# Patient Record
Sex: FEMALE | Race: WHITE | NOT HISPANIC OR LATINO | Employment: OTHER | URBAN - METROPOLITAN AREA
[De-identification: names, ages, dates, MRNs, and addresses within clinical notes are randomized per-mention and may not be internally consistent; named-entity substitution may affect disease eponyms.]

---

## 2017-04-17 ENCOUNTER — ALLSCRIPTS OFFICE VISIT (OUTPATIENT)
Dept: OTHER | Facility: OTHER | Age: 67
End: 2017-04-17

## 2017-04-17 DIAGNOSIS — M25.561 PAIN IN RIGHT KNEE: ICD-10-CM

## 2017-04-27 ENCOUNTER — APPOINTMENT (OUTPATIENT)
Dept: PHYSICAL THERAPY | Facility: CLINIC | Age: 67
End: 2017-04-27
Payer: COMMERCIAL

## 2017-04-27 PROCEDURE — 97161 PT EVAL LOW COMPLEX 20 MIN: CPT

## 2017-04-27 PROCEDURE — 97535 SELF CARE MNGMENT TRAINING: CPT

## 2017-05-15 ENCOUNTER — APPOINTMENT (OUTPATIENT)
Dept: PHYSICAL THERAPY | Facility: CLINIC | Age: 67
End: 2017-05-15
Payer: COMMERCIAL

## 2017-05-15 PROCEDURE — 97110 THERAPEUTIC EXERCISES: CPT

## 2017-05-18 ENCOUNTER — APPOINTMENT (OUTPATIENT)
Dept: PHYSICAL THERAPY | Facility: CLINIC | Age: 67
End: 2017-05-18
Payer: COMMERCIAL

## 2017-05-23 ENCOUNTER — APPOINTMENT (OUTPATIENT)
Dept: PHYSICAL THERAPY | Facility: CLINIC | Age: 67
End: 2017-05-23
Payer: COMMERCIAL

## 2017-05-23 PROCEDURE — 97110 THERAPEUTIC EXERCISES: CPT

## 2017-05-25 ENCOUNTER — APPOINTMENT (OUTPATIENT)
Dept: PHYSICAL THERAPY | Facility: CLINIC | Age: 67
End: 2017-05-25
Payer: COMMERCIAL

## 2017-05-26 ENCOUNTER — APPOINTMENT (OUTPATIENT)
Dept: PHYSICAL THERAPY | Facility: CLINIC | Age: 67
End: 2017-05-26
Payer: COMMERCIAL

## 2017-05-26 PROCEDURE — 97164 PT RE-EVAL EST PLAN CARE: CPT

## 2017-05-26 PROCEDURE — 97140 MANUAL THERAPY 1/> REGIONS: CPT

## 2017-05-26 PROCEDURE — 97110 THERAPEUTIC EXERCISES: CPT

## 2017-05-30 ENCOUNTER — APPOINTMENT (OUTPATIENT)
Dept: PHYSICAL THERAPY | Facility: CLINIC | Age: 67
End: 2017-05-30
Payer: COMMERCIAL

## 2017-05-30 PROCEDURE — 97110 THERAPEUTIC EXERCISES: CPT

## 2017-05-30 PROCEDURE — 97140 MANUAL THERAPY 1/> REGIONS: CPT

## 2017-05-31 ENCOUNTER — ALLSCRIPTS OFFICE VISIT (OUTPATIENT)
Dept: OTHER | Facility: OTHER | Age: 67
End: 2017-05-31

## 2017-05-31 DIAGNOSIS — R92.8 OTHER ABNORMAL AND INCONCLUSIVE FINDINGS ON DIAGNOSTIC IMAGING OF BREAST: ICD-10-CM

## 2017-06-01 ENCOUNTER — APPOINTMENT (OUTPATIENT)
Dept: PHYSICAL THERAPY | Facility: CLINIC | Age: 67
End: 2017-06-01
Payer: COMMERCIAL

## 2017-06-07 ENCOUNTER — APPOINTMENT (OUTPATIENT)
Dept: PHYSICAL THERAPY | Facility: CLINIC | Age: 67
End: 2017-06-07
Payer: COMMERCIAL

## 2017-06-07 PROCEDURE — 97110 THERAPEUTIC EXERCISES: CPT

## 2017-06-08 ENCOUNTER — APPOINTMENT (OUTPATIENT)
Dept: PHYSICAL THERAPY | Facility: CLINIC | Age: 67
End: 2017-06-08
Payer: COMMERCIAL

## 2017-06-08 PROCEDURE — 97110 THERAPEUTIC EXERCISES: CPT

## 2017-06-08 PROCEDURE — 97140 MANUAL THERAPY 1/> REGIONS: CPT

## 2017-06-13 ENCOUNTER — APPOINTMENT (OUTPATIENT)
Dept: PHYSICAL THERAPY | Facility: CLINIC | Age: 67
End: 2017-06-13
Payer: COMMERCIAL

## 2017-06-13 PROCEDURE — 97110 THERAPEUTIC EXERCISES: CPT

## 2017-06-15 ENCOUNTER — ALLSCRIPTS OFFICE VISIT (OUTPATIENT)
Dept: OTHER | Facility: OTHER | Age: 67
End: 2017-06-15

## 2017-06-15 ENCOUNTER — APPOINTMENT (OUTPATIENT)
Dept: PHYSICAL THERAPY | Facility: CLINIC | Age: 67
End: 2017-06-15
Payer: COMMERCIAL

## 2017-06-19 ENCOUNTER — APPOINTMENT (OUTPATIENT)
Dept: PHYSICAL THERAPY | Facility: CLINIC | Age: 67
End: 2017-06-19
Payer: COMMERCIAL

## 2017-06-19 PROCEDURE — 97164 PT RE-EVAL EST PLAN CARE: CPT

## 2017-06-20 ENCOUNTER — GENERIC CONVERSION - ENCOUNTER (OUTPATIENT)
Dept: OTHER | Facility: OTHER | Age: 67
End: 2017-06-20

## 2017-06-23 ENCOUNTER — HOSPITAL ENCOUNTER (OUTPATIENT)
Dept: MAMMOGRAPHY | Facility: CLINIC | Age: 67
Discharge: HOME | End: 2017-06-23
Payer: COMMERCIAL

## 2017-06-23 ENCOUNTER — HOSPITAL ENCOUNTER (OUTPATIENT)
Dept: ULTRASOUND IMAGING | Facility: CLINIC | Age: 67
Discharge: HOME | End: 2017-06-23
Payer: COMMERCIAL

## 2017-06-23 DIAGNOSIS — R92.8 OTHER ABNORMAL AND INCONCLUSIVE FINDINGS ON DIAGNOSTIC IMAGING OF BREAST: ICD-10-CM

## 2017-06-23 PROCEDURE — G0279 TOMOSYNTHESIS, MAMMO: HCPCS

## 2017-06-23 PROCEDURE — 76642 ULTRASOUND BREAST LIMITED: CPT

## 2017-06-23 PROCEDURE — G0204 DX MAMMO INCL CAD BI: HCPCS

## 2017-06-26 ENCOUNTER — GENERIC CONVERSION - ENCOUNTER (OUTPATIENT)
Dept: OTHER | Facility: OTHER | Age: 67
End: 2017-06-26

## 2017-09-26 ENCOUNTER — APPOINTMENT (OUTPATIENT)
Dept: LAB | Facility: CLINIC | Age: 67
End: 2017-09-26
Payer: COMMERCIAL

## 2017-09-26 ENCOUNTER — TRANSCRIBE ORDERS (OUTPATIENT)
Dept: LAB | Facility: CLINIC | Age: 67
End: 2017-09-26

## 2017-09-26 DIAGNOSIS — E78.5 OTHER AND UNSPECIFIED HYPERLIPIDEMIA: ICD-10-CM

## 2017-09-26 DIAGNOSIS — I10 ESSENTIAL HYPERTENSION, MALIGNANT: ICD-10-CM

## 2017-09-26 DIAGNOSIS — R94.30: Primary | ICD-10-CM

## 2017-09-26 LAB
25(OH)D3 SERPL-MCNC: 32.8 NG/ML (ref 30–100)
ALBUMIN SERPL BCP-MCNC: 3.7 G/DL (ref 3.5–5)
ALP SERPL-CCNC: 72 U/L (ref 46–116)
ALT SERPL W P-5'-P-CCNC: 17 U/L (ref 12–78)
ANION GAP SERPL CALCULATED.3IONS-SCNC: 10 MMOL/L (ref 4–13)
AST SERPL W P-5'-P-CCNC: 16 U/L (ref 5–45)
BASOPHILS # BLD AUTO: 0.02 THOUSANDS/ΜL (ref 0–0.1)
BASOPHILS NFR BLD AUTO: 0 % (ref 0–1)
BILIRUB SERPL-MCNC: 0.62 MG/DL (ref 0.2–1)
BUN SERPL-MCNC: 12 MG/DL (ref 5–25)
CALCIUM SERPL-MCNC: 8.7 MG/DL (ref 8.3–10.1)
CHLORIDE SERPL-SCNC: 107 MMOL/L (ref 100–108)
CHOLEST SERPL-MCNC: 269 MG/DL (ref 50–200)
CO2 SERPL-SCNC: 22 MMOL/L (ref 21–32)
CREAT SERPL-MCNC: 0.92 MG/DL (ref 0.6–1.3)
EOSINOPHIL # BLD AUTO: 0.15 THOUSAND/ΜL (ref 0–0.61)
EOSINOPHIL NFR BLD AUTO: 2 % (ref 0–6)
ERYTHROCYTE [DISTWIDTH] IN BLOOD BY AUTOMATED COUNT: 12.7 % (ref 11.6–15.1)
GFR SERPL CREATININE-BSD FRML MDRD: 66 ML/MIN/1.73SQ M
GLUCOSE P FAST SERPL-MCNC: 90 MG/DL (ref 65–99)
HCT VFR BLD AUTO: 43 % (ref 34.8–46.1)
HDLC SERPL-MCNC: 41 MG/DL (ref 40–60)
HGB BLD-MCNC: 14.7 G/DL (ref 11.5–15.4)
LDLC SERPL CALC-MCNC: 185 MG/DL (ref 0–100)
LYMPHOCYTES # BLD AUTO: 1.6 THOUSANDS/ΜL (ref 0.6–4.47)
LYMPHOCYTES NFR BLD AUTO: 24 % (ref 14–44)
MCH RBC QN AUTO: 30.8 PG (ref 26.8–34.3)
MCHC RBC AUTO-ENTMCNC: 34.2 G/DL (ref 31.4–37.4)
MCV RBC AUTO: 90 FL (ref 82–98)
MONOCYTES # BLD AUTO: 0.56 THOUSAND/ΜL (ref 0.17–1.22)
MONOCYTES NFR BLD AUTO: 9 % (ref 4–12)
NEUTROPHILS # BLD AUTO: 4.22 THOUSANDS/ΜL (ref 1.85–7.62)
NEUTS SEG NFR BLD AUTO: 65 % (ref 43–75)
NRBC BLD AUTO-RTO: 0 /100 WBCS
PLATELET # BLD AUTO: 237 THOUSANDS/UL (ref 149–390)
PMV BLD AUTO: 10.9 FL (ref 8.9–12.7)
POTASSIUM SERPL-SCNC: 3.5 MMOL/L (ref 3.5–5.3)
PROT SERPL-MCNC: 7.3 G/DL (ref 6.4–8.2)
RBC # BLD AUTO: 4.77 MILLION/UL (ref 3.81–5.12)
SODIUM SERPL-SCNC: 139 MMOL/L (ref 136–145)
TRIGL SERPL-MCNC: 216 MG/DL
TSH SERPL DL<=0.05 MIU/L-ACNC: 0.95 UIU/ML (ref 0.36–3.74)
WBC # BLD AUTO: 6.57 THOUSAND/UL (ref 4.31–10.16)

## 2017-09-26 PROCEDURE — 82306 VITAMIN D 25 HYDROXY: CPT

## 2017-09-26 PROCEDURE — 84443 ASSAY THYROID STIM HORMONE: CPT

## 2017-09-26 PROCEDURE — 80053 COMPREHEN METABOLIC PANEL: CPT

## 2017-09-26 PROCEDURE — 80061 LIPID PANEL: CPT

## 2017-09-26 PROCEDURE — 36415 COLL VENOUS BLD VENIPUNCTURE: CPT

## 2017-09-26 PROCEDURE — 85025 COMPLETE CBC W/AUTO DIFF WBC: CPT

## 2017-09-27 ENCOUNTER — GENERIC CONVERSION - ENCOUNTER (OUTPATIENT)
Dept: OTHER | Facility: OTHER | Age: 67
End: 2017-09-27

## 2017-10-04 ENCOUNTER — ALLSCRIPTS OFFICE VISIT (OUTPATIENT)
Dept: OTHER | Facility: OTHER | Age: 67
End: 2017-10-04

## 2018-01-11 NOTE — RESULT NOTES
Verified Results  MAMMO DIAGNOSTIC RIGHT W 3D & CAD [id number] 08:48AM Mary, Rayna   AREA ONLY SEEN 3D MAMMO     Test Name Result Flag Reference   [facility] DIAGNOSTIC RIGHT W 3D & CAD (Report)     Patient History:   Patient is postmenopausal    Family history of prostate cancer in father at age 48 or over  Excisional biopsy of both breasts, 2002  Benign core biopsy of    both breasts  Patient is an every day smoker, and has smoked for 17 years  Patient's BMI is 29 6  Reason for exam: follow-up at short interval from prior study  Mammo Diagnostic Right W DBT and CAD: September 16, 2016 - Check    In #: [de-identified]   2D/3D Procedure   3D views: MLO view(s) were taken of the right breast    2D views: CC and XCCL view(s) were taken of the right breast        Technologist: Be Babin, RT(R)(M)   Prior study comparison: March 15, 2016, mammo diagnostic right W    CAD, performed at [facility]  March 9, 2016, mammo screening bilateral W DBT and CAD performed at [place] [address]  April 25, 2008,    bilateral SLNBIC DIGTL BERT DX MAMMO w/CAD performed at [place] [address]  October 18, 2007, bilateral    digital bilat mammo cad performed at [place] [address]  The breast tissue is heterogeneously dense, potentially limiting    the sensitivity of mammography  Patient risk, included in this    report, assists in determining the appropriate screening regimen    (such as 3-D mammography or the inclusion of automated breast    ultrasound or MRI)  3-D mammography may also remain indicated as    screening  A combination of mediolateral oblique 3-D tomographic slices as    well as standard two-dimensional orthogonal images were obtained     No dominant soft tissue mass, architectural distortion or    suspicious calcifications are noted in the right breast   The    skin and nipple contours are within normal limits  No evidence of malignancy  No significant changes when compared with prior studies  ASSESSMENT: BiRad:1 - Negative     Recommendation:   Routine screening mammogram of the right breast in 1 year  A    reminder letter will be scheduled  8-10% of cancers will be missed on mammography  Management of a    palpable abnormality must be based on clinical grounds  Patients   will be notified of their results via letter from our facility  Accredited by [employer] and FDA       Transcription Location: [facility] 98: [id number]     Risk Value(s):   Tyrer-Cuzick 10 Year: 4 625%, Tyrer-Cuzick Lifetime: 10 285%,    Myriad Table: 1 5%, GILDA 5 Year: 2 8%, NCI Lifetime: 11 7%   Signed by:   Zenaida Yi MD   9/19/16

## 2018-01-12 VITALS
HEART RATE: 138 BPM | SYSTOLIC BLOOD PRESSURE: 138 MMHG | RESPIRATION RATE: 20 BRPM | OXYGEN SATURATION: 95 % | TEMPERATURE: 98.7 F | DIASTOLIC BLOOD PRESSURE: 82 MMHG | WEIGHT: 195 LBS | BODY MASS INDEX: 29.55 KG/M2 | HEIGHT: 68 IN

## 2018-01-12 NOTE — RESULT NOTES
Verified Results  MAMMO SCREENING BILATERAL W 3D & CAD [id number] 12:17PM Rayna Hernandez     Test Name Result Flag Reference   MAMMO SCREENING BILATERAL W 3D & CAD (Report)     Patient History:   Patient is postmenopausal    Family history of prostate cancer in father at age 48 or over  Excisional biopsy of both breasts, 2002  Benign core biopsy of    both breasts  Patient is an every day smoker, and has smoked for 17 years  Patient's BMI is 29 6  Reason for exam: screening (asymptomatic)  Screening     Mammo Screening Bilateral W DBT and CAD: March 9, 2016 - Check In   #: [de-identified]   2D/3D Procedure   3D views: Bilateral MLO view(s) were taken  2D views: Bilateral CC and XCCL view(s) were taken  Technologist: MARGARITA Munoz (MARGARITA)(M)   Prior study comparison: April 25, 2008, bilateral SLNBIC DIGTL    BERT DX MAMMO w/CAD performed at [place] [address]  April 25, 2008, bilateral ultrasound performed    at [place] [address]  October 18, 2007,   bilateral digital bilat mammo cad performed at [place] [address]  October 18, 2007, bilateral    ultrasound performed at [place] [address]  Focal spiculation in the upper posterior right breast in the    approximate midline appears to be new when compared to prior    examinations estimated at 9 mm in greatest dimension  Further    evaluation with diagnostic mammography and, if necessary,    targeted right breast ultrasound is recommended  Stable sharply circumscribed nodular densities in the lower inner   right breast and slightly lower and slightly inner left breast    are stable from previous examinations and can be considered    benign  Postsurgical tissue distortion is noted in both breasts,   stable   There are no other dominant masses, foci of    architectural distortion or suspicious clusters of calcification    in either breast to suggest malignancy  ASSESSMENT: BiRad:0 - Incomplete: needs additional imaging    evaluation     Recommendation:   Further imaging of the right breast    A breast health care nurse from our facility will be contacting    the patient regarding the need for additional imaging  8-10% of cancers will be missed on mammography  Management of a    palpable abnormality must be based on clinical grounds  Patients    will be notified of their results via letter from our facility  Accredited by [employer] and FDA       Transcription Location: [facility] 98: [id number]     Risk Value(s):   Tyrer-Cuzick 10 Year: 4 625%, Tyrer-Cuzick Lifetime: 10 285%,    Myriad Table: 1 5%, GILDA 5 Year: 2 8%, NCI Lifetime: 11 7%

## 2018-01-12 NOTE — RESULT NOTES
Verified Results  (1) CBC/PLT/DIFF 26Sep2017 08:36AM Rayna Hernandez     Test Name Result Flag Reference   WBC COUNT 6 57 Thousand/uL  4 31-10 16   RBC COUNT 4 77 Million/uL  3 81-5 12   HEMOGLOBIN 14 7 g/dL  11 5-15 4   HEMATOCRIT 43 0 %  34 8-46  1   MCV 90 fL  82-98   MCH 30 8 pg  26 8-34 3   MCHC 34 2 g/dL  31 4-37 4   RDW 12 7 %  11 6-15 1   MPV 10 9 fL  8 9-12 7   PLATELET COUNT 438 Thousands/uL  149-390   nRBC AUTOMATED 0 /100 WBCs     NEUTROPHILS RELATIVE PERCENT 65 %  43-75   LYMPHOCYTES RELATIVE PERCENT 24 %  14-44   MONOCYTES RELATIVE PERCENT 9 %  4-12   EOSINOPHILS RELATIVE PERCENT 2 %  0-6   BASOPHILS RELATIVE PERCENT 0 %  0-1   NEUTROPHILS ABSOLUTE COUNT 4 22 Thousands/? ??L  1 85-7 62   LYMPHOCYTES ABSOLUTE COUNT 1 60 Thousands/? ??L  0 60-4 47   MONOCYTES ABSOLUTE COUNT 0 56 Thousand/? ??L  0 17-1 22   EOSINOPHILS ABSOLUTE COUNT 0 15 Thousand/? ??L  0 00-0 61   BASOPHILS ABSOLUTE COUNT 0 02 Thousands/? ??L  0 00-0 10   This is a patient instruction: This test is non-fasting  Please drink two glasses of water morning of bloodwork  (1) VITAMIN D 25-HYDROXY 26Sep2017 08:36AM Rayna Hernandez     Test Name Result Flag Reference   VIT D 25-HYDROX 32 8 ng/mL  30 0-100 0   This assay is a certified procedure of the CDC Vitamin D Standardization Certification Program (VDSCP)     Deficiency <20ng/ml   Insufficiency 20-30ng/ml   Sufficient  ng/ml     *Patients undergoing fluorescein dye angiography may retain small amounts of fluorescein in the body for 48-72 hours post procedure  Samples containing fluorescein can produce falsely elevated Vitamin D values  If the patient had this procedure, a specimen should be resubmitted post fluorescein clearance       (1) COMPREHENSIVE METABOLIC PANEL [id number] 73:52GG Rayna Hernandez     Test Name Result Flag Reference   SODIUM 139 mmol/L  136-145   POTASSIUM 3 5 mmol/L  3 5-5 3   CHLORIDE 107 mmol/L  100-108   CARBON DIOXIDE 22 mmol/L  21-32   ANION GAP (CALC) 10 mmol/L  4-13   BLOOD UREA NITROGEN 12 mg/dL  5-25   CREATININE 0 92 mg/dL  0 60-1 30   Standardized to IDMS reference method   CALCIUM 8 7 mg/dL  8 3-10 1   BILI, TOTAL 0 62 mg/dL  0 20-1 00   ALK PHOSPHATAS 72 U/L     ALT (SGPT) 17 U/L  12-78   Specimen collection should occur prior to Sulfasalazine and/or Sulfapyridine administration due to the potential for falsely depressed results  AST(SGOT) 16 U/L  5-45   Specimen collection should occur prior to Sulfasalazine administration due to the potential for falsely depressed results  ALBUMIN 3 7 g/dL  3 5-5 0   TOTAL PROTEIN 7 3 g/dL  6 4-8 2   eGFR 66 ml/min/1 73sq m     National Kidney Disease Education Program recommendations are as follows:  GFR calculation is accurate only with a steady state creatinine  Chronic Kidney disease less than 60 ml/min/1 73 sq  meters  Kidney failure less than 15 ml/min/1 73 sq  meters  GLUCOSE FASTING 90 mg/dL  65-99   Specimen collection should occur prior to Sulfasalazine administration due to the potential for falsely depressed results  Specimen collection should occur prior to Sulfapyridine administration due to the potential for falsely elevated results  (1) TSH [id number] 08:36AM Rayna Hernandez     Test Name Result Flag Reference   TSH 0 953 uIU/mL  0 358-3 740   This is a patient instruction: This test is non-fasting  Please drink two glasses of water morning of bloodwork  Patients undergoing fluorescein dye angiography may retain small amounts of fluorescein in the body for 48-72 hours post procedure  Samples containing fluorescein can produce falsely depressed TSH values  If the patient had this procedure,a specimen should be resubmitted post fluorescein clearance            The recommended reference ranges for TSH during pregnancy are as follows:  First trimester 0 1 to 2 5 uIU/mL  Second trimester  0 2 to 3 0 uIU/mL  Third trimester 0 3 to 3 0 uIU/m     (1) LIPID PANEL, FASTING [id number] 08:36AM Romel Arms     Test Name Result Flag Reference   CHOLESTEROL 269 mg/dL H    HDL,DIRECT 41 mg/dL  40-60   Specimen collection should occur prior to Metamizole administration due to the potential for falsley depressed results  LDL CHOLESTEROL CALCULATED 185 mg/dL H 0-100   This is a patient instruction: This is a fasting test  Water, black tea or black coffee only after 9:00pm the night before the test  Drink 2 glasses of water the morning of the test         Triglyceride:        Normal <150 mg/dl   Borderline High 150-199 mg/dl   High 200-499 mg/dl   Very High >499 mg/dl      Cholesterol:       Desirable <200 mg/dl    Borderline High 200-239 mg/dl    High >239 mg/dl      HDL Cholesterol:       High>59 mg/dL    Low <41 mg/dL      This screening LDL is a calculated result  It does not have the accuracy of the Direct Measured LDL in the monitoring of patients with hyperlipidemia and/or statin therapy  Direct Measure LDL (EDZ924) must be ordered separately in these patients  TRIGLYCERIDES 216 mg/dL H <=150   Specimen collection should occur prior to N-Acetylcysteine or Metamizole administration due to the potential for falsely depressed results

## 2018-01-12 NOTE — RESULT NOTES
Verified Results  * DXA BONE DENSITY SPINE HIP AND PELVIS [id number] 12:17PM Rayna Hernandez     Test Name Result Flag Reference   DXA BONE DENSITY SPINE HIP AND PELVIS (Report)     CENTRAL DXA SCAN     CLINICAL HISTORY:  63year old post-menopausal  female risk factors include postmenopausal, estrogen deficiency  TECHNIQUE: Bone densitometry was performed using a [employer] [place] C bone densitometer  Regions of interest appear properly placed  There are no obvious fractures or other confounding variables which could limit the study  None     COMPARISON: Baseline     RESULTS:    LUMBAR SPINE: L1-L4:   BMD 1 091 gm/cm2   T-score normal, 0 4   Z-score +2 0     LEFT TOTAL HIP:   BMD 0 914 gm/cm2   T-score normal, -0 2   Z-score 0 9     LEFT FEMORAL NECK:   BMD 0 857 gm/cm2   T-score normal, 0 1   Z-score +1 5           ASSESSMENT:   1  Based on the WHO classification, this study is normal and the patient is considered at low risk for fracture  2  A daily intake of calcium of at least 1200 mg and vitamin D, 800-1000 IU, as well as weight bearing and muscle strengthening exercise, fall prevention and avoidance of tobacco and excessive alcohol intake as basic preventive measures are recommended  3  Repeat DXA scan in 18-24 months as clinically indicated  WHO CLASSIFICATION:   Normal (a T-score of -1 0 or higher)   Low bone mineral density (a T-score of less than -1 0 but higher than -2 5)   Osteoporosis (a T-score of -2 5 or less)   Severe osteoporosis (a T-score of -2 5 or less with a fragility fracture)      Thank you for allowing us the opportunity to participate in your patient care  The expanded DEXA report will no longer be arriving in your mail   If you desire to view the full report please contact [address] [place] [address] or access the PACS system       Workstation performed: [id number]

## 2018-01-12 NOTE — RESULT NOTES
Verified Results  MAMMO DIAGNOSTIC RIGHT W CAD [id number] 08:34AM Tamiko Oden Order Number: [id number]     Test Name Result Flag Reference   MAMMO DIAGNOSTIC RIGHT W CAD (Report)     Patient History:   Patient is postmenopausal    Family history of prostate cancer in father at age 48 or over  Excisional biopsy of both breasts, 2002  Benign core biopsy of    both breasts  Patient is an every day smoker, and has smoked for 17 years  Patient's BMI is 29 6  Reason for exam: additional evaluation requested from abnormal    screening  Indeterminate nodular density seen in the right breast on 3-D    tomographic screening  Mammo Diagnostic Right W CAD: March 15, 2016 - Check In #:    [de-identified]   Spot compression CC, spot compression MLO, and ML view(s) were    taken of the right breast      Technologist: Lilia Acevedo RT(R)(M)   Prior study comparison: March 9, 2016, mammo screening bilateral    W DBT and CAD, performed at [place] [address]  April 25, 2008, bilateral SLNBIC DIGTL BERT DX MAMMO    w/CAD, performed at [place] [address]  October 18, 2007, bilateral digital bilat mammo cad, performed at   [place] [address]  There are scattered fibroglandular densities  A convincing focal   high density nodule at the 12 o'clock position in the posterior    upper right breast is not visible on diagnostic mammographic    views  Targeted right breast ultrasound reveals fibrocystic type   tissue at the 12 o'clock position 7 cm from the nipple possibly    corresponding to the mammographic abnormality  Because the 3-D tomographic screening mammographic finding was so   convincing, 6 month 3-D tomographic mammographic follow-up is    recommended on the right for this probably benign finding       US Breast Right Limited: March 15, 2016 - Check In #: [de-identified]   Technologist: Cristy Chance RDMS     ASSESSMENT: BiRad:3 - Probably Benign (Overall)     Recommendation:   Follow-up diagnostic mammogram of the right breast in 6 months  Because the screening 3-D tomographic screening mammographic    finding was so convincing, 6 month 3-D tomographic mammographic    follow-up is recommended on the right for this probably benign    finding     Analyzed by CAD     Transcription Location: [address]   Signing Station: [id number]     Risk Value(s):   Tyrer-Cuzick 10 Year: 4 625%, Tyrer-Cuzick Lifetime: 10 285%,    Myriad Table: 1 5%, GILDA 5 Year: 2 8%, NCI Lifetime: 11 7%

## 2018-01-13 VITALS
SYSTOLIC BLOOD PRESSURE: 136 MMHG | WEIGHT: 199 LBS | DIASTOLIC BLOOD PRESSURE: 72 MMHG | HEART RATE: 86 BPM | TEMPERATURE: 98.6 F | RESPIRATION RATE: 18 BRPM | BODY MASS INDEX: 30.16 KG/M2 | HEIGHT: 68 IN

## 2018-01-13 NOTE — RESULT NOTES
Verified Results  (1) LIPID PANEL FASTING W DIRECT LDL REFLEX [id number] 08:26AM Herb June Order Number: [id number]  TW Order Number: [id number]     Test Name Result Flag Reference   CHOLESTEROL 270 mg/dL H    LDL CHOLESTEROL CALCULATED 179 mg/dL H 0-100   - Patient Instructions: This is a fasting blood test  Water, black tea or black coffee only after 9:00pm the night before test   Drink 2 glasses of water the morning of test    - Patient Instructions: This is a fasting blood test  Water, black tea or black coffee only after 9:00pm the night before test   Drink 2 glasses of water the morning of test     - Patient Instructions: This is a fasting blood test  Water, black tea or black coffee only after 9:00pm the night before test Drink 2 glasses of water the morning of test - Patient Instructions: This is a fasting blood test  Water, black tea or black   coffee only after 9:00pm the night before test Drink 2 glasses of water the morning of test   Triglyceride:         Normal              <150 mg/dl       Borderline High    150-199 mg/dl       High               200-499 mg/dl       Very High          >499 mg/dl  Cholesterol:         Desirable        <200 mg/dl      Borderline High  200-239 mg/dl      High             >239 mg/dl  HDL Cholesterol:        High    >59 mg/dL      Low     <41 mg/dL  LDL Cholesterol:        Optimal          <100 mg/dl        Near Optimal     100-129 mg/dl        Above Optimal          Borderline High   130-159 mg/dl          High              160-189 mg/dl          Very High        >189 mg/dl  LDL CALCULATED:    This screening LDL is a calculated result  It does not have the accuracy of the Direct Measured LDL in the monitoring of patients with hyperlipidemia and/or statin therapy  Direct Measure LDL (JSJ043) must be ordered separately in these patients     TRIGLYCERIDES 233 mg/dL H <=150   Specimen collection should occur prior to N-Acetylcysteine or Metamizole administration due to the potential for falsely depressed results  HDL,DIRECT 44 mg/dL  40-60   Specimen collection should occur prior to Metamizole administration due to the potential for falsely depressed results  (1) CBC/ PLT (NO DIFF) 21Oct2016 08:26AM Rayna Hernandez     Test Name Result Flag Reference   HEMATOCRIT 41 7 %  34 8-46 1   HEMOGLOBIN 14 6 g/dL  11 5-15 4   MCHC 35 0 g/dL  31 4-37 4   MCH 31 3 pg  26 8-34 3   MCV 89 fL  82-98   PLATELET COUNT 971 Thousands/uL  149-390   RBC COUNT 4 67 Million/uL  3 81-5 12   RDW 12 8 %  11 6-15 1   WBC COUNT 7 25 Thousand/uL  4 31-10 16   MPV 10 6 fL  8 9-12 7     (1) VITAMIN D 25-HYDROXY 21Oct2016 08:26AM Rayna Hernandez     Test Name Result Flag Reference   VIT D 25-HYDROX 30 3 ng/mL  30 0-100 0   This assay is a certified procedure of the CDC Vitamin D Standardization Certification Program (VDSCP)     Deficiency <20ng/ml   Insufficiency 20-30ng/ml   Sufficient  ng/ml     *Patients undergoing fluorescein dye angiography may retain small amounts of fluorescein in the body for 48-72 hours post procedure  Samples containing fluorescein can produce falsely elevated Vitamin D values  If the patient had this procedure, a specimen should be resubmitted post fluorescein clearance  (1) COMPREHENSIVE METABOLIC PANEL [id number] 39:65EP Rayna Hernandez     Test Name Result Flag Reference   GLUCOSE,RANDM 106 mg/dL     If the patient is fasting, the ADA then defines impaired fasting glucose as > 100 mg/dL and diabetes as > or equal to 123 mg/dL     SODIUM 139 mmol/L  136-145   POTASSIUM 3 8 mmol/L  3 5-5 3   CHLORIDE 108 mmol/L  100-108   CARBON DIOXIDE 27 mmol/L  21-32   ANION GAP (CALC) 4 mmol/L  4-13   BLOOD UREA NITROGEN 13 mg/dL  5-25   CREATININE 1 17 mg/dL  0 60-1 30   Standardized to IDMS reference method   CALCIUM 8 8 mg/dL  8 3-10 1   BILI, TOTAL 0 59 mg/dL  0 20-1 00   ALK PHOSPHATAS 77 U/L     ALT (SGPT) 32 U/L  12-78 AST(SGOT) 23 U/L  5-45   ALBUMIN 3 9 g/dL  3 5-5 0   TOTAL PROTEIN 7 3 g/dL  6 4-8 2   eGFR Non-African American 46 6 ml/min/1 73sq m     [facility] Energy Disease Education Program recommendations are as follows:  GFR calculation is accurate only with a steady state creatinine  Chronic Kidney disease less than 60 ml/min/1 73 sq  meters  Kidney failure less than 15 ml/min/1 73 sq  meters  (1) TSH [id number] 08:26AM Rayna Hernandez     Test Name Result Flag Reference   TSH 0 901 uIU/mL  0 358-3 740   Patients undergoing fluorescein dye angiography may retain small amounts of fluorescein in the body for 48-72 hours post procedure  Samples containing fluorescein can produce falsely depressed TSH values  If the patient had this procedure,a specimen should be resubmitted post fluorescein clearance            The recommended reference ranges for TSH during pregnancy are as follows:  First trimester 0 1 to 2 5 uIU/mL  Second trimester  0 2 to 3 0 uIU/mL  Third trimester 0 3 to 3 0 uIU/m

## 2018-01-14 VITALS
BODY MASS INDEX: 28.34 KG/M2 | TEMPERATURE: 98.2 F | DIASTOLIC BLOOD PRESSURE: 66 MMHG | RESPIRATION RATE: 20 BRPM | HEART RATE: 84 BPM | SYSTOLIC BLOOD PRESSURE: 112 MMHG | WEIGHT: 187 LBS | HEIGHT: 68 IN

## 2018-01-14 VITALS
DIASTOLIC BLOOD PRESSURE: 70 MMHG | OXYGEN SATURATION: 96 % | HEIGHT: 68 IN | TEMPERATURE: 98.4 F | SYSTOLIC BLOOD PRESSURE: 140 MMHG | RESPIRATION RATE: 18 BRPM | BODY MASS INDEX: 29.7 KG/M2 | WEIGHT: 196 LBS | HEART RATE: 112 BPM

## 2018-01-16 NOTE — RESULT NOTES
Verified Results  *US BREAST RIGHT LIMITED (DIAGNOSTIC) [id number] 03:47PM Maicol Lindo Order Number: [id number]    - Patient Instructions: To schedule this appointment, please contact Central Scheduling at [phone]  Do not wear any perfume, powder, lotion or deodorant on breast or underarm area  Please bring your doctors order, referral (if needed) and insurance information with you on the day of the test  Failure to bring this information may result in this test being rescheduled  Arrive 15 minutes prior to your appointment time to register  On the day of your test, please bring any prior mammogram or breast studies with you that were not performed at a [facility]  Failure to bring prior exams may result in your test needing to be rescheduled  Test Name Result Flag Reference   US BREAST RIGHT LIMITED (Report)     Patient History:   Patient is postmenopausal    Family history of prostate cancer at age 48 or over in father  Excisional biopsy of both breasts, 2002  Benign core biopsy of    both breasts  Patient is an every day smoker, and has smoked for 17 years  Patient's BMI is 29 6  Reason for exam: high-risk patient  Mammo Diagnostic Bilateral W DBT and CAD: June 23, 2017 - Check    In #: [de-identified]   2D/3D Procedure   3D views: Bilateral MLO and CC view(s) were taken  2D views: Bilateral CC view(s) were taken  Technologist: RT Carey(R)(M)   Prior study comparison: September 16, 2016, mammo diagnostic    right W DBT and CAD, performed at [place] [address]  March 15, 2016, mammo diagnostic right W CAD    performed at [address] [place] [address]  March 15, 2016,    US breast right limited performed at [address] [place] [address]  March 9, 2016, mammo screening bilateral W DBT and CAD,    performed at [place] [address]   April 25, 2008, bilateral SLNBIC DIGTL BERT DX MAMMO w/CAD, performed at   [facility]  There are scattered fibroglandular densities  US Breast Right Limited: June 23, 2017 - Check In #: [de-identified]   Standard views  Technologist: Charity Canseco   A uniformly echogenic layer of fibroglandular tissue  The    parenchymal pattern is stable  Again identified are the stable    nodules in the medial breast bilaterally  No new densities are    seen  The upper right breast vague nodular asymmetry, described    on the study of March 9, 2016, is not identified  No other    densities are seen  There are no suspicious microcalcifications  There is no evidence of axillary adenopathy or skin thickening  Following diagnostic mammography, the patient was taken to the    ultrasound suite     RIGHT BREAST ULTRASOUND:     Grayscale sonography of the upper right breast was performed in    multiple projections using real time imaging  At the 12 o'clock position, there is a vague hypoechoic area    measuring 0 6 cm, not significantly changed from a prior    ultrasound and having an appearance that is suggestive of    fibrocystic change  No new densities are seen  There is no    evidence of discrete cystic or solid mass or suspicious area of    shadowing  1  Stable bilateral mammogram    2  Stable upper right breast ultrasound with no suspicious    finding seen  3  The patient can return to routine screening protocol with    bilateral mammography in one year  ACR BI-RADSï¾® Assessments: BiRad:2 - Benign (Overall)   Diag Mammo: BiRad:2 - benign finding in both breasts  Right breast Right Brst US: BiRad:2 - benign finding in the right   breast      Recommendation:   Routine screening mammogram in 1 year  A reminder letter will be   scheduled       Transcription Location: [facility] 98: [id number]     Risk Value(s):   Tyrer-Cuzick 10 Year: 4 600%, Tyrer-Cuzick Lifetime: 9 900%,    Myriad Table: 1 5%, GILDA 5 Year: 2 9%, NCI Lifetime: 11 3%   Signed by:   Stephan Marion MD   6/23/17     MAMMO DIAGNOSTIC BILATERAL W 3D & CAD [id number] 02:33PM Kaylee Ma Order Number: [id number]    - Patient Instructions: To schedule this appointment, please contact Central Scheduling at [phone]  Do not wear any perfume, powder, lotion or deodorant on breast or underarm area  Please bring your doctors order, referral (if needed) and insurance information with you on the day of the test  Failure to bring this information may result in this test being rescheduled  Arrive 15 minutes prior to your appointment time to register  On the day of your test, please bring any prior mammogram or breast studies with you that were not performed at a [facility]  Failure to bring prior exams may result in your test needing to be rescheduled  Test Name Result Flag Reference   MAMMO DIAGNOSTIC BILATERAL W 3D & CAD (Report)     Patient History:   Patient is postmenopausal    Family history of prostate cancer at age 48 or over in father  Excisional biopsy of both breasts, 2002  Benign core biopsy of    both breasts  Patient is an every day smoker, and has smoked for 17 years  Patient's BMI is 29 6  Reason for exam: high-risk patient  Mammo Diagnostic Bilateral W DBT and CAD: June 23, 2017 - Check    In #: [de-identified]   2D/3D Procedure   3D views: Bilateral MLO and CC view(s) were taken  2D views: Bilateral CC view(s) were taken  Technologist: RT Garrison(R)(M)   Prior study comparison: September 16, 2016, mammo diagnostic    right W DBT and CAD, performed at [address] [place] [address]  March 15, 2016, mammo diagnostic right W CAD    performed at [address] [place] [address]  March 15, 2016,    US breast right limited performed at [address] [place] [address]  March 9, 2016, mammo screening bilateral W DBT and CAD,    performed at [address] [place] [address]   April 25, 2008, bilateral SLNBIC DIGTL BERT DX MAMMO w/CAD, performed at   [facility]  There are scattered fibroglandular densities  US Breast Right Limited: June 23, 2017 - Check In #: [de-identified]   Standard views  Technologist: Micha Arthur   A uniformly echogenic layer of fibroglandular tissue  The    parenchymal pattern is stable  Again identified are the stable    nodules in the medial breast bilaterally  No new densities are    seen  The upper right breast vague nodular asymmetry, described    on the study of March 9, 2016, is not identified  No other    densities are seen  There are no suspicious microcalcifications  There is no evidence of axillary adenopathy or skin thickening  Following diagnostic mammography, the patient was taken to the    ultrasound suite     RIGHT BREAST ULTRASOUND:     Grayscale sonography of the upper right breast was performed in    multiple projections using real time imaging  At the 12 o'clock position, there is a vague hypoechoic area    measuring 0 6 cm, not significantly changed from a prior    ultrasound and having an appearance that is suggestive of    fibrocystic change  No new densities are seen  There is no    evidence of discrete cystic or solid mass or suspicious area of    shadowing  1  Stable bilateral mammogram    2  Stable upper right breast ultrasound with no suspicious    finding seen  3  The patient can return to routine screening protocol with    bilateral mammography in one year  ACR BI-RADSï¾® Assessments: BiRad:2 - Benign (Overall)   Diag Mammo: BiRad:2 - benign finding in both breasts  Right breast Right Brst US: BiRad:2 - benign finding in the right   breast      Recommendation:   Routine screening mammogram in 1 year  A reminder letter will be   scheduled       Transcription Location: [facility] 98: [id number]     Risk Value(s):   Ariel 10 Year: 4 600%, Ariel Lifetime: 9 900%,    Myriad Table: 1 5%, GILDA 5 Year: 2 9%, NCI Lifetime: 11 3%   Signed by:   Kulwinder Carvalho MD   6/23/17

## 2018-02-12 ENCOUNTER — TELEPHONE (OUTPATIENT)
Dept: INTERNAL MEDICINE CLINIC | Facility: CLINIC | Age: 68
End: 2018-02-12

## 2018-02-26 DIAGNOSIS — I10 HYPERTENSION, UNSPECIFIED TYPE: Primary | ICD-10-CM

## 2018-02-26 RX ORDER — BIOTIN 5 MG
TABLET ORAL DAILY
COMMUNITY
Start: 2017-10-04 | End: 2018-03-19 | Stop reason: SINTOL

## 2018-02-26 RX ORDER — ALBUTEROL SULFATE 90 UG/1
2 AEROSOL, METERED RESPIRATORY (INHALATION) EVERY 4 HOURS PRN
COMMUNITY
Start: 2017-04-17 | End: 2018-09-06 | Stop reason: SDUPTHER

## 2018-02-26 RX ORDER — ATORVASTATIN CALCIUM 40 MG/1
1 TABLET, FILM COATED ORAL DAILY
COMMUNITY
Start: 2017-09-27 | End: 2018-09-06 | Stop reason: SDUPTHER

## 2018-02-26 RX ORDER — AMLODIPINE BESYLATE 10 MG/1
1 TABLET ORAL DAILY
COMMUNITY
Start: 2014-01-03 | End: 2018-02-26 | Stop reason: SDUPTHER

## 2018-02-26 RX ORDER — AMLODIPINE BESYLATE 10 MG/1
10 TABLET ORAL DAILY
Qty: 30 TABLET | Refills: 5 | Status: SHIPPED | OUTPATIENT
Start: 2018-02-26 | End: 2018-07-20 | Stop reason: SDUPTHER

## 2018-02-26 RX ORDER — MOMETASONE FUROATE 50 UG/1
2 SPRAY, METERED NASAL DAILY
COMMUNITY
Start: 2018-01-12 | End: 2018-08-06 | Stop reason: ALTCHOICE

## 2018-02-26 RX ORDER — ERGOCALCIFEROL 1.25 MG/1
1 CAPSULE ORAL WEEKLY
COMMUNITY
Start: 2017-10-04 | End: 2018-03-19 | Stop reason: SINTOL

## 2018-02-26 RX ORDER — DIAZEPAM 5 MG/1
TABLET ORAL
COMMUNITY
Start: 2017-11-03 | End: 2018-03-19 | Stop reason: ALTCHOICE

## 2018-03-19 ENCOUNTER — OFFICE VISIT (OUTPATIENT)
Dept: FAMILY MEDICINE CLINIC | Facility: CLINIC | Age: 68
End: 2018-03-19
Payer: COMMERCIAL

## 2018-03-19 VITALS
RESPIRATION RATE: 16 BRPM | DIASTOLIC BLOOD PRESSURE: 68 MMHG | SYSTOLIC BLOOD PRESSURE: 128 MMHG | BODY MASS INDEX: 29.41 KG/M2 | HEIGHT: 67 IN | TEMPERATURE: 98.5 F | HEART RATE: 103 BPM | WEIGHT: 187.4 LBS

## 2018-03-19 DIAGNOSIS — Z13.820 SCREENING FOR OSTEOPOROSIS: ICD-10-CM

## 2018-03-19 DIAGNOSIS — E78.5 HYPERLIPIDEMIA, UNSPECIFIED HYPERLIPIDEMIA TYPE: ICD-10-CM

## 2018-03-19 DIAGNOSIS — Z78.0 POST-MENOPAUSAL: ICD-10-CM

## 2018-03-19 DIAGNOSIS — J44.9 CHRONIC OBSTRUCTIVE PULMONARY DISEASE, UNSPECIFIED COPD TYPE (HCC): Primary | ICD-10-CM

## 2018-03-19 DIAGNOSIS — R53.83 FATIGUE, UNSPECIFIED TYPE: ICD-10-CM

## 2018-03-19 DIAGNOSIS — Z12.11 SCREENING FOR COLON CANCER: ICD-10-CM

## 2018-03-19 DIAGNOSIS — Z12.39 SCREENING FOR BREAST CANCER: ICD-10-CM

## 2018-03-19 PROBLEM — J30.9 ALLERGIC RHINITIS: Status: ACTIVE | Noted: 2017-06-01

## 2018-03-19 PROBLEM — E55.9 VITAMIN D DEFICIENCY: Status: ACTIVE | Noted: 2017-10-04

## 2018-03-19 PROCEDURE — 3725F SCREEN DEPRESSION PERFORMED: CPT | Performed by: PHYSICIAN ASSISTANT

## 2018-03-19 PROCEDURE — 99214 OFFICE O/P EST MOD 30 MIN: CPT | Performed by: PHYSICIAN ASSISTANT

## 2018-03-19 RX ORDER — ASCORBIC ACID 500 MG
500 TABLET ORAL DAILY
COMMUNITY

## 2018-03-19 RX ORDER — FLUTICASONE FUROATE AND VILANTEROL 100; 25 UG/1; UG/1
1 POWDER RESPIRATORY (INHALATION) DAILY
Qty: 30 EACH | Refills: 5 | Status: SHIPPED | OUTPATIENT
Start: 2018-03-19 | End: 2018-07-20 | Stop reason: CLARIF

## 2018-03-19 RX ORDER — CETIRIZINE HYDROCHLORIDE 10 MG/1
10 TABLET ORAL DAILY
COMMUNITY

## 2018-03-19 RX ORDER — MAG HYDROX/ALUMINUM HYD/SIMETH 400-400-40
1 SUSPENSION, ORAL (FINAL DOSE FORM) ORAL DAILY
COMMUNITY

## 2018-03-19 RX ORDER — CHROMIUM 200 MCG
1 TABLET ORAL DAILY
COMMUNITY

## 2018-03-19 NOTE — ASSESSMENT & PLAN NOTE
Start breo to treat COPD symptoms  Continue albuterol prn   Labs, mammogram, dexa scan and FIT test supplies given

## 2018-03-19 NOTE — PROGRESS NOTES
Assessment/Plan:    Chronic obstructive pulmonary disease ([facility] )  Start breo to treat COPD symptoms  Continue albuterol prn  Labs, mammogram, dexa scan and FIT test supplies given       Diagnoses and all orders for this visit:    Chronic obstructive pulmonary disease, unspecified COPD type ([facility] )  -     fluticasone furoate-vilanterol (BREO ELLIPTA); Inhale 1 puff daily    Screening for osteoporosis  -     DXA bone density spine hip and pelvis; Future    Post-menopausal  -     DXA bone density spine hip and pelvis; Future    Screening for breast cancer  -     Mammo screening bilateral w cad; Future    Screening for colon cancer  -     Ambulatory referral to Gastroenterology; Future    Hyperlipidemia, unspecified hyperlipidemia type  -     Lipid panel; Future    Fatigue, unspecified type  -     CBC and differential; Future  -     Comprehensive metabolic panel; Future  -     TSH, 3rd generation; Future    Other orders  -     Cholecalciferol (VITAMIN D3) 5000 units CAPS; Take 1 capsule by mouth daily  -     Probiotic Product (PROBIOTIC-10 PO); Take 1 capsule by mouth daily  -     ascorbic acid (VITAMIN C) 500 mg tablet; Take 500 mg by mouth daily  -     L-Lysine 500 MG CAPS; Take 1 capsule by mouth daily  -     cetirizine (ZyrTEC) 10 mg tablet; Take 10 mg by mouth daily          Subjective:      Patient ID: Evy Robison is a 74 y o  female  Pt presents for routine visit  She was recently in [facility] visiting family for the last few months  While there she had a COPD exacerbation due to the change in weather  Since being home it has improved but she feels her breathing is more labored  She is due for dexa, mammogram and colonoscopy  The following portions of the patient's history were reviewed and updated as appropriate:   She  has a past medical history of Allergic rhinitis; Fibroadenoma of breast; Hyperlipidemia; and Hypertension    She   Patient Active Problem List    Diagnosis Date Noted    Vitamin D deficiency 10/04/2017    Allergic rhinitis 06/01/2017    Depressive disorder 08/29/2016    Cigarette nicotine dependence without complication 79/51/8298    GABI (obstructive sleep apnea) 07/28/2015    Chronic obstructive pulmonary disease ([facility] ) 06/23/2015    Hyperlipidemia 06/22/2015    Anxiety 06/10/2015    Asthma 06/10/2015    Lung disease 06/10/2015    Hypertension 11/07/2013    Benign essential hypertension 05/30/2012     She  has a past surgical history that includes Breast lumpectomy; Bunionectomy (Bilateral); Tubal ligation; Tonsillectomy; and Mole removal   Her family history includes Alcohol abuse in her mother; Diabetes in her father; Hypertension in her father and mother; Prostate cancer in her father; Vitamin D deficiency in her sister  She  reports that she has been smoking Cigarettes  She has a 12 50 pack-year smoking history  She has never used smokeless tobacco  She reports that she drinks alcohol  She reports that she does not use drugs  Current Outpatient Prescriptions   Medication Sig Dispense Refill    albuterol (VENTOLIN HFA) 90 mcg/act inhaler Inhale 2 puffs every 4 (four) hours as needed      amLODIPine (NORVASC) 10 mg tablet Take 1 tablet (10 mg total) by mouth daily 30 tablet 5    ascorbic acid (VITAMIN C) 500 mg tablet Take 500 mg by mouth daily      atorvastatin (LIPITOR) 40 mg tablet Take 1 tablet by mouth daily      cetirizine (ZyrTEC) 10 mg tablet Take 10 mg by mouth daily      Cholecalciferol (VITAMIN D3) 5000 units CAPS Take 1 capsule by mouth daily      L-Lysine 500 MG CAPS Take 1 capsule by mouth daily      mometasone (NASONEX) 50 mcg/act nasal spray 2 sprays into each nostril daily      MULTIPLE VITAMINS-CALCIUM PO Take by mouth daily      Probiotic Product (PROBIOTIC-10 PO) Take 1 capsule by mouth daily      fluticasone furoate-vilanterol (BREO ELLIPTA) Inhale 1 puff daily 30 each 5     No current facility-administered medications for this visit  Current Outpatient Prescriptions on File Prior to Visit   Medication Sig    albuterol (VENTOLIN HFA) 90 mcg/act inhaler Inhale 2 puffs every 4 (four) hours as needed    amLODIPine (NORVASC) 10 mg tablet Take 1 tablet (10 mg total) by mouth daily    atorvastatin (LIPITOR) 40 mg tablet Take 1 tablet by mouth daily    mometasone (NASONEX) 50 mcg/act nasal spray 2 sprays into each nostril daily    MULTIPLE VITAMINS-CALCIUM PO Take by mouth daily    [DISCONTINUED] diazepam (VALIUM) 5 mg tablet Take by mouth    [DISCONTINUED] ergocalciferol (VITAMIN D2) 50,000 units Take 1 capsule by mouth once a week    [DISCONTINUED] Krill Oil 1000 MG CAPS Take by mouth daily     No current facility-administered medications on file prior to visit  She is allergic to ace inhibitors; bupropion; and citalopram     Review of Systems   Constitutional: Negative for chills and fever  HENT: Negative for congestion, ear pain, hearing loss, postnasal drip, rhinorrhea, sinus pain, sinus pressure, sore throat and trouble swallowing  Eyes: Negative for pain and visual disturbance  Respiratory: Negative for cough, chest tightness, shortness of breath and wheezing  Cardiovascular: Negative  Negative for chest pain, palpitations and leg swelling  Gastrointestinal: Negative for abdominal pain, blood in stool, constipation, diarrhea, nausea and vomiting  Endocrine: Negative for cold intolerance, heat intolerance, polydipsia, polyphagia and polyuria  Genitourinary: Negative for difficulty urinating, dysuria, flank pain and urgency  Musculoskeletal: Negative for arthralgias, back pain, gait problem and myalgias  Skin: Negative for rash  Allergic/Immunologic: Negative  Neurological: Negative for dizziness, weakness, light-headedness and headaches  Hematological: Negative  Psychiatric/Behavioral: Negative for behavioral problems, dysphoric mood and sleep disturbance  The patient is not nervous/anxious  Objective:      /68 (BP Location: Left arm, Patient Position: Sitting, Cuff Size: Large)   Pulse 103   Temp 98 5 °F (36 9 °C) (Tympanic)   Resp 16   Ht 5' 7" (1 702 m)   Wt 85 kg (187 lb 6 4 oz)   BMI 29 35 kg/m²          Physical Exam   Constitutional: She is oriented to person, place, and time  She appears well-developed and well-nourished  No distress  HENT:   Head: Normocephalic and atraumatic  Right Ear: External ear normal    Left Ear: External ear normal    Nose: Nose normal    Mouth/Throat: Oropharynx is clear and moist  No oropharyngeal exudate  Eyes: Conjunctivae and EOM are normal  Pupils are equal, round, and reactive to light  Right eye exhibits no discharge  Left eye exhibits no discharge  No scleral icterus  Neck: Normal range of motion  Neck supple  No thyromegaly present  Cardiovascular: Normal rate, regular rhythm and normal heart sounds  Exam reveals no gallop and no friction rub  No murmur heard  Pulmonary/Chest: Effort normal and breath sounds normal  No respiratory distress  She has no wheezes  She has no rales  Abdominal: Soft  Bowel sounds are normal  She exhibits no distension  There is no tenderness  Musculoskeletal: Normal range of motion  She exhibits no edema, tenderness or deformity  Neurological: She is alert and oriented to person, place, and time  No cranial nerve deficit  Skin: Skin is warm and dry  She is not diaphoretic  Psychiatric: She has a normal mood and affect   Her behavior is normal  Judgment and thought content normal

## 2018-07-20 ENCOUNTER — TRANSCRIBE ORDERS (OUTPATIENT)
Dept: LAB | Facility: CLINIC | Age: 68
End: 2018-07-20

## 2018-07-20 ENCOUNTER — APPOINTMENT (OUTPATIENT)
Dept: LAB | Facility: CLINIC | Age: 68
End: 2018-07-20
Payer: COMMERCIAL

## 2018-07-20 ENCOUNTER — OFFICE VISIT (OUTPATIENT)
Dept: FAMILY MEDICINE CLINIC | Facility: CLINIC | Age: 68
End: 2018-07-20
Payer: COMMERCIAL

## 2018-07-20 ENCOUNTER — APPOINTMENT (OUTPATIENT)
Dept: RADIOLOGY | Facility: CLINIC | Age: 68
End: 2018-07-20
Payer: COMMERCIAL

## 2018-07-20 VITALS
RESPIRATION RATE: 20 BRPM | TEMPERATURE: 98.9 F | BODY MASS INDEX: 30.01 KG/M2 | WEIGHT: 191.2 LBS | DIASTOLIC BLOOD PRESSURE: 62 MMHG | HEART RATE: 100 BPM | SYSTOLIC BLOOD PRESSURE: 120 MMHG | HEIGHT: 67 IN

## 2018-07-20 DIAGNOSIS — Z13.29 SCREENING FOR THYROID DISORDER: ICD-10-CM

## 2018-07-20 DIAGNOSIS — E55.9 VITAMIN D DEFICIENCY: ICD-10-CM

## 2018-07-20 DIAGNOSIS — F32.A DEPRESSIVE DISORDER: ICD-10-CM

## 2018-07-20 DIAGNOSIS — R10.9 ABDOMINAL CRAMPING: ICD-10-CM

## 2018-07-20 DIAGNOSIS — Z13.1 SCREENING FOR DIABETES MELLITUS: ICD-10-CM

## 2018-07-20 DIAGNOSIS — M25.511 ACUTE PAIN OF RIGHT SHOULDER: Primary | ICD-10-CM

## 2018-07-20 DIAGNOSIS — Z13.0 SCREENING FOR DEFICIENCY ANEMIA: ICD-10-CM

## 2018-07-20 DIAGNOSIS — Z12.11 SCREENING FOR COLON CANCER: ICD-10-CM

## 2018-07-20 DIAGNOSIS — I10 HYPERTENSION, UNSPECIFIED TYPE: ICD-10-CM

## 2018-07-20 DIAGNOSIS — E78.5 HYPERLIPIDEMIA, UNSPECIFIED HYPERLIPIDEMIA TYPE: ICD-10-CM

## 2018-07-20 DIAGNOSIS — M25.511 ACUTE PAIN OF RIGHT SHOULDER: ICD-10-CM

## 2018-07-20 LAB
25(OH)D3 SERPL-MCNC: 54.8 NG/ML (ref 30–100)
ALBUMIN SERPL BCP-MCNC: 4.1 G/DL (ref 3.5–5)
ALP SERPL-CCNC: 95 U/L (ref 46–116)
ALT SERPL W P-5'-P-CCNC: 24 U/L (ref 12–78)
ANION GAP SERPL CALCULATED.3IONS-SCNC: 5 MMOL/L (ref 4–13)
AST SERPL W P-5'-P-CCNC: 16 U/L (ref 5–45)
BASOPHILS # BLD AUTO: 0.03 THOUSANDS/ΜL (ref 0–0.1)
BASOPHILS NFR BLD AUTO: 1 % (ref 0–1)
BILIRUB SERPL-MCNC: 0.81 MG/DL (ref 0.2–1)
BUN SERPL-MCNC: 15 MG/DL (ref 5–25)
CALCIUM SERPL-MCNC: 9.3 MG/DL (ref 8.3–10.1)
CHLORIDE SERPL-SCNC: 108 MMOL/L (ref 100–108)
CHOLEST SERPL-MCNC: 151 MG/DL (ref 50–200)
CO2 SERPL-SCNC: 28 MMOL/L (ref 21–32)
CREAT SERPL-MCNC: 0.97 MG/DL (ref 0.6–1.3)
EOSINOPHIL # BLD AUTO: 0.1 THOUSAND/ΜL (ref 0–0.61)
EOSINOPHIL NFR BLD AUTO: 2 % (ref 0–6)
ERYTHROCYTE [DISTWIDTH] IN BLOOD BY AUTOMATED COUNT: 12.2 % (ref 11.6–15.1)
GFR SERPL CREATININE-BSD FRML MDRD: 61 ML/MIN/1.73SQ M
GLUCOSE P FAST SERPL-MCNC: 104 MG/DL (ref 65–99)
HCT VFR BLD AUTO: 43.8 % (ref 34.8–46.1)
HDLC SERPL-MCNC: 45 MG/DL (ref 40–60)
HGB BLD-MCNC: 14.6 G/DL (ref 11.5–15.4)
IMM GRANULOCYTES # BLD AUTO: 0.01 THOUSAND/UL (ref 0–0.2)
IMM GRANULOCYTES NFR BLD AUTO: 0 % (ref 0–2)
LDLC SERPL CALC-MCNC: 76 MG/DL (ref 0–100)
LYMPHOCYTES # BLD AUTO: 1.37 THOUSANDS/ΜL (ref 0.6–4.47)
LYMPHOCYTES NFR BLD AUTO: 24 % (ref 14–44)
MCH RBC QN AUTO: 30.3 PG (ref 26.8–34.3)
MCHC RBC AUTO-ENTMCNC: 33.3 G/DL (ref 31.4–37.4)
MCV RBC AUTO: 91 FL (ref 82–98)
MONOCYTES # BLD AUTO: 0.47 THOUSAND/ΜL (ref 0.17–1.22)
MONOCYTES NFR BLD AUTO: 8 % (ref 4–12)
NEUTROPHILS # BLD AUTO: 3.8 THOUSANDS/ΜL (ref 1.85–7.62)
NEUTS SEG NFR BLD AUTO: 65 % (ref 43–75)
NONHDLC SERPL-MCNC: 106 MG/DL
NRBC BLD AUTO-RTO: 0 /100 WBCS
PLATELET # BLD AUTO: 220 THOUSANDS/UL (ref 149–390)
PMV BLD AUTO: 10.5 FL (ref 8.9–12.7)
POTASSIUM SERPL-SCNC: 3.5 MMOL/L (ref 3.5–5.3)
PROT SERPL-MCNC: 7.8 G/DL (ref 6.4–8.2)
RBC # BLD AUTO: 4.82 MILLION/UL (ref 3.81–5.12)
SODIUM SERPL-SCNC: 141 MMOL/L (ref 136–145)
TRIGL SERPL-MCNC: 150 MG/DL
TSH SERPL DL<=0.05 MIU/L-ACNC: 1.11 UIU/ML (ref 0.36–3.74)
WBC # BLD AUTO: 5.78 THOUSAND/UL (ref 4.31–10.16)

## 2018-07-20 PROCEDURE — 36415 COLL VENOUS BLD VENIPUNCTURE: CPT

## 2018-07-20 PROCEDURE — 80061 LIPID PANEL: CPT

## 2018-07-20 PROCEDURE — 82784 ASSAY IGA/IGD/IGG/IGM EACH: CPT

## 2018-07-20 PROCEDURE — 82306 VITAMIN D 25 HYDROXY: CPT

## 2018-07-20 PROCEDURE — 85025 COMPLETE CBC W/AUTO DIFF WBC: CPT

## 2018-07-20 PROCEDURE — 80053 COMPREHEN METABOLIC PANEL: CPT

## 2018-07-20 PROCEDURE — 99214 OFFICE O/P EST MOD 30 MIN: CPT | Performed by: PHYSICIAN ASSISTANT

## 2018-07-20 PROCEDURE — 84443 ASSAY THYROID STIM HORMONE: CPT

## 2018-07-20 PROCEDURE — 73030 X-RAY EXAM OF SHOULDER: CPT

## 2018-07-20 PROCEDURE — 86255 FLUORESCENT ANTIBODY SCREEN: CPT

## 2018-07-20 PROCEDURE — 83516 IMMUNOASSAY NONANTIBODY: CPT

## 2018-07-20 RX ORDER — DULOXETIN HYDROCHLORIDE 30 MG/1
30 CAPSULE, DELAYED RELEASE ORAL DAILY
Qty: 30 CAPSULE | Refills: 2 | Status: SHIPPED | OUTPATIENT
Start: 2018-07-20 | End: 2018-08-20 | Stop reason: ALTCHOICE

## 2018-07-20 RX ORDER — AMLODIPINE BESYLATE 10 MG/1
10 TABLET ORAL DAILY
Qty: 90 TABLET | Refills: 0 | Status: SHIPPED | OUTPATIENT
Start: 2018-07-20 | End: 2018-08-02 | Stop reason: HOSPADM

## 2018-07-20 NOTE — PROGRESS NOTES
Assessment/Plan:    Acute pain of right shoulder  Will get xray right shoulder  Pt defers any medicine to help with this at this time  Depressive disorder  Will try pt with low dose cymbalta to combat depression and anhedonia  No hx liver disease  Potential side effects reviewed    Hypertension  Pts symptoms are stable with current regime  No changes at present  Update labs       Diagnoses and all orders for this visit:    Acute pain of right shoulder  -     XR shoulder 2+ vw right; Future    Screening for deficiency anemia  -     CBC and differential; Future    Screening for diabetes mellitus  -     Comprehensive metabolic panel; Future    Hyperlipidemia, unspecified hyperlipidemia type  -     Lipid panel; Future    Screening for thyroid disorder  -     TSH, 3rd generation with T4 reflex; Future    Vitamin D deficiency  -     Vitamin D 25 hydroxy; Future    Hypertension, unspecified type  -     amLODIPine (NORVASC) 10 mg tablet; Take 1 tablet (10 mg total) by mouth daily    Depressive disorder  -     DULoxetine (CYMBALTA) 30 mg delayed release capsule; Take 1 capsule (30 mg total) by mouth daily    Abdominal cramping  -     Celiac Disease Antibody Profile; Future    Screening for colon cancer  -     Occult Bloood,Fecal Immunochemical; Future          Subjective:      Patient ID: Lashae Rutledge is a 74 y o  female  Pt presents for her routine visit  She complains of shoulder pain as outlined in detail below  She also complains of worsening depressive symtpoms  She has some relationship discord with her brother and this upsets her  She was previously on celexa with fair results but admits to being poorly compliant with it  She notes poor energy and motivation and has been isolating at home a lot  She denies SI/HI  Shoulder Pain    The pain is present in the right shoulder  This is a new problem  The current episode started 1 to 4 weeks ago  There has been a history of trauma  The problem occurs daily  The problem has been unchanged  The quality of the pain is described as aching  The pain is moderate  Associated symptoms include a limited range of motion  Pertinent negatives include no fever  The symptoms are aggravated by lying down and activity  She has tried cold and heat for the symptoms  The treatment provided no relief  The following portions of the patient's history were reviewed and updated as appropriate:   She  has a past medical history of Allergic; Allergic rhinitis; Depression; Fibroadenoma of breast; Hyperlipidemia; Hypertension; Post-menopausal; and Vitamin D deficiency  She   Patient Active Problem List    Diagnosis Date Noted    Acute pain of right shoulder 07/20/2018    Vitamin D deficiency 10/04/2017    Allergic rhinitis 06/01/2017    Depressive disorder 08/29/2016    Cigarette nicotine dependence without complication 55/14/9128    GABI (obstructive sleep apnea) 07/28/2015    Chronic obstructive pulmonary disease ([facility] ) 06/23/2015    Hyperlipidemia 06/22/2015    Anxiety 06/10/2015    Asthma 06/10/2015    Lung disease 06/10/2015    Hypertension 11/07/2013    Benign essential hypertension 05/30/2012     She  has a past surgical history that includes Breast lumpectomy; Bunionectomy (Bilateral); Tubal ligation; Tonsillectomy; and Mole removal   Her family history includes Alcohol abuse in her mother; Celiac disease in her daughter; Diabetes in her father; Heart failure in her brother; Hypertension in her father and mother; Prostate cancer in her father; Vitamin D deficiency in her sister  She  reports that she has been smoking Cigarettes  She has a 12 50 pack-year smoking history  She has never used smokeless tobacco  She reports that she drinks alcohol  She reports that she does not use drugs    Current Outpatient Prescriptions   Medication Sig Dispense Refill    albuterol (VENTOLIN HFA) 90 mcg/act inhaler Inhale 2 puffs every 4 (four) hours as needed      amLODIPine (NORVASC) 10 mg tablet Take 1 tablet (10 mg total) by mouth daily 90 tablet 0    ascorbic acid (VITAMIN C) 500 mg tablet Take 500 mg by mouth daily      atorvastatin (LIPITOR) 40 mg tablet Take 1 tablet by mouth daily      cetirizine (ZyrTEC) 10 mg tablet Take 10 mg by mouth daily      Cholecalciferol (VITAMIN D3) 5000 units CAPS Take 1 capsule by mouth daily      L-Lysine 500 MG CAPS Take 1 capsule by mouth daily      mometasone (NASONEX) 50 mcg/act nasal spray 2 sprays into each nostril daily      MULTIPLE VITAMINS-CALCIUM PO Take by mouth daily      Probiotic Product (PROBIOTIC-10 PO) Take 1 capsule by mouth daily      DULoxetine (CYMBALTA) 30 mg delayed release capsule Take 1 capsule (30 mg total) by mouth daily 30 capsule 2     No current facility-administered medications for this visit  Current Outpatient Prescriptions on File Prior to Visit   Medication Sig    albuterol (VENTOLIN HFA) 90 mcg/act inhaler Inhale 2 puffs every 4 (four) hours as needed    ascorbic acid (VITAMIN C) 500 mg tablet Take 500 mg by mouth daily    atorvastatin (LIPITOR) 40 mg tablet Take 1 tablet by mouth daily    cetirizine (ZyrTEC) 10 mg tablet Take 10 mg by mouth daily    Cholecalciferol (VITAMIN D3) 5000 units CAPS Take 1 capsule by mouth daily    L-Lysine 500 MG CAPS Take 1 capsule by mouth daily    mometasone (NASONEX) 50 mcg/act nasal spray 2 sprays into each nostril daily    MULTIPLE VITAMINS-CALCIUM PO Take by mouth daily    Probiotic Product (PROBIOTIC-10 PO) Take 1 capsule by mouth daily    [DISCONTINUED] amLODIPine (NORVASC) 10 mg tablet Take 1 tablet (10 mg total) by mouth daily    [DISCONTINUED] fluticasone furoate-vilanterol (BREO ELLIPTA) Inhale 1 puff daily     No current facility-administered medications on file prior to visit  She is allergic to ace inhibitors; bupropion; citalopram; and adhesive [medical tape]       Review of Systems   Constitutional: Negative for chills and fever     HENT: Negative for congestion, ear pain, hearing loss, postnasal drip, rhinorrhea, sinus pain, sinus pressure, sore throat and trouble swallowing  Eyes: Negative for pain and visual disturbance  Respiratory: Negative for cough, chest tightness, shortness of breath and wheezing  Cardiovascular: Negative  Negative for chest pain, palpitations and leg swelling  Gastrointestinal: Negative for abdominal pain, blood in stool, constipation, diarrhea, nausea and vomiting  Endocrine: Negative for cold intolerance, heat intolerance, polydipsia, polyphagia and polyuria  Genitourinary: Negative for difficulty urinating, dysuria, flank pain and urgency  Musculoskeletal: Positive for arthralgias  Negative for back pain, gait problem and myalgias  Skin: Negative for rash  Allergic/Immunologic: Negative  Neurological: Negative for dizziness, weakness, light-headedness and headaches  Hematological: Negative  Psychiatric/Behavioral: Positive for dysphoric mood  Negative for behavioral problems and sleep disturbance  The patient is not nervous/anxious  Objective:      /62 (BP Location: Left arm, Patient Position: Sitting, Cuff Size: Large)   Pulse 100   Temp 98 9 °F (37 2 °C) (Tympanic)   Resp 20   Ht 5' 7" (1 702 m)   Wt 86 7 kg (191 lb 3 2 oz)   BMI 29 95 kg/m²          Physical Exam   Constitutional: She is oriented to person, place, and time  She appears well-developed and well-nourished  No distress  HENT:   Head: Normocephalic and atraumatic  Right Ear: External ear normal    Left Ear: External ear normal    Nose: Nose normal    Mouth/Throat: Oropharynx is clear and moist  No oropharyngeal exudate  Eyes: Conjunctivae and EOM are normal  Pupils are equal, round, and reactive to light  Right eye exhibits no discharge  Left eye exhibits no discharge  No scleral icterus  Neck: Normal range of motion  Neck supple  No thyromegaly present     Cardiovascular: Normal rate, regular rhythm and normal heart sounds  Exam reveals no gallop and no friction rub  No murmur heard  Pulmonary/Chest: Effort normal and breath sounds normal  No respiratory distress  She has no wheezes  She has no rales  Abdominal: Soft  Bowel sounds are normal  She exhibits no distension  There is no tenderness  Musculoskeletal: She exhibits no edema or deformity  Right shoulder: She exhibits decreased range of motion, tenderness and pain  Neurological: She is alert and oriented to person, place, and time  No cranial nerve deficit  Skin: Skin is warm and dry  She is not diaphoretic  Psychiatric: Her behavior is normal  Judgment and thought content normal  Her mood appears anxious  She exhibits a depressed mood

## 2018-07-20 NOTE — ASSESSMENT & PLAN NOTE
Will try pt with low dose cymbalta to combat depression and anhedonia  No hx liver disease   Potential side effects reviewed

## 2018-07-21 LAB
ENDOMYSIUM IGA SER QL: NEGATIVE
GLIADIN PEPTIDE IGA SER-ACNC: 4 UNITS (ref 0–19)
GLIADIN PEPTIDE IGG SER-ACNC: 2 UNITS (ref 0–19)
IGA SERPL-MCNC: 194 MG/DL (ref 87–352)
TTG IGA SER-ACNC: <2 U/ML (ref 0–3)
TTG IGG SER-ACNC: <2 U/ML (ref 0–5)

## 2018-07-31 ENCOUNTER — APPOINTMENT (EMERGENCY)
Dept: RADIOLOGY | Facility: HOSPITAL | Age: 68
End: 2018-07-31
Payer: COMMERCIAL

## 2018-07-31 ENCOUNTER — TRANSCRIBE ORDERS (OUTPATIENT)
Dept: ADMINISTRATIVE | Facility: HOSPITAL | Age: 68
End: 2018-07-31

## 2018-07-31 ENCOUNTER — HOSPITAL ENCOUNTER (OUTPATIENT)
Facility: HOSPITAL | Age: 68
Setting detail: OBSERVATION
Discharge: HOME | End: 2018-08-02
Attending: INTERNAL MEDICINE | Admitting: INTERNAL MEDICINE
Payer: COMMERCIAL

## 2018-07-31 DIAGNOSIS — I48.91 ATRIAL FIBRILLATION WITH RAPID VENTRICULAR RESPONSE (HCC): Primary | ICD-10-CM

## 2018-07-31 DIAGNOSIS — Z12.39 SCREENING BREAST EXAMINATION: Primary | ICD-10-CM

## 2018-07-31 LAB
ALBUMIN SERPL BCP-MCNC: 4.3 G/DL (ref 3.5–5.7)
ALP SERPL-CCNC: 74 U/L (ref 55–165)
ALT SERPL W P-5'-P-CCNC: 17 U/L (ref 7–52)
ANION GAP SERPL CALCULATED.3IONS-SCNC: 10 MMOL/L (ref 4–13)
APTT PPP: 33 SECONDS (ref 24–36)
AST SERPL W P-5'-P-CCNC: 30 U/L (ref 13–39)
BASOPHILS # BLD AUTO: 0 THOUSANDS/ΜL (ref 0–0.1)
BASOPHILS NFR BLD AUTO: 1 % (ref 0–2)
BILIRUB SERPL-MCNC: 0.6 MG/DL (ref 0.2–1)
BILIRUB UR QL STRIP: NEGATIVE
BUN SERPL-MCNC: 16 MG/DL (ref 7–25)
CALCIUM SERPL-MCNC: 9.4 MG/DL (ref 8.6–10.5)
CHLORIDE SERPL-SCNC: 106 MMOL/L (ref 98–107)
CK SERPL-CCNC: 80 U/L (ref 30–192)
CLARITY UR: CLEAR
CO2 SERPL-SCNC: 23 MMOL/L (ref 21–31)
COLOR UR: YELLOW
CREAT SERPL-MCNC: 0.89 MG/DL (ref 0.6–1.2)
EOSINOPHIL # BLD AUTO: 0.2 THOUSAND/ΜL (ref 0–0.61)
EOSINOPHIL NFR BLD AUTO: 2 % (ref 0–5)
ERYTHROCYTE [DISTWIDTH] IN BLOOD BY AUTOMATED COUNT: 13.1 % (ref 11.5–14.5)
GFR SERPL CREATININE-BSD FRML MDRD: 68 ML/MIN/1.73SQ M
GLUCOSE SERPL-MCNC: 124 MG/DL (ref 65–99)
GLUCOSE UR STRIP-MCNC: NEGATIVE MG/DL
HCT VFR BLD AUTO: 40.8 % (ref 34.8–46.1)
HGB BLD-MCNC: 14.6 G/DL (ref 12–16)
HGB UR QL STRIP.AUTO: NEGATIVE
INR PPP: 1.01 (ref 0.9–1.5)
KETONES UR STRIP-MCNC: NEGATIVE MG/DL
LEUKOCYTE ESTERASE UR QL STRIP: NEGATIVE
LYMPHOCYTES # BLD AUTO: 2 THOUSANDS/ΜL (ref 0.6–4.47)
LYMPHOCYTES NFR BLD AUTO: 25 % (ref 21–51)
MAGNESIUM SERPL-MCNC: 2.1 MG/DL (ref 1.9–2.7)
MCH RBC QN AUTO: 31.3 PG (ref 26–34)
MCHC RBC AUTO-ENTMCNC: 35.8 G/DL (ref 31–37)
MCV RBC AUTO: 88 FL (ref 81–99)
MONOCYTES # BLD AUTO: 0.7 THOUSAND/ΜL (ref 0.17–1.22)
MONOCYTES NFR BLD AUTO: 9 % (ref 2–12)
NEUTROPHILS # BLD AUTO: 5.3 THOUSANDS/ΜL (ref 1.4–6.5)
NEUTS SEG NFR BLD AUTO: 64 % (ref 42–75)
NITRITE UR QL STRIP: NEGATIVE
NRBC BLD AUTO-RTO: 0 /100 WBCS
OVALOCYTES BLD QL SMEAR: PRESENT
PH UR STRIP.AUTO: 6 [PH] (ref 5–8)
PLATELET # BLD AUTO: 256 THOUSANDS/UL (ref 149–390)
PLATELET BLD QL SMEAR: ADEQUATE
PMV BLD AUTO: 8.7 FL (ref 8.6–11.7)
POTASSIUM SERPL-SCNC: 4.5 MMOL/L (ref 3.5–5.5)
PROT SERPL-MCNC: 7 G/DL (ref 6.4–8.9)
PROT UR STRIP-MCNC: NEGATIVE MG/DL
PROTHROMBIN TIME: 11.7 SECONDS (ref 10.1–12.9)
RBC # BLD AUTO: 4.67 MILLION/UL (ref 3.9–5.2)
RBC MORPH BLD: NORMAL
SODIUM SERPL-SCNC: 139 MMOL/L (ref 134–143)
SP GR UR STRIP.AUTO: 1.01 (ref 1–1.03)
TROPONIN I SERPL-MCNC: <0.03 NG/ML
TSH SERPL DL<=0.05 MIU/L-ACNC: 1.43 UIU/ML (ref 0.45–5.33)
UROBILINOGEN UR QL STRIP.AUTO: 0.2 E.U./DL
WBC # BLD AUTO: 8.2 THOUSAND/UL (ref 4.8–10.8)

## 2018-07-31 PROCEDURE — 84484 ASSAY OF TROPONIN QUANT: CPT | Performed by: INTERNAL MEDICINE

## 2018-07-31 PROCEDURE — 82550 ASSAY OF CK (CPK): CPT | Performed by: INTERNAL MEDICINE

## 2018-07-31 PROCEDURE — 99219 PR INITIAL OBSERVATION CARE/DAY 50 MINUTES: CPT | Performed by: PHYSICIAN ASSISTANT

## 2018-07-31 PROCEDURE — 85025 COMPLETE CBC W/AUTO DIFF WBC: CPT | Performed by: INTERNAL MEDICINE

## 2018-07-31 PROCEDURE — 80053 COMPREHEN METABOLIC PANEL: CPT | Performed by: INTERNAL MEDICINE

## 2018-07-31 PROCEDURE — 96361 HYDRATE IV INFUSION ADD-ON: CPT

## 2018-07-31 PROCEDURE — 71045 X-RAY EXAM CHEST 1 VIEW: CPT

## 2018-07-31 PROCEDURE — 83735 ASSAY OF MAGNESIUM: CPT | Performed by: INTERNAL MEDICINE

## 2018-07-31 PROCEDURE — 96365 THER/PROPH/DIAG IV INF INIT: CPT

## 2018-07-31 PROCEDURE — 85610 PROTHROMBIN TIME: CPT | Performed by: INTERNAL MEDICINE

## 2018-07-31 PROCEDURE — 93005 ELECTROCARDIOGRAM TRACING: CPT

## 2018-07-31 PROCEDURE — 85730 THROMBOPLASTIN TIME PARTIAL: CPT | Performed by: INTERNAL MEDICINE

## 2018-07-31 PROCEDURE — 36415 COLL VENOUS BLD VENIPUNCTURE: CPT | Performed by: INTERNAL MEDICINE

## 2018-07-31 PROCEDURE — 99285 EMERGENCY DEPT VISIT HI MDM: CPT

## 2018-07-31 PROCEDURE — 96376 TX/PRO/DX INJ SAME DRUG ADON: CPT

## 2018-07-31 PROCEDURE — 81003 URINALYSIS AUTO W/O SCOPE: CPT | Performed by: INTERNAL MEDICINE

## 2018-07-31 PROCEDURE — 84443 ASSAY THYROID STIM HORMONE: CPT | Performed by: INTERNAL MEDICINE

## 2018-07-31 RX ORDER — DILTIAZEM HYDROCHLORIDE 5 MG/ML
10 INJECTION INTRAVENOUS ONCE
Status: COMPLETED | OUTPATIENT
Start: 2018-07-31 | End: 2018-07-31

## 2018-07-31 RX ORDER — SODIUM CHLORIDE 9 MG/ML
125 INJECTION, SOLUTION INTRAVENOUS CONTINUOUS
Status: DISCONTINUED | OUTPATIENT
Start: 2018-07-31 | End: 2018-08-01

## 2018-07-31 RX ORDER — LABETALOL HYDROCHLORIDE 5 MG/ML
10 INJECTION, SOLUTION INTRAVENOUS ONCE
Status: COMPLETED | OUTPATIENT
Start: 2018-07-31 | End: 2018-07-31

## 2018-07-31 RX ORDER — DILTIAZEM HYDROCHLORIDE 5 MG/ML
20 INJECTION INTRAVENOUS ONCE
Status: COMPLETED | OUTPATIENT
Start: 2018-07-31 | End: 2018-07-31

## 2018-07-31 RX ORDER — ASPIRIN 325 MG
325 TABLET, DELAYED RELEASE (ENTERIC COATED) ORAL DAILY
Status: DISCONTINUED | OUTPATIENT
Start: 2018-08-01 | End: 2018-08-01

## 2018-07-31 RX ADMIN — DILTIAZEM HYDROCHLORIDE 10 MG: 5 INJECTION INTRAVENOUS at 21:31

## 2018-07-31 RX ADMIN — SODIUM CHLORIDE 125 ML/HR: 9 INJECTION, SOLUTION INTRAVENOUS at 21:15

## 2018-07-31 RX ADMIN — DILTIAZEM HYDROCHLORIDE 20 MG: 5 INJECTION INTRAVENOUS at 21:13

## 2018-07-31 RX ADMIN — DILTIAZEM HYDROCHLORIDE 5 MG/HR: 5 INJECTION INTRAVENOUS at 21:48

## 2018-07-31 RX ADMIN — LABETALOL HYDROCHLORIDE 10 MG: 5 INJECTION, SOLUTION INTRAVENOUS at 23:02

## 2018-08-01 ENCOUNTER — APPOINTMENT (OUTPATIENT)
Dept: NON INVASIVE DIAGNOSTICS | Facility: HOSPITAL | Age: 68
End: 2018-08-01
Payer: COMMERCIAL

## 2018-08-01 LAB
ALBUMIN SERPL BCP-MCNC: 3.8 G/DL (ref 3.5–5.7)
ALP SERPL-CCNC: 65 U/L (ref 55–165)
ALT SERPL W P-5'-P-CCNC: 11 U/L (ref 7–52)
ANION GAP SERPL CALCULATED.3IONS-SCNC: 8 MMOL/L (ref 4–13)
AST SERPL W P-5'-P-CCNC: 14 U/L (ref 13–39)
ATRIAL RATE: 107 BPM
ATRIAL RATE: 144 BPM
ATRIAL RATE: 250 BPM
BASOPHILS # BLD AUTO: 0 THOUSANDS/ΜL (ref 0–0.1)
BASOPHILS NFR BLD AUTO: 1 % (ref 0–2)
BILIRUB SERPL-MCNC: 0.6 MG/DL (ref 0.2–1)
BUN SERPL-MCNC: 13 MG/DL (ref 7–25)
CALCIUM SERPL-MCNC: 9 MG/DL (ref 8.6–10.5)
CHLORIDE SERPL-SCNC: 110 MMOL/L (ref 98–107)
CO2 SERPL-SCNC: 26 MMOL/L (ref 21–31)
CREAT SERPL-MCNC: 0.91 MG/DL (ref 0.6–1.2)
EOSINOPHIL # BLD AUTO: 0.2 THOUSAND/ΜL (ref 0–0.61)
EOSINOPHIL NFR BLD AUTO: 2 % (ref 0–5)
ERYTHROCYTE [DISTWIDTH] IN BLOOD BY AUTOMATED COUNT: 13 % (ref 11.5–14.5)
GFR SERPL CREATININE-BSD FRML MDRD: 66 ML/MIN/1.73SQ M
GLUCOSE P FAST SERPL-MCNC: 103 MG/DL (ref 65–99)
GLUCOSE SERPL-MCNC: 103 MG/DL (ref 65–99)
HCT VFR BLD AUTO: 37.2 % (ref 34.8–46.1)
HGB BLD-MCNC: 13.1 G/DL (ref 12–16)
LYMPHOCYTES # BLD AUTO: 2.2 THOUSANDS/ΜL (ref 0.6–4.47)
LYMPHOCYTES NFR BLD AUTO: 32 % (ref 21–51)
MCH RBC QN AUTO: 31.3 PG (ref 26–34)
MCHC RBC AUTO-ENTMCNC: 35.3 G/DL (ref 31–37)
MCV RBC AUTO: 89 FL (ref 81–99)
MONOCYTES # BLD AUTO: 0.6 THOUSAND/ΜL (ref 0.17–1.22)
MONOCYTES NFR BLD AUTO: 8 % (ref 2–12)
NEUTROPHILS # BLD AUTO: 3.9 THOUSANDS/ΜL (ref 1.4–6.5)
NEUTS SEG NFR BLD AUTO: 57 % (ref 42–75)
NRBC BLD AUTO-RTO: 0 /100 WBCS
PLATELET # BLD AUTO: 200 THOUSANDS/UL (ref 149–390)
PMV BLD AUTO: 8.4 FL (ref 8.6–11.7)
POTASSIUM SERPL-SCNC: 4 MMOL/L (ref 3.5–5.5)
PROT SERPL-MCNC: 6 G/DL (ref 6.4–8.9)
QRS AXIS: 57 DEGREES
QRS AXIS: 57 DEGREES
QRS AXIS: 66 DEGREES
QRSD INTERVAL: 80 MS
QRSD INTERVAL: 84 MS
QRSD INTERVAL: 88 MS
QT INTERVAL: 274 MS
QT INTERVAL: 280 MS
QT INTERVAL: 362 MS
QTC INTERVAL: 431 MS
QTC INTERVAL: 433 MS
QTC INTERVAL: 467 MS
RBC # BLD AUTO: 4.19 MILLION/UL (ref 3.9–5.2)
SODIUM SERPL-SCNC: 144 MMOL/L (ref 134–143)
T WAVE AXIS: -38 DEGREES
T WAVE AXIS: -87 DEGREES
T WAVE AXIS: 229 DEGREES
TROPONIN I SERPL-MCNC: <0.03 NG/ML
TROPONIN I SERPL-MCNC: <0.03 NG/ML
TSH SERPL DL<=0.05 MIU/L-ACNC: 1.34 UIU/ML (ref 0.45–5.33)
VENTRICULAR RATE: 149 BPM
VENTRICULAR RATE: 167 BPM
VENTRICULAR RATE: 86 BPM
WBC # BLD AUTO: 6.9 THOUSAND/UL (ref 4.8–10.8)

## 2018-08-01 PROCEDURE — 80053 COMPREHEN METABOLIC PANEL: CPT | Performed by: PHYSICIAN ASSISTANT

## 2018-08-01 PROCEDURE — 99204 OFFICE O/P NEW MOD 45 MIN: CPT | Performed by: INTERNAL MEDICINE

## 2018-08-01 PROCEDURE — 99226 PR SBSQ OBSERVATION CARE/DAY 35 MINUTES: CPT | Performed by: INTERNAL MEDICINE

## 2018-08-01 PROCEDURE — 84443 ASSAY THYROID STIM HORMONE: CPT | Performed by: PHYSICIAN ASSISTANT

## 2018-08-01 PROCEDURE — 93005 ELECTROCARDIOGRAM TRACING: CPT

## 2018-08-01 PROCEDURE — 84484 ASSAY OF TROPONIN QUANT: CPT | Performed by: PHYSICIAN ASSISTANT

## 2018-08-01 PROCEDURE — 85025 COMPLETE CBC W/AUTO DIFF WBC: CPT | Performed by: PHYSICIAN ASSISTANT

## 2018-08-01 PROCEDURE — 93306 TTE W/DOPPLER COMPLETE: CPT

## 2018-08-01 PROCEDURE — 93306 TTE W/DOPPLER COMPLETE: CPT | Performed by: INTERNAL MEDICINE

## 2018-08-01 RX ORDER — ALBUTEROL SULFATE 90 UG/1
2 AEROSOL, METERED RESPIRATORY (INHALATION) EVERY 4 HOURS PRN
Status: DISCONTINUED | OUTPATIENT
Start: 2018-08-01 | End: 2018-08-02 | Stop reason: HOSPADM

## 2018-08-01 RX ORDER — ONDANSETRON 2 MG/ML
4 INJECTION INTRAMUSCULAR; INTRAVENOUS EVERY 6 HOURS PRN
Status: DISCONTINUED | OUTPATIENT
Start: 2018-08-01 | End: 2018-08-02 | Stop reason: HOSPADM

## 2018-08-01 RX ORDER — NICOTINE 21 MG/24HR
1 PATCH, TRANSDERMAL 24 HOURS TRANSDERMAL DAILY
Status: DISCONTINUED | OUTPATIENT
Start: 2018-08-01 | End: 2018-08-02 | Stop reason: HOSPADM

## 2018-08-01 RX ORDER — MELATONIN
5000 DAILY
Status: DISCONTINUED | OUTPATIENT
Start: 2018-08-01 | End: 2018-08-02 | Stop reason: HOSPADM

## 2018-08-01 RX ORDER — AMLODIPINE BESYLATE 5 MG/1
10 TABLET ORAL DAILY
Status: DISCONTINUED | OUTPATIENT
Start: 2018-08-01 | End: 2018-08-01

## 2018-08-01 RX ORDER — ASCORBIC ACID 500 MG
500 TABLET ORAL DAILY
Status: DISCONTINUED | OUTPATIENT
Start: 2018-08-01 | End: 2018-08-02 | Stop reason: HOSPADM

## 2018-08-01 RX ORDER — DILTIAZEM HYDROCHLORIDE 60 MG/1
60 TABLET, FILM COATED ORAL EVERY 6 HOURS SCHEDULED
Status: DISCONTINUED | OUTPATIENT
Start: 2018-08-01 | End: 2018-08-02

## 2018-08-01 RX ORDER — PROPAFENONE HYDROCHLORIDE 150 MG/1
600 TABLET, FILM COATED ORAL ONCE
Status: COMPLETED | OUTPATIENT
Start: 2018-08-01 | End: 2018-08-01

## 2018-08-01 RX ORDER — HEPARIN SODIUM 5000 [USP'U]/ML
5000 INJECTION, SOLUTION INTRAVENOUS; SUBCUTANEOUS EVERY 8 HOURS SCHEDULED
Status: DISCONTINUED | OUTPATIENT
Start: 2018-08-01 | End: 2018-08-01

## 2018-08-01 RX ORDER — FLUTICASONE PROPIONATE 50 MCG
1 SPRAY, SUSPENSION (ML) NASAL 2 TIMES DAILY
Status: DISCONTINUED | OUTPATIENT
Start: 2018-08-01 | End: 2018-08-02 | Stop reason: HOSPADM

## 2018-08-01 RX ORDER — ACETAMINOPHEN 325 MG/1
650 TABLET ORAL EVERY 6 HOURS PRN
Status: DISCONTINUED | OUTPATIENT
Start: 2018-08-01 | End: 2018-08-02 | Stop reason: HOSPADM

## 2018-08-01 RX ORDER — DOCUSATE SODIUM 100 MG/1
100 CAPSULE, LIQUID FILLED ORAL 2 TIMES DAILY
Status: DISCONTINUED | OUTPATIENT
Start: 2018-08-01 | End: 2018-08-02 | Stop reason: HOSPADM

## 2018-08-01 RX ORDER — LORATADINE 10 MG/1
10 TABLET ORAL DAILY
Status: DISCONTINUED | OUTPATIENT
Start: 2018-08-01 | End: 2018-08-02 | Stop reason: HOSPADM

## 2018-08-01 RX ORDER — DULOXETIN HYDROCHLORIDE 30 MG/1
30 CAPSULE, DELAYED RELEASE ORAL DAILY
Status: DISCONTINUED | OUTPATIENT
Start: 2018-08-01 | End: 2018-08-02 | Stop reason: HOSPADM

## 2018-08-01 RX ORDER — ATORVASTATIN CALCIUM 40 MG/1
40 TABLET, FILM COATED ORAL DAILY
Status: DISCONTINUED | OUTPATIENT
Start: 2018-08-01 | End: 2018-08-02 | Stop reason: HOSPADM

## 2018-08-01 RX ADMIN — HEPARIN SODIUM 5000 UNITS: 5000 INJECTION INTRAVENOUS; SUBCUTANEOUS at 01:06

## 2018-08-01 RX ADMIN — VITAMIN D, TAB 1000IU (100/BT) 5000 UNITS: 25 TAB at 08:11

## 2018-08-01 RX ADMIN — DILTIAZEM HYDROCHLORIDE 60 MG: 60 TABLET, FILM COATED ORAL at 23:53

## 2018-08-01 RX ADMIN — DOCUSATE SODIUM 100 MG: 100 CAPSULE, LIQUID FILLED ORAL at 08:09

## 2018-08-01 RX ADMIN — OXYCODONE HYDROCHLORIDE AND ACETAMINOPHEN 500 MG: 500 TABLET ORAL at 08:14

## 2018-08-01 RX ADMIN — DOCUSATE SODIUM 100 MG: 100 CAPSULE, LIQUID FILLED ORAL at 18:04

## 2018-08-01 RX ADMIN — ATORVASTATIN CALCIUM 40 MG: 40 TABLET, FILM COATED ORAL at 21:17

## 2018-08-01 RX ADMIN — SODIUM CHLORIDE 125 ML/HR: 9 INJECTION, SOLUTION INTRAVENOUS at 08:07

## 2018-08-01 RX ADMIN — PROPAFENONE HYDROCHLORIDE 600 MG: 150 TABLET, FILM COATED ORAL at 11:23

## 2018-08-01 RX ADMIN — ACETAMINOPHEN 650 MG: 325 TABLET ORAL at 01:05

## 2018-08-01 RX ADMIN — DILTIAZEM HYDROCHLORIDE 60 MG: 60 TABLET, FILM COATED ORAL at 11:22

## 2018-08-01 RX ADMIN — APIXABAN 5 MG: 5 TABLET, FILM COATED ORAL at 11:18

## 2018-08-01 RX ADMIN — HEPARIN SODIUM 5000 UNITS: 5000 INJECTION INTRAVENOUS; SUBCUTANEOUS at 05:24

## 2018-08-01 RX ADMIN — APIXABAN 5 MG: 5 TABLET, FILM COATED ORAL at 18:04

## 2018-08-01 RX ADMIN — SODIUM CHLORIDE 125 ML/HR: 9 INJECTION, SOLUTION INTRAVENOUS at 00:41

## 2018-08-01 RX ADMIN — ASPIRIN 325 MG: 325 TABLET, DELAYED RELEASE ORAL at 08:10

## 2018-08-01 RX ADMIN — DILTIAZEM HYDROCHLORIDE 60 MG: 60 TABLET, FILM COATED ORAL at 18:04

## 2018-08-01 RX ADMIN — DULOXETINE HYDROCHLORIDE 30 MG: 30 CAPSULE, DELAYED RELEASE ORAL at 11:22

## 2018-08-01 NOTE — CONSULTS
Consult- Micha Clay 1950, 74 y o  female MRN: [id number]    Unit/Bed#: ICU 04 Encounter: [id number]    Primary Care Provider: Tariq Thompson DO   Date and time admitted to hospital: 7/31/2018  9:02 PM      Inpatient consult to Cardiology  Consult performed by: Rashi Méndez  Consult ordered by: Amanda Fong          * Atrial fibrillation with rapid ventricular response ([address] )   Assessment & Plan    Onset seems to be last night  Highly likely to be related to underlying lung disease  Likelihood of recurrence is high  We will try 1 attempt at pharmacologic cardioversion with propafenone but otherwise concentrate on rate control and anticoagulation  Hypertension   Assessment & Plan    Adequately controlled  Will have to stop amlodipine as diltiazem is started  Chronic obstructive pulmonary disease (HCC)   Assessment & Plan    Likely the underlying trigger to atrial fibrillation  Smoking cessation was encouraged  Other summary comments:   Further comments after review of echo  Hopefully she will be stable for discharge tomorrow  It is notable that thus far she really is not symptomatic of her atrial fibrillation  HPI: Micha Clay is a 74y o  year old female who presented with tachycardia  She has home pulse oximetry  She takes measurements daily  Last night she noted that her heart rate was quite erratically fast   She did not truly feel this if she had not put on the oximeter  There is no history of chest pain or chest pressure  No syncope or near syncope  There is chronic dyspnea  She wears oxygen principally at night  There is a history of cigarette smoking as well as hypertension  EKG:   Atrial fibrillation with a rapid ventricular response  MOST  RECENT CARDIAC IMAGING:   Echo is being done shortly  Review of Systems: a 12 point review of systems was conducted and is negative except for as mentioned in the HPI or as below    Can't walk quickly  Historical Information   Past Medical History:   Diagnosis Date    Allergic     Allergic rhinitis     [id number]  RESOLVED    Asthma     Bronchitis     COPD (chronic obstructive pulmonary disease) ([facility] [address] )     home O2 at night at home 2L    Depression     Fibroadenoma of breast     Hyperlipidemia     Hypertension     Post-menopausal     Vitamin D deficiency      Past Surgical History:   Procedure Laterality Date    BREAST LUMPECTOMY      5    BUNIONECTOMY Bilateral     MOLE REMOVAL      lip    TONSILLECTOMY      TUBAL LIGATION       History   Alcohol Use    Yes     Comment: rarely      History   Drug Use No     History   Smoking Status    Current Every Day Smoker    Packs/day: 0 50    Years: 50 00    Types: Cigarettes   Smokeless Tobacco    Never Used       Family History:   Negative early CAD  Meds/Allergies   all current active meds have been reviewed  Prescriptions Prior to Admission   Medication    albuterol (VENTOLIN HFA) 90 mcg/act inhaler    amLODIPine (NORVASC) 10 mg tablet    ascorbic acid (VITAMIN C) 500 mg tablet    atorvastatin (LIPITOR) 40 mg tablet    cetirizine (ZyrTEC) 10 mg tablet    Cholecalciferol (VITAMIN D3) 5000 units CAPS    DULoxetine (CYMBALTA) 30 mg delayed release capsule    L-Lysine 500 MG CAPS    mometasone (NASONEX) 50 mcg/act nasal spray    MULTIPLE VITAMINS-CALCIUM PO    Probiotic Product (PROBIOTIC-10 PO)       Allergies   Allergen Reactions    Ace Inhibitors Cough     Action Taken: stopped med and changed to ARB; Category: Adverse Reaction;     Bupropion Itching    Citalopram Diarrhea and Nausea Only    Adhesive [Medical Tape] Rash     Patch        Objective   Vitals: Blood pressure 125/63, pulse 86, temperature 97 6 °F (36 4 °C), temperature source Temporal, resp  rate (!) 26, height 5' 8" (1 727 m), weight 86 6 kg (191 lb), SpO2 95 %  , Body mass index is 29 04 kg/m² , Orthostatic Blood Pressures      Most Recent Value   Blood Pressure  125/63 filed at 08/01/2018 1000   Patient Position - Orthostatic VS  Lying filed at 08/01/2018 7310          Systolic (32GQJ), EWY:734 , Min:112 , SUJ:323     Diastolic (30WUC), PML:28, Min:58, Max:80              Physical Exam:    General:  Normal appearance in no distress  Eyes:  Anicteric  Oral mucosa:  Moist   Neck:  No JV D  Carotid upstrokes are brisk without bruits  No masses  Chest:  Diffuse inspiratory and expiratory wheezes  Cardiac:  Normal PMI  Normal S1 and S2  No murmur gallop or rub  Irregular rate and rhythm  Abdomen:  Soft and nontender  No palpable organomegaly or aortic enlargement  Extremities:  No peripheral edema  Musculoskeletal:  Symmetric  Gait is slow  Vascular:  Femoral pulses are brisk without bruits  Popliteal  pulses are intact bilaterally  Pedal pulses are intact  Neuro:  Grossly symmetric  Psych:  Alert and oriented x3          Lab Results:     Troponins:   Results from last 7 days  Lab Units 08/01/18  0438 08/01/18  0033 07/31/18  2116   CK TOTAL U/L  --   --  80   TROPONIN I ng/mL <0 03 <0 03 <0 03     BNP:       CBC :   Results from last 7 days  Lab Units 08/01/18  0438 07/31/18  2116   WBC Thousand/uL 6 90 8 20   HEMOGLOBIN g/dL 13 1 14 6   HEMATOCRIT % 37 2 40 8   MCV fL 89 88   PLATELETS Thousands/uL 200 256     TSH:     CMP:   Results from last 7 days  Lab Units 08/01/18  0438 07/31/18  2116   SODIUM mmol/L 144* 139   POTASSIUM mmol/L 4 0 4 5   CHLORIDE mmol/L 110* 106   CO2 mmol/L 26 23   BUN mg/dL 13 16   CREATININE mg/dL 0 91 0 89   GLUCOSE RANDOM mg/dL 103* 124*   AST U/L 14 30   ALT U/L 11 17   TOTAL PROTEIN g/dL 6 0* 7 0   BILIRUBIN TOTAL mg/dL 0 60 0 60   EGFR ml/min/1 73sq m 66 68     Lipid Profile:     Coags:   Results from last 7 days  Lab Units 07/31/18  2116   INR  1 01

## 2018-08-01 NOTE — SOCIAL WORK
Evaluated the pt at the bedside  Pt states she lives with her SO in a one story home  Pt states she has an upcoming appointment with a new pulmonolgist in [facility]  Pt states she is independent with ADL's and IADL's at home  Pt reports she utilizes [employer] in [place] for medications  Pt states she does not need any services  SO will transport home upon discharge  CM reviewed d/c planning process including the following: identifying help at home, patient preference for d/c planning needs, availability of treatment team to discuss questions or concerns patient and/or family may have regarding understanding medications and recognizing signs and symptoms once discharged  CM also encouraged patient to follow up with all recommended appointments after discharge  Patient advised of importance for patient and family to participate in managing patients medical well being

## 2018-08-01 NOTE — CASE MANAGEMENT
Initial Clinical Review    Admission: Date/Time/Statement: Observation 7/31/18 @ 2248    Orders Placed This Encounter   Procedures    Place in Observation     Standing Status:   Standing     Number of Occurrences:   1     Order Specific Question:   Admitting Physician     Answer:   Poornima Sorto [[id number]]     Order Specific Question:   Level of Care     Answer:   Critical Care [15]         ED: Date/Time/Mode of Arrival:   ED Arrival Information     Expected Arrival Acuity Means of Arrival Escorted By Service Admission Type    - 7/31/2018 20:15 Emergent Walk-In Family Member General Medicine Emergency    Arrival Complaint    Heart feels likes its racing          Chief Complaint:   Chief Complaint   Patient presents with    Rapid Heart Rate       History of Illness: 74 y o  female who presents with a rapid and irregular heartbeat x1 day that was noticed this evening while she was checking her pulse oximetry  Patient has a history of oxygen-dependent COPD she wears 2 L O2 nasal cannula at night she was checking her pulse oximetry point prior to going to bed when she noticed that her pulse was rapid and irregular  Patient denies any recent changes to her medications though she does state that she had a dilated eye exam done yesterday      Patient had a stress test in 2015 and reportedly negative and was obtained for a chest pain rule out MI observation admission   She does not currently follow with Cardiology she denies a history of MI and no previous echocardiogram    ED Vital Signs:   Temperature Pulse Respirations Blood Pressure SpO2   07/31/18 2105 07/31/18 2105 07/31/18 2105 07/31/18 2133 07/31/18 2133   98 1 °F (36 7 °C) (!) 145 20 163/73 93 %   Pain Score       07/31/18 2105       No Pain        Wt Readings from Last 1 Encounters:   08/01/18 86 6 kg (191 lb)       Vital Signs  08/02/18 0400   97 1 °F (36 2 °C)  61  14  119/66  86  92 %       08/01/18 0400   97 °F (36 1 °C)  87  14  126/62  89  91 % Abnormal Labs/Diagnostic Test Results: EKG:  Atrial fibrillation with a rate of 167  CXR Mild cardiomegaly with COPD and bilateral atelectasis  ED Treatment:   Medication Administration from 07/31/2018 2014 to 07/31/2018 2358       Date/Time Order Dose Route     07/31/2018 2115 sodium chloride 0 9 % infusion 125 mL/hr Intravenous     07/31/2018 2113 diltiazem (CARDIZEM) injection 20 mg 20 mg Intravenous     07/31/2018 2131 diltiazem (CARDIZEM) injection 10 mg 10 mg Intravenous     07/31/2018 2148 diltiazem (CARDIZEM) 125 mg in sodium chloride 0 9 % 125 mL infusion 5 mg/hr Intravenous     07/31/2018 2302 labetalol (NORMODYNE) injection 10 mg 10 mg Intravenous          Past Medical/Surgical History: Active Ambulatory Problems     Diagnosis Date Noted    Allergic rhinitis 06/01/2017    Anxiety 06/10/2015    Asthma 06/10/2015    Chronic obstructive pulmonary disease ([facility] [address] ) 06/23/2015    Cigarette nicotine dependence without complication 93/53/8042    Depressive disorder 08/29/2016    Benign essential hypertension 05/30/2012    Hyperlipidemia 06/22/2015    Hypertension 11/07/2013    Lung disease 06/10/2015    GABI (obstructive sleep apnea) 07/28/2015    Vitamin D deficiency 10/04/2017    Acute pain of right shoulder 07/20/2018     Resolved Ambulatory Problems     Diagnosis Date Noted    No Resolved Ambulatory Problems     Past Medical History:   Diagnosis Date    Allergic     Allergic rhinitis     Asthma     Bronchitis     COPD (chronic obstructive pulmonary disease) (HCC)     Depression     Fibroadenoma of breast     Hyperlipidemia     Hypertension     Post-menopausal     Vitamin D deficiency        Admitting Diagnosis: Racing heart beat [R00 0]  Atrial fibrillation with rapid ventricular response (HCC) [I48 91]    Age/Sex: 74 y o  female    Assessment/Plan: Afib w/ RVR   Will place on observation ICU, obtain serial cardiac enzymes, repeat EKG in a m , give aspirin 325 mg p o  daily, obtain echocardiogram and consult cardiology in a m  COPD, Nicotine dependence- smoking cessation discussed  Per cardiology, Afib with RVR Onset seems to be last night  Highly likely to be related to underlying lung disease  Likelihood of recurrence is high  We will try 1 attempt at pharmacologic cardioversion with propafenone but otherwise concentrate on rate control and anticoagulation  Anticipated Length of Stay:  Patient will be admitted on an Observation basis with an anticipated length of stay of  < 2 midnights  Justification for Hospital Stay:   New onset atrial fibrillation with RVR  Admission Orders:  Scheduled Meds:   Current Facility-Administered Medications:  acetaminophen 650 mg Oral Q6H PRN    albuterol 2 puff Inhalation Q4H PRN    amLODIPine 10 mg Oral Daily    ascorbic acid 500 mg Oral Daily    aspirin 325 mg Oral Daily    atorvastatin 40 mg Oral Daily    cholecalciferol 5,000 Units Oral Daily    diltiazem 5 mg/hr Intravenous Continuous Last Rate: 5 mg/hr (07/31/18 2148)   docusate sodium 100 mg Oral BID    DULoxetine 30 mg Oral Daily    fluticasone 1 spray Each Nare BID    heparin (porcine) 5,000 Units Subcutaneous Q8H [facility]    loratadine 10 mg Oral Daily    nicotine 1 patch Transdermal Daily    ondansetron 4 mg Intravenous Q6H PRN    sodium chloride 125 mL/hr Intravenous Continuous Last Rate: 125 mL/hr (08/01/18 0807)   Telemetry  Consult cardiology   Echocardiogram   Daily weight   I's&O's   EKG in am   Cardiac diet     8/1 Cardiology consult pending  Cardizem drip @ 5 mg/hr  Remains in Afib overnight per nursing notes       8/2 NSR on monitor per nursing notes this am

## 2018-08-01 NOTE — NURSING NOTE
Patient noted to convert to a NSR  Echo being performed at this time  Will perform 12 lead EKG when echo completed  Will continue to monitor

## 2018-08-01 NOTE — ASSESSMENT & PLAN NOTE
Will place on observation ICU, obtain serial cardiac enzymes, repeat EKG in a m , give aspirin 325 mg p o  daily, obtain echocardiogram and consult cardiology in a m

## 2018-08-01 NOTE — PROGRESS NOTES
[facility] Internal Medicine Progress Note  Patient: Onelia Thomas 74 y o  female   MRN: [id number]  PCP: Alexandrea Goss DO  Unit/Bed#: ICU 04 Encounter: [id number]  Date Of Visit: 08/01/18    Assessment:    Principal Problem:    Atrial fibrillation with rapid ventricular response (HCC)  Active Problems:    Asthma    Chronic obstructive pulmonary disease ([address] )    Cigarette nicotine dependence without complication    Benign essential hypertension    Hypertension      Plan:    * Atrial fibrillation with RVR [facility])   Assessment & Plan     Patient is hemodynamically stable  Currently rate controlled  Case discussed with cardiology   Appreciate their input  Continue Cardizem  Continue Eliquis       Chronic obstructive pulmonary disease (HCC)   Assessment & Plan     As per above will continue pre-hospital O2 and albuterol MDI, counseled patient on importance of smoking cessation          Asthma   Assessment & Plan     Continue pre-hospital O2 2 L nasal cannula q h s  a give albuterol MDI p r n           Cigarette nicotine dependence without complication   Assessment & Plan     Counseled patient greater 10 minutes on the importance of smoking cessation of specially in light of her COPD diagnosis  Patient voiced understanding but unwillingness to attempt to quit  She refused nicotine replacement at this time will consult for smoking cessation education in a m           Benign essential hypertension   Assessment & Plan     Continue pre-hospital Norvasc 10 mg p o  daily            VTE Pharmacologic Prophylaxis:   Pharmacologic: Apixaban (Eliquis)  Mechanical VTE Prophylaxis in Place: Yes    Patient Centered Rounds: I have performed bedside rounds with nursing staff today  Discussions with Specialists or Other Care Team Provider: yes    Education and Discussions with Family / Patient: yes    Time Spent for Care: 45 minutes    More than 50% of total time spent on counseling and coordination of care as described above     Current Length of Stay: 0 day(s)    Current Patient Status: Observation   Certification Statement: The patient will continue to require additional inpatient hospital stay due to A  fib    Discharge Plan / Estimated Discharge Date: 24-48 hours    Code Status: Level 1 - Full Code      Subjective:   No complaints    Objective:     Vitals:   Temp (24hrs), Av 5 °F (36 4 °C), Min:96 8 °F (36 °C), Max:98 1 °F (36 7 °C)    HR:  [] 62  Resp:  [10-37] 15  BP: (109-177)/(58-80) 125/58  SpO2:  [88 %-95 %] 94 %  Body mass index is 29 04 kg/m²  Input and Output Summary (last 24 hours): Intake/Output Summary (Last 24 hours) at 18 1702  Last data filed at 18 1333   Gross per 24 hour   Intake          3066 41 ml   Output             1400 ml   Net          1666 41 ml       Physical Exam:     Physical Exam  GENERAL APPEARANCE: WD/WN in NAD pleasant  SKIN: no rash  HEENT: NC/AT, PERRLA (B), moist MM, no epistaxis  NECK: Supple, no JVD    LUNGS: CTA (B) mildly prolonged expiratory phase,   no use of accessory muscles    HEART:          S1S 2, RRR  , PMI is not displaced  ABDOMEN: Soft, nontender, nondistended, +BS  Rectal exam:  EXTREMITIES: no edema   PERIPHERAL VASCULAR: palpable pulses   NEURO:  AAO x 3, CN 2-12: non focal  MUSCLE STRENGHT: 5/5 (B), SENSATION: nonfocal  DTR: ++, CEREBELLAR: non focal  Additional Data:     Labs:      Results from last 7 days  Lab Units 18  0438   WBC Thousand/uL 6 90   HEMOGLOBIN g/dL 13 1   HEMATOCRIT % 37 2   PLATELETS Thousands/uL 200   NEUTROS PCT % 57   LYMPHS PCT % 32   MONOS PCT % 8   EOS PCT % 2       Results from last 7 days  Lab Units 18  0438   SODIUM mmol/L 144*   POTASSIUM mmol/L 4 0   CHLORIDE mmol/L 110*   CO2 mmol/L 26   BUN mg/dL 13   CREATININE mg/dL 0 91   CALCIUM mg/dL 9 0   TOTAL PROTEIN g/dL 6 0*   BILIRUBIN TOTAL mg/dL 0 60   ALK PHOS U/L 65   ALT U/L 11   AST U/L 14   GLUCOSE RANDOM mg/dL 103*       Results from last 7 days  Lab Units 07/31/18  2116   INR  1 01       * I Have Reviewed All Lab Data Listed Above  * Additional Pertinent Lab Tests Reviewed: [address] Admission Reviewed    Imaging:    Imaging Reports Reviewed Today Include: yes  Imaging Personally Reviewed by Myself Includes:  yes    Recent Cultures (last 7 days):           Last 24 Hours Medication List:     Current Facility-Administered Medications:  acetaminophen 650 mg Oral Q6H PRN Crissy Pleitez PA-C   albuterol 2 puff Inhalation Q4H PRN Crissy Pleitez PA-C   apixaban 5 mg Oral BID Brittany Marvin MD   ascorbic acid 500 mg Oral Daily Crissy Pleitez PA-C   atorvastatin 40 mg Oral Daily Crissy Pleitez PA-C   cholecalciferol 5,000 Units Oral Daily Crissy Pleitez PA-C   diltiazem 60 mg Oral Q6H [facility] Brittany Marvin MD   docusate sodium 100 mg Oral BID Crissy Pleitez PA-C   DULoxetine 30 mg Oral Daily Crissy Pleitez PA-C   fluticasone 1 spray Each Nare BID Crissy Pleitez PA-C   loratadine 10 mg Oral Daily Crissy Pleitez PA-C   nicotine 1 patch Transdermal Daily Crissy Pleitez PA-C   ondansetron 4 mg Intravenous Q6H PRN Crissy Pleitez PA-C        Today, Patient Was Seen By: Vee Garcia    ** Please Note: This note has been constructed using a voice recognition system   **

## 2018-08-01 NOTE — ASSESSMENT & PLAN NOTE
Counseled patient greater 10 minutes on the importance of smoking cessation of specially in light of her COPD diagnosis  Patient voiced understanding but unwillingness to attempt to quit  She refused nicotine replacement at this time will consult for smoking cessation education in a m

## 2018-08-01 NOTE — ASSESSMENT & PLAN NOTE
Onset seems to be last night  Highly likely to be related to underlying lung disease  Likelihood of recurrence is high  We will try 1 attempt at pharmacologic cardioversion with propafenone but otherwise concentrate on rate control and anticoagulation

## 2018-08-01 NOTE — PLAN OF CARE
Problem: DISCHARGE PLANNING - CARE MANAGEMENT  Goal: Discharge to post-acute care or home with appropriate resources  INTERVENTIONS:  - Conduct assessment to determine patient/family and health care team treatment goals, and need for post-acute services based on payer coverage, community resources, and patient preferences, and barriers to discharge  - Address psychosocial, clinical, and financial barriers to discharge as identified in assessment in conjunction with the patient/family and health care team  - Arrange appropriate level of post-acute services according to patient's   needs and preference and payer coverage in collaboration with the physician and health care team  - Communicate with and update the patient/family, physician, and health care team regarding progress on the discharge plan  - Arrange appropriate transportation to post-acute venues  Pt is independent and will not need any services upon discharge  Pt SO will transport home upon discharge    Outcome: Progressing

## 2018-08-01 NOTE — ED PROVIDER NOTES
History  No chief complaint on file  67-YEAR-OLD WHITE FEMALE WAS SEEN AT THE EYE DOCTOR TODAY  HAS SOME EYEDROPS  SHE SAW THE SIDE EFFECTS SHOWED RAPID HEARTBEAT  SHE STARTED DEVELOPING PALPITATIONS AND COMES IN TODAY  HER HEART RATE ON ARRIVAL WAS OVER 107 BEATS PER MINUTE  SHE DENIED ANY CHEST PAIN OR SHORTNESS OF BREATH ASSOCIATED WITH THIS  JUST PALPITATIONS  HAS NEVER HAD THESE SYMPTOMS BEFORE  History provided by:  Patient   used: No    Palpitations   Palpitations quality:  Fast  Onset quality:  Sudden  Duration:  2 hours  Timing:  Constant  Progression:  Waxing and waning  Context: not anxiety, not blood loss, not caffeine, not exercise, not illicit drugs, not nicotine and not stimulant use    Relieved by:  Nothing  Worsened by:  Nothing  Ineffective treatments:  None tried  Associated symptoms: no back pain, no chest pain, no cough, no dizziness, no leg pain, no lower extremity edema, no nausea, no shortness of breath, no vomiting and no weakness    Risk factors: no diabetes mellitus, no heart disease, no hx of atrial fibrillation, no hx of DVT, no hx of PE and no hypercoagulable state        Prior to Admission Medications   Prescriptions Last Dose Informant Patient Reported? Taking?    Cholecalciferol (VITAMIN D3) 5000 units CAPS   Yes No   Sig: Take 1 capsule by mouth daily   DULoxetine (CYMBALTA) 30 mg delayed release capsule   No No   Sig: Take 1 capsule (30 mg total) by mouth daily   L-Lysine 500 MG CAPS   Yes No   Sig: Take 1 capsule by mouth daily   MULTIPLE VITAMINS-CALCIUM PO   Yes No   Sig: Take by mouth daily   Probiotic Product (PROBIOTIC-10 PO)   Yes No   Sig: Take 1 capsule by mouth daily   albuterol (VENTOLIN HFA) 90 mcg/act inhaler   Yes No   Sig: Inhale 2 puffs every 4 (four) hours as needed   amLODIPine (NORVASC) 10 mg tablet   No No   Sig: Take 1 tablet (10 mg total) by mouth daily   ascorbic acid (VITAMIN C) 500 mg tablet   Yes No   Sig: Take 500 mg by mouth daily   atorvastatin (LIPITOR) 40 mg tablet   Yes No   Sig: Take 1 tablet by mouth daily   cetirizine (ZyrTEC) 10 mg tablet   Yes No   Sig: Take 10 mg by mouth daily   mometasone (NASONEX) 50 mcg/act nasal spray   Yes No   Si sprays into each nostril daily      Facility-Administered Medications: None       Past Medical History:   Diagnosis Date    Allergic     Allergic rhinitis     [id number]  RESOLVED    Depression     Fibroadenoma of breast     Hyperlipidemia     Hypertension     Post-menopausal     Vitamin D deficiency        Past Surgical History:   Procedure Laterality Date    BREAST LUMPECTOMY      5    BUNIONECTOMY Bilateral     MOLE REMOVAL      lip    TONSILLECTOMY      TUBAL LIGATION         Family History   Problem Relation Age of Onset    Hypertension Mother     Alcohol abuse Mother     Hypertension Father     Diabetes Father     Prostate cancer Father     Vitamin D deficiency Sister     Heart failure Brother     Celiac disease Daughter      I have reviewed and agree with the history as documented  Social History   Substance Use Topics    Smoking status: Current Every Day Smoker     Packs/day: 0 25     Years: 50 00     Types: Cigarettes    Smokeless tobacco: Never Used    Alcohol use Yes      Comment: rarely         Review of Systems   Constitutional: Negative for chills and fever  HENT: Negative for rhinorrhea and sore throat  Eyes: Negative for visual disturbance  Respiratory: Negative for cough and shortness of breath  Cardiovascular: Positive for palpitations  Negative for chest pain and leg swelling  Gastrointestinal: Negative for abdominal pain, diarrhea, nausea and vomiting  Genitourinary: Negative for dysuria  Musculoskeletal: Negative for back pain and myalgias  Skin: Negative for rash  Neurological: Negative for dizziness, weakness and headaches  Psychiatric/Behavioral: Negative for confusion     All other systems reviewed and are negative  Physical Exam  Physical Exam   Constitutional: She is oriented to person, place, and time  She appears well-developed and well-nourished  HENT:   Nose: Nose normal    Mouth/Throat: Oropharynx is clear and moist  No oropharyngeal exudate  Eyes: Conjunctivae and EOM are normal  Pupils are equal, round, and reactive to light  No scleral icterus  Neck: Normal range of motion  Neck supple  No JVD present  No tracheal deviation present  Cardiovascular:   No murmur heard  IRREGULARLY IRREGULAR AND TACHYCARDIC   Pulmonary/Chest: Effort normal and breath sounds normal  No respiratory distress  She has no wheezes  She has no rales  Abdominal: Soft  Bowel sounds are normal  There is no tenderness  There is no guarding  Musculoskeletal: Normal range of motion  She exhibits no edema or tenderness  Neurological: She is alert and oriented to person, place, and time  No cranial nerve deficit or sensory deficit  She exhibits normal muscle tone  5/5 motor, nl sens   Skin: Skin is warm and dry  Psychiatric: She has a normal mood and affect  Her behavior is normal    Nursing note and vitals reviewed  Vital Signs  ED Triage Vitals   Temp Pulse Resp BP SpO2   -- -- -- -- --      Temp src Heart Rate Source Patient Position - Orthostatic VS BP Location FiO2 (%)   -- -- -- -- --      Pain Score       --           There were no vitals filed for this visit      Visual Acuity      ED Medications  Medications - No data to display    Diagnostic Studies  Results Reviewed     None                 No orders to display              Procedures  ECG 12 Lead Documentation  Date/Time: 7/31/2018 10:49 PM  Performed by: Shaquille Form by: Danelle Young     Indications / Diagnosis:  PALPITATIONS  ECG reviewed by me, the ED Provider: yes    Patient location:  ED  Previous ECG:     Previous ECG:  Unavailable    Comparison to cardiac monitor: Yes    Interpretation:     Interpretation: abnormal    Rate: ECG rate:  167    ECG rate assessment: tachycardic    Rhythm:     Rhythm: atrial fibrillation    Ectopy:     Ectopy: none    QRS:     QRS axis:  Normal  ST segments:     ST segments:  Non-specific           Phone Contacts  ED Phone Contact    ED Course  ED Course as of Jul 31 2245 Tue Jul 31, 2018 2235  PATIENT REMAINS IN ATRIAL FIB AT ABOUT 110  SHE IS OTHERWISE ASYMPTOMATIC  WILL BE ADMITTED TO INTENSIVE CARE UNIT                                 [facility]  CritCare Time    Disposition  Final diagnoses:   None     ED Disposition     None      Follow-up Information    None         Patient's Medications   Discharge Prescriptions    No medications on file     No discharge procedures on file      ED Provider  Electronically Signed by           Aaron Javier DO  08/01/18 6668

## 2018-08-01 NOTE — H&P
H&P- Renee Decker 1950, 74 y o  female MRN: [id number]    Unit/Bed#: ED 05 Encounter: [id number]    Primary Care Provider: Patricia Rivera DO   Date and time admitted to hospital: 7/31/2018  9:02 PM        * Atrial fibrillation with RVR ([address] )   Assessment & Plan    Will place on observation ICU, obtain serial cardiac enzymes, repeat EKG in a m , give aspirin 325 mg p o  daily, obtain echocardiogram and consult cardiology in a m  Chronic obstructive pulmonary disease (HCC)   Assessment & Plan    As per above will continue pre-hospital O2 and albuterol MDI, counseled patient on importance of smoking cessation        Asthma   Assessment & Plan    Continue pre-hospital O2 2 L nasal cannula q h s  a give albuterol MDI p r n  Cigarette nicotine dependence without complication   Assessment & Plan    Counseled patient greater 10 minutes on the importance of smoking cessation of specially in light of her COPD diagnosis  Patient voiced understanding but unwillingness to attempt to quit  She refused nicotine replacement at this time will consult for smoking cessation education in a m  Benign essential hypertension   Assessment & Plan    Continue pre-hospital Norvasc 10 mg p o  daily          VTE Prophylaxis: Heparin  Code Status:  Full  POLST: There is no POLST form on file for this patient (pre-hospital)  Discussion with family:    present at bedside a time of exam    Anticipated Length of Stay:  Patient will be admitted on an Observation basis with an anticipated length of stay of  < 2 midnights  Justification for Hospital Stay:   New onset atrial fibrillation with RVR     Total Time for Visit, including Counseling / Coordination of Care: 45 minutes  Greater than 50% of this total time spent on direct patient counseling and coordination of care      Chief Complaint:     Rapid irregular heartbeat x1 day    History of Present Illness:    Renee Decker is a 74 y o  female who presents with a rapid and irregular heartbeat x1 day that was noticed this evening while she was checking her pulse oximetry  Patient has a history of oxygen-dependent COPD she wears 2 L O2 nasal cannula at night she was checking her pulse oximetry point prior to going to bed when she noticed that her pulse was rapid and irregular  Patient was asymptomatic she denies any chest pain or shortness of breath no dizziness and syncope or near syncope  Patient denies any recent changes to her medications though she does state that she had a dilated eye exam done yesterday  Patient had a stress test in 2015 and reportedly negative and was obtained for a chest pain rule out MI observation admission  She does not currently follow with Cardiology she denies a history of MI and no previous echocardiogram     Review of Systems:  Review of Systems    Past Medical and Surgical History:   Past Medical History:   Diagnosis Date    Allergic     Allergic rhinitis     [id number]  RESOLVED    Asthma     Bronchitis     COPD (chronic obstructive pulmonary disease) ([facility] [address] )     home O2 at night at home 2L    Depression     Fibroadenoma of breast     Hyperlipidemia     Hypertension     Post-menopausal     Vitamin D deficiency        Past Surgical History:   Procedure Laterality Date    BREAST LUMPECTOMY      5    BUNIONECTOMY Bilateral     MOLE REMOVAL      lip    TONSILLECTOMY      TUBAL LIGATION         Meds/Allergies:  Prior to Admission medications    Medication Sig Start Date End Date Taking?  Authorizing Provider   albuterol (VENTOLIN HFA) 90 mcg/act inhaler Inhale 2 puffs every 4 (four) hours as needed 4/17/17  Yes Historical Provider, MD   amLODIPine (NORVASC) 10 mg tablet Take 1 tablet (10 mg total) by mouth daily 7/20/18  Yes Rayna Hernandez PA-C   ascorbic acid (VITAMIN C) 500 mg tablet Take 500 mg by mouth daily   Yes Historical Provider, MD   atorvastatin (LIPITOR) 40 mg tablet Take 1 tablet by mouth daily 9/27/17 Yes Historical Provider, MD   cetirizine (ZyrTEC) 10 mg tablet Take 10 mg by mouth daily   Yes Historical Provider, MD   Cholecalciferol (VITAMIN D3) 5000 units CAPS Take 1 capsule by mouth daily   Yes Historical Provider, MD   DULoxetine (CYMBALTA) 30 mg delayed release capsule Take 1 capsule (30 mg total) by mouth daily 7/20/18  Yes Janine Peralta PA-C   L-Lysine 500 MG CAPS Take 1 capsule by mouth daily   Yes Historical Provider, MD   mometasone (NASONEX) 50 mcg/act nasal spray 2 sprays into each nostril daily 1/12/18  Yes Historical Provider, MD   MULTIPLE VITAMINS-CALCIUM PO Take by mouth daily   Yes Historical Provider, MD   Probiotic Product (PROBIOTIC-10 PO) Take 1 capsule by mouth daily   Yes Historical Provider, MD     I have reviewed home medications with patient personally  Allergies: Allergies   Allergen Reactions    Ace Inhibitors Cough     Action Taken: stopped med and changed to ARB; Category:  Adverse Reaction;     Bupropion Itching    Citalopram Diarrhea and Nausea Only    Adhesive [Medical Tape] Rash     Patch        Social History:  Marital Status:    Occupation:  Retired  Patient Pre-hospital Living Situation:   Resides at home with   Patient Pre-hospital Level of Mobility:  Full  Patient Pre-hospital Diet Restrictions:   None    Substance Use History:   History   Alcohol Use    Yes     Comment: rarely      History   Smoking Status    Current Every Day Smoker    Packs/day: 0 50    Years: 50 00    Types: Cigarettes   Smokeless Tobacco    Never Used     History   Drug Use No       Family History:  I have reviewed the patients family history    Physical Exam:   Vitals:   Blood Pressure: 155/68 (07/31/18 2145)  Pulse: 104 (07/31/18 2145)  Temperature: 98 1 °F (36 7 °C) (07/31/18 2105)  Temp Source: Temporal (07/31/18 2105)  Respirations: 20 (07/31/18 2145)  Height: 5' 8" (172 7 cm) (07/31/18 2105)  Weight - Scale: 87 1 kg (192 lb) (07/31/18 2105)  SpO2: 92 % (07/31/18 2145)    Physical Exam    Additional Data:   Lab Results: I have personally reviewed pertinent reports  Results from last 7 days  Lab Units 07/31/18  2116   WBC Thousand/uL 8 20   HEMOGLOBIN g/dL 14 6   HEMATOCRIT % 40 8   PLATELETS Thousands/uL 256   NEUTROS PCT % 64   LYMPHS PCT % 25   MONOS PCT % 9   EOS PCT % 2       Results from last 7 days  Lab Units 07/31/18  2116   SODIUM mmol/L 139   POTASSIUM mmol/L 4 5   CHLORIDE mmol/L 106   CO2 mmol/L 23   BUN mg/dL 16   CREATININE mg/dL 0 89   CALCIUM mg/dL 9 4   TOTAL PROTEIN g/dL 7 0   BILIRUBIN TOTAL mg/dL 0 60   ALK PHOS U/L 74   ALT U/L 17   AST U/L 30   GLUCOSE RANDOM mg/dL 124*       Results from last 7 days  Lab Units 07/31/18  2116   INR  1 01               Imaging: I have personally reviewed pertinent reports  XR chest 1 view portable   Final Result by Rebecca Morris (07/31 2227)   Mild cardiomegaly with COPD and bilateral atelectasis  Signed by Ravi Cee MD          EKG, Pathology, and Other Studies Reviewed on Admission:   · EKG:  Atrial fibrillation with a rate of 167    Procedure: Xr Chest 1 View Portable    Result Date: 7/31/2018  Narrative: INDICATION:  Rapid heart rate  ORDERING PROVIDER:  Kia Keller  TECHNIQUE:  Frontal chest was obtained at 21:41 hours  COMPARISON:  None Available  FINDINGS:  The heart is upper limits normal in size  Atelectatic changes at the lung bases noted  Emphysematous changes noted  There are no pleural effusions  There is no pneumothorax  No acute osseous process  Impression: Mild cardiomegaly with COPD and bilateral atelectasis  Signed by Ravi Cee MD    [employer] / [facility] Records Reviewed: Yes     ** Please Note: This note has been constructed using a voice recognition system   **

## 2018-08-01 NOTE — ASSESSMENT & PLAN NOTE
As per above will continue pre-hospital O2 and albuterol MDI, counseled patient on importance of smoking cessation

## 2018-08-02 ENCOUNTER — TRANSITIONAL CARE MANAGEMENT (OUTPATIENT)
Dept: FAMILY MEDICINE CLINIC | Facility: CLINIC | Age: 68
End: 2018-08-02

## 2018-08-02 VITALS
RESPIRATION RATE: 13 BRPM | BODY MASS INDEX: 28.58 KG/M2 | HEART RATE: 63 BPM | HEIGHT: 68 IN | SYSTOLIC BLOOD PRESSURE: 124 MMHG | WEIGHT: 188.56 LBS | OXYGEN SATURATION: 91 % | DIASTOLIC BLOOD PRESSURE: 58 MMHG | TEMPERATURE: 97.3 F

## 2018-08-02 LAB
ATRIAL RATE: 66 BPM
P AXIS: 72 DEGREES
PR INTERVAL: 196 MS
QRS AXIS: 71 DEGREES
QRSD INTERVAL: 106 MS
QT INTERVAL: 464 MS
QTC INTERVAL: 486 MS
T WAVE AXIS: 63 DEGREES
VENTRICULAR RATE: 66 BPM

## 2018-08-02 PROCEDURE — 99217 PR OBSERVATION CARE DISCHARGE MANAGEMENT: CPT | Performed by: INTERNAL MEDICINE

## 2018-08-02 PROCEDURE — 99213 OFFICE O/P EST LOW 20 MIN: CPT | Performed by: INTERNAL MEDICINE

## 2018-08-02 RX ORDER — DILTIAZEM HYDROCHLORIDE 120 MG/1
240 CAPSULE, COATED, EXTENDED RELEASE ORAL DAILY
Status: DISCONTINUED | OUTPATIENT
Start: 2018-08-02 | End: 2018-08-02 | Stop reason: HOSPADM

## 2018-08-02 RX ORDER — NICOTINE 21 MG/24HR
1 PATCH, TRANSDERMAL 24 HOURS TRANSDERMAL DAILY
Qty: 28 PATCH | Refills: 0 | Status: SHIPPED | OUTPATIENT
Start: 2018-08-03 | End: 2018-08-06 | Stop reason: SINTOL

## 2018-08-02 RX ORDER — DILTIAZEM HYDROCHLORIDE 240 MG/1
240 CAPSULE, COATED, EXTENDED RELEASE ORAL DAILY
Qty: 30 CAPSULE | Refills: 0 | Status: SHIPPED | OUTPATIENT
Start: 2018-08-03 | End: 2018-08-30 | Stop reason: SDUPTHER

## 2018-08-02 RX ADMIN — DILTIAZEM HYDROCHLORIDE 240 MG: 120 CAPSULE, COATED, EXTENDED RELEASE ORAL at 08:25

## 2018-08-02 RX ADMIN — DOCUSATE SODIUM 100 MG: 100 CAPSULE, LIQUID FILLED ORAL at 08:15

## 2018-08-02 RX ADMIN — VITAMIN D, TAB 1000IU (100/BT) 5000 UNITS: 25 TAB at 08:15

## 2018-08-02 RX ADMIN — DULOXETINE HYDROCHLORIDE 30 MG: 30 CAPSULE, DELAYED RELEASE ORAL at 08:15

## 2018-08-02 RX ADMIN — APIXABAN 5 MG: 5 TABLET, FILM COATED ORAL at 08:14

## 2018-08-02 RX ADMIN — DILTIAZEM HYDROCHLORIDE 60 MG: 60 TABLET, FILM COATED ORAL at 06:17

## 2018-08-02 RX ADMIN — OXYCODONE HYDROCHLORIDE AND ACETAMINOPHEN 500 MG: 500 TABLET ORAL at 08:14

## 2018-08-02 NOTE — PLAN OF CARE
CARDIOVASCULAR - ADULT     Maintains optimal cardiac output and hemodynamic stability Adequate for Discharge     Absence of cardiac dysrhythmias or at baseline rhythm Adequate for Discharge    Pt in a [address] Discharge to home or other facility with appropriate resources Adequate for Discharge    [id number] [address] for d/c to home per the md    [address] Discharge to post-acute care or home with appropriate resources Adequate for Discharge        INFECTION - ADULT     Absence or prevention of progression during hospitalization Adequate for Discharge        Knowledge Deficit     Patient/family/caregiver demonstrates understanding of disease process, treatment plan, medications, and discharge instructions Adequate for Discharge        PAIN - ADULT     Verbalizes/displays adequate comfort level or baseline comfort level Adequate for Discharge    Denies any pain at this time    Potential for Falls     Patient will remain free of falls Adequate for Discharge        SAFETY ADULT     Maintain or return to baseline ADL function Adequate for Discharge     Maintain or return mobility status to optimal level Adequate for Discharge

## 2018-08-02 NOTE — PLAN OF CARE
Problem: DISCHARGE PLANNING - CARE MANAGEMENT  Goal: Discharge to post-acute care or home with appropriate resources  INTERVENTIONS:  - Conduct assessment to determine patient/family and health care team treatment goals, and need for post-acute services based on payer coverage, community resources, and patient preferences, and barriers to discharge  - Address psychosocial, clinical, and financial barriers to discharge as identified in assessment in conjunction with the patient/family and health care team  - Arrange appropriate level of post-acute services according to patient's   needs and preference and payer coverage in collaboration with the physician and health care team  - Communicate with and update the patient/family, physician, and health care team regarding progress on the discharge plan  - Arrange appropriate transportation to post-acute venues  Pt is independent and will not need any services upon discharge  Pt SO will transport home upon discharge     Pt had a pulse oxymetry machine ordered upon discharge from [facility]   Outcome: Completed Date Met: 08/02/18

## 2018-08-02 NOTE — ASSESSMENT & PLAN NOTE
Newly diagnosed  Likely to be related to underlying lung disease  Propafenone was unsuccessful at pharmacological conversion  Rate control with Cardizem  We will arrange for out patient follow up in our office

## 2018-08-02 NOTE — ASSESSMENT & PLAN NOTE
Newly diagnosed  Likely to be related to underlying lung disease  Propafenone was successful at pharmacological conversion  Rate control with Cardizem  We will arrange for out patient follow up in our office

## 2018-08-02 NOTE — DISCHARGE INSTRUCTIONS
Apixaban (By mouth)   Apixaban (a-PIX-a-ban)  Treats and prevents blood clots  This medicine is a blood thinner  Brand Name(s): Eliquis   There may be other brand names for this medicine  When This Medicine Should Not Be Used: This medicine is not right for everyone  Do not use it if you had an allergic reaction to apixaban or you have active bleeding  How to Use This Medicine:   Tablet  · Your doctor will tell you how much medicine to use  Do not use more than directed  · If you are not able to swallow the tablets whole, they may be crushed and mixed in water, 5% dextrose in water (D5W), apple juice, or applesauce  The crushed tablets may be mixed with 60 mL of water or D5W dose and given through a nasogastric tube (NGT)  · This medicine should come with a Medication Guide  Ask your pharmacist for a copy if you do not have one  · Missed dose: Take a dose as soon as you remember  If it is almost time for your next dose, wait until then and take a regular dose  Do not take extra medicine to make up for a missed dose  · Store the medicine in a closed container at room temperature, away from heat, moisture, and direct light  Drugs and Foods to Avoid:   Ask your doctor or pharmacist before using any other medicine, including over-the-counter medicines, vitamins, and herbal products  · Some medicines can affect how apixaban works  Tell your doctor if you are using any of the following:   ¨ Carbamazepine, clarithromycin, itraconazole, ketoconazole, phenytoin, rifampin, ritonavir, Luz Marina's wort  ¨ Blood thinner (including clopidogrel, heparin, prasugrel, warfarin)  ¨ Medicine to treat depression  ¨ NSAID pain or arthritis medicine (including aspirin, celecoxib, diclofenac, ibuprofen, naproxen)  Warnings While Using This Medicine:   · Tell your doctor if you are pregnant or breastfeeding, or if you have kidney disease, liver disease, bleeding problems, or an artificial heart valve    · Do not stop using this medicine suddenly without asking your doctor  You might have a higher risk of stroke for a short time after you stop using this medicine  · This medicine increases your risk for bleeding that can become serious if not controlled  You may also bruise easily, and it may take longer than usual for bleeding to stop  · This medicine may increase your risk for blood clots in your spine or back if you undergo an epidural or spinal puncture  This could lead to paralysis  Tell your doctor if you ever had spine problems or back surgery  · Tell any doctor or dentist who treats you that you are using this medicine  With your doctor's supervision, you may need to stop using this medicine several days before you have surgery or medical tests  · Your doctor will do lab tests at regular visits to check on the effects of this medicine  Keep all appointments  · Keep all medicine out of the reach of children  Never share your medicine with anyone  Possible Side Effects While Using This Medicine:   Call your doctor right away if you notice any of these side effects:  · Allergic reaction: Itching or hives, swelling in your face or hands, swelling or tingling in your mouth or throat, chest tightness, trouble breathing  · Change in how much or how often you urinate, red or pink urine  · Chest pain, trouble breathing  · Coughing up blood, vomiting blood or material that looks like coffee grounds  · Numbness, tingling, or muscle weakness in your legs or feet  · Red or black, tarry stools  · Unusual bleeding, bruising, or weakness  If you notice other side effects that you think are caused by this medicine, tell your doctor  Call your doctor for medical advice about side effects  You may report side effects to FDA at 1-729-SJQ-6452  © 2017 [address] [employer] Information is for End User's use only and may not be sold, redistributed or otherwise used for commercial purposes    The above information is an  only  It is not intended as medical advice for individual conditions or treatments  Talk to your doctor, nurse or pharmacist before following any medical regimen to see if it is safe and effective for you  Diltiazem (By mouth)   Diltiazem (rlk-JIE-i-zem)  Treats high blood pressure and angina (chest pain)  This medicine is a calcium channel blocker  Brand Name(s): Cardizem, Cardizem CD, Cardizem LA, Cartia XT, Dilt-XR, Matzim LA, Taztia XT, Tiazac   There may be other brand names for this medicine  When This Medicine Should Not Be Used: This medicine is not right for everyone  Do not use it if you had an allergic reaction to diltiazem or similar medicines  How to Use This Medicine:   Long Acting Capsule, 12 Hour Capsule, 24 Hour Capsule, Tablet, Long Acting Tablet  · Take your medicine as directed  Your dose may need to be changed several times to find what works best for you  · It is best to take this medicine on an empty stomach  · Swallow this medicine whole  Do not crush, break, chew, or open the capsule or tablet  · Missed dose: Take a dose as soon as you remember  If it is almost time for your next dose, wait until then and take a regular dose  Do not take extra medicine to make up for a missed dose  · Store the medicine in a closed container at room temperature, away from heat, moisture, and direct light  Drugs and Foods to Avoid:   Ask your doctor or pharmacist before using any other medicine, including over-the-counter medicines, vitamins, and herbal products  · Some medicines can affect how diltiazem works  Tell your doctor if you are using the following:  ¨ Buspirone, carbamazepine, cimetidine, clonidine, cyclosporine, digoxin, ivabradine, lovastatin, midazolam, quinidine, rifampin, simvastatin, triazolam  ¨ Other blood pressure medicine, including a beta-blocker      Warnings While Using This Medicine:   · Tell your doctor if you are pregnant or breastfeeding, or if you have kidney disease, liver disease, or digestion problems  Tell your doctor about all heart problems that you have, including heart failure or rhythm problems  · This medicine may cause the following problems:  ¨ Slow heartbeat  ¨ Worsening of heart failure  ¨ Serious skin reactions  · This medicine could lower your blood pressure too much, especially when you first use it or if you are dehydrated  Stand or sit up slowly if you feel lightheaded or dizzy  Do not drive or do anything else that could be dangerous until you know how this medicine affects you  · Do not stop using this medicine without asking your doctor, even if you feel well  This medicine will not cure high blood pressure, but it will help keep it in normal range  You may have to take blood pressure medicine for the rest of your life  · Your doctor will do lab tests at regular visits to check on the effects of this medicine  Keep all appointments  · Keep all medicine out of the reach of children  Never share your medicine with anyone  Possible Side Effects While Using This Medicine:   Call your doctor right away if you notice any of these side effects:  · Allergic reaction: Itching or hives, swelling in your face or hands, swelling or tingling in your mouth or throat, chest tightness, trouble breathing  · Blistering, peeling, red skin rash  · Dark urine or pale stools, nausea, vomiting, loss of appetite, stomach pain, yellow skin or eyes  · Fast, slow, uneven, or pounding heartbeat  · Rapid weight gain, swelling in your hands, ankles, or feet  If you notice other side effects that you think are caused by this medicine, tell your doctor  Call your doctor for medical advice about side effects  You may report side effects to FDA at 0-958-FDA-4213  © 2017 [employer] Information is for End User's use only and may not be sold, redistributed or otherwise used for commercial purposes  The above information is an  only  It is not intended as medical advice for individual conditions or treatments  Talk to your doctor, nurse or pharmacist before following any medical regimen to see if it is safe and effective for you  Cigarette Smoking and Your Health   WHAT YOU NEED TO KNOW:   What are the risks to my health if I smoke tobacco?  Nicotine and other chemicals found in tobacco damage every cell in your body  Even if you are a light smoker, you have an increased risk for cancer, heart disease, and lung disease  If you are pregnant or have diabetes, smoking increases your risk for complications  What are the benefits to my health if I stop smoking? · You decrease respiratory symptoms such as coughing, wheezing, and shortness of breath  · You reduce your risk for cancers of the lung, mouth, throat, kidney, bladder, pancreas, stomach, and cervix  If you already have cancer, you increase the benefits of chemotherapy  You also reduce your risk for cancer returning or a second cancer from developing  · You reduce your risk for heart disease, blood clots, heart attack, and stroke  · You reduce your risk for lung infections, and diseases such as pneumonia, asthma, chronic bronchitis, and emphysema  · Your circulation improves  More oxygen can be delivered to your body  If you have diabetes, you lower your risk for complications, such as kidney, artery, and eye diseases  You also lower your risk for nerve damage  Nerve damage can lead to amputations, poor vision, and blindness  · You improve your body's ability to heal and to fight infections  What are the health benefits to others if I stop smoking? Tobacco is harmful to nonsmokers who breathe in your secondhand smoke  The following are ways the health of others around you may improve when you stop smoking:  · You lower the risks for lung cancer and heart disease in nonsmoking adults       · If you are pregnant, you lower the risk for miscarriage, early delivery, low birth weight, and stillbirth  You also lower your baby's risk for SIDS, obesity, developmental delay, and neurobehavioral problems, such as ADHD  · If you have children, you lower their risk for ear infections, colds, pneumonia, bronchitis, and asthma  Where can I find more information and support to stop smoking? · [employer]  Phone: 6- 549 - [phone]  Web Address: www [employer]  CARE AGREEMENT:   You have the right to help plan your care  Learn about your health condition and how it may be treated  Discuss treatment options with your caregivers to decide what care you want to receive  You always have the right to refuse treatment  The above information is an  only  It is not intended as medical advice for individual conditions or treatments  Talk to your doctor, nurse or pharmacist before following any medical regimen to see if it is safe and effective for you  © 2017 [employer] Information is for End User's use only and may not be sold, redistributed or otherwise used for commercial purposes  All illustrations and images included in CareNotes® are the copyrighted property of [employer]  or [employer]  How to Stop Smoking   WHAT YOU NEED TO KNOW:   You will improve your health and the health of others around you if you stop smoking  Your risk for heart and lung disease, cancer, stroke, heart attack, and vision problems will also decrease  You can benefit from quitting no matter how long you have smoked  DISCHARGE INSTRUCTIONS:   Prepare to stop smoking:  Nicotine is a highly addictive drug found in cigarettes  Withdrawal symptoms can happen when you stop smoking and make it hard to quit  These include anxiety, depression, irritability, trouble sleeping, and increased appetite  You increase your chances of success if you prepare to quit  · Set a quit date  Renee Maldonado a date that is within the next 2 weeks   Do not pick a day that you think may be stressful or busy  Write down the day or [place] it on your calender  · Tell friends and family that you plan to quit  Explain that you may have withdrawal symptoms when you try to quit  Ask them to support you  They may be able to encourage you and help reduce your stress to make it easier for you to quit  · Make a list of your reasons for quitting  Put the list somewhere you will see it every day, such as your refrigerator  You can look at the list when you have a craving  · Remove all tobacco and nicotine products from your home, car, and workplace  Also, remove anything else that will tempt you to smoke, such as lighters, matches, or ashtrays  Clean your car, home, and places at work that smell like smoke  The smell of smoke can trigger a craving  · Identify triggers that make you want to smoke  This may include activities, feelings, or people  Also write down 1 way you can deal with each of your triggers  For example, if you want to smoke as soon as you wake up, plan another activity during this time, such as exercise  · Make a plan for how you will quit  Learn about the tools that can help you quit, such as medicine, counseling, or nicotine replacement therapy  Choose at least 2 options to help you quit  Tools to help you stop smoking:   · Counseling  from a trained healthcare provider can provide you with support and skills to quit smoking  The provider will also teach you to manage your withdrawal symptoms and cravings  You may receive counseling from one counselor, in group therapy, or through phone therapy called a quit line  · Nicotine replacement therapy (NRT)  such as nicotine patches, gum, or lozenges may help reduce your nicotine cravings  You may get these without a doctor's order  Do not use e-cigarettes or smokeless tobacco in place of cigarettes or to help you quit  They still contain nicotine      · Prescription medicines  such as nasal sprays or nicotine inhalers may help reduce your withdrawal symptoms  Other medicines may also be used to reduce your urge to smoke  Ask your healthcare provider about these medicines  You may need to start certain medicines 2 weeks before your quit date for them to work well  · Hypnosis  is a practice that helps guide you through thoughts and feelings  Hypnosis may help decrease your cravings and make you more willing to quit  · Acupuncture therapy  uses very thin needles to balance energy channels in the body  This is thought to help decrease cravings and symptoms of nicotine withdrawal      · Support groups  let you talk to others who are trying to quit or have already quit  It may be helpful to speak with others about how they quit  Manage your cravings:   · Avoid situations, people, and places that tempt you to smoke  Go to nonsmoking places, such as libraries or restaurants  Understand what tempts you and try to avoid these things  · Keep your hands busy  Hold things such as a stress ball or pen  · Put candy or toothpicks in your mouth  Keep lollipops, sugarless gum, or toothpicks with you at all times  · Do not have alcohol or caffeine  These drinks may tempt you to smoke  Drink healthy liquids such as water or juice instead  · Reward yourself when you resist your cravings  Rewards will motivate you and help you stay positive  · Do an activity that distracts you from your craving  Examples include going for a walk, exercising, or cleaning  Prevent weight gain after you quit:  You may gain a few pounds after you quit smoking  It is healthier for you to gain a few pounds than to continue to smoke  The following can help you prevent weight gain:  · Eat healthy foods  These include fruits, vegetables, whole-grain breads, low-fat dairy products, beans, lean meats, and fish  Eat healthy snacks, such as low-fat yogurt, if you get hungry between meals       · Drink water before, during, and between meals   This will make your stomach feel full and help prevent you from overeating  Ask your healthcare provider how much liquid to drink each day and which liquids are best for you  · Exercise  Take a walk or do some kind of exercise every day  Ask your healthcare provider what exercise is right for you  This may help reduce your cravings and reduce stress  For support and more information:   · [employer]oke[employer]  gov  Phone: 2- 896 - 642-6911  Web Address: www smokefrGene[employer]  © 2017 [address] [employer] Information is for End User's use only and may not be sold, redistributed or otherwise used for commercial purposes  All illustrations and images included in CareNotes® are the copyrighted property of [employer]  or [employer]  The above information is an  only  It is not intended as medical advice for individual conditions or treatments  Talk to your doctor, nurse or pharmacist before following any medical regimen to see if it is safe and effective for you

## 2018-08-02 NOTE — PROGRESS NOTES
Progress Note - Catrachito Situ 1950, 74 y o  female MRN: [id number]    Unit/Bed#: ICU 04 Encounter: [id number]    Primary Care Provider: David Adams DO   Date and time admitted to hospital: 7/31/2018  9:02 PM        * Atrial fibrillation with rapid ventricular response [facility])   Assessment & Plan    Newly diagnosed  Likely to be related to underlying lung disease  Propafenone was successful at pharmacological conversion  Rate control with Cardizem  We will arrange for out patient follow up in our office  Subjective:  Patient seen and examined at the the bedside  Feeling better  No chest pain dyspnea, or palpitations  No edema orthopnea  Anxious to go home  Objective:   Vitals: Blood pressure 127/58, pulse 77, temperature (!) 97 3 °F (36 3 °C), temperature source Tympanic, resp  rate (!) 26, height 5' 8" (1 727 m), weight 85 5 kg (188 lb 9 oz), SpO2 92 % ,   Orthostatic Blood Pressures      Most Recent Value   Blood Pressure  127/58 filed at 08/02/2018 0800   Patient Position - Orthostatic VS  Lying filed at 08/01/2018 0900          Telemetry: Normal sinus rhythm  Normal rate    Physical Exam:  Physical Exam   Constitutional: She is oriented to person, place, and time  She appears well-developed and well-nourished  HENT:   Head: Normocephalic and atraumatic  Right Ear: External ear normal    Left Ear: External ear normal    Nose: Nose normal    Mouth/Throat: Oropharynx is clear and moist    Eyes: Conjunctivae and EOM are normal  No scleral icterus  Neck: Normal range of motion  Neck supple  No JVD present  No tracheal deviation present  Cardiovascular: Normal rate, regular rhythm, normal heart sounds and intact distal pulses  Exam reveals no gallop and no friction rub  No murmur heard  Pulmonary/Chest: Effort normal and breath sounds normal  No respiratory distress  She has no wheezes  She has no rales  She exhibits no tenderness  Abdominal: Soft   Bowel sounds are normal  She exhibits no distension  There is no tenderness  Musculoskeletal: Normal range of motion  She exhibits no edema or tenderness  Neurological: She is alert and oriented to person, place, and time  Skin: Skin is warm and dry  No rash noted  No erythema  No pallor  Psychiatric: She has a normal mood and affect  Her behavior is normal  Judgment and thought content normal    Nursing note and vitals reviewed        Medications:    Current Facility-Administered Medications:     acetaminophen (TYLENOL) tablet 650 mg, 650 mg, Oral, Q6H PRN, EVELIN Prasad-LIZZETH, 650 mg at 08/01/18 0105    albuterol (PROVENTIL HFA,VENTOLIN HFA) inhaler 2 puff, 2 puff, Inhalation, Q4H PRN, EVELIN Prasad-LIZZETH    apixaban Mabel Range) tablet 5 mg, 5 mg, Oral, BID, Agustín Vanegas MD, 5 mg at 08/02/18 9124    ascorbic acid (VITAMIN C) tablet 500 mg, 500 mg, Oral, Daily, Heike Lou PA-C, 500 mg at 08/02/18 7530    atorvastatin (LIPITOR) tablet 40 mg, 40 mg, Oral, Daily, EVELIN Prasad-C, 40 mg at 08/01/18 2117    cholecalciferol (VITAMIN D3) tablet 5,000 Units, 5,000 Units, Oral, Daily, EVELIN Prasad-LIZZETH, 5,000 Units at 08/02/18 0815    diltiazem (CARDIZEM CD) 24 hr capsule 240 mg, 240 mg, Oral, Daily, Clem Matias PA-C, 240 mg at 08/02/18 0825    docusate sodium (COLACE) capsule 100 mg, 100 mg, Oral, BID, EVELIN Prasad-C, 100 mg at 08/02/18 0815    DULoxetine (CYMBALTA) delayed release capsule 30 mg, 30 mg, Oral, Daily, EVELIN Prasad-C, 30 mg at 08/02/18 0815    fluticasone (FLONASE) 50 mcg/act nasal spray 1 spray, 1 spray, Each Nare, BID, Heike Lou PA-C    loratadine (CLARITIN) tablet 10 mg, 10 mg, Oral, Daily, Heike Lou PA-C    nicotine (NICODERM CQ) 21 mg/24 hr TD 24 hr patch 1 patch, 1 patch, Transdermal, Daily, Heike Lou PA-C    ondansetron [facility]) injection 4 mg, 4 mg, Intravenous, Q6H PRN, Heike Lou PA-C     Labs & Results:    Results from last 7 days  Lab Units 08/01/18  9130 07/31/18 2116   CK TOTAL U/L  --   --  80   TROPONIN I ng/mL <0 03  < > <0 03   < > = values in this interval not displayed      Results from last 7 days  Lab Units 08/01/18  0438   WBC Thousand/uL 6 90   HEMOGLOBIN g/dL 13 1   HEMATOCRIT % 37 2   PLATELETS Thousands/uL 200           Results from last 7 days  Lab Units 08/01/18  0438   SODIUM mmol/L 144*   POTASSIUM mmol/L 4 0   CHLORIDE mmol/L 110*   CO2 mmol/L 26   BUN mg/dL 13   CREATININE mg/dL 0 91   GLUCOSE RANDOM mg/dL 103*       Results from last 7 days  Lab Units 07/31/18  2116   INR  1 01

## 2018-08-02 NOTE — NURSING NOTE
hospitalist and cardiologist were in to see and assess the pt this am and she is ok for d/c to home, iv site removed with the tip intact and the tele monitor was removed, nsr prior, avs reviewed with the pt and her , all questions answered and she verbalized understanding, pt taken to her family car via w/c by the pca and was d/c'd to home

## 2018-08-02 NOTE — DISCHARGE SUMMARY
Discharge Summary - Silver Samuels 74 y o  female MRN: [id number]    Unit/Bed#: ICU 04 Encounter: [id number]    Admission Date:   Admission Orders     Ordered        07/31/18 2248  Place in Observation  Once               Admitting Diagnosis: Racing heart beat [R00 0]  Atrial fibrillation with rapid ventricular response ([facility] [address] ) [I48 91]      Procedures Performed:   Orders Placed This Encounter   Procedures    ED ECG Documentation Only       Summary of Hospital Course: Silver Samuels is a 74 y o  female who presents with a rapid and irregular heartbeat x1 day that was noticed this evening while she was checking her pulse oximetry  Patient has a history of oxygen-dependent COPD she wears 2 L O2 nasal cannula at night she was checking her pulse oximetry point prior to going to bed when she noticed that her pulse was rapid and irregular  Patient was asymptomatic she denies any chest pain or shortness of breath no dizziness and syncope or near syncope  Patient denies any recent changes to her medications though she does state that she had a dilated eye exam done yesterday  Patient had a stress test in 2015 and reportedly negative and was obtained for a chest pain rule out MI observation admission  She does not currently follow with Cardiology she denies a history of MI and no previous echocardiogram   Patient was subsequently admitted due to AFib with RVR initially treated with IV Cardizem  Quickly recovered from that and was switched to Cardizem p  o   Given patient's high chads score she was initiated on Eliquis  Patient did express some concerns about a for debility however would like and now will anticoagulation  Patient was given a coupon for 30 days free of Eliquis and I was told she is able to tolerate and afford the rest of medication regimen at home  Otherwise currently she is asymptomatic  Heart rate at 70s, patient was seen evaluated by Cardiology who has cleared patient for discharge   Discharge exam is as following:      Constitutional:  Well developed, well nourished, no acute distress, non-toxic appearance   Eyes:  PERRL, conjunctiva normal   HENT:  Atraumatic, external ears normal, nose normal, oropharynx moist, no pharyngeal exudates  Neck- normal range of motion, no tenderness, supple   Respiratory:  No respiratory distress, normal breath sounds, no rales, no wheezing   Cardiovascular:  Normal rate, normal rhythm, no murmurs, no gallops, no rubs   GI:  Soft, nondistended, normal bowel sounds, nontender, no organomegaly, no mass, no rebound, no guarding   :  No costovertebral angle tenderness   Musculoskeletal:  No edema, no tenderness, no deformities  Back- no tenderness  Integument:  Well hydrated, no rash   Lymphatic:  No lymphadenopathy noted   Neurologic:  Alert & oriented x 3, CN 2-12 normal, normal motor function, normal sensory function, no focal deficits noted   Psychiatric:  Speech and behavior appropriate                Significant Findings, Care, Treatment and Services Provided:  As per description      Complications:  No known complicate    Discharge Diagnosis:   AFib with RVR  Hyperlipidemia  Hypertension    Resolved Problems  Date Reviewed: 8/2/2018    None          Condition at Discharge: good         Discharge instructions/Information to patient and family:   See after visit summary for information provided to patient and family  Provisions for Follow-Up Care:  See after visit summary for information related to follow-up care and any pertinent home health orders  PCP: Riki Muñoz DO    Disposition: Home    Planned Readmission: No    Discharge Statement   I spent 45 minutes discharging the patient  This time was spent on the day of discharge  I had direct contact with the patient on the day of discharge  Additional documentation is required if more than 30 minutes were spent on discharge       Discharge Medications:  See after visit summary for reconciled discharge medications provided to patient and family

## 2018-08-03 ENCOUNTER — TRANSITIONAL CARE MANAGEMENT (OUTPATIENT)
Dept: FAMILY MEDICINE CLINIC | Facility: CLINIC | Age: 68
End: 2018-08-03

## 2018-08-06 ENCOUNTER — PATIENT OUTREACH (OUTPATIENT)
Dept: FAMILY MEDICINE CLINIC | Facility: CLINIC | Age: 68
End: 2018-08-06

## 2018-08-06 ENCOUNTER — TELEPHONE (OUTPATIENT)
Dept: FAMILY MEDICINE CLINIC | Facility: CLINIC | Age: 68
End: 2018-08-06

## 2018-08-06 NOTE — TELEPHONE ENCOUNTER
Patient called and stated that she was in the hospital with A-Fib    She does have an TCM with Luis Miguel Wagner on 8/20    Luis Miguel Wagner had put her on Cymbalta 3 wks ago and she is asking if she can stop the cymbalta  She was looking at the side effects  She does know that she needs to be weaned off medication  She knows that Luis Miguel Wagner is on vacation      Please advise    Phone; [phone]

## 2018-08-06 NOTE — CASE MANAGEMENT
Notification of Discharge  This is a Notification of Discharge from our facility [address]  Please be advised that this patient has been discharge from our facility  Below you will find the admission and discharge date and time including the patients disposition  PRESENTATION DATE: 7/31/2018  9:02 PM  IP ADMISSION DATE: N/A N/A  DISCHARGE DATE: 8/2/2018 12:50 PM  DISPOSITION: Home/Self Care    [address] [facility] in the [facility]  Phone: [phone]; Fax [phone]  ATTENTION: The Network Utilization Review Department is now centralized for our 9 Facilities  Make a note that we have a new phone and fax numbers for our Department  Please call with any questions or concerns to [phone] and carefully follow the prompts so that you are directed to the right person  All voicemails are confidential  Fax any determinations, approvals, denials, and requests for initial or continue stay review clinical to [phone]  Due to HIGH CALL volume, it would be easier if you could please send faxed requests to expedite your requests and in part, help us provide discharge notifications faster

## 2018-08-09 ENCOUNTER — PATIENT OUTREACH (OUTPATIENT)
Dept: FAMILY MEDICINE CLINIC | Facility: CLINIC | Age: 68
End: 2018-08-09

## 2018-08-10 ENCOUNTER — PATIENT OUTREACH (OUTPATIENT)
Dept: FAMILY MEDICINE CLINIC | Facility: CLINIC | Age: 68
End: 2018-08-10

## 2018-08-10 NOTE — PROGRESS NOTES
Outpatient Care Management Note:Received return call from patient  She is doing well and has no further episodes of a rapid heart rate  She is using oxygen at 2 liters/min  at home and is interested in pulmonary rehab  I offered to message pulmonary and make them aware on her behalf, she agreed  Had questions about her Eliquis and Vitamin K as well as the necessary holds for procedures  I encouraged her to discuss hold times for procedures with cardiology because they can vary depending on the type of procedure  I offered to mail her information on vitamin K levels in foods as well as information on the Eliquis  She is currently independent with her ADL's  She is aware of the dates and times of her upcoming appointments  Added my contact information into her cell phone  encouraged to call with questions or concerns

## 2018-08-20 ENCOUNTER — OFFICE VISIT (OUTPATIENT)
Dept: FAMILY MEDICINE CLINIC | Facility: CLINIC | Age: 68
End: 2018-08-20
Payer: COMMERCIAL

## 2018-08-20 VITALS
BODY MASS INDEX: 28.76 KG/M2 | HEART RATE: 80 BPM | RESPIRATION RATE: 20 BRPM | HEIGHT: 68 IN | DIASTOLIC BLOOD PRESSURE: 62 MMHG | TEMPERATURE: 98.8 F | SYSTOLIC BLOOD PRESSURE: 104 MMHG | WEIGHT: 189.8 LBS

## 2018-08-20 DIAGNOSIS — I48.19 PERSISTENT ATRIAL FIBRILLATION (HCC): Primary | ICD-10-CM

## 2018-08-20 DIAGNOSIS — Z23 NEED FOR VACCINATION WITH 13-POLYVALENT PNEUMOCOCCAL CONJUGATE VACCINE: ICD-10-CM

## 2018-08-20 DIAGNOSIS — K59.00 CONSTIPATION, UNSPECIFIED CONSTIPATION TYPE: ICD-10-CM

## 2018-08-20 PROCEDURE — 90471 IMMUNIZATION ADMIN: CPT

## 2018-08-20 PROCEDURE — 90670 PCV13 VACCINE IM: CPT

## 2018-08-20 PROCEDURE — TCMS TRANSITION OF CARE: Performed by: PHYSICIAN ASSISTANT

## 2018-08-20 NOTE — ASSESSMENT & PLAN NOTE
We discussed trying linzess as this has good effectiveness and no drug-drug interactions as it is not absorbed systemically  Pt defers at this time but will call us if she changes her mind

## 2018-08-20 NOTE — ASSESSMENT & PLAN NOTE
Continue eliquis and diltiazem  Pt is doing well with these  Questions answered regarding Eliquis  We discussed signs/sxs of DVT/PE  Continue upcoming cardiology appt

## 2018-08-20 NOTE — PROGRESS NOTES
Transition of Care  Follow-up After Hospitalization    Corinne A Damon Rote 74 y o  female   Date:  8/20/2018    Date and time hospital follow up call was made:  8/3/2018  3:34 PM  Patient was hopsitalized at:  [address]  Date of admission:  7/31/18  Date of discharge:  8/2/18  Diagnosis:  Atrial fibrillation with rapid ventricular   Disposition:  Home  Were the patients medicaitons reviewed and updated:  Yes  Post hospital issues:  None  Should patient be enrolled in anticoag monitoring?:  No  Scheduled for follow up?:  Yes  Did you obtain your prescribed medications:  Yes  Do you need help managing your perscriptions or medications:  No  Is transportation to your appointments needed:  No  I have advised the patient to call PCP with any new or worsening symptoms (please type in name along with any credentials):  [facility]   Living Arrangements:  Family members  Are you recieving outpatient services:  No  Are you recieving home care services:  No  Are you using any community resources:  No  Current waiver service:  No  Have you fallen in the last 12 months:  No  Interperter language line required?:  No  Counseling:  Patient       Admit Date:7/31/18  Discharge Date:8/2/18  Diagnosis: Atrial Fibrillation with RVR  Location[facility] [place] [facility] were reviewed  Medications upon discharge reviewed/updated  Medication Changes: Eliquis 5mg BID and Diltiazem added  Imaging: Echo  Consults: cardiology  Discharge Disposition:  home  Follow up visits with other specialists: cardiology 8/30/18 and pulmonary 9/5/18      Assessment and Plan:    Heidi Lanier was seen today for transition of care management  Diagnoses and all orders for this visit:    Need for vaccination with 13-polyvalent pneumococcal conjugate vaccine  -     PNEUMOCOCCAL CONJUGATE VACCINE 13-VALENT GREATER THAN 6 MONTHS            HPI:  Pt presents for follow up from recent hospitalization for new dx of atrial fibrillation   She did not have any symptoms but noted an erratic pulse when checking her pulse ox at home due to her COPD  She was found to be in atrial fibrillation with RVR  She was started on diltiazem and her rate has improved  She had an ECHO that was normal  Her Chads score was greater than 1 so she was started on Eliquis  She is tolerating this well  She has concerns that her recent start of cymbalta provoked the A fib as she read an article online about this  We discussed that no direct studies have been performed and a direct link cannot be established at this time  She has since stopped the cymbalta and does not wish to try any replacements at this time  She also notes increased constipation  She has been using colace with some benefit  ROS: Review of Systems   Constitutional: Negative for chills and fever  HENT: Negative for congestion, ear pain, hearing loss, postnasal drip, rhinorrhea, sinus pain, sinus pressure, sore throat and trouble swallowing  Eyes: Negative for pain and visual disturbance  Respiratory: Negative for cough, chest tightness, shortness of breath and wheezing  Cardiovascular: Negative  Negative for chest pain, palpitations and leg swelling  Gastrointestinal: Positive for constipation  Negative for abdominal pain, blood in stool, diarrhea, nausea and vomiting  Endocrine: Negative for cold intolerance, heat intolerance, polydipsia, polyphagia and polyuria  Genitourinary: Negative for difficulty urinating, dysuria, flank pain and urgency  Musculoskeletal: Negative for arthralgias, back pain, gait problem and myalgias  Skin: Negative for rash  Allergic/Immunologic: Negative  Neurological: Negative for dizziness, weakness, light-headedness and headaches  Hematological: Negative  Psychiatric/Behavioral: Negative for behavioral problems, dysphoric mood and sleep disturbance  The patient is not nervous/anxious          Past Medical History:   Diagnosis Date    Allergic     Allergic rhinitis     [id number]  RESOLVED    Asthma     Atrial fibrillation (HCC)     Bronchitis     COPD (chronic obstructive pulmonary disease) (HCC)     home O2 at night at home 2L    Depression     Fibroadenoma of breast     Hyperlipidemia     Hypertension     Post-menopausal     Vitamin D deficiency        Past Surgical History:   Procedure Laterality Date    BREAST LUMPECTOMY      5    BUNIONECTOMY Bilateral     MOLE REMOVAL      lip    TONSILLECTOMY      TUBAL LIGATION         Social History     Social History    Marital status:      Spouse name: N/A    Number of children: N/A    Years of education: N/A     Social History Main Topics    Smoking status: Current Every Day Smoker     Packs/day: 0 25     Years: 50 00     Types: Cigarettes    Smokeless tobacco: Never Used    Alcohol use Yes      Comment: rarely     Drug use: No    Sexual activity: Not Asked     Other Topics Concern    None     Social History Narrative    -    Retired    Lives with significant other       Family History   Problem Relation Age of Onset    Hypertension Mother     Alcohol abuse Mother     Hypertension Father     Diabetes Father     Prostate cancer Father     Vitamin D deficiency Sister     Heart failure Brother     Celiac disease Daughter        Allergies   Allergen Reactions    Ace Inhibitors Cough     Action Taken: stopped med and changed to ARB; Category:  Adverse Reaction;     Bupropion Itching    Citalopram Diarrhea and Nausea Only    Adhesive [Medical Tape] Rash     Patch          Current Outpatient Prescriptions:     albuterol (VENTOLIN HFA) 90 mcg/act inhaler, Inhale 2 puffs every 4 (four) hours as needed, Disp: , Rfl:     apixaban (ELIQUIS) 5 mg, Take 1 tablet (5 mg total) by mouth 2 (two) times a day, Disp: 30 tablet, Rfl: 0    ascorbic acid (VITAMIN C) 500 mg tablet, Take 500 mg by mouth daily, Disp: , Rfl:     atorvastatin (LIPITOR) 40 mg tablet, Take 1 tablet by mouth daily, Disp: , Rfl:     cetirizine (ZyrTEC) 10 mg tablet, Take 10 mg by mouth daily, Disp: , Rfl:     Cholecalciferol (VITAMIN D3) 5000 units CAPS, Take 1 capsule by mouth daily, Disp: , Rfl:     diltiazem (CARDIZEM CD) 240 mg 24 hr capsule, Take 1 capsule (240 mg total) by mouth daily, Disp: 30 capsule, Rfl: 0    L-Lysine 500 MG CAPS, Take 1 capsule by mouth daily, Disp: , Rfl:     MULTIPLE VITAMINS-CALCIUM PO, Take by mouth daily, Disp: , Rfl:     Probiotic Product (PROBIOTIC-10 PO), Take 1 capsule by mouth daily, Disp: , Rfl:       Physical Exam:  /62 (BP Location: Left arm, Patient Position: Sitting, Cuff Size: Large)   Pulse 80   Temp 98 8 °F (37 1 °C) (Tympanic)   Resp 20   Ht 5' 8" (1 727 m)   Wt 86 1 kg (189 lb 12 8 oz)   BMI 28 86 kg/m²     Physical Exam   Constitutional: She is oriented to person, place, and time  She appears well-developed and well-nourished  No distress  HENT:   Head: Normocephalic and atraumatic  Right Ear: External ear normal    Left Ear: External ear normal    Nose: Nose normal    Mouth/Throat: Oropharynx is clear and moist  No oropharyngeal exudate  Eyes: Conjunctivae and EOM are normal  Pupils are equal, round, and reactive to light  Right eye exhibits no discharge  Left eye exhibits no discharge  No scleral icterus  Neck: Normal range of motion  Neck supple  No thyromegaly present  Cardiovascular: Normal rate and normal heart sounds  An irregularly irregular rhythm present  Exam reveals no gallop and no friction rub  No murmur heard  Pulmonary/Chest: Effort normal and breath sounds normal  No respiratory distress  She has no wheezes  She has no rales  Abdominal: Soft  Bowel sounds are normal  She exhibits no distension  There is no tenderness  Musculoskeletal: Normal range of motion  She exhibits no edema, tenderness or deformity  Neurological: She is alert and oriented to person, place, and time  No cranial nerve deficit     Skin: Skin is warm and dry  She is not diaphoretic  Psychiatric: She has a normal mood and affect  Her behavior is normal  Judgment and thought content normal            Labs:  Lab Results   Component Value Date    WBC 6 90 08/01/2018    HGB 13 1 08/01/2018    HCT 37 2 08/01/2018    MCV 89 08/01/2018     08/01/2018     Lab Results   Component Value Date     (H) 08/01/2018    K 4 0 08/01/2018     (H) 08/01/2018    CO2 26 08/01/2018    ANIONGAP 8 08/01/2018    BUN 13 08/01/2018    CREATININE 0 91 08/01/2018    GLUCOSE 103 (H) 08/01/2018    GLUF 103 (H) 08/01/2018    CALCIUM 9 0 08/01/2018    AST 14 08/01/2018    ALT 11 08/01/2018    ALKPHOS 65 08/01/2018    PROT 6 0 (L) 08/01/2018    BILITOT 0 60 08/01/2018    EGFR 66 08/01/2018         Assesment/Plan:    1  Atrial Fibrillation with RVR: Continue eliquis and diltiazem  Pt is doing well with these  Questions answered regarding Eliquis  We discussed signs/sxs of DVT/PE  Continue upcoming cardiology appt  2  Constipation: We discussed trying linzess as this has good effectiveness and no drug-drug interactions as it is not absorbed systemically  Pt defers at this time but will call us if she changes her mind  3  Vaccine health maintenance: Update flu shot in September  Prevnar 13 given to satisfy pts lifetime vaccine need  She is to discuss Shingrix with her pharmacist  She did have zostavax but shingrix still recommended and not yet available at our office

## 2018-08-20 NOTE — ASSESSMENT & PLAN NOTE
Lifetime vaccination for pneumonia completed  We discussed Shingrix as well and pt will check with her pharmacist about getting this   Flu shot in Sept

## 2018-08-21 ENCOUNTER — TELEPHONE (OUTPATIENT)
Dept: CARDIOLOGY CLINIC | Facility: CLINIC | Age: 68
End: 2018-08-21

## 2018-08-21 NOTE — TELEPHONE ENCOUNTER
Patient called c/o daytime fatigue  She was recently discharged from the hospital with new afib and on Cardizem 240 mg daily  Yesterday, her PCP noted her BP to be 104/62 with pulse 80 irrg  Otherwise, she feels okay  She is scheduled to see Marta on August 30  Any suggestions?

## 2018-08-22 ENCOUNTER — PATIENT OUTREACH (OUTPATIENT)
Dept: FAMILY MEDICINE CLINIC | Facility: CLINIC | Age: 68
End: 2018-08-22

## 2018-08-22 NOTE — PROGRESS NOTES
Outpatient Care Management Note:  Patient is doing well, concerned about the amount of fatigue she still has  Concerned it is the Cardizem since her blood pressure was low in the PCP office  Encouraged her to discuss taking it with her cardiologist at the appointment next week  She did receive the information I mailed her  She is independent in her ADL's, discussed the need to increase her activity slowly due to the fatigue  Denies any dizziness, no further episodes of rapid heart rate  Confirmed that she has my contact information

## 2018-08-30 ENCOUNTER — OFFICE VISIT (OUTPATIENT)
Dept: CARDIOLOGY CLINIC | Facility: CLINIC | Age: 68
End: 2018-08-30
Payer: COMMERCIAL

## 2018-08-30 VITALS
SYSTOLIC BLOOD PRESSURE: 130 MMHG | DIASTOLIC BLOOD PRESSURE: 62 MMHG | HEART RATE: 80 BPM | HEIGHT: 68 IN | WEIGHT: 188 LBS | BODY MASS INDEX: 28.49 KG/M2

## 2018-08-30 DIAGNOSIS — J42 CHRONIC BRONCHITIS, UNSPECIFIED CHRONIC BRONCHITIS TYPE (HCC): ICD-10-CM

## 2018-08-30 DIAGNOSIS — I48.0 PAROXYSMAL ATRIAL FIBRILLATION (HCC): Primary | ICD-10-CM

## 2018-08-30 DIAGNOSIS — I48.91 ATRIAL FIBRILLATION WITH RAPID VENTRICULAR RESPONSE (HCC): ICD-10-CM

## 2018-08-30 DIAGNOSIS — I10 BENIGN ESSENTIAL HYPERTENSION: ICD-10-CM

## 2018-08-30 PROCEDURE — 99214 OFFICE O/P EST MOD 30 MIN: CPT | Performed by: PHYSICIAN ASSISTANT

## 2018-08-30 RX ORDER — DILTIAZEM HYDROCHLORIDE 240 MG/1
240 CAPSULE, COATED, EXTENDED RELEASE ORAL DAILY
Qty: 90 CAPSULE | Refills: 5 | Status: SHIPPED | OUTPATIENT
Start: 2018-08-30 | End: 2018-09-06 | Stop reason: SDUPTHER

## 2018-08-30 NOTE — PROGRESS NOTES
Patient ID: Onelia Thomas is a 74 y o  female  Plan:      Paroxysmal atrial fibrillation (HCC)  No recurrence since admission  Continue Eliquis and diltiazem  Chronic obstructive pulmonary disease ([address] )  She has cut cigarettes to 2/day from 10-15  Benign essential hypertension  Adequately controlled  Follow up Plan:  1 year ecg and f/u visit  HPI:   The patient is seen in follow-up today regarding paroxysmal atrial fibrillation  She had presented to the hospital with tachycardia for which she was essentially asymptomatic  She only detected it because of her pulse oximetry read out of a very fast heart rate  She converted to sinus rhythm on propafenone in the hospital   Since hospital discharge she has tried to significantly cut down on cigarette smoking and change her eating habits  There have been no palpitations, chest pain, syncope or near syncope  Echo while in the hospital recently revealed normal LV function  Other cardiac testing: Recent echo was essentially normal     Past Surgical History:   Procedure Laterality Date    BREAST LUMPECTOMY      5    BUNIONECTOMY Bilateral     MOLE REMOVAL      lip    TONSILLECTOMY      TUBAL LIGATION       CMP:   Lab Results   Component Value Date     (H) 08/01/2018     06/05/2015    K 4 0 08/01/2018    K 3 6 06/05/2015     (H) 08/01/2018     06/05/2015    CO2 26 08/01/2018    CO2 27 5 06/05/2015    BUN 13 08/01/2018    BUN 14 06/05/2015    CREATININE 0 91 08/01/2018    CREATININE 1 08 06/05/2015    GLUCOSE 101 06/05/2015    EGFR 66 08/01/2018       Lipid Profile:   Lab Results   Component Value Date    TRIG 150 07/20/2018    HDL 45 07/20/2018         Review of Systems   10  point ROS  was otherwise non pertinent or negative except as per HPI or as below     Gait:   Normal         Objective:     /62   Pulse 80   Ht 5' 8" (1 727 m)   Wt 85 3 kg (188 lb)   BMI 28 59 kg/m²     PHYSICAL EXAM:    General: Normal appearance in no distress  Eyes:  Anicteric  Oral mucosa:  Moist   Neck:  No JVD  Carotid upstrokes are brisk without bruits  No masses  Chest:  Mildly decreased breath sounds diffusely  Cardiac:  Normal PMI  Normal S1 and S2  No murmur gallop or rub  Abdomen:  Soft and nontender  No palpable organomegaly or aortic enlargement  Extremities:  No peripheral edema  Musculoskeletal:  Symmetric  Vascular:  Femoral pulses are brisk without bruits  Popliteal pulses are intact bilaterally  Pedal pulses are intact  Neuro:  Grossly symmetric  Psych:  Alert and oriented x3  Current Outpatient Prescriptions:     albuterol (VENTOLIN HFA) 90 mcg/act inhaler, Inhale 2 puffs every 4 (four) hours as needed, Disp: , Rfl:     apixaban (ELIQUIS) 5 mg, Take 1 tablet (5 mg total) by mouth 2 (two) times a day, Disp: 30 tablet, Rfl: 0    ascorbic acid (VITAMIN C) 500 mg tablet, Take 500 mg by mouth daily, Disp: , Rfl:     atorvastatin (LIPITOR) 40 mg tablet, Take 1 tablet by mouth daily, Disp: , Rfl:     cetirizine (ZyrTEC) 10 mg tablet, Take 10 mg by mouth daily, Disp: , Rfl:     Cholecalciferol (VITAMIN D3) 5000 units CAPS, Take 1 capsule by mouth daily, Disp: , Rfl:     diltiazem (CARDIZEM CD) 240 mg 24 hr capsule, Take 1 capsule (240 mg total) by mouth daily, Disp: 30 capsule, Rfl: 0    L-Lysine 500 MG CAPS, Take 1 capsule by mouth daily, Disp: , Rfl:     MULTIPLE VITAMINS-CALCIUM PO, Take by mouth daily, Disp: , Rfl:     Probiotic Product (PROBIOTIC-10 PO), Take 1 capsule by mouth daily, Disp: , Rfl:   Allergies   Allergen Reactions    Ace Inhibitors Cough     Action Taken: stopped med and changed to ARB; Category:  Adverse Reaction;     Bupropion Itching    Citalopram Diarrhea and Nausea Only    Adhesive [Medical Tape] Rash     Patch      Past Medical History:   Diagnosis Date    Allergic     Allergic rhinitis     [id number]  RESOLVED    Asthma     Atrial fibrillation ([address] )     Bronchitis     COPD (chronic obstructive pulmonary disease) (HCC)     home O2 at night at home 2L    Depression     Fibroadenoma of breast     Hyperlipidemia     Hypertension     Post-menopausal     Vitamin D deficiency            History   Smoking Status    Current Every Day Smoker    Packs/day: 0 25    Years: 50 00    Types: Cigarettes   Smokeless Tobacco    Never Used

## 2018-08-31 DIAGNOSIS — I48.91 ATRIAL FIBRILLATION WITH RAPID VENTRICULAR RESPONSE (HCC): ICD-10-CM

## 2018-08-31 NOTE — TELEPHONE ENCOUNTER
Patient called stating she needed a 1 month supply, her previous rx was refilled for 1 5 months  Script updated

## 2018-09-04 ENCOUNTER — OFFICE VISIT (OUTPATIENT)
Dept: PULMONOLOGY | Facility: HOSPITAL | Age: 68
End: 2018-09-04
Payer: COMMERCIAL

## 2018-09-04 VITALS
WEIGHT: 189 LBS | OXYGEN SATURATION: 94 % | BODY MASS INDEX: 28.64 KG/M2 | HEIGHT: 68 IN | DIASTOLIC BLOOD PRESSURE: 60 MMHG | HEART RATE: 88 BPM | SYSTOLIC BLOOD PRESSURE: 122 MMHG

## 2018-09-04 DIAGNOSIS — J30.9 ALLERGIC RHINITIS, UNSPECIFIED SEASONALITY, UNSPECIFIED TRIGGER: Primary | ICD-10-CM

## 2018-09-04 DIAGNOSIS — F17.210 CIGARETTE NICOTINE DEPENDENCE WITHOUT COMPLICATION: ICD-10-CM

## 2018-09-04 PROCEDURE — 99204 OFFICE O/P NEW MOD 45 MIN: CPT | Performed by: INTERNAL MEDICINE

## 2018-09-04 PROCEDURE — 94618 PULMONARY STRESS TESTING: CPT | Performed by: INTERNAL MEDICINE

## 2018-09-04 PROCEDURE — 4040F PNEUMOC VAC/ADMIN/RCVD: CPT | Performed by: INTERNAL MEDICINE

## 2018-09-04 NOTE — PROGRESS NOTES
Pulmonary Initial Visit  Willa Juan 74 y o  female MRN: [id number]  @ Encounter: [id number]      Impressions/Recommendations:   Patient is a 29-year-old female presenting for pulmonary evaluation  The patient has a significant history of tobacco dependence for which she is trying to quit  She is down to 1 cigarette daily with a plan to be done in 2 weeks  I reviewed her previous CT scans which have emphysematous changes but there are no pulmonary function testing available for review  I have ordered full study pulmonary function testing for her  The patient will likely be diagnosed with Chronic Obstructive Pulmonary Disease based on her CT scan findings as well as her symptomatology, she would benefit from a Lama/Laba combo  I have provided with samples of both Anoro and Stiolto determine which when she likes better but she is instructed not to start these until she has completed her pulmonary function testing  In the meantime, she may increase her albuterol usage but maintained on a p r n  basis  The patient has chronic nocturnal hypoxia for which she is on oxygen at night  She underwent in office 6 min walk test to which she desaturated to 88% on room air  She maintained a saturation of greater than 90% while using 2 L nasal cannula  I have provided the patient a prescription for pulmonary rehab for which he is very interested  We discussed the low-dose screening CT scan  The patient stated she would like to think about it before proceeding  The patient may follow up in 6-8 weeks or sooner as necessary  History of Present Illness   HPI:  Willa Juan is a 74 y o  female with pmhx sig for Afib, emphysema on CT who presents for pulmonary evaluation  Patient was recently hospitalized for atrial fibrillation  The patient checks her oxygen routinely at home and noted a variable heart rate  She denies any palpitations or shortness of breath    She went to the hospital and was told she was in Afib and was started on diltiazem/eliquis  She notes her breathing has worsened over the past year since her [facility]  She spent 3 months in [place] and noted her breathing was significantly worse  The patient used her albuterol inhaler frequently  It improved with her return to the [facility]  The patient is followed by an allergist   She is taking daily zyrtec with only occasional use of albuterol  She notices significant relief with the ventolin  She feels anxious when taking the ventolin  The patient uses 2L at night  Inhalers:  Ventolin - ~1x/2weeks  Review of systems:  12 point review of systems was completed and was otherwise negative except as listed in HPI      Answers for HPI/ROS submitted by the patient on 9/4/2018   Primary symptoms  Do you have a cough?: Yes  Do you have difficulty breathing?: Yes  Do you have a wet cough?: Yes  Chronicity: new  When did you first notice your symptoms?: more than 1 month ago  How often do your symptoms occur?: intermittently  Since you first noticed this problem, how has it changed?: waxing and waning  Have you had a change in appetite?: Yes  Do you have chest pain?: No  Do you have shortness of breath that occurs with effort or exertion?: Yes  Do you have ear congestion?: No  Do you have ear pain?: No  Do you have a fever?: No  Do you have heartburn?: No  Do you have fatigue?: Yes  Do you have muscle pain?: No  Do you have nasal congestion?: Yes  Do you have shortness of breath when lying flat?: No  Do you have shortness of breath when you wake up?: No  Do you have post-nasal drip?: Yes  Do you have a runny nose?: Yes  Do you have sneezing?: Yes  Do you have a sore throat?: No  Do you have sweats?: No  Do you have trouble swallowing?: No  Have you experienced weight loss?: Yes  Which of the following makes your symptoms worse?: change in weather, emotional stress, exposure to fumes, exposure to smoke, pollen, URI  Which of the following makes your symptoms better?: change in position, steroid inhaler  Risk factors for lung disease: smoking/tobacco exposure    Historical Information   Past Medical History:   Diagnosis Date    Allergic     Allergic rhinitis     [id number]  RESOLVED    Asthma     Atrial fibrillation (HCC)     Bronchitis     COPD (chronic obstructive pulmonary disease) (HCC)     home O2 at night at home 2L    Depression     Fibroadenoma of breast     Hyperlipidemia     Hypertension     Post-menopausal     Vitamin D deficiency      Past Surgical History:   Procedure Laterality Date    BREAST LUMPECTOMY      5    BUNIONECTOMY Bilateral     MOLE REMOVAL      lip    TONSILLECTOMY      TUBAL LIGATION       Family History   Problem Relation Age of Onset    Hypertension Mother     Alcohol abuse Mother     Hypertension Father     Diabetes Father     Prostate cancer Father     Vitamin D deficiency Sister     Heart failure Brother     Celiac disease Daughter        Social History     Social History    Marital status:      Spouse name: N/A    Number of children: N/A    Years of education: N/A     Social History Main Topics    Smoking status: Current Every Day Smoker     Packs/day: 0 25     Years: 50 00     Types: Cigarettes    Smokeless tobacco: Never Used    Alcohol use Yes      Comment: rarely     Drug use: No    Sexual activity: Not Asked     Other Topics Concern    None     Social History Narrative    -    Retired    Lives with significant other       Meds/Allergies   No current facility-administered medications for this visit  (Not in a hospital admission)  Allergies   Allergen Reactions    Ace Inhibitors Cough     Action Taken: stopped med and changed to ARB; Category:  Adverse Reaction;     Bupropion Itching    Citalopram Diarrhea and Nausea Only    Adhesive [Medical Tape] Rash     Patch        Vitals: Blood pressure 122/60, pulse 88, height 5' 8" (1 727 m), weight 85 7 kg (189 lb), SpO2 94 % , RA, Body mass index is 28 74 kg/m²  Physical Exam  General: Pleasant, Awake alert and oriented x 3, conversant without conversational dyspnea, NAD, normal affect  HEENT:  PERRL, Sclera noninjected, nonicteric OU, Nares patent, no nasal flaring, no nasal drainage, Mucous membranes, moist, no oral lesions, normal dentition  NECK: Trachea midline, no accessory muscle use, no stridor, no cervical or supraclavicular adenopathy, JVP not elevated  CARDIAC: Reg, single s1/S2, no m/r/g  PULM: CTA B/L  CHEST: No gross deformities, equal chest expansion on inspiration bilaterally  ABD: Normoactive bowel sounds, soft nontender, nondistended, no rebound, no rigidity, no guarding  EXT: No cyanosis, no clubbing, no edema, normal capillary refill  SKIN:  No rashes, no lesions  NEURO: no focal neurologic deficits, AAOx3, moving all extremities appropriately    Labs: I have personally reviewed pertinent lab results  Lab Results   Component Value Date    WBC 6 90 08/01/2018    HGB 13 1 08/01/2018    HCT 37 2 08/01/2018    MCV 89 08/01/2018     08/01/2018     Lab Results   Component Value Date    GLUCOSE 101 06/05/2015    CALCIUM 9 0 08/01/2018     (H) 08/01/2018    K 4 0 08/01/2018    CO2 26 08/01/2018     (H) 08/01/2018    BUN 13 08/01/2018    CREATININE 0 91 08/01/2018     A 6 min walk test - desaturation 88%  Imaging and other studies: I have personally reviewed pertinent reports  and I have personally reviewed pertinent films in PACS   CXR 7/31/2018:  Mild  cardiomegaly  with  COPD  and  bilateral  atelectasis  CT Chest 6/2015:  Mild to moderate COPD with 3 mm lingular nodule  Pulmonary function testing:    No pulmonary function testing available for review  Echocardiogram: I have personally reviewed pertinent reports  8/1/2018:  LEFT VENTRICLE: Size was normal  Systolic function was normal  Ejection fraction was estimated to be 60 %   There were no regional wall motion abnormalities  There was mild concentric hypertrophy  No evidence of apical thrombus  DOPPLER:  Doppler parameters were consistent with abnormal left ventricular relaxation (grade 1 diastolic dysfunction)      RIGHT VENTRICLE: The size was normal  Systolic function was normal  Wall thickness was normal      LEFT ATRIUM: Size was normal      RIGHT ATRIUM: Size was normal      [facility]

## 2018-09-05 ENCOUNTER — PATIENT OUTREACH (OUTPATIENT)
Dept: FAMILY MEDICINE CLINIC | Facility: CLINIC | Age: 68
End: 2018-09-05

## 2018-09-05 DIAGNOSIS — J43.9 PULMONARY EMPHYSEMA, UNSPECIFIED EMPHYSEMA TYPE (HCC): Primary | ICD-10-CM

## 2018-09-05 NOTE — PROGRESS NOTES
Outpatient Care Management Note:  Patient saw pulmonary this morning and failed her walking test   She is now supposed to use nasal O2 at 2 liters/minute continuously  She is scheduled for pulmonary functions studies on 9/14/18  She will be starting pulmonary rehab, questioned whether or not it was offered at [facility] and verified that it was not  Gave patient a list of facilities that offered it as well as the number for scheduling  Has had no issues with her heart rate  No chest pain  Remains independent with her ADL's  Verified that she has my contact information  Encouraged to call with a change in symptoms, questions or concerns

## 2018-09-06 DIAGNOSIS — E78.5 HYPERLIPIDEMIA, UNSPECIFIED HYPERLIPIDEMIA TYPE: ICD-10-CM

## 2018-09-06 DIAGNOSIS — I48.91 ATRIAL FIBRILLATION WITH RAPID VENTRICULAR RESPONSE (HCC): ICD-10-CM

## 2018-09-06 DIAGNOSIS — R06.02 SOB (SHORTNESS OF BREATH): Primary | ICD-10-CM

## 2018-09-06 RX ORDER — ATORVASTATIN CALCIUM 40 MG/1
40 TABLET, FILM COATED ORAL DAILY
Qty: 90 TABLET | Refills: 1 | Status: SHIPPED | OUTPATIENT
Start: 2018-09-06 | End: 2018-10-11 | Stop reason: SDUPTHER

## 2018-09-06 RX ORDER — ALBUTEROL SULFATE 90 UG/1
2 AEROSOL, METERED RESPIRATORY (INHALATION) EVERY 4 HOURS PRN
Qty: 3 INHALER | Refills: 2 | Status: SHIPPED | OUTPATIENT
Start: 2018-09-06 | End: 2019-07-23 | Stop reason: SDUPTHER

## 2018-09-06 RX ORDER — DILTIAZEM HYDROCHLORIDE 240 MG/1
240 CAPSULE, COATED, EXTENDED RELEASE ORAL DAILY
Qty: 90 CAPSULE | Refills: 1 | Status: SHIPPED | OUTPATIENT
Start: 2018-09-06 | End: 2019-05-13 | Stop reason: SDUPTHER

## 2018-09-07 ENCOUNTER — TELEPHONE (OUTPATIENT)
Dept: PULMONOLOGY | Facility: CLINIC | Age: 68
End: 2018-09-07

## 2018-09-07 NOTE — PROGRESS NOTES
Patient called regarding CMN docs from [address] for Home portable oxygen   Patient also has questions regarding PFT scheduled for 09/14/18 Patient can be reached at [phone]

## 2018-09-07 NOTE — TELEPHONE ENCOUNTER
Patient called stating Leydi Mata is requesting CMN for Home portable Oxygen  Patient also has question regarding PFT which she'll be having 09/14/18   Patient can be reached at [phone]

## 2018-09-10 ENCOUNTER — HOSPITAL ENCOUNTER (OUTPATIENT)
Dept: MAMMOGRAPHY | Facility: MEDICAL CENTER | Age: 68
Discharge: HOME | End: 2018-09-10
Payer: COMMERCIAL

## 2018-09-10 DIAGNOSIS — Z12.39 SCREENING FOR BREAST CANCER: ICD-10-CM

## 2018-09-10 PROCEDURE — 77063 BREAST TOMOSYNTHESIS BI: CPT

## 2018-09-10 PROCEDURE — 77067 SCR MAMMO BI INCL CAD: CPT

## 2018-09-13 ENCOUNTER — CLINICAL SUPPORT (OUTPATIENT)
Dept: PULMONOLOGY | Facility: HOSPITAL | Age: 68
End: 2018-09-13
Attending: INTERNAL MEDICINE
Payer: COMMERCIAL

## 2018-09-13 DIAGNOSIS — J42 CHRONIC BRONCHITIS, UNSPECIFIED CHRONIC BRONCHITIS TYPE (HCC): ICD-10-CM

## 2018-09-13 DIAGNOSIS — F17.210 CIGARETTE NICOTINE DEPENDENCE WITHOUT COMPLICATION: ICD-10-CM

## 2018-09-13 PROCEDURE — G0424 PULMONARY REHAB W EXER: HCPCS

## 2018-09-13 NOTE — PROGRESS NOTES
All exercise performed with pt  Using 2 liters of supplemental oxygen   O2 sats remained greater than 90%

## 2018-09-14 ENCOUNTER — HOSPITAL ENCOUNTER (OUTPATIENT)
Dept: PULMONOLOGY | Facility: HOSPITAL | Age: 68
Discharge: HOME | End: 2018-09-14
Attending: INTERNAL MEDICINE
Payer: COMMERCIAL

## 2018-09-14 VITALS — BODY MASS INDEX: 30.41 KG/M2 | WEIGHT: 200 LBS

## 2018-09-14 DIAGNOSIS — F17.210 CIGARETTE NICOTINE DEPENDENCE WITHOUT COMPLICATION: ICD-10-CM

## 2018-09-14 PROCEDURE — 94060 EVALUATION OF WHEEZING: CPT | Performed by: INTERNAL MEDICINE

## 2018-09-14 PROCEDURE — 94726 PLETHYSMOGRAPHY LUNG VOLUMES: CPT | Performed by: INTERNAL MEDICINE

## 2018-09-14 PROCEDURE — 94729 DIFFUSING CAPACITY: CPT | Performed by: INTERNAL MEDICINE

## 2018-09-14 PROCEDURE — 94060 EVALUATION OF WHEEZING: CPT

## 2018-09-14 PROCEDURE — 94729 DIFFUSING CAPACITY: CPT

## 2018-09-14 PROCEDURE — 94727 GAS DIL/WSHOT DETER LNG VOL: CPT

## 2018-09-14 PROCEDURE — 94760 N-INVAS EAR/PLS OXIMETRY 1: CPT

## 2018-09-14 RX ORDER — ALBUTEROL SULFATE 2.5 MG/3ML
2.5 SOLUTION RESPIRATORY (INHALATION) ONCE
Status: COMPLETED | OUTPATIENT
Start: 2018-09-14 | End: 2018-09-14

## 2018-09-14 RX ADMIN — ALBUTEROL SULFATE 2.5 MG: 2.5 SOLUTION RESPIRATORY (INHALATION) at 11:43

## 2018-09-14 NOTE — PROGRESS NOTES
Pulmonary Rehabilitation Plan of Care     Today's date: 2018   Visits:   Patient name: Onelia Thomas      : 1950  Age: 74 y o  MRN: [id number]  Referring Physician: Albertina Costa MD  Provider: [facility]  Clinician: Yaneth Youssef    Dx:COPD     Encounter Diagnoses   Name Primary?  Cigarette nicotine dependence without complication     [facility], [facility]      Date of onset: 2018    Medication compliance: Yes   Comments: Pt  Is compliant taking meds  Fall Risk: No   Comments: Pt  Is not a fall risk    EXERCISE/ACTIVITY    Cardiovascular:   Min: 30 min continuous cardio exercise    METS: TBD   Hr: 20-30 bpm greater than RHR if sinus tachycardia APMHR = 93-124bpm   RPE: 13   O2 sat: Greater than 90%    Modalities: Treadmill, UBE, NuStep and Recumbent bike  Strength trainin-3 days / week   Modalities: Leg press and Chest press  EKG changes: Hx  Atrial fibrillation, will monitor if needed  Dyspnea score:1  Home activity: Progress as tolerated    Goals: Improve physical capacity to better perform functional daily activities upon completion of Pulmonary Rehab  Education: Living Life to The Fullest with COPD  Plan: Attend Pulmonary rehab 2-3 x's week  Readiness to change:  Pt is ready to make significant lifestyle changes to improve quality of Life    NUTRITION    Weight control:    Starting weight:190   Current weight: Weight - Scale: 90 7 kg (200 lb)   Waist circumference:    Starting: N/A   Current:    Diabetes: no  Lipid management:yes, atorvastatin  Goals: Pt   Wishes to monitor caloric intake to avoid weight gain due to smoking cessation  Education: verbal followed by written material  Plan: Follow diet regimen daily especially carbohydrate intake  Readiness to change: Yes Pt is ready and willing to make appropriate lifestyle changes to improve quality of life    PSYCHOSOCIAL    Emotional: PHQ-9 Total Score: 9  Self-reported stress level:5  Goals: Exercise 2-3 x's week to help with socialization and depression  Education: specific to disease process and pt  requests  Plan: Pulmonary Rehab 2-3 x's week  Readiness to change: 5    OTHER CORE COMPONENTS     Tobacco:   History   Smoking Status    Current Every Day Smoker    Packs/day: 0 25    Years: 50 00    Types: Cigarettes   Smokeless Tobacco    Never Used     Blood pressure:    Resting: Resting BP: 124/58   Exercise: Recovery BP: 114/70  Goals: Adhere to AHA BP guidlines  Education: verbal followed by written material  Plan: Monitor hemodynamic responses  Readiness to change: 5

## 2018-09-14 NOTE — PROGRESS NOTES
/PULMONARY REHAB ASSESSMENT    Today's date: 2018   Patient name: Catrachito Savage      : 1950       MRN: [id number]  PCP: David Adams DO  Cardiologist: Dr Agapito Anguiano  Pulmonologist: Dr Christiano Catherine  Surgeon: N/A  Dx: COPD  Encounter Diagnoses   Name Primary?  Cigarette nicotine dependence without complication     [facility]      Date of onset: 2018  Cultural needs: N/A    Height: 5'8"  Weight:  Weight - Scale: 90 7 kg (200 lb)   Medical History: Pt  Is a 74year old female dx COPD referred to Pulmonary Rehab by her pulmonologist Dr Zepeda Blocker  Pt sates she recently performed a 6MWT which she desaturated to 88% on room air  Past Medical History:   Diagnosis Date    Allergic     Allergic rhinitis       RESOLVED    Asthma     Atrial fibrillation (HCC)     Bronchitis     COPD (chronic obstructive pulmonary disease) (HCC)     home O2 at night at home 2L    Depression     Fibroadenoma of breast     Hyperlipidemia     Hypertension     Post-menopausal     Vitamin D deficiency        Physical Limitations: none  Risk Factors   Cholesterol: yes  Smoking: Quit 2018  HTN: yes  DM :no  Obesity: yes  Inactivity:yes   Family History:   Family History   Problem Relation Age of Onset    Hypertension Mother     Alcohol abuse Mother     Hypertension Father     Diabetes Father     Prostate cancer Father     Vitamin D deficiency Sister     Heart failure Brother     Celiac disease Daughter      Allergies: Allergies   Allergen Reactions    Ace Inhibitors Cough     Action Taken: stopped med and changed to ARB; Category:  Adverse Reaction;     Bupropion Itching    Citalopram Diarrhea and Nausea Only    Adhesive [Medical Tape] Rash     Patch        Current Medications:   Current Outpatient Prescriptions   Medication Sig Dispense Refill    albuterol (VENTOLIN HFA) 90 mcg/act inhaler Inhale 2 puffs every 4 (four) hours as needed for shortness of breath 3 Inhaler 2    apixaban (ELIQUIS) 5 mg Take 1 tablet (5 mg total) by mouth 2 (two) times a day 60 tablet 5    ascorbic acid (VITAMIN C) 500 mg tablet Take 500 mg by mouth daily      atorvastatin (LIPITOR) 40 mg tablet Take 1 tablet (40 mg total) by mouth daily 90 tablet 1    cetirizine (ZyrTEC) 10 mg tablet Take 10 mg by mouth daily      Cholecalciferol (VITAMIN D3) 5000 units CAPS Take 1 capsule by mouth daily      diltiazem (CARDIZEM CD) 240 mg 24 hr capsule Take 1 capsule (240 mg total) by mouth daily 90 capsule 1    L-Lysine 500 MG CAPS Take 1 capsule by mouth daily      MULTIPLE VITAMINS-CALCIUM PO Take by mouth daily      Probiotic Product (PROBIOTIC-10 PO) Take 1 capsule by mouth daily       No current facility-administered medications for this visit  Functional Status Prior to Diagnosis for Treatment   Occupation: retired  Recreation: spending time with family  ADLs: Independent  [place]: Yes  Exercise: no      Current Functional Status  Occupation: Retired  Recreation: Spending time with family  ADLs: Independent  [place]: Yes  Exercise: none      Short Term Program Goals: Pt  Wishes to perform activities at home such as cleaning, yard work without SOB    Long Term Goals: Upon completion of Pulmonary Rehab x 36 sessions Pt  Wishes to improve cardiopulmonary condition and improve physical capacity to better perform functional daily activities  Pt  Wishes to achieve a lower risk status for pulmonary disease and other related medical conditions, minimize disease progression, prevent exacerbations, and decrease hospitalizations x 36 sessions of Pulmonary Rehab      Ability to reach goals/rehabilitation potential: Good potential    Projected return to function: Upon completion of 36 sessions of Pulmonary Rehab    Objective tests: 6MWT      Nutritional   Fats/Oils: Selects health choices  Sodium: yes  Sweets/ETOH/Caffeine:No alcohol  Dairy/Eggs: yes  Meats: Beef: yes   Fish:yes  Chicken/Turkey:yes  Pork/Ham: yes    Processed Meats: sometimes  Fruits: Vegetables: Grains/Beans: Supplements: yes  Social: has significant other    Clinical Implications:none  Goals: Pt  Wishes to improve dietary habits, decrease total body weight, improve quality of life by lifestyle modification    Emotional/Social    Life Stressors: Health      Goals:  Increase socialization by attending Pulmonary rehab, exercise 2-3 days per week to help with depression    Domestic Violence Screening:   Comments:

## 2018-09-14 NOTE — PROGRESS NOTES
Corinnekvng Carolina   1950     Risk: Moderate     Post PRE % Change Goal   Date: 9/13/2018      Physical       Sub Max ETT (mets)    10% increase   6MWT (feet) 600   10% increase   UCSD Dyspnea Score    5 pt decrease   Supplemental O2 use (L) 2liters O2 Flow Rate (L/min): 2 L/min     [facility] (est peak O2)  Total Score: 24 2     Peak exercise CR/ID (mets)    40% increase   Emotional       PHQ9 (> 10 refer to MD)  PHQ-9 Total Score: 9    4 pt decrease   [place] (lower score = improvement)       Total  Total: 27  < 27   Feelings  Feelings: Slightly  < 3   Physical Fitness  Physical Fitness: Very light  < 3   Social Support  Social Support: Yes, as much as I needed  < 3   Daily Activities  Daily Activity: Some difficulty  < 3   Social Activities  Social Activities: Moderately  < 3   Pain  Pain: Mild pain  < 3   Overall Health  Overall Health: Good  < 3   Quality of Life  Quality of Life: Good & bad parts, about equal  < 3   Change in Health  Change in Health: A little worse  < 3   Dietary       Rate your plate     > 58   Measurements       Weight  Weight - Scale: 90 7 kg (200 lb)    2 5 - 5%   BMI     19 - 25   Waist Circ      < 36 M / < 35 F   % Body fat    < 25 M / < 33 F   BP left arm               (systolic) 479   < 248   (diastolic) 58   < 90   Smoking #/day  (if applicable)    0   Lipids/Glucose (Date)       Total cholesterol    50 - 200   Triglycerides    < 150   HDL    40 - 60   LDL    < 100   A1C    4 0 - 5 6%   Fasting BG

## 2018-09-17 ENCOUNTER — CLINICAL SUPPORT (OUTPATIENT)
Dept: PULMONOLOGY | Facility: HOSPITAL | Age: 68
End: 2018-09-17
Attending: INTERNAL MEDICINE
Payer: COMMERCIAL

## 2018-09-17 DIAGNOSIS — J42 CHRONIC BRONCHITIS, UNSPECIFIED CHRONIC BRONCHITIS TYPE (HCC): ICD-10-CM

## 2018-09-17 PROCEDURE — G0424 PULMONARY REHAB W EXER: HCPCS

## 2018-09-17 NOTE — PROGRESS NOTES
Program progressed this date-refer to flow sheet  Attempted TM but patient unable to tolerate at this time due to [address]  Leg Press also added w/out difficulty

## 2018-09-19 ENCOUNTER — CLINICAL SUPPORT (OUTPATIENT)
Dept: PULMONOLOGY | Facility: HOSPITAL | Age: 68
End: 2018-09-19
Attending: INTERNAL MEDICINE
Payer: COMMERCIAL

## 2018-09-19 ENCOUNTER — PATIENT OUTREACH (OUTPATIENT)
Dept: FAMILY MEDICINE CLINIC | Facility: CLINIC | Age: 68
End: 2018-09-19

## 2018-09-19 DIAGNOSIS — J42 CHRONIC BRONCHITIS, UNSPECIFIED CHRONIC BRONCHITIS TYPE (HCC): ICD-10-CM

## 2018-09-19 PROCEDURE — G0424 PULMONARY REHAB W EXER: HCPCS

## 2018-09-19 NOTE — PROGRESS NOTES
Program progressed w/in her tolerance  Correct hemodynamic responses to activity  Patient performs correct breathing techniques entire session

## 2018-09-19 NOTE — PROGRESS NOTES
Outpatient Care Management Note:  Patient had PFT's done on 9/1/18 but has not received results yet  She has started pulmonary rehab and is very pleased  She is using her nasal O2 at 2 liters/minute continuously  Her insurance has authorized a portable concentrator but Amina Valadez has told her it may take a month to het one   " I will be patient and give them the month but after that I will be complaining "  She is in good spirits, remains independent with ADL's  She has no needs at this time  Verified that she has my contact information  Encouraged to call with change in symptoms, questions or concerns

## 2018-09-20 DIAGNOSIS — I10 HYPERTENSION, UNSPECIFIED TYPE: ICD-10-CM

## 2018-09-20 DIAGNOSIS — I48.91 ATRIAL FIBRILLATION WITH RAPID VENTRICULAR RESPONSE (HCC): ICD-10-CM

## 2018-09-20 RX ORDER — AMLODIPINE BESYLATE 10 MG/1
TABLET ORAL
Qty: 90 TABLET | Refills: 0 | Status: SHIPPED | OUTPATIENT
Start: 2018-09-20 | End: 2018-11-02 | Stop reason: ALTCHOICE

## 2018-09-21 ENCOUNTER — TELEPHONE (OUTPATIENT)
Dept: PULMONOLOGY | Facility: HOSPITAL | Age: 68
End: 2018-09-21

## 2018-09-21 NOTE — TELEPHONE ENCOUNTER
Pt called for results of PFT and also questioning if she should with inhalers depending on results of PFT  Dr Domonique Mohan to be notified

## 2018-09-24 ENCOUNTER — APPOINTMENT (OUTPATIENT)
Dept: PULMONOLOGY | Facility: HOSPITAL | Age: 68
End: 2018-09-24
Attending: INTERNAL MEDICINE
Payer: COMMERCIAL

## 2018-09-24 ENCOUNTER — TELEPHONE (OUTPATIENT)
Dept: CARDIOLOGY CLINIC | Facility: CLINIC | Age: 68
End: 2018-09-24

## 2018-09-24 NOTE — TELEPHONE ENCOUNTER
Patient called and stated she is going for an eye procedure  Procedure is superficial so the eye doctor told her she did not have to hold any medications  Corinne was questioning whether or not the numbing drops they use will affect her taking her Cardizem  Per Jasbir Cody, numbing drops in the eye will not affect the Cardizem

## 2018-09-26 ENCOUNTER — CLINICAL SUPPORT (OUTPATIENT)
Dept: PULMONOLOGY | Facility: HOSPITAL | Age: 68
End: 2018-09-26
Attending: INTERNAL MEDICINE
Payer: COMMERCIAL

## 2018-09-26 DIAGNOSIS — J44.9 OBSTRUCTIVE CHRONIC BRONCHITIS WITHOUT EXACERBATION (HCC): ICD-10-CM

## 2018-09-26 PROCEDURE — G0424 PULMONARY REHAB W EXER: HCPCS | Performed by: PHYSICAL THERAPIST

## 2018-10-01 ENCOUNTER — CLINICAL SUPPORT (OUTPATIENT)
Dept: PULMONOLOGY | Facility: HOSPITAL | Age: 68
End: 2018-10-01
Attending: INTERNAL MEDICINE
Payer: COMMERCIAL

## 2018-10-01 DIAGNOSIS — J42 CHRONIC BRONCHITIS, UNSPECIFIED CHRONIC BRONCHITIS TYPE (HCC): ICD-10-CM

## 2018-10-01 PROCEDURE — G0424 PULMONARY REHAB W EXER: HCPCS | Performed by: PHYSICAL THERAPIST

## 2018-10-01 NOTE — PROGRESS NOTES
Program progressed this date w/little difficulty  Correct hemodynamic responses to activity  Standing activities are difficult for patient to tolerate

## 2018-10-03 ENCOUNTER — APPOINTMENT (OUTPATIENT)
Dept: PULMONOLOGY | Facility: HOSPITAL | Age: 68
End: 2018-10-03
Attending: INTERNAL MEDICINE
Payer: COMMERCIAL

## 2018-10-03 DIAGNOSIS — J44.9 CHRONIC OBSTRUCTIVE PULMONARY DISEASE, UNSPECIFIED COPD TYPE (HCC): Primary | ICD-10-CM

## 2018-10-05 ENCOUNTER — CLINICAL SUPPORT (OUTPATIENT)
Dept: PULMONOLOGY | Facility: HOSPITAL | Age: 68
End: 2018-10-05
Attending: INTERNAL MEDICINE
Payer: COMMERCIAL

## 2018-10-05 DIAGNOSIS — J42 CHRONIC BRONCHITIS, UNSPECIFIED CHRONIC BRONCHITIS TYPE (HCC): ICD-10-CM

## 2018-10-05 PROCEDURE — G0424 PULMONARY REHAB W EXER: HCPCS

## 2018-10-05 NOTE — PROGRESS NOTES
Hold weights due to low back pain  Pt tolerated there ex well on 2 liters of supplemental O2  No complaints when leaving ex  Session  Pt  States she is going to perform low back exercises and apply ice when she arrives home  Pt  Was pleased with today's exercise session

## 2018-10-08 ENCOUNTER — CLINICAL SUPPORT (OUTPATIENT)
Dept: PULMONOLOGY | Facility: HOSPITAL | Age: 68
End: 2018-10-08
Attending: INTERNAL MEDICINE
Payer: COMMERCIAL

## 2018-10-08 DIAGNOSIS — J44.9 OBSTRUCTIVE CHRONIC BRONCHITIS WITHOUT EXACERBATION (HCC): ICD-10-CM

## 2018-10-08 PROCEDURE — G0424 PULMONARY REHAB W EXER: HCPCS

## 2018-10-10 ENCOUNTER — APPOINTMENT (OUTPATIENT)
Dept: PULMONOLOGY | Facility: HOSPITAL | Age: 68
End: 2018-10-10
Attending: INTERNAL MEDICINE
Payer: COMMERCIAL

## 2018-10-11 DIAGNOSIS — E78.5 HYPERLIPIDEMIA, UNSPECIFIED HYPERLIPIDEMIA TYPE: ICD-10-CM

## 2018-10-11 RX ORDER — ATORVASTATIN CALCIUM 40 MG/1
TABLET, FILM COATED ORAL
Qty: 90 TABLET | Refills: 1 | Status: SHIPPED | OUTPATIENT
Start: 2018-10-11 | End: 2019-04-04 | Stop reason: SDUPTHER

## 2018-10-12 ENCOUNTER — CLINICAL SUPPORT (OUTPATIENT)
Dept: PULMONOLOGY | Facility: HOSPITAL | Age: 68
End: 2018-10-12
Attending: INTERNAL MEDICINE
Payer: COMMERCIAL

## 2018-10-12 DIAGNOSIS — J44.9 CHRONIC OBSTRUCTIVE PULMONARY DISEASE, UNSPECIFIED COPD TYPE (HCC): ICD-10-CM

## 2018-10-12 PROCEDURE — G0424 PULMONARY REHAB W EXER: HCPCS

## 2018-10-12 NOTE — PROGRESS NOTES
Pt reports improvement in performing activities of daily living, housecleaning activities    Reports wearing a mask while performing housecleaning activities

## 2018-10-12 NOTE — PROGRESS NOTES
Cardiac Rehabilitation 30 day Progress Report      Today's date: 10/12/2018   Visits:   Patient name: Olivia Lorenzana      : 1950  Age: 79 y o  MRN: [id number]  Referring Physician: Brandie Sams MD  Provider: Lawrence Martins    Clinician: Yareli CASTRO    Dx:   Encounter Diagnosis   Name Primary?  Chronic obstructive pulmonary disease, unspecified [facility] type [facility]      Date of onset: 2018      SUMMARY OF PROGRESS:  Pt  Attended  Pulmonary rehab sessions  She has made significant progress thus far  She is able to tolerate 22 min ambulating on the treadmill with 2 liters of supplemental oxygen  O2 = 92% Peak BP = 162/60      Medication compliance: Yes   Comments: Pt states compliancy taking meds  Fall Risk: Low   Comments: pt  Is not a fall risk, ambulates safely    EKG changes: N/A pt  unmonitored      EXERCISE ASSESSMENT and PLAN    Current Exercise Program in Rehab:       Frequency: 3/week        Minutes: 30         METS: 2 1            HR: RHR=  THR= 20-30 greater than RHR pt  In afib   RPE: 12-13         Modalities: Treadmill, UBE and NuStep      Exercise Plan/Progression:    Frequency: 2x/week   Minutes: 30   METS: 3   HR: 20-30 greater than RHR    RPE: 12-13   Modalities: Treadmill, UBE and Recumbent bike    Strength trainin-3 days / week   Modalities: Chest Press and Seated Row    Progressing:  Yes    Home Exercise: Pt  Is able to perform cleaning chores, wears mask for protection from chemicals      Goals: :Improve physical capacity to better perform functional daily activities, achieve a lower risk status for medical condition,minimize disease progression, prevent exacerbations, decrease hospitalizations    Education: Coping with shortness of breath, breathing techniques, stress management, energy conservation, preventing infections, medications, using oxygen safely, avoiding irritants and allergens, maintaining a healthy weight, emotional health/well being      Plan:education on home exercise guidelines  Readiness to change: Action      NUTRITION ASSESSMENT AND PLAN    Weight control: pt; wishing to decrease total body weight      Diabetes: N/A  Lipid management: Discussed diet and lipid management  Goals:Wt  loss 1-2 ppw goal of 10 lbs  Education: hydration  wt  loss  portion control  Progressing:Yes  Plan: Increase whole grains, increase fruits/vegs, eliminate processed meats and Reduce added sugars <25g/day  Readiness to change: Action      PSYCHOSOCIAL ASSESSMENT AND PLAN    Emotional:          1-4 = Minimal Depression  Self-reported stress level: 3   Social support: Very Good  Goals:  Reduce perceived stress to 1-3/10 and Quality of Life in [facility] Score < 3   Education: benefits of positive support system  Progressing:Yes  Plan: Practice relaxation techniques  Readiness to change: Action      OTHER CORE COMPONENTS     Tobacco:   History   Smoking Status    Current Every Day Smoker    Packs/day: 0 25    Years: 50 00    Types: Cigarettes   Smokeless Tobacco    Never Used       Tobacco Use Intervention: Pt quit smoking    Blood pressure:    Rest[place]-140/58-62   Exercise: 162/60    Goals: consistent BP < 130/80     Education: Coping with shortness of breath, breathing techniques, stress management, energy conservation, preventing infections, medications, using oxygen safely, avoiding irritants and allergens, maintaining a healthy weight, emotional health/well being        Progressing:Yes  Plan: Complete abstention from smoking  Readiness to change: Action

## 2018-10-15 ENCOUNTER — CLINICAL SUPPORT (OUTPATIENT)
Dept: PULMONOLOGY | Facility: HOSPITAL | Age: 68
End: 2018-10-15
Attending: INTERNAL MEDICINE
Payer: COMMERCIAL

## 2018-10-15 DIAGNOSIS — J42 CHRONIC BRONCHITIS, UNSPECIFIED CHRONIC BRONCHITIS TYPE (HCC): ICD-10-CM

## 2018-10-15 PROCEDURE — G0424 PULMONARY REHAB W EXER: HCPCS

## 2018-10-15 NOTE — PROGRESS NOTES
Patient progressed to 30 minutes on the TM today and accomplished   75 miles  Correcr breathing techniques practiced and encouraged  Rated program a 7 this date w/minimal MERCHANT  Program to be progressed as patient able to tolerate

## 2018-10-16 ENCOUNTER — PATIENT OUTREACH (OUTPATIENT)
Dept: FAMILY MEDICINE CLINIC | Facility: CLINIC | Age: 68
End: 2018-10-16

## 2018-10-16 NOTE — PROGRESS NOTES
Outpatient Care Management Note:  Doing well at home  She has received her oxygen concentrator and continues to use 2 liters of oxygen continuously  Doing well with pulmonary rehab, she is up to 3/4 mile on the treadmill  She has applied for PACE benefits to assist in obtaining medications and should hear something next week  Discussed pros and cons of applying for disability, it would put her over the income limit for pace  She does not think she wants to pursue that at this time  She has no current needs  Verified that she has my contact information  Encouraged to call with symptoms, questions or concerns

## 2018-10-17 ENCOUNTER — CLINICAL SUPPORT (OUTPATIENT)
Dept: PULMONOLOGY | Facility: HOSPITAL | Age: 68
End: 2018-10-17
Attending: INTERNAL MEDICINE
Payer: COMMERCIAL

## 2018-10-17 DIAGNOSIS — J42 CHRONIC BRONCHITIS, UNSPECIFIED CHRONIC BRONCHITIS TYPE (HCC): ICD-10-CM

## 2018-10-17 PROCEDURE — G0424 PULMONARY REHAB W EXER: HCPCS

## 2018-10-17 NOTE — PROGRESS NOTES
Pt  States I baked for the first time yesterday! Pt  Also looking forward to a day of shopping with her daughter   Pt  Progressing well in pulmonary rehab

## 2018-10-22 ENCOUNTER — CLINICAL SUPPORT (OUTPATIENT)
Dept: PULMONOLOGY | Facility: HOSPITAL | Age: 68
End: 2018-10-22
Attending: INTERNAL MEDICINE
Payer: COMMERCIAL

## 2018-10-22 DIAGNOSIS — J42 CHRONIC BRONCHITIS, UNSPECIFIED CHRONIC BRONCHITIS TYPE (HCC): ICD-10-CM

## 2018-10-22 PROCEDURE — G0424 PULMONARY REHAB W EXER: HCPCS

## 2018-10-24 ENCOUNTER — CLINICAL SUPPORT (OUTPATIENT)
Dept: PULMONOLOGY | Facility: HOSPITAL | Age: 68
End: 2018-10-24
Attending: INTERNAL MEDICINE
Payer: COMMERCIAL

## 2018-10-24 DIAGNOSIS — J42 CHRONIC BRONCHITIS, UNSPECIFIED CHRONIC BRONCHITIS TYPE (HCC): ICD-10-CM

## 2018-10-24 PROCEDURE — G0424 PULMONARY REHAB W EXER: HCPCS

## 2018-10-31 ENCOUNTER — CLINICAL SUPPORT (OUTPATIENT)
Dept: PULMONOLOGY | Facility: HOSPITAL | Age: 68
End: 2018-10-31
Attending: INTERNAL MEDICINE
Payer: COMMERCIAL

## 2018-10-31 DIAGNOSIS — J42 CHRONIC BRONCHITIS, UNSPECIFIED CHRONIC BRONCHITIS TYPE (HCC): ICD-10-CM

## 2018-10-31 PROCEDURE — G0424 PULMONARY REHAB W EXER: HCPCS

## 2018-10-31 NOTE — PROGRESS NOTES
Pt states she is able to perform more activities at home like cooking, cleaning,socializing with friends Pt  Is pleased with progress thus far  She does complain of a sore Left toe, therefore TM speed decreased to 1  5mph today

## 2018-11-02 ENCOUNTER — CLINICAL SUPPORT (OUTPATIENT)
Dept: PULMONOLOGY | Facility: HOSPITAL | Age: 68
End: 2018-11-02
Attending: INTERNAL MEDICINE
Payer: COMMERCIAL

## 2018-11-02 ENCOUNTER — OFFICE VISIT (OUTPATIENT)
Dept: FAMILY MEDICINE CLINIC | Facility: CLINIC | Age: 68
End: 2018-11-02
Payer: COMMERCIAL

## 2018-11-02 VITALS
BODY MASS INDEX: 29.25 KG/M2 | HEIGHT: 68 IN | SYSTOLIC BLOOD PRESSURE: 120 MMHG | TEMPERATURE: 99.7 F | OXYGEN SATURATION: 96 % | HEART RATE: 95 BPM | WEIGHT: 193 LBS | DIASTOLIC BLOOD PRESSURE: 64 MMHG

## 2018-11-02 DIAGNOSIS — I48.0 PAROXYSMAL ATRIAL FIBRILLATION (HCC): Primary | ICD-10-CM

## 2018-11-02 DIAGNOSIS — E28.39 PRIMARY OVARIAN FAILURE: ICD-10-CM

## 2018-11-02 DIAGNOSIS — I10 BENIGN ESSENTIAL HYPERTENSION: ICD-10-CM

## 2018-11-02 DIAGNOSIS — J42 CHRONIC BRONCHITIS, UNSPECIFIED CHRONIC BRONCHITIS TYPE (HCC): ICD-10-CM

## 2018-11-02 DIAGNOSIS — J43.1 PANLOBULAR EMPHYSEMA (HCC): ICD-10-CM

## 2018-11-02 DIAGNOSIS — Z12.11 COLON CANCER SCREENING: ICD-10-CM

## 2018-11-02 DIAGNOSIS — Z00.00 MEDICARE ANNUAL WELLNESS VISIT, INITIAL: ICD-10-CM

## 2018-11-02 PROBLEM — M25.511 ACUTE PAIN OF RIGHT SHOULDER: Status: RESOLVED | Noted: 2018-07-20 | Resolved: 2018-11-02

## 2018-11-02 PROCEDURE — G0438 PPPS, INITIAL VISIT: HCPCS | Performed by: PHYSICIAN ASSISTANT

## 2018-11-02 PROCEDURE — 1170F FXNL STATUS ASSESSED: CPT | Performed by: PHYSICIAN ASSISTANT

## 2018-11-02 PROCEDURE — 99214 OFFICE O/P EST MOD 30 MIN: CPT | Performed by: PHYSICIAN ASSISTANT

## 2018-11-02 PROCEDURE — G0424 PULMONARY REHAB W EXER: HCPCS

## 2018-11-02 PROCEDURE — 3078F DIAST BP <80 MM HG: CPT | Performed by: PHYSICIAN ASSISTANT

## 2018-11-02 PROCEDURE — 1125F AMNT PAIN NOTED PAIN PRSNT: CPT | Performed by: PHYSICIAN ASSISTANT

## 2018-11-02 PROCEDURE — 3074F SYST BP LT 130 MM HG: CPT | Performed by: PHYSICIAN ASSISTANT

## 2018-11-02 NOTE — PROGRESS NOTES
Assessment/Plan:    Paroxysmal atrial fibrillation (HCC)  Currently in NSR  Continue eliquis  Benign essential hypertension  Pts symptoms are stable with current regime  No changes at present  Emphysema/COPD ([facility] )  Continue with oxygen and pulmonary rehab  Continue with pulmonology as well       Diagnoses and all orders for this visit:    Paroxysmal atrial fibrillation [facility])    Colon cancer screening  -     Ambulatory referral to Gastroenterology; Future    Primary ovarian failure  -     DXA bone density spine hip and pelvis; Future    Panlobular emphysema (HCC)    Benign essential hypertension          Subjective:      Patient ID: Bhavesh Hernandez is a 79 y o  female  Pt presents for routine visit  She was seen by pulmonology and has been placed on continuous oxygen for her emphysema which has been helpful  She is also going to pulmonary rehab  Initial AWV performed  Dexa and colonoscopy due and ordered  She denies any new complaints or concerns  She remains on Eliquis for her PAF  The following portions of the patient's history were reviewed and updated as appropriate:   She  has a past medical history of Allergic; Allergic rhinitis; Asthma; Atrial fibrillation ([facility] ); Bronchitis; COPD (chronic obstructive pulmonary disease) ([facility] ); Depression; Emphysema lung ([facility] ); Fibroadenoma of breast; Hyperlipidemia; Hypertension; Post-menopausal; Post-menopausal; Spondylisthesis; and Vitamin D deficiency    She   Patient Active Problem List    Diagnosis Date Noted    Need for vaccination with 13-polyvalent pneumococcal conjugate vaccine 08/20/2018    Constipation 08/20/2018    Paroxysmal atrial fibrillation ([facility] ) 07/31/2018    Vitamin D deficiency 10/04/2017    Allergic rhinitis 06/01/2017    Depressive disorder 08/29/2016    Cigarette nicotine dependence without complication 50/37/7269    GABI (obstructive sleep apnea) 07/28/2015    Em[facility]/COPD ([facility] ) 06/23/2015    Hyperlipidemia 06/22/2015    Anxiety 06/10/2015    Asthma 06/10/2015    Benign essential hypertension 05/30/2012     She  has a past surgical history that includes Breast lumpectomy; Bunionectomy (Bilateral); Tubal ligation; Tonsillectomy; Mole removal; and Cataract extraction, bilateral   Her family history includes Alcohol abuse in her mother; Celiac disease in her daughter; Diabetes in her father; Diverticulitis in her sister; Heart failure in her brother; Hypertension in her father and mother; Prostate cancer in her father; Vitamin D deficiency in her sister  She  reports that she quit smoking about 6 weeks ago  Her smoking use included Cigarettes  She has a 12 50 pack-year smoking history  She has never used smokeless tobacco  She reports that she drinks alcohol  She reports that she does not use drugs  Current Outpatient Prescriptions   Medication Sig Dispense Refill    albuterol (VENTOLIN HFA) 90 mcg/act inhaler Inhale 2 puffs every 4 (four) hours as needed for shortness of breath 3 Inhaler 2    apixaban (ELIQUIS) 5 mg Take 1 tablet (5 mg total) by mouth 2 (two) times a day 180 tablet 3    ascorbic acid (VITAMIN C) 500 mg tablet Take 500 mg by mouth daily      atorvastatin (LIPITOR) 40 mg tablet take 1 tablet by mouth once daily 90 tablet 1    cetirizine (ZyrTEC) 10 mg tablet Take 10 mg by mouth daily      Cholecalciferol (VITAMIN D3) 5000 units CAPS Take 1 capsule by mouth daily      diltiazem (CARDIZEM CD) 240 mg 24 hr capsule Take 1 capsule (240 mg total) by mouth daily 90 capsule 1    L-Lysine 500 MG CAPS Take 1 capsule by mouth daily      MULTIPLE VITAMINS-CALCIUM PO Take by mouth daily      Probiotic Product (PROBIOTIC-10 PO) Take 1 capsule by mouth daily      umeclidinium-vilanterol (ANORO ELLIPTA) 62 5-25 MCG/INH inhaler Inhale 1 puff daily 3 Inhaler 3     No current facility-administered medications for this visit        Current Outpatient Prescriptions on File Prior to Visit   Medication Sig    albuterol (VENTOLIN HFA) 90 mcg/act inhaler Inhale 2 puffs every 4 (four) hours as needed for shortness of breath    apixaban (ELIQUIS) 5 mg Take 1 tablet (5 mg total) by mouth 2 (two) times a day    ascorbic acid (VITAMIN C) 500 mg tablet Take 500 mg by mouth daily    atorvastatin (LIPITOR) 40 mg tablet take 1 tablet by mouth once daily    cetirizine (ZyrTEC) 10 mg tablet Take 10 mg by mouth daily    Cholecalciferol (VITAMIN D3) 5000 units CAPS Take 1 capsule by mouth daily    diltiazem (CARDIZEM CD) 240 mg 24 hr capsule Take 1 capsule (240 mg total) by mouth daily    L-Lysine 500 MG CAPS Take 1 capsule by mouth daily    MULTIPLE VITAMINS-CALCIUM PO Take by mouth daily    Probiotic Product (PROBIOTIC-10 PO) Take 1 capsule by mouth daily    umeclidinium-vilanterol (ANORO ELLIPTA) 62 5-25 MCG/INH inhaler Inhale 1 puff daily    [DISCONTINUED] amLODIPine (NORVASC) 10 mg tablet take 1 tablet by mouth daily     No current facility-administered medications on file prior to visit  She is allergic to ace inhibitors; bupropion; citalopram; and adhesive [medical tape]       Review of Systems   Constitutional: Negative for chills and fever  HENT: Negative for congestion, ear pain, hearing loss, postnasal drip, rhinorrhea, sinus pain, sinus pressure, sore throat and trouble swallowing  Eyes: Negative for pain and visual disturbance  Respiratory: Negative for cough, chest tightness, shortness of breath and wheezing  Cardiovascular: Negative  Negative for chest pain, palpitations and leg swelling  Gastrointestinal: Negative for abdominal pain, blood in stool, constipation, diarrhea, nausea and vomiting  Endocrine: Negative for cold intolerance, heat intolerance, polydipsia, polyphagia and polyuria  Genitourinary: Negative for difficulty urinating, dysuria, flank pain and urgency  Musculoskeletal: Negative for arthralgias, back pain, gait problem and myalgias  Skin: Negative for rash  Allergic/Immunologic: Negative  Neurological: Negative for dizziness, weakness, light-headedness and headaches  Hematological: Negative  Psychiatric/Behavioral: Negative for behavioral problems, dysphoric mood and sleep disturbance  The patient is not nervous/anxious  Objective:      /64 (BP Location: Left arm, Patient Position: Sitting, Cuff Size: Large)   Pulse 95   Temp 99 7 °F (37 6 °C) (Tympanic)   Ht 5' 8" (1 727 m)   Wt 87 5 kg (193 lb)   SpO2 96% Comment: on 2 L continuous  BMI 29 35 kg/m²          Physical Exam   Constitutional: She is oriented to person, place, and time  She appears well-developed and well-nourished  No distress  HENT:   Head: Normocephalic and atraumatic  Right Ear: External ear normal    Left Ear: External ear normal    Nose: Nose normal    Mouth/Throat: Oropharynx is clear and moist  No oropharyngeal exudate  Eyes: Pupils are equal, round, and reactive to light  Conjunctivae and EOM are normal  Right eye exhibits no discharge  Left eye exhibits no discharge  No scleral icterus  Neck: Normal range of motion  Neck supple  No thyromegaly present  Cardiovascular: Normal rate, regular rhythm and normal heart sounds  Exam reveals no gallop and no friction rub  No murmur heard  Pulmonary/Chest: Effort normal  No respiratory distress  She has decreased breath sounds  She has no wheezes  She has no rales  Abdominal: Soft  Bowel sounds are normal  She exhibits no distension  There is no tenderness  Musculoskeletal: Normal range of motion  She exhibits no edema, tenderness or deformity  Neurological: She is alert and oriented to person, place, and time  No cranial nerve deficit  Skin: Skin is warm and dry  She is not diaphoretic  Psychiatric: She has a normal mood and affect   Her behavior is normal  Judgment and thought content normal

## 2018-11-02 NOTE — PROGRESS NOTES
Pt  Continues to perform well and show improvement each visit  Pt  Is living life to the fullest, taking control of her COPD not letting COPD control her life!

## 2018-11-02 NOTE — PROGRESS NOTES
Assessment and Plan:    Problem List Items Addressed This Visit     None        Health Maintenance Due   Topic Date Due    Medicare Annual Wellness Visit (AWV)  1950    CRC Screening: Colonoscopy  1950    DXA SCAN  03/09/2018    Fall Risk  10/04/2018    Urinary Incontinence Screening  10/04/2018         HPI:  Laura Foster is a 79 y o  female here for her Initial Wellness Visit      Patient Active Problem List   Diagnosis    Allergic rhinitis    Anxiety    Asthma    [facility] pulmonary [facility] )    Cigarette nicotine dependence without complication    Depressive disorder    Benign essential hypertension    Hyperlipidemia    Hypertension    Lung disease    GABI (obstructive sleep apnea)    Vitamin D deficiency    Acute pain of right shoulder    Paroxysmal atrial fibrillation ([address] )    Need for vaccination with 13-polyvalent pneumococcal conjugate vaccine    Constipation     Past Medical History:   Diagnosis Date    Allergic     Allergic rhinitis     [id number]  RESOLVED    Asthma     Atrial fibrillation ([address] )     Bronchitis     COPD (chronic obstructive pulmonary disease) ([address] )     home O2 at night at home 2L    Depression     Emphysema lung ([facility]     Fibroadenoma of breast     Hyperlipidemia     Hypertension     Post-menopausal     Post-menopausal     Spondylisthesis     Vitamin D deficiency      Past Surgical History:   Procedure Laterality Date    BREAST LUMPECTOMY      5    BUNIONECTOMY Bilateral     CATARACT EXTRACTION, BILATERAL      MOLE REMOVAL      lip    TONSILLECTOMY      TUBAL LIGATION       Family History   Problem Relation Age of Onset    Hypertension Mother     Alcohol abuse Mother     Hypertension Father     Diabetes Father     Prostate cancer Father     Vitamin D deficiency Sister     Diverticulitis Sister     Heart failure Brother     Celiac disease Daughter      History   Smoking Status    Former Smoker    Packs/day: 0 25  Years: 50 00    Types: Cigarettes    Quit date: 9/20/2018   Smokeless Tobacco    Never Used     History   Alcohol Use    Yes     Comment: rarely       History   Drug Use No       Current Outpatient Prescriptions   Medication Sig Dispense Refill    albuterol (VENTOLIN HFA) 90 mcg/act inhaler Inhale 2 puffs every 4 (four) hours as needed for shortness of breath 3 Inhaler 2    apixaban (ELIQUIS) 5 mg Take 1 tablet (5 mg total) by mouth 2 (two) times a day 180 tablet 3    ascorbic acid (VITAMIN C) 500 mg tablet Take 500 mg by mouth daily      atorvastatin (LIPITOR) 40 mg tablet take 1 tablet by mouth once daily 90 tablet 1    cetirizine (ZyrTEC) 10 mg tablet Take 10 mg by mouth daily      Cholecalciferol (VITAMIN D3) 5000 units CAPS Take 1 capsule by mouth daily      diltiazem (CARDIZEM CD) 240 mg 24 hr capsule Take 1 capsule (240 mg total) by mouth daily 90 capsule 1    L-Lysine 500 MG CAPS Take 1 capsule by mouth daily      MULTIPLE VITAMINS-CALCIUM PO Take by mouth daily      Probiotic Product (PROBIOTIC-10 PO) Take 1 capsule by mouth daily      umeclidinium-vilanterol (ANORO ELLIPTA) 62 5-25 MCG/INH inhaler Inhale 1 puff daily 3 Inhaler 3     No current facility-administered medications for this visit  Allergies   Allergen Reactions    Ace Inhibitors Cough     Action Taken: stopped med and changed to ARB; Category:  Adverse Reaction;     Bupropion Itching    Citalopram Diarrhea and Nausea Only    Adhesive [Medical Tape] Rash     Patch      Immunization History   Administered Date(s) Administered    Influenza 11/09/2015, 10/21/2016, 10/03/2017, 09/10/2018    Influenza Quadrivalent Preservative Free 3 years and older IM 11/09/2015, 10/21/2016, 10/03/2017    Pneumococcal Conjugate 13-Valent 11/16/2015, 08/20/2018    Pneumococcal Polysaccharide PPV23 08/29/2016    Tdap 12/28/2015    Zoster 12/28/2015       Patient Care Team:  Madhu Cota PA-C as PCP - General (Family Medicine)  MD Jacek Austin MD Wenona Daniels, PA-C Felicia Nasuti, DO Deboraha Milch RN as Care Manager (Care Coordination)    Medicare Screening Tests and Risk Assessments:  Cm Bloom is here for her Initial Wellness visit  Health Risk Assessment:  Patient rates overall health as fair  Patient feels that their physical health rating is Slightly worse  Eyesight was rated as Same  Hearing was rated as Same  Patient feels that their emotional and mental health rating is Same  Pain experienced by patient in the last 7 days has been Some  Patient's pain rating has been 2/10  Patient states that she has experienced no weight loss or gain in last 6 months  Emotional/Mental Health:  Patient has been feeling nervous/anxious  PHQ-9 Depression Screening:    Frequency of the following problems over the past two weeks:      1  Little interest or pleasure in doing things: 0 - not at all      2  Feeling down, depressed, or hopeless: 0 - not at all  PHQ-2 Score: 0          Broken Bones/Falls: Fall Risk Assessment:    In the past year, patient has experienced: History of falling in past year     Number of falls: 2 or more  Patient does not feel she is unsteady standing  Patient is taking medication that can cause feelings of lightheadedness or tiredness  Patient often has no need to rush to the toilet  Bladder/Bowel:  Patient has not leaked urine accidently in the last six months  Patient reports no loss of bowel control  Immunizations:  Patient has had a flu vaccination within the last year  Patient has received a pneumonia shot  Patient has not received a shingles shot  Patient has received tetanus/diphtheria shot  Home Safety:  Patient does not have trouble with stairs inside or outside of their home  Patient currently reports that there are safety hazards present in home , working smoke alarms, no working carbon monoxide detectors        Preventative Screenings:   Breast cancer screening performed, no colon cancer screen completed, cholesterol screen completed, glaucoma eye exam completed,     Nutrition:  Current diet: Low Cholesterol, Low Saturated Fat and Limited junk food with servings of the following:    Medications:  Patient is currently taking over-the-counter supplements  List of OTC medications includes: vitamins daily   Patient is able to manage medications  Lifestyle Choices:  Patient reports no tobacco use  Patient has smoked or used tobacco in the past   Patient has stopped her tobacco use  Tobacco use quit date: 09/2018  Patient reports alcohol use  Alcohol use per week: raj   Patient drives a vehicle  Patient wears seat belt  Current level of exercise of physical activity described by patient as: Pulmonary rehab   Activities of Daily Living:  Can get out of bed by his or her self, able to dress self, able to make own meals, able to do own shopping, able to bathe self, can do own laundry/housekeeping, can manage own money, pay bills and track expenses    Previous Hospitalizations:  Hospitalization or ED visit in past 12 months  Number of hospitalizations within the last year: 1-2        Advanced Directives:  Patient has decided on a power of   Patient has spoken to designated power of   Patient has not completed advanced directive    Additional Comments: Patient given and explained a blue freezer folder including a blank copy of a 5 wishes     Preventative Screening/Counseling:      Cardiovascular:      General: Screening Current          Diabetes:      General: Screening Current          Colorectal Cancer:      Due for studies: Colonoscopy - High Risk          Breast Cancer:      General: Screening Current          Cervical Cancer:      General: Screening Not Indicated          Osteoporosis:      Due for studies: DXA Axial          AAA:      General: Screening Not Indicated and Risks and Benefits Discussed Glaucoma:      General: Screening Current          HIV:      General: Screening Not Indicated          Hepatitis C:      General: Risks and Benefits Discussed        Advanced Directives:   Patient has no living will for healthcare, does not have durable POA for healthcare, patient does not have an advanced directive  Information on ACP and/or AD provided  5 wishes given       Immunizations:      Influenza: Influenza UTD This Year      Pneumococcal: Lifetime Vaccine Completed      Shingrix: Risks & Benefits Discussed      Hepatitis B (Low risk patients): Series Not Indicated      Zostavax: Risks & Benefits Discussed

## 2018-11-05 ENCOUNTER — OFFICE VISIT (OUTPATIENT)
Dept: PULMONOLOGY | Facility: HOSPITAL | Age: 68
End: 2018-11-05
Payer: COMMERCIAL

## 2018-11-05 ENCOUNTER — APPOINTMENT (OUTPATIENT)
Dept: PULMONOLOGY | Facility: HOSPITAL | Age: 68
End: 2018-11-05
Attending: INTERNAL MEDICINE
Payer: COMMERCIAL

## 2018-11-05 VITALS
BODY MASS INDEX: 29.55 KG/M2 | HEART RATE: 92 BPM | HEIGHT: 68 IN | WEIGHT: 195 LBS | SYSTOLIC BLOOD PRESSURE: 128 MMHG | DIASTOLIC BLOOD PRESSURE: 68 MMHG | OXYGEN SATURATION: 94 %

## 2018-11-05 DIAGNOSIS — J44.9 CHRONIC OBSTRUCTIVE PULMONARY DISEASE, UNSPECIFIED COPD TYPE (HCC): ICD-10-CM

## 2018-11-05 DIAGNOSIS — J43.2 CENTRILOBULAR EMPHYSEMA (HCC): ICD-10-CM

## 2018-11-05 DIAGNOSIS — J43.9 PULMONARY EMPHYSEMA, UNSPECIFIED EMPHYSEMA TYPE (HCC): ICD-10-CM

## 2018-11-05 DIAGNOSIS — F17.200 SMOKING: Primary | ICD-10-CM

## 2018-11-05 PROCEDURE — G0424 PULMONARY REHAB W EXER: HCPCS

## 2018-11-05 PROCEDURE — 99214 OFFICE O/P EST MOD 30 MIN: CPT | Performed by: INTERNAL MEDICINE

## 2018-11-05 NOTE — PROGRESS NOTES
Pt  Tolerated today's exercise session well, increased TM to 1 8 with 2% grade  Pt  Demonstrated a normal hemodynamic response to prescribed exercise with 2 liters of supplemental oxygen   Good effort and determination by pt !  Pt  asymptomatic the entire session

## 2018-11-05 NOTE — PROGRESS NOTES
Pulmonary outpatient note   Joo Shea 79 y o  female MRN: [id number]  11/5/2018      Assessment and plan:  Joo Shea has the following medical problems:  1  COPD  COPD gold stage B based on hospitalization and symptoms history  She has severe obstruction by FEV1 43% of predicted before bronchodilators and 53% of predicted after bronchodilators  She is currently on [facility] which made her feel much better  She is using Mucinex as needed for cough and phlegm  Since she does not have history of hospitalizations she does not need inhaled corticosteroids as she is COPD gold stage B currently  No change in her COPD management at this point  2   Smoking history  The patient has 50 pack years smoking history and stopped last year  She never had chest CT for screening of lung cancer  I referred her to the test today  3   Sleep  The patient had a sleep study few years ago with [facility] 1 7  She desaturated during the test to 90%  She is using oxygen 24 hr a day  Follow-up in 3 months with chest CT results  Stacy Vega MD/PhD,  [facility]      Return in about 3 months (around 2/5/2019)  History of Present Illness   This is a 37-year-old female with previous medical history of paroxysmal atrial fibrillation, hypertension and recently diagnosed COPD  She was seen by us couple of months ago and was prescribed with Anoro  She feels much better with this new inhaler  She had breathing tests in the meantime that showed severe obstruction with severe hyperinflation and air trapping with low diffusion  She was never hospitalized for acute exacerbation of COPD  She is using oxygen 24 hr a day with a portable tank  Review of Systems   Constitutional: Negative  HENT: Negative  Eyes: Negative  Respiratory: Positive for shortness of breath  Cardiovascular: Negative  Gastrointestinal: Negative  Endocrine: Negative  Genitourinary: Negative  Musculoskeletal: Negative  Allergic/Immunologic: Negative  Neurological: Negative  Hematological: Negative  Psychiatric/Behavioral: Negative          Historical Information   Past Medical History:   Diagnosis Date    Allergic     Allergic rhinitis     [id number]  RESOLVED    Asthma     Atrial fibrillation ([address] )     Bronchitis     COPD (chronic obstructive pulmonary disease) ([facility])     home O2 at night at home 2L    Depression     Emphysema lung (HCC)     Fibroadenoma of breast     Hyperlipidemia     Hypertension     Post-menopausal     Post-menopausal     Spondylisthesis     Vitamin D deficiency      Past Surgical History:   Procedure Laterality Date    BREAST LUMPECTOMY      5    BUNIONECTOMY Bilateral     CATARACT EXTRACTION, BILATERAL      MOLE REMOVAL      lip    TONSILLECTOMY      TUBAL LIGATION       Family History   Problem Relation Age of Onset    Hypertension Mother     Alcohol abuse Mother     Hypertension Father     Diabetes Father     Prostate cancer Father     Vitamin D deficiency Sister     Diverticulitis Sister     Heart failure Brother     Celiac disease Daughter        Meds/Allergies     Current Outpatient Prescriptions:     albuterol (VENTOLIN HFA) 90 mcg/act inhaler, Inhale 2 puffs every 4 (four) hours as needed for shortness of breath, Disp: 3 Inhaler, Rfl: 2    apixaban (ELIQUIS) 5 mg, Take 1 tablet (5 mg total) by mouth 2 (two) times a day, Disp: 180 tablet, Rfl: 3    ascorbic acid (VITAMIN C) 500 mg tablet, Take 500 mg by mouth daily, Disp: , Rfl:     atorvastatin (LIPITOR) 40 mg tablet, take 1 tablet by mouth once daily, Disp: 90 tablet, Rfl: 1    cetirizine (ZyrTEC) 10 mg tablet, Take 10 mg by mouth daily, Disp: , Rfl:     Cholecalciferol (VITAMIN D3) 5000 units CAPS, Take 1 capsule by mouth daily, Disp: , Rfl:     diltiazem (CARDIZEM CD) 240 mg 24 hr capsule, Take 1 capsule (240 mg total) by mouth daily, Disp: 90 capsule, Rfl: 1    L-Lysine 500 MG CAPS, Take 1 capsule by mouth daily, Disp: , Rfl:     MULTIPLE VITAMINS-CALCIUM PO, Take by mouth daily, Disp: , Rfl:     Probiotic Product (PROBIOTIC-10 PO), Take 1 capsule by mouth daily, Disp: , Rfl:     umeclidinium-vilanterol (ANORO ELLIPTA) 62 5-25 MCG/INH inhaler, Inhale 1 puff daily, Disp: 3 Inhaler, Rfl: 3  Allergies   Allergen Reactions    Ace Inhibitors Cough     Action Taken: stopped med and changed to ARB; Category: Adverse Reaction;     Bupropion Itching    Citalopram Diarrhea and Nausea Only    Adhesive [Medical Tape] Rash     Patch        Vitals: Blood pressure 128/68, pulse 92, height 5' 8" (1 727 m), weight 88 5 kg (195 lb), SpO2 94 %  Body mass index is 29 65 kg/m²  Oxygen Therapy  SpO2: 94 %    Physical Exam  Physical Exam   Constitutional: She is oriented to person, place, and time  She appears well-developed and well-nourished  HENT:   Head: Normocephalic  Eyes: Pupils are equal, round, and reactive to light  EOM are normal    Neck: Normal range of motion  Cardiovascular: Normal rate, regular rhythm and normal heart sounds  Exam reveals no friction rub  No murmur heard  Pulmonary/Chest: Effort normal  She has no wheezes  She has no rales  Decreased breath sounds   Abdominal: Soft  Musculoskeletal: Normal range of motion  Neurological: She is alert and oriented to person, place, and time  Skin: Skin is warm  Psychiatric: She has a normal mood and affect  Labs: I have personally reviewed pertinent lab results    Lab Results   Component Value Date    WBC 6 90 08/01/2018    HGB 13 1 08/01/2018    HCT 37 2 08/01/2018    MCV 89 08/01/2018     08/01/2018     Lab Results   Component Value Date    GLUCOSE 101 06/05/2015    CALCIUM 9 0 08/01/2018     06/05/2015    K 4 0 08/01/2018    CO2 26 08/01/2018     (H) 08/01/2018    BUN 13 08/01/2018    CREATININE 0 91 08/01/2018     No results found for: IGE  Lab Results   Component Value Date    ALT 11 2018    AST 14 2018    ALKPHOS 65 2018    BILITOT 0 5 2015       Imaging and other studies:   I have personally reviewed pertinent imaging studies in PACS  The heart is upper limits normal in size  Atelectatic changes at the lung  bases noted  Emphysematous changes noted  There are no pleural  effusions  There is no pneumothorax  No acute osseous process  IMPRESSION:  Mild cardiomegaly with COPD and bilateral atelectasis  Echo 8906  Systolic function was normal  Ejection fraction was estimated to be 60 %  There were no regional wall motion abnormalities  There was mild concentric hypertrophy  Pulmonary function testing:   Date of Testin2018   Requesting Physician:  Dr Milad Spring for Testing:  Shortness of breath     Reference set for interpretation: NHANES III     Procedure: The patient was taken to pulmonary function testing laboratory  The patient demonstrated good effort and cooperation  The results of this test meet ATS standards for acceptability and repeatability    Data set appears appropriate for interpretation      Post bronchodilator testing performed after the administration of 2 5mg albuterol in 3cc normal saline administered via nebulizer per bronchodilator protocol      Results:  FEV1/FVC Ratio:  63 %  Forced Vital Capacity:  2 23 L, 67 % predicted  FEV1:  1 10 L, 43 % predicted  After administration of bronchodilator FEV1:  1 37 L, 53% predicted, with an appreciable response to the bronchodilator     Lung volumes by body nitrogen wash out : Total Lung Capacity 181 % predicted Residual volume 316 % predicted     DLCO corrected for patients hemoglobin level:  45 %    Davis Borjas MD/PhD,  [facility]

## 2018-11-07 ENCOUNTER — APPOINTMENT (OUTPATIENT)
Dept: PULMONOLOGY | Facility: HOSPITAL | Age: 68
End: 2018-11-07
Attending: INTERNAL MEDICINE
Payer: COMMERCIAL

## 2018-11-09 ENCOUNTER — ANNUAL EXAM (OUTPATIENT)
Dept: OBGYN CLINIC | Facility: CLINIC | Age: 68
End: 2018-11-09
Payer: COMMERCIAL

## 2018-11-09 VITALS
WEIGHT: 191 LBS | SYSTOLIC BLOOD PRESSURE: 140 MMHG | HEIGHT: 67 IN | DIASTOLIC BLOOD PRESSURE: 58 MMHG | BODY MASS INDEX: 29.98 KG/M2

## 2018-11-09 DIAGNOSIS — Z11.51 SCREENING FOR HUMAN PAPILLOMAVIRUS (HPV): ICD-10-CM

## 2018-11-09 DIAGNOSIS — Z12.31 ENCOUNTER FOR SCREENING MAMMOGRAM FOR MALIGNANT NEOPLASM OF BREAST: ICD-10-CM

## 2018-11-09 DIAGNOSIS — Z01.419 ENCOUNTER FOR GYNECOLOGICAL EXAMINATION WITHOUT ABNORMAL FINDING: Primary | ICD-10-CM

## 2018-11-09 PROCEDURE — G0145 SCR C/V CYTO,THINLAYER,RESCR: HCPCS | Performed by: OBSTETRICS & GYNECOLOGY

## 2018-11-09 PROCEDURE — 87624 HPV HI-RISK TYP POOLED RSLT: CPT | Performed by: OBSTETRICS & GYNECOLOGY

## 2018-11-09 PROCEDURE — G0101 CA SCREEN;PELVIC/BREAST EXAM: HCPCS | Performed by: OBSTETRICS & GYNECOLOGY

## 2018-11-09 NOTE — PROGRESS NOTES
Corinne A Longo   1950    CC:  Yearly exam    S:  79 y o  female here for yearly exam  She is postmenopausal and has had no vaginal bleeding  She denies vaginal discharge, itching, odor or dryness  She is not sexually active  She was previously seeing her PA at the internal medicine office for gyn care but wished to see a gynecologist   She denies specific concerns  She has never had STD testing and is aware that Medicare will likely not cover  She is enquiring whether she needs a referral from me for a free STD clinic - discussed she can refer herself        Last Pap 11/16/2015 - negative; discussed pap with HPV today and then never again if negative/normal  Last Mammo 9/10/2018 - BIRAD-2  Last DEXA 3/9/2016 - normal  Last Colonoscopy - due      Current Outpatient Prescriptions:     albuterol (VENTOLIN HFA) 90 mcg/act inhaler, Inhale 2 puffs every 4 (four) hours as needed for shortness of breath, Disp: 3 Inhaler, Rfl: 2    apixaban (ELIQUIS) 5 mg, Take 1 tablet (5 mg total) by mouth 2 (two) times a day, Disp: 180 tablet, Rfl: 3    ascorbic acid (VITAMIN C) 500 mg tablet, Take 500 mg by mouth daily, Disp: , Rfl:     atorvastatin (LIPITOR) 40 mg tablet, take 1 tablet by mouth once daily, Disp: 90 tablet, Rfl: 1    cetirizine (ZyrTEC) 10 mg tablet, Take 10 mg by mouth daily, Disp: , Rfl:     Cholecalciferol (VITAMIN D3) 5000 units CAPS, Take 1 capsule by mouth daily, Disp: , Rfl:     diltiazem (CARDIZEM CD) 240 mg 24 hr capsule, Take 1 capsule (240 mg total) by mouth daily, Disp: 90 capsule, Rfl: 1    L-Lysine 500 MG CAPS, Take 1 capsule by mouth daily, Disp: , Rfl:     MULTIPLE VITAMINS-CALCIUM PO, Take by mouth daily, Disp: , Rfl:     Probiotic Product (PROBIOTIC-10 PO), Take 1 capsule by mouth daily, Disp: , Rfl:     umeclidinium-vilanterol (ANORO ELLIPTA) 62 5-25 MCG/INH inhaler, Inhale 1 puff daily, Disp: 3 Inhaler, Rfl: 10  Social History     Social History    Marital status:  Spouse name: N/A    Number of children: N/A    Years of education: N/A     Occupational History    Not on file  Social History Main Topics    Smoking status: Former Smoker     Packs/day: 0 25     Years: 50 00     Types: Cigarettes     Quit date: 9/20/2018    Smokeless tobacco: Never Used      Comment: Quit    Alcohol use Yes      Comment: rarely     Drug use: No    Sexual activity: Not Currently     Birth control/ protection: Post-menopausal      Comment: sperated     Other Topics Concern    Not on file     Social History Narrative    -    Retired    Lives with significant other     Family History   Problem Relation Age of Onset    Hypertension Mother     Alcohol abuse Mother     Hypertension Father     Diabetes Father     Prostate cancer Father     Vitamin D deficiency Sister     Diverticulitis Sister     Heart failure Brother     Celiac disease Daughter     Eczema Daughter     Fibromyalgia Daughter      Past Medical History:   Diagnosis Date    Allergic     Allergic rhinitis     [id number]  RESOLVED    Asthma     [facility] fibrillation [facility])     Bronchitis     COPD (chronic obstructive pulmonary disease) ([facility])     home O2 at night at home 2L    Depression     [facility]     Fibroadenoma of breast     Hyperlipidemia     Hypertension     Post-menopausal     Post-menopausal     Spondylisthesis     Vitamin D deficiency         O:  Blood pressure 140/58, height 5' 6 5" (1 689 m), weight 86 6 kg (191 lb)  Patient appears well and is not in distress  Neck is supple without masses  Breasts are symmetrical without mass, tenderness, nipple discharge, skin changes or adenopathy  Abdomen is soft and nontender without masses  External genitals are normal without lesions or rashes  Vagina is normal without discharge or bleeding  Cervix is normal without discharge or lesion  Uterus is normal, mobile, nontender without palpable mass    Adnexa are normal, nontender, without palpable mass  A:  Yearly exam      P:  RTO one year for yearly exam or sooner as needed  Pap with HPV sent  Mammogram referral given

## 2018-11-12 ENCOUNTER — CLINICAL SUPPORT (OUTPATIENT)
Dept: PULMONOLOGY | Facility: HOSPITAL | Age: 68
End: 2018-11-12
Attending: INTERNAL MEDICINE
Payer: COMMERCIAL

## 2018-11-12 DIAGNOSIS — J44.9 OBSTRUCTIVE CHRONIC BRONCHITIS WITHOUT EXACERBATION (HCC): ICD-10-CM

## 2018-11-12 PROCEDURE — G0424 PULMONARY REHAB W EXER: HCPCS

## 2018-11-12 NOTE — PROGRESS NOTES
CARDIAC/PULMONARY REHAB ASSESSMENT    Today's date: 2018   Patient name: Mitchel Stokes      : 1950       MRN: [id number]  PCP: Andrew Montaño PA-C  Pulmonologist: Cecy Rees  Dx: COPD  Date of onset: 2018  Cultural needs: NA    Height: 5'8"  Weight:  193lb  Medical History:   Past Medical History:   Diagnosis Date    Allergic     Allergic rhinitis     [id number]  RESOLVED    Asthma     Atrial fibrillation (HCC)     Bronchitis     COPD (chronic obstructive pulmonary disease) (HCC)     home O2 at night at home 2L    Depression     Emphysema lung (HCC)     Fibroadenoma of breast     Hyperlipidemia     Hypertension     Post-menopausal     Post-menopausal     Spondylisthesis     Vitamin D deficiency        Physical Limitations: Chronic LBP    Risk Factors   Smoking: no  HTN: yes  DM: no  Obesity: yes   Inactivity: no  Family History:   Family History   Problem Relation Age of Onset    Hypertension Mother     Alcohol abuse Mother     Hypertension Father     Diabetes Father     Prostate cancer Father     Vitamin D deficiency Sister     Diverticulitis Sister     Heart failure Brother     Celiac disease Daughter     Eczema Daughter     Fibromyalgia Daughter      Allergies: Allergies   Allergen Reactions    Ace Inhibitors Cough     Action Taken: stopped med and changed to ARB; Category:  Adverse Reaction;     Bupropion Itching    Citalopram Diarrhea and Nausea Only    Adhesive [Medical Tape] Rash     Patch          Current Medications:   Current Outpatient Prescriptions   Medication Sig Dispense Refill    albuterol (VENTOLIN HFA) 90 mcg/act inhaler Inhale 2 puffs every 4 (four) hours as needed for shortness of breath 3 Inhaler 2    apixaban (ELIQUIS) 5 mg Take 1 tablet (5 mg total) by mouth 2 (two) times a day 180 tablet 3    ascorbic acid (VITAMIN C) 500 mg tablet Take 500 mg by mouth daily      atorvastatin (LIPITOR) 40 mg tablet take 1 tablet by mouth once daily 90 tablet 1    cetirizine (ZyrTEC) 10 mg tablet Take 10 mg by mouth daily      Cholecalciferol (VITAMIN D3) 5000 units CAPS Take 1 capsule by mouth daily      diltiazem (CARDIZEM CD) 240 mg 24 hr capsule Take 1 capsule (240 mg total) by mouth daily 90 capsule 1    L-Lysine 500 MG CAPS Take 1 capsule by mouth daily      MULTIPLE VITAMINS-CALCIUM PO Take by mouth daily      Probiotic Product (PROBIOTIC-10 PO) Take 1 capsule by mouth daily      umeclidinium-vilanterol (ANORO ELLIPTA) 62 5-25 MCG/INH inhaler Inhale 1 puff daily 3 Inhaler 10     No current facility-administered medications for this visit  Functional Status Prior to Diagnosis for Treatment   Occupation: retired  Recreation: socializing with family and friends  ADLs: Independent  [place]: yes  Exercise: yes  Other: resumed hobby of baking    Short Term Program Goals: Decrease shortness of breath, improve stamina, increase strength   Long Term Goals: Comments::Improve physical capacity to better perform functional daily activities, achieve a lower risk status for medical condition,minimize disease progression, prevent exacerbations, decrease hospitalizations    Ability to reach goals/rehabilitation potential: high    Projected return to function: Full    Emotional/Social    Marital status: Single with significant other    Life Stressors: Health    Goals: Improve socialization by attending community events, pulmonary rehab, travel with family and friends,perform stress management techniques for relaxation    Comments: Pt  Is doing well attending Pulmonary rehab [facility]  Pt  has made significant progress toward her goals  Pt  Is taking control of her health, managing COPD to the best of her ability  Pt  Is p[leased with her progress and abilities thus far  Pt  Is a pleasure to have in pulmonary rehab, she is a good role model for other pulmonary patients  Thank you for referring Ms Magali Olszewski to pulmonary rehab

## 2018-11-13 LAB
HPV HR 12 DNA CVX QL NAA+PROBE: NEGATIVE
HPV16 DNA CVX QL NAA+PROBE: NEGATIVE
HPV18 DNA CVX QL NAA+PROBE: NEGATIVE

## 2018-11-14 ENCOUNTER — CLINICAL SUPPORT (OUTPATIENT)
Dept: PULMONOLOGY | Facility: HOSPITAL | Age: 68
End: 2018-11-14
Attending: INTERNAL MEDICINE
Payer: COMMERCIAL

## 2018-11-14 DIAGNOSIS — J43.9 PULMONARY EMPHYSEMA, UNSPECIFIED EMPHYSEMA TYPE (HCC): ICD-10-CM

## 2018-11-14 PROCEDURE — G0424 PULMONARY REHAB W EXER: HCPCS

## 2018-11-14 NOTE — PROGRESS NOTES
Pt  Tolerated today's exercise session well using 2 liters supplemental oxygen  Pt  Demonstrated a normal hemodynamic response to prescribed exercise  Pt  asymptomatic the entire session  Good effort put forth by pt

## 2018-11-19 ENCOUNTER — APPOINTMENT (OUTPATIENT)
Dept: PULMONOLOGY | Facility: HOSPITAL | Age: 68
End: 2018-11-19
Attending: INTERNAL MEDICINE
Payer: COMMERCIAL

## 2018-11-19 LAB
LAB AP GYN PRIMARY INTERPRETATION: NORMAL
Lab: NORMAL

## 2018-11-21 ENCOUNTER — APPOINTMENT (OUTPATIENT)
Dept: PULMONOLOGY | Facility: HOSPITAL | Age: 68
End: 2018-11-21
Attending: INTERNAL MEDICINE
Payer: COMMERCIAL

## 2018-11-23 ENCOUNTER — APPOINTMENT (OUTPATIENT)
Dept: PULMONOLOGY | Facility: HOSPITAL | Age: 68
End: 2018-11-23
Attending: INTERNAL MEDICINE
Payer: COMMERCIAL

## 2018-11-26 ENCOUNTER — APPOINTMENT (OUTPATIENT)
Dept: PULMONOLOGY | Facility: HOSPITAL | Age: 68
End: 2018-11-26
Attending: INTERNAL MEDICINE
Payer: COMMERCIAL

## 2018-11-28 ENCOUNTER — APPOINTMENT (OUTPATIENT)
Dept: PULMONOLOGY | Facility: HOSPITAL | Age: 68
End: 2018-11-28
Attending: INTERNAL MEDICINE
Payer: COMMERCIAL

## 2018-11-28 DIAGNOSIS — J44.9 CHRONIC OBSTRUCTIVE PULMONARY DISEASE, UNSPECIFIED COPD TYPE (HCC): ICD-10-CM

## 2018-11-28 PROCEDURE — G0424 PULMONARY REHAB W EXER: HCPCS

## 2018-11-28 NOTE — PROGRESS NOTES
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]    Procedure(s) (LRB):  ARTHROSCOPY, KNEE, WITH MENISCECTOMY (Left)  ARTHROSCOPY, KNEE, WITH CHONDROPLASTY (Left)    Anesthesia: General    Description of the findings of the procedure: See op report    Findings/Key Components: Degenerative medial meniscus tear    Estimated Blood Loss: * No values recorded between 10/17/2018 11:42 AM and 10/17/2018 12:21 PM *         Specimens:   Specimen (12h ago, onward)    None          Discharge Note    SUMMARY     Admit Date: 10/17/2018    Discharge Date and Time:  10/17/2018 12:41 PM    Hospital Course (synopsis of major diagnoses, care, treatment, and services provided during the course of the hospital stay): Patient underwent surgery, transferred to PACU, then home     Final Diagnosis: Post-Op Diagnosis Codes:     * Bone marrow edema [D75.89]     * Complex tear of medial meniscus of left knee as current injury, subsequent encounter [S83.232D]     * Chondromalacia of knee, 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 MILTON Carr    Medications:  Reconciled Home Medications:      Medication List      CONTINUE taking these medications    aspirin 81 MG EC tablet  Commonly known as:  ECOTRIN  Take 1 tablet (81 mg total) by mouth 2 (two) times daily. for 14 days     lisinopril 10 MG tablet  Take 10 mg by mouth once daily.     ondansetron 4 MG  Pt  Tolerated today's exercise session well  Pt  Demonstrated a normal hemodynamic response to prescribed exercise  Pt  asymptomatic the entire session  "tablet  Commonly known as:  ZOFRAN  Take 1 tablet (4 mg total) by mouth every 8 (eight) hours as needed.     oxyCODONE-acetaminophen  mg per tablet  Commonly known as:  PERCOCET  Take 1 tablet by mouth every 6 (six) hours as needed for Pain.        STOP taking these medications    diclofenac sodium 1 % Gel  Commonly known as:  VOLTAREN     gabapentin 300 MG capsule  Commonly known as:  NEURONTIN     ibuprofen 800 MG tablet  Commonly known as:  ADVIL,MOTRIN     meclizine 25 mg tablet  Commonly known as:  ANTIVERT          Discharge Procedure Orders   CRUTCHES FOR HOME USE     Order Specific Question Answer Comments   Type: Axillary    Height: 5' 6" (1.676 m)    Weight: 81.6 kg (180 lb)    Length of need (1-99 months): 1      Diet general     Call MD for:  temperature >100.4     Call MD for:  persistent nausea and vomiting     Call MD for:  severe uncontrolled pain     Call MD for:  difficulty breathing, headache or visual disturbances     Call MD for:  redness, tenderness, or signs of infection (pain, swelling, redness, odor or green/yellow discharge around incision site)     Call MD for:  hives     Call MD for:  persistent dizziness or light-headedness     Call MD for:  extreme fatigue     Activity as tolerated     Weight bearing as tolerated     Follow-up Information     Jamaal Carr MD In 2 weeks.    Specialties:  Sports Medicine, Orthopedic Surgery  Contact information:  [address] [address]  [phone]                 "

## 2018-11-30 ENCOUNTER — CLINICAL SUPPORT (OUTPATIENT)
Dept: PULMONOLOGY | Facility: HOSPITAL | Age: 68
End: 2018-11-30
Attending: INTERNAL MEDICINE
Payer: COMMERCIAL

## 2018-11-30 DIAGNOSIS — J43.9 PULMONARY EMPHYSEMA, UNSPECIFIED EMPHYSEMA TYPE (HCC): ICD-10-CM

## 2018-11-30 PROCEDURE — G0424 PULMONARY REHAB W EXER: HCPCS

## 2018-12-03 ENCOUNTER — CLINICAL SUPPORT (OUTPATIENT)
Dept: PULMONOLOGY | Facility: HOSPITAL | Age: 68
End: 2018-12-03
Attending: INTERNAL MEDICINE
Payer: COMMERCIAL

## 2018-12-03 ENCOUNTER — PATIENT OUTREACH (OUTPATIENT)
Dept: FAMILY MEDICINE CLINIC | Facility: CLINIC | Age: 68
End: 2018-12-03

## 2018-12-03 DIAGNOSIS — J44.9 CHRONIC OBSTRUCTIVE PULMONARY DISEASE, UNSPECIFIED COPD TYPE (HCC): ICD-10-CM

## 2018-12-03 PROCEDURE — G0424 PULMONARY REHAB W EXER: HCPCS

## 2018-12-10 ENCOUNTER — CLINICAL SUPPORT (OUTPATIENT)
Dept: PULMONOLOGY | Facility: HOSPITAL | Age: 68
End: 2018-12-10
Attending: INTERNAL MEDICINE
Payer: COMMERCIAL

## 2018-12-10 ENCOUNTER — DOCUMENTATION (OUTPATIENT)
Dept: PULMONOLOGY | Facility: HOSPITAL | Age: 68
End: 2018-12-10

## 2018-12-10 DIAGNOSIS — J44.9 CHRONIC OBSTRUCTIVE PULMONARY DISEASE, UNSPECIFIED COPD TYPE (HCC): ICD-10-CM

## 2018-12-10 PROCEDURE — G0424 PULMONARY REHAB W EXER: HCPCS

## 2018-12-14 ENCOUNTER — CLINICAL SUPPORT (OUTPATIENT)
Dept: PULMONOLOGY | Facility: HOSPITAL | Age: 68
End: 2018-12-14
Attending: INTERNAL MEDICINE
Payer: COMMERCIAL

## 2018-12-14 DIAGNOSIS — J44.9 CHRONIC OBSTRUCTIVE PULMONARY DISEASE, UNSPECIFIED COPD TYPE (HCC): ICD-10-CM

## 2018-12-14 PROCEDURE — G0424 PULMONARY REHAB W EXER: HCPCS

## 2018-12-17 ENCOUNTER — CLINICAL SUPPORT (OUTPATIENT)
Dept: PULMONOLOGY | Facility: HOSPITAL | Age: 68
End: 2018-12-17
Attending: INTERNAL MEDICINE
Payer: COMMERCIAL

## 2018-12-17 DIAGNOSIS — J44.9 CHRONIC OBSTRUCTIVE PULMONARY DISEASE, UNSPECIFIED COPD TYPE (HCC): ICD-10-CM

## 2018-12-17 PROCEDURE — G0424 PULMONARY REHAB W EXER: HCPCS

## 2018-12-21 ENCOUNTER — APPOINTMENT (OUTPATIENT)
Dept: PULMONOLOGY | Facility: HOSPITAL | Age: 68
End: 2018-12-21
Attending: INTERNAL MEDICINE
Payer: COMMERCIAL

## 2018-12-24 ENCOUNTER — APPOINTMENT (OUTPATIENT)
Dept: PULMONOLOGY | Facility: HOSPITAL | Age: 68
End: 2018-12-24
Attending: INTERNAL MEDICINE
Payer: COMMERCIAL

## 2018-12-26 ENCOUNTER — APPOINTMENT (OUTPATIENT)
Dept: PULMONOLOGY | Facility: HOSPITAL | Age: 68
End: 2018-12-26
Attending: INTERNAL MEDICINE
Payer: COMMERCIAL

## 2018-12-28 ENCOUNTER — APPOINTMENT (OUTPATIENT)
Dept: PULMONOLOGY | Facility: HOSPITAL | Age: 68
End: 2018-12-28
Attending: INTERNAL MEDICINE
Payer: COMMERCIAL

## 2019-01-02 ENCOUNTER — TELEPHONE (OUTPATIENT)
Dept: CARDIAC REHAB | Facility: HOSPITAL | Age: 69
End: 2019-01-02

## 2019-01-04 ENCOUNTER — TELEPHONE (OUTPATIENT)
Dept: CARDIAC REHAB | Facility: HOSPITAL | Age: 69
End: 2019-01-04

## 2019-01-16 ENCOUNTER — CLINICAL SUPPORT (OUTPATIENT)
Dept: PULMONOLOGY | Facility: HOSPITAL | Age: 69
End: 2019-01-16
Payer: COMMERCIAL

## 2019-01-16 ENCOUNTER — PATIENT OUTREACH (OUTPATIENT)
Dept: FAMILY MEDICINE CLINIC | Facility: CLINIC | Age: 69
End: 2019-01-16

## 2019-01-16 DIAGNOSIS — J44.9 CHRONIC OBSTRUCTIVE PULMONARY DISEASE, UNSPECIFIED COPD TYPE (HCC): ICD-10-CM

## 2019-01-16 PROCEDURE — G0424 PULMONARY REHAB W EXER: HCPCS

## 2019-01-16 NOTE — PROGRESS NOTES
Medical Director 30 day Pulmonary Rehabilitation Review    I certify that I have met with Joo Shea face-to face to provide a 30 day progress review of his/her pulmonary rehabilitation program at [address] [place] [address]    I have reviewed the most recent individualized treatment plan (ITP), outcomes assessment, and provided opportunity for discussion with the patient    Comments: None    Continue with current treatment plan yes    Please provide the following modifications to the current treatment plan: N/A      Dr Elenita Hudson

## 2019-01-16 NOTE — PROGRESS NOTES
Pulmonary Rehabilitation Plan of Care   120 Day Plan of Care          Today's date: 2019   Total visits to date: 25  Patient name: Bhavesh Hernandez      : 1950  Age: 79 y o  MRN: [id number]  Referring Physician: Ailin Rios MD  Provider: Francisco Spencer  Clinician: ROMAN Bolivar  Dx:   Encounter Diagnosis   Name Primary?  Chronic obstructive pulmonary disease, unspecified COPD type [facility])      Date of onset: 2018    COMMENTS:  Ms Melanie Hoffman completed her 24th exercise session of pulmonary rehab today  She reports to take meds as prescribed; she states that she doesn't take her rescue inhaler as frequently because she thinks it impacts her heart rate  She reports to do exercise at home 3-4 days per week for at least 30 minutes on the stationary bike  She reports that she can do ADLs with less shortness of breath and does not feel as fatigued  She displayed normal hemodynamic responses to exercise and during rest  At the start of her session, O2 was 97% and declined to 94% during exercise  She will continue to use Supplemental Oxygen at a flow rate of 2L/min  Ms Melanie Hoffman tolerated exercise well        Medication compliance: Yes   Comments: Patient takes meds as prescribed; she states that she doesn't take her rescue inhaler as frequently because she thinks it impacts her heart rate  Fall Risk: Low   Comments: Patient ambulates well      EXERCISE/ACTIVITY    Cardiopulmonary Goals:   Min: 30-50   HR:    RPE: 4-7 moderate to somewhat hard exercise   O2 sat: >90%    Modalities: Treadmill, UBE and Eliptical   Strength trainin-3 days / week, 12-15 repitations  and 1-2 sets per modality    Modalities: Chest press, Lateral pull down , Lateral raise, Overhead press and Arm curl    Exercise Progression: continued exercise progression, Treadmill for 30 min at speed 2 0, incline 1, Arm Ergometer for  6 min at level 60 (RPMs 40-50), Weights 5 lbs , 10-15 repititons and 1-3 sets , elliptical may be added in as another modality, depending on the patient's progression  RPE 3-4  Home activity: Patient states that she does the stationary bike at home but is able to get up and move around doing housework or normal ADLs with much more ease and less fatigue  Ms  Ramya Rodrigues reports that she does the stationary for 30 minutes about 3-4 days per week  Goals: 10% improvement in functional capacity, home exercise days opposite [place] and >150 mins of exercise/wk  Education: Benefit of exercise, home exercise, pursed lip breathing, RPE scale and O2 saturation monitoring   Plan:home exercise target 30 mins, 2 days opposite [place] and Improved 6MW results  Readiness to change: Preparation    NUTRITION    Weight control:    Starting weight: 194      Diabetes: N/A  Goals:BMI <25 and Improved Rate Your Plate score  Education:More frequent meals, smaller portions  weight loss  healthy choices while dining out  Increasing fruits and veggies, having more whole grains, less processed foods  Plan: Continue to assess and guide [atient on tips for nutrition  Readiness to change: Preparation    PSYCHOSOCIAL    Emotional: benefits of positive support system and coping mechanisms  Social support: Family and boyfriend are very supportive, stated by patient  Goals: Will continue to assess  Education: Mental Health Education  Plan: Relaxation, Stress and your lungs     Readiness to change: Preparation    OTHER CORE COMPONENTS     Tobacco:   History   Smoking Status    Former Smoker    Packs/day: 0 25    Years: 50 00    Types: Cigarettes    Quit date: 9/20/2018   Smokeless Tobacco    Never Used     Comment: Quit     Oxygen: 97  Blood pressure:    Resting: Resting BP: 126/74   Exercise:  134/70  Goals: consistent BP < 130/80 and consistent exercise >150 mins/wk  Education: Pulmonary Disease, physiology, Exercise, strength, flexibility, Conservation of energy, oxygen, breathing techniques, Diet,nutrition and Medication  Plan: Causes of lung disease, Prevention and treatment, Smoking cessation, Exercise benefits, Proper nutrition and Continue to abstain from smoking; she quit 4 months ago: September 20th  She said she usually is a stress/anxiety smoker so it is important to continue managing stress/anxiety    Readiness to change: Preparation

## 2019-01-16 NOTE — PROGRESS NOTES
Outpatient Care Management Note:  Continues to well and participate in pulmonary rehab  No needs at this time  Verified that she has my contact information  Encouraged to call with questions or concerns

## 2019-01-21 NOTE — PROGRESS NOTES
Lissett Solis      Dear Dr Shalom Alvarado    Thank you for referring your patient to our cardiac rehabilitation program  The patient has completed 24/36  visits  However, rehab is being discontinued at this time due to:    Noncompliance: The patient has not regularly attended his/her rehab sessions  Last session attended was on 1/16/2019  Ms Gonzalo Cabrera was informed of our compliance policy  She called on 1/18/2019 as well as today, 1/21/2019, to cancel  The patient was informed that this would be her final reminder and explained that she would be discharged next time  She then told me that she wanted to cancel for the rest of the month until seeing her doctor  Ms Gonzalo Cabrera was then informed that she would be discharged and if she wanted to start rehab in the future, her physician would have to provide a new referral and script  Please contact us at [phone] if you have any questions about this patents case or if/when it is appropriate to re-start the patients rehab program at a later date  Thank you for your continued support of cardiac rehabilitation         Sincerely,      ROMAN Garcia

## 2019-01-23 ENCOUNTER — APPOINTMENT (OUTPATIENT)
Dept: PULMONOLOGY | Facility: HOSPITAL | Age: 69
End: 2019-01-23
Payer: COMMERCIAL

## 2019-03-04 DIAGNOSIS — J44.9 CHRONIC OBSTRUCTIVE PULMONARY DISEASE, UNSPECIFIED COPD TYPE (HCC): Primary | ICD-10-CM

## 2019-03-19 ENCOUNTER — HOSPITAL ENCOUNTER (OUTPATIENT)
Dept: CT IMAGING | Facility: HOSPITAL | Age: 69
Discharge: HOME | End: 2019-03-19
Attending: INTERNAL MEDICINE
Payer: COMMERCIAL

## 2019-03-19 ENCOUNTER — HOSPITAL ENCOUNTER (OUTPATIENT)
Dept: MAMMOGRAPHY | Facility: CLINIC | Age: 69
Discharge: HOME | End: 2019-03-19
Payer: COMMERCIAL

## 2019-03-19 DIAGNOSIS — E28.39 PRIMARY OVARIAN FAILURE: ICD-10-CM

## 2019-03-19 DIAGNOSIS — J44.9 CHRONIC OBSTRUCTIVE PULMONARY DISEASE, UNSPECIFIED COPD TYPE (HCC): ICD-10-CM

## 2019-03-19 PROCEDURE — 71250 CT THORAX DX C-: CPT

## 2019-03-19 PROCEDURE — 77080 DXA BONE DENSITY AXIAL: CPT

## 2019-04-04 DIAGNOSIS — E78.5 HYPERLIPIDEMIA, UNSPECIFIED HYPERLIPIDEMIA TYPE: ICD-10-CM

## 2019-04-05 RX ORDER — ATORVASTATIN CALCIUM 40 MG/1
40 TABLET, FILM COATED ORAL DAILY
Qty: 90 TABLET | Refills: 1 | Status: SHIPPED | OUTPATIENT
Start: 2019-04-05 | End: 2019-07-23 | Stop reason: SDUPTHER

## 2019-04-10 ENCOUNTER — OFFICE VISIT (OUTPATIENT)
Dept: PULMONOLOGY | Facility: HOSPITAL | Age: 69
End: 2019-04-10
Payer: COMMERCIAL

## 2019-04-10 VITALS
HEART RATE: 67 BPM | DIASTOLIC BLOOD PRESSURE: 82 MMHG | WEIGHT: 197 LBS | HEIGHT: 67 IN | OXYGEN SATURATION: 92 % | BODY MASS INDEX: 30.92 KG/M2 | SYSTOLIC BLOOD PRESSURE: 140 MMHG

## 2019-04-10 DIAGNOSIS — J45.21 MILD INTERMITTENT ASTHMATIC BRONCHITIS WITH EXACERBATION: ICD-10-CM

## 2019-04-10 DIAGNOSIS — I48.0 PAROXYSMAL ATRIAL FIBRILLATION (HCC): ICD-10-CM

## 2019-04-10 DIAGNOSIS — R91.1 INCIDENTAL PULMONARY NODULE, > 3MM AND < 8MM: ICD-10-CM

## 2019-04-10 DIAGNOSIS — J43.2 CENTRILOBULAR EMPHYSEMA (HCC): Primary | ICD-10-CM

## 2019-04-10 PROBLEM — J45.901 ASTHMATIC BRONCHITIS WITH EXACERBATION: Status: ACTIVE | Noted: 2019-04-10

## 2019-04-10 PROCEDURE — 99214 OFFICE O/P EST MOD 30 MIN: CPT | Performed by: INTERNAL MEDICINE

## 2019-04-10 RX ORDER — AMOXICILLIN 875 MG/1
875 TABLET, COATED ORAL 2 TIMES DAILY
Qty: 14 TABLET | Refills: 0 | Status: SHIPPED | OUTPATIENT
Start: 2019-04-10 | End: 2019-04-17

## 2019-04-10 RX ORDER — METHYLPREDNISOLONE 4 MG/1
TABLET ORAL
Qty: 1 EACH | Refills: 0 | Status: SHIPPED | OUTPATIENT
Start: 2019-04-10 | End: 2019-04-19

## 2019-04-17 DIAGNOSIS — J41.8 MIXED SIMPLE AND MUCOPURULENT CHRONIC BRONCHITIS (HCC): Primary | ICD-10-CM

## 2019-04-17 DIAGNOSIS — J41.8 MIXED SIMPLE AND MUCOPURULENT CHRONIC BRONCHITIS (HCC): ICD-10-CM

## 2019-04-17 RX ORDER — IPRATROPIUM BROMIDE AND ALBUTEROL SULFATE 2.5; .5 MG/3ML; MG/3ML
3 SOLUTION RESPIRATORY (INHALATION) 4 TIMES DAILY PRN
Qty: 120 VIAL | Refills: 3 | Status: SHIPPED | OUTPATIENT
Start: 2019-04-17 | End: 2019-04-17 | Stop reason: SDUPTHER

## 2019-04-17 RX ORDER — IPRATROPIUM BROMIDE AND ALBUTEROL SULFATE 2.5; .5 MG/3ML; MG/3ML
3 SOLUTION RESPIRATORY (INHALATION) 4 TIMES DAILY PRN
Qty: 360 VIAL | Refills: 3 | Status: SHIPPED | OUTPATIENT
Start: 2019-04-17 | End: 2020-07-02

## 2019-04-18 ENCOUNTER — PATIENT OUTREACH (OUTPATIENT)
Dept: FAMILY MEDICINE CLINIC | Facility: CLINIC | Age: 69
End: 2019-04-18

## 2019-04-19 ENCOUNTER — OFFICE VISIT (OUTPATIENT)
Dept: FAMILY MEDICINE CLINIC | Facility: CLINIC | Age: 69
End: 2019-04-19
Payer: COMMERCIAL

## 2019-04-19 VITALS
WEIGHT: 190 LBS | DIASTOLIC BLOOD PRESSURE: 60 MMHG | SYSTOLIC BLOOD PRESSURE: 122 MMHG | RESPIRATION RATE: 18 BRPM | TEMPERATURE: 98.6 F | HEIGHT: 67 IN | OXYGEN SATURATION: 94 % | HEART RATE: 108 BPM | BODY MASS INDEX: 29.82 KG/M2

## 2019-04-19 DIAGNOSIS — I10 BENIGN ESSENTIAL HYPERTENSION: ICD-10-CM

## 2019-04-19 DIAGNOSIS — Z11.59 ENCOUNTER FOR HEPATITIS C SCREENING TEST FOR LOW RISK PATIENT: Primary | ICD-10-CM

## 2019-04-19 DIAGNOSIS — J43.2 CENTRILOBULAR EMPHYSEMA (HCC): ICD-10-CM

## 2019-04-19 DIAGNOSIS — Z01.84 IMMUNITY STATUS TESTING: ICD-10-CM

## 2019-04-19 PROCEDURE — 3074F SYST BP LT 130 MM HG: CPT | Performed by: PHYSICIAN ASSISTANT

## 2019-04-19 PROCEDURE — 3078F DIAST BP <80 MM HG: CPT | Performed by: PHYSICIAN ASSISTANT

## 2019-04-19 PROCEDURE — 3008F BODY MASS INDEX DOCD: CPT | Performed by: PHYSICIAN ASSISTANT

## 2019-04-19 PROCEDURE — 99214 OFFICE O/P EST MOD 30 MIN: CPT | Performed by: PHYSICIAN ASSISTANT

## 2019-05-13 DIAGNOSIS — I48.91 ATRIAL FIBRILLATION WITH RAPID VENTRICULAR RESPONSE (HCC): ICD-10-CM

## 2019-05-13 RX ORDER — DILTIAZEM HYDROCHLORIDE 240 MG/1
240 CAPSULE, COATED, EXTENDED RELEASE ORAL DAILY
Qty: 90 CAPSULE | Refills: 1 | Status: SHIPPED | OUTPATIENT
Start: 2019-05-13 | End: 2019-07-23 | Stop reason: SDUPTHER

## 2019-07-18 ENCOUNTER — PATIENT OUTREACH (OUTPATIENT)
Dept: FAMILY MEDICINE CLINIC | Facility: CLINIC | Age: 69
End: 2019-07-18

## 2019-07-18 NOTE — PROGRESS NOTES
Outpatient Care Management Note:  Follow up call done with Maddy  She is doing well and has been stable for a year  Annual assessment completed and case closed  Patient has my contact information and was encouraged to call with any questions or concerns

## 2019-07-23 ENCOUNTER — OFFICE VISIT (OUTPATIENT)
Dept: INTERNAL MEDICINE CLINIC | Facility: CLINIC | Age: 69
End: 2019-07-23
Payer: COMMERCIAL

## 2019-07-23 VITALS
RESPIRATION RATE: 18 BRPM | DIASTOLIC BLOOD PRESSURE: 60 MMHG | HEART RATE: 78 BPM | BODY MASS INDEX: 29.4 KG/M2 | WEIGHT: 194 LBS | TEMPERATURE: 97.8 F | SYSTOLIC BLOOD PRESSURE: 140 MMHG | HEIGHT: 68 IN

## 2019-07-23 DIAGNOSIS — R06.02 SOB (SHORTNESS OF BREATH): ICD-10-CM

## 2019-07-23 DIAGNOSIS — I48.0 PAROXYSMAL ATRIAL FIBRILLATION (HCC): ICD-10-CM

## 2019-07-23 DIAGNOSIS — Z13.0 SCREENING FOR DEFICIENCY ANEMIA: ICD-10-CM

## 2019-07-23 DIAGNOSIS — I48.91 ATRIAL FIBRILLATION WITH RAPID VENTRICULAR RESPONSE (HCC): ICD-10-CM

## 2019-07-23 DIAGNOSIS — J43.2 CENTRILOBULAR EMPHYSEMA (HCC): Primary | ICD-10-CM

## 2019-07-23 DIAGNOSIS — Z13.1 SCREENING FOR DIABETES MELLITUS: ICD-10-CM

## 2019-07-23 DIAGNOSIS — I10 BENIGN ESSENTIAL HYPERTENSION: ICD-10-CM

## 2019-07-23 DIAGNOSIS — R41.3 MEMORY DIFFICULTIES: ICD-10-CM

## 2019-07-23 DIAGNOSIS — Z12.39 SCREENING FOR BREAST CANCER: ICD-10-CM

## 2019-07-23 DIAGNOSIS — Z13.220 SCREENING FOR HYPERLIPIDEMIA: ICD-10-CM

## 2019-07-23 DIAGNOSIS — J44.9 CHRONIC OBSTRUCTIVE PULMONARY DISEASE, UNSPECIFIED COPD TYPE (HCC): ICD-10-CM

## 2019-07-23 DIAGNOSIS — E78.5 HYPERLIPIDEMIA, UNSPECIFIED HYPERLIPIDEMIA TYPE: ICD-10-CM

## 2019-07-23 DIAGNOSIS — Z13.21 ENCOUNTER FOR VITAMIN DEFICIENCY SCREENING: ICD-10-CM

## 2019-07-23 DIAGNOSIS — Z13.29 SCREENING FOR HYPOTHYROIDISM: ICD-10-CM

## 2019-07-23 PROCEDURE — 99213 OFFICE O/P EST LOW 20 MIN: CPT | Performed by: PHYSICIAN ASSISTANT

## 2019-07-23 RX ORDER — ALBUTEROL SULFATE 90 UG/1
2 AEROSOL, METERED RESPIRATORY (INHALATION) EVERY 4 HOURS PRN
Qty: 3 INHALER | Refills: 3 | Status: SHIPPED | OUTPATIENT
Start: 2019-07-23 | End: 2020-07-06 | Stop reason: SDUPTHER

## 2019-07-23 RX ORDER — DILTIAZEM HYDROCHLORIDE 240 MG/1
240 CAPSULE, COATED, EXTENDED RELEASE ORAL DAILY
Qty: 90 CAPSULE | Refills: 1 | Status: SHIPPED | OUTPATIENT
Start: 2019-07-23 | End: 2019-12-23

## 2019-07-23 RX ORDER — ATORVASTATIN CALCIUM 40 MG/1
40 TABLET, FILM COATED ORAL DAILY
Qty: 90 TABLET | Refills: 1 | Status: SHIPPED | OUTPATIENT
Start: 2019-07-23 | End: 2020-01-11 | Stop reason: SDUPTHER

## 2019-07-23 NOTE — PROGRESS NOTES
Assessment/Plan:  Problem List Items Addressed This Visit        Respiratory    Emphysema/COPD ([address] ) - Primary    Relevant Medications    albuterol (VENTOLIN HFA) 90 mcg/act inhaler    umeclidinium-vilanterol (ANORO ELLIPTA) 62 5-25 MCG/INH inhaler       Cardiovascular and Mediastinum    Benign essential hypertension     Pts symptoms are stable with current regime  No changes at present  Relevant Medications    diltiazem (CARDIZEM CD) 240 mg 24 hr capsule    Paroxysmal atrial fibrillation (HCC)     Pt remains on diltazem and eliquis  Cardiology appt scheduled  Presently in NSR  Relevant Medications    diltiazem (CARDIZEM CD) 240 mg 24 hr capsule    apixaban (ELIQUIS) 5 mg       Other    Hyperlipidemia    Relevant Medications    atorvastatin (LIPITOR) 40 mg tablet    Memory difficulties     MMSE 29/30 which is essentially normal  Consider repeat in 6-12 months  No meds at this time  We did discuss option of adding low dose aricept but did not initiate at this time              Other Visit Diagnoses     Screening for deficiency anemia        Relevant Orders    CBC and differential    Screening for diabetes mellitus        Relevant Orders    Comprehensive metabolic panel    Screening for hyperlipidemia        Relevant Orders    Lipid Panel with Direct LDL reflex    Screening for hypothyroidism        Relevant Orders    TSH, 3rd generation with Free T4 reflex    Encounter for vitamin deficiency screening        Relevant Orders    Vitamin D 25 hydroxy    Screening for breast cancer        Relevant Orders    Mammo screening bilateral w 3d & cad    SOB (shortness of breath)        Relevant Medications    albuterol (VENTOLIN HFA) 90 mcg/act inhaler    Atrial fibrillation with rapid ventricular response (HCC)        Relevant Medications    diltiazem (CARDIZEM CD) 240 mg 24 hr capsule    apixaban (ELIQUIS) 5 mg    Chronic obstructive pulmonary disease, unspecified COPD type (HCC)        Relevant Medications albuterol (VENTOLIN HFA) 90 mcg/act inhaler    umeclidinium-vilanterol (ANORO ELLIPTA) 62 5-25 MCG/INH inhaler           Diagnoses and all orders for this visit:    Centrilobular emphysema ([place] [address] )    Screening for deficiency anemia  -     CBC and differential; Future    Screening for diabetes mellitus  -     Comprehensive metabolic panel; Future    Screening for hyperlipidemia  -     Lipid Panel with Direct LDL reflex; Future    Screening for hypothyroidism  -     TSH, 3rd generation with Free T4 reflex; Future    Encounter for vitamin deficiency screening  -     Vitamin D 25 hydroxy; Future    Screening for breast cancer  -     Mammo screening bilateral w 3d & cad; Future    SOB (shortness of breath)  -     albuterol (VENTOLIN HFA) 90 mcg/act inhaler; Inhale 2 puffs every 4 (four) hours as needed for shortness of breath    Atrial fibrillation with rapid ventricular response (HCC)  -     Discontinue: apixaban (ELIQUIS) 5 mg; Take 1 tablet (5 mg total) by mouth 2 (two) times a day  -     diltiazem (CARDIZEM CD) 240 mg 24 hr capsule; Take 1 capsule (240 mg total) by mouth daily  -     apixaban (ELIQUIS) 5 mg; Take 1 tablet (5 mg total) by mouth 2 (two) times a day    Hyperlipidemia, unspecified hyperlipidemia type  -     atorvastatin (LIPITOR) 40 mg tablet; Take 1 tablet (40 mg total) by mouth daily    Chronic obstructive pulmonary disease, unspecified COPD type ([place] [address] )  -     umeclidinium-vilanterol (ANORO ELLIPTA) 62 5-25 MCG/INH inhaler; Inhale 1 puff daily    Benign essential hypertension    Paroxysmal atrial fibrillation (HCC)    Memory difficulties        Memory difficulties  MMSE 29/30 which is essentially normal  Consider repeat in 6-12 months  No meds at this time  We did discuss option of adding low dose aricept but did not initiate at this time  Paroxysmal atrial fibrillation (HCC)  Pt remains on diltazem and eliquis  Cardiology appt scheduled  Presently in NSR       Benign essential hypertension  Pts symptoms are stable with current regime  No changes at present  Subjective:      Patient ID: Meir Hurt is a 79 y o  female  Pt presents for routine visit  She is noting ongoing episodes of SOB related to her chronic lung disease  She notes her breathing is typically improved with her current regime unless it is a very hot/humid day  She is due for labs and mammogram  She remains on eliquis for her a fib  She follows with both pulmonary and cardiology on a routine basis  She is concerned about some short term memory loss  She is worried about developing dementia down the line and becoming a burden  MMSE performed and pt scored 29/30 which is essentially normal  Consider recheck at next appt  The following portions of the patient's history were reviewed and updated as appropriate:   She has a past medical history of Allergic, Allergic rhinitis, Asthma, Atrial fibrillation ([address] ), Bronchitis, COPD (chronic obstructive pulmonary disease) ([address] ), Depression, Emphysema lung ([address] ), Fibroadenoma of breast, Hyperlipidemia, Hypertension, Memory difficulties (7/23/2019), Post-menopausal, Post-menopausal, Spondylisthesis, and Vitamin D deficiency  ,  does not have any pertinent problems on file  ,   has a past surgical history that includes Breast lumpectomy; Bunionectomy (Bilateral); Tubal ligation; Tonsillectomy; Mole removal; Cataract extraction, bilateral; and ADENOIDECTOMY  ,  family history includes Alcohol abuse in her mother; Celiac disease in her daughter; Diabetes in her father; Diverticulitis in her sister; Eczema in her daughter; Fibromyalgia in her daughter; Heart failure in her brother; Hypertension in her father and mother; Prostate cancer in her father; Vitamin D deficiency in her sister  ,   reports that she quit smoking about 10 months ago  Her smoking use included cigarettes  She has a 12 50 pack-year smoking history   She has never used smokeless tobacco  She reports that she drinks alcohol  She reports that she does not use drugs  ,  is allergic to ace inhibitors; bupropion; citalopram; and adhesive [medical tape]     Current Outpatient Medications   Medication Sig Dispense Refill    albuterol (VENTOLIN HFA) 90 mcg/act inhaler Inhale 2 puffs every 4 (four) hours as needed for shortness of breath 3 Inhaler 3    apixaban (ELIQUIS) 5 mg Take 1 tablet (5 mg total) by mouth 2 (two) times a day 180 tablet 3    ascorbic acid (VITAMIN C) 500 mg tablet Take 500 mg by mouth daily      atorvastatin (LIPITOR) 40 mg tablet Take 1 tablet (40 mg total) by mouth daily 90 tablet 1    cetirizine (ZyrTEC) 10 mg tablet Take 10 mg by mouth daily      Cholecalciferol (VITAMIN D3) 5000 units CAPS Take 1 capsule by mouth daily      diltiazem (CARDIZEM CD) 240 mg 24 hr capsule Take 1 capsule (240 mg total) by mouth daily 90 capsule 1    ipratropium-albuterol (DUO-NEB) 0 5-2 5 mg/3 mL nebulizer solution Take 1 vial (3 mL total) by nebulization 4 (four) times a day as needed for wheezing or shortness of breath 360 vial 3    L-Lysine 500 MG CAPS Take 1 capsule by mouth daily      MULTIPLE VITAMINS-CALCIUM PO Take by mouth daily      Probiotic Product (PROBIOTIC-10 PO) Take 1 capsule by mouth daily      umeclidinium-vilanterol (ANORO ELLIPTA) 62 5-25 MCG/INH inhaler Inhale 1 puff daily 6 Inhaler 3     No current facility-administered medications for this visit  Review of Systems   Constitutional: Negative for chills and fever  HENT: Negative for congestion, ear pain, hearing loss, postnasal drip, rhinorrhea, sinus pressure, sinus pain, sore throat and trouble swallowing  Eyes: Negative for pain and visual disturbance  Respiratory: Positive for shortness of breath  Negative for cough, chest tightness and wheezing  Cardiovascular: Negative  Negative for chest pain, palpitations and leg swelling     Gastrointestinal: Negative for abdominal pain, blood in stool, constipation, diarrhea, nausea and vomiting  Endocrine: Negative for cold intolerance, heat intolerance, polydipsia, polyphagia and polyuria  Genitourinary: Negative for difficulty urinating, dysuria, flank pain and urgency  Musculoskeletal: Negative for arthralgias, back pain, gait problem and myalgias  Skin: Negative for rash  Allergic/Immunologic: Negative  Neurological: Negative for dizziness, weakness, light-headedness and headaches  Hematological: Negative  Psychiatric/Behavioral: Negative for behavioral problems, dysphoric mood and sleep disturbance  The patient is not nervous/anxious  Objective:  Vitals:    07/23/19 1041   BP: 140/60   Pulse: 78   Resp: 18   Temp: 97 8 °F (36 6 °C)   TempSrc: Tympanic   Weight: 88 kg (194 lb)   Height: 5' 7 5" (1 715 m)     Body mass index is 29 94 kg/m²  Physical Exam   Constitutional: She is oriented to person, place, and time  She appears well-developed and well-nourished  No distress  HENT:   Head: Normocephalic and atraumatic  Right Ear: External ear normal    Left Ear: External ear normal    Nose: Nose normal    Mouth/Throat: Oropharynx is clear and moist  No oropharyngeal exudate  Eyes: Pupils are equal, round, and reactive to light  Conjunctivae and EOM are normal  Right eye exhibits no discharge  Left eye exhibits no discharge  No scleral icterus  Neck: Normal range of motion  Neck supple  No thyromegaly present  Cardiovascular: Normal rate, regular rhythm and normal heart sounds  Exam reveals no gallop and no friction rub  No murmur heard  Pulmonary/Chest: Effort normal and breath sounds normal  No respiratory distress  She has no wheezes  She has no rales  Abdominal: Soft  Bowel sounds are normal  She exhibits no distension  There is no tenderness  Musculoskeletal: Normal range of motion  She exhibits no edema, tenderness or deformity  Neurological: She is alert and oriented to person, place, and time  No cranial nerve deficit     Skin: Skin is warm and dry  She is not diaphoretic  Psychiatric: She has a normal mood and affect  Her behavior is normal  Judgment and thought content normal  She exhibits abnormal recent memory

## 2019-07-23 NOTE — ASSESSMENT & PLAN NOTE
MMSE 29/30 which is essentially normal  Consider repeat in 6-12 months  No meds at this time  We did discuss option of adding low dose aricept but did not initiate at this time

## 2019-08-14 ENCOUNTER — OFFICE VISIT (OUTPATIENT)
Dept: PULMONOLOGY | Facility: HOSPITAL | Age: 69
End: 2019-08-14
Payer: COMMERCIAL

## 2019-08-14 VITALS
HEIGHT: 68 IN | OXYGEN SATURATION: 93 % | HEART RATE: 82 BPM | DIASTOLIC BLOOD PRESSURE: 70 MMHG | SYSTOLIC BLOOD PRESSURE: 116 MMHG | BODY MASS INDEX: 29.4 KG/M2 | WEIGHT: 194 LBS

## 2019-08-14 DIAGNOSIS — J43.2 CENTRILOBULAR EMPHYSEMA (HCC): Primary | ICD-10-CM

## 2019-08-14 PROCEDURE — 99214 OFFICE O/P EST MOD 30 MIN: CPT | Performed by: INTERNAL MEDICINE

## 2019-08-14 NOTE — PROGRESS NOTES
Pulmonary outpatient note   Rashad Winters 79 y o  female MRN: [id number]  8/14/2019      Assessment and plan:  Rashad Winters has the following medical problems:  1  COPD  Gold stage B COPD  She is on Anoro with good clinical response  She is using oxygen as needed 24 hours per day  She is pretty active and cycling 3 miles per day  No changes are needed  She was not hypercapnic year ago and I do not think she developed hypercapnia in the meantime  She is not a candidate for BiPAP at this point  2   Smoking history  Patient has 50 pack year smoking  She had chest CT on March 2019 that showed moderate to severe emphysema without any suspicious lung lesion for lung cancer  Patient is stable, will follow in 6 months  Dariel Dee MD/PhD,  [facility]      Return in about 6 months (around 2/14/2020)  History of Present Illness  This is a 16-year-old female with previous medical history of paroxysmal atrial fibrillation, hypertension and COPD  She takes and no every day and feels good staying active, cycling 3 miles per day  She is using her rescue inhaler infrequently and use nebulizers once a month approximately  She had breathing teststhat showed severe obstruction with severe hyperinflation and air trapping with low diffusion  She was never hospitalized for acute exacerbation of COPD  She is using oxygen 24 hr a day with a portable tank  Social history:   Smoked a pack a day for 50 years  Stop smoking years ago  Does not drink alcohol  Occupational history:   Worked as a  many years  Retired 2 years ago  Review of Systems   Constitutional: Negative  HENT: Negative  Eyes: Negative  Respiratory: Positive for shortness of breath  Cardiovascular: Negative  Gastrointestinal: Negative  Endocrine: Negative  Genitourinary: Negative  Musculoskeletal: Negative  Skin: Negative  Allergic/Immunologic: Negative  Neurological: Negative  Hematological: Negative  Psychiatric/Behavioral: Negative          Historical Information   Past Medical History:   Diagnosis Date    Allergic     Allergic rhinitis     [id number]  RESOLVED    Asthma     Atrial fibrillation ([address] )     Bronchitis     COPD [facility]chronic obstructive [facility] disease) [facility])     home O2 at night at home 2L    Depression     Emphysema lung ([facility])     Fibroadenoma of breast     Hyperlipidemia     Hypertension     Memory difficulties 7/23/2019    Post-menopausal     Post-menopausal     Spondylisthesis     Vitamin D deficiency      Past Surgical History:   Procedure Laterality Date    ADENOIDECTOMY      BREAST LUMPECTOMY      5    BUNIONECTOMY Bilateral     CATARACT EXTRACTION, BILATERAL      MOLE REMOVAL      lip    TONSILLECTOMY      TUBAL LIGATION       Family History   Problem Relation Age of Onset    Hypertension Mother     Alcohol abuse Mother     Hypertension Father     Diabetes Father     Prostate cancer Father     Vitamin D deficiency Sister     Diverticulitis Sister     Heart failure Brother     Celiac disease Daughter     Eczema Daughter     Fibromyalgia Daughter        Meds/Allergies     Current Outpatient Medications:     albuterol (VENTOLIN HFA) 90 mcg/act inhaler, Inhale 2 puffs every 4 (four) hours as needed for shortness of breath, Disp: 3 Inhaler, Rfl: 3    apixaban (ELIQUIS) 5 mg, Take 1 tablet (5 mg total) by mouth 2 (two) times a day, Disp: 180 tablet, Rfl: 3    ascorbic acid (VITAMIN C) 500 mg tablet, Take 500 mg by mouth daily, Disp: , Rfl:     atorvastatin (LIPITOR) 40 mg tablet, Take 1 tablet (40 mg total) by mouth daily, Disp: 90 tablet, Rfl: 1    cetirizine (ZyrTEC) 10 mg tablet, Take 10 mg by mouth daily, Disp: , Rfl:     Cholecalciferol (VITAMIN D3) 5000 units CAPS, Take 1 capsule by mouth daily, Disp: , Rfl:     diltiazem (CARDIZEM CD) 240 mg 24 hr capsule, Take 1 capsule (240 mg total) by mouth daily, Disp: 90 capsule, Rfl: 1    ipratropium-albuterol (DUO-NEB) 0 5-2 5 mg/3 mL nebulizer solution, Take 1 vial (3 mL total) by nebulization 4 (four) times a day as needed for wheezing or shortness of breath, Disp: 360 vial, Rfl: 3    L-Lysine 500 MG CAPS, Take 1 capsule by mouth daily, Disp: , Rfl:     MULTIPLE VITAMINS-CALCIUM PO, Take by mouth daily, Disp: , Rfl:     Probiotic Product (PROBIOTIC-10 PO), Take 1 capsule by mouth daily, Disp: , Rfl:     umeclidinium-vilanterol (ANORO ELLIPTA) 62 5-25 MCG/INH inhaler, Inhale 1 puff daily, Disp: 6 Inhaler, Rfl: 3  Allergies   Allergen Reactions    Ace Inhibitors Cough     Action Taken: stopped med and changed to ARB; Category: Adverse Reaction;     Bupropion Itching    Citalopram Diarrhea and Nausea Only    Adhesive [Medical Tape] Rash     Patch        Vitals: Blood pressure 116/70, pulse 82, height 5' 7 5" (1 715 m), weight 88 kg (194 lb), SpO2 93 %  Body mass index is 29 94 kg/m²  Oxygen Therapy  SpO2: 93 %    Physical Exam  Physical Exam   Constitutional: She is oriented to person, place, and time  She appears well-developed and well-nourished  No distress  HENT:   Head: Normocephalic and atraumatic  Eyes: Pupils are equal, round, and reactive to light  EOM are normal    Neck: Normal range of motion  Neck supple  No JVD present  Cardiovascular: Normal rate, regular rhythm and normal heart sounds  Exam reveals no friction rub  No murmur heard  Pulmonary/Chest: Effort normal  No stridor  No respiratory distress  She has no wheezes  She has rales  Abdominal: Soft  Musculoskeletal: Normal range of motion  She exhibits no edema or deformity  Neurological: She is alert and oriented to person, place, and time  Skin: Skin is warm  She is not diaphoretic  Psychiatric: She has a normal mood and affect  Labs: I have personally reviewed pertinent lab results    Lab Results   Component Value Date    WBC 6 90 08/01/2018    HGB 13 1 08/01/2018    HCT 37 2 08/01/2018    MCV 89 08/01/2018     08/01/2018     Lab Results   Component Value Date    GLUCOSE 101 06/05/2015    CALCIUM 9 0 08/01/2018     06/05/2015    K 4 0 08/01/2018    CO2 26 08/01/2018     (H) 08/01/2018    BUN 13 08/01/2018    CREATININE 0 91 08/01/2018     No results found for: IGE  Lab Results   Component Value Date    ALT 11 08/01/2018    AST 14 08/01/2018    ALKPHOS 65 08/01/2018    BILITOT 0 5 06/04/2015       Imaging and other studies:   I have personally reviewed pertinent imaging studies in PACS  CHEST CT 03/2019  LUNGS:  There is a 3 mm subpleural nodule in the posterior lateral right upper lobe on image 3/47  Retrospectively, this was present on the prior study, and is unchanged, indicative of benignity  Previously noted lingular nodule is not evident on today's exam   There is moderate to severe emphysema bilaterally in the mid to upper lung zones  Scattered linear atelectasis or scarring in the right middle lobe, lingula, and bilateral lower lobes  There is no tracheal or endobronchial lesion      PFT 09/2014  FEV1/FVC Ratio:  63 %  Forced Vital Capacity:  2 23 L, 67 % predicted  FEV1:  1 10 L, 43 % predicted  After administration of bronchodilator FEV1:  1 37 L, 53% predicted, with an appreciable response to the bronchodilator  Lung volumes by body nitrogen wash out : Total Lung Capacity 181 % predicted Residual volume 316 % predicted  DLCO corrected for patients hemoglobin level:  45 %

## 2019-10-22 ENCOUNTER — HOSPITAL ENCOUNTER (OUTPATIENT)
Dept: MAMMOGRAPHY | Facility: CLINIC | Age: 69
Discharge: HOME | End: 2019-10-22
Payer: COMMERCIAL

## 2019-10-22 VITALS — WEIGHT: 192 LBS | HEIGHT: 68 IN | BODY MASS INDEX: 29.1 KG/M2

## 2019-10-22 DIAGNOSIS — Z12.39 SCREENING FOR BREAST CANCER: ICD-10-CM

## 2019-10-22 PROCEDURE — 77067 SCR MAMMO BI INCL CAD: CPT

## 2019-10-22 PROCEDURE — 77063 BREAST TOMOSYNTHESIS BI: CPT

## 2019-12-23 ENCOUNTER — OFFICE VISIT (OUTPATIENT)
Dept: CARDIOLOGY CLINIC | Facility: CLINIC | Age: 69
End: 2019-12-23
Payer: COMMERCIAL

## 2019-12-23 VITALS
BODY MASS INDEX: 30.45 KG/M2 | SYSTOLIC BLOOD PRESSURE: 135 MMHG | HEART RATE: 88 BPM | DIASTOLIC BLOOD PRESSURE: 75 MMHG | WEIGHT: 194 LBS | HEIGHT: 67 IN

## 2019-12-23 DIAGNOSIS — E78.2 MIXED HYPERLIPIDEMIA: ICD-10-CM

## 2019-12-23 DIAGNOSIS — I10 BENIGN ESSENTIAL HYPERTENSION: ICD-10-CM

## 2019-12-23 DIAGNOSIS — I48.91 ATRIAL FIBRILLATION WITH RAPID VENTRICULAR RESPONSE (HCC): Primary | ICD-10-CM

## 2019-12-23 DIAGNOSIS — I48.0 PAROXYSMAL ATRIAL FIBRILLATION (HCC): ICD-10-CM

## 2019-12-23 PROCEDURE — 3078F DIAST BP <80 MM HG: CPT | Performed by: INTERNAL MEDICINE

## 2019-12-23 PROCEDURE — 3075F SYST BP GE 130 - 139MM HG: CPT | Performed by: INTERNAL MEDICINE

## 2019-12-23 PROCEDURE — 93000 ELECTROCARDIOGRAM COMPLETE: CPT | Performed by: INTERNAL MEDICINE

## 2019-12-23 PROCEDURE — 99214 OFFICE O/P EST MOD 30 MIN: CPT | Performed by: INTERNAL MEDICINE

## 2019-12-23 RX ORDER — METOPROLOL TARTRATE 50 MG/1
50 TABLET, FILM COATED ORAL EVERY 12 HOURS SCHEDULED
Qty: 180 TABLET | Refills: 5 | Status: SHIPPED | OUTPATIENT
Start: 2019-12-23 | End: 2020-05-14

## 2019-12-23 RX ORDER — ASPIRIN 81 MG/1
81 TABLET, CHEWABLE ORAL DAILY
Qty: 180 TABLET | Refills: 5
Start: 2019-12-23 | End: 2020-02-12 | Stop reason: ALTCHOICE

## 2019-12-23 NOTE — ASSESSMENT & PLAN NOTE
Rare spells  Chads Vasc score is 2  We talked about switching to aspirin so that she can take Motrin when needed

## 2019-12-23 NOTE — PROGRESS NOTES
Patient ID: Marycarmen Caraballo is a 81 y o  female  Plan:      Hyperlipidemia    Tolerating statin therapy  Paroxysmal atrial fibrillation (HCC)    Rare spells  Chads Vasc score is 2  We talked about switching to aspirin so that she can take Motrin when needed  Benign essential hypertension     really not a problem  Follow up Plan:  Up six-month visit as meds or changing  Less frequent thereafter  I switched her from diltiazem to metoprolol as she gets jazz  When she takes caffeine it would like to have an extra dose on hand for those occasions  HPI:   The patient is seen in follow-up today regarding paroxysmal atrial fibrillation  No recent chest pain or chest pressure  No spells of atrial fibrillation  Results for orders placed or performed in visit on 12/23/19   POCT ECG    Impression    NSR at 90 Otherwise normal          Most recent or relevant cardiac/vascular testing:    Echocardiogram 08/01/2018: Mild LVH  Normal LV function  Past Surgical History:   Procedure Laterality Date    ADENOIDECTOMY      BREAST CYST EXCISION Bilateral     benign-1987, 2002, 2007    BUNIONECTOMY Bilateral     CATARACT EXTRACTION, BILATERAL      MOLE REMOVAL      lip    TONSILLECTOMY      TUBAL LIGATION       CMP:   Lab Results   Component Value Date     06/05/2015    K 4 0 08/01/2018    K 3 6 06/05/2015     (H) 08/01/2018     06/05/2015    CO2 26 08/01/2018    CO2 27 5 06/05/2015    BUN 13 08/01/2018    BUN 14 06/05/2015    CREATININE 0 91 08/01/2018    CREATININE 1 08 06/05/2015    GLUCOSE 101 06/05/2015    EGFR 66 08/01/2018       Lipid Profile:   Lab Results   Component Value Date    TRIG 150 07/20/2018    HDL 45 07/20/2018         Review of Systems   10  point ROS  was otherwise non pertinent or negative except as per HPI or as below     Gait: Normal         Objective:     /75   Pulse 88   Ht 5' 7" (1 702 m)   Wt 88 kg (194 lb)   BMI 30 38 kg/m² PHYSICAL EXAM:    General:  Normal appearance in no distress  Eyes:  Anicteric  Oral mucosa:  Moist   Neck:  No JVD  Carotid upstrokes are brisk without bruits  No masses  Chest:  Clear to auscultation and percussion  Cardiac:  Normal PMI  Normal S1 and S2  No murmur gallop or rub  Abdomen:  Soft and nontender  No palpable organomegaly or aortic enlargement  Extremities:  No peripheral edema  Musculoskeletal:  Symmetric  Vascular:  Femoral pulses are brisk without bruits  Popliteal pulses are intact bilaterally  Pedal pulses are intact  Neuro:  Grossly symmetric  Psych:  Alert and oriented x3          Current Outpatient Medications:     albuterol (VENTOLIN HFA) 90 mcg/act inhaler, Inhale 2 puffs every 4 (four) hours as needed for shortness of breath, Disp: 3 Inhaler, Rfl: 3    ascorbic acid (VITAMIN C) 500 mg tablet, Take 500 mg by mouth daily, Disp: , Rfl:     atorvastatin (LIPITOR) 40 mg tablet, Take 1 tablet (40 mg total) by mouth daily, Disp: 90 tablet, Rfl: 1    cetirizine (ZyrTEC) 10 mg tablet, Take 10 mg by mouth daily, Disp: , Rfl:     Cholecalciferol (VITAMIN D3) 5000 units CAPS, Take 1 capsule by mouth daily, Disp: , Rfl:     ipratropium-albuterol (DUO-NEB) 0 5-2 5 mg/3 mL nebulizer solution, Take 1 vial (3 mL total) by nebulization 4 (four) times a day as needed for wheezing or shortness of breath, Disp: 360 vial, Rfl: 3    L-Lysine 500 MG CAPS, Take 1 capsule by mouth daily, Disp: , Rfl:     MULTIPLE VITAMINS-CALCIUM PO, Take by mouth daily, Disp: , Rfl:     Probiotic Product (PROBIOTIC-10 PO), Take 1 capsule by mouth daily, Disp: , Rfl:     umeclidinium-vilanterol (ANORO ELLIPTA) 62 5-25 MCG/INH inhaler, Inhale 1 puff daily, Disp: 6 Inhaler, Rfl: 3    aspirin 81 mg chewable tablet, Chew 1 tablet (81 mg total) daily, Disp: 180 tablet, Rfl: 5    metoprolol tartrate (LOPRESSOR) 50 mg tablet, Take 1 tablet (50 mg total) by mouth every 12 (twelve) hours, Disp: 180 tablet, Rfl: 5  Allergies   Allergen Reactions    Ace Inhibitors Cough     Action Taken: stopped med and changed to ARB; Category:  Adverse Reaction;     Bupropion Itching    Citalopram Diarrhea and Nausea Only    Adhesive [Medical Tape] Rash     Patch      Past Medical History:   Diagnosis Date    Allergic     Allergic rhinitis       RESOLVED    Asthma     [facility] fibrillation [facility])     Bronchitis     COPD (chronic obstructive pulmonary disease) ([facility])     home O2 at night at home 2L    Depression     Emphysema lung ([facility])     Fibroadenoma of breast     Hyperlipidemia     Hypertension     Memory difficulties 2019    Post-menopausal     Post-menopausal     Spondylisthesis     Vitamin D deficiency            Social History     Tobacco Use   Smoking Status Former Smoker    Packs/day: 0 25    Years: 50 00    Pack years: 12 50    Types: Cigarettes    Last attempt to quit: 2018    Years since quittin 2   Smokeless Tobacco Never Used   Tobacco Comment    Quit

## 2019-12-23 NOTE — PATIENT INSTRUCTIONS
Stop Eliquis  Start aspirin 81 mg daily  Stop diltiazem  Start metoprolol 50 mg twice daily  You can take an extra 1 when you feel revved up from caffeine or similar

## 2020-01-07 ENCOUNTER — OFFICE VISIT (OUTPATIENT)
Dept: INTERNAL MEDICINE CLINIC | Facility: CLINIC | Age: 70
End: 2020-01-07
Payer: COMMERCIAL

## 2020-01-07 VITALS
HEART RATE: 112 BPM | DIASTOLIC BLOOD PRESSURE: 60 MMHG | BODY MASS INDEX: 30.7 KG/M2 | OXYGEN SATURATION: 93 % | SYSTOLIC BLOOD PRESSURE: 148 MMHG | TEMPERATURE: 98.4 F | WEIGHT: 195.6 LBS | HEIGHT: 67 IN | RESPIRATION RATE: 18 BRPM

## 2020-01-07 DIAGNOSIS — Z00.00 MEDICARE ANNUAL WELLNESS VISIT, SUBSEQUENT: ICD-10-CM

## 2020-01-07 DIAGNOSIS — I10 BENIGN ESSENTIAL HYPERTENSION: Primary | ICD-10-CM

## 2020-01-07 DIAGNOSIS — J43.2 CENTRILOBULAR EMPHYSEMA (HCC): ICD-10-CM

## 2020-01-07 DIAGNOSIS — F32.A DEPRESSIVE DISORDER: ICD-10-CM

## 2020-01-07 PROCEDURE — G0439 PPPS, SUBSEQ VISIT: HCPCS | Performed by: PHYSICIAN ASSISTANT

## 2020-01-07 PROCEDURE — 3288F FALL RISK ASSESSMENT DOCD: CPT | Performed by: PHYSICIAN ASSISTANT

## 2020-01-07 PROCEDURE — 1170F FXNL STATUS ASSESSED: CPT | Performed by: PHYSICIAN ASSISTANT

## 2020-01-07 PROCEDURE — 99214 OFFICE O/P EST MOD 30 MIN: CPT | Performed by: PHYSICIAN ASSISTANT

## 2020-01-07 PROCEDURE — 1160F RVW MEDS BY RX/DR IN RCRD: CPT | Performed by: PHYSICIAN ASSISTANT

## 2020-01-07 PROCEDURE — 3725F SCREEN DEPRESSION PERFORMED: CPT | Performed by: PHYSICIAN ASSISTANT

## 2020-01-07 PROCEDURE — 1125F AMNT PAIN NOTED PAIN PRSNT: CPT | Performed by: PHYSICIAN ASSISTANT

## 2020-01-07 PROCEDURE — 1101F PT FALLS ASSESS-DOCD LE1/YR: CPT | Performed by: PHYSICIAN ASSISTANT

## 2020-01-07 PROCEDURE — 3008F BODY MASS INDEX DOCD: CPT | Performed by: PHYSICIAN ASSISTANT

## 2020-01-07 NOTE — PATIENT INSTRUCTIONS
Medicare Preventive Visit Patient Instructions  Thank you for completing your Welcome to Medicare Visit or Medicare Annual Wellness Visit today  Your next wellness visit will be due in one year (1/7/2021)  The screening/preventive services that you may require over the next 5-10 years are detailed below  Some tests may not apply to you based off risk factors and/or age  Screening tests ordered at today's visit but not completed yet may show as past due  Also, please note that scanned in results may not display below  Preventive Screenings:  Service Recommendations Previous Testing/Comments   Colorectal Cancer Screening  * Colonoscopy    * Fecal Occult Blood Test (FOBT)/Fecal Immunochemical Test (FIT)  * Fecal DNA/Cologuard Test  * Flexible Sigmoidoscopy Age: 54-65 years old   Colonoscopy: every 10 years (may be performed more frequently if at higher risk)  OR  FOBT/FIT: every 1 year  OR  Cologuard: every 3 years  OR  Sigmoidoscopy: every 5 years  Screening may be recommended earlier than age 48 if at higher risk for colorectal cancer  Also, an individualized decision between you and your healthcare provider will decide whether screening between the ages of 74-80 would be appropriate  Colonoscopy: 04/17/2017  FOBT/FIT: Not on file  Cologuard: Not on file  Sigmoidoscopy: Not on file    Screening Current     Breast Cancer Screening Age: 36 years old  Frequency: every 1-2 years  Not required if history of left and right mastectomy Mammogram: 10/22/2019    Screening Current   Cervical Cancer Screening Between the ages of 21-29, pap smear recommended once every 3 years  Between the ages of 33-67, can perform pap smear with HPV co-testing every 5 years     Recommendations may differ for women with a history of total hysterectomy, cervical cancer, or abnormal pap smears in past  Pap Smear: 11/09/2018    Screening Not Indicated   Hepatitis C Screening Once for adults born between 1945 and 1965  More frequently in patients at high risk for Hepatitis C Hep C Antibody: Not on file       Diabetes Screening 1-2 times per year if you're at risk for diabetes or have pre-diabetes Fasting glucose: 103 mg/dL   A1C: No results in last 5 years       Cholesterol Screening Once every 5 years if you don't have a lipid disorder  May order more often based on risk factors  Lipid panel: 07/20/2018    Screening Not Indicated  History Lipid Disorder     Other Preventive Screenings Covered by Medicare:  1  Abdominal Aortic Aneurysm (AAA) Screening: covered once if your at risk  You're considered to be at risk if you have a family history of AAA  2  Lung Cancer Screening: covers low dose CT scan once per year if you meet all of the following conditions: (1) Age 50-69; (2) No signs or symptoms of lung cancer; (3) Current smoker or have quit smoking within the last 15 years; (4) You have a tobacco smoking history of at least 30 pack years (packs per day multiplied by number of years you smoked); (5) You get a written order from a healthcare provider  3  Glaucoma Screening: covered annually if you're considered high risk: (1) You have diabetes OR (2) Family history of glaucoma OR (3)  aged 48 and older OR (3)  American aged 72 and older  3  Osteoporosis Screening: covered every 2 years if you meet one of the following conditions: (1) You're estrogen deficient and at risk for osteoporosis based off medical history and other findings; (2) Have a vertebral abnormality; (3) On glucocorticoid therapy for more than 3 months; (4) Have primary hyperparathyroidism; (5) On osteoporosis medications and need to assess response to drug therapy  · Last bone density test (DXA Scan): 03/19/2019   5  HIV Screening: covered annually if you're between the age of 15-65  Also covered annually if you are younger than 13 and older than 72 with risk factors for HIV infection   For pregnant patients, it is covered up to 3 times per pregnancy  Immunizations:  Immunization Recommendations   Influenza Vaccine Annual influenza vaccination during flu season is recommended for all persons aged >= 6 months who do not have contraindications   Pneumococcal Vaccine (Prevnar and Pneumovax)  * Prevnar = PCV13  * Pneumovax = PPSV23   Adults 25-60 years old: 1-3 doses may be recommended based on certain risk factors  Adults 72 years old: Prevnar (PCV13) vaccine recommended followed by Pneumovax (PPSV23) vaccine  If already received PPSV23 since turning 65, then PCV13 recommended at least one year after PPSV23 dose  Hepatitis B Vaccine 3 dose series if at intermediate or high risk (ex: diabetes, end stage renal disease, liver disease)   Tetanus (Td) Vaccine - COST NOT COVERED BY MEDICARE PART B Following completion of primary series, a booster dose should be given every 10 years to maintain immunity against tetanus  Td may also be given as tetanus wound prophylaxis  Tdap Vaccine - COST NOT COVERED BY MEDICARE PART B Recommended at least once for all adults  For pregnant patients, recommended with each pregnancy  Shingles Vaccine (Shingrix) - COST NOT COVERED BY MEDICARE PART B  2 shot series recommended in those aged 48 and above     Health Maintenance Due:      Topic Date Due    Hepatitis C Screening  1950    MAMMOGRAM  10/22/2020    DXA SCAN  03/19/2021    CRC Screening: Colonoscopy  04/17/2027     Immunizations Due:      Topic Date Due    Influenza Vaccine  07/01/2019     Advance Directives   What are advance directives? Advance directives are legal documents that state your wishes and plans for medical care  These plans are made ahead of time in case you lose your ability to make decisions for yourself  Advance directives can apply to any medical decision, such as the treatments you want, and if you want to donate organs  What are the types of advance directives?   There are many types of advance directives, and each state has rules about how to use them  You may choose a combination of any of the following:  · Living will: This is a written record of the treatment you want  You can also choose which treatments you do not want, which to limit, and which to stop at a certain time  This includes surgery, medicine, IV fluid, and tube feedings  · Durable power of  for healthcare [facility]): This is a written record that states who you want to make healthcare choices for you when you are unable to make them for yourself  This person, called a proxy, is usually a family member or a friend  You may choose more than 1 proxy  · Do not resuscitate (DNR) order:  A DNR order is used in case your heart stops beating or you stop breathing  It is a request not to have certain forms of treatment, such as CPR  A DNR order may be included in other types of advance directives  · Medical directive: This covers the care that you want if you are in a coma, near death, or unable to make decisions for yourself  You can list the treatments you want for each condition  Treatment may include pain medicine, surgery, blood transfusions, dialysis, IV or tube feedings, and a ventilator (breathing machine)  · Values history: This document has questions about your views, beliefs, and how you feel and think about life  This information can help others choose the care that you would choose  Why are advance directives important? An advance directive helps you control your care  Although spoken wishes may be used, it is better to have your wishes written down  Spoken wishes can be misunderstood, or not followed  Treatments may be given even if you do not want them  An advance directive may make it easier for your family to make difficult choices about your care  Cigarette Smoking and Your Health   Risks to your health if you smoke:  Nicotine and other chemicals found in tobacco damage every cell in your body   Even if you are a light smoker, you have an increased risk for cancer, heart disease, and lung disease  If you are pregnant or have diabetes, smoking increases your risk for complications  Benefits to your health if you stop smoking:   · You decrease respiratory symptoms such as coughing, wheezing, and shortness of breath  · You reduce your risk for cancers of the lung, mouth, throat, kidney, bladder, pancreas, stomach, and cervix  If you already have cancer, you increase the benefits of chemotherapy  You also reduce your risk for cancer returning or a second cancer from developing  · You reduce your risk for heart disease, blood clots, heart attack, and stroke  · You reduce your risk for lung infections, and diseases such as pneumonia, asthma, chronic bronchitis, and emphysema  · Your circulation improves  More oxygen can be delivered to your body  If you have diabetes, you lower your risk for complications, such as kidney, artery, and eye diseases  You also lower your risk for nerve damage  Nerve damage can lead to amputations, poor vision, and blindness  · You improve your body's ability to heal and to fight infections  For more information and support to stop smoking:   · [employer]  Phone: 3- 009 - 458-5010  Web Address: www [employer]  Weight Management   Why it is important to manage your weight:  Being overweight increases your risk of health conditions such as heart disease, high blood pressure, type 2 diabetes, and certain types of cancer  It can also increase your risk for osteoarthritis, sleep apnea, and other respiratory problems  Aim for a slow, steady weight loss  Even a small amount of weight loss can lower your risk of health problems  How to lose weight safely:  A safe and healthy way to lose weight is to eat fewer calories and get regular exercise  You can lose up about 1 pound a week by decreasing the number of calories you eat by 500 calories each day     Healthy meal plan for weight management:  A healthy meal plan includes a variety of foods, contains fewer calories, and helps you stay healthy  A healthy meal plan includes the following:  · Eat whole-grain foods more often  A healthy meal plan should contain fiber  Fiber is the part of grains, fruits, and vegetables that is not broken down by your body  Whole-grain foods are healthy and provide extra fiber in your diet  Some examples of whole-grain foods are whole-wheat breads and pastas, oatmeal, brown rice, and bulgur  · Eat a variety of vegetables every day  Include dark, leafy greens such as spinach, kale, lori greens, and mustard greens  Eat yellow and orange vegetables such as carrots, sweet potatoes, and winter squash  · Eat a variety of fruits every day  Choose fresh or canned fruit (canned in its own juice or light syrup) instead of juice  Fruit juice has very little or no fiber  · Eat low-fat dairy foods  Drink fat-free (skim) milk or 1% milk  Eat fat-free yogurt and low-fat cottage cheese  Try low-fat cheeses such as mozzarella and other reduced-fat cheeses  · Choose meat and other protein foods that are low in fat  Choose beans or other legumes such as split peas or lentils  Choose fish, skinless poultry (chicken or turkey), or lean cuts of red meat (beef or pork)  Before you cook meat or poultry, cut off any visible fat  · Use less fat and oil  Try baking foods instead of frying them  Add less fat, such as margarine, sour cream, regular salad dressing and mayonnaise to foods  Eat fewer high-fat foods  Some examples of high-fat foods include french fries, doughnuts, ice cream, and cakes  · Eat fewer sweets  Limit foods and drinks that are high in sugar  This includes candy, cookies, regular soda, and sweetened drinks  Exercise:  Exercise at least 30 minutes per day on most days of the week  Some examples of exercise include walking, biking, dancing, and swimming   You can also fit in more physical activity by taking the stairs instead of the elevator or parking farther away from stores  Ask your healthcare provider about the best exercise plan for you  © Copyright [employer] 2018 Information is for End User's use only and may not be sold, redistributed or otherwise used for commercial purposes   All illustrations and images included in CareNotes® are the copyrighted property of [employer]  or [address] [place] [address]

## 2020-01-07 NOTE — PROGRESS NOTES
Assessment/Plan:  Problem List Items Addressed This Visit        Respiratory    Emphysema/COPD [facility])       Cardiovascular and Mediastinum    Benign essential hypertension - Primary     Pts symptoms are stable with current regime  No changes at present  I agree with recommendation to discontinue diltiazem in favor of metoprolol  Other    Depressive disorder      Other Visit Diagnoses     Medicare annual wellness visit, subsequent               Diagnoses and all orders for this visit:    Benign essential hypertension    Medicare annual wellness visit, subsequent    Depressive disorder    Centrilobular emphysema ([facility] [address] )        Benign essential hypertension  Pts symptoms are stable with current regime  No changes at present  I agree with recommendation to discontinue diltiazem in favor of metoprolol  Subjective:      Patient ID: Homero Win is a 81 y o  female  Pt presents for routine visit  She recently saw her cardiologist who recommended that she discontinue diltiazem in favor of metoprolol  She also notes that they are discontinuing eliquis in favor of baby aspirin  She is concerned about switching to the metoprolol due to her hx of lung disease  Will monitor once she makes the transition  She did note a recent depressive episode but feels she is rebounding now  She defers any medication at this time  The following portions of the patient's history were reviewed and updated as appropriate:   She has a past medical history of Allergic, Allergic rhinitis, Asthma, Atrial fibrillation ([address] ), Bronchitis, COPD (chronic obstructive pulmonary disease) ([address] ), Depression, Emphysema lung ([address] ), Fibroadenoma of breast, Hyperlipidemia, Hypertension, Memory difficulties (7/23/2019), Post-menopausal, Post-menopausal, Spondylisthesis, and Vitamin D deficiency  ,  does not have any pertinent problems on file  ,   has a past surgical history that includes Bunionectomy (Bilateral);  Tubal ligation; Tonsillectomy; Mole removal; Cataract extraction, bilateral; ADENOIDECTOMY; and Breast cyst excision (Bilateral)  ,  family history includes Alcohol abuse in her mother; Celiac disease in her daughter; Diabetes in her father; Diverticulitis in her sister; Eczema in her daughter; Fibromyalgia in her daughter; Heart failure in her brother; Hypertension in her father and mother; No Known Problems in her daughter, maternal aunt, and sister; Prostate cancer in her father; Vitamin D deficiency in her sister  ,   reports that she has been smoking cigarettes  She has a 12 50 pack-year smoking history  She has never used smokeless tobacco  She reports that she drinks alcohol  She reports that she does not use drugs  ,  is allergic to ace inhibitors; bupropion; citalopram; and adhesive [medical tape]     Current Outpatient Medications   Medication Sig Dispense Refill    albuterol (VENTOLIN HFA) 90 mcg/act inhaler Inhale 2 puffs every 4 (four) hours as needed for shortness of breath 3 Inhaler 3    ascorbic acid (VITAMIN C) 500 mg tablet Take 500 mg by mouth daily      aspirin 81 mg chewable tablet Chew 1 tablet (81 mg total) daily 180 tablet 5    atorvastatin (LIPITOR) 40 mg tablet Take 1 tablet (40 mg total) by mouth daily 90 tablet 1    cetirizine (ZyrTEC) 10 mg tablet Take 10 mg by mouth daily      Cholecalciferol (VITAMIN D3) 5000 units CAPS Take 1 capsule by mouth daily      ipratropium-albuterol (DUO-NEB) 0 5-2 5 mg/3 mL nebulizer solution Take 1 vial (3 mL total) by nebulization 4 (four) times a day as needed for wheezing or shortness of breath 360 vial 3    L-Lysine 500 MG CAPS Take 1 capsule by mouth daily      metoprolol tartrate (LOPRESSOR) 50 mg tablet Take 1 tablet (50 mg total) by mouth every 12 (twelve) hours 180 tablet 5    MULTIPLE VITAMINS-CALCIUM PO Take by mouth daily      Probiotic Product (PROBIOTIC-10 PO) Take 1 capsule by mouth daily      umeclidinium-vilanterol (ANORO ELLIPTA) 62 5-25 MCG/INH inhaler Inhale 1 puff daily 6 Inhaler 3     No current facility-administered medications for this visit  Review of Systems   Constitutional: Negative for chills and fever  HENT: Negative for congestion, ear pain, hearing loss, postnasal drip, rhinorrhea, sinus pressure, sinus pain, sore throat and trouble swallowing  Eyes: Negative for pain and visual disturbance  Respiratory: Negative for cough, chest tightness, shortness of breath and wheezing  Cardiovascular: Negative  Negative for chest pain, palpitations and leg swelling  Gastrointestinal: Negative for abdominal pain, blood in stool, constipation, diarrhea, nausea and vomiting  Endocrine: Negative for cold intolerance, heat intolerance, polydipsia, polyphagia and polyuria  Genitourinary: Negative for difficulty urinating, dysuria, flank pain and urgency  Musculoskeletal: Negative for arthralgias, back pain, gait problem and myalgias  Skin: Negative for rash  Allergic/Immunologic: Negative  Neurological: Negative for dizziness, weakness, light-headedness and headaches  Hematological: Negative  Psychiatric/Behavioral: Negative for behavioral problems, dysphoric mood and sleep disturbance  The patient is not nervous/anxious  PHQ-9 Depression Screening    PHQ-9:    Frequency of the following problems over the past two weeks:       Little interest or pleasure in doing things:  1 - several days  Feeling down, depressed, or hopeless:  1 - several days  Trouble falling or staying asleep, or sleeping too much:  1 - several days  Feeling tired or having little energy:  1 - several days  Poor appetite or overeatin - several days  Feeling bad about yourself - or that you are a failure or have let yourself or your family down:  0 - not at all  Trouble concentrating on things, such as reading the newspaper or watching television:  1 - several days  Moving or speaking so slowly that other people could have noticed   Or the opposite - being so fidgety or restless that you have been moving around a lot more than usual:  0 - not at all  Thoughts that you would be better off dead, or of hurting yourself in some way:  0 - not at all  PHQ-2 Score:  2  PHQ-9 Score:  6          Objective:  Vitals:    01/07/20 1053   BP: 148/60   BP Location: Right arm   Patient Position: Sitting   Cuff Size: Adult   Pulse: (!) 112   Resp: 18   Temp: 98 4 °F (36 9 °C)   TempSrc: Tympanic   SpO2: 93%   Weight: 88 7 kg (195 lb 9 6 oz)   Height: 5' 7" (1 702 m)     Body mass index is 30 64 kg/m²  Physical Exam   Constitutional: She is oriented to person, place, and time  She appears well-developed and well-nourished  No distress  HENT:   Head: Normocephalic and atraumatic  Right Ear: External ear normal    Left Ear: External ear normal    Nose: Nose normal    Mouth/Throat: Oropharynx is clear and moist  No oropharyngeal exudate  Eyes: Pupils are equal, round, and reactive to light  Conjunctivae and EOM are normal  Right eye exhibits no discharge  Left eye exhibits no discharge  No scleral icterus  Neck: Normal range of motion  Neck supple  No thyromegaly present  Cardiovascular: Normal rate, regular rhythm and normal heart sounds  Exam reveals no gallop and no friction rub  No murmur heard  Pulmonary/Chest: Effort normal  No respiratory distress  She has decreased breath sounds  She has no wheezes  She has no rales  Abdominal: Soft  Bowel sounds are normal  She exhibits no distension  There is no tenderness  Musculoskeletal: Normal range of motion  She exhibits no edema, tenderness or deformity  Neurological: She is alert and oriented to person, place, and time  No cranial nerve deficit  Skin: Skin is warm and dry  She is not diaphoretic  Psychiatric: She has a normal mood and affect   Her behavior is normal  Judgment and thought content normal

## 2020-01-07 NOTE — PROGRESS NOTES
Assessment and Plan:     Problem List Items Addressed This Visit     None        BMI Counseling: Body mass index is 30 64 kg/m²  The BMI is above normal  Nutrition recommendations include decreasing portion sizes, consuming healthier snacks and limiting drinks that contain sugar  Preventive health issues were discussed with patient, and age appropriate screening tests were ordered as noted in patient's After Visit Summary  Personalized health advice and appropriate referrals for health education or preventive services given if needed, as noted in patient's After Visit Summary       History of Present Illness:     Patient presents for Medicare Annual Wellness visit    Patient Care Team:  Rich Oliva PA-C as PCP - General (Internal Medicine)  Willia Singh, MD Kristy Seip, MD Johnnie Irons, PA-C Alyse Medina, DO     Problem List:     Patient Active Problem List   Diagnosis    Allergic rhinitis    Anxiety    Asthma    [facility]COPD ([facility] [address] )    Cigarette nicotine dependence without complication    Depressive disorder    Benign essential hypertension    Hyperlipidemia    GABI (obstructive sleep apnea)    Vitamin D deficiency    Paroxysmal atrial fibrillation ([address] )    Need for vaccination with 13-polyvalent pneumococcal conjugate vaccine    Constipation    Asthmatic bronchitis with exacerbation    Incidental pulmonary nodule, > 3mm and < 8mm    Memory difficulties      Past Medical and Surgical History:     Past Medical History:   Diagnosis Date    Allergic     Allergic rhinitis     [id number]  RESOLVED    Asthma     Atrial fibrillation ([address] )     Bronchitis     COPD (chronic obstructive pulmonary disease) ([address] )     home O2 at night at home 2L    Depression     Emphysema lung ([address] )     Fibroadenoma of breast     Hyperlipidemia     Hypertension     Memory difficulties 7/23/2019    Post-menopausal     Post-menopausal     Spondylisthesis     Vitamin D deficiency      Past Surgical History:   Procedure Laterality Date    ADENOIDECTOMY      BREAST CYST EXCISION Bilateral     benign-, ,     BUNIONECTOMY Bilateral     CATARACT EXTRACTION, BILATERAL      MOLE REMOVAL      lip    TONSILLECTOMY      TUBAL LIGATION        Family History:     Family History   Problem Relation Age of Onset    Hypertension Mother     Alcohol abuse Mother     Hypertension Father     Diabetes Father     Prostate cancer Father     Vitamin D deficiency Sister     Diverticulitis Sister     Heart failure Brother     Celiac disease Daughter     Eczema Daughter     Fibromyalgia Daughter     No Known Problems Daughter     No Known Problems Sister     No Known Problems Maternal Aunt     Breast cancer Neg Hx       Social History:     Social History     Socioeconomic History    Marital status:      Spouse name: None    Number of children: None    Years of education: None    Highest education level: None   Occupational History    None   Social Needs    Financial resource strain: Not very hard    Food insecurity:     Worry: Never true     Inability: Never true    Transportation needs:     Medical: No     Non-medical: No   Tobacco Use    Smoking status: Current Every Day Smoker     Packs/day: 0 25     Years: 50 00     Pack years: 12 50     Types: Cigarettes     Last attempt to quit: 2018     Years since quittin 2    Smokeless tobacco: Never Used   Substance and Sexual Activity    Alcohol use: Yes     Frequency: 2-4 times a month     Drinks per session: 1 or 2     Binge frequency: Never     Comment: rarely     Drug use: No    Sexual activity: Not Currently     Birth control/protection: Post-menopausal     Comment: sperated   Lifestyle    Physical activity:     Days per week: 0 days     Minutes per session: 0 min    Stress:  Only a little   Relationships    Social connections:     Talks on phone: More than three times a week     Gets together: More than three times a week     Attends Faith service: None     Active member of club or organization: None     Attends meetings of clubs or organizations: None     Relationship status: None    Intimate partner violence:     Fear of current or ex partner: No     Emotionally abused: No     Physically abused: No     Forced sexual activity: No   Other Topics Concern    None   Social History Narrative    -    Retired    Lives with significant other       Medications and Allergies:     Current Outpatient Medications   Medication Sig Dispense Refill    albuterol (VENTOLIN HFA) 90 mcg/act inhaler Inhale 2 puffs every 4 (four) hours as needed for shortness of breath 3 Inhaler 3    ascorbic acid (VITAMIN C) 500 mg tablet Take 500 mg by mouth daily      aspirin 81 mg chewable tablet Chew 1 tablet (81 mg total) daily 180 tablet 5    atorvastatin (LIPITOR) 40 mg tablet Take 1 tablet (40 mg total) by mouth daily 90 tablet 1    cetirizine (ZyrTEC) 10 mg tablet Take 10 mg by mouth daily      Cholecalciferol (VITAMIN D3) 5000 units CAPS Take 1 capsule by mouth daily      ipratropium-albuterol (DUO-NEB) 0 5-2 5 mg/3 mL nebulizer solution Take 1 vial (3 mL total) by nebulization 4 (four) times a day as needed for wheezing or shortness of breath 360 vial 3    L-Lysine 500 MG CAPS Take 1 capsule by mouth daily      metoprolol tartrate (LOPRESSOR) 50 mg tablet Take 1 tablet (50 mg total) by mouth every 12 (twelve) hours 180 tablet 5    MULTIPLE VITAMINS-CALCIUM PO Take by mouth daily      Probiotic Product (PROBIOTIC-10 PO) Take 1 capsule by mouth daily      umeclidinium-vilanterol (ANORO ELLIPTA) 62 5-25 MCG/INH inhaler Inhale 1 puff daily 6 Inhaler 3     No current facility-administered medications for this visit  Allergies   Allergen Reactions    Ace Inhibitors Cough     Action Taken: stopped med and changed to ARB; Category:  Adverse Reaction;     Bupropion Itching    Citalopram Diarrhea and Nausea Only    Patient is currently taking over-the-counter supplements  OTC medications include: see medication list  Patient is able to manage medications  Activities of Daily Living (ADLs)/Instrumental Activities of Daily Living (IADLs):   Walk and transfer into and out of bed and chair?: Yes  Dress and groom yourself?: Yes    Bathe or shower yourself?: Yes    Feed yourself?  Yes  Do your laundry/housekeeping?: Yes  Manage your money, pay your bills and track your expenses?: Yes  Make your own meals?: Yes    Do your own shopping?: Yes    Previous Hospitalizations:   Any hospitalizations or ED visits within the last 12 months?: No      Advance Care Planning:   Living will: No    Durable POA for healthcare: No    Advanced directive: No      Cognitive Screening:   Provider or family/friend/caregiver concerned regarding cognition?: No    PREVENTIVE SCREENINGS      Cardiovascular Screening:    General: Screening Not Indicated and History Lipid Disorder      Colorectal Cancer Screening:     General: Screening Current      Breast Cancer Screening:     General: Screening Current      Cervical Cancer Screening:    General: Screening Not Indicated      Osteoporosis Screening:    General: Screening Current      Abdominal Aortic Aneurysm (AAA) Screening:        General: Screening Not Indicated      Lung Cancer Screening:     General: Screening Current      Hepatitis C Screening:    General: Screening Current      Jonas Elaine PA-C

## 2020-01-07 NOTE — ASSESSMENT & PLAN NOTE
Pts symptoms are stable with current regime  No changes at present  I agree with recommendation to discontinue diltiazem in favor of metoprolol

## 2020-01-09 ENCOUNTER — TELEPHONE (OUTPATIENT)
Dept: INTERNAL MEDICINE CLINIC | Facility: CLINIC | Age: 70
End: 2020-01-09

## 2020-01-09 NOTE — TELEPHONE ENCOUNTER
Pt called because she had a problem with the mail order    She is asking for us to send a rx for atorvastatin to [facility] in [facility]  She wants #90 (3 mth supply please)    She also wants the enoroalipta inhaler #3 (90 day supply to [facility]

## 2020-01-11 DIAGNOSIS — R06.02 SOB (SHORTNESS OF BREATH): ICD-10-CM

## 2020-01-11 DIAGNOSIS — J44.9 CHRONIC OBSTRUCTIVE PULMONARY DISEASE, UNSPECIFIED COPD TYPE (HCC): ICD-10-CM

## 2020-01-11 DIAGNOSIS — E78.5 HYPERLIPIDEMIA, UNSPECIFIED HYPERLIPIDEMIA TYPE: ICD-10-CM

## 2020-01-11 RX ORDER — ATORVASTATIN CALCIUM 40 MG/1
40 TABLET, FILM COATED ORAL DAILY
Qty: 90 TABLET | Refills: 1 | Status: SHIPPED | OUTPATIENT
Start: 2020-01-11 | End: 2020-02-25 | Stop reason: SDUPTHER

## 2020-01-27 DIAGNOSIS — J44.9 CHRONIC OBSTRUCTIVE PULMONARY DISEASE, UNSPECIFIED COPD TYPE (HCC): ICD-10-CM

## 2020-01-28 DIAGNOSIS — J44.9 CHRONIC OBSTRUCTIVE PULMONARY DISEASE, UNSPECIFIED COPD TYPE (HCC): ICD-10-CM

## 2020-01-28 RX ORDER — UMECLIDINIUM BROMIDE AND VILANTEROL TRIFENATATE 62.5; 25 UG/1; UG/1
POWDER RESPIRATORY (INHALATION)
Qty: 180 EACH | Refills: 0 | Status: SHIPPED | OUTPATIENT
Start: 2020-01-28 | End: 2020-02-18

## 2020-02-12 ENCOUNTER — OFFICE VISIT (OUTPATIENT)
Dept: PULMONOLOGY | Facility: CLINIC | Age: 70
End: 2020-02-12
Payer: COMMERCIAL

## 2020-02-12 VITALS
SYSTOLIC BLOOD PRESSURE: 142 MMHG | HEIGHT: 67 IN | OXYGEN SATURATION: 92 % | BODY MASS INDEX: 31.08 KG/M2 | DIASTOLIC BLOOD PRESSURE: 62 MMHG | HEART RATE: 87 BPM | WEIGHT: 198 LBS

## 2020-02-12 DIAGNOSIS — F17.200 NICOTINE DEPENDENCE WITH CURRENT USE: ICD-10-CM

## 2020-02-12 DIAGNOSIS — Z83.2 FAMILY HISTORY OF AUTOIMMUNE DISORDER: ICD-10-CM

## 2020-02-12 DIAGNOSIS — J44.9 COPD, SEVERE (HCC): Primary | ICD-10-CM

## 2020-02-12 PROCEDURE — 1160F RVW MEDS BY RX/DR IN RCRD: CPT | Performed by: PHYSICIAN ASSISTANT

## 2020-02-12 PROCEDURE — 3077F SYST BP >= 140 MM HG: CPT | Performed by: PHYSICIAN ASSISTANT

## 2020-02-12 PROCEDURE — 4040F PNEUMOC VAC/ADMIN/RCVD: CPT | Performed by: PHYSICIAN ASSISTANT

## 2020-02-12 PROCEDURE — 4004F PT TOBACCO SCREEN RCVD TLK: CPT | Performed by: PHYSICIAN ASSISTANT

## 2020-02-12 PROCEDURE — 3078F DIAST BP <80 MM HG: CPT | Performed by: PHYSICIAN ASSISTANT

## 2020-02-12 PROCEDURE — 99214 OFFICE O/P EST MOD 30 MIN: CPT | Performed by: PHYSICIAN ASSISTANT

## 2020-02-12 PROCEDURE — 3008F BODY MASS INDEX DOCD: CPT | Performed by: PHYSICIAN ASSISTANT

## 2020-02-12 RX ORDER — POLYETHYLENE GLYCOL 3350 17 G
4 POWDER IN PACKET (EA) ORAL AS NEEDED
Qty: 100 EACH | Refills: 0 | Status: SHIPPED | OUTPATIENT
Start: 2020-02-12 | End: 2020-09-22

## 2020-02-12 NOTE — ASSESSMENT & PLAN NOTE
Unfortunately, patient has resume smoking  We spent a significant amount of time discussing smoking cessation today in the office  We will start with nicotine replacement lozenges as nicotine replacement patches give her rash  The lozenges in the past were sufficient enough for her to quit for approximately 15 months  I also reviewed behavioral modifications that he had in her endeavor  We will review how she is doing at her next office visit  In the meantime she will have a low-dose CT chest lung cancer screening given her smoking history  This will be due in March of 2020

## 2020-02-12 NOTE — PROGRESS NOTES
Pulmonary Follow Up Note   Kathleen Old 81 y o  female MRN: [id number]  2/12/2020      Assessment:    COPD, severe ([address] )  Reviewed results of most recent PFTs which are consistent with severe obstructive airflow defect with FEV1 43% predicted  Patient also has increased TLC and RV indicative of hyperinflation and air trapping secondary to severe emphysema seen on most recent CT chest   Patient is stable on her current regimen and will continue Anoro Ellipta 1 puff daily and as needed albuterol HFA q 4 hours p r n  And DuoNeb 4 times daily p r n  Tati Arbour Appropriate refills sent to the pharmacy  Nicotine dependence with current use  Unfortunately, patient has resume smoking  We spent a significant amount of time discussing smoking cessation today in the office  We will start with nicotine replacement lozenges as nicotine replacement patches give her rash  The lozenges in the past were sufficient enough for her to quit for approximately 15 months  I also reviewed behavioral modifications that he had in her endeavor  We will review how she is doing at her next office visit  In the meantime she will have a low-dose CT chest lung cancer screening given her smoking history  This will be due in March of 2020  Family history of autoimmune disorder  Patient complains of dry mucosal membranes today expresses concern for autoimmune disease given her family history of them  At her request I will send basic workup including EDE, CRP, ESR to rule out Sjogren's  However, if abnormal I highly suggest patient follow up with PCP for further recommendations  For now I have recommended hard sour candies to activate salivary glands and/or biotene spray prn  Plan:    Diagnoses and all orders for this visit:    COPD, severe ([address] )    Nicotine dependence with current use  -     nicotine polacrilex (COMMIT) 4 MG lozenge;  Apply 1 lozenge (4 mg total) to the mouth or throat as needed for smoking cessation  -     CT lung screening program; Future    Family history of autoimmune disorder  -     Sedimentation rate, automated; Future  -     C-reactive protein; Future  -     EDE w/Reflex; Future        Return in about 3 months (around 5/12/2020)  History of Present Illness   HPI:  Denise Barrett is a 81 y o  female who presents to the office today for routine follow-up  Patient was last seen in our office in summer of 2019 by Dr Duke Bryan for her severe COPD, severe emphysema, and nicotine dependence  Patient also has past medical history positive for paroxysmal atrial fibrillation and hypertension for which she follows with Dr Ruben Oliver of Cardiology  She tells me today that she recently was instructed to discontinue use of Eliquis and start baby aspirin for anticoagulation and instead of taking Cardizem she was switched to metoprolol  Patient states that her respiratory symptoms have been stable for the most part  She tells me that she is in the process of putting her house on the market and moving soon  Her goal is to moved to the [facility]  She states that with the moving preparations she is doing a lot of extra strenuous activities including packing and carrying boxes  This has caused some dyspnea on exertion where some time she has to stop and put 2 L nasal cannula supplemental oxygen on to improve her dyspnea  Other pertinent symptoms include occasional cough that is sometimes productive of clear sputum and a rare wheeze that is using leak status survey did by strong aroma as or pollen/allergens  Patient denies chest pain, pleurisy, lower extremity edema, hemoptysis, fevers, chills, night sweats, headache, dizziness  Unfortunately over Adams time patient started smoking again  She states that she has a large stressors in her life that started then however she cannot reiterate them to me without becoming tearful and upset during our visit    She now states that she smokes 3 cigarettes a day since December  Prior to that she had been smoke free for 15 months but ultimately has a 50 pack year history  Patient quit smoking previously using nicotine replacement lozenges and has requested a prescription for these at this time  Patient's pulmonary regimen includes 2 L nasal cannula q h s  P r n , Anoro Ellipta 1 puff daily, Ventolin which she states she uses maybe once a week and duo neb very infrequently  Patient can't tell me the last time she took DuoNeb  Patient complains of dry mouth and dry eyes  She tells me that she this started well after startign Anoro  She tells me that her family is prone to autoimmune diseases stating that all of her daughters have come sort of autoimmune disease  Review of Systems   All other systems reviewed and are negative        Historical Information   Past Medical History:   Diagnosis Date    Allergic     Allergic rhinitis     [id number]  RESOLVED    Asthma     Atrial fibrillation (HCC)     Bronchitis     COPD (chronic obstructive pulmonary disease) (HCC)     home O2 at night at home 2L    Depression     Emphysema lung (HCC)     Fibroadenoma of breast     Hyperlipidemia     Hypertension     Memory difficulties 7/23/2019    Post-menopausal     Post-menopausal     Spondylisthesis     Vitamin D deficiency      Past Surgical History:   Procedure Laterality Date    ADENOIDECTOMY      BREAST CYST EXCISION Bilateral     benign-1987, 2002, 2007    BUNIONECTOMY Bilateral     CATARACT EXTRACTION, BILATERAL      MOLE REMOVAL      lip    TONSILLECTOMY      TUBAL LIGATION       Family History   Problem Relation Age of Onset    Hypertension Mother     Alcohol abuse Mother     Hypertension Father     Diabetes Father     Prostate cancer Father     Vitamin D deficiency Sister     Diverticulitis Sister     Heart failure Brother     Celiac disease Daughter     Eczema Daughter     Fibromyalgia Daughter     No Known Problems Daughter     No Known Problems Sister     No Known Problems Maternal Aunt     Breast cancer Neg Hx        Social History     Tobacco Use   Smoking Status Current Every Day Smoker    Packs/day: 0 25    Years: 50 00    Pack years: 12 50    Types: Cigarettes    Last attempt to quit: 2018    Years since quittin 3   Smokeless Tobacco Never Used         Meds/Allergies     Current Outpatient Medications:     albuterol (VENTOLIN HFA) 90 mcg/act inhaler, Inhale 2 puffs every 4 (four) hours as needed for shortness of breath, Disp: 3 Inhaler, Rfl: 3    ANORO ELLIPTA 62 5-25 MCG/INH inhaler, INHALE ONE PUFF BY MOUTH EVERY DAY, Disp: 180 each, Rfl: 0    ascorbic acid (VITAMIN C) 500 mg tablet, Take 500 mg by mouth daily, Disp: , Rfl:     atorvastatin (LIPITOR) 40 mg tablet, Take 1 tablet (40 mg total) by mouth daily, Disp: 90 tablet, Rfl: 1    cetirizine (ZyrTEC) 10 mg tablet, Take 10 mg by mouth daily, Disp: , Rfl:     Cholecalciferol (VITAMIN D3) 5000 units CAPS, Take 1 capsule by mouth daily, Disp: , Rfl:     ipratropium-albuterol (DUO-NEB) 0 5-2 5 mg/3 mL nebulizer solution, Take 1 vial (3 mL total) by nebulization 4 (four) times a day as needed for wheezing or shortness of breath, Disp: 360 vial, Rfl: 3    L-Lysine 500 MG CAPS, Take 1 capsule by mouth daily, Disp: , Rfl:     metoprolol tartrate (LOPRESSOR) 50 mg tablet, Take 1 tablet (50 mg total) by mouth every 12 (twelve) hours, Disp: 180 tablet, Rfl: 5    MULTIPLE VITAMINS-CALCIUM PO, Take by mouth daily, Disp: , Rfl:     Probiotic Product (PROBIOTIC-10 PO), Take 1 capsule by mouth daily, Disp: , Rfl:     nicotine polacrilex (COMMIT) 4 MG lozenge, Apply 1 lozenge (4 mg total) to the mouth or throat as needed for smoking cessation, Disp: 100 each, Rfl: 0  Allergies   Allergen Reactions    Ace Inhibitors Cough     Action Taken: stopped med and changed to ARB; Category:  Adverse Reaction;     Bupropion Itching    Citalopram Diarrhea and Nausea Only    Adhesive [Medical Tape] Rash     Patch        Vitals: Blood pressure 142/62, pulse 87, height 5' 7" (1 702 m), weight 89 8 kg (198 lb), SpO2 92 %, not currently breastfeeding  Body mass index is 31 01 kg/m²  Oxygen Therapy  SpO2: 92 %    Physical Exam  Physical Exam   Constitutional: She is oriented to person, place, and time  She appears well-developed and well-nourished  No distress  HENT:   Head: Normocephalic and atraumatic  Right Ear: External ear normal    Left Ear: External ear normal    Nose: Nose normal    Mouth/Throat: Oropharynx is clear and moist  No oropharyngeal exudate  Eyes: Pupils are equal, round, and reactive to light  EOM are normal  Right eye exhibits no discharge  Left eye exhibits no discharge  Neck: Normal range of motion  Neck supple  No tracheal deviation present  Cardiovascular: Normal rate, regular rhythm, normal heart sounds and intact distal pulses  No murmur heard  Pulmonary/Chest: Effort normal  No respiratory distress  She has decreased breath sounds in the right upper field and the left upper field  She has no wheezes  Abdominal: Soft  Bowel sounds are normal  There is no tenderness  There is no guarding  Musculoskeletal: Normal range of motion  She exhibits no edema, tenderness or deformity  Neurological: She is alert and oriented to person, place, and time  No cranial nerve deficit  Skin: Skin is warm and dry  She is not diaphoretic  No erythema  No pallor  Psychiatric: She has a normal mood and affect  Her behavior is normal  Judgment and thought content normal    Vitals reviewed  Labs: I have personally reviewed pertinent lab results  , ABG: No results found for: PHART, EQA1OEZ, PO2ART, KDM7TAR, O7DKJCDT, BEART, SOURCE, BNP: No results found for: BNP, CBC: No results found for: WBC, HGB, HCT, MCV, PLT, ADJUSTEDWBC, MCH, MCHC, RDW, MPV, NRBC, CMP: No results found for: SODIUM, K, CL, CO2, ANIONGAP, BUN, CREATININE, GLUCOSE, CALCIUM, AST, ALT, ALKPHOS, PROT, BILITOT, EGFR, PT/INR: No results found for: PT, INR, Troponin: No results found for: TROPONINI  Lab Results   Component Value Date    WBC 6 90 08/01/2018    HGB 13 1 08/01/2018    HCT 37 2 08/01/2018    MCV 89 08/01/2018     08/01/2018     Lab Results   Component Value Date    GLUCOSE 101 06/05/2015    CALCIUM 9 0 08/01/2018     06/05/2015    K 4 0 08/01/2018    CO2 26 08/01/2018     (H) 08/01/2018    BUN 13 08/01/2018    CREATININE 0 91 08/01/2018     No results found for: IGE  Lab Results   Component Value Date    ALT 11 08/01/2018    AST 14 08/01/2018    ALKPHOS 65 08/01/2018    BILITOT 0 5 06/04/2015       Imaging and other studies: I have personally reviewed pertinent reports  and I have personally reviewed pertinent films in PACS     CT chest 03/19/2019  3 mm subpleural nodule in the posterior lateral right upper lobe  Significant emphysematous changes upper lobes predominantly  Scattered linear atelectasis noted bibasilarly  Coronary artery calcifications noted  Pulmonary function testing:  Performed 09/14/2018  FEV1/FVC ratio 63 %   FEV1 43 % predicted  FVC 67 % predicted  FEV1 improved to 53% predicted after bronchodilator   % predicted   % predicted  DLCO corrected for hemoglobin 45 % predicted  Impression:  Severe obstructive airflow defect with decreased vital capacity and increased TLC and RV indicative of hyperinflation and air trapping  DLCO is severely decreased  Other Studies: I have personally reviewed pertinent reports  Echo 08/01/2018  Left ventricular size and systolic function normal   EF was 60%  No regional wall motion abnormality  Mild concentric hypertrophy  Grade 1 diastolic dysfunction  Right ventricular size and systolic function normal   No significant valvular dysfunction

## 2020-02-12 NOTE — ASSESSMENT & PLAN NOTE
Reviewed results of most recent PFTs which are consistent with severe obstructive airflow defect with FEV1 43% predicted  Patient also has increased TLC and RV indicative of hyperinflation and air trapping secondary to severe emphysema seen on most recent CT chest   Patient is stable on her current regimen and will continue Anoro Ellipta 1 puff daily and as needed albuterol HFA q 4 hours p r n  And DuoNeb 4 times daily p r n  Thelda Fare Appropriate refills sent to the pharmacy

## 2020-02-12 NOTE — ASSESSMENT & PLAN NOTE
Patient complains of dry mucosal membranes today expresses concern for autoimmune disease given her family history of them  At her request I will send basic workup including EDE, CRP, ESR to rule out Sjogren's  However, if abnormal I highly suggest patient follow up with PCP for further recommendations  For now I have recommended hard sour candies to activate salivary glands and/or biotene spray prn

## 2020-02-18 DIAGNOSIS — J44.9 CHRONIC OBSTRUCTIVE PULMONARY DISEASE, UNSPECIFIED COPD TYPE (HCC): ICD-10-CM

## 2020-02-18 RX ORDER — UMECLIDINIUM BROMIDE AND VILANTEROL TRIFENATATE 62.5; 25 UG/1; UG/1
POWDER RESPIRATORY (INHALATION)
Qty: 180 EACH | Refills: 0 | Status: SHIPPED | OUTPATIENT
Start: 2020-02-18 | End: 2020-05-11 | Stop reason: SDUPTHER

## 2020-02-25 DIAGNOSIS — E78.5 HYPERLIPIDEMIA, UNSPECIFIED HYPERLIPIDEMIA TYPE: ICD-10-CM

## 2020-02-26 ENCOUNTER — TELEPHONE (OUTPATIENT)
Dept: CARDIOLOGY CLINIC | Facility: CLINIC | Age: 70
End: 2020-02-26

## 2020-02-26 RX ORDER — ATORVASTATIN CALCIUM 40 MG/1
40 TABLET, FILM COATED ORAL DAILY
Qty: 90 TABLET | Refills: 1 | Status: SHIPPED | OUTPATIENT
Start: 2020-02-26 | End: 2020-09-11 | Stop reason: SDUPTHER

## 2020-02-26 NOTE — TELEPHONE ENCOUNTER
Patient called back and was told ok to cut metoprolol in Half  She will report back with any other symptoms

## 2020-02-26 NOTE — TELEPHONE ENCOUNTER
Corinne called to report that her heart rate was low, she is concerned how low it is getting  She said the lowest is 53  She said no dizziness  Only complaint is joint pain but didn't think it was from the meds  Her boyfriend also noticed that she has been snoring more lately  She does not have a sleep apnea machine  Had a sleep study at [facility] years ago but was told she does not  She knows metoprolol can cause breathing issues  She said she can cut the pill in half if needed  BP has been running well--  125/78 last BP she got

## 2020-02-28 ENCOUNTER — TELEPHONE (OUTPATIENT)
Dept: CARDIOLOGY CLINIC | Facility: CLINIC | Age: 70
End: 2020-02-28

## 2020-02-28 NOTE — TELEPHONE ENCOUNTER
Corinne called, she is concerned with being switched to aspirin because now she has pronounced veins and "lumpy" on the palms of her hands, mostly the right  She noted to me that she did struggle to open a balsamic bottle that her boyfriend had to help her with  She is wondered about a blood clot  I told her I may not have an answer for her before the end of the day and she said she may go to the urgent care tomorrow if it doesn't go away  Thoughts?

## 2020-03-16 ENCOUNTER — PATIENT OUTREACH (OUTPATIENT)
Dept: INTERNAL MEDICINE CLINIC | Facility: CLINIC | Age: 70
End: 2020-03-16

## 2020-03-16 NOTE — PROGRESS NOTES
Outpatient Care Management Note:  Received call from [address] [facility] [address] with questions regarding COVID-19  She is concerned about her health should she contract the disease  Discussed self quarantining, which she is doing  She is concerned about her SO other going out into the community and bringing the virus back  Discussed using sanitizing wipes on surfaces, good hand washing and overall good hygiene  Verbalized understanding  She is also concerned about her portable oxygen unit should she need to go out  Advised wiping it down thoroughly after use, she questioned whether or not the internal filter was adequate for the virus  Advised her that I did not know the answer to that but she could contact her supply company or the   She has reached out to [facility] but has not gotten an answer at this time  She also questioned whether or not there was currenlty a bed or equipment shortage  Advised that as of our conversation we had not been notified of any issues at any of our hospitals  Giovanna Nelson is also planning on cancelling her CT scan later this week  Advised her that it is a good idea as it is a routine test  Encouraged to call if she has any further questions

## 2020-03-18 ENCOUNTER — TELEPHONE (OUTPATIENT)
Dept: PULMONOLOGY | Facility: CLINIC | Age: 70
End: 2020-03-18

## 2020-03-18 ENCOUNTER — DOCUMENTATION (OUTPATIENT)
Dept: PULMONOLOGY | Facility: CLINIC | Age: 70
End: 2020-03-18

## 2020-03-18 NOTE — TELEPHONE ENCOUNTER
Returned call, pt questioned if she would ever be dx with Covid19 if she should continue duoneb and inhalers  Per [facility], yes

## 2020-05-11 ENCOUNTER — TELEMEDICINE (OUTPATIENT)
Dept: PULMONOLOGY | Facility: CLINIC | Age: 70
End: 2020-05-11
Payer: COMMERCIAL

## 2020-05-11 VITALS — WEIGHT: 198 LBS | HEIGHT: 67 IN | BODY MASS INDEX: 31.08 KG/M2

## 2020-05-11 DIAGNOSIS — J44.9 COPD, SEVERE (HCC): ICD-10-CM

## 2020-05-11 DIAGNOSIS — J44.9 CHRONIC OBSTRUCTIVE PULMONARY DISEASE, UNSPECIFIED COPD TYPE (HCC): ICD-10-CM

## 2020-05-11 DIAGNOSIS — J30.9 ALLERGIC RHINITIS, UNSPECIFIED SEASONALITY, UNSPECIFIED TRIGGER: Primary | ICD-10-CM

## 2020-05-11 PROCEDURE — 99443 PR PHYS/QHP TELEPHONE EVALUATION 21-30 MIN: CPT | Performed by: PHYSICIAN ASSISTANT

## 2020-05-11 RX ORDER — MOMETASONE FUROATE 50 UG/1
2 SPRAY, METERED NASAL DAILY
Qty: 1 ACT | Refills: 5 | Status: SHIPPED | OUTPATIENT
Start: 2020-05-11 | End: 2020-05-12 | Stop reason: SDUPTHER

## 2020-05-12 DIAGNOSIS — J30.9 ALLERGIC RHINITIS, UNSPECIFIED SEASONALITY, UNSPECIFIED TRIGGER: ICD-10-CM

## 2020-05-12 RX ORDER — MOMETASONE FUROATE 50 UG/1
2 SPRAY, METERED NASAL DAILY
Qty: 3 ACT | Refills: 3 | Status: SHIPPED | OUTPATIENT
Start: 2020-05-12 | End: 2021-04-08 | Stop reason: SDUPTHER

## 2020-05-14 ENCOUNTER — TELEMEDICINE (OUTPATIENT)
Dept: CARDIOLOGY CLINIC | Facility: CLINIC | Age: 70
End: 2020-05-14
Payer: COMMERCIAL

## 2020-05-14 VITALS
BODY MASS INDEX: 29.98 KG/M2 | WEIGHT: 191 LBS | HEIGHT: 67 IN | HEART RATE: 68 BPM | DIASTOLIC BLOOD PRESSURE: 75 MMHG | SYSTOLIC BLOOD PRESSURE: 122 MMHG

## 2020-05-14 DIAGNOSIS — I10 BENIGN ESSENTIAL HYPERTENSION: ICD-10-CM

## 2020-05-14 DIAGNOSIS — E78.2 MIXED HYPERLIPIDEMIA: ICD-10-CM

## 2020-05-14 DIAGNOSIS — I48.0 PAROXYSMAL ATRIAL FIBRILLATION (HCC): Primary | ICD-10-CM

## 2020-05-14 DIAGNOSIS — I48.91 ATRIAL FIBRILLATION WITH RAPID VENTRICULAR RESPONSE (HCC): ICD-10-CM

## 2020-05-14 PROCEDURE — 3008F BODY MASS INDEX DOCD: CPT | Performed by: PHYSICIAN ASSISTANT

## 2020-05-14 PROCEDURE — 99442 PR PHYS/QHP TELEPHONE EVALUATION 11-20 MIN: CPT | Performed by: INTERNAL MEDICINE

## 2020-05-14 RX ORDER — ASPIRIN 81 MG/1
81 TABLET, CHEWABLE ORAL DAILY
Start: 2020-05-14

## 2020-07-02 DIAGNOSIS — J41.8 MIXED SIMPLE AND MUCOPURULENT CHRONIC BRONCHITIS (HCC): ICD-10-CM

## 2020-07-02 RX ORDER — IPRATROPIUM BROMIDE AND ALBUTEROL SULFATE 2.5; .5 MG/3ML; MG/3ML
SOLUTION RESPIRATORY (INHALATION)
Qty: 120 VIAL | Refills: 3 | Status: SHIPPED | OUTPATIENT
Start: 2020-07-02 | End: 2022-02-11 | Stop reason: SDUPTHER

## 2020-07-06 DIAGNOSIS — R06.02 SOB (SHORTNESS OF BREATH): ICD-10-CM

## 2020-07-06 RX ORDER — ALBUTEROL SULFATE 90 UG/1
2 AEROSOL, METERED RESPIRATORY (INHALATION) EVERY 4 HOURS PRN
Qty: 3 INHALER | Refills: 3 | Status: SHIPPED | OUTPATIENT
Start: 2020-07-06 | End: 2021-06-14

## 2020-08-10 ENCOUNTER — TELEMEDICINE (OUTPATIENT)
Dept: PULMONOLOGY | Facility: CLINIC | Age: 70
End: 2020-08-10
Payer: COMMERCIAL

## 2020-08-10 DIAGNOSIS — J44.9 CHRONIC OBSTRUCTIVE PULMONARY DISEASE, UNSPECIFIED COPD TYPE (HCC): ICD-10-CM

## 2020-08-10 PROCEDURE — 99443 PR PHYS/QHP TELEPHONE EVALUATION 21-30 MIN: CPT | Performed by: INTERNAL MEDICINE

## 2020-08-10 RX ORDER — UMECLIDINIUM BROMIDE AND VILANTEROL TRIFENATATE 62.5; 25 UG/1; UG/1
1 POWDER RESPIRATORY (INHALATION) DAILY
Qty: 180 EACH | Refills: 3 | Status: SHIPPED | OUTPATIENT
Start: 2020-08-10 | End: 2021-07-02

## 2020-08-10 NOTE — PROGRESS NOTES
Virtual Brief Visit    Assessment/Plan:    Problem List Items Addressed This Visit     None      Visit Diagnoses     Chronic obstructive pulmonary disease, unspecified COPD type ([facility] [address] )        Relevant Medications    umeclidinium-vilanterol (Anoro Ellipta) 62 5-25 MCG/INH inhaler                Reason for visit is   Chief Complaint   Patient presents with    Virtual Brief Visit        Encounter provider Jacquelyn Barrios DO    Provider located at [address] [place] [id number] [phone]    Recent Visits  No visits were found meeting these conditions  Showing recent visits within past 7 days and meeting all other requirements     Today's Visits  Date Type Provider Dept   08/10/20 Telemedicine Jacquelyn Barrios DO [facility]   Showing today's visits and meeting all other requirements     Future Appointments  No visits were found meeting these conditions  Showing future appointments within next 150 days and meeting all other requirements        After connecting through telephone, the patient was identified by name and date of birth  Carmencita Vargas was informed that this is a telemedicine visit and that the visit is being conducted through telephone  My office door was closed  No one else was in the room  She acknowledged consent and understanding of privacy and security of the platform  The patient has agreed to participate and understands she can discontinue the visit at any time  Patient is aware this is a billable service  Subjective    Corinne A Katie Nielsen is a 81 y o  female with COPD  Venora Ohs is doing well with regards to her breathing  She is rarely using a rescue medication and seems to be doing well on Anoro  She is still smoking 3 to 5 cigarettes daily with adverse reaction to nicotine lozenge and patch         Past Medical History:   Diagnosis Date    Allergic     Allergic rhinitis     [id number]  RESOLVED    Asthma     Atrial fibrillation ([facility] [address] )     Bronchitis     COPD (chronic obstructive pulmonary disease) ([facility])     home O2 at night at home 2L    Depression     Emphysema lung ([facility])     Fibroadenoma of breast     Hyperlipidemia     Hypertension     Memory difficulties 7/23/2019    Post-menopausal     Post-menopausal     Spondylisthesis     Vitamin D deficiency        Past Surgical History:   Procedure Laterality Date    ADENOIDECTOMY      BREAST CYST EXCISION Bilateral     benign-1987, 2002, 2007    BUNIONECTOMY Bilateral     CATARACT EXTRACTION, BILATERAL      MOLE REMOVAL      lip    TONSILLECTOMY      TUBAL LIGATION         Current Outpatient Medications   Medication Sig Dispense Refill    albuterol (Ventolin HFA) 90 mcg/act inhaler Inhale 2 puffs every 4 (four) hours as needed for shortness of breath 3 Inhaler 3    ascorbic acid (VITAMIN C) 500 mg tablet Take 500 mg by mouth daily      aspirin 81 mg chewable tablet Chew 1 tablet (81 mg total) daily      atorvastatin (LIPITOR) 40 mg tablet Take 1 tablet (40 mg total) by mouth daily 90 tablet 1    cetirizine (ZyrTEC) 10 mg tablet Take 10 mg by mouth daily      Cholecalciferol (VITAMIN D3) 5000 units CAPS Take 1 capsule by mouth daily      ipratropium-albuterol (DUO-NEB) 0 5-2 5 mg/3 mL nebulizer solution TAKE 1 VIAL BY MOUTH BY NEBULIZATION FOUR TIMES A DAY AS NEEDED FOR WHEEZING OR SHORTNESS OF BREATH 120 vial 3    L-Lysine 500 MG CAPS Take 1 capsule by mouth daily      metoprolol tartrate (LOPRESSOR) 25 mg tablet Take 1 tablet (25 mg total) by mouth every 12 (twelve) hours 180 tablet 5    mometasone (NASONEX) 50 mcg/act nasal spray 2 sprays into each nostril daily 3 Act 3    MULTIPLE VITAMINS-CALCIUM PO Take by mouth daily      nicotine polacrilex (COMMIT) 4 MG lozenge Apply 1 lozenge (4 mg total) to the mouth or throat as needed for smoking cessation (Patient not taking: Reported on 5/14/2020) 100 each 0    Probiotic Product (PROBIOTIC-10 PO) Take 1 capsule by mouth daily      umeclidinium-vilanterol (Anoro Ellipta) 62 5-25 MCG/INH inhaler Inhale 1 puff daily 180 each 3     No current facility-administered medications for this visit  Allergies   Allergen Reactions    Ace Inhibitors Cough     Action Taken: stopped med and changed to ARB; Category: Adverse Reaction;     Bupropion Itching    Citalopram Diarrhea and Nausea Only    Adhesive [Medical Tape] Rash     Patch        Review of Systems   Constitutional: Negative  Negative for unexpected weight change  HENT: Negative  Negative for postnasal drip  Eyes: Negative  Respiratory: Negative  Negative for cough, shortness of breath and wheezing  Cardiovascular: Negative  Negative for chest pain and leg swelling  Gastrointestinal: Negative  Endocrine: Negative  Genitourinary: Negative  Musculoskeletal: Negative  Allergic/Immunologic: Negative  Neurological: Negative  Hematological: Negative  There were no vitals filed for this visit  I spent 25 minutes directly with the patient during this visit    Matt Mckeon acknowledges that she has consented to an online visit or consultation  She understands that the online visit is based solely on information provided by her, and that, in the absence of a face-to-face physical evaluation by the physician, the diagnosis she receives is both limited and provisional in terms of accuracy and completeness  This is not intended to replace a full medical face-to-face evaluation by the physician  Corinne A Longo understands and accepts these terms

## 2020-08-26 ENCOUNTER — TELEPHONE (OUTPATIENT)
Dept: PULMONOLOGY | Facility: CLINIC | Age: 70
End: 2020-08-26

## 2020-08-26 DIAGNOSIS — J44.9 CHRONIC OBSTRUCTIVE PULMONARY DISEASE, UNSPECIFIED COPD TYPE (HCC): Primary | ICD-10-CM

## 2020-08-26 NOTE — TELEPHONE ENCOUNTER
Pt left message on my machine stating she wants tanks now  I called her back to verify but had to lmom  I then called [facility] and was told pt already called there but they will need an order for tanks along with note stating pt could not tolerate POC  Plus if POC is returned, chances of getting it back if pt chooses are slim   Will wait for return call from pt

## 2020-08-27 NOTE — TELEPHONE ENCOUNTER
Spoke to pt regarding tanks vs poc  Pt aware that if she turns in poc she will not be able to get another one if she changes her mind  Pt understands and prefers small M6 tanks  Feels safer to use those instead of poc   Will forward new orders to [address]

## 2020-09-11 DIAGNOSIS — E78.5 HYPERLIPIDEMIA, UNSPECIFIED HYPERLIPIDEMIA TYPE: ICD-10-CM

## 2020-09-11 RX ORDER — ATORVASTATIN CALCIUM 40 MG/1
40 TABLET, FILM COATED ORAL DAILY
Qty: 90 TABLET | Refills: 1 | Status: SHIPPED | OUTPATIENT
Start: 2020-09-11 | End: 2021-01-25

## 2020-09-22 ENCOUNTER — OFFICE VISIT (OUTPATIENT)
Dept: INTERNAL MEDICINE CLINIC | Facility: CLINIC | Age: 70
End: 2020-09-22
Payer: COMMERCIAL

## 2020-09-22 VITALS
SYSTOLIC BLOOD PRESSURE: 146 MMHG | TEMPERATURE: 97.6 F | BODY MASS INDEX: 31.23 KG/M2 | RESPIRATION RATE: 18 BRPM | HEIGHT: 67 IN | OXYGEN SATURATION: 95 % | WEIGHT: 199 LBS | HEART RATE: 78 BPM | DIASTOLIC BLOOD PRESSURE: 80 MMHG

## 2020-09-22 DIAGNOSIS — I48.0 PAROXYSMAL ATRIAL FIBRILLATION (HCC): ICD-10-CM

## 2020-09-22 DIAGNOSIS — L98.9 SKIN LESION OF HAND: ICD-10-CM

## 2020-09-22 DIAGNOSIS — Z13.29 SCREENING FOR THYROID DISORDER: ICD-10-CM

## 2020-09-22 DIAGNOSIS — F17.200 NICOTINE DEPENDENCE WITH CURRENT USE: ICD-10-CM

## 2020-09-22 DIAGNOSIS — J44.9 COPD, SEVERE (HCC): ICD-10-CM

## 2020-09-22 DIAGNOSIS — E78.2 MIXED HYPERLIPIDEMIA: ICD-10-CM

## 2020-09-22 DIAGNOSIS — Z13.0 SCREENING FOR DEFICIENCY ANEMIA: ICD-10-CM

## 2020-09-22 DIAGNOSIS — E55.9 VITAMIN D DEFICIENCY: ICD-10-CM

## 2020-09-22 DIAGNOSIS — I10 BENIGN ESSENTIAL HYPERTENSION: Primary | ICD-10-CM

## 2020-09-22 DIAGNOSIS — Z12.39 SCREENING FOR BREAST CANCER: ICD-10-CM

## 2020-09-22 DIAGNOSIS — M75.51 BURSITIS OF RIGHT SHOULDER: ICD-10-CM

## 2020-09-22 PROCEDURE — 4004F PT TOBACCO SCREEN RCVD TLK: CPT | Performed by: PHYSICIAN ASSISTANT

## 2020-09-22 PROCEDURE — 1160F RVW MEDS BY RX/DR IN RCRD: CPT | Performed by: PHYSICIAN ASSISTANT

## 2020-09-22 PROCEDURE — 99214 OFFICE O/P EST MOD 30 MIN: CPT | Performed by: PHYSICIAN ASSISTANT

## 2020-09-22 PROCEDURE — 3079F DIAST BP 80-89 MM HG: CPT | Performed by: PHYSICIAN ASSISTANT

## 2020-09-22 RX ORDER — DICLOFENAC SODIUM 75 MG/1
75 TABLET, DELAYED RELEASE ORAL 2 TIMES DAILY
Qty: 60 TABLET | Refills: 2 | Status: SHIPPED | OUTPATIENT
Start: 2020-09-22 | End: 2020-09-28 | Stop reason: SDUPTHER

## 2020-09-22 NOTE — ASSESSMENT & PLAN NOTE
Start diclofenac prn  Directions for use and possible side effects discussed and patient verbalized understanding of these

## 2020-09-22 NOTE — ASSESSMENT & PLAN NOTE
Pts symptoms are stable with current regime  No changes at present  Systolic mildly increased but pt is nervous as this is her first time out of the house since the pandemic

## 2020-09-22 NOTE — PROGRESS NOTES
Assessment/Plan:  Problem List Items Addressed This Visit        Respiratory    COPD, severe ([address] )     Pt remains on continuous O2  Pts symptoms are stable with current regime  No changes at present  Cardiovascular and Mediastinum    Benign essential hypertension - Primary     Pts symptoms are stable with current regime  No changes at present  Systolic mildly increased but pt is nervous as this is her first time out of the house since the pandemic  Relevant Orders    Comprehensive metabolic panel    Paroxysmal atrial fibrillation (HCC)       Musculoskeletal and Integument    Bursitis of right shoulder     Start diclofenac prn  Directions for use and possible side effects discussed and patient verbalized understanding of these  Relevant Medications    diclofenac (VOLTAREN) 75 mg EC tablet       Other    Nicotine dependence with current use    Relevant Medications    nicotine (NICOTROL) 10 MG inhaler    Hyperlipidemia    Relevant Orders    Lipid panel    Vitamin D deficiency    Relevant Orders    Vitamin D 25 hydroxy      Other Visit Diagnoses     Screening for breast cancer        Relevant Orders    Mammo screening bilateral w 3d & cad    Screening for deficiency anemia        Relevant Orders    CBC and differential    Screening for thyroid disorder        Relevant Orders    TSH, 3rd generation with Free T4 reflex    Skin lesion of hand        Relevant Orders    Ambulatory referral to Dermatology           Diagnoses and all orders for this visit:    Benign essential hypertension  -     Comprehensive metabolic panel; Future    Screening for breast cancer  -     Mammo screening bilateral w 3d & cad; Future    Paroxysmal atrial fibrillation (HCC)    Mixed hyperlipidemia  -     Lipid panel; Future    Vitamin D deficiency  -     Vitamin D 25 hydroxy;  Future    Screening for deficiency anemia  -     CBC and differential; Future    Screening for thyroid disorder  -     TSH, 3rd generation with Free T4 reflex; Future    Skin lesion of hand  -     Ambulatory referral to Dermatology; Future    Bursitis of right shoulder  -     diclofenac (VOLTAREN) 75 mg EC tablet; Take 1 tablet (75 mg total) by mouth 2 (two) times a day    Nicotine dependence with current use  -     nicotine (NICOTROL) 10 MG inhaler; Inhale 1 puff as needed for smoking cessation    COPD, severe (HCC)        Bursitis of right shoulder  Start diclofenac prn  Directions for use and possible side effects discussed and patient verbalized understanding of these  Benign essential hypertension  Pts symptoms are stable with current regime  No changes at present  Systolic mildly increased but pt is nervous as this is her first time out of the house since the pandemic  COPD, severe ([address] )  Pt remains on continuous O2  Pts symptoms are stable with current regime  No changes at present  Subjective:      Patient ID: Carmencita aVrgas is a 78 y o  female  Pt presents for routine visit  She would like a referral to dermatology regarding 1 spot on the dorsal surface of each hand  They itch at times  They are red, slightly raised and full surface area is attached to the skin  At times they have a scaly appearance and sandpaper texture  Breathing has been stable  She desires trial of nicotrol  She was using nicorette gum but has a hard time tolerating the sorbitol  She notes some right shoulder pain after doing some crafting and painting  She is due for labs and mammogram        The following portions of the patient's history were reviewed and updated as appropriate:   She has a past medical history of Allergic, Allergic rhinitis, Asthma, Atrial fibrillation ([address] ), Bronchitis, COPD (chronic obstructive pulmonary disease) ([address] ), Depression, Emphysema lung ([address] ), Fibroadenoma of breast, Hyperlipidemia, Hypertension, Memory difficulties (7/23/2019), Post-menopausal, Post-menopausal, Spondylisthesis, and Vitamin D deficiency  ,  does not have any pertinent problems on file  ,   has a past surgical history that includes Bunionectomy (Bilateral); Tubal ligation; Tonsillectomy; Mole removal; Cataract extraction, bilateral; ADENOIDECTOMY; and Breast cyst excision (Bilateral)  ,  family history includes Alcohol abuse in her mother; Celiac disease in her daughter; Diabetes in her father; Diverticulitis in her sister; Eczema in her daughter; Fibromyalgia in her daughter; Heart failure in her brother; Hypertension in her father and mother; No Known Problems in her daughter, maternal aunt, and sister; Prostate cancer in her father; Vitamin D deficiency in her sister  ,   reports that she has been smoking cigarettes  She has a 12 50 pack-year smoking history  She has never used smokeless tobacco  She reports current alcohol use  She reports that she does not use drugs  ,  is allergic to ace inhibitors; bupropion; citalopram; and adhesive [medical tape]     Current Outpatient Medications   Medication Sig Dispense Refill    albuterol (Ventolin HFA) 90 mcg/act inhaler Inhale 2 puffs every 4 (four) hours as needed for shortness of breath 3 Inhaler 3    ascorbic acid (VITAMIN C) 500 mg tablet Take 500 mg by mouth daily      aspirin 81 mg chewable tablet Chew 1 tablet (81 mg total) daily      atorvastatin (LIPITOR) 40 mg tablet Take 1 tablet (40 mg total) by mouth daily 90 tablet 1    cetirizine (ZyrTEC) 10 mg tablet Take 10 mg by mouth daily      Cholecalciferol (VITAMIN D3) 5000 units CAPS Take 1 capsule by mouth daily      ipratropium-albuterol (DUO-NEB) 0 5-2 5 mg/3 mL nebulizer solution TAKE 1 VIAL BY MOUTH BY NEBULIZATION FOUR TIMES A DAY AS NEEDED FOR WHEEZING OR SHORTNESS OF BREATH 120 vial 3    L-Lysine 500 MG CAPS Take 1 capsule by mouth daily      metoprolol tartrate (LOPRESSOR) 25 mg tablet Take 1 tablet (25 mg total) by mouth every 12 (twelve) hours 180 tablet 5    mometasone (NASONEX) 50 mcg/act nasal spray 2 sprays into each nostril daily 3 Act 3    MULTIPLE VITAMINS-CALCIUM PO Take by mouth daily      Probiotic Product (PROBIOTIC-10 PO) Take 1 capsule by mouth daily      umeclidinium-vilanterol (Anoro Ellipta) 62 5-25 MCG/INH inhaler Inhale 1 puff daily 180 each 3    diclofenac (VOLTAREN) 75 mg EC tablet Take 1 tablet (75 mg total) by mouth 2 (two) times a day 60 tablet 2    nicotine (NICOTROL) 10 MG inhaler Inhale 1 puff as needed for smoking cessation 42 each 0     No current facility-administered medications for this visit  Review of Systems   Constitutional: Negative for chills and fever  HENT: Negative for congestion, ear pain, hearing loss, postnasal drip, rhinorrhea, sinus pressure, sinus pain, sore throat and trouble swallowing  Eyes: Negative for pain and visual disturbance  Respiratory: Negative for cough, chest tightness, shortness of breath and wheezing  Cardiovascular: Negative  Negative for chest pain, palpitations and leg swelling  Gastrointestinal: Negative for abdominal pain, blood in stool, constipation, diarrhea, nausea and vomiting  Endocrine: Negative for cold intolerance, heat intolerance, polydipsia, polyphagia and polyuria  Genitourinary: Negative for difficulty urinating, dysuria, flank pain and urgency  Musculoskeletal: Positive for arthralgias  Negative for back pain, gait problem and myalgias  Skin: Positive for rash  Allergic/Immunologic: Negative  Neurological: Negative for dizziness, weakness, light-headedness and headaches  Hematological: Negative  Psychiatric/Behavioral: Negative for behavioral problems, dysphoric mood and sleep disturbance  The patient is not nervous/anxious            PHQ-9 Depression Screening    PHQ-9:    Frequency of the following problems over the past two weeks:               Objective:  Vitals:    09/22/20 0830   BP: 146/80   Pulse: 78   Resp: 18   Temp: 97 6 °F (36 4 °C)   TempSrc: Tympanic   SpO2: 95%   Weight: 90 3 kg (199 lb)   Height: 5' 7" (1 702 m) Body mass index is 31 17 kg/m²  Physical Exam  Constitutional:       General: She is not in acute distress  Appearance: She is well-developed  She is not diaphoretic  HENT:      Head: Normocephalic and atraumatic  Right Ear: External ear normal       Left Ear: External ear normal       Nose: Nose normal       Mouth/Throat:      Pharynx: No oropharyngeal exudate  Eyes:      General: No scleral icterus  Right eye: No discharge  Left eye: No discharge  Conjunctiva/sclera: Conjunctivae normal       Pupils: Pupils are equal, round, and reactive to light  Neck:      Musculoskeletal: Normal range of motion and neck supple  Thyroid: No thyromegaly  Cardiovascular:      Rate and Rhythm: Normal rate and regular rhythm  Heart sounds: Normal heart sounds  No murmur  No friction rub  No gallop  Pulmonary:      Effort: Pulmonary effort is normal  No respiratory distress  Breath sounds: Normal breath sounds  No wheezing or rales  Abdominal:      General: Bowel sounds are normal  There is no distension  Palpations: Abdomen is soft  Tenderness: There is no abdominal tenderness  Musculoskeletal: Normal range of motion  General: No tenderness or deformity  Right shoulder: She exhibits pain  Skin:     General: Skin is warm and dry  Neurological:      Mental Status: She is alert and oriented to person, place, and time  Cranial Nerves: No cranial nerve deficit  Psychiatric:         Behavior: Behavior normal          Thought Content:  Thought content normal          Judgment: Judgment normal

## 2020-09-28 DIAGNOSIS — F17.200 NICOTINE DEPENDENCE WITH CURRENT USE: ICD-10-CM

## 2020-09-28 DIAGNOSIS — M75.51 BURSITIS OF RIGHT SHOULDER: ICD-10-CM

## 2020-09-28 RX ORDER — DICLOFENAC SODIUM 75 MG/1
75 TABLET, DELAYED RELEASE ORAL 2 TIMES DAILY
Qty: 180 TABLET | Refills: 1 | Status: SHIPPED | OUTPATIENT
Start: 2020-09-28 | End: 2022-02-07

## 2020-09-30 DIAGNOSIS — F17.200 NICOTINE DEPENDENCE WITH CURRENT USE: ICD-10-CM

## 2020-10-01 ENCOUNTER — TELEPHONE (OUTPATIENT)
Dept: INTERNAL MEDICINE CLINIC | Facility: CLINIC | Age: 70
End: 2020-10-01

## 2020-10-16 ENCOUNTER — IMMUNIZATIONS (OUTPATIENT)
Dept: INTERNAL MEDICINE CLINIC | Facility: CLINIC | Age: 70
End: 2020-10-16
Payer: COMMERCIAL

## 2020-10-16 DIAGNOSIS — Z23 ENCOUNTER FOR IMMUNIZATION: ICD-10-CM

## 2020-10-16 PROCEDURE — G0008 ADMIN INFLUENZA VIRUS VAC: HCPCS

## 2020-10-16 PROCEDURE — 90662 IIV NO PRSV INCREASED AG IM: CPT

## 2020-11-09 ENCOUNTER — OFFICE VISIT (OUTPATIENT)
Dept: INTERNAL MEDICINE CLINIC | Facility: CLINIC | Age: 70
End: 2020-11-09
Payer: COMMERCIAL

## 2020-11-09 VITALS
HEART RATE: 90 BPM | BODY MASS INDEX: 30.16 KG/M2 | TEMPERATURE: 97.8 F | RESPIRATION RATE: 18 BRPM | SYSTOLIC BLOOD PRESSURE: 138 MMHG | OXYGEN SATURATION: 98 % | HEIGHT: 68 IN | WEIGHT: 199 LBS | DIASTOLIC BLOOD PRESSURE: 70 MMHG

## 2020-11-09 DIAGNOSIS — Z00.00 MEDICARE ANNUAL WELLNESS VISIT, SUBSEQUENT: Primary | ICD-10-CM

## 2020-11-09 DIAGNOSIS — F41.9 ANXIETY: ICD-10-CM

## 2020-11-09 PROCEDURE — 1170F FXNL STATUS ASSESSED: CPT | Performed by: PHYSICIAN ASSISTANT

## 2020-11-09 PROCEDURE — 3725F SCREEN DEPRESSION PERFORMED: CPT | Performed by: PHYSICIAN ASSISTANT

## 2020-11-09 PROCEDURE — 3075F SYST BP GE 130 - 139MM HG: CPT | Performed by: PHYSICIAN ASSISTANT

## 2020-11-09 PROCEDURE — 1125F AMNT PAIN NOTED PAIN PRSNT: CPT | Performed by: PHYSICIAN ASSISTANT

## 2020-11-09 PROCEDURE — 1160F RVW MEDS BY RX/DR IN RCRD: CPT | Performed by: PHYSICIAN ASSISTANT

## 2020-11-09 PROCEDURE — G0439 PPPS, SUBSEQ VISIT: HCPCS | Performed by: PHYSICIAN ASSISTANT

## 2020-11-09 PROCEDURE — 1036F TOBACCO NON-USER: CPT | Performed by: PHYSICIAN ASSISTANT

## 2020-11-09 PROCEDURE — 3008F BODY MASS INDEX DOCD: CPT | Performed by: PHYSICIAN ASSISTANT

## 2020-11-09 PROCEDURE — 3078F DIAST BP <80 MM HG: CPT | Performed by: PHYSICIAN ASSISTANT

## 2020-11-09 RX ORDER — BUSPIRONE HYDROCHLORIDE 5 MG/1
5 TABLET ORAL 3 TIMES DAILY
Qty: 60 TABLET | Refills: 1 | Status: SHIPPED | OUTPATIENT
Start: 2020-11-09 | End: 2020-12-18

## 2020-12-18 DIAGNOSIS — F41.9 ANXIETY: ICD-10-CM

## 2020-12-18 RX ORDER — BUSPIRONE HYDROCHLORIDE 5 MG/1
TABLET ORAL
Qty: 60 TABLET | Refills: 1 | Status: SHIPPED | OUTPATIENT
Start: 2020-12-18 | End: 2022-02-07

## 2020-12-21 ENCOUNTER — TELEPHONE (OUTPATIENT)
Dept: INTERNAL MEDICINE CLINIC | Facility: CLINIC | Age: 70
End: 2020-12-21

## 2021-01-25 DIAGNOSIS — E78.5 HYPERLIPIDEMIA, UNSPECIFIED HYPERLIPIDEMIA TYPE: ICD-10-CM

## 2021-01-25 RX ORDER — ATORVASTATIN CALCIUM 40 MG/1
TABLET, FILM COATED ORAL
Qty: 90 TABLET | Refills: 1 | Status: SHIPPED | OUTPATIENT
Start: 2021-01-25 | End: 2021-03-08 | Stop reason: SDUPTHER

## 2021-02-20 DIAGNOSIS — F17.200 NICOTINE DEPENDENCE WITH CURRENT USE: ICD-10-CM

## 2021-02-20 RX ORDER — NICOTINE 10 MG
CARTRIDGE (EA) INHALATION
Qty: 504 EACH | Refills: 1 | Status: SHIPPED | OUTPATIENT
Start: 2021-02-20 | End: 2021-08-09

## 2021-03-04 DIAGNOSIS — E78.5 HYPERLIPIDEMIA, UNSPECIFIED HYPERLIPIDEMIA TYPE: ICD-10-CM

## 2021-03-04 NOTE — TELEPHONE ENCOUNTER
Pt needs rx for  atorvastatin (LIPITOR) 40 mg tablet ,90 day supply, pls send to [facility] mail order

## 2021-03-08 RX ORDER — ATORVASTATIN CALCIUM 40 MG/1
40 TABLET, FILM COATED ORAL DAILY
Qty: 90 TABLET | Refills: 1 | Status: SHIPPED | OUTPATIENT
Start: 2021-03-08 | End: 2021-08-16

## 2021-03-10 DIAGNOSIS — Z23 ENCOUNTER FOR IMMUNIZATION: ICD-10-CM

## 2021-03-12 DIAGNOSIS — I48.0 PAROXYSMAL ATRIAL FIBRILLATION (HCC): Primary | ICD-10-CM

## 2021-03-15 RX ORDER — METOPROLOL TARTRATE 50 MG/1
50 TABLET, FILM COATED ORAL EVERY 12 HOURS SCHEDULED
Qty: 180 TABLET | Refills: 3 | OUTPATIENT
Start: 2021-03-15

## 2021-03-15 NOTE — TELEPHONE ENCOUNTER
I called patient to see if we can send it to [facility] next door  Patient said she has enough medications due to mix up in mail order    they sent it to [facility] and patient has 3 months already  Patient stating she does not need it now  She said she is actually 6 months ahead with her pills due to the mix up

## 2021-03-17 ENCOUNTER — IMMUNIZATIONS (OUTPATIENT)
Dept: FAMILY MEDICINE CLINIC | Facility: HOSPITAL | Age: 71
End: 2021-03-17

## 2021-03-17 DIAGNOSIS — Z23 ENCOUNTER FOR IMMUNIZATION: Primary | ICD-10-CM

## 2021-03-17 PROCEDURE — 91300 SARS-COV-2 / COVID-19 MRNA VACCINE (PFIZER-BIONTECH) 30 MCG: CPT

## 2021-03-17 PROCEDURE — 0001A SARS-COV-2 / COVID-19 MRNA VACCINE (PFIZER-BIONTECH) 30 MCG: CPT

## 2021-04-07 ENCOUNTER — IMMUNIZATIONS (OUTPATIENT)
Dept: FAMILY MEDICINE CLINIC | Facility: HOSPITAL | Age: 71
End: 2021-04-07

## 2021-04-07 DIAGNOSIS — Z23 ENCOUNTER FOR IMMUNIZATION: Primary | ICD-10-CM

## 2021-04-07 PROCEDURE — 91300 SARS-COV-2 / COVID-19 MRNA VACCINE (PFIZER-BIONTECH) 30 MCG: CPT

## 2021-04-07 PROCEDURE — 0002A SARS-COV-2 / COVID-19 MRNA VACCINE (PFIZER-BIONTECH) 30 MCG: CPT

## 2021-04-08 DIAGNOSIS — J30.9 ALLERGIC RHINITIS, UNSPECIFIED SEASONALITY, UNSPECIFIED TRIGGER: ICD-10-CM

## 2021-04-08 RX ORDER — MOMETASONE FUROATE 50 UG/1
2 SPRAY, METERED NASAL DAILY
Qty: 3 ACT | Refills: 0 | Status: SHIPPED | OUTPATIENT
Start: 2021-04-08

## 2021-05-10 ENCOUNTER — OFFICE VISIT (OUTPATIENT)
Dept: INTERNAL MEDICINE CLINIC | Facility: CLINIC | Age: 71
End: 2021-05-10
Payer: COMMERCIAL

## 2021-05-10 ENCOUNTER — OFFICE VISIT (OUTPATIENT)
Dept: CARDIOLOGY CLINIC | Facility: CLINIC | Age: 71
End: 2021-05-10
Payer: COMMERCIAL

## 2021-05-10 VITALS
WEIGHT: 203 LBS | BODY MASS INDEX: 30.77 KG/M2 | RESPIRATION RATE: 18 BRPM | DIASTOLIC BLOOD PRESSURE: 78 MMHG | HEART RATE: 78 BPM | TEMPERATURE: 97.9 F | OXYGEN SATURATION: 99 % | HEIGHT: 68 IN | SYSTOLIC BLOOD PRESSURE: 118 MMHG

## 2021-05-10 VITALS
HEART RATE: 65 BPM | HEIGHT: 68 IN | BODY MASS INDEX: 30.77 KG/M2 | SYSTOLIC BLOOD PRESSURE: 120 MMHG | WEIGHT: 203 LBS | DIASTOLIC BLOOD PRESSURE: 70 MMHG

## 2021-05-10 DIAGNOSIS — F33.9 DEPRESSION, RECURRENT (HCC): ICD-10-CM

## 2021-05-10 DIAGNOSIS — E78.2 MIXED HYPERLIPIDEMIA: ICD-10-CM

## 2021-05-10 DIAGNOSIS — I48.0 PAROXYSMAL ATRIAL FIBRILLATION (HCC): Primary | ICD-10-CM

## 2021-05-10 DIAGNOSIS — I10 BENIGN ESSENTIAL HYPERTENSION: ICD-10-CM

## 2021-05-10 DIAGNOSIS — Z13.29 SCREENING FOR THYROID DISORDER: ICD-10-CM

## 2021-05-10 DIAGNOSIS — I48.0 PAROXYSMAL ATRIAL FIBRILLATION (HCC): ICD-10-CM

## 2021-05-10 DIAGNOSIS — E55.9 VITAMIN D DEFICIENCY: ICD-10-CM

## 2021-05-10 DIAGNOSIS — Z13.0 SCREENING FOR DEFICIENCY ANEMIA: ICD-10-CM

## 2021-05-10 DIAGNOSIS — B35.6 TINEA CRURIS: ICD-10-CM

## 2021-05-10 DIAGNOSIS — J43.9 PULMONARY EMPHYSEMA, UNSPECIFIED EMPHYSEMA TYPE (HCC): ICD-10-CM

## 2021-05-10 DIAGNOSIS — J44.9 COPD, SEVERE (HCC): Primary | ICD-10-CM

## 2021-05-10 PROCEDURE — 99214 OFFICE O/P EST MOD 30 MIN: CPT | Performed by: PHYSICIAN ASSISTANT

## 2021-05-10 PROCEDURE — 99213 OFFICE O/P EST LOW 20 MIN: CPT | Performed by: INTERNAL MEDICINE

## 2021-05-10 PROCEDURE — 93000 ELECTROCARDIOGRAM COMPLETE: CPT | Performed by: INTERNAL MEDICINE

## 2021-05-10 RX ORDER — METOPROLOL TARTRATE 50 MG/1
50 TABLET, FILM COATED ORAL 2 TIMES DAILY
Qty: 180 TABLET | Refills: 5
Start: 2021-05-10 | End: 2021-08-16

## 2021-05-10 RX ORDER — BETAMETHASONE DIPROPIONATE 0.5 MG/G
CREAM TOPICAL 2 TIMES DAILY
Qty: 30 G | Refills: 0 | Status: SHIPPED | OUTPATIENT
Start: 2021-05-10 | End: 2021-05-12

## 2021-05-10 NOTE — PROGRESS NOTES
Patient ID: Brandi Lozoya is a 78 y o  female  Plan:      Paroxysmal atrial fibrillation (HCC)  Prior epistaxis  CHADSVASC 2  On aspirin and metoprolol  Emphysema/COPD ([address] )  Remains O2 dependent  Follow up Plan/Other summary comments:  One year EKG and follow-up visit  HPI:  Patient is seen in follow-up regarding the above  Since her last visit she has felt reasonably well  She has not ventured out too much during the pandemic but is now vaccinated  She remains oxygen dependent  No recent change in exertional capacity  No more than brief spells of irregular heart beat  To reiterate, she has paroxysmal atrial fibrillation  Because of significant epistaxis on Eliquis, she has reverted to aspirin and really strongly prefers this  Results for orders placed or performed in visit on 05/10/21   POCT ECG    Impression    NSR  PAC  Otherwise WNL  Most recent or relevant cardiac/vascular testing:     Echocardiogram 08/01/2018: Mild LVH  Normal LV function  Past Surgical History:   Procedure Laterality Date    ADENOIDECTOMY      BREAST CYST EXCISION Bilateral     benign-1987, 2002, 2007    BUNIONECTOMY Bilateral     CATARACT EXTRACTION, BILATERAL      MOLE REMOVAL      lip    TONSILLECTOMY      TUBAL LIGATION       CMP:   Lab Results   Component Value Date     06/05/2015    K 4 0 08/01/2018    K 3 6 06/05/2015     (H) 08/01/2018     06/05/2015    CO2 26 08/01/2018    CO2 27 5 06/05/2015    BUN 13 08/01/2018    BUN 14 06/05/2015    CREATININE 0 91 08/01/2018    CREATININE 1 08 06/05/2015    GLUCOSE 101 06/05/2015    EGFR 66 08/01/2018       Lipid Profile:   Lab Results   Component Value Date    TRIG 150 07/20/2018    HDL 45 07/20/2018         Review of Systems   10  point ROS  was otherwise non pertinent or negative except as per HPI or as below  Gait:  Slow          Objective:     /70   Pulse 65   Ht 5' 7 5" (1 715 m)   Wt 92 1 kg (203 lb)   BMI 31 33 kg/m²     PHYSICAL EXAM:       General:  Normal appearance in no distress  Eyes:  Anicteric  Oral mucosa:  Moist   Neck:  No JVD  Carotid upstrokes are brisk without bruits  No masses  Chest:  Decreased breath sounds throughout  Cardiac:  Normal PMI  Normal S1 and S2  No murmur gallop or rub  Abdomen:  Soft and nontender  No palpable organomegaly or aortic enlargement  Extremities:  No peripheral edema  Musculoskeletal:  Symmetric  Vascular:     Popliteal pulses are intact bilaterally  Pedal pulses are intact  Neuro:  Grossly symmetric  Psych:  Alert and oriented x3            Current Outpatient Medications:     albuterol (Ventolin HFA) 90 mcg/act inhaler, Inhale 2 puffs every 4 (four) hours as needed for shortness of breath, Disp: 3 Inhaler, Rfl: 3    ascorbic acid (VITAMIN C) 500 mg tablet, Take 500 mg by mouth daily, Disp: , Rfl:     aspirin 81 mg chewable tablet, Chew 1 tablet (81 mg total) daily, Disp: , Rfl:     atorvastatin (LIPITOR) 40 mg tablet, Take 1 tablet (40 mg total) by mouth daily, Disp: 90 tablet, Rfl: 1    betamethasone dipropionate (DIPROSONE) 0 05 % cream, Apply topically 2 (two) times a day, Disp: 30 g, Rfl: 0    cetirizine (ZyrTEC) 10 mg tablet, Take 10 mg by mouth daily, Disp: , Rfl:     Cholecalciferol (VITAMIN D3) 5000 units CAPS, Take 1 capsule by mouth daily, Disp: , Rfl:     diclofenac (VOLTAREN) 75 mg EC tablet, Take 1 tablet (75 mg total) by mouth 2 (two) times a day, Disp: 180 tablet, Rfl: 1    ipratropium-albuterol (DUO-NEB) 0 5-2 5 mg/3 mL nebulizer solution, TAKE 1 VIAL BY MOUTH BY NEBULIZATION FOUR TIMES A DAY AS NEEDED FOR WHEEZING OR SHORTNESS OF BREATH, Disp: 120 vial, Rfl: 3    L-Lysine 500 MG CAPS, Take 1 capsule by mouth daily, Disp: , Rfl:     mometasone (NASONEX) 50 mcg/act nasal spray, 2 sprays into each nostril daily, Disp: 3 Act, Rfl: 0    MULTIPLE VITAMINS-CALCIUM PO, Take by mouth daily, Disp: , Rfl:     Nicotrol 10 MG inhaler, INHALE 1 PUFF AS NEEDED FOR SMOKING CESSATION, Disp: 504 each, Rfl: 1    Probiotic Product (PROBIOTIC-10 PO), Take 1 capsule by mouth daily, Disp: , Rfl:     umeclidinium-vilanterol (Anoro Ellipta) 62 5-25 MCG/INH inhaler, Inhale 1 puff daily, Disp: 180 each, Rfl: 3    busPIRone (BUSPAR) 5 mg tablet, take 1 tablet by mouth three times a day (Patient not taking: Reported on 5/10/2021), Disp: 60 tablet, Rfl: 1    metoprolol tartrate (LOPRESSOR) 50 mg tablet, Take 1 tablet (50 mg total) by mouth 2 (two) times a day, Disp: 180 tablet, Rfl: 5  Allergies   Allergen Reactions    Ace Inhibitors Cough     Action Taken: stopped med and changed to ARB; Category:  Adverse Reaction;     Bupropion Itching    Citalopram Diarrhea and Nausea Only    Adhesive [Medical Tape] Rash     Patch      Past Medical History:   Diagnosis Date    Allergic     Allergic rhinitis       RESOLVED    Asthma     Atrial fibrillation (HCC)     Bronchitis     COPD [facility]chronic obstructive pulmonary disease) ([facility])     home O2 at night at home 2L    Depression     Emphysema lung (HCC)     Fibroadenoma of breast     Hyperlipidemia     Hypertension     Memory difficulties 2019    Post-menopausal     Post-menopausal     Spondylisthesis     Vitamin D deficiency            Social History     Tobacco Use   Smoking Status Former Smoker    Packs/day: 0 25    Years: 50 00    Pack years: 12 50    Types: Cigarettes    Quit date: 2018    Years since quittin 6   Smokeless Tobacco Never Used

## 2021-05-10 NOTE — PROGRESS NOTES
Assessment/Plan:  Problem List Items Addressed This Visit        Respiratory    COPD, severe ([address] ) - Primary     Pts symptoms are stable with current regime  No changes at present  Cardiovascular and Mediastinum    Paroxysmal atrial fibrillation (HCC)    RESOLVED: Benign essential hypertension    Relevant Orders    Comprehensive metabolic panel       Other    Depression, recurrent (HCC)    Hyperlipidemia    Relevant Orders    Lipid panel    Vitamin D deficiency    Relevant Orders    Vitamin D 25 hydroxy      Other Visit Diagnoses     Screening for deficiency anemia        Relevant Orders    CBC and differential    Screening for thyroid disorder        Relevant Orders    TSH, 3rd generation with Free T4 reflex    Tinea cruris        Relevant Medications    betamethasone dipropionate (DIPROSONE) 0 05 % cream           Diagnoses and all orders for this visit:    COPD, severe (HCC)    Depression, recurrent (HCC)    Paroxysmal atrial fibrillation (HCC)    Benign essential hypertension  -     Comprehensive metabolic panel; Future    Mixed hyperlipidemia  -     Lipid panel; Future    Vitamin D deficiency  -     Vitamin D 25 hydroxy; Future    Screening for deficiency anemia  -     CBC and differential; Future    Screening for thyroid disorder  -     TSH, 3rd generation with Free T4 reflex; Future    Tinea cruris  -     betamethasone dipropionate (DIPROSONE) 0 05 % cream; Apply topically 2 (two) times a day        COPD, severe (HCC)  Pts symptoms are stable with current regime  No changes at present  Subjective:      Patient ID: Evy Robison is a 78 y o  female  Pt presents for routine visit  She is doing well overall  Her breathing has been stable and she continues with oxygen use  She is fully covid vaccinated  She notes her mood has also been stable despite stressors with the pandemic  She has her mammogram and dexa scan scheduled  She sees her cardiologist today   She remains on 81mg ASA for her PAF  She is due for labs  The following portions of the patient's history were reviewed and updated as appropriate:   She has a past medical history of Allergic, Allergic rhinitis, Asthma, Atrial fibrillation ([facility] ), Bronchitis, COPD (chronic obstructive pulmonary disease) ([facility] [address] ), Depression, Emphysema lung ([facility] [address] ), Fibroadenoma of breast, Hyperlipidemia, Hypertension, Memory difficulties (7/23/2019), Post-menopausal, Post-menopausal, Spondylisthesis, and Vitamin D deficiency  ,  does not have any pertinent problems on file  ,   has a past surgical history that includes Bunionectomy (Bilateral); Tubal ligation; Tonsillectomy; Mole removal; Cataract extraction, bilateral; ADENOIDECTOMY; and Breast cyst excision (Bilateral)  ,  family history includes Alcohol abuse in her mother; Celiac disease in her daughter; Diabetes in her father; Diverticulitis in her sister; Eczema in her daughter; Fibromyalgia in her daughter; Heart failure in her brother; Hypertension in her father and mother; No Known Problems in her daughter, maternal aunt, and sister; Prostate cancer in her father; Vitamin D deficiency in her sister  ,   reports that she quit smoking about 2 years ago  Her smoking use included cigarettes  She has a 12 50 pack-year smoking history  She has never used smokeless tobacco  She reports current alcohol use  She reports that she does not use drugs  ,  is allergic to ace inhibitors; bupropion; citalopram; and adhesive [medical tape]     Current Outpatient Medications   Medication Sig Dispense Refill    albuterol (Ventolin HFA) 90 mcg/act inhaler Inhale 2 puffs every 4 (four) hours as needed for shortness of breath 3 Inhaler 3    ascorbic acid (VITAMIN C) 500 mg tablet Take 500 mg by mouth daily      aspirin 81 mg chewable tablet Chew 1 tablet (81 mg total) daily      atorvastatin (LIPITOR) 40 mg tablet Take 1 tablet (40 mg total) by mouth daily 90 tablet 1    cetirizine (ZyrTEC) 10 mg tablet Take 10 mg by mouth daily      Cholecalciferol (VITAMIN D3) 5000 units CAPS Take 1 capsule by mouth daily      diclofenac (VOLTAREN) 75 mg EC tablet Take 1 tablet (75 mg total) by mouth 2 (two) times a day 180 tablet 1    ipratropium-albuterol (DUO-NEB) 0 5-2 5 mg/3 mL nebulizer solution TAKE 1 VIAL BY MOUTH BY NEBULIZATION FOUR TIMES A DAY AS NEEDED FOR WHEEZING OR SHORTNESS OF BREATH 120 vial 3    L-Lysine 500 MG CAPS Take 1 capsule by mouth daily      mometasone (NASONEX) 50 mcg/act nasal spray 2 sprays into each nostril daily 3 Act 0    MULTIPLE VITAMINS-CALCIUM PO Take by mouth daily      Nicotrol 10 MG inhaler INHALE 1 PUFF AS NEEDED FOR SMOKING CESSATION 504 each 1    Probiotic Product (PROBIOTIC-10 PO) Take 1 capsule by mouth daily      umeclidinium-vilanterol (Anoro Ellipta) 62 5-25 MCG/INH inhaler Inhale 1 puff daily 180 each 3    betamethasone dipropionate (DIPROSONE) 0 05 % cream Apply topically 2 (two) times a day 30 g 0    busPIRone (BUSPAR) 5 mg tablet take 1 tablet by mouth three times a day (Patient not taking: Reported on 5/10/2021) 60 tablet 1    metoprolol tartrate (LOPRESSOR) 50 mg tablet Take 1 tablet (50 mg total) by mouth 2 (two) times a day 180 tablet 5     No current facility-administered medications for this visit  Review of Systems   Constitutional: Negative for chills and fever  HENT: Negative for congestion, ear pain, hearing loss, postnasal drip, rhinorrhea, sinus pressure, sinus pain, sore throat and trouble swallowing  Eyes: Negative for pain and visual disturbance  Respiratory: Negative for cough, chest tightness, shortness of breath and wheezing  Cardiovascular: Negative  Negative for chest pain, palpitations and leg swelling  Gastrointestinal: Negative for abdominal pain, blood in stool, constipation, diarrhea, nausea and vomiting  Endocrine: Negative for cold intolerance, heat intolerance, polydipsia, polyphagia and polyuria     Genitourinary: Negative for difficulty urinating, dysuria, flank pain and urgency  Musculoskeletal: Negative for arthralgias, back pain, gait problem and myalgias  Skin: Negative for rash  Allergic/Immunologic: Negative  Neurological: Negative for dizziness, weakness, light-headedness and headaches  Hematological: Negative  Psychiatric/Behavioral: Negative for behavioral problems, dysphoric mood and sleep disturbance  The patient is not nervous/anxious  PHQ-9 Depression Screening    PHQ-9:   Frequency of the following problems over the past two weeks:              Objective:  Vitals:    05/10/21 1037   BP: 118/78   Pulse: 78   Resp: 18   Temp: 97 9 °F (36 6 °C)   TempSrc: Tympanic   SpO2: 99%   Weight: 92 1 kg (203 lb)   Height: 5' 7 5" (1 715 m)     Body mass index is 31 33 kg/m²  Physical Exam  Constitutional:       General: She is not in acute distress  Appearance: She is well-developed  She is not diaphoretic  HENT:      Head: Normocephalic and atraumatic  Right Ear: External ear normal       Left Ear: External ear normal       Nose: Nose normal       Mouth/Throat:      Pharynx: No oropharyngeal exudate  Eyes:      General: No scleral icterus  Right eye: No discharge  Left eye: No discharge  Conjunctiva/sclera: Conjunctivae normal       Pupils: Pupils are equal, round, and reactive to light  Neck:      Musculoskeletal: Normal range of motion and neck supple  Thyroid: No thyromegaly  Cardiovascular:      Rate and Rhythm: Normal rate and regular rhythm  Heart sounds: Normal heart sounds  No murmur  No friction rub  No gallop  Pulmonary:      Effort: Pulmonary effort is normal  No respiratory distress  Breath sounds: Normal breath sounds  No wheezing or rales  Abdominal:      General: Bowel sounds are normal  There is no distension  Palpations: Abdomen is soft  Tenderness: There is no abdominal tenderness  Musculoskeletal: Normal range of motion  General: No tenderness or deformity  Skin:     General: Skin is warm and dry  Neurological:      Mental Status: She is alert and oriented to person, place, and time  Cranial Nerves: No cranial nerve deficit  Psychiatric:         Behavior: Behavior normal          Thought Content: Thought content normal          Judgment: Judgment normal         BMI Counseling: Body mass index is 31 33 kg/m²  The BMI is above normal  Nutrition recommendations include decreasing portion sizes, consuming healthier snacks and limiting drinks that contain sugar

## 2021-05-11 ENCOUNTER — TELEPHONE (OUTPATIENT)
Dept: INTERNAL MEDICINE CLINIC | Facility: CLINIC | Age: 71
End: 2021-05-11

## 2021-05-12 ENCOUNTER — TELEPHONE (OUTPATIENT)
Dept: INTERNAL MEDICINE CLINIC | Facility: CLINIC | Age: 71
End: 2021-05-12

## 2021-05-12 DIAGNOSIS — B35.6 TINEA CRURIS: Primary | ICD-10-CM

## 2021-05-12 RX ORDER — CLOTRIMAZOLE AND BETAMETHASONE DIPROPIONATE 10; .64 MG/G; MG/G
CREAM TOPICAL 2 TIMES DAILY
Qty: 30 G | Refills: 0 | Status: SHIPPED | OUTPATIENT
Start: 2021-05-12 | End: 2022-02-07 | Stop reason: SDUPTHER

## 2021-05-12 NOTE — TELEPHONE ENCOUNTER
Pt called stating that her antifungal script was never received by her [address] [place] [address] Road? Was Disprosone 0 05% the cream that was ordered? If so I can resent the script   Thank you

## 2021-05-13 ENCOUNTER — HOSPITAL ENCOUNTER (OUTPATIENT)
Dept: CT IMAGING | Facility: HOSPITAL | Age: 71
Discharge: HOME | End: 2021-05-13
Payer: COMMERCIAL

## 2021-05-13 DIAGNOSIS — F17.200 NICOTINE DEPENDENCE WITH CURRENT USE: ICD-10-CM

## 2021-05-13 PROCEDURE — 71271 CT THORAX LUNG CANCER SCR C-: CPT

## 2021-05-13 NOTE — RESPIRATORY THERAPY NOTE
CT Scan called for a portable oxygen tank  Pt 's home oxygen tank was on empty  Pt  was given one [employer] tank to get her home  Pt  will go home and get a full oxygen tank   Pt  will return borrowed oxygen tank to  of the hospital

## 2021-05-19 ENCOUNTER — OFFICE VISIT (OUTPATIENT)
Dept: PULMONOLOGY | Facility: CLINIC | Age: 71
End: 2021-05-19
Payer: COMMERCIAL

## 2021-05-19 VITALS
TEMPERATURE: 98.5 F | HEIGHT: 68 IN | SYSTOLIC BLOOD PRESSURE: 120 MMHG | DIASTOLIC BLOOD PRESSURE: 85 MMHG | HEART RATE: 84 BPM | WEIGHT: 203 LBS | OXYGEN SATURATION: 95 % | BODY MASS INDEX: 30.77 KG/M2

## 2021-05-19 DIAGNOSIS — J96.11 CHRONIC RESPIRATORY FAILURE WITH HYPOXIA (HCC): ICD-10-CM

## 2021-05-19 DIAGNOSIS — J30.9 ALLERGIC RHINITIS, UNSPECIFIED SEASONALITY, UNSPECIFIED TRIGGER: ICD-10-CM

## 2021-05-19 DIAGNOSIS — J44.9 COPD, SEVERE (HCC): Primary | ICD-10-CM

## 2021-05-19 DIAGNOSIS — F17.200 NICOTINE DEPENDENCE WITH CURRENT USE: ICD-10-CM

## 2021-05-19 PROCEDURE — 1160F RVW MEDS BY RX/DR IN RCRD: CPT | Performed by: PHYSICIAN ASSISTANT

## 2021-05-19 PROCEDURE — 99214 OFFICE O/P EST MOD 30 MIN: CPT | Performed by: PHYSICIAN ASSISTANT

## 2021-05-19 PROCEDURE — 3074F SYST BP LT 130 MM HG: CPT | Performed by: PHYSICIAN ASSISTANT

## 2021-05-19 PROCEDURE — 1036F TOBACCO NON-USER: CPT | Performed by: PHYSICIAN ASSISTANT

## 2021-05-19 PROCEDURE — 3008F BODY MASS INDEX DOCD: CPT | Performed by: PHYSICIAN ASSISTANT

## 2021-05-19 PROCEDURE — 3079F DIAST BP 80-89 MM HG: CPT | Performed by: PHYSICIAN ASSISTANT

## 2021-05-19 NOTE — PROGRESS NOTES
Pulmonary Follow Up Note   Robert Desai 78 y o  female MRN: [id number]  5/20/2021      Assessment:    COPD, severe ([facility] [address] )    Patient did have slight wheeze on exam today and reports slightly worse respiratory symptoms compared to her baseline  I offered her a short course of prednisone but she denied  Instead we have agreed to increase her nebulizer use to DuoNeb t i d  and see if this helps improve her symptoms  She should also remain on Anoro Ellipta 1 puff daily and keep her rescue inhaler with her at all times just in case  Allergic rhinitis    Patient will continue Zyrtec 10 mg p o  daily before bed, Nasonex 2 sprays each nostril daily for postnasal drip  Chronic respiratory failure with hypoxia ([facility])   Currently wearing 2 L nasal cannula with adequate SpO2 noted in the office today  Recommend she continue supplemental oxygen to maintain SpO2 greater than or equal to 88%  Increase activity as tolerated  She   Participated in pulmonary rehab once before and did no benefit from it but feels that she is not necessary get at this time  Should her exercise tolerance continued to decline could reconsider in the future  Nicotine dependence with current use    Congratulated patient on success she has had thus far encouraged her to keep up the good work  Continue Nicotrol inhaler for breakthrough cravings  Reviewed behavioral modifications as well  Reviewed low-dose lung cancer screening CT with her which does not appear to show any new or suspicious pulmonary nodules  Chronic/ benign findings were reviewed with patient  Recommend repeat CT lung cancer screening in 12 months unless official radiology report says otherwise  Plan:    Diagnoses and all orders for this visit:    COPD, severe ([facility] [address] )    Allergic rhinitis, unspecified seasonality, unspecified trigger    Chronic respiratory failure with hypoxia ([facility] )    Nicotine dependence with current use        No follow-ups on file      History of Present Illness   HPI:  Sydni Fung is a 78 y o  female who presents to the office for routine follow-up  Patient has PMH positive for  Severe COPD, allergic rhinitis nicotine dependence  Patient reports stability respiratory symptoms for the most part  Reports that she has most difficulty breathing when wearing a mask wearing her supplemental oxygen at 2 L at the same time  She feels claustrophobic reveals shortness of breath gets worse  She also feels like her allergies are acting up again this year  The regimen we have her on now is helping with this  She also takes Anoro Ellipta and DuoNeb  Patient still smoking but now reports only smoking a cigarette once a week maybe  She is using Nicotrol inhaler frequently feels this is helping a lot  She appears to be off of cigarettes soon  Denies chest pain, palpitations, fevers, chills, headache, dizziness  No GI or urinary symptoms  No hemoptysis, abnormal weight loss or night sweats  Has a lot of phlegm  Review of Systems   All other systems reviewed and are negative        Historical Information   Past Medical History:   Diagnosis Date    Allergic     Allergic rhinitis     [id number]  RESOLVED    Asthma     Atrial fibrillation (HCC)     Bronchitis     COPD (chronic obstructive pulmonary disease) (HCC)     home O2 at night at home 2L    Depression     Emphysema lung (HCC)     Fibroadenoma of breast     Hyperlipidemia     Hypertension     Memory difficulties 7/23/2019    Post-menopausal     Post-menopausal     Spondylisthesis     Vitamin D deficiency      Past Surgical History:   Procedure Laterality Date    ADENOIDECTOMY      BREAST CYST EXCISION Bilateral     benign-1987, 2002, 2007    BUNIONECTOMY Bilateral     CATARACT EXTRACTION, BILATERAL      MOLE REMOVAL      lip    TONSILLECTOMY      TUBAL LIGATION       Family History   Problem Relation Age of Onset    Hypertension Mother     Alcohol abuse Mother     Hypertension Father     Diabetes Father     Prostate cancer Father     Vitamin D deficiency Sister     Diverticulitis Sister     Heart failure Brother     Celiac disease Daughter     Eczema Daughter     Fibromyalgia Daughter     No Known Problems Daughter     No Known Problems Sister     No Known Problems Maternal Aunt     Breast cancer Neg Hx        Social History     Tobacco Use   Smoking Status Former Smoker    Packs/day: 0 25    Years: 50 00    Pack years: 12 50    Types: Cigarettes    Quit date: 2018    Years since quittin 6   Smokeless Tobacco Never Used         Meds/Allergies     Current Outpatient Medications:     albuterol (Ventolin HFA) 90 mcg/act inhaler, Inhale 2 puffs every 4 (four) hours as needed for shortness of breath, Disp: 3 Inhaler, Rfl: 3    ascorbic acid (VITAMIN C) 500 mg tablet, Take 500 mg by mouth daily, Disp: , Rfl:     aspirin 81 mg chewable tablet, Chew 1 tablet (81 mg total) daily, Disp: , Rfl:     atorvastatin (LIPITOR) 40 mg tablet, Take 1 tablet (40 mg total) by mouth daily, Disp: 90 tablet, Rfl: 1    cetirizine (ZyrTEC) 10 mg tablet, Take 10 mg by mouth daily, Disp: , Rfl:     Cholecalciferol (VITAMIN D3) 5000 units CAPS, Take 1 capsule by mouth daily, Disp: , Rfl:     clotrimazole-betamethasone (LOTRISONE) 1-0 05 % cream, Apply topically 2 (two) times a day, Disp: 30 g, Rfl: 0    diclofenac (VOLTAREN) 75 mg EC tablet, Take 1 tablet (75 mg total) by mouth 2 (two) times a day, Disp: 180 tablet, Rfl: 1    ipratropium-albuterol (DUO-NEB) 0 5-2 5 mg/3 mL nebulizer solution, TAKE 1 VIAL BY MOUTH BY NEBULIZATION FOUR TIMES A DAY AS NEEDED FOR WHEEZING OR SHORTNESS OF BREATH, Disp: 120 vial, Rfl: 3    L-Lysine 500 MG CAPS, Take 1 capsule by mouth daily, Disp: , Rfl:     metoprolol tartrate (LOPRESSOR) 50 mg tablet, Take 1 tablet (50 mg total) by mouth 2 (two) times a day, Disp: 180 tablet, Rfl: 5    mometasone (NASONEX) 50 mcg/act nasal spray, 2 sprays into each nostril daily, Disp: 3 Act, Rfl: 0    MULTIPLE VITAMINS-CALCIUM PO, Take by mouth daily, Disp: , Rfl:     Nicotrol 10 MG inhaler, INHALE 1 PUFF AS NEEDED FOR SMOKING CESSATION, Disp: 504 each, Rfl: 1    Probiotic Product (PROBIOTIC-10 PO), Take 1 capsule by mouth daily, Disp: , Rfl:     umeclidinium-vilanterol (Anoro Ellipta) 62 5-25 MCG/INH inhaler, Inhale 1 puff daily, Disp: 180 each, Rfl: 3    busPIRone (BUSPAR) 5 mg tablet, take 1 tablet by mouth three times a day (Patient not taking: Reported on 5/19/2021), Disp: 60 tablet, Rfl: 1  Allergies   Allergen Reactions    Ace Inhibitors Cough     Action Taken: stopped med and changed to ARB; Category: Adverse Reaction;     Bupropion Itching    Citalopram Diarrhea and Nausea Only    Adhesive [Medical Tape] Rash     Patch        Vitals: Blood pressure 120/85, pulse 84, temperature 98 5 °F (36 9 °C), height 5' 7 5" (1 715 m), weight 92 1 kg (203 lb), SpO2 95 %, not currently breastfeeding  Body mass index is 31 33 kg/m²  Oxygen Therapy  SpO2: 95 %  Oxygen Therapy: Supplemental oxygen  O2 Delivery Method: Nasal cannula  O2 Flow Rate (L/min): 2 L/min    Physical Exam  Physical Exam  Vitals signs reviewed  Constitutional:       Appearance: Normal appearance  She is well-developed  HENT:      Head: Normocephalic and atraumatic  Right Ear: External ear normal       Left Ear: External ear normal    Eyes:      Extraocular Movements: Extraocular movements intact  Neck:      Musculoskeletal: Normal range of motion and neck supple  Cardiovascular:      Rate and Rhythm: Normal rate and regular rhythm  Pulses: Normal pulses  Heart sounds: Normal heart sounds  No murmur  Pulmonary:      Effort: Pulmonary effort is normal  No respiratory distress  Breath sounds: No stridor  Wheezing (faint expiratory wheeze) present  No rhonchi or rales  Abdominal:      Palpations: Abdomen is soft  Tenderness: There is no abdominal tenderness        Hernia: No hernia is present  Musculoskeletal: Normal range of motion  General: No swelling, tenderness or deformity  Skin:     General: Skin is warm and dry  Capillary Refill: Capillary refill takes less than 2 seconds  Neurological:      General: No focal deficit present  Mental Status: She is alert and oriented to person, place, and time  Mental status is at baseline  Psychiatric:         Behavior: Behavior normal          Thought Content: Thought content normal          Judgment: Judgment normal          Labs: I have personally reviewed pertinent lab results  , ABG: No results found for: PHART, EOG3IBC, PO2ART, XXI0TNR, K6RPGIVY, BEART, SOURCE, BNP: No results found for: BNP, CBC: No results found for: WBC, HGB, HCT, MCV, PLT, ADJUSTEDWBC, MCH, MCHC, RDW, MPV, NRBC, CMP: No results found for: SODIUM, K, CL, CO2, ANIONGAP, BUN, CREATININE, GLUCOSE, CALCIUM, AST, ALT, ALKPHOS, PROT, BILITOT, EGFR, PT/INR: No results found for: PT, INR, Troponin: No results found for: TROPONINI  Lab Results   Component Value Date    WBC 6 90 08/01/2018    HGB 13 1 08/01/2018    HCT 37 2 08/01/2018    MCV 89 08/01/2018     08/01/2018     Lab Results   Component Value Date    GLUCOSE 101 06/05/2015    CALCIUM 9 0 08/01/2018     06/05/2015    K 4 0 08/01/2018    CO2 26 08/01/2018     (H) 08/01/2018    BUN 13 08/01/2018    CREATININE 0 91 08/01/2018     No results found for: IGE  Lab Results   Component Value Date    ALT 11 08/01/2018    AST 14 08/01/2018    ALKPHOS 65 08/01/2018    BILITOT 0 5 06/04/2015       Imaging and other studies: I have personally reviewed pertinent reports  and I have personally reviewed pertinent films in PACS       CT lung cancer screening 05/13/2021   Of awaiting official read   Tiny scattered granulomata  Severe upper lobe predominant emphysema  Bibasilar  Linear scarring versus atelectasis      Pulmonary function testing:  Performed   09/14/2018   FEV1/FVC ratio 63%   FEV1  43% predicted  FVC  67% predicted   no response to bronchodilators  TLC  181 % predicted  RV  316 % predicted  DLCO corrected for hemoglobin  45 % predicted   severe obstructive airflow defect without significant response to bronchodilators  Increased TLC and RV indicative of hyperinflation and air trapping  Moderately impaired diffusion capacity

## 2021-05-20 PROBLEM — J96.11 CHRONIC RESPIRATORY FAILURE WITH HYPOXIA (HCC): Status: ACTIVE | Noted: 2021-05-20

## 2021-05-20 NOTE — ASSESSMENT & PLAN NOTE
Currently wearing 2 L nasal cannula with adequate SpO2 noted in the office today  Recommend she continue supplemental oxygen to maintain SpO2 greater than or equal to 88%  Increase activity as tolerated  She   Participated in pulmonary rehab once before and did no benefit from it but feels that she is not necessary get at this time  Should her exercise tolerance continued to decline could reconsider in the future

## 2021-05-20 NOTE — ASSESSMENT & PLAN NOTE
Patient will continue Zyrtec 10 mg p o  daily before bed, Nasonex 2 sprays each nostril daily for postnasal drip

## 2021-05-20 NOTE — ASSESSMENT & PLAN NOTE
Congratulated patient on success she has had thus far encouraged her to keep up the good work  Continue Nicotrol inhaler for breakthrough cravings  Reviewed behavioral modifications as well  Reviewed low-dose lung cancer screening CT with her which does not appear to show any new or suspicious pulmonary nodules  Chronic/ benign findings were reviewed with patient  Recommend repeat CT lung cancer screening in 12 months unless official radiology report says otherwise

## 2021-05-20 NOTE — ASSESSMENT & PLAN NOTE
Patient did have slight wheeze on exam today and reports slightly worse respiratory symptoms compared to her baseline  I offered her a short course of prednisone but she denied  Instead we have agreed to increase her nebulizer use to DuoNeb t i d  and see if this helps improve her symptoms  She should also remain on Anoro Ellipta 1 puff daily and keep her rescue inhaler with her at all times just in case

## 2021-05-27 ENCOUNTER — HOSPITAL ENCOUNTER (OUTPATIENT)
Dept: MAMMOGRAPHY | Facility: CLINIC | Age: 71
Discharge: HOME | End: 2021-05-27
Payer: COMMERCIAL

## 2021-05-27 VITALS — BODY MASS INDEX: 31.86 KG/M2 | HEIGHT: 67 IN | WEIGHT: 203 LBS

## 2021-05-27 DIAGNOSIS — Z13.820 SCREENING FOR OSTEOPOROSIS: ICD-10-CM

## 2021-05-27 DIAGNOSIS — Z12.31 ENCOUNTER FOR SCREENING MAMMOGRAM FOR BREAST CANCER: ICD-10-CM

## 2021-05-27 DIAGNOSIS — Z12.39 SCREENING FOR BREAST CANCER: ICD-10-CM

## 2021-05-27 DIAGNOSIS — Z78.0 POST-MENOPAUSAL: ICD-10-CM

## 2021-05-27 PROCEDURE — 77063 BREAST TOMOSYNTHESIS BI: CPT

## 2021-05-27 PROCEDURE — 77067 SCR MAMMO BI INCL CAD: CPT

## 2021-05-27 PROCEDURE — 77080 DXA BONE DENSITY AXIAL: CPT

## 2021-06-14 DIAGNOSIS — R06.02 SOB (SHORTNESS OF BREATH): ICD-10-CM

## 2021-06-14 RX ORDER — ALBUTEROL SULFATE 90 UG/1
AEROSOL, METERED RESPIRATORY (INHALATION)
Qty: 18 G | Refills: 3 | Status: SHIPPED | OUTPATIENT
Start: 2021-06-14 | End: 2021-06-15 | Stop reason: SDUPTHER

## 2021-06-15 DIAGNOSIS — R06.02 SOB (SHORTNESS OF BREATH): ICD-10-CM

## 2021-06-15 RX ORDER — ALBUTEROL SULFATE 90 UG/1
2 AEROSOL, METERED RESPIRATORY (INHALATION) EVERY 4 HOURS PRN
Qty: 54 G | Refills: 3 | Status: SHIPPED | OUTPATIENT
Start: 2021-06-15

## 2021-07-02 DIAGNOSIS — J44.9 CHRONIC OBSTRUCTIVE PULMONARY DISEASE, UNSPECIFIED COPD TYPE (HCC): ICD-10-CM

## 2021-07-02 RX ORDER — UMECLIDINIUM BROMIDE AND VILANTEROL TRIFENATATE 62.5; 25 UG/1; UG/1
POWDER RESPIRATORY (INHALATION)
Qty: 60 BLISTER | Refills: 3 | Status: SHIPPED | OUTPATIENT
Start: 2021-07-02 | End: 2021-07-02 | Stop reason: SDUPTHER

## 2021-07-02 RX ORDER — UMECLIDINIUM BROMIDE AND VILANTEROL TRIFENATATE 62.5; 25 UG/1; UG/1
1 POWDER RESPIRATORY (INHALATION) DAILY
Qty: 180 BLISTER | Refills: 3 | Status: SHIPPED | OUTPATIENT
Start: 2021-07-02 | End: 2022-07-07

## 2021-07-22 ENCOUNTER — VBI (OUTPATIENT)
Dept: ADMINISTRATIVE | Facility: OTHER | Age: 71
End: 2021-07-22

## 2021-08-09 DIAGNOSIS — F17.200 NICOTINE DEPENDENCE WITH CURRENT USE: ICD-10-CM

## 2021-08-09 RX ORDER — NICOTINE 10 MG
CARTRIDGE (EA) INHALATION
Qty: 504 EACH | Refills: 1 | Status: SHIPPED | OUTPATIENT
Start: 2021-08-09 | End: 2022-01-27

## 2021-08-16 DIAGNOSIS — I48.0 PAROXYSMAL ATRIAL FIBRILLATION (HCC): ICD-10-CM

## 2021-08-16 DIAGNOSIS — E78.5 HYPERLIPIDEMIA, UNSPECIFIED HYPERLIPIDEMIA TYPE: ICD-10-CM

## 2021-08-16 RX ORDER — METOPROLOL TARTRATE 50 MG/1
TABLET, FILM COATED ORAL
Qty: 180 TABLET | Refills: 5 | Status: SHIPPED | OUTPATIENT
Start: 2021-08-16 | End: 2022-01-03 | Stop reason: DRUGHIGH

## 2021-08-16 RX ORDER — ATORVASTATIN CALCIUM 40 MG/1
TABLET, FILM COATED ORAL
Qty: 90 TABLET | Refills: 1 | Status: SHIPPED | OUTPATIENT
Start: 2021-08-16 | End: 2022-01-14

## 2021-10-21 ENCOUNTER — OFFICE VISIT (OUTPATIENT)
Dept: PULMONOLOGY | Facility: CLINIC | Age: 71
End: 2021-10-21
Payer: COMMERCIAL

## 2021-10-21 VITALS
TEMPERATURE: 96.9 F | OXYGEN SATURATION: 97 % | DIASTOLIC BLOOD PRESSURE: 72 MMHG | HEART RATE: 74 BPM | HEIGHT: 67 IN | WEIGHT: 193.2 LBS | SYSTOLIC BLOOD PRESSURE: 127 MMHG | BODY MASS INDEX: 30.32 KG/M2

## 2021-10-21 DIAGNOSIS — J44.9 COPD, SEVERE (HCC): Primary | ICD-10-CM

## 2021-10-21 PROCEDURE — 4004F PT TOBACCO SCREEN RCVD TLK: CPT | Performed by: INTERNAL MEDICINE

## 2021-10-21 PROCEDURE — 3008F BODY MASS INDEX DOCD: CPT | Performed by: INTERNAL MEDICINE

## 2021-10-21 PROCEDURE — 99214 OFFICE O/P EST MOD 30 MIN: CPT | Performed by: INTERNAL MEDICINE

## 2021-10-21 PROCEDURE — 1160F RVW MEDS BY RX/DR IN RCRD: CPT | Performed by: INTERNAL MEDICINE

## 2021-10-21 RX ORDER — BENZONATATE 200 MG/1
200 CAPSULE ORAL 3 TIMES DAILY PRN
Qty: 20 CAPSULE | Refills: 5 | Status: SHIPPED | OUTPATIENT
Start: 2021-10-21 | End: 2022-02-07

## 2021-10-22 ENCOUNTER — TELEPHONE (OUTPATIENT)
Dept: PULMONOLOGY | Facility: CLINIC | Age: 71
End: 2021-10-22

## 2021-10-26 ENCOUNTER — TELEPHONE (OUTPATIENT)
Dept: PULMONOLOGY | Facility: CLINIC | Age: 71
End: 2021-10-26

## 2021-11-09 ENCOUNTER — APPOINTMENT (OUTPATIENT)
Dept: LAB | Facility: CLINIC | Age: 71
End: 2021-11-09
Payer: COMMERCIAL

## 2021-11-09 DIAGNOSIS — I10 BENIGN ESSENTIAL HYPERTENSION: ICD-10-CM

## 2021-11-09 DIAGNOSIS — R73.01 ELEVATED FASTING GLUCOSE: ICD-10-CM

## 2021-11-09 DIAGNOSIS — Z13.0 SCREENING FOR DEFICIENCY ANEMIA: ICD-10-CM

## 2021-11-09 DIAGNOSIS — E78.2 MIXED HYPERLIPIDEMIA: ICD-10-CM

## 2021-11-09 DIAGNOSIS — Z13.29 SCREENING FOR THYROID DISORDER: ICD-10-CM

## 2021-11-09 DIAGNOSIS — E55.9 VITAMIN D DEFICIENCY: ICD-10-CM

## 2021-11-09 LAB
25(OH)D3 SERPL-MCNC: 73.7 NG/ML (ref 30–100)
ALBUMIN SERPL BCP-MCNC: 3.8 G/DL (ref 3.5–5)
ALP SERPL-CCNC: 88 U/L (ref 46–116)
ALT SERPL W P-5'-P-CCNC: 23 U/L (ref 12–78)
ANION GAP SERPL CALCULATED.3IONS-SCNC: 12 MMOL/L (ref 4–13)
AST SERPL W P-5'-P-CCNC: 19 U/L (ref 5–45)
BASOPHILS # BLD AUTO: 0.05 THOUSANDS/ΜL (ref 0–0.1)
BASOPHILS NFR BLD AUTO: 1 % (ref 0–1)
BILIRUB SERPL-MCNC: 0.66 MG/DL (ref 0.2–1)
BUN SERPL-MCNC: 15 MG/DL (ref 5–25)
CALCIUM SERPL-MCNC: 9.4 MG/DL (ref 8.3–10.1)
CHLORIDE SERPL-SCNC: 110 MMOL/L (ref 100–108)
CHOLEST SERPL-MCNC: 133 MG/DL (ref 50–200)
CO2 SERPL-SCNC: 24 MMOL/L (ref 21–32)
CREAT SERPL-MCNC: 1.19 MG/DL (ref 0.6–1.3)
EOSINOPHIL # BLD AUTO: 0.17 THOUSAND/ΜL (ref 0–0.61)
EOSINOPHIL NFR BLD AUTO: 2 % (ref 0–6)
ERYTHROCYTE [DISTWIDTH] IN BLOOD BY AUTOMATED COUNT: 12.4 % (ref 11.6–15.1)
GFR SERPL CREATININE-BSD FRML MDRD: 47 ML/MIN/1.73SQ M
GLUCOSE P FAST SERPL-MCNC: 103 MG/DL (ref 65–99)
HCT VFR BLD AUTO: 43.6 % (ref 34.8–46.1)
HDLC SERPL-MCNC: 44 MG/DL
HGB BLD-MCNC: 15 G/DL (ref 11.5–15.4)
IMM GRANULOCYTES # BLD AUTO: 0.02 THOUSAND/UL (ref 0–0.2)
IMM GRANULOCYTES NFR BLD AUTO: 0 % (ref 0–2)
LDLC SERPL CALC-MCNC: 66 MG/DL (ref 0–100)
LYMPHOCYTES # BLD AUTO: 1.6 THOUSANDS/ΜL (ref 0.6–4.47)
LYMPHOCYTES NFR BLD AUTO: 22 % (ref 14–44)
MCH RBC QN AUTO: 31.3 PG (ref 26.8–34.3)
MCHC RBC AUTO-ENTMCNC: 34.4 G/DL (ref 31.4–37.4)
MCV RBC AUTO: 91 FL (ref 82–98)
MONOCYTES # BLD AUTO: 0.67 THOUSAND/ΜL (ref 0.17–1.22)
MONOCYTES NFR BLD AUTO: 9 % (ref 4–12)
NEUTROPHILS # BLD AUTO: 4.72 THOUSANDS/ΜL (ref 1.85–7.62)
NEUTS SEG NFR BLD AUTO: 66 % (ref 43–75)
NONHDLC SERPL-MCNC: 89 MG/DL
NRBC BLD AUTO-RTO: 0 /100 WBCS
PLATELET # BLD AUTO: 215 THOUSANDS/UL (ref 149–390)
PMV BLD AUTO: 11 FL (ref 8.9–12.7)
POTASSIUM SERPL-SCNC: 3.8 MMOL/L (ref 3.5–5.3)
PROT SERPL-MCNC: 7.4 G/DL (ref 6.4–8.2)
RBC # BLD AUTO: 4.8 MILLION/UL (ref 3.81–5.12)
SODIUM SERPL-SCNC: 146 MMOL/L (ref 136–145)
TRIGL SERPL-MCNC: 114 MG/DL
TSH SERPL DL<=0.05 MIU/L-ACNC: 1.18 UIU/ML (ref 0.36–3.74)
WBC # BLD AUTO: 7.23 THOUSAND/UL (ref 4.31–10.16)

## 2021-11-09 PROCEDURE — 80061 LIPID PANEL: CPT

## 2021-11-09 PROCEDURE — 84443 ASSAY THYROID STIM HORMONE: CPT

## 2021-11-09 PROCEDURE — 85025 COMPLETE CBC W/AUTO DIFF WBC: CPT

## 2021-11-09 PROCEDURE — 83036 HEMOGLOBIN GLYCOSYLATED A1C: CPT

## 2021-11-09 PROCEDURE — 80053 COMPREHEN METABOLIC PANEL: CPT

## 2021-11-09 PROCEDURE — 82306 VITAMIN D 25 HYDROXY: CPT

## 2021-11-09 PROCEDURE — 36415 COLL VENOUS BLD VENIPUNCTURE: CPT

## 2021-11-10 ENCOUNTER — OFFICE VISIT (OUTPATIENT)
Dept: INTERNAL MEDICINE CLINIC | Facility: CLINIC | Age: 71
End: 2021-11-10
Payer: COMMERCIAL

## 2021-11-10 VITALS
OXYGEN SATURATION: 97 % | SYSTOLIC BLOOD PRESSURE: 138 MMHG | HEART RATE: 70 BPM | DIASTOLIC BLOOD PRESSURE: 72 MMHG | WEIGHT: 192.38 LBS | BODY MASS INDEX: 30.19 KG/M2 | TEMPERATURE: 98.7 F | HEIGHT: 67 IN

## 2021-11-10 DIAGNOSIS — M71.30 MYXOID CYST: Primary | ICD-10-CM

## 2021-11-10 DIAGNOSIS — B35.1 TINEA UNGUIUM: ICD-10-CM

## 2021-11-10 DIAGNOSIS — Z00.00 MEDICARE ANNUAL WELLNESS VISIT, SUBSEQUENT: ICD-10-CM

## 2021-11-10 DIAGNOSIS — R73.01 ELEVATED FASTING GLUCOSE: ICD-10-CM

## 2021-11-10 DIAGNOSIS — Z12.11 SCREENING FOR COLON CANCER: ICD-10-CM

## 2021-11-10 LAB
EST. AVERAGE GLUCOSE BLD GHB EST-MCNC: 108 MG/DL
HBA1C MFR BLD: 5.4 %

## 2021-11-10 PROCEDURE — 1125F AMNT PAIN NOTED PAIN PRSNT: CPT | Performed by: PHYSICIAN ASSISTANT

## 2021-11-10 PROCEDURE — 3288F FALL RISK ASSESSMENT DOCD: CPT | Performed by: PHYSICIAN ASSISTANT

## 2021-11-10 PROCEDURE — 99213 OFFICE O/P EST LOW 20 MIN: CPT | Performed by: PHYSICIAN ASSISTANT

## 2021-11-10 PROCEDURE — G0439 PPPS, SUBSEQ VISIT: HCPCS | Performed by: PHYSICIAN ASSISTANT

## 2021-11-10 PROCEDURE — 1160F RVW MEDS BY RX/DR IN RCRD: CPT | Performed by: PHYSICIAN ASSISTANT

## 2021-11-10 PROCEDURE — 3725F SCREEN DEPRESSION PERFORMED: CPT | Performed by: PHYSICIAN ASSISTANT

## 2021-11-10 PROCEDURE — 4004F PT TOBACCO SCREEN RCVD TLK: CPT | Performed by: PHYSICIAN ASSISTANT

## 2021-11-10 PROCEDURE — 3008F BODY MASS INDEX DOCD: CPT | Performed by: PHYSICIAN ASSISTANT

## 2021-11-10 PROCEDURE — 1170F FXNL STATUS ASSESSED: CPT | Performed by: PHYSICIAN ASSISTANT

## 2021-11-10 PROCEDURE — 1101F PT FALLS ASSESS-DOCD LE1/YR: CPT | Performed by: PHYSICIAN ASSISTANT

## 2021-11-11 PROBLEM — B35.1 TINEA UNGUIUM: Status: ACTIVE | Noted: 2021-11-11

## 2021-11-11 PROBLEM — M71.30 MYXOID CYST: Status: ACTIVE | Noted: 2021-11-11

## 2022-01-03 ENCOUNTER — OFFICE VISIT (OUTPATIENT)
Dept: CARDIOLOGY CLINIC | Facility: CLINIC | Age: 72
End: 2022-01-03
Payer: COMMERCIAL

## 2022-01-03 VITALS
SYSTOLIC BLOOD PRESSURE: 150 MMHG | DIASTOLIC BLOOD PRESSURE: 80 MMHG | HEART RATE: 75 BPM | HEIGHT: 67 IN | WEIGHT: 190 LBS | BODY MASS INDEX: 29.82 KG/M2

## 2022-01-03 DIAGNOSIS — I48.0 PAROXYSMAL ATRIAL FIBRILLATION (HCC): ICD-10-CM

## 2022-01-03 DIAGNOSIS — I48.91 ATRIAL FIBRILLATION, UNSPECIFIED TYPE (HCC): Primary | ICD-10-CM

## 2022-01-03 DIAGNOSIS — F17.200 NICOTINE DEPENDENCE WITH CURRENT USE: ICD-10-CM

## 2022-01-03 PROCEDURE — 99214 OFFICE O/P EST MOD 30 MIN: CPT | Performed by: INTERNAL MEDICINE

## 2022-01-03 PROCEDURE — 1036F TOBACCO NON-USER: CPT | Performed by: INTERNAL MEDICINE

## 2022-01-03 PROCEDURE — 1160F RVW MEDS BY RX/DR IN RCRD: CPT | Performed by: INTERNAL MEDICINE

## 2022-01-03 PROCEDURE — 3008F BODY MASS INDEX DOCD: CPT | Performed by: INTERNAL MEDICINE

## 2022-01-03 PROCEDURE — 93000 ELECTROCARDIOGRAM COMPLETE: CPT | Performed by: INTERNAL MEDICINE

## 2022-01-03 RX ORDER — METOPROLOL TARTRATE 50 MG/1
75 TABLET, FILM COATED ORAL 2 TIMES DAILY
Qty: 180 TABLET | Refills: 5
Start: 2022-01-03 | End: 2022-01-05 | Stop reason: SDUPTHER

## 2022-01-03 NOTE — PROGRESS NOTES
Patient ID: Davon Urban is a 73 y o  female  Plan:      Paroxysmal atrial fibrillation ([address] )  She feels lots of skipped beats but is actually not in atrial fibrillation today  Prior bleeding problems on more intense anticoagulation  Nicotine dependence with current use  Cutting down drastically since her significant other had emergency CABG    Emphysema/COPD [facility])  Oxygen dependent  Follow up Plan/Other summary comments:  Blood pressure is up in she is feeling anxious  Will tease up the metoprolol dose to 75 mg twice daily  She describes chest pain but this is non coronary  If this helps, 1 year ecg and f/u visit  HPI:  Patient comes in to see me today she is a bit anxious  Her significant other had a emergency CABG in late November  She feels not quite right  There are seconds of discomfort in the right parasternal region  As well she feels her heart thumping lots  She has cut back on her cigarette smoking drastically since late November  She remains oxygen dependent  To reiterate, she has paroxysmal atrial fibrillation  Because of significant epistaxis on Eliquis, she has reverted to aspirin and really strongly prefers this  Results for orders placed or performed in visit on 01/03/22   POCT ECG    Impression    Sinus arrhythmia  Otherwise normal  Rate 75/minute  Most recent or relevant cardiac/vascular testing:     Echocardiogram 08/01/2018: Mild LVH  Normal LV function          Past Surgical History:   Procedure Laterality Date    ADENOIDECTOMY      BREAST CYST EXCISION Bilateral     benign-1987, 2002, 2007    BUNIONECTOMY Bilateral     CATARACT EXTRACTION, BILATERAL      MOLE REMOVAL      lip    TONSILLECTOMY      TUBAL LIGATION       CMP:   Lab Results   Component Value Date     06/05/2015    K 3 8 11/09/2021    K 3 6 06/05/2015     (H) 11/09/2021     06/05/2015    CO2 24 11/09/2021    CO2 27 5 06/05/2015    BUN 15 11/09/2021    BUN 14 06/05/2015    CREATININE 1 19 11/09/2021    CREATININE 1 08 06/05/2015    GLUCOSE 101 06/05/2015    EGFR 47 11/09/2021       Lipid Profile:   Lab Results   Component Value Date    TRIG 114 11/09/2021    HDL 44 11/09/2021         Review of Systems   10  point ROS  was otherwise non pertinent or negative except as per HPI or as below  Gait: Normal          Objective:     BP (!) 170/110   Pulse 75   Ht 5' 7" (1 702 m)   Wt 86 2 kg (190 lb)   BMI 29 76 kg/m²     PHYSICAL EXAM:     General:  Normal appearance in no distress  Eyes:  Anicteric  Oral mucosa:  Moist   Neck:  No JVD  Carotid upstrokes are brisk without bruits  No masses  Chest:  Decreased breath sounds throughout  Cardiac:  Normal PMI  Normal S1 and S2  No murmur gallop or rub  Abdomen:  Soft and nontender  No palpable organomegaly or aortic enlargement  Extremities:  No peripheral edema  Musculoskeletal:  Symmetric  Vascular:     Popliteal pulses are intact bilaterally  Pedal pulses are intact  Neuro:  Grossly symmetric  Psych:  Alert and oriented x3          Current Outpatient Medications:     albuterol (PROVENTIL HFA,VENTOLIN HFA) 90 mcg/act inhaler, Inhale 2 puffs every 4 (four) hours as needed for shortness of breath, Disp: 54 g, Rfl: 3    ascorbic acid (VITAMIN C) 500 mg tablet, Take 500 mg by mouth daily, Disp: , Rfl:     aspirin 81 mg chewable tablet, Chew 1 tablet (81 mg total) daily, Disp: , Rfl:     atorvastatin (LIPITOR) 40 mg tablet, TAKE ONE TABLET BY MOUTH EVERY DAY, Disp: 90 tablet, Rfl: 1    cetirizine (ZyrTEC) 10 mg tablet, Take 10 mg by mouth daily, Disp: , Rfl:     Cholecalciferol (VITAMIN D3) 5000 units CAPS, Take 1 capsule by mouth daily, Disp: , Rfl:     ipratropium-albuterol (DUO-NEB) 0 5-2 5 mg/3 mL nebulizer solution, TAKE 1 VIAL BY MOUTH BY NEBULIZATION FOUR TIMES A DAY AS NEEDED FOR WHEEZING OR SHORTNESS OF BREATH, Disp: 120 vial, Rfl: 3    L-Lysine 500 MG CAPS, Take 1 capsule by mouth daily, Disp: , Rfl:     mometasone (NASONEX) 50 mcg/act nasal spray, 2 sprays into each nostril daily, Disp: 3 Act, Rfl: 0    MULTIPLE VITAMINS-CALCIUM PO, Take by mouth daily, Disp: , Rfl:     Probiotic Product (PROBIOTIC-10 PO), Take 1 capsule by mouth daily, Disp: , Rfl:     umeclidinium-vilanterol (Anoro Ellipta) 62 5-25 MCG/INH inhaler, Inhale 1 puff daily, Disp: 180 blister, Rfl: 3    benzonatate (TESSALON) 200 MG capsule, Take 1 capsule (200 mg total) by mouth 3 (three) times a day as needed for cough (Patient not taking: Reported on 11/10/2021 ), Disp: 20 capsule, Rfl: 5    busPIRone (BUSPAR) 5 mg tablet, take 1 tablet by mouth three times a day (Patient not taking: Reported on 5/19/2021), Disp: 60 tablet, Rfl: 1    clotrimazole-betamethasone (LOTRISONE) 1-0 05 % cream, Apply topically 2 (two) times a day (Patient not taking: Reported on 11/10/2021 ), Disp: 30 g, Rfl: 0    diclofenac (VOLTAREN) 75 mg EC tablet, Take 1 tablet (75 mg total) by mouth 2 (two) times a day (Patient not taking: Reported on 10/21/2021), Disp: 180 tablet, Rfl: 1    metoprolol tartrate (LOPRESSOR) 50 mg tablet, Take 1 5 tablets (75 mg total) by mouth 2 (two) times a day, Disp: 180 tablet, Rfl: 5    Nicotrol 10 MG inhaler, INHALE 1 PUFF AS NEEDED FOR SMOKING CESSATION (Patient not taking: Reported on 1/3/2022), Disp: 504 each, Rfl: 1  Allergies   Allergen Reactions    Bupropion Itching    Ace Inhibitors Cough     Action Taken: stopped med and changed to ARB; Category:  Adverse Reaction;     Citalopram Diarrhea and Nausea Only    Adhesive [Medical Tape] Rash     Patch      Past Medical History:   Diagnosis Date    Allergic     Allergic rhinitis     [id number]  RESOLVED    Asthma     Atrial fibrillation (HCC)     Bronchitis     COPD [facility]chronic [facility] pulmonary disease) ([facility])     home O2 at night at home 2L    Depression     Emphysema lung (HCC)     Fibroadenoma of breast     Hyperlipidemia     Hypertension     Memory difficulties 7/23/2019    Post-menopausal     Post-menopausal     Spondylisthesis     Vitamin D deficiency            Social History     Tobacco Use   Smoking Status Former Smoker    Packs/day: 0 25    Years: 50 00    Pack years: 12 50    Types: Cigarettes    Quit date: 9/20/2018    Years since quitting: 3 2   Smokeless Tobacco Never Used   Tobacco Comment    1/2 ciggs  weekly   11/10/21

## 2022-01-03 NOTE — PATIENT INSTRUCTIONS
Take 75 mg of metoprolol twice daily    Call us if this is working out after a week or so and we will call in a new higher prescription

## 2022-01-03 NOTE — ASSESSMENT & PLAN NOTE
She feels lots of skipped beats but is actually not in atrial fibrillation today  Prior bleeding problems on more intense anticoagulation

## 2022-01-05 ENCOUNTER — TELEPHONE (OUTPATIENT)
Dept: INTERNAL MEDICINE CLINIC | Facility: CLINIC | Age: 72
End: 2022-01-05

## 2022-01-05 DIAGNOSIS — I48.0 PAROXYSMAL ATRIAL FIBRILLATION (HCC): ICD-10-CM

## 2022-01-05 RX ORDER — METOPROLOL TARTRATE 50 MG/1
75 TABLET, FILM COATED ORAL 2 TIMES DAILY
Qty: 270 TABLET | Refills: 3 | Status: SHIPPED | OUTPATIENT
Start: 2022-01-05

## 2022-01-05 NOTE — TELEPHONE ENCOUNTER
PT called, she has rtn scheduled for 2/7/2022  She is wondering if you can put in bloodwork for her to get done prior  She states her cardiologist is adjusting her medications and she wants to get everything checked out to go over with you  Please advise

## 2022-01-10 DIAGNOSIS — I10 BENIGN ESSENTIAL HYPERTENSION: Primary | ICD-10-CM

## 2022-01-12 ENCOUNTER — TELEPHONE (OUTPATIENT)
Dept: CARDIOLOGY CLINIC | Facility: CLINIC | Age: 72
End: 2022-01-12

## 2022-01-12 DIAGNOSIS — E78.5 HYPERLIPIDEMIA, UNSPECIFIED HYPERLIPIDEMIA TYPE: ICD-10-CM

## 2022-01-12 NOTE — TELEPHONE ENCOUNTER
Patient called with update on her BP and pulse  She increased metoprolol to 75 mg twice a day due to "skipped beats" at her last visit  BP ranging from 102/65 to 131/65, 135/76  Pulse ranging from 50-65 at rest, with activity 85-90  She does feel most of her symptoms could be related to anxiety, but overall feels okay  Please advise if any changes are required

## 2022-01-14 RX ORDER — ATORVASTATIN CALCIUM 40 MG/1
TABLET, FILM COATED ORAL
Qty: 90 TABLET | Refills: 1 | Status: SHIPPED | OUTPATIENT
Start: 2022-01-14

## 2022-01-27 DIAGNOSIS — F17.200 NICOTINE DEPENDENCE WITH CURRENT USE: ICD-10-CM

## 2022-01-27 RX ORDER — NICOTINE 10 MG
CARTRIDGE (EA) INHALATION
Qty: 504 EACH | Refills: 1 | Status: SHIPPED | OUTPATIENT
Start: 2022-01-27 | End: 2022-02-07

## 2022-02-03 ENCOUNTER — RA CDI HCC (OUTPATIENT)
Dept: OTHER | Facility: HOSPITAL | Age: 72
End: 2022-02-03

## 2022-02-03 ENCOUNTER — APPOINTMENT (OUTPATIENT)
Dept: LAB | Facility: CLINIC | Age: 72
End: 2022-02-03
Payer: COMMERCIAL

## 2022-02-03 DIAGNOSIS — I10 BENIGN ESSENTIAL HYPERTENSION: ICD-10-CM

## 2022-02-03 LAB
ANION GAP SERPL CALCULATED.3IONS-SCNC: 6 MMOL/L (ref 4–13)
BUN SERPL-MCNC: 26 MG/DL (ref 5–25)
CALCIUM SERPL-MCNC: 9.7 MG/DL (ref 8.3–10.1)
CHLORIDE SERPL-SCNC: 110 MMOL/L (ref 100–108)
CO2 SERPL-SCNC: 27 MMOL/L (ref 21–32)
CREAT SERPL-MCNC: 1.31 MG/DL (ref 0.6–1.3)
GFR SERPL CREATININE-BSD FRML MDRD: 41 ML/MIN/1.73SQ M
GLUCOSE SERPL-MCNC: 94 MG/DL (ref 65–140)
POTASSIUM SERPL-SCNC: 4 MMOL/L (ref 3.5–5.3)
SODIUM SERPL-SCNC: 143 MMOL/L (ref 136–145)

## 2022-02-03 PROCEDURE — 80048 BASIC METABOLIC PNL TOTAL CA: CPT

## 2022-02-03 PROCEDURE — 36415 COLL VENOUS BLD VENIPUNCTURE: CPT

## 2022-02-03 NOTE — PROGRESS NOTES
[facility]  coding opportunities       Chart reviewed, no opportunity found: CHART REVIEWED, NO OPPORTUNITY FOUND                        Patients insurance company: [employer]

## 2022-02-07 ENCOUNTER — OFFICE VISIT (OUTPATIENT)
Dept: INTERNAL MEDICINE CLINIC | Facility: CLINIC | Age: 72
End: 2022-02-07
Payer: COMMERCIAL

## 2022-02-07 VITALS
HEIGHT: 67 IN | SYSTOLIC BLOOD PRESSURE: 146 MMHG | DIASTOLIC BLOOD PRESSURE: 82 MMHG | OXYGEN SATURATION: 98 % | WEIGHT: 191.6 LBS | HEART RATE: 62 BPM | BODY MASS INDEX: 30.07 KG/M2 | TEMPERATURE: 97.6 F

## 2022-02-07 DIAGNOSIS — R94.4 DECREASED GFR: Primary | ICD-10-CM

## 2022-02-07 DIAGNOSIS — J44.9 COPD, SEVERE (HCC): ICD-10-CM

## 2022-02-07 DIAGNOSIS — F33.9 DEPRESSION, RECURRENT (HCC): ICD-10-CM

## 2022-02-07 DIAGNOSIS — B35.6 TINEA CRURIS: ICD-10-CM

## 2022-02-07 DIAGNOSIS — N18.30 STAGE 3 CHRONIC KIDNEY DISEASE, UNSPECIFIED WHETHER STAGE 3A OR 3B CKD (HCC): ICD-10-CM

## 2022-02-07 DIAGNOSIS — J96.11 CHRONIC RESPIRATORY FAILURE WITH HYPOXIA (HCC): ICD-10-CM

## 2022-02-07 PROCEDURE — 1160F RVW MEDS BY RX/DR IN RCRD: CPT | Performed by: PHYSICIAN ASSISTANT

## 2022-02-07 PROCEDURE — 1036F TOBACCO NON-USER: CPT | Performed by: PHYSICIAN ASSISTANT

## 2022-02-07 PROCEDURE — 99214 OFFICE O/P EST MOD 30 MIN: CPT | Performed by: PHYSICIAN ASSISTANT

## 2022-02-07 PROCEDURE — 3008F BODY MASS INDEX DOCD: CPT | Performed by: PHYSICIAN ASSISTANT

## 2022-02-07 RX ORDER — CLOTRIMAZOLE AND BETAMETHASONE DIPROPIONATE 10; .64 MG/G; MG/G
CREAM TOPICAL 2 TIMES DAILY
Qty: 30 G | Refills: 0 | Status: SHIPPED | OUTPATIENT
Start: 2022-02-07

## 2022-02-08 ENCOUNTER — TELEPHONE (OUTPATIENT)
Dept: INTERNAL MEDICINE CLINIC | Facility: CLINIC | Age: 72
End: 2022-02-08

## 2022-02-08 DIAGNOSIS — R94.4 DECREASED GFR: Primary | ICD-10-CM

## 2022-02-08 NOTE — TELEPHONE ENCOUNTER
Scheduling called they will not schedule the US because they want it to be an US of the kidney and bladder

## 2022-02-08 NOTE — PROGRESS NOTES
Assessment/Plan:  Problem List Items Addressed This Visit        Respiratory    COPD, severe ([facility] )    Chronic respiratory failure with hypoxia (HCC)       Genitourinary    Stage 3 chronic kidney disease, unspecified whether stage 3a or 3b CKD ([facility] )       Other    Depression, recurrent ([facility] )    Decreased GFR - Primary     Recheck labs in 3-4 weeks  Will get kidney ultrasound  Depending on results will see if we need to have pt follow with nephrology  Avoid NSAIDs         Relevant Orders    Basic metabolic panel    Microalbumin / creatinine urine ratio    US Limited Kidney      Other Visit Diagnoses     Tinea cruris        Relevant Medications    clotrimazole-betamethasone (LOTRISONE) 1-0 05 % cream           Diagnoses and all orders for this visit:    Decreased GFR  -     Basic metabolic panel; Future  -     Microalbumin / creatinine urine ratio  -     US Limited Kidney; Future    Tinea cruris  -     clotrimazole-betamethasone (LOTRISONE) 1-0 05 % cream; Apply topically 2 (two) times a day    Stage 3 chronic kidney disease, unspecified whether stage 3a or 3b CKD (HCC)    COPD, severe (HCC)    Depression, recurrent (HCC)    Chronic respiratory failure with hypoxia (HCC)        Decreased GFR  Recheck labs in 3-4 weeks  Will get kidney ultrasound  Depending on results will see if we need to have pt follow with nephrology  Avoid NSAIDs      Subjective:      Patient ID: Joo Shea is a 73 y o  female  Pt presents for routine visit  She is doing well overall  She recently had a BMP which revealed very mildly increased BUN and creatinine and GFR decreased at 46  She notes some mild flank pain  She drinks enough fluids throughout the day and follows a health conscious diet         The following portions of the patient's history were reviewed and updated as appropriate:   She has a past medical history of Allergic, Allergic rhinitis, Asthma, Atrial fibrillation ([facility] [address] ), Bronchitis, COPD (chronic obstructive pulmonary disease) ([facility] ), Depression, Emphysema lung ([facility] ), Fibroadenoma of breast, Hyperlipidemia, Hypertension, Memory difficulties (7/23/2019), Post-menopausal, Post-menopausal, Spondylisthesis, Stage 3 chronic kidney disease, unspecified whether stage 3a or 3b CKD ([facility] ) (2/7/2022), and Vitamin D deficiency  ,  does not have any pertinent problems on file  ,   has a past surgical history that includes Bunionectomy (Bilateral); Tubal ligation; Tonsillectomy; Mole removal; Cataract extraction, bilateral; ADENOIDECTOMY; and Breast cyst excision (Bilateral)  ,  family history includes Alcohol abuse in her mother; Celiac disease in her daughter; Diabetes in her father; Diverticulitis in her sister; Eczema in her daughter; Fibromyalgia in her daughter; Heart failure in her brother; Hypertension in her father and mother; No Known Problems in her daughter, maternal aunt, and sister; Prostate cancer in her father; Vitamin D deficiency in her sister  ,   reports that she quit smoking about 3 years ago  Her smoking use included cigarettes  She has a 12 50 pack-year smoking history  She has never used smokeless tobacco  She reports current alcohol use  She reports that she does not use drugs  ,  is allergic to bupropion, ace inhibitors, citalopram, and adhesive [medical tape]     Current Outpatient Medications   Medication Sig Dispense Refill    albuterol (PROVENTIL HFA,VENTOLIN HFA) 90 mcg/act inhaler Inhale 2 puffs every 4 (four) hours as needed for shortness of breath 54 g 3    ascorbic acid (VITAMIN C) 500 mg tablet Take 500 mg by mouth daily      aspirin 81 mg chewable tablet Chew 1 tablet (81 mg total) daily      atorvastatin (LIPITOR) 40 mg tablet TAKE 1 TABLET BY MOUTH EVERY DAY 90 tablet 1    cetirizine (ZyrTEC) 10 mg tablet Take 10 mg by mouth daily      Cholecalciferol (VITAMIN D3) 5000 units CAPS Take 1 capsule by mouth daily      ipratropium-albuterol (DUO-NEB) 0 5-2 5 mg/3 mL nebulizer solution TAKE 1 VIAL BY MOUTH BY NEBULIZATION FOUR TIMES A DAY AS NEEDED FOR WHEEZING OR SHORTNESS OF BREATH 120 vial 3    L-Lysine 500 MG CAPS Take 1 capsule by mouth daily      metoprolol tartrate (LOPRESSOR) 50 mg tablet Take 1 5 tablets (75 mg total) by mouth 2 (two) times a day 270 tablet 3    mometasone (NASONEX) 50 mcg/act nasal spray 2 sprays into each nostril daily 3 Act 0    MULTIPLE VITAMINS-CALCIUM PO Take by mouth daily      Probiotic Product (PROBIOTIC-10 PO) Take 1 capsule by mouth daily      umeclidinium-vilanterol (Anoro Ellipta) 62 5-25 MCG/INH inhaler Inhale 1 puff daily 180 blister 3    clotrimazole-betamethasone (LOTRISONE) 1-0 05 % cream Apply topically 2 (two) times a day 30 g 0     No current facility-administered medications for this visit  Review of Systems   Constitutional: Negative for chills and fever  HENT: Negative for congestion, ear pain, hearing loss, postnasal drip, rhinorrhea, sinus pressure, sinus pain, sore throat and trouble swallowing  Eyes: Negative for pain and visual disturbance  Respiratory: Negative for cough, chest tightness, shortness of breath and wheezing  Cardiovascular: Negative  Negative for chest pain, palpitations and leg swelling  Gastrointestinal: Negative for abdominal pain, blood in stool, constipation, diarrhea, nausea and vomiting  Endocrine: Negative for cold intolerance, heat intolerance, polydipsia, polyphagia and polyuria  Genitourinary: Positive for flank pain  Negative for difficulty urinating, dysuria and urgency  Musculoskeletal: Negative for arthralgias, back pain, gait problem and myalgias  Skin: Negative for rash  Allergic/Immunologic: Negative  Neurological: Negative for dizziness, weakness, light-headedness and headaches  Hematological: Negative  Psychiatric/Behavioral: Negative for behavioral problems, dysphoric mood and sleep disturbance  The patient is not nervous/anxious            PHQ-2/9 Depression Screening Objective:  Vitals:    02/07/22 1439   BP: 146/82   BP Location: Left arm   Patient Position: Sitting   Cuff Size: Standard   Pulse: 62   Temp: 97 6 °F (36 4 °C)   SpO2: 98%   Weight: 86 9 kg (191 lb 9 6 oz)   Height: 5' 7" (1 702 m)     Body mass index is 30 01 kg/m²  Physical Exam  Constitutional:       General: She is not in acute distress  Appearance: She is well-developed  She is not diaphoretic  HENT:      Head: Normocephalic and atraumatic  Right Ear: External ear normal       Left Ear: External ear normal       Nose: Nose normal       Mouth/Throat:      Pharynx: No oropharyngeal exudate  Eyes:      General: No scleral icterus  Right eye: No discharge  Left eye: No discharge  Conjunctiva/sclera: Conjunctivae normal       Pupils: Pupils are equal, round, and reactive to light  Neck:      Thyroid: No thyromegaly  Cardiovascular:      Rate and Rhythm: Normal rate and regular rhythm  Heart sounds: Normal heart sounds  No murmur heard  No friction rub  No gallop  Pulmonary:      Effort: Pulmonary effort is normal  No respiratory distress  Breath sounds: Normal breath sounds  No wheezing or rales  Abdominal:      General: Bowel sounds are normal  There is no distension  Palpations: Abdomen is soft  Tenderness: There is no abdominal tenderness  Musculoskeletal:         General: No tenderness or deformity  Normal range of motion  Cervical back: Normal range of motion and neck supple  Skin:     General: Skin is warm and dry  Neurological:      Mental Status: She is alert and oriented to person, place, and time  Cranial Nerves: No cranial nerve deficit  Psychiatric:         Behavior: Behavior normal          Thought Content: Thought content normal          Judgment: Judgment normal        BMI Counseling: Body mass index is 30 01 kg/m²   The BMI is above normal  Nutrition recommendations include decreasing portion sizes, consuming healthier snacks and limiting drinks that contain sugar  Rationale for BMI follow-up plan is due to patient being overweight or obese

## 2022-02-08 NOTE — ASSESSMENT & PLAN NOTE
Recheck labs in 3-4 weeks  Will get kidney ultrasound  Depending on results will see if we need to have pt follow with nephrology   Avoid NSAIDs

## 2022-02-11 DIAGNOSIS — J41.8 MIXED SIMPLE AND MUCOPURULENT CHRONIC BRONCHITIS (HCC): ICD-10-CM

## 2022-02-11 RX ORDER — IPRATROPIUM BROMIDE AND ALBUTEROL SULFATE 2.5; .5 MG/3ML; MG/3ML
3 SOLUTION RESPIRATORY (INHALATION) 4 TIMES DAILY
Qty: 1080 ML | Refills: 0 | Status: SHIPPED | OUTPATIENT
Start: 2022-02-11

## 2022-02-15 ENCOUNTER — HOSPITAL ENCOUNTER (OUTPATIENT)
Dept: ULTRASOUND IMAGING | Facility: HOSPITAL | Age: 72
Discharge: HOME | End: 2022-02-15
Attending: INTERNAL MEDICINE
Payer: COMMERCIAL

## 2022-02-15 DIAGNOSIS — R94.4 DECREASED GFR: ICD-10-CM

## 2022-02-15 PROCEDURE — 76770 US EXAM ABDO BACK WALL COMP: CPT

## 2022-04-28 ENCOUNTER — OFFICE VISIT (OUTPATIENT)
Dept: PULMONOLOGY | Facility: CLINIC | Age: 72
End: 2022-04-28
Payer: COMMERCIAL

## 2022-04-28 VITALS
WEIGHT: 190 LBS | OXYGEN SATURATION: 94 % | HEIGHT: 67 IN | SYSTOLIC BLOOD PRESSURE: 143 MMHG | DIASTOLIC BLOOD PRESSURE: 69 MMHG | HEART RATE: 75 BPM | BODY MASS INDEX: 29.82 KG/M2

## 2022-04-28 DIAGNOSIS — J44.9 COPD, SEVERE (HCC): Primary | ICD-10-CM

## 2022-04-28 DIAGNOSIS — G47.33 OSA (OBSTRUCTIVE SLEEP APNEA): ICD-10-CM

## 2022-04-28 PROBLEM — J45.901 ASTHMATIC BRONCHITIS WITH EXACERBATION: Status: RESOLVED | Noted: 2019-04-10 | Resolved: 2022-04-28

## 2022-04-28 PROCEDURE — 99215 OFFICE O/P EST HI 40 MIN: CPT | Performed by: INTERNAL MEDICINE

## 2022-04-28 NOTE — ASSESSMENT & PLAN NOTE
Corinne will remain on Anoro 1 puff daily and use her rescue inhaler as needed  She has been off cigarettes since December and I reviewed her most recent CT scan of the chest   She is due for lung cancer screening CT which I have ordered on today's visit  I encouraged her to go to her new gym as she has already been through pulmonary rehab and is would be a maintenance program   She will she knows to check her oxygen level and start slow

## 2022-04-28 NOTE — PROGRESS NOTES
Assessment/Plan:    GABI (obstructive sleep apnea)  Corinne has had worsening o her snoring since her last sleep study 8 years ago  Repeat her polysomnogram to assess for adequacy of nocturnal oxygen as well as any worsening of her AHI that would require CPAP therapy  COPD, severe ([address] )  Corinne will remain on Anoro 1 puff daily and use her rescue inhaler as needed  She has been off cigarettes since December and I reviewed her most recent CT scan of the chest   She is due for lung cancer screening CT which I have ordered on today's visit  I encouraged her to go to her new gym as she has already been through pulmonary rehab and is would be a maintenance program   She will she knows to check her oxygen level and start slow  Diagnoses and all orders for this visit:    COPD, severe ([address] )  -     CT lung screening program; Future    GABI (obstructive sleep apnea)  -     Diagnostic Sleep Study; Future          Subjective:      Patient ID: Ruben Williamson is a 73 y o  female  Corinne is here for follow-up of her sleep apnea and COPD  She has been in her normal state of health doing well on Anoro 1 puff daily in her rescue inhaler as needed  She does feel better since quitting smoking in December  primary symptoms  Pertinent negatives include no chest pain, coughing, fever, headaches or myalgias  The following portions of the patient's history were reviewed and updated as appropriate: allergies, current medications, past family history, past medical history, past social history, past surgical history and problem list     Review of Systems   Constitutional: Negative  Negative for appetite change, fever and unexpected weight change  HENT: Positive for postnasal drip, rhinorrhea and sneezing  Negative for ear pain  Eyes: Negative  Respiratory: Negative  Negative for cough, shortness of breath and wheezing  Cardiovascular: Negative  Negative for chest pain and leg swelling  Gastrointestinal: Negative  Endocrine: Negative  Genitourinary: Negative  Musculoskeletal: Negative  Negative for myalgias  Allergic/Immunologic: Negative  Neurological: Negative  Negative for headaches  Hematological: Negative  Objective:      /69   Pulse 75   Ht 5' 7" (1 702 m)   Wt 86 2 kg (190 lb)   SpO2 94% Comment: 2l  BMI 29 76 kg/m²          Physical Exam  Constitutional:       Appearance: She is well-developed  HENT:      Head: Normocephalic  Eyes:      Pupils: Pupils are equal, round, and reactive to light  Cardiovascular:      Rate and Rhythm: Normal rate  Heart sounds: No murmur heard  Pulmonary:      Effort: Pulmonary effort is normal  No respiratory distress  Breath sounds: Normal breath sounds  No wheezing or rales  Abdominal:      Palpations: Abdomen is soft  Musculoskeletal:         General: Normal range of motion  Cervical back: Normal range of motion and neck supple  Skin:     General: Skin is warm and dry  Neurological:      Mental Status: She is alert and oriented to person, place, and time           Answers for HPI/ROS submitted by the patient on 4/27/2022  How often do your symptoms occur?: rarely  Since you first noticed this problem, how has it changed?: gradually improving  Do you have shortness of breath that occurs with effort or exertion?: No  Do you have ear congestion?: No  Do you have heartburn?: No  Do you have fatigue?: No  Do you have nasal congestion?: Yes  Do you have shortness of breath when lying flat?: No  Do you have shortness of breath when you wake up?: No  Which of the following makes your symptoms worse?: animal exposure, pollen

## 2022-04-28 NOTE — ASSESSMENT & PLAN NOTE
Irvin Bello has had worsening o her snoring since her last sleep study 8 years ago  Repeat her polysomnogram to assess for adequacy of nocturnal oxygen as well as any worsening of her AHI that would require CPAP therapy

## 2022-05-04 ENCOUNTER — HOSPITAL ENCOUNTER (OUTPATIENT)
Dept: CT IMAGING | Facility: HOSPITAL | Age: 72
Discharge: HOME | End: 2022-05-04
Payer: COMMERCIAL

## 2022-05-04 DIAGNOSIS — J44.9 COPD, SEVERE (HCC): ICD-10-CM

## 2022-05-04 PROCEDURE — 71271 CT THORAX LUNG CANCER SCR C-: CPT

## 2022-05-06 ENCOUNTER — TELEPHONE (OUTPATIENT)
Dept: SLEEP CENTER | Facility: CLINIC | Age: 72
End: 2022-05-06

## 2022-05-06 NOTE — TELEPHONE ENCOUNTER
----- Message from Sangeeta Lane MD sent at 5/4/2022 12:55 PM EDT -----  Approved  ----- Message -----  From: Naz Tomlinson  Sent: 4/29/2022   8:24 AM EDT  To: Sleep Medicine [place] Provider    This sleep study needs approval      If approved please sign and return to clerical pool  If denied please include reasons why  Also provide alternative testing if warranted  Please sign and return to clerical pool

## 2022-05-09 DIAGNOSIS — Z12.31 ENCOUNTER FOR SCREENING MAMMOGRAM FOR MALIGNANT NEOPLASM OF BREAST: Primary | ICD-10-CM

## 2022-05-24 ENCOUNTER — HOSPITAL ENCOUNTER (OUTPATIENT)
Dept: SLEEP CENTER | Facility: HOSPITAL | Age: 72
Discharge: HOME | End: 2022-05-24
Attending: INTERNAL MEDICINE
Payer: COMMERCIAL

## 2022-05-24 DIAGNOSIS — G47.33 OSA (OBSTRUCTIVE SLEEP APNEA): ICD-10-CM

## 2022-05-24 PROCEDURE — 95810 POLYSOM 6/> YRS 4/> PARAM: CPT | Performed by: INTERNAL MEDICINE

## 2022-05-24 PROCEDURE — 95810 POLYSOM 6/> YRS 4/> PARAM: CPT

## 2022-05-25 NOTE — PROGRESS NOTES
Sleep Study Documentation    Pre-Sleep Study       Sleep testing procedure explained to patient:YES    Patient napped prior to study:YES- less than 30 minutes  Napped after 2PM: no    Caffeine:Dayshift worker after 12PM   Caffeine use:NO    Alcohol:Dayshift workers after 5PM: Alcohol use:NO    Typical day for patient:YES       Study Documentation    Sleep Study Indications: The patient is here for snoring, excessive daytime sleepiness and BMI>30  Sleep Study: Diagnostic   Snore:Mild occasionally moderate  Supplemental O2: yes  O2 flow rate (L/min) range 1  O2 flow rate (L/min) final 1  O2 flow rate (L/min) range 1  O2 flow rate (L/min) final 1  Minimum SaO2 83  Baseline SaO2 90        Mode of Therapy:N/A    EKG abnormalities: PACs noted throughout study     EEG abnormalities: no    Sleep Study Recorded < 2 hours: N/A    Sleep Study Recorded > 2 hours but incomplete study: N/A    Sleep Study Recorded 6 hours but no sleep obtained: NO    Patient classification: retired       Post-Sleep Study    Medication used at bedtime or during sleep study:NO    Patient reports time it took to fall asleep:30 to 60 minutes    Patient reports waking up during study:3 or more times  Patient reports returning to sleep in greater than 30 minutes  Patient reports sleeping 2 to 4 hours without dreaming  Patient reports sleep during study:worse than usual    Patient rated sleepiness: Somewhat sleepy or tired    PAP treatment:no

## 2022-06-01 ENCOUNTER — HOSPITAL ENCOUNTER (OUTPATIENT)
Dept: MAMMOGRAPHY | Facility: HOSPITAL | Age: 72
Discharge: HOME | End: 2022-06-01
Payer: COMMERCIAL

## 2022-06-01 DIAGNOSIS — Z12.31 ENCOUNTER FOR SCREENING MAMMOGRAM FOR BREAST CANCER: ICD-10-CM

## 2022-06-01 PROCEDURE — 77063 BREAST TOMOSYNTHESIS BI: CPT

## 2022-06-01 PROCEDURE — 77067 SCR MAMMO BI INCL CAD: CPT

## 2022-06-02 ENCOUNTER — TELEPHONE (OUTPATIENT)
Dept: PULMONOLOGY | Facility: CLINIC | Age: 72
End: 2022-06-02

## 2022-06-02 NOTE — TELEPHONE ENCOUNTER
Results of sleep study with her in detail  She did not sleep well during testing  Based on testing she does not meet criteria for GABI but does have nocturnal hypoxia corrected with 1 L nasal cannula  Recommended she use 1 L nasal cannula during all hours of sleep  Patient voices concern that she usually wore 2 L  for many years  I suggest checking pulse ox on 1 L in the future that can be   Arranged at a later date  She verbalized understanding and agrees to the plan  All questions answered

## 2022-06-28 NOTE — PROGRESS NOTES
Addended by: BLAZE SIU on: 6/28/2022 01:36 PM     Modules accepted: Orders     Pt  States she is dealing with congestion in nasal passages  Pt was able to perform all exercise on 2 liters supplemental oxygen

## 2022-07-04 DIAGNOSIS — J44.9 CHRONIC OBSTRUCTIVE PULMONARY DISEASE, UNSPECIFIED COPD TYPE (HCC): ICD-10-CM

## 2022-10-31 ENCOUNTER — OFFICE VISIT (OUTPATIENT)
Dept: PULMONOLOGY | Facility: CLINIC | Age: 72
End: 2022-10-31

## 2022-10-31 VITALS
BODY MASS INDEX: 27.37 KG/M2 | OXYGEN SATURATION: 98 % | TEMPERATURE: 97.8 F | WEIGHT: 174.4 LBS | DIASTOLIC BLOOD PRESSURE: 66 MMHG | HEART RATE: 65 BPM | SYSTOLIC BLOOD PRESSURE: 136 MMHG | HEIGHT: 67 IN

## 2022-10-31 DIAGNOSIS — R06.02 SOB (SHORTNESS OF BREATH): ICD-10-CM

## 2022-10-31 DIAGNOSIS — J96.11 CHRONIC RESPIRATORY FAILURE WITH HYPOXIA (HCC): ICD-10-CM

## 2022-10-31 DIAGNOSIS — J44.9 COPD, SEVERE (HCC): Primary | ICD-10-CM

## 2022-10-31 DIAGNOSIS — J30.9 ALLERGIC RHINITIS, UNSPECIFIED SEASONALITY, UNSPECIFIED TRIGGER: ICD-10-CM

## 2022-10-31 RX ORDER — MOMETASONE FUROATE 50 UG/1
2 SPRAY, METERED NASAL DAILY
Qty: 51 G | Refills: 0 | Status: SHIPPED | OUTPATIENT
Start: 2022-10-31

## 2022-10-31 RX ORDER — ALBUTEROL SULFATE 90 UG/1
2 AEROSOL, METERED RESPIRATORY (INHALATION) EVERY 4 HOURS PRN
Qty: 54 G | Refills: 0 | Status: SHIPPED | OUTPATIENT
Start: 2022-10-31

## 2022-10-31 NOTE — ASSESSMENT & PLAN NOTE
Corinne will maintain on Anoro 1 puff daily and use her rescue inhaler daily  We reviewed the results of her pulmonary function test and she does have severe hyperinflation and would be a good candidate for endobronchial valve placement at least consideration  She understands that she would have to quit smoking  I will send her information in the mail for her reading  In the meantime we talked about pulmonary rehab and she does not want to restart rehab at this time  I reviewed her most recent CT scan and she is due for repeat CT scan in May of 2023 which will order at her next visit    Her last annual CT scan was without abnormality

## 2022-10-31 NOTE — PROGRESS NOTES
Assessment/Plan:    COPD, severe ([facility] )  Corinne will maintain on Anoro 1 puff daily and use her rescue inhaler daily  We reviewed the results of her pulmonary function test and she does have severe hyperinflation and would be a good candidate for endobronchial valve placement at least consideration  She understands that she would have to quit smoking  I will send her information in the mail for her reading  In the meantime we talked about pulmonary rehab and she does not want to restart rehab at this time  I reviewed her most recent CT scan and she is due for repeat CT scan in May of 2023 which will order at her next visit  Her last annual CT scan was without abnormality    Chronic respiratory failure with hypoxia ([facility] )  I reviewed her last sleep study which showed no evidence obstructive sleep apnea just oxygen requirements at night  She is wearing her oxygen all night every night  Diagnoses and all orders for this visit:    COPD, severe ([facility] )  -     Pneumococcal Conjugate Vaccine 20-valent (Pcv20)    SOB (shortness of breath)  -     albuterol (PROVENTIL HFA,VENTOLIN HFA) 90 mcg/act inhaler; Inhale 2 puffs every 4 (four) hours as needed for shortness of breath    Allergic rhinitis, unspecified seasonality, unspecified trigger  -     mometasone (NASONEX) 50 mcg/act nasal spray; 2 sprays into each nostril daily    Chronic respiratory failure with hypoxia (HCC)          Subjective:      Patient ID: Pgegy Martínez is a 81 y o  female  Maddy has been taking her Anoro 1 puff daily  With that she only uses her rescue inhaler less than once a day  Unfortunately she started smoking in his up to 4-5 cigarettes a day  primary symptoms  Associated symptoms include headaches  Pertinent negatives include no chest pain, coughing, fever, myalgias or sore throat         The following portions of the patient's history were reviewed and updated as appropriate: allergies, current medications, past family history, past medical history, past social history, past surgical history and problem list     Review of Systems   Constitutional: Negative  Negative for appetite change, fever and unexpected weight change  HENT: Positive for postnasal drip and sneezing  Negative for ear pain, rhinorrhea, sore throat and trouble swallowing  Eyes: Negative  Respiratory: Negative  Negative for cough, shortness of breath and wheezing  Cardiovascular: Negative  Negative for chest pain and leg swelling  Gastrointestinal: Negative  Endocrine: Negative  Genitourinary: Negative  Musculoskeletal: Negative  Negative for myalgias  Allergic/Immunologic: Negative  Neurological: Positive for headaches  Hematological: Negative  Objective:      /66   Pulse 65   Temp 97 8 °F (36 6 °C) (Tympanic)   Ht 5' 7" (1 702 m)   Wt 79 1 kg (174 lb 6 4 oz)   SpO2 98%   BMI 27 31 kg/m²          Physical Exam  Constitutional:       Appearance: She is well-developed  HENT:      Head: Normocephalic  Eyes:      Pupils: Pupils are equal, round, and reactive to light  Cardiovascular:      Rate and Rhythm: Normal rate  Heart sounds: No murmur heard  Pulmonary:      Effort: Pulmonary effort is normal  No respiratory distress  Breath sounds: Normal breath sounds  No wheezing or rales  Abdominal:      Palpations: Abdomen is soft  Musculoskeletal:         General: Normal range of motion  Cervical back: Normal range of motion and neck supple  Skin:     General: Skin is warm and dry  Neurological:      Mental Status: She is alert and oriented to person, place, and time           Answers for HPI/ROS submitted by the patient on 10/30/2022  Do you have a wet cough?: Yes  Chronicity: recurrent  When did you first notice your symptoms?: more than 1 year ago  How often do your symptoms occur?: daily  Since you first noticed this problem, how has it changed?: unchanged  Do you have shortness of breath that occurs with effort or exertion?: No  Do you have ear congestion?: No  Do you have heartburn?: No  Do you have fatigue?: No  Do you have nasal congestion?: Yes  Do you have shortness of breath when lying flat?: No  Do you have shortness of breath when you wake up?: No  Do you have sweats?: No  Have you experienced weight loss?: No  Which of the following makes your symptoms worse?: animal exposure, change in weather, exposure to fumes, pollen  Risk factors for lung disease: smoking/tobacco exposure

## 2022-10-31 NOTE — ASSESSMENT & PLAN NOTE
I reviewed her last sleep study which showed no evidence obstructive sleep apnea just oxygen requirements at night  She is wearing her oxygen all night every night

## 2022-11-03 DIAGNOSIS — E78.5 HYPERLIPIDEMIA, UNSPECIFIED HYPERLIPIDEMIA TYPE: ICD-10-CM

## 2022-11-03 RX ORDER — ATORVASTATIN CALCIUM 40 MG/1
TABLET, FILM COATED ORAL
Qty: 90 TABLET | Refills: 1 | Status: SHIPPED | OUTPATIENT
Start: 2022-11-03

## 2022-11-13 DIAGNOSIS — I48.0 PAROXYSMAL ATRIAL FIBRILLATION (HCC): ICD-10-CM

## 2022-11-14 RX ORDER — METOPROLOL TARTRATE 50 MG/1
TABLET, FILM COATED ORAL
Qty: 270 TABLET | Refills: 3 | Status: SHIPPED | OUTPATIENT
Start: 2022-11-14

## 2022-12-01 ENCOUNTER — RA CDI HCC (OUTPATIENT)
Dept: OTHER | Facility: HOSPITAL | Age: 72
End: 2022-12-01

## 2022-12-01 NOTE — PROGRESS NOTES
[facility]  coding opportunities       Chart reviewed, no opportunity found:   [address]        Patients Insurance     Medicare Insurance: [employer]

## 2022-12-08 ENCOUNTER — OFFICE VISIT (OUTPATIENT)
Dept: INTERNAL MEDICINE CLINIC | Facility: CLINIC | Age: 72
End: 2022-12-08

## 2022-12-08 VITALS
DIASTOLIC BLOOD PRESSURE: 64 MMHG | HEIGHT: 67 IN | HEART RATE: 68 BPM | OXYGEN SATURATION: 98 % | TEMPERATURE: 98 F | WEIGHT: 174.25 LBS | BODY MASS INDEX: 27.35 KG/M2 | SYSTOLIC BLOOD PRESSURE: 122 MMHG

## 2022-12-08 DIAGNOSIS — Z13.1 SCREENING FOR DIABETES MELLITUS: ICD-10-CM

## 2022-12-08 DIAGNOSIS — Z00.00 MEDICARE ANNUAL WELLNESS VISIT, SUBSEQUENT: Primary | ICD-10-CM

## 2022-12-08 DIAGNOSIS — E78.2 MIXED HYPERLIPIDEMIA: ICD-10-CM

## 2022-12-08 DIAGNOSIS — E55.9 VITAMIN D DEFICIENCY: ICD-10-CM

## 2022-12-08 DIAGNOSIS — Z13.29 SCREENING FOR THYROID DISORDER: ICD-10-CM

## 2022-12-08 DIAGNOSIS — J44.9 COPD, SEVERE (HCC): ICD-10-CM

## 2022-12-08 DIAGNOSIS — Z13.0 SCREENING FOR DEFICIENCY ANEMIA: ICD-10-CM

## 2022-12-08 DIAGNOSIS — R06.02 SOB (SHORTNESS OF BREATH): ICD-10-CM

## 2022-12-08 NOTE — PATIENT INSTRUCTIONS
Medicare Preventive Visit Patient Instructions  Thank you for completing your Welcome to Medicare Visit or Medicare Annual Wellness Visit today  Your next wellness visit will be due in one year (12/9/2023)  The screening/preventive services that you may require over the next 5-10 years are detailed below  Some tests may not apply to you based off risk factors and/or age  Screening tests ordered at today's visit but not completed yet may show as past due  Also, please note that scanned in results may not display below  Preventive Screenings:  Service Recommendations Previous Testing/Comments   Colorectal Cancer Screening  * Colonoscopy    * Fecal Occult Blood Test (FOBT)/Fecal Immunochemical Test (FIT)  * Fecal DNA/Cologuard Test  * Flexible Sigmoidoscopy Age: 39-70 years old   Colonoscopy: every 10 years (may be performed more frequently if at higher risk)  OR  FOBT/FIT: every 1 year  OR  Cologuard: every 3 years  OR  Sigmoidoscopy: every 5 years  Screening may be recommended earlier than age 39 if at higher risk for colorectal cancer  Also, an individualized decision between you and your healthcare provider will decide whether screening between the ages of 74-80 would be appropriate  Colonoscopy: Not on file  FOBT/FIT: Not on file  Cologuard: Not on file  Sigmoidoscopy: Not on file          Breast Cancer Screening Age: 36 years old  Frequency: every 1-2 years  Not required if history of left and right mastectomy Mammogram: 06/01/2022    Screening Current   Cervical Cancer Screening Between the ages of 21-29, pap smear recommended once every 3 years  Between the ages of 33-67, can perform pap smear with HPV co-testing every 5 years     Recommendations may differ for women with a history of total hysterectomy, cervical cancer, or abnormal pap smears in past  Pap Smear: 11/09/2018    Screening Not Indicated   Hepatitis C Screening Once for adults born between 1945 and 1965  More frequently in patients at high risk for Hepatitis C Hep C Antibody: Not on file        Diabetes Screening 1-2 times per year if you're at risk for diabetes or have pre-diabetes Fasting glucose: 103 mg/dL (11/9/2021)  A1C: 5 4 % (11/9/2021)  Screening Current   Cholesterol Screening Once every 5 years if you don't have a lipid disorder  May order more often based on risk factors  Lipid panel: 11/09/2021    Screening Not Indicated  History Lipid Disorder     Other Preventive Screenings Covered by Medicare:  1  Abdominal Aortic Aneurysm (AAA) Screening: covered once if your at risk  You're considered to be at risk if you have a family history of AAA  2  Lung Cancer Screening: covers low dose CT scan once per year if you meet all of the following conditions: (1) Age 50-69; (2) No signs or symptoms of lung cancer; (3) Current smoker or have quit smoking within the last 15 years; (4) You have a tobacco smoking history of at least 20 pack years (packs per day multiplied by number of years you smoked); (5) You get a written order from a healthcare provider  3  Glaucoma Screening: covered annually if you're considered high risk: (1) You have diabetes OR (2) Family history of glaucoma OR (3)  aged 48 and older OR (3)  American aged 72 and older  3  Osteoporosis Screening: covered every 2 years if you meet one of the following conditions: (1) You're estrogen deficient and at risk for osteoporosis based off medical history and other findings; (2) Have a vertebral abnormality; (3) On glucocorticoid therapy for more than 3 months; (4) Have primary hyperparathyroidism; (5) On osteoporosis medications and need to assess response to drug therapy  · Last bone density test (DXA Scan): 05/27/2021   5  HIV Screening: covered annually if you're between the age of 15-65  Also covered annually if you are younger than 13 and older than 72 with risk factors for HIV infection   For pregnant patients, it is covered up to 3 times per pregnancy  Immunizations:  Immunization Recommendations   Influenza Vaccine Annual influenza vaccination during flu season is recommended for all persons aged >= 6 months who do not have contraindications   Pneumococcal Vaccine   * Pneumococcal conjugate vaccine = PCV13 (Prevnar 13), PCV15 (Vaxneuvance), PCV20 (Prevnar 20)  * Pneumococcal polysaccharide vaccine = PPSV23 (Pneumovax) Adults 25-60 years old: 1-3 doses may be recommended based on certain risk factors  Adults 72 years old: 1-2 doses may be recommended based off what pneumonia vaccine you previously received   Hepatitis B Vaccine 3 dose series if at intermediate or high risk (ex: diabetes, end stage renal disease, liver disease)   Tetanus (Td) Vaccine - COST NOT COVERED BY MEDICARE PART B Following completion of primary series, a booster dose should be given every 10 years to maintain immunity against tetanus  Td may also be given as tetanus wound prophylaxis  Tdap Vaccine - COST NOT COVERED BY MEDICARE PART B Recommended at least once for all adults  For pregnant patients, recommended with each pregnancy  Shingles Vaccine (Shingrix) - COST NOT COVERED BY MEDICARE PART B  2 shot series recommended in those aged 48 and above     Health Maintenance Due:      Topic Date Due   • Hepatitis C Screening  Never done   • Colorectal Cancer Screening  Never done   • Lung Cancer Screening  05/04/2023   • DXA SCAN  05/27/2023   • Breast Cancer Screening: Mammogram  06/01/2023     Immunizations Due:      Topic Date Due   • Hepatitis B Vaccine (1 of 3 - 3-dose series) Never done     Advance Directives   What are advance directives? Advance directives are legal documents that state your wishes and plans for medical care  These plans are made ahead of time in case you lose your ability to make decisions for yourself  Advance directives can apply to any medical decision, such as the treatments you want, and if you want to donate organs     What are the types of advance directives? There are many types of advance directives, and each state has rules about how to use them  You may choose a combination of any of the following:  · Living will: This is a written record of the treatment you want  You can also choose which treatments you do not want, which to limit, and which to stop at a certain time  This includes surgery, medicine, IV fluid, and tube feedings  · Durable power of  for healthcare [facility] [place]): This is a written record that states who you want to make healthcare choices for you when you are unable to make them for yourself  This person, called a proxy, is usually a family member or a friend  You may choose more than 1 proxy  · Do not resuscitate (DNR) order:  A DNR order is used in case your heart stops beating or you stop breathing  It is a request not to have certain forms of treatment, such as CPR  A DNR order may be included in other types of advance directives  · Medical directive: This covers the care that you want if you are in a coma, near death, or unable to make decisions for yourself  You can list the treatments you want for each condition  Treatment may include pain medicine, surgery, blood transfusions, dialysis, IV or tube feedings, and a ventilator (breathing machine)  · Values history: This document has questions about your views, beliefs, and how you feel and think about life  This information can help others choose the care that you would choose  Why are advance directives important? An advance directive helps you control your care  Although spoken wishes may be used, it is better to have your wishes written down  Spoken wishes can be misunderstood, or not followed  Treatments may be given even if you do not want them  An advance directive may make it easier for your family to make difficult choices about your care  Urinary Incontinence   Urinary incontinence (UI)  is when you lose control of your bladder   UI develops because your bladder cannot store or empty urine properly  The 3 most common types of UI are stress incontinence, urge incontinence, or both  Medicines:   · May be given to help strengthen your bladder control  Report any side effects of medication to your healthcare provider  Do pelvic muscle exercises often:  Your pelvic muscles help you stop urinating  Squeeze these muscles tight for 5 seconds, then relax for 5 seconds  Gradually work up to squeezing for 10 seconds  Do 3 sets of 15 repetitions a day, or as directed  This will help strengthen your pelvic muscles and improve bladder control  Train your bladder:  Go to the bathroom at set times, such as every 2 hours, even if you do not feel the urge to go  You can also try to hold your urine when you feel the urge to go  For example, hold your urine for 5 minutes when you feel the urge to go  As that becomes easier, hold your urine for 10 minutes  Self-care:   · Keep a UI record  Write down how often you leak urine and how much you leak  Make a note of what you were doing when you leaked urine  · Drink liquids as directed  You may need to limit the amount of liquid you drink to help control your urine leakage  Do not drink any liquid right before you go to bed  Limit or do not have drinks that contain caffeine or alcohol  · Prevent constipation  Eat a variety of high-fiber foods  Good examples are high-fiber cereals, beans, vegetables, and whole-grain breads  Walking is the best way to trigger your intestines to have a bowel movement  · Exercise regularly and maintain a healthy weight  Weight loss and exercise will decrease pressure on your bladder and help you control your leakage  · Use a catheter as directed  to help empty your bladder  A catheter is a tiny, plastic tube that is put into your bladder to drain your urine  · Go to behavior therapy as directed  Behavior therapy may be used to help you learn to control your urge to urinate      Cigarette Smoking and Your Health   Risks to your health if you smoke:  Nicotine and other chemicals found in tobacco damage every cell in your body  Even if you are a light smoker, you have an increased risk for cancer, heart disease, and lung disease  If you are pregnant or have diabetes, smoking increases your risk for complications  Benefits to your health if you stop smoking:   · You decrease respiratory symptoms such as coughing, wheezing, and shortness of breath  · You reduce your risk for cancers of the lung, mouth, throat, kidney, bladder, pancreas, stomach, and cervix  If you already have cancer, you increase the benefits of chemotherapy  You also reduce your risk for cancer returning or a second cancer from developing  · You reduce your risk for heart disease, blood clots, heart attack, and stroke  · You reduce your risk for lung infections, and diseases such as pneumonia, asthma, chronic bronchitis, and emphysema  · Your circulation improves  More oxygen can be delivered to your body  If you have diabetes, you lower your risk for complications, such as kidney, artery, and eye diseases  You also lower your risk for nerve damage  Nerve damage can lead to amputations, poor vision, and blindness  · You improve your body's ability to heal and to fight infections  For more information and support to stop smoking:   · [employer]  Phone: 1- 269 - 174-2619  Web Address: www [employer]  Weight Management   Why it is important to manage your weight:  Being overweight increases your risk of health conditions such as heart disease, high blood pressure, type 2 diabetes, and certain types of cancer  It can also increase your risk for osteoarthritis, sleep apnea, and other respiratory problems  Aim for a slow, steady weight loss  Even a small amount of weight loss can lower your risk of health problems    How to lose weight safely:  A safe and healthy way to lose weight is to eat fewer calories and get regular exercise  You can lose up about 1 pound a week by decreasing the number of calories you eat by 500 calories each day  Healthy meal plan for weight management:  A healthy meal plan includes a variety of foods, contains fewer calories, and helps you stay healthy  A healthy meal plan includes the following:  · Eat whole-grain foods more often  A healthy meal plan should contain fiber  Fiber is the part of grains, fruits, and vegetables that is not broken down by your body  Whole-grain foods are healthy and provide extra fiber in your diet  Some examples of whole-grain foods are whole-wheat breads and pastas, oatmeal, brown rice, and bulgur  · Eat a variety of vegetables every day  Include dark, leafy greens such as spinach, kale, lori greens, and mustard greens  Eat yellow and orange vegetables such as carrots, sweet potatoes, and winter squash  · Eat a variety of fruits every day  Choose fresh or canned fruit (canned in its own juice or light syrup) instead of juice  Fruit juice has very little or no fiber  · Eat low-fat dairy foods  Drink fat-free (skim) milk or 1% milk  Eat fat-free yogurt and low-fat cottage cheese  Try low-fat cheeses such as mozzarella and other reduced-fat cheeses  · Choose meat and other protein foods that are low in fat  Choose beans or other legumes such as split peas or lentils  Choose fish, skinless poultry (chicken or turkey), or lean cuts of red meat (beef or pork)  Before you cook meat or poultry, cut off any visible fat  · Use less fat and oil  Try baking foods instead of frying them  Add less fat, such as margarine, sour cream, regular salad dressing and mayonnaise to foods  Eat fewer high-fat foods  Some examples of high-fat foods include french fries, doughnuts, ice cream, and cakes  · Eat fewer sweets  Limit foods and drinks that are high in sugar  This includes candy, cookies, regular soda, and sweetened drinks    Exercise:  Exercise at least 30 minutes per day on most days of the week  Some examples of exercise include walking, biking, dancing, and swimming  You can also fit in more physical activity by taking the stairs instead of the elevator or parking farther away from stores  Ask your healthcare provider about the best exercise plan for you  © Copyright [employer] 2018 Information is for End User's use only and may not be sold, redistributed or otherwise used for commercial purposes   All illustrations and images included in CareNotes® are the copyrighted property of [employer]  or [address] [place] [address]

## 2022-12-08 NOTE — PROGRESS NOTES
Assessment and Plan:     Problem List Items Addressed This Visit        Respiratory    COPD, severe ([address] )     Encouraged to quit smoking and pt is wanting to also  She plans to quit in the new year and is seriously considering the bronchial valve procedure  As of now she is stable with her inhaler regime and supplemental oxygen  Other    Hyperlipidemia    Relevant Orders    Lipid panel    Vitamin D deficiency    Relevant Orders    Vitamin D 25 hydroxy   Other Visit Diagnoses     Medicare annual wellness visit, subsequent    -  Primary    Screening for deficiency anemia        Relevant Orders    CBC and differential    SOB (shortness of breath)        Screening for thyroid disorder        Relevant Orders    TSH, 3rd generation    Screening for diabetes mellitus        Relevant Orders    Comprehensive metabolic panel          Urinary Incontinence Plan of Care: counseling topics discussed: practice Kegel (pelvic floor strengthening) exercises and use restroom every 2 hours  Tobacco Cessation Counseling: Tobacco cessation counseling was provided  The patient is sincerely urged to quit consumption of tobacco  She is ready to quit tobacco        Preventive health issues were discussed with patient, and age appropriate screening tests were ordered as noted in patient's After Visit Summary  Personalized health advice and appropriate referrals for health education or preventive services given if needed, as noted in patient's After Visit Summary  History of Present Illness:     Patient presents for a Medicare Wellness Visit    Pt presents for routine visit  She is due for labs  Subsequent AWV performed  She admits that she has been smoking and would like to try to quit again for the new year  She is considering bronchial valve placement surgery but needs to be smoke free x 4 months to be able to proceed  Her breathing has been stable with current regime        Patient Care Team:  Tre Mitchell PA-C as PCP - General (Internal Medicine)  Pio Garrido DO as PCP - [address] [place], [place] [address]Th [facility] ([facility])  [facility]  MD Erinn Marc MD Houston Harsh, PA-C Marquita Bacon, DO     Review of Systems:     Review of Systems   Constitutional: Negative for chills and fever  HENT: Negative for congestion, ear pain, hearing loss, postnasal drip, rhinorrhea, sinus pressure, sinus pain, sore throat and trouble swallowing  Eyes: Negative for pain and visual disturbance  Respiratory: Negative for cough, chest tightness, shortness of breath and wheezing  Cardiovascular: Negative  Negative for chest pain, palpitations and leg swelling  Gastrointestinal: Negative for abdominal pain, blood in stool, constipation, diarrhea, nausea and vomiting  Endocrine: Negative for cold intolerance, heat intolerance, polydipsia, polyphagia and polyuria  Genitourinary: Negative for difficulty urinating, dysuria, flank pain and urgency  Musculoskeletal: Negative for arthralgias, back pain, gait problem and myalgias  Skin: Negative for rash  Allergic/Immunologic: Negative  Neurological: Negative for dizziness, weakness, light-headedness and headaches  Hematological: Negative  Psychiatric/Behavioral: Negative for behavioral problems, dysphoric mood and sleep disturbance  The patient is not nervous/anxious           Problem List:     Patient Active Problem List   Diagnosis   • Allergic rhinitis   • Anxiety   • Emphysema/COPD ([address] )   • Nicotine dependence with current use   • Depression, recurrent ([address] )   • Hyperlipidemia   • GABI (obstructive sleep apnea)   • Vitamin D deficiency   • Paroxysmal atrial fibrillation (HCC)   • Need for vaccination with 13-polyvalent pneumococcal conjugate vaccine   • Constipation   • Incidental pulmonary nodule, > 3mm and < 8mm   • Memory difficulties   • COPD, severe ([address] )   • Family history of autoimmune disorder   • Bursitis of right shoulder • Chronic respiratory failure with hypoxia ([facility])   • Tinea unguium   • Myxoid cyst   • Stage 3 chronic kidney disease, unspecified whether stage 3a or 3b CKD ([facility])   • Decreased GFR      Past Medical and Surgical History:     Past Medical History:   Diagnosis Date   • Allergic    • Allergic rhinitis     [id number]  RESOLVED   • Asthma    • Atrial fibrillation ([facility])    • Bronchitis    • COPD (chronic obstructive pulmonary disease) ([facility])     home O2 at night at home 2L   • Depression    • Emphysema lung ([facility])    • Fibroadenoma of breast    • Hyperlipidemia    • Hypertension    • Memory difficulties 7/23/2019   • Post-menopausal    • Post-menopausal    • Spondylisthesis    • Stage 3 chronic kidney disease, unspecified whether stage 3a or 3b CKD ([facility] ) 2/7/2022   • Vitamin D deficiency      Past Surgical History:   Procedure Laterality Date   • ADENOIDECTOMY     • BREAST CYST EXCISION Bilateral     benign-1987, 2002, 2007   • BUNIONECTOMY Bilateral    • CATARACT EXTRACTION, BILATERAL     • MOLE REMOVAL      lip   • TONSILLECTOMY     • TUBAL LIGATION        Family History:     Family History   Problem Relation Age of Onset   • Hypertension Mother    • Alcohol abuse Mother    • Hypertension Father    • Diabetes Father    • Prostate cancer Father    • Vitamin D deficiency Sister    • Diverticulitis Sister    • No Known Problems Sister    • Heart failure Brother    • Lung cancer Brother    • Celiac disease Daughter    • Eczema Daughter    • Fibromyalgia Daughter    • Substance Abuse Daughter    • Alcohol abuse Daughter    • ADD / ADHD Daughter    • Bipolar disorder Daughter    • Anxiety disorder Daughter    • Ulcerative colitis Daughter    • No Known Problems Maternal Aunt    • Breast cancer Neg Hx       Social History:     Social History     Socioeconomic History   • Marital status:      Spouse name: None   • Number of children: None   • Years of education: None   • Highest education level: None   Occupational History   • None   Tobacco Use   • Smoking status: Every Day     Packs/day: 0 25     Years: 50 00     Pack years: 12 50     Types: Cigarettes     Start date: 7/4/2022   • Smokeless tobacco: Never   • Tobacco comments:     3 ciggs daily as of 12/2/22   Vaping Use   • Vaping Use: Never used   Substance and Sexual Activity   • Alcohol use: Yes     Comment: rarely    • Drug use: No   • Sexual activity: Not Currently     Birth control/protection: Post-menopausal     Comment: sperated   Other Topics Concern   • None   Social History Narrative    -    Retired    Lives with significant other     Social Determinants of Health     Financial Resource Strain: Not on file   Food Insecurity: Not on file   Transportation Needs: Unknown   • Lack of Transportation (Medical): No   • Lack of Transportation (Non-Medical):  Not on file   Physical Activity: Not on file   Stress: Not on file   Social Connections: Not on file   Intimate Partner Violence: Not on file   Housing Stability: Not on file      Medications and Allergies:     Current Outpatient Medications   Medication Sig Dispense Refill   • albuterol (PROVENTIL HFA,VENTOLIN HFA) 90 mcg/act inhaler Inhale 2 puffs every 4 (four) hours as needed for shortness of breath 54 g 0   • Anoro Ellipta 62 5-25 MCG/INH inhaler INHALE 1 PUFF BY MOUTH EVERY  blister 3   • ascorbic acid (VITAMIN C) 500 mg tablet Take 500 mg by mouth daily     • aspirin 81 mg chewable tablet Chew 1 tablet (81 mg total) daily     • atorvastatin (LIPITOR) 40 mg tablet TAKE ONE TABLET BY MOUTH EVERY DAY 90 tablet 1   • cetirizine (ZyrTEC) 10 mg tablet Take 10 mg by mouth daily     • Cholecalciferol (VITAMIN D3) 5000 units CAPS Take 1 capsule by mouth daily     • clotrimazole-betamethasone (LOTRISONE) 1-0 05 % cream Apply topically 2 (two) times a day 30 g 0   • ipratropium-albuterol (DUO-NEB) 0 5-2 5 mg/3 mL nebulizer solution Take 3 mL by nebulization 4 (four) times a day 1080 mL 0   • L-Lysine 500 MG CAPS Take 1 capsule by mouth daily     • metoprolol tartrate (LOPRESSOR) 50 mg tablet TAKE ONE AND ONE-HALF TABLETS BY MOUTH TWICE A  tablet 3   • mometasone (NASONEX) 50 mcg/act nasal spray 2 sprays into each nostril daily 51 g 0   • MULTIPLE VITAMINS-CALCIUM PO Take by mouth daily     • Probiotic Product (PROBIOTIC-10 PO) Take 1 capsule by mouth daily       No current facility-administered medications for this visit  Allergies   Allergen Reactions   • Bupropion Itching   • Ace Inhibitors Cough     Action Taken: stopped med and changed to ARB; Category:  Adverse Reaction;    • Citalopram Diarrhea and Nausea Only   • Adhesive [Medical Tape] Rash     Patch       Immunizations:     Immunization History   Administered Date(s) Administered   • COVID-19 PFIZER VACCINE 0 3 ML IM 03/17/2021, 04/07/2021, 11/10/2021, 04/26/2022   • COVID-19 Pfizer Vac BIVALENT Kevin-sucrose 12 Yr+ IM (BOOSTER ONLY) 09/29/2022   • COVID-19 Pfizer vac (Kevin-sucrose, gray cap) 12 yr+ IM 04/26/2022   • INFLUENZA 11/09/2015, 10/21/2016, 10/03/2017, 09/10/2018, 09/29/2021, 09/29/2022   • Influenza Quadrivalent Preservative Free 3 years and older IM 11/09/2015, 10/21/2016, 10/03/2017   • Influenza, high dose seasonal 0 7 mL 10/16/2020, 09/29/2022   • Pneumococcal Conjugate 13-Valent 11/16/2015, 08/20/2018   • Pneumococcal Conjugate Vaccine 20-valent (Pcv20), Polysace 10/31/2022   • Pneumococcal Polysaccharide PPV23 08/29/2016   • Tdap 12/28/2015   • Zoster 12/28/2015   • Zoster Vaccine Recombinant 12/28/2015   • influenza, trivalent, adjuvanted 09/27/2019      Health Maintenance:         Topic Date Due   • Hepatitis C Screening  Never done   • Colorectal Cancer Screening  Never done   • DXA SCAN  05/27/2023   • Breast Cancer Screening: Mammogram  06/01/2023   • Lung Cancer Screening  Discontinued         Topic Date Due   • Hepatitis B Vaccine (1 of 3 - 3-dose series) Never done      Medicare Screening Tests and Risk Assessments:     Patricia Cameron is here for her Subsequent Wellness visit  Last Medicare Wellness visit information reviewed, patient interviewed and updates made to the record today  Health Risk Assessment:   Patient rates overall health as good  Patient feels that their physical health rating is slightly better  Patient is very satisfied with their life  Eyesight was rated as same  Hearing was rated as same  Patient feels that their emotional and mental health rating is slightly better  Patients states they are never, rarely angry  Patient states they are sometimes unusually tired/fatigued  Pain experienced in the last 7 days has been some  Patient's pain rating has been 3/10  Patient states that she has experienced no weight loss or gain in last 6 months  Fall Risk Screening: In the past year, patient has experienced: no history of falling in past year      Urinary Incontinence Screening:   Patient has leaked urine accidently in the last six months  Home Safety:  Patient does not have trouble with stairs inside or outside of their home  Patient has working smoke alarms and has no working carbon monoxide detector  Home safety hazards include: none  Nutrition:   Current diet is Low Cholesterol, Low Saturated Fat and Low Carb  Medications:   Patient is currently taking over-the-counter supplements  OTC medications include: Listed in my medications  Patient is able to manage medications  Activities of Daily Living (ADLs)/Instrumental Activities of Daily Living (IADLs):   Walk and transfer into and out of bed and chair?: Yes  Dress and groom yourself?: Yes    Bathe or shower yourself?: Yes    Feed yourself?  Yes  Do your laundry/housekeeping?: Yes  Manage your money, pay your bills and track your expenses?: Yes  Make your own meals?: Yes    Do your own shopping?: Yes    Durable Medical Equipment Suppliers  [facility]    Previous Hospitalizations:   Any hospitalizations or ED visits within the last 12 months?: No      Advance Care Planning:   Living will: No    Durable POA for healthcare: No    Advanced directive: No      Cognitive Screening:   Provider or family/friend/caregiver concerned regarding cognition?: No    PREVENTIVE SCREENINGS      Cardiovascular Screening:    General: Screening Not Indicated and History Lipid Disorder      Diabetes Screening:     General: Screening Current      Colorectal Cancer Screening:     General: Screening Not Indicated      Breast Cancer Screening:     General: Screening Current      Cervical Cancer Screening:    General: Screening Not Indicated      Osteoporosis Screening:    General: Screening Current      Abdominal Aortic Aneurysm (AAA) Screening:        General: Screening Not Indicated      Lung Cancer Screening:     General: Screening Not Indicated    Screening, Brief Intervention, and Referral to Treatment (SBIRT)    Screening  Typical number of drinks in a day: 0  Typical number of drinks in a week: 0  Interpretation: Low risk drinking behavior  AUDIT-C Screenin) How often did you have a drink containing alcohol in the past year? monthly or less  2) How many drinks did you have on a typical day when you were drinking in the past year? 0  3) How often did you have 6 or more drinks on one occasion in the past year? never    AUDIT-C Score: 1  Interpretation: Score 0-2 (female): Negative screen for alcohol misuse    Single Item Drug Screening:  How often have you used an illegal drug (including marijuana) or a prescription medication for non-medical reasons in the past year? never    Single Item Drug Screen Score: 0  Interpretation: Negative screen for possible drug use disorder    No results found       Physical Exam:     /64 (BP Location: Left arm, Patient Position: Sitting)   Pulse 68   Temp 98 °F (36 7 °C)   Ht 5' 7" (1 702 m)   Wt 79 kg (174 lb 4 oz)   SpO2 98%   BMI 27 29 kg/m²     Physical Exam  Constitutional:       Appearance: She is well-developed  HENT:      Head: Normocephalic and atraumatic  Right Ear: External ear normal       Left Ear: External ear normal       Nose: Nose normal    Eyes:      Conjunctiva/sclera: Conjunctivae normal    Cardiovascular:      Rate and Rhythm: Normal rate  Rhythm irregularly irregular  Heart sounds: Normal heart sounds  Pulmonary:      Effort: Pulmonary effort is normal       Breath sounds: Normal breath sounds  Decreased air movement present  Abdominal:      General: Bowel sounds are normal       Palpations: Abdomen is soft  Musculoskeletal:         General: Normal range of motion  Cervical back: Normal range of motion and neck supple  Skin:     General: Skin is warm and dry  Neurological:      Mental Status: She is alert and oriented to person, place, and time  Psychiatric:         Behavior: Behavior normal          Thought Content:  Thought content normal          Judgment: Judgment normal           Rayna Hernandez PA-C

## 2022-12-12 NOTE — ASSESSMENT & PLAN NOTE
Encouraged to quit smoking and pt is wanting to also  She plans to quit in the new year and is seriously considering the bronchial valve procedure  As of now she is stable with her inhaler regime and supplemental oxygen

## 2023-02-09 ENCOUNTER — OFFICE VISIT (OUTPATIENT)
Dept: CARDIOLOGY CLINIC | Facility: CLINIC | Age: 73
End: 2023-02-09

## 2023-02-09 VITALS
HEIGHT: 67 IN | HEART RATE: 59 BPM | SYSTOLIC BLOOD PRESSURE: 135 MMHG | WEIGHT: 175 LBS | BODY MASS INDEX: 27.47 KG/M2 | DIASTOLIC BLOOD PRESSURE: 75 MMHG

## 2023-02-09 DIAGNOSIS — J43.9 PULMONARY EMPHYSEMA, UNSPECIFIED EMPHYSEMA TYPE (HCC): ICD-10-CM

## 2023-02-09 DIAGNOSIS — I48.0 PAROXYSMAL ATRIAL FIBRILLATION (HCC): Primary | ICD-10-CM

## 2023-02-09 DIAGNOSIS — F17.200 NICOTINE DEPENDENCE WITH CURRENT USE: ICD-10-CM

## 2023-02-09 NOTE — PROGRESS NOTES
Patient ID: Gavino Nugent is a 81 y o  female  Plan:      Paroxysmal atrial fibrillation ([address] )  She feels lots of skipped beats but is actually not in atrial fibrillation today  Never sustained  Not clear that these feelings are atrial fibrillation  Prior bleeding problems on more intense anticoagulation  Low CHADSVASc  Nicotine dependence with current use  Resumed smoking because of stress but wants to quit on Valentines day  Emphysema/COPD ([address] )  Remains on chronic oxygen  Follow up Plan/Other summary comments:  1 year ecg and f/u    HPI: Patient seen in f/u regarding the above issues  No CP or syncope  No sustained palpitations  She remains oxygen dependent  To reiterate, she has paroxysmal atrial fibrillation  Because of significant epistaxis on Eliquis, she has reverted to aspirin and really strongly prefers this  Results for orders placed or performed in visit on 02/09/23   POCT ECG    Impression    NSR at 60/minute  WNL  Most recent or relevant cardiac/vascular testing:    Echocardiogram 08/01/2018: Mild LVH  Normal LV function  Past Surgical History:   Procedure Laterality Date   • ADENOIDECTOMY     • BREAST CYST EXCISION Bilateral     benign-1987, 2002, 2007   • BUNIONECTOMY Bilateral    • CATARACT EXTRACTION, BILATERAL     • MOLE REMOVAL      lip   • TONSILLECTOMY     • TUBAL LIGATION         Lipid Profile:   Lab Results   Component Value Date    TRIG 114 11/09/2021    HDL 44 11/09/2021         Review of Systems   10  point ROS  was otherwise non pertinent or negative except as per HPI or as below  Gait: Normal          Objective:     /75   Pulse 59   Ht 5' 7" (1 702 m)   Wt 79 4 kg (175 lb)   BMI 27 41 kg/m²     PHYSICAL EXAM:    General:  Normal appearance in no distress  Eyes:  Anicteric  Oral mucosa:  Moist   Neck:  No JVD  Carotid upstrokes are brisk without bruits  No masses  Chest:  Clear to auscultation    Cardiac:  No palpable PMI  Normal S1 and S2  No murmur gallop or rub  Abdomen:  Soft and nontender  No palpable organomegaly or aortic enlargement  Extremities:  No peripheral edema  Musculoskeletal:  Symmetric  Vascular:  Femoral pulses are brisk without bruits  Popliteal pulses are intact bilaterally  Pedal pulses are intact  Neuro:  Grossly symmetric  Psych:  Alert and oriented x3          Current Outpatient Medications:   •  albuterol (PROVENTIL HFA,VENTOLIN HFA) 90 mcg/act inhaler, Inhale 2 puffs every 4 (four) hours as needed for shortness of breath, Disp: 54 g, Rfl: 0  •  Anoro Ellipta 62 5-25 MCG/INH inhaler, INHALE 1 PUFF BY MOUTH EVERY DAY, Disp: 180 blister, Rfl: 3  •  ascorbic acid (VITAMIN C) 500 mg tablet, Take 500 mg by mouth daily, Disp: , Rfl:   •  aspirin 81 mg chewable tablet, Chew 1 tablet (81 mg total) daily, Disp: , Rfl:   •  atorvastatin (LIPITOR) 40 mg tablet, TAKE ONE TABLET BY MOUTH EVERY DAY, Disp: 90 tablet, Rfl: 1  •  cetirizine (ZyrTEC) 10 mg tablet, Take 10 mg by mouth daily, Disp: , Rfl:   •  Cholecalciferol (VITAMIN D3) 5000 units CAPS, Take 1 capsule by mouth daily, Disp: , Rfl:   •  clotrimazole-betamethasone (LOTRISONE) 1-0 05 % cream, Apply topically 2 (two) times a day, Disp: 30 g, Rfl: 0  •  ipratropium-albuterol (DUO-NEB) 0 5-2 5 mg/3 mL nebulizer solution, Take 3 mL by nebulization 4 (four) times a day, Disp: 1080 mL, Rfl: 0  •  L-Lysine 500 MG CAPS, Take 1 capsule by mouth daily, Disp: , Rfl:   •  metoprolol tartrate (LOPRESSOR) 50 mg tablet, TAKE ONE AND ONE-HALF TABLETS BY MOUTH TWICE A DAY, Disp: 270 tablet, Rfl: 3  •  mometasone (NASONEX) 50 mcg/act nasal spray, 2 sprays into each nostril daily, Disp: 51 g, Rfl: 0  •  MULTIPLE VITAMINS-CALCIUM PO, Take by mouth daily, Disp: , Rfl:   •  Probiotic Product (PROBIOTIC-10 PO), Take 1 capsule by mouth daily, Disp: , Rfl:   Allergies   Allergen Reactions   • Bupropion Itching   • Ace Inhibitors Cough     Action Taken: stopped med and changed to ARB; Category:  Adverse Reaction;    • Citalopram Diarrhea and Nausea Only   • Adhesive [Medical Tape] Rash     Patch      Past Medical History:   Diagnosis Date   • Allergic    • Allergic rhinitis     [id number]  RESOLVED   • Asthma    • Atrial fibrillation (HCC)    • Bronchitis    • COPD (chronic obstructive pulmonary disease) ([facility] )     home O2 at night at home 2L   • Depression    • Emphysema lung (HCC)    • Fibroadenoma of breast    • Hyperlipidemia    • Hypertension    • Memory difficulties 7/23/2019   • Post-menopausal    • Post-menopausal    • Spondylisthesis    • Stage 3 chronic kidney disease, unspecified whether stage 3a or 3b CKD ([facility] ) 2/7/2022   • Vitamin D deficiency            Social History     Tobacco Use   Smoking Status Every Day   • Packs/day: 0 25   • Years: 50 00   • Pack years: 12 50   • Types: Cigarettes   • Start date: 7/4/2022   Smokeless Tobacco Never   Tobacco Comments    3 ciggs daily as of 12/2/22

## 2023-02-09 NOTE — ASSESSMENT & PLAN NOTE
She feels lots of skipped beats but is actually not in atrial fibrillation today  Never sustained  Not clear that these feelings are atrial fibrillation  Prior bleeding problems on more intense anticoagulation  Low CHADSVASc

## 2023-03-14 ENCOUNTER — TELEPHONE (OUTPATIENT)
Dept: PULMONOLOGY | Facility: CLINIC | Age: 73
End: 2023-03-14

## 2023-03-14 DIAGNOSIS — J96.11 CHRONIC RESPIRATORY FAILURE WITH HYPOXIA (HCC): Primary | ICD-10-CM

## 2023-03-14 NOTE — TELEPHONE ENCOUNTER
Denise from [employer] order left a VM stating that the doctor has to fax a script over to Kristin Mandujano for the patients oxygen in order for it to be approved by her insurance  Please advise       Fax# [phone]

## 2023-03-14 NOTE — TELEPHONE ENCOUNTER
Home o2 with portability order placed  Patient is to use 2L NC with exertion, at least 1L NC during hours of sleep

## 2023-04-03 ENCOUNTER — OFFICE VISIT (OUTPATIENT)
Dept: PULMONOLOGY | Facility: CLINIC | Age: 73
End: 2023-04-03

## 2023-04-03 ENCOUNTER — TELEPHONE (OUTPATIENT)
Dept: GASTROENTEROLOGY | Facility: CLINIC | Age: 73
End: 2023-04-03

## 2023-04-03 VITALS
SYSTOLIC BLOOD PRESSURE: 152 MMHG | BODY MASS INDEX: 27.5 KG/M2 | HEART RATE: 77 BPM | TEMPERATURE: 97.2 F | OXYGEN SATURATION: 95 % | WEIGHT: 175.2 LBS | DIASTOLIC BLOOD PRESSURE: 68 MMHG | HEIGHT: 67 IN

## 2023-04-03 DIAGNOSIS — J44.9 COPD, SEVERE (HCC): ICD-10-CM

## 2023-04-03 DIAGNOSIS — F17.200 NICOTINE DEPENDENCE WITH CURRENT USE: ICD-10-CM

## 2023-04-03 DIAGNOSIS — J96.11 CHRONIC RESPIRATORY FAILURE WITH HYPOXIA (HCC): Primary | ICD-10-CM

## 2023-04-03 NOTE — ASSESSMENT & PLAN NOTE
Oxygen titration study done the office today confirms the patient requires 2 L nasal cannula with activities  Order had previously been updated prior to this visit but will fax office notes and study to [facility] for completeness sake  Patient knows to maintain saturations greater than 88%

## 2023-04-03 NOTE — ASSESSMENT & PLAN NOTE
Congratulated patient on the success they had thus far and encouraged him to keep up the good work  We discussed adverse health risks associated with continued tobacco/cigarette smoking including progressive lung disease, risk of MI, risk of PVD, delayed wound healing, cancer, etc  At this time, patient is Action  Discussed pharmacotherapies and behavior modifications in detail during our visit for approximately 5 minutes  After discussion, recommended Nicotrol inhaler  SE reviewed  Also reviewed alternative quit strategies including pattern recognition/habit breaking, forming new coping mechanisms, finding new outlets for stress relief, etc  Patient prefers to have CT chest wo contrast with [place] valve protocol in September 2023 in place of LDCT chest this year

## 2023-04-03 NOTE — PROGRESS NOTES
Pulmonary Follow Up Note   Merry Lefort 81 y o  female MRN: [id number]  4/3/2023      Assessment:    COPD, severe ([address] )  With mild expiratory wheeze on exam today however no worsening subjective symptoms  Will hold off on systemic steroids  Continue Anoro Ellipta 1 puff daily  Continue as needed albuterol HFA and DuoNebs every 6 hours as needed for shortness of breath/wheeze  Review of PFTs from 2018 show severe obstruction with hyperinflation and air trapping for which endobronchial valve placement could be a consideration if patient were to completely quit smoking  While we are actively working on quitting at this time, will tentatively plan for repeat PFT with 6-minute walk and CT chest with Marfa protocol in September 2023  Chronic respiratory failure with hypoxia (HCC)  Oxygen titration study done the office today confirms the patient requires 2 L nasal cannula with activities  Order had previously been updated prior to this visit but will fax office notes and study to [facility] for completeness sake  Patient knows to maintain saturations greater than 88%  Nicotine dependence with current use  Congratulated patient on the success they had thus far and encouraged him to keep up the good work  We discussed adverse health risks associated with continued tobacco/cigarette smoking including progressive lung disease, risk of MI, risk of PVD, delayed wound healing, cancer, etc  At this time, patient is Action  Discussed pharmacotherapies and behavior modifications in detail during our visit for approximately 5 minutes  After discussion, recommended Nicotrol inhaler  SE reviewed  Also reviewed alternative quit strategies including pattern recognition/habit breaking, forming new coping mechanisms, finding new outlets for stress relief, etc  Patient prefers to have CT chest wo contrast with Marfa valve protocol in September 2023 in place of LDCT chest this year               Plan:    Diagnoses and all orders for this visit:    Chronic respiratory failure with hypoxia (HCC)  -     POCT 6 minute walk    COPD, severe (HCC)  -     Complete PFT with post Bronchodilator and Six Minute walk; Future  -     CT chest wo contrast; Future    Nicotine dependence with current use        Return in about 5 months (around 9/3/2023)  History of Present Illness   HPI:  Gerhardt Frizzle is a 81 y o  female who presents the office today for routine follow-up  Patient has a history of severe COPD with severe hyperinflation, chronic hypoxic respiratory failure, nicotine dependence with current use  Patient continues to use Anoro Ellipta 1 puff daily  Reports very infrequent use HFA or DuoNeb  Does however endorse chronic symptoms including dyspnea on exertion, cough productive of sputum without hemoptysis and a little bit of wheezing  No recent fevers or chills  Denies use of systemic steroids or antibiotics recently  Patient is interested in having [place] valve procedure but still currently smoking  She is averaging 1 to 3 cigarettes daily  Intolerant to Chantix or patches  Currently using Nicotrol with some success  Review of Systems   Constitutional: Negative for appetite change and fever  HENT: Positive for postnasal drip and sneezing  Negative for ear pain, rhinorrhea, sore throat and trouble swallowing  Cardiovascular: Negative for chest pain  Musculoskeletal: Negative for myalgias  Neurological: Positive for headaches         Historical Information   Past Medical History:   Diagnosis Date   • Allergic    • Allergic rhinitis     [id number]  RESOLVED   • Asthma    • Atrial fibrillation (HCC)    • Bronchitis    • COPD (chronic obstructive pulmonary disease) ([facility] [address] )     home O2 at night at home 2L   • Depression    • Emphysema lung (HCC)    • Fibroadenoma of breast    • Hyperlipidemia    • Hypertension    • Memory difficulties 7/23/2019   • Post-menopausal    • Post-menopausal    • Spondylisthesis    • Stage 3 chronic kidney disease, unspecified whether stage 3a or 3b CKD ([facility] [address] ) 2/7/2022   • Vitamin D deficiency      Past Surgical History:   Procedure Laterality Date   • ADENOIDECTOMY     • BREAST CYST EXCISION Bilateral     benign-1987, 2002, 2007   • BUNIONECTOMY Bilateral    • CATARACT EXTRACTION, BILATERAL     • MOLE REMOVAL      lip   • TONSILLECTOMY     • TUBAL LIGATION       Family History   Problem Relation Age of Onset   • Hypertension Mother    • Alcohol abuse Mother    • Hypertension Father    • Diabetes Father    • Prostate cancer Father    • Vitamin D deficiency Sister    • Diverticulitis Sister    • No Known Problems Sister    • Heart failure Brother    • Lung cancer Brother    • Celiac disease Daughter    • Eczema Daughter    • Fibromyalgia Daughter    • Substance Abuse Daughter    • Alcohol abuse Daughter    • ADD / ADHD Daughter    • Bipolar disorder Daughter    • Anxiety disorder Daughter    • Ulcerative colitis Daughter    • No Known Problems Maternal Aunt    • Breast cancer Neg Hx        Social History     Tobacco Use   Smoking Status Every Day   • Packs/day: 0 25   • Years: 50 00   • Pack years: 12 50   • Types: Cigarettes   • Start date: 7/4/2022   Smokeless Tobacco Never   Tobacco Comments    3 ciggs daily as of 12/2/22         Meds/Allergies     Current Outpatient Medications:   •  albuterol (PROVENTIL HFA,VENTOLIN HFA) 90 mcg/act inhaler, Inhale 2 puffs every 4 (four) hours as needed for shortness of breath, Disp: 54 g, Rfl: 0  •  Anoro Ellipta 62 5-25 MCG/INH inhaler, INHALE 1 PUFF BY MOUTH EVERY DAY, Disp: 180 blister, Rfl: 3  •  ascorbic acid (VITAMIN C) 500 mg tablet, Take 500 mg by mouth daily, Disp: , Rfl:   •  aspirin 81 mg chewable tablet, Chew 1 tablet (81 mg total) daily, Disp: , Rfl:   •  atorvastatin (LIPITOR) 40 mg tablet, TAKE ONE TABLET BY MOUTH EVERY DAY, Disp: 90 tablet, Rfl: 1  •  cetirizine (ZyrTEC) 10 mg tablet, Take 10 mg by mouth daily, Disp: , Rfl:   •  Cholecalciferol "(VITAMIN D3) 5000 units CAPS, Take 1 capsule by mouth daily, Disp: , Rfl:   •  clotrimazole-betamethasone (LOTRISONE) 1-0 05 % cream, Apply topically 2 (two) times a day, Disp: 30 g, Rfl: 0  •  ipratropium-albuterol (DUO-NEB) 0 5-2 5 mg/3 mL nebulizer solution, Take 3 mL by nebulization 4 (four) times a day, Disp: 1080 mL, Rfl: 0  •  L-Lysine 500 MG CAPS, Take 1 capsule by mouth daily, Disp: , Rfl:   •  metoprolol tartrate (LOPRESSOR) 50 mg tablet, TAKE ONE AND ONE-HALF TABLETS BY MOUTH TWICE A DAY, Disp: 270 tablet, Rfl: 3  •  mometasone (NASONEX) 50 mcg/act nasal spray, 2 sprays into each nostril daily, Disp: 51 g, Rfl: 0  •  MULTIPLE VITAMINS-CALCIUM PO, Take by mouth daily, Disp: , Rfl:   •  Probiotic Product (PROBIOTIC-10 PO), Take 1 capsule by mouth daily, Disp: , Rfl:   Allergies   Allergen Reactions   • Bupropion Itching   • Ace Inhibitors Cough     Action Taken: stopped med and changed to ARB; Category: Adverse Reaction;    • Citalopram Diarrhea and Nausea Only   • Adhesive [Medical Tape] Rash     Patch        Vitals: Blood pressure 152/68, pulse 77, temperature (!) 97 2 °F (36 2 °C), temperature source Tympanic, height 5' 7\" (1 702 m), weight 79 5 kg (175 lb 3 2 oz), SpO2 95 %, not currently breastfeeding  Body mass index is 27 44 kg/m²  Oxygen Therapy  SpO2: 95 %    Physical Exam  Physical Exam  Vitals reviewed  Constitutional:       Appearance: Normal appearance  She is well-developed  HENT:      Head: Normocephalic and atraumatic  Nose: Nose normal       Mouth/Throat:      Mouth: Mucous membranes are moist    Eyes:      Extraocular Movements: Extraocular movements intact  Cardiovascular:      Rate and Rhythm: Normal rate and regular rhythm  Heart sounds: Normal heart sounds  Pulmonary:      Effort: Pulmonary effort is normal  No respiratory distress  Breath sounds: Wheezing (faint expiratory wheeze) present  No rhonchi or rales  Musculoskeletal:         General: No swelling       " Cervical back: Normal range of motion and neck supple  Skin:     General: Skin is warm and dry  Neurological:      General: No focal deficit present  Mental Status: She is alert  Mental status is at baseline  Psychiatric:         Mood and Affect: Mood normal          Behavior: Behavior normal          Labs: I have personally reviewed pertinent lab results  Lab Results   Component Value Date    WBC 7 23 11/09/2021    HGB 15 0 11/09/2021    HCT 43 6 11/09/2021    MCV 91 11/09/2021     11/09/2021     Lab Results   Component Value Date    GLUCOSE 101 06/05/2015    CALCIUM 9 7 02/03/2022     06/05/2015    K 4 0 02/03/2022    CO2 27 02/03/2022     (H) 02/03/2022    BUN 26 (H) 02/03/2022    CREATININE 1 31 (H) 02/03/2022     No results found for: IGE  Lab Results   Component Value Date    ALT 23 11/09/2021    AST 19 11/09/2021    ALKPHOS 88 11/09/2021    BILITOT 0 5 06/04/2015       Imaging and other studies: I have personally reviewed pertinent reports  and I have personally reviewed pertinent films in PACS     Lung cancer screening CT 5/4/2022  Severe emphysema  No nodules or consolidation  Scattered linear atelectasis/scarring bilaterally noted  Curvilinear density of the distal trachea consistent with mucus thread identified  Pulmonary function testing:  Performed 9/14/2018   FEV1/FVC ratio 63%   FEV1 43% predicted  FVC 67% predicted  There is response to bronchodilators   % predicted   % predicted  DLCO corrected for hemoglobin 45 % predicted  Severe obstructive airflow defect with bronchodilator responsiveness  Hyperinflation and severe air trapping  Moderately impaired diffusion capacity  Other Studies: I have personally reviewed pertinent reports  Oxygen titration study done in the office today  Resting on room air SPO2 94%  Patient ambulated for 3 minutes before desat to 87%    Test was positive place patient on 2 L nasal cannula then resumed for an additional 6 minutes without SPO2 dropping below 91% with a max heart rate 98 bpm   Total distance 180 m  Impression: Patient requires 2 L nasal cannula with activities        Answers for HPI/ROS submitted by the patient on 4/3/2023  Do you have shortness of breath that occurs with effort or exertion?: No  Do you have ear congestion?: No  Do you have heartburn?: No  Do you have fatigue?: No  Do you have nasal congestion?: No  Do you have shortness of breath when lying flat?: No  Do you have shortness of breath when you wake up?: No  Do you have sweats?: No  Have you experienced weight loss?: No  Which of the following makes your symptoms worse?: animal exposure, change in weather, exposure to fumes, pollen  Which of the following makes your symptoms better?: steroid inhaler  Risk factors for lung disease: animal exposure, smoking/tobacco exposure

## 2023-04-03 NOTE — ASSESSMENT & PLAN NOTE
With mild expiratory wheeze on exam today however no worsening subjective symptoms  Will hold off on systemic steroids  Continue Anoro Ellipta 1 puff daily  Continue as needed albuterol HFA and DuoNebs every 6 hours as needed for shortness of breath/wheeze  Review of PFTs from 2018 show severe obstruction with hyperinflation and air trapping for which endobronchial valve placement could be a consideration if patient were to completely quit smoking  While we are actively working on quitting at this time, will tentatively plan for repeat PFT with 6-minute walk and CT chest with [place] protocol in September 2023

## 2023-04-06 ENCOUNTER — RA CDI HCC (OUTPATIENT)
Dept: OTHER | Facility: HOSPITAL | Age: 73
End: 2023-04-06

## 2023-04-07 ENCOUNTER — APPOINTMENT (OUTPATIENT)
Dept: LAB | Facility: CLINIC | Age: 73
End: 2023-04-07

## 2023-04-07 DIAGNOSIS — Z13.1 SCREENING FOR DIABETES MELLITUS: ICD-10-CM

## 2023-04-07 DIAGNOSIS — Z13.29 SCREENING FOR THYROID DISORDER: ICD-10-CM

## 2023-04-07 DIAGNOSIS — E78.2 MIXED HYPERLIPIDEMIA: ICD-10-CM

## 2023-04-07 DIAGNOSIS — E55.9 VITAMIN D DEFICIENCY: ICD-10-CM

## 2023-04-07 DIAGNOSIS — Z13.0 SCREENING FOR DEFICIENCY ANEMIA: ICD-10-CM

## 2023-04-07 LAB
25(OH)D3 SERPL-MCNC: 77.4 NG/ML (ref 30–100)
ALBUMIN SERPL BCP-MCNC: 3.6 G/DL (ref 3.5–5)
ALP SERPL-CCNC: 81 U/L (ref 46–116)
ALT SERPL W P-5'-P-CCNC: 23 U/L (ref 12–78)
ANION GAP SERPL CALCULATED.3IONS-SCNC: 1 MMOL/L (ref 4–13)
AST SERPL W P-5'-P-CCNC: 16 U/L (ref 5–45)
BASOPHILS # BLD AUTO: 0.05 THOUSANDS/ÂΜL (ref 0–0.1)
BASOPHILS NFR BLD AUTO: 1 % (ref 0–1)
BILIRUB SERPL-MCNC: 0.66 MG/DL (ref 0.2–1)
BUN SERPL-MCNC: 23 MG/DL (ref 5–25)
CALCIUM SERPL-MCNC: 9 MG/DL (ref 8.3–10.1)
CHLORIDE SERPL-SCNC: 112 MMOL/L (ref 96–108)
CHOLEST SERPL-MCNC: 130 MG/DL
CO2 SERPL-SCNC: 28 MMOL/L (ref 21–32)
CREAT SERPL-MCNC: 1.02 MG/DL (ref 0.6–1.3)
EOSINOPHIL # BLD AUTO: 0.16 THOUSAND/ÂΜL (ref 0–0.61)
EOSINOPHIL NFR BLD AUTO: 3 % (ref 0–6)
ERYTHROCYTE [DISTWIDTH] IN BLOOD BY AUTOMATED COUNT: 12.2 % (ref 11.6–15.1)
GFR SERPL CREATININE-BSD FRML MDRD: 55 ML/MIN/1.73SQ M
GLUCOSE P FAST SERPL-MCNC: 91 MG/DL (ref 65–99)
HCT VFR BLD AUTO: 40.4 % (ref 34.8–46.1)
HDLC SERPL-MCNC: 47 MG/DL
HGB BLD-MCNC: 13.6 G/DL (ref 11.5–15.4)
IMM GRANULOCYTES # BLD AUTO: 0.01 THOUSAND/UL (ref 0–0.2)
IMM GRANULOCYTES NFR BLD AUTO: 0 % (ref 0–2)
LDLC SERPL CALC-MCNC: 63 MG/DL (ref 0–100)
LYMPHOCYTES # BLD AUTO: 1.89 THOUSANDS/ÂΜL (ref 0.6–4.47)
LYMPHOCYTES NFR BLD AUTO: 30 % (ref 14–44)
MCH RBC QN AUTO: 31.2 PG (ref 26.8–34.3)
MCHC RBC AUTO-ENTMCNC: 33.7 G/DL (ref 31.4–37.4)
MCV RBC AUTO: 93 FL (ref 82–98)
MONOCYTES # BLD AUTO: 0.6 THOUSAND/ÂΜL (ref 0.17–1.22)
MONOCYTES NFR BLD AUTO: 10 % (ref 4–12)
NEUTROPHILS # BLD AUTO: 3.56 THOUSANDS/ÂΜL (ref 1.85–7.62)
NEUTS SEG NFR BLD AUTO: 56 % (ref 43–75)
NONHDLC SERPL-MCNC: 83 MG/DL
NRBC BLD AUTO-RTO: 0 /100 WBCS
PLATELET # BLD AUTO: 191 THOUSANDS/UL (ref 149–390)
PMV BLD AUTO: 10.8 FL (ref 8.9–12.7)
POTASSIUM SERPL-SCNC: 4.1 MMOL/L (ref 3.5–5.3)
PROT SERPL-MCNC: 6.8 G/DL (ref 6.4–8.4)
RBC # BLD AUTO: 4.36 MILLION/UL (ref 3.81–5.12)
SODIUM SERPL-SCNC: 141 MMOL/L (ref 135–147)
TRIGL SERPL-MCNC: 99 MG/DL
TSH SERPL DL<=0.05 MIU/L-ACNC: 1.02 UIU/ML (ref 0.45–4.5)
WBC # BLD AUTO: 6.27 THOUSAND/UL (ref 4.31–10.16)

## 2023-04-07 NOTE — PROGRESS NOTES
[address]  coding opportunities       Chart reviewed, no opportunity found:   [address]        Patients Insurance     Medicare Insurance: [employer]

## 2023-04-10 PROBLEM — L30.9 DERMATITIS: Status: ACTIVE | Noted: 2023-04-10

## 2023-04-10 PROBLEM — H74.92 DISORDER OF LEFT MASTOID: Status: ACTIVE | Noted: 2023-04-10

## 2023-04-25 ENCOUNTER — APPOINTMENT (OUTPATIENT)
Dept: RADIOLOGY | Facility: CLINIC | Age: 73
End: 2023-04-25

## 2023-04-25 ENCOUNTER — OFFICE VISIT (OUTPATIENT)
Dept: PODIATRY | Facility: CLINIC | Age: 73
End: 2023-04-25

## 2023-04-25 VITALS
HEART RATE: 63 BPM | SYSTOLIC BLOOD PRESSURE: 149 MMHG | BODY MASS INDEX: 26.22 KG/M2 | HEIGHT: 68 IN | DIASTOLIC BLOOD PRESSURE: 81 MMHG | WEIGHT: 173 LBS

## 2023-04-25 DIAGNOSIS — M21.611 BILATERAL BUNIONS: Primary | ICD-10-CM

## 2023-04-25 DIAGNOSIS — M21.612 BILATERAL BUNIONS: Primary | ICD-10-CM

## 2023-04-25 DIAGNOSIS — M21.611 BILATERAL BUNIONS: ICD-10-CM

## 2023-04-25 DIAGNOSIS — M21.612 BILATERAL BUNIONS: ICD-10-CM

## 2023-04-25 DIAGNOSIS — M79.671 FOOT PAIN, BILATERAL: ICD-10-CM

## 2023-04-25 DIAGNOSIS — M79.672 FOOT PAIN, BILATERAL: ICD-10-CM

## 2023-04-25 DIAGNOSIS — M20.5X1 ACQUIRED HALLUX LIMITUS OF BOTH FEET: ICD-10-CM

## 2023-04-25 DIAGNOSIS — M20.5X2 ACQUIRED HALLUX LIMITUS OF BOTH FEET: ICD-10-CM

## 2023-04-25 NOTE — PROGRESS NOTES
Assessment/Plan:    No problem-specific Assessment & Plan notes found for this encounter  Diagnoses and all orders for this visit:    Bilateral bunions  -     X-ray foot right 3+ views; Future  -     X-ray foot left 3+ views; Future    Acquired hallux limitus of both feet    Foot pain, bilateral      Plan:     - diagnosis and treatment discussed with patient  - I personally reviewed x-ray findings with patient which is consistent with decrease in first metatarsophalangeal joint space consistent with degenerative joint disease  - I recommend conservative treatments such as supportive shoe gear with a wider toe box, over-the-counter inserts such as carbon fiber inserts or custom molded orthotics, shoe gear modification as well as steroid injections  I also recommended continue range-of-motion exercises  Patient agrees with the treatment above  Would like to defer injection at this time as pain is intermittent  - Patient agrees with plan and will continue home exercises as well as PT  - All questions and concerns were addressed, call if any questions      Subjective:      Patient ID: Venkata Damon is a 81 y o  female  68-year-old female with past medical history as below presents for an evaluation of bilateral foot pain localized to first metatarsophalangeal joints  Patient reports about 35 years ago she had bunionectomies done  Reports her pain is intermittent and its aching located on the medial dorsal aspect of her big toe joints  Resting helps  Pain is usually aggravated with standing and walking for longer period of time  Unfortunately due to her shoe size she is unable to buy size 11 comfortable shoes  No other complaints        The following portions of the patient's history were reviewed and updated as appropriate:   She  has a past medical history of Allergic, Allergic rhinitis, Asthma, Atrial fibrillation ([address] ), Bronchitis, Bunion, COPD (chronic obstructive pulmonary disease) ([facility] [address] ), "Coronary artery disease, Depression, Emphysema lung ([place] [address] ), Fibroadenoma of breast, Hyperlipidemia, Hypertension, Lung disease (2008), Memory difficulties (07/23/2019), Post-menopausal, Post-menopausal, Spondylisthesis, Stage 3 chronic kidney disease, unspecified whether stage 3a or 3b CKD ([place] [address] ) (02/07/2022), and Vitamin D deficiency  She   Patient Active Problem List    Diagnosis Date Noted   • Disorder of left mastoid 04/10/2023   • Dermatitis 04/10/2023   • Decreased GFR 02/08/2022   • Stage 3 chronic kidney disease, unspecified whether stage 3a or 3b CKD ([place] [address] ) 02/07/2022   • Tinea unguium 11/11/2021   • Myxoid cyst 11/11/2021   • Chronic respiratory failure with hypoxia ([place] [address] ) 05/20/2021   • Bursitis of right shoulder 09/22/2020   • COPD, severe ([place] [address] ) 02/12/2020   • Family history of autoimmune disorder 02/12/2020   • Memory difficulties 07/23/2019   • Incidental pulmonary nodule, > 3mm and < 8mm 04/10/2019   • Need for vaccination with 13-polyvalent pneumococcal conjugate vaccine 08/20/2018   • Constipation 08/20/2018   • Paroxysmal atrial fibrillation ([place] [address] ) 07/31/2018   • Vitamin D deficiency 10/04/2017   • Allergic rhinitis 06/01/2017   • Depression, recurrent ([place] [address] ) 08/29/2016   • Nicotine dependence with current use 11/10/2015   • GABI (obstructive sleep apnea) 07/28/2015   • Emphysema/COPD ([place] [address] ) 06/23/2015   • Hyperlipidemia 06/22/2015   • Anxiety 06/10/2015     She  has a past surgical history that includes Bunionectomy (Bilateral); Tubal ligation; Tonsillectomy; Mole removal; Cataract extraction, bilateral; ADENOIDECTOMY; and Breast cyst excision (Bilateral)       Review of Systems   All other systems reviewed and are negative  Objective:      /81 (BP Location: Right arm, Patient Position: Sitting, Cuff Size: Large)   Pulse 63   Ht 5' 7 5\" (1 715 m) Comment: verbal  Wt 78 5 kg (173 lb)   BMI 26 70 kg/m²          Physical Exam  Vitals reviewed     Cardiovascular:      Rate and Rhythm: Normal " rate       Pulses: Normal pulses  Pulmonary:      Effort: Pulmonary effort is normal    Musculoskeletal:         General: Tenderness present  Right foot: Tenderness present  Left foot: Tenderness present  Comments: Lateral hallux abductovalgus deformity noted with big toes deviating laterally  Mild discomfort with palpation as well as range of motion of first metatarsophalangeal joints bilaterally  No apparent dorsal medial exostosis noted  Findings are consistent with hallux limitus  Skin:     General: Skin is warm  Neurological:      General: No focal deficit present  Mental Status: She is alert     Psychiatric:         Mood and Affect: Mood normal

## 2023-05-17 ENCOUNTER — HOSPITAL ENCOUNTER (OUTPATIENT)
Dept: PULMONOLOGY | Facility: HOSPITAL | Age: 73
Discharge: HOME | End: 2023-05-17

## 2023-05-17 ENCOUNTER — HOSPITAL ENCOUNTER (OUTPATIENT)
Dept: CT IMAGING | Facility: HOSPITAL | Age: 73
Discharge: HOME | End: 2023-05-17

## 2023-05-17 DIAGNOSIS — J44.9 COPD, SEVERE (HCC): ICD-10-CM

## 2023-05-17 DIAGNOSIS — F17.200 NICOTINE DEPENDENCE WITH CURRENT USE: ICD-10-CM

## 2023-05-17 RX ORDER — ALBUTEROL SULFATE 2.5 MG/3ML
2.5 SOLUTION RESPIRATORY (INHALATION) ONCE AS NEEDED
Status: COMPLETED | OUTPATIENT
Start: 2023-05-17 | End: 2023-05-17

## 2023-05-17 RX ADMIN — ALBUTEROL SULFATE 2.5 MG: 2.5 SOLUTION RESPIRATORY (INHALATION) at 11:36

## 2023-05-18 ENCOUNTER — RA CDI HCC (OUTPATIENT)
Dept: OTHER | Facility: HOSPITAL | Age: 73
End: 2023-05-18

## 2023-05-18 NOTE — RESULT ENCOUNTER NOTE
Reviewed results via phone  She has had improvement in her overall lung function and now consistent with moderate COPD  By all accounts however, she is not a candidate for EBV procedure given the improvement in FEV1  She verbalized understanding stating that she had decided that she would not want to pursue valves anyway

## 2023-05-19 NOTE — PROGRESS NOTES
[facility]  coding opportunities       Chart reviewed, no opportunity found: CHART REVIEWED, Tushar Maldonado 0717     Patients Insurance     Medicare Insurance: [employer]

## 2023-05-23 ENCOUNTER — TELEPHONE (OUTPATIENT)
Dept: PULMONOLOGY | Facility: CLINIC | Age: 73
End: 2023-05-23

## 2023-05-24 DIAGNOSIS — J44.9 COPD, SEVERE (HCC): Primary | ICD-10-CM

## 2023-05-24 DIAGNOSIS — R91.8 LUNG NODULES: ICD-10-CM

## 2023-05-24 RX ORDER — GUAIFENESIN 600 MG/1
600 TABLET, EXTENDED RELEASE ORAL EVERY 12 HOURS SCHEDULED
Qty: 180 TABLET | Refills: 3 | Status: SHIPPED | OUTPATIENT
Start: 2023-05-24

## 2023-05-24 NOTE — TELEPHONE ENCOUNTER
Reviewed results of lung cancer screening CT with patient over the phone  Recommended repeat CT chest in 6 months  Also recommended increased mucus clearing efforts including mucinex which I sent to her mail order  She verbalized understanding and agrees to the plan

## 2023-06-02 ENCOUNTER — HOSPITAL ENCOUNTER (OUTPATIENT)
Age: 73
Discharge: HOME | End: 2023-06-02

## 2023-06-02 VITALS — BODY MASS INDEX: 26.22 KG/M2 | WEIGHT: 173 LBS | HEIGHT: 68 IN

## 2023-06-02 DIAGNOSIS — Z78.0 POST-MENOPAUSAL: ICD-10-CM

## 2023-06-02 DIAGNOSIS — Z12.31 SCREENING MAMMOGRAM FOR BREAST CANCER: ICD-10-CM

## 2023-06-05 DIAGNOSIS — J44.9 CHRONIC OBSTRUCTIVE PULMONARY DISEASE, UNSPECIFIED COPD TYPE (HCC): ICD-10-CM

## 2023-06-08 ENCOUNTER — OFFICE VISIT (OUTPATIENT)
Dept: INTERNAL MEDICINE CLINIC | Facility: CLINIC | Age: 73
End: 2023-06-08
Payer: COMMERCIAL

## 2023-06-08 VITALS
TEMPERATURE: 98.9 F | BODY MASS INDEX: 25.91 KG/M2 | WEIGHT: 171 LBS | HEIGHT: 68 IN | SYSTOLIC BLOOD PRESSURE: 118 MMHG | HEART RATE: 60 BPM | DIASTOLIC BLOOD PRESSURE: 64 MMHG | OXYGEN SATURATION: 98 %

## 2023-06-08 DIAGNOSIS — S16.1XXA STRAIN OF NECK MUSCLE, INITIAL ENCOUNTER: Primary | ICD-10-CM

## 2023-06-08 DIAGNOSIS — Z12.11 SCREENING FOR COLON CANCER: ICD-10-CM

## 2023-06-08 PROCEDURE — 99213 OFFICE O/P EST LOW 20 MIN: CPT | Performed by: PHYSICIAN ASSISTANT

## 2023-06-08 RX ORDER — METHOCARBAMOL 500 MG/1
500 TABLET, FILM COATED ORAL 3 TIMES DAILY PRN
Qty: 30 TABLET | Refills: 0 | Status: SHIPPED | OUTPATIENT
Start: 2023-06-08

## 2023-06-12 PROBLEM — J96.11 CHRONIC RESPIRATORY FAILURE WITH HYPOXIA (HCC): Status: RESOLVED | Noted: 2021-05-20 | Resolved: 2023-06-12

## 2023-06-12 PROBLEM — B35.1 TINEA UNGUIUM: Status: RESOLVED | Noted: 2021-11-11 | Resolved: 2023-06-12

## 2023-06-12 PROBLEM — L30.9 DERMATITIS: Status: RESOLVED | Noted: 2023-04-10 | Resolved: 2023-06-12

## 2023-06-12 PROBLEM — H74.92 DISORDER OF LEFT MASTOID: Status: RESOLVED | Noted: 2023-04-10 | Resolved: 2023-06-12

## 2023-06-12 PROBLEM — S16.1XXA CERVICAL STRAIN: Status: ACTIVE | Noted: 2023-06-12

## 2023-06-12 PROBLEM — R94.4 DECREASED GFR: Status: RESOLVED | Noted: 2022-02-08 | Resolved: 2023-06-12

## 2023-06-12 PROBLEM — R41.3 MEMORY DIFFICULTIES: Status: RESOLVED | Noted: 2019-07-23 | Resolved: 2023-06-12

## 2023-06-12 RX ORDER — UMECLIDINIUM BROMIDE AND VILANTEROL TRIFENATATE 62.5; 25 UG/1; UG/1
POWDER RESPIRATORY (INHALATION)
Qty: 180 EACH | Refills: 3 | Status: SHIPPED | OUTPATIENT
Start: 2023-06-12

## 2023-06-12 NOTE — ASSESSMENT & PLAN NOTE
Start robaxin  Directions for use and possible side effects discussed and patient verbalized understanding of these

## 2023-06-12 NOTE — PROGRESS NOTES
Name: Shani Valdez      : 1950      MRN: [id number]  Encounter Provider: Apolinar Alexander PA-C  Encounter Date: 2023   Encounter department: [address] [place] [address]  Strain of neck muscle, initial encounter  Assessment & Plan:  Start robaxin  Directions for use and possible side effects discussed and patient verbalized understanding of these  Orders:  -     methocarbamol (ROBAXIN) 500 mg tablet; Take 1 tablet (500 mg total) by mouth 3 (three) times a day as needed for muscle spasms    2  Screening for colon cancer  -     Jody Weston      Pt presents for routine visit  She is doing fairly well overall  She was seen by dermatology but was not satisfied with the visit, she felt the office was dirty and the provider was unprofessional  She will let us know if she would like to pursue another opinion with our network  She is up to date with labs, mammogram, dexa scan  She is due for CRC screening  She is noting some increased headaches and feels this may be musculoskeletal in origin  She is agreeable to trying a muscle relaxant to help  Review of Systems   Constitutional: Negative for chills and fever  HENT: Negative for congestion, ear pain, hearing loss, postnasal drip, rhinorrhea, sinus pressure, sinus pain, sore throat and trouble swallowing  Eyes: Negative for pain and visual disturbance  Respiratory: Negative for cough, chest tightness, shortness of breath and wheezing  Cardiovascular: Negative  Negative for chest pain, palpitations and leg swelling  Gastrointestinal: Negative for abdominal pain, blood in stool, constipation, diarrhea, nausea and vomiting  Endocrine: Negative for cold intolerance, heat intolerance, polydipsia, polyphagia and polyuria  Genitourinary: Negative for difficulty urinating, dysuria, flank pain and urgency  Musculoskeletal: Positive for myalgias and neck pain   Negative for arthralgias, back pain "and gait problem  Skin: Negative for rash  Allergic/Immunologic: Negative  Neurological: Positive for headaches  Negative for dizziness, weakness and light-headedness  Hematological: Negative  Psychiatric/Behavioral: Negative for behavioral problems, dysphoric mood and sleep disturbance  The patient is not nervous/anxious  Current Outpatient Medications on File Prior to Visit   Medication Sig   • albuterol (PROVENTIL HFA,VENTOLIN HFA) 90 mcg/act inhaler Inhale 2 puffs every 4 (four) hours as needed for shortness of breath   • Anoro Ellipta 62 5-25 MCG/INH inhaler INHALE 1 PUFF BY MOUTH EVERY DAY   • ascorbic acid (VITAMIN C) 500 mg tablet Take 500 mg by mouth daily   • aspirin 81 mg chewable tablet Chew 1 tablet (81 mg total) daily   • atorvastatin (LIPITOR) 40 mg tablet TAKE ONE TABLET BY MOUTH EVERY DAY   • cetirizine (ZyrTEC) 10 mg tablet Take 10 mg by mouth daily   • Cholecalciferol (VITAMIN D3) 5000 units CAPS Take 1 capsule by mouth daily   • guaiFENesin (MUCINEX) 600 mg 12 hr tablet Take 1 tablet (600 mg total) by mouth every 12 (twelve) hours   • ipratropium-albuterol (DUO-NEB) 0 5-2 5 mg/3 mL nebulizer solution Take 3 mL by nebulization 4 (four) times a day   • L-Lysine 500 MG CAPS Take 1 capsule by mouth daily   • metoprolol tartrate (LOPRESSOR) 50 mg tablet TAKE ONE AND ONE-HALF TABLETS BY MOUTH TWICE A DAY   • mometasone (NASONEX) 50 mcg/act nasal spray 2 sprays into each nostril daily   • MULTIPLE VITAMINS-CALCIUM PO Take by mouth daily   • Probiotic Product (PROBIOTIC-10 PO) Take 1 capsule by mouth daily   • diazepam (VALIUM) 5 mg tablet Take 1 tablet 1 hour prior to procedure (Patient not taking: Reported on 6/8/2023)       Objective     /64 (BP Location: Left arm, Patient Position: Sitting)   Pulse 60   Temp 98 9 °F (37 2 °C)   Ht 5' 7 5\" (1 715 m)   Wt 77 6 kg (171 lb)   SpO2 98%   BMI 26 39 kg/m²     Physical Exam  Vitals and nursing note reviewed     Constitutional:  " General: She is not in acute distress  Appearance: She is well-developed  She is not diaphoretic  HENT:      Head: Normocephalic and atraumatic  Right Ear: External ear normal       Left Ear: External ear normal       Nose: Nose normal       Mouth/Throat:      Pharynx: No oropharyngeal exudate  Eyes:      General: No scleral icterus  Right eye: No discharge  Left eye: No discharge  Conjunctiva/sclera: Conjunctivae normal       Pupils: Pupils are equal, round, and reactive to light  Neck:      Thyroid: No thyromegaly  Cardiovascular:      Rate and Rhythm: Normal rate and regular rhythm  Heart sounds: Normal heart sounds  No murmur heard  No friction rub  No gallop  Pulmonary:      Effort: Pulmonary effort is normal  No respiratory distress  Breath sounds: Normal breath sounds  No wheezing or rales  Abdominal:      General: Bowel sounds are normal  There is no distension  Palpations: Abdomen is soft  Tenderness: There is no abdominal tenderness  Musculoskeletal:         General: No tenderness or deformity  Normal range of motion  Cervical back: Normal range of motion and neck supple  Skin:     General: Skin is warm and dry  Neurological:      Mental Status: She is alert and oriented to person, place, and time  Cranial Nerves: No cranial nerve deficit  Psychiatric:         Behavior: Behavior normal          Thought Content:  Thought content normal          Judgment: Judgment normal        Rayna Hernandez PA-C

## 2023-07-03 ENCOUNTER — TELEPHONE (OUTPATIENT)
Dept: PODIATRY | Facility: CLINIC | Age: 73
End: 2023-07-03

## 2023-07-03 ENCOUNTER — OFFICE VISIT (OUTPATIENT)
Dept: PODIATRY | Facility: CLINIC | Age: 73
End: 2023-07-03
Payer: COMMERCIAL

## 2023-07-03 VITALS
SYSTOLIC BLOOD PRESSURE: 150 MMHG | WEIGHT: 171 LBS | DIASTOLIC BLOOD PRESSURE: 78 MMHG | BODY MASS INDEX: 25.91 KG/M2 | HEART RATE: 69 BPM | HEIGHT: 68 IN

## 2023-07-03 DIAGNOSIS — I87.2 VENOUS INSUFFICIENCY OF BOTH LOWER EXTREMITIES: Primary | ICD-10-CM

## 2023-07-03 DIAGNOSIS — B35.1 ONYCHOMYCOSIS: ICD-10-CM

## 2023-07-03 PROCEDURE — 11721 DEBRIDE NAIL 6 OR MORE: CPT | Performed by: STUDENT IN AN ORGANIZED HEALTH CARE EDUCATION/TRAINING PROGRAM

## 2023-07-03 PROCEDURE — 1160F RVW MEDS BY RX/DR IN RCRD: CPT | Performed by: STUDENT IN AN ORGANIZED HEALTH CARE EDUCATION/TRAINING PROGRAM

## 2023-07-03 PROCEDURE — 99213 OFFICE O/P EST LOW 20 MIN: CPT | Performed by: STUDENT IN AN ORGANIZED HEALTH CARE EDUCATION/TRAINING PROGRAM

## 2023-07-03 PROCEDURE — 1159F MED LIST DOCD IN RCRD: CPT | Performed by: STUDENT IN AN ORGANIZED HEALTH CARE EDUCATION/TRAINING PROGRAM

## 2023-07-03 NOTE — PROGRESS NOTES
Assessment/Plan:    No problem-specific Assessment & Plan notes found for this encounter. Diagnoses and all orders for this visit:    Venous insufficiency of both lower extremities    Onychomycosis      Plan:       1. A thorough neurovascular exam was performed. Patient presents for at-risk foot care. Patient has no acute concerns today. Patient has significant lower extremity risk due to neuropathy, parasthesia, edema, and trophic skin changes to the lower extremity. Patient has Q9  findings and is recommended for at risk foot care every 3 months. 2. All 10 toenails were debridement as follow: using nail nipper, parul, and curette, nails were sharply debrided, reduced in thickness and length. Devitalized nail tissue and fungal debris excised and removed. Patient tolerated well. 3. Patient was educated on importance of daily foot assessment and proper shoe gear. Educational materials were provided. Discussed importance of wearing compression socks during the day for edema control. 4. Call if any questions or occurrence of any foot and ankle related complications     5. Return in 10-12 weeks. 6. Recent PCP notes reviewed     Subjective:      Patient ID: Deborah Camacho is a 81 y.o. female. HPI:  Deborah Camacho is a 81 y.o. female who presents with painful, elongated toenails. They have difficulty applying their socks and shoes due to the elongation of the nails. The pressure within their shoe gear is painful and they have been unable to cut their nails adequately. Patient states pain is 1/10 in shoe gear. Pain with pressure. Requires at risk foot care. No other complaints at this time.         The following portions of the patient's history were reviewed and updated as appropriate:   She  has a past medical history of Allergic, Allergic rhinitis, Asthma, Atrial fibrillation ([address]), Bronchitis, Bunion, COPD (chronic obstructive pulmonary disease) ([address]), Coronary artery disease, Depression, Emphysema lung ([address]), Fibroadenoma of breast, Hyperlipidemia, Hypertension, Lung disease (2008), Memory difficulties (07/23/2019), Post-menopausal, Post-menopausal, Spondylisthesis, Stage 3 chronic kidney disease, unspecified whether stage 3a or 3b CKD ([address]) (02/07/2022), and Vitamin D deficiency. She   Patient Active Problem List    Diagnosis Date Noted   • Cervical strain 06/12/2023   • Stage 3 chronic kidney disease, unspecified whether stage 3a or 3b CKD ([address]) 02/07/2022   • Myxoid cyst 11/11/2021   • Bursitis of right shoulder 09/22/2020   • COPD, severe ([address]) 02/12/2020   • Family history of autoimmune disorder 02/12/2020   • Incidental pulmonary nodule, > 3mm and < 8mm 04/10/2019   • Need for vaccination with 13-polyvalent pneumococcal conjugate vaccine 08/20/2018   • Constipation 08/20/2018   • Paroxysmal atrial fibrillation ([address]) 07/31/2018   • Vitamin D deficiency 10/04/2017   • Allergic rhinitis 06/01/2017   • Depression, recurrent ([address]) 08/29/2016   • Nicotine dependence with current use 11/10/2015   • GABI (obstructive sleep apnea) 07/28/2015   • Emphysema/COPD ([address]) 06/23/2015   • Hyperlipidemia 06/22/2015   • Anxiety 06/10/2015     She  has a past surgical history that includes Bunionectomy (Bilateral); Tubal ligation; Tonsillectomy; Mole removal; Cataract extraction, bilateral; ADENOIDECTOMY; and Breast cyst excision (Bilateral). .    Review of Systems   All other systems reviewed and are negative. Objective:      /78   Pulse 69   Ht 5' 7.5" (1.715 m)   Wt 77.6 kg (171 lb)   BMI 26.39 kg/m²          Physical Exam  Vitals reviewed. Cardiovascular:      Pulses:           Dorsalis pedis pulses are 1+ on the right side and 1+ on the left side. Posterior tibial pulses are 1+ on the right side and 1+ on the left side. Musculoskeletal:      Right foot: Bunion present. Left foot: Bunion present. Feet:      Right foot:      Protective Sensation: 10 sites tested.  9 sites sensed. Skin integrity: Dry skin present. Toenail Condition: Right toenails are abnormally thick and long. Fungal disease present. Left foot:      Protective Sensation: 10 sites tested. 8 sites sensed. Skin integrity: Dry skin present. Toenail Condition: Left toenails are abnormally thick and long. Fungal disease present. Comments: On exam patient has thickened, hypertrophic, discolored, brittle toenails with subungual debris and tenderness x10. Patient has lower extremity edema  PAtients skin is atrophic, thickened nails, and decreased pedal hair  Patient has decreased pinprick and vibratory sensation to his feet and parasthesia    Neurological:      Mental Status: She is alert.

## 2023-07-03 NOTE — TELEPHONE ENCOUNTER
Caller: Dk Tyson    Doctor/Office: Dr. Maury Arreguin    #: [phone]    Escalation: Appointment/asking for call back from Dr Darryl Javier as she is not happy with what her OV note says from today. Please return call.  Thanks

## 2023-07-06 ENCOUNTER — TELEPHONE (OUTPATIENT)
Dept: PODIATRY | Facility: CLINIC | Age: 73
End: 2023-07-06

## 2023-07-06 NOTE — TELEPHONE ENCOUNTER
Caller: Katiana Hemphill    Doctor/Office:Dr. Pichardo/Oliva     #: [phone]    Escalation: No returned call/Still waiting on call back about non covered foot care. She wants to see Dr. Debra Villagomez from this point on and I told her as far as I know the non covered foot care fee is $40.00. I also let her know I do not know if this is a service that Dr. Debra Villagomez offers to his patients as I have never worked with him in office. My [facility] Drs do offer that service. Please return her call.  Thanks

## 2023-08-03 ENCOUNTER — TELEPHONE (OUTPATIENT)
Dept: PULMONOLOGY | Facility: CLINIC | Age: 73
End: 2023-08-03

## 2023-08-03 NOTE — TELEPHONE ENCOUNTER
She needs a 6 month follow CT due in November because of multiple new nodules in the left upper lobe. This was discussed with her 5/24 by myself and at the time was agreeable. Please explain to patient and help her schedule it.

## 2023-08-03 NOTE — TELEPHONE ENCOUNTER
Patient called asking if the CT order can be closed or removed from her chart because she keeps on getting calls from central scheduling and she states that she already had a CT scan done in May. Please advise.

## 2023-09-05 ENCOUNTER — OFFICE VISIT (OUTPATIENT)
Dept: PULMONOLOGY | Facility: CLINIC | Age: 73
End: 2023-09-05
Payer: COMMERCIAL

## 2023-09-05 ENCOUNTER — RA CDI HCC (OUTPATIENT)
Dept: OTHER | Facility: HOSPITAL | Age: 73
End: 2023-09-05

## 2023-09-05 VITALS
TEMPERATURE: 96.9 F | HEIGHT: 68 IN | HEART RATE: 56 BPM | OXYGEN SATURATION: 97 % | WEIGHT: 165 LBS | SYSTOLIC BLOOD PRESSURE: 112 MMHG | DIASTOLIC BLOOD PRESSURE: 60 MMHG | BODY MASS INDEX: 25.01 KG/M2

## 2023-09-05 DIAGNOSIS — I48.0 PAROXYSMAL ATRIAL FIBRILLATION (HCC): ICD-10-CM

## 2023-09-05 DIAGNOSIS — J44.9 COPD, SEVERE (HCC): Primary | ICD-10-CM

## 2023-09-05 DIAGNOSIS — G47.33 OSA (OBSTRUCTIVE SLEEP APNEA): ICD-10-CM

## 2023-09-05 PROCEDURE — 99215 OFFICE O/P EST HI 40 MIN: CPT | Performed by: INTERNAL MEDICINE

## 2023-09-05 RX ORDER — LEVALBUTEROL TARTRATE 45 UG/1
1-2 AEROSOL, METERED ORAL EVERY 4 HOURS PRN
Qty: 15 G | Refills: 0 | Status: SHIPPED | OUTPATIENT
Start: 2023-09-05

## 2023-09-05 NOTE — PROGRESS NOTES
[facility] coding opportunities       Chart reviewed, no opportunity found: [address] Review     Patients Insurance     Medicare Insurance: [employer]

## 2023-09-05 NOTE — PROGRESS NOTES
Assessment/Plan:    COPD, severe ([address])  Becky and I reviewed her PFTs and symptoms. At this point she will continue on her Anoro 1 puff daily. She is hesitant to use her albuterol due to side effects so I have given her a levalbuterol to use instead and attempt to mitigate side effects. I reviewed her most recent CAT scan and she is due for lung cancer screening CT which is scheduled for November. We discussed her vaccines today and I encouraged her to get the flu shot. We discussed the risks and benefits of RSV and decided to move forward with this vaccination as well. I believe she is up-to-date on her pneumonia shot and COVID vaccines. Paroxysmal atrial fibrillation (HCC)  Heart rate and blood pressure stable on today's exam.       Diagnoses and all orders for this visit:    COPD, severe (HCC)  -     levalbuterol (Xopenex HFA) 45 mcg/act inhaler; Inhale 1-2 puffs every 4 (four) hours as needed for wheezing    GABI (obstructive sleep apnea)    Paroxysmal atrial fibrillation (HCC)    Other orders  -     Calcium Carbonate-Vit D-Min (CALCIUM 1200 PO); Take by mouth          Subjective:      Patient ID: Peggy Art is a 81 y.o. female. Brea's been having some trouble breathing this week due to hot humid weather otherwise her breathing's been stable on Anoro 1 puff daily. She does not take her rescue inhaler due to side effects reportedly of jitteriness and constipation. She quit smoking on July 4 otherwise had no change in her health. primary symptoms  Pertinent negatives include no chest pain, fever, headaches, myalgias or sore throat. The following portions of the patient's history were reviewed and updated as appropriate: allergies, current medications, past family history, past medical history, past social history, past surgical history and problem list.    Review of Systems   Constitutional: Negative for appetite change and fever. HENT: Positive for postnasal drip and sneezing. Negative for ear pain, rhinorrhea, sore throat and trouble swallowing. Cardiovascular: Negative for chest pain. Musculoskeletal: Negative for myalgias. Neurological: Negative for headaches. Objective:      /60 (BP Location: Left arm, Patient Position: Sitting, Cuff Size: Standard)   Pulse 56   Temp (!) 96.9 °F (36.1 °C) (Tympanic)   Ht 5' 7.5" (1.715 m)   Wt 74.8 kg (165 lb)   SpO2 97%   BMI 25.46 kg/m²          Physical Exam  Constitutional:       Appearance: She is well-developed. HENT:      Head: Normocephalic. Eyes:      Pupils: Pupils are equal, round, and reactive to light. Cardiovascular:      Rate and Rhythm: Normal rate. Heart sounds: No murmur heard. Pulmonary:      Effort: Pulmonary effort is normal. No respiratory distress. Breath sounds: Normal breath sounds. No wheezing or rales. Abdominal:      Palpations: Abdomen is soft. Musculoskeletal:         General: Normal range of motion. Cervical back: Normal range of motion and neck supple. Skin:     General: Skin is warm and dry. Neurological:      Mental Status: She is alert and oriented to person, place, and time.          Answers for HPI/ROS submitted by the patient on 9/4/2023  Do you have a wet cough?: Yes  When did you first notice your symptoms?: 1 to 4 weeks ago  How often do your symptoms occur?: daily  Since you first noticed this problem, how has it changed?: unchanged  Do you have shortness of breath that occurs with effort or exertion?: No  Do you have ear congestion?: No  Do you have heartburn?: No  Do you have fatigue?: No  Do you have nasal congestion?: Yes  Do you have shortness of breath when lying flat?: No  Do you have shortness of breath when you wake up?: No  Do you have sweats?: No  Have you experienced weight loss?: No  Which of the following makes your symptoms worse?: animal exposure, exposure to fumes, exposure to smoke, pollen  Which of the following makes your symptoms better?: steroid inhaler

## 2023-09-05 NOTE — ASSESSMENT & PLAN NOTE
Becky and I reviewed her PFTs and symptoms. At this point she will continue on her Anoro 1 puff daily. She is hesitant to use her albuterol due to side effects so I have given her a levalbuterol to use instead and attempt to mitigate side effects. I reviewed her most recent CAT scan and she is due for lung cancer screening CT which is scheduled for November. We discussed her vaccines today and I encouraged her to get the flu shot. We discussed the risks and benefits of RSV and decided to move forward with this vaccination as well. I believe she is up-to-date on her pneumonia shot and COVID vaccines.

## 2023-09-07 ENCOUNTER — RA CDI HCC (OUTPATIENT)
Dept: OTHER | Facility: HOSPITAL | Age: 73
End: 2023-09-07

## 2023-09-08 NOTE — PROGRESS NOTES
[facility] [address] [facility] coding opportunities       Chart reviewed, no opportunity found: CHART REVIEWED, NO OPPORTUNITY FOUND     MARC quiñones suggestion-CKD on BPA     Patients Insurance     Medicare Insurance: [employer]

## 2023-09-13 ENCOUNTER — TELEPHONE (OUTPATIENT)
Dept: INTERNAL MEDICINE CLINIC | Facility: CLINIC | Age: 73
End: 2023-09-13

## 2023-09-13 ENCOUNTER — OFFICE VISIT (OUTPATIENT)
Dept: INTERNAL MEDICINE CLINIC | Facility: CLINIC | Age: 73
End: 2023-09-13
Payer: COMMERCIAL

## 2023-09-13 VITALS
OXYGEN SATURATION: 98 % | HEART RATE: 58 BPM | WEIGHT: 165.5 LBS | TEMPERATURE: 98.3 F | HEIGHT: 68 IN | DIASTOLIC BLOOD PRESSURE: 66 MMHG | SYSTOLIC BLOOD PRESSURE: 126 MMHG | BODY MASS INDEX: 25.08 KG/M2

## 2023-09-13 DIAGNOSIS — L30.9 ECZEMA, UNSPECIFIED TYPE: ICD-10-CM

## 2023-09-13 DIAGNOSIS — D17.20 LIPOMA OF UPPER EXTREMITY, UNSPECIFIED LATERALITY: ICD-10-CM

## 2023-09-13 DIAGNOSIS — Z23 NEED FOR VACCINATION: ICD-10-CM

## 2023-09-13 DIAGNOSIS — B35.6 TINEA CRURIS: ICD-10-CM

## 2023-09-13 DIAGNOSIS — J44.9 COPD, SEVERE (HCC): Primary | ICD-10-CM

## 2023-09-13 PROCEDURE — 90662 IIV NO PRSV INCREASED AG IM: CPT | Performed by: PHYSICIAN ASSISTANT

## 2023-09-13 PROCEDURE — G0008 ADMIN INFLUENZA VIRUS VAC: HCPCS | Performed by: PHYSICIAN ASSISTANT

## 2023-09-13 PROCEDURE — 99214 OFFICE O/P EST MOD 30 MIN: CPT | Performed by: PHYSICIAN ASSISTANT

## 2023-09-13 RX ORDER — CLOTRIMAZOLE AND BETAMETHASONE DIPROPIONATE 10; .64 MG/G; MG/G
CREAM TOPICAL 2 TIMES DAILY
Qty: 45 G | Refills: 1 | Status: SHIPPED | OUTPATIENT
Start: 2023-09-13

## 2023-09-13 RX ORDER — FEXOFENADINE HCL 180 MG/1
180 TABLET ORAL DAILY
COMMUNITY

## 2023-09-13 RX ORDER — TRIAMCINOLONE ACETONIDE 1 MG/G
CREAM TOPICAL 2 TIMES DAILY
Qty: 15 G | Refills: 0 | Status: SHIPPED | OUTPATIENT
Start: 2023-09-13

## 2023-09-14 PROBLEM — B35.6 TINEA CRURIS: Status: ACTIVE | Noted: 2023-09-14

## 2023-09-14 PROBLEM — D17.20 LIPOMA OF UPPER EXTREMITY: Status: ACTIVE | Noted: 2023-09-14

## 2023-09-14 NOTE — ASSESSMENT & PLAN NOTE
Pts symptoms are stable with current regime. No changes at present. Continue with oxygen use, and pulmonary appts. Flu shot given. Up to date with pneumonia vaccines. Will be getting RSV vaccine as well.

## 2023-09-14 NOTE — PROGRESS NOTES
Name: Chema Davis      : 1950      MRN: [id number]  Encounter Provider: Meera Vera PA-C  Encounter Date: 2023   Encounter department: [id number] [place] [address] [place]     1. COPD, severe ([address])  Assessment & Plan:  Pts symptoms are stable with current regime. No changes at present. Continue with oxygen use, and pulmonary appts. Flu shot given. Up to date with pneumonia vaccines. Will be getting RSV vaccine as well. 2. Need for vaccination  -     influenza vaccine, high-dose, PF 0.7 mL (FLUZONE HIGH-DOSE)    3. Eczema, unspecified type  -     triamcinolone (KENALOG) 0.1 % cream; Apply topically 2 (two) times a day    4. Tinea cruris  -     clotrimazole-betamethasone (LOTRISONE) 1-0.05 % cream; Apply topically 2 (two) times a day    5. Lipoma of upper extremity, unspecified laterality  Assessment & Plan:  Defers ultrasound at this time and will monitor. We discussed the benign nature of these. Subjective      Pt presents for routine visit. She is up to date with mammogram and dexa scan. She desires flu vaccine today. She has cologuard ordered. She has an upcoming appt with vascular to discuss possible peripheral vascular disease. She also notes concern about 2 growths, one along each antecubital fossa and a third along her shin. Palpation of the one on the left inner elbow is very consistent with a lipoma. She continues to follow routinely with pulmonary and symptoms have been stable. Blood pressure has been nicely controlled except for a temporary increase in values which may have been secondary to situational stressors. Review of Systems   Constitutional: Negative for chills and fever. HENT: Negative for congestion, ear pain, hearing loss, postnasal drip, rhinorrhea, sinus pressure, sinus pain, sore throat and trouble swallowing. Eyes: Negative for pain and visual disturbance.    Respiratory: Negative for cough, chest tightness, shortness of breath and wheezing. Cardiovascular: Negative. Negative for chest pain, palpitations and leg swelling. Gastrointestinal: Negative for abdominal pain, blood in stool, constipation, diarrhea, nausea and vomiting. Endocrine: Negative for cold intolerance, heat intolerance, polydipsia, polyphagia and polyuria. Genitourinary: Negative for difficulty urinating, dysuria, flank pain and urgency. Musculoskeletal: Negative for arthralgias, back pain, gait problem and myalgias. Skin: Negative for rash. Allergic/Immunologic: Negative. Neurological: Negative for dizziness, weakness, light-headedness and headaches. Hematological: Negative. Psychiatric/Behavioral: Negative for behavioral problems, dysphoric mood and sleep disturbance. The patient is not nervous/anxious.         Current Outpatient Medications on File Prior to Visit   Medication Sig   • albuterol (PROVENTIL HFA,VENTOLIN HFA) 90 mcg/act inhaler Inhale 2 puffs every 4 (four) hours as needed for shortness of breath   • Anoro Ellipta 62.5-25 MCG/ACT inhaler INHALE 1 PUFF BY MOUTH EVERY DAY   • ascorbic acid (VITAMIN C) 500 mg tablet Take 500 mg by mouth daily   • aspirin 81 mg chewable tablet Chew 1 tablet (81 mg total) daily   • atorvastatin (LIPITOR) 40 mg tablet TAKE ONE TABLET BY MOUTH EVERY DAY   • Calcium Carbonate-Vit D-Min (CALCIUM 1200 PO) Take by mouth   • Cholecalciferol (VITAMIN D3) 5000 units CAPS Take 1 capsule by mouth daily   • fexofenadine (ALLEGRA) 180 MG tablet Take 180 mg by mouth daily   • guaiFENesin (MUCINEX) 600 mg 12 hr tablet Take 1 tablet (600 mg total) by mouth every 12 (twelve) hours   • ipratropium-albuterol (DUO-NEB) 0.5-2.5 mg/3 mL nebulizer solution Take 3 mL by nebulization 4 (four) times a day   • L-Lysine 500 MG CAPS Take 1 capsule by mouth daily   • levalbuterol (Xopenex HFA) 45 mcg/act inhaler Inhale 1-2 puffs every 4 (four) hours as needed for wheezing   • methocarbamol (ROBAXIN) 500 mg tablet Take 1 tablet (500 mg total) by mouth 3 (three) times a day as needed for muscle spasms   • metoprolol tartrate (LOPRESSOR) 50 mg tablet TAKE ONE AND ONE-HALF TABLETS BY MOUTH TWICE A DAY   • mometasone (NASONEX) 50 mcg/act nasal spray 2 sprays into each nostril daily   • MULTIPLE VITAMINS-CALCIUM PO Take by mouth daily   • Probiotic Product (PROBIOTIC-10 PO) Take 1 capsule by mouth daily       Objective     /66 (BP Location: Left arm, Patient Position: Sitting)   Pulse 58   Temp 98.3 °F (36.8 °C)   Ht 5' 7.5" (1.715 m)   Wt 75.1 kg (165 lb 8 oz)   SpO2 98%   BMI 25.54 kg/m²     Physical Exam  Vitals and nursing note reviewed. Constitutional:       Appearance: She is well-developed. HENT:      Head: Normocephalic and atraumatic. Nose: Nose normal.   Cardiovascular:      Rate and Rhythm: Normal rate. Pulmonary:      Effort: Pulmonary effort is normal.   Musculoskeletal:         General: Normal range of motion. Cervical back: Normal range of motion. Skin:     General: Skin is warm. Neurological:      General: No focal deficit present. Mental Status: She is alert. Psychiatric:         Behavior: Behavior normal.         Thought Content:  Thought content normal.         Judgment: Judgment normal.       Rayna Hernandez PA-C

## 2023-09-29 ENCOUNTER — OFFICE VISIT (OUTPATIENT)
Dept: VASCULAR SURGERY | Facility: CLINIC | Age: 73
End: 2023-09-29
Payer: COMMERCIAL

## 2023-09-29 VITALS
DIASTOLIC BLOOD PRESSURE: 62 MMHG | TEMPERATURE: 97.6 F | HEIGHT: 68 IN | RESPIRATION RATE: 16 BRPM | WEIGHT: 170.6 LBS | OXYGEN SATURATION: 97 % | HEART RATE: 81 BPM | BODY MASS INDEX: 25.85 KG/M2 | SYSTOLIC BLOOD PRESSURE: 152 MMHG

## 2023-09-29 DIAGNOSIS — I87.2 VENOUS INSUFFICIENCY: ICD-10-CM

## 2023-09-29 PROCEDURE — 99203 OFFICE O/P NEW LOW 30 MIN: CPT

## 2023-09-29 NOTE — PATIENT INSTRUCTIONS
-Recommend conservative measures with use of daily compression hose (20-30 mmHg), lower extremity elevation, regular exercise, and diligent skin care. -Script for compression provided today. - Instructed to call the office in the interim with any questions, concerns, or new symptoms.

## 2023-09-29 NOTE — ASSESSMENT & PLAN NOTE
79yo female with PMH of GABI, COPD, PAF, CKD, HLD. She presents today as a new patient for evaluation of BLE. She reports she was seen by podiatry who said that she had shiny skin and decreased pedal hair growth. She was concerned that this was due to vascular issue and wanted to be evaluated. -She denies claudication, rest pain or tissue loss. -She reports intermittent ankle swelling after salt intake but follows a reduced salt diet.   -On exam, BLE are warm, perfused without cyanosis, edema or ulcerations. She has easily palpable DP/PT pulses with triphasic doppler signals. Motor and sensation grossly intact. Recommendations:   -No further vascular work up indicated at this time as patient is asymptomatic with easily palpable pulses on exam.   -Recommend low-fat, low-sodium diet and regular exercise.   -Follow up with podiatry for routine foot care/nail trimmings due to neuropathy.   -Can consider compression stockings for intermittent leg swelling. Rx for 15-20 mmHg compression provided today.   -Instructed to notify the office with questions, concerns or new symptoms.

## 2023-09-29 NOTE — PROGRESS NOTES
Assessment/Plan:    Venous insufficiency  79yo female with PMH of GABI, COPD, PAF, CKD, HLD. She presents today as a new patient for evaluation of BLE. She reports she was seen by podiatry who said that she had shiny skin and decreased pedal hair growth. She was concerned that this was due to vascular issue and wanted to be evaluated. -She denies claudication, rest pain or tissue loss. -She reports intermittent ankle swelling after salt intake but follows a reduced salt diet.   -On exam, BLE are warm, perfused without cyanosis, edema or ulcerations. She has easily palpable DP/PT pulses with triphasic doppler signals. Motor and sensation grossly intact. Recommendations:   -No further vascular work up indicated at this time as patient is asymptomatic with easily palpable pulses on exam.   -Recommend low-fat, low-sodium diet and regular exercise.   -Follow up with podiatry for routine foot care/nail trimmings due to neuropathy.   -Can consider compression stockings for intermittent leg swelling. Rx for 15-20 mmHg compression provided today.   -Follow up prn.   -Instructed to notify the office with questions, concerns or new symptoms. Diagnoses and all orders for this visit:    Venous insufficiency  -     Ambulatory Referral to Vascular Surgery  -     Compression Stocking          Subjective:      Patient ID: Marylin Pro is a 72 y.o. female. HPI    Patient is here to establish care, referred by self. Patient has c/o dry skin on her b/l feet and legs, shininess to her skin and loss of hair on feet and legs. Patient has coldness to her hands and feet. Patient is on Atorvastatin and ASA 81 mg. Patient is a former smoker, quit 7/4/23. Reports swelling in the ankles after eating salty foods. She tries to restrict her salt intake. 1 year ago she noticed her skin is dry and flakey no matter what soap or lotion she used. She also noticed decreased pedal hair growth and shiny skin.      She got a wound on her right shin after sustaining trauma in July which is now healed. She states it took about 2 weeks to heal.     Her feet and hands get cold in the winter months and notices issues with keeping her hands and feet warm. She states this mainly happens at home. She has advanced COPD and is on 2L O2 at home. The following portions of the patient's history were reviewed and updated as appropriate: allergies, current medications, past family history, past medical history, past social history, past surgical history and problem list.    Review of Systems   Constitutional: Negative. HENT: Positive for congestion. Eyes: Negative. Respiratory: Negative. Cardiovascular: Negative. Gastrointestinal: Negative. Endocrine: Negative. Genitourinary: Negative. Musculoskeletal: Negative. Skin: Negative. Allergic/Immunologic: Negative. Neurological:        Tingling. Hematological: Negative. Psychiatric/Behavioral: Negative. Negative for agitation. I have personally reviewed and made appropriate changes to the ROS that was input by the medical assistant.        Objective:      Vitals:    09/29/23 1525   BP: 152/62   BP Location: Left arm   Patient Position: Sitting   Cuff Size: Adult   Pulse: 81   Resp: 16   Temp: 97.6 °F (36.4 °C)   TempSrc: Temporal   SpO2: 97%   Weight: 77.4 kg (170 lb 9.6 oz)   Height: 5' 7.5" (1.715 m)       Patient Active Problem List   Diagnosis   • Allergic rhinitis   • Anxiety   • Emphysema/COPD (HCC)   • Nicotine dependence with current use   • Depression, recurrent (HCC)   • Hyperlipidemia   • GABI (obstructive sleep apnea)   • Vitamin D deficiency   • Paroxysmal atrial fibrillation (HCC)   • Need for vaccination with 13-polyvalent pneumococcal conjugate vaccine   • Constipation   • Incidental pulmonary nodule, > 3mm and < 8mm   • COPD, severe (HCC)   • Family history of autoimmune disorder   • Bursitis of right shoulder   • Myxoid cyst   • Stage 3 chronic kidney disease, unspecified whether stage 3a or 3b CKD ([address])   • Cervical strain   • Tinea cruris   • Lipoma of upper extremity   • Venous insufficiency       Past Surgical History:   Procedure Laterality Date   • ADENOIDECTOMY     • BREAST CYST EXCISION Bilateral     benign-, ,    • BUNIONECTOMY Bilateral    • CATARACT EXTRACTION, BILATERAL     • MOLE REMOVAL      lip   • TONSILLECTOMY     • TUBAL LIGATION         Family History   Problem Relation Age of Onset   • Hypertension Mother    • Alcohol abuse Mother    • Hypertension Father    • Diabetes Father    • Prostate cancer Father    • Cancer Father         Prostate   • Vitamin D deficiency Sister    • Diverticulitis Sister    • No Known Problems Sister    • Heart failure Brother    • Lung cancer Brother    • Cancer Brother         Stage four lung cancer   • Celiac disease Daughter    • Eczema Daughter    • Fibromyalgia Daughter    • Substance Abuse Daughter    • Alcohol abuse Daughter    • ADD / ADHD Daughter    • Bipolar disorder Daughter    • Anxiety disorder Daughter    • Ulcerative colitis Daughter    • No Known Problems Maternal Aunt    • Heart disease Brother    • Breast cancer Neg Hx        Social History     Socioeconomic History   • Marital status:      Spouse name: Not on file   • Number of children: Not on file   • Years of education: Not on file   • Highest education level: Not on file   Occupational History   • Not on file   Tobacco Use   • Smoking status: Former     Packs/day: 0.25     Years: 50.00     Total pack years: 12.50     Types: Cigarettes     Start date: 1970     Quit date: 2023     Years since quittin.2   • Smokeless tobacco: Never   Vaping Use   • Vaping Use: Never used   Substance and Sexual Activity   • Alcohol use: Yes     Comment: I might have a glass of wine every two or three months   • Drug use: No   • Sexual activity: Not Currently     Partners: Male     Birth control/protection: Abstinence Comment: Had tubal ligation in the 1980's   Other Topics Concern   • Not on file   Social History Narrative    -    Retired    Lives with significant other     Social Determinants of Health     Financial Resource Strain: Low Risk  (5/10/2021)    Overall Financial Resource Strain (CARDIA)    • Difficulty of Paying Living Expenses: Not very hard   Food Insecurity: No Food Insecurity (5/10/2021)    Hunger Vital Sign    • Worried About Running Out of Food in the Last Year: Never true    • Ran Out of Food in the Last Year: Never true   Transportation Needs: Unknown (12/1/2022)    PRAPARE - Transportation    • Lack of Transportation (Medical): No    • Lack of Transportation (Non-Medical): Not on file   Physical Activity: Inactive (7/18/2019)    Exercise Vital Sign    • Days of Exercise per Week: 0 days    • Minutes of Exercise per Session: 0 min   Stress: No Stress Concern Present (7/18/2019)    [facility]    • Feeling of Stress : Only a little   Social Connections: Unknown (7/18/2019)    Social Connection and Isolation Panel [NHANES]    • Frequency of Communication with Friends and Family: More than three times a week    • Frequency of Social Gatherings with Friends and Family: More than three times a week    • Attends [employer] Services: Not on file    • Active Member of Clubs or Organizations: Not on file    • Attends Club or Organization Meetings: Not on file    • Marital Status: Not on file   Intimate Partner Violence: Not At Risk (7/18/2019)    Humiliation, Afraid, Rape, and Kick questionnaire    • Fear of Current or Ex-Partner: No    • Emotionally Abused: No    • Physically Abused: No    • Sexually Abused: No   Housing Stability: Not on file       Allergies   Allergen Reactions   • Bupropion Itching   • Ace Inhibitors Cough     Action Taken: stopped med and changed to ARB; Category:  Adverse Reaction;    • Citalopram Diarrhea and Nausea Only   • Adhesive [Medical Tape] Rash     Patch          Current Outpatient Medications:   •  albuterol (PROVENTIL HFA,VENTOLIN HFA) 90 mcg/act inhaler, Inhale 2 puffs every 4 (four) hours as needed for shortness of breath, Disp: 54 g, Rfl: 0  •  Anoro Ellipta 62.5-25 MCG/ACT inhaler, INHALE 1 PUFF BY MOUTH EVERY DAY, Disp: 180 each, Rfl: 3  •  ascorbic acid (VITAMIN C) 500 mg tablet, Take 500 mg by mouth daily, Disp: , Rfl:   •  aspirin 81 mg chewable tablet, Chew 1 tablet (81 mg total) daily, Disp: , Rfl:   •  atorvastatin (LIPITOR) 40 mg tablet, TAKE ONE TABLET BY MOUTH EVERY DAY, Disp: 90 tablet, Rfl: 1  •  Calcium Carbonate-Vit D-Min (CALCIUM 1200 PO), Take by mouth, Disp: , Rfl:   •  Cholecalciferol (VITAMIN D3) 5000 units CAPS, Take 1 capsule by mouth daily, Disp: , Rfl:   •  clotrimazole-betamethasone (LOTRISONE) 1-0.05 % cream, Apply topically 2 (two) times a day, Disp: 45 g, Rfl: 1  •  fexofenadine (ALLEGRA) 180 MG tablet, Take 180 mg by mouth daily, Disp: , Rfl:   •  guaiFENesin (MUCINEX) 600 mg 12 hr tablet, Take 1 tablet (600 mg total) by mouth every 12 (twelve) hours, Disp: 180 tablet, Rfl: 3  •  ipratropium-albuterol (DUO-NEB) 0.5-2.5 mg/3 mL nebulizer solution, Take 3 mL by nebulization 4 (four) times a day, Disp: 1080 mL, Rfl: 0  •  L-Lysine 500 MG CAPS, Take 1 capsule by mouth daily, Disp: , Rfl:   •  levalbuterol (Xopenex HFA) 45 mcg/act inhaler, Inhale 1-2 puffs every 4 (four) hours as needed for wheezing, Disp: 15 g, Rfl: 0  •  methocarbamol (ROBAXIN) 500 mg tablet, Take 1 tablet (500 mg total) by mouth 3 (three) times a day as needed for muscle spasms, Disp: 30 tablet, Rfl: 0  •  metoprolol tartrate (LOPRESSOR) 50 mg tablet, TAKE ONE AND ONE-HALF TABLETS BY MOUTH TWICE A DAY, Disp: 270 tablet, Rfl: 3  •  mometasone (NASONEX) 50 mcg/act nasal spray, 2 sprays into each nostril daily, Disp: 51 g, Rfl: 0  •  MULTIPLE VITAMINS-CALCIUM PO, Take by mouth daily, Disp: , Rfl:   •  Probiotic Product (PROBIOTIC-10 PO), Take 1 capsule by mouth daily, Disp: , Rfl:   •  triamcinolone (KENALOG) 0.1 % cream, Apply topically 2 (two) times a day, Disp: 15 g, Rfl: 0      /62 (BP Location: Left arm, Patient Position: Sitting, Cuff Size: Adult)   Pulse 81   Temp 97.6 °F (36.4 °C) (Temporal)   Resp 16   Ht 5' 7.5" (1.715 m)   Wt 77.4 kg (170 lb 9.6 oz)   SpO2 97% Comment: 2L of O2. BMI 26.33 kg/m²          Physical Exam  Vitals and nursing note reviewed. Constitutional:       Appearance: Normal appearance. HENT:      Head: Normocephalic and atraumatic. Neck:      Vascular: No carotid bruit. Cardiovascular:      Rate and Rhythm: Normal rate and regular rhythm. Pulses:           Radial pulses are 2+ on the right side and 2+ on the left side. Femoral pulses are 2+ on the right side and 2+ on the left side. Dorsalis pedis pulses are 2+ on the right side and 2+ on the left side. Posterior tibial pulses are 2+ on the right side and 2+ on the left side. Heart sounds: Normal heart sounds. Pulmonary:      Effort: Pulmonary effort is normal. No respiratory distress. Breath sounds: Normal breath sounds. Abdominal:      General: Bowel sounds are normal. There is no distension. Palpations: Abdomen is soft. Comments: No abdominal bruit or pulsatile masses. Musculoskeletal:         General: No swelling. Normal range of motion. Cervical back: Normal range of motion and neck supple. Right lower leg: No edema. Left lower leg: No edema. Skin:     General: Skin is warm and dry. Capillary Refill: Capillary refill takes less than 2 seconds. Neurological:      General: No focal deficit present. Mental Status: She is alert and oriented to person, place, and time.    Psychiatric:         Mood and Affect: Mood normal.         Behavior: Behavior normal.       Filipe Yu PA-C  [facility]  [phone]    I have spent a total time of 30 minutes on 09/29/23 in caring for this patient including Prognosis, Risks and benefits of tx options, Instructions for management, Patient and family education, Importance of tx compliance, Risk factor reductions, Impressions, Counseling / Coordination of care, Documenting in the medical record, Reviewing / ordering tests, medicine, procedures   and Obtaining or reviewing history  .

## 2023-11-16 DIAGNOSIS — E78.5 HYPERLIPIDEMIA, UNSPECIFIED HYPERLIPIDEMIA TYPE: ICD-10-CM

## 2023-11-16 RX ORDER — ATORVASTATIN CALCIUM 40 MG/1
40 TABLET, FILM COATED ORAL DAILY
Qty: 90 TABLET | Refills: 1 | Status: SHIPPED | OUTPATIENT
Start: 2023-11-16

## 2023-11-20 ENCOUNTER — HOSPITAL ENCOUNTER (OUTPATIENT)
Dept: CT IMAGING | Facility: HOSPITAL | Age: 73
Discharge: HOME | End: 2023-11-20
Payer: COMMERCIAL

## 2023-11-20 DIAGNOSIS — R91.8 LUNG NODULES: ICD-10-CM

## 2023-11-20 PROCEDURE — G1004 CDSM NDSC: HCPCS

## 2023-11-20 PROCEDURE — 71250 CT THORAX DX C-: CPT

## 2023-11-21 DIAGNOSIS — I48.0 PAROXYSMAL ATRIAL FIBRILLATION (HCC): ICD-10-CM

## 2023-11-21 RX ORDER — METOPROLOL TARTRATE 50 MG/1
75 TABLET, FILM COATED ORAL 2 TIMES DAILY
Qty: 270 TABLET | Refills: 3 | Status: SHIPPED | OUTPATIENT
Start: 2023-11-21

## 2023-12-18 ENCOUNTER — OFFICE VISIT (OUTPATIENT)
Dept: INTERNAL MEDICINE CLINIC | Facility: CLINIC | Age: 73
End: 2023-12-18
Payer: COMMERCIAL

## 2023-12-18 VITALS
HEART RATE: 57 BPM | OXYGEN SATURATION: 98 % | DIASTOLIC BLOOD PRESSURE: 58 MMHG | HEIGHT: 68 IN | TEMPERATURE: 98.4 F | SYSTOLIC BLOOD PRESSURE: 116 MMHG | WEIGHT: 166.6 LBS | BODY MASS INDEX: 25.25 KG/M2

## 2023-12-18 DIAGNOSIS — Z00.00 MEDICARE ANNUAL WELLNESS VISIT, SUBSEQUENT: ICD-10-CM

## 2023-12-18 DIAGNOSIS — Z11.59 NEED FOR HEPATITIS C SCREENING TEST: ICD-10-CM

## 2023-12-18 DIAGNOSIS — K21.9 GASTROESOPHAGEAL REFLUX DISEASE WITHOUT ESOPHAGITIS: Primary | ICD-10-CM

## 2023-12-18 DIAGNOSIS — Z12.11 ENCOUNTER FOR SCREENING FOR COLORECTAL MALIGNANT NEOPLASM: ICD-10-CM

## 2023-12-18 DIAGNOSIS — J44.9 COPD, SEVERE (HCC): ICD-10-CM

## 2023-12-18 DIAGNOSIS — Z12.12 ENCOUNTER FOR SCREENING FOR COLORECTAL MALIGNANT NEOPLASM: ICD-10-CM

## 2023-12-18 PROBLEM — M75.51 BURSITIS OF RIGHT SHOULDER: Status: RESOLVED | Noted: 2020-09-22 | Resolved: 2023-12-18

## 2023-12-18 PROCEDURE — 99202 OFFICE O/P NEW SF 15 MIN: CPT | Performed by: PHYSICIAN ASSISTANT

## 2023-12-18 PROCEDURE — G0439 PPPS, SUBSEQ VISIT: HCPCS | Performed by: PHYSICIAN ASSISTANT

## 2023-12-18 RX ORDER — OMEPRAZOLE 40 MG/1
40 CAPSULE, DELAYED RELEASE ORAL DAILY
Qty: 90 CAPSULE | Refills: 0 | Status: SHIPPED | OUTPATIENT
Start: 2023-12-18

## 2023-12-18 RX ORDER — CETIRIZINE HYDROCHLORIDE 10 MG/1
10 TABLET ORAL DAILY
COMMUNITY

## 2023-12-18 NOTE — PROGRESS NOTES
Assessment and Plan:     Problem List Items Addressed This Visit     COPD, severe (HCC)    Relevant Medications    cetirizine (ZyrTEC) 10 mg tablet    Gastroesophageal reflux disease without esophagitis - Primary     Start omeprazole. Directions for use and possible side effects discussed and patient verbalized understanding of these.           Relevant Medications    omeprazole (PriLOSEC) 40 MG capsule   Other Visit Diagnoses     Encounter for screening for colorectal malignant neoplasm        Relevant Orders    Cologuard    Need for hepatitis C screening test        Relevant Orders    Hepatitis C Antibody    Medicare annual wellness visit, subsequent               Preventive health issues were discussed with patient, and age appropriate screening tests were ordered as noted in patient's After Visit Summary.  Personalized health advice and appropriate referrals for health education or preventive services given if needed, as noted in patient's After Visit Summary.     History of Present Illness:     Patient presents for a Medicare Wellness Visit    Pt presents for routine visit. Subsequent AWV also performed. She is up to date with mammogram and dexa scan. She had her CT scan of her chest and it revealed resolution of some of her lung nodules. She desires rsv and prevnar vaccines and will get these at her pharmacy. She is due for CRC screening but defers. She notes some issues with food intolerance with acidic foods and would like to try a course of omeprazole.        Patient Care Team:  Rayna Hernandez PA-C as PCP - General (Internal Medicine)  Guido Duran DO as PCP - PCP-[facility] ([facility])  Guido Duran DO as PCP - PCP-[facility] ([facility])  MD Gordy Wharton MD Meghan Astorino, PA-C Kirth Steele, DO Jenifer Lee Beers, PA-C as Physician Assistant (Vascular Surgery)     Review of Systems:     Review of Systems   Constitutional:  Negative for chills and fever.   HENT:  Negative for  congestion, ear pain, hearing loss, postnasal drip, rhinorrhea, sinus pressure, sinus pain, sore throat and trouble swallowing.    Eyes:  Negative for pain and visual disturbance.   Respiratory:  Negative for cough, chest tightness, shortness of breath and wheezing.    Cardiovascular: Negative.  Negative for chest pain, palpitations and leg swelling.   Gastrointestinal:  Negative for abdominal pain, blood in stool, constipation, diarrhea, nausea and vomiting.   Endocrine: Negative for cold intolerance, heat intolerance, polydipsia, polyphagia and polyuria.   Genitourinary:  Negative for difficulty urinating, dysuria, flank pain and urgency.   Musculoskeletal:  Negative for arthralgias, back pain, gait problem and myalgias.   Skin:  Negative for rash.   Allergic/Immunologic: Negative.    Neurological:  Negative for dizziness, weakness, light-headedness and headaches.   Hematological: Negative.    Psychiatric/Behavioral:  Negative for behavioral problems, dysphoric mood and sleep disturbance. The patient is not nervous/anxious.         Problem List:     Patient Active Problem List   Diagnosis   • Allergic rhinitis   • Anxiety   • Emphysema/COPD ([facility])   • Nicotine dependence with current use   • Depression, recurrent ([facility])   • Hyperlipidemia   • GABI (obstructive sleep apnea)   • Vitamin D deficiency   • Paroxysmal atrial fibrillation ([facility])   • Need for vaccination with 13-polyvalent pneumococcal conjugate vaccine   • Constipation   • Incidental pulmonary nodule, > 3mm and < 8mm   • COPD, severe ([facility])   • Family history of autoimmune disorder   • Myxoid cyst   • Stage 3 chronic kidney disease, unspecified whether stage 3a or 3b CKD ([facility])   • Cervical strain   • Tinea cruris   • Lipoma of upper extremity   • Venous insufficiency   • Gastroesophageal reflux disease without esophagitis      Past Medical and Surgical History:     Past Medical History:   Diagnosis Date   • Allergic    • Allergic rhinitis     [id number]   RESOLVED   • Asthma    • Atrial fibrillation ([facility])    • Bronchitis    • Bunion    • COPD (chronic obstructive pulmonary disease) ([facility])     home O2 at night at home 2L   • Coronary artery disease     AFIB   • Depression    • Emphysema lung ([facility])    • Fibroadenoma of breast    • Gastroesophageal reflux disease without esophagitis 12/18/2023   • Hyperlipidemia    • Hypertension    • Lung disease 2008    COPD   • Memory difficulties 07/23/2019   • Post-menopausal    • Post-menopausal    • Spondylisthesis    • Stage 3 chronic kidney disease, unspecified whether stage 3a or 3b CKD ([facility]) 02/07/2022   • Vitamin D deficiency      Past Surgical History:   Procedure Laterality Date   • ADENOIDECTOMY     • BREAST CYST EXCISION Bilateral     benign-1987, 2002, 2007   • BUNIONECTOMY Bilateral    • CATARACT EXTRACTION, BILATERAL     • EYE SURGERY      Cataracts removed   • MOLE REMOVAL      lip   • TONSILLECTOMY     • TUBAL LIGATION        Family History:     Family History   Problem Relation Age of Onset   • Hypertension Mother    • Alcohol abuse Mother    • Hypertension Father    • Diabetes Father    • Prostate cancer Father    • Cancer Father         Prostate   • Hearing loss Father    • Vitamin D deficiency Sister    • Diverticulitis Sister    • Depression Sister    • No Known Problems Sister    • Heart failure Brother    • Lung cancer Brother    • Cancer Brother         Stage four lung cancer   • Celiac disease Daughter    • Alcohol abuse Daughter    • Substance Abuse Daughter    • Eczema Daughter    • Fibromyalgia Daughter    • Substance Abuse Daughter    • Alcohol abuse Daughter    • ADD / ADHD Daughter    • Bipolar disorder Daughter    • Anxiety disorder Daughter    • Ulcerative colitis Daughter    • No Known Problems Maternal Aunt    • Heart disease Brother    • Substance Abuse Brother    • Drug abuse Brother    • Breast cancer Neg Hx       Social History:     Social History     Socioeconomic History   • Marital status:       Spouse name: None   • Number of children: None   • Years of education: None   • Highest education level: None   Occupational History   • None   Tobacco Use   • Smoking status: Every Day     Current packs/day: 0.00     Average packs/day: 0.3 packs/day for 53.5 years (13.4 ttl pk-yrs)     Types: Cigarettes     Start date: 1970     Last attempt to quit: 2023     Years since quittin.4   • Smokeless tobacco: Never   Vaping Use   • Vaping status: Never Used   Substance and Sexual Activity   • Alcohol use: Yes     Comment: I might have a glass of wine every two or three months   • Drug use: No   • Sexual activity: Not Currently     Partners: Male     Birth control/protection: Abstinence     Comment: Had tubal ligation in the    Other Topics Concern   • None   Social History Narrative    -    Retired    Lives with significant other     Social Determinants of Health     Financial Resource Strain: Low Risk  (2023)    Overall Financial Resource Strain (CARDIA)    • Difficulty of Paying Living Expenses: Not very hard   Food Insecurity: No Food Insecurity (2023)    Received from [facility]    Hunger Vital Sign    • Worried About Running Out of Food in the Last Year: Never true    • Ran Out of Food in the Last Year: Never true   Transportation Needs: No Transportation Needs (2023)    PRAPARE - Transportation    • Lack of Transportation (Medical): No    • Lack of Transportation (Non-Medical): No   Physical Activity: Inactive (2019)    Exercise Vital Sign    • Days of Exercise per Week: 0 days    • Minutes of Exercise per Session: 0 min   Stress: No Stress Concern Present (2019)    [place] [employer] - Occupational Stress Questionnaire    • Feeling of Stress : Only a little   Social Connections: Unknown (2019)    Social Connection and Isolation Panel [NHANES]    • Frequency of Communication with Friends and Family: More than three  times a week    • Frequency of Social Gatherings with Friends and Family: More than three times a week    • Attends Jehovah's witness Services: Not on file    • Active Member of Clubs or Organizations: Not on file    • Attends Club or Organization Meetings: Not on file    • Marital Status: Not on file   Intimate Partner Violence: Not At Risk (7/18/2019)    Humiliation, Afraid, Rape, and Kick questionnaire    • Fear of Current or Ex-Partner: No    • Emotionally Abused: No    • Physically Abused: No    • Sexually Abused: No   Housing Stability: Not on file      Medications and Allergies:     Current Outpatient Medications   Medication Sig Dispense Refill   • albuterol (PROVENTIL HFA,VENTOLIN HFA) 90 mcg/act inhaler Inhale 2 puffs every 4 (four) hours as needed for shortness of breath 54 g 0   • Anoro Ellipta 62.5-25 MCG/ACT inhaler INHALE 1 PUFF BY MOUTH EVERY  each 3   • ascorbic acid (VITAMIN C) 500 mg tablet Take 500 mg by mouth daily     • aspirin 81 mg chewable tablet Chew 1 tablet (81 mg total) daily     • atorvastatin (LIPITOR) 40 mg tablet Take 1 tablet (40 mg total) by mouth daily 90 tablet 1   • cetirizine (ZyrTEC) 10 mg tablet Take 10 mg by mouth daily     • Cholecalciferol (VITAMIN D3) 5000 units CAPS Take 1 capsule by mouth daily     • clotrimazole-betamethasone (LOTRISONE) 1-0.05 % cream Apply topically 2 (two) times a day 45 g 1   • guaiFENesin (MUCINEX) 600 mg 12 hr tablet Take 1 tablet (600 mg total) by mouth every 12 (twelve) hours 180 tablet 3   • ipratropium-albuterol (DUO-NEB) 0.5-2.5 mg/3 mL nebulizer solution Take 3 mL by nebulization 4 (four) times a day 1080 mL 0   • L-Lysine 500 MG CAPS Take 1 capsule by mouth daily     • levalbuterol (Xopenex HFA) 45 mcg/act inhaler Inhale 1-2 puffs every 4 (four) hours as needed for wheezing 15 g 0   • metoprolol tartrate (LOPRESSOR) 50 mg tablet Take 1.5 tablets (75 mg total) by mouth 2 (two) times a day 270 tablet 3   • mometasone (NASONEX) 50 mcg/act  nasal spray 2 sprays into each nostril daily 51 g 0   • MULTIPLE VITAMINS-CALCIUM PO Take by mouth daily     • omeprazole (PriLOSEC) 40 MG capsule Take 1 capsule (40 mg total) by mouth daily 90 capsule 0   • Probiotic Product (PROBIOTIC-10 PO) Take 1 capsule by mouth daily     • triamcinolone (KENALOG) 0.1 % cream Apply topically 2 (two) times a day 15 g 0   • Calcium Carbonate-Vit D-Min (CALCIUM 1200 PO) Take by mouth     • fexofenadine (ALLEGRA) 180 MG tablet Take 180 mg by mouth daily (Patient not taking: Reported on 12/18/2023)     • methocarbamol (ROBAXIN) 500 mg tablet Take 1 tablet (500 mg total) by mouth 3 (three) times a day as needed for muscle spasms (Patient not taking: Reported on 12/18/2023) 30 tablet 0     No current facility-administered medications for this visit.     Allergies   Allergen Reactions   • Bupropion Itching   • Ace Inhibitors Cough     Action Taken: stopped med and changed to ARB; Category: Adverse Reaction;    • Citalopram Diarrhea and Nausea Only   • Mixed Ragweed Cough   • Pollen Extract Cough   • Adhesive [Medical Tape] Rash     Patch       Immunizations:     Immunization History   Administered Date(s) Administered   • COVID-19 PFIZER VACCINE 0.3 ML IM 03/17/2021, 04/07/2021, 11/10/2021, 04/26/2022   • COVID-19 Pfizer Vac BIVALENT Kevin-sucrose 12 Yr+ IM 09/29/2022   • COVID-19 Pfizer vac (Kevin-sucrose, gray cap) 12 yr+ IM 04/26/2022   • INFLUENZA 11/09/2015, 10/21/2016, 10/03/2017, 09/10/2018, 09/29/2021, 09/29/2022   • Influenza Quadrivalent Preservative Free 3 years and older IM 11/09/2015, 10/21/2016, 10/03/2017   • Influenza, high dose seasonal 0.7 mL 10/16/2020, 09/29/2022, 09/13/2023   • Pneumococcal Conjugate 13-Valent 11/16/2015, 08/20/2018   • Pneumococcal Conjugate Vaccine 20-valent (Pcv20), Polysace 10/31/2022   • Pneumococcal Polysaccharide PPV23 08/29/2016   • Tdap 12/28/2015   • Zoster 12/28/2015   • Zoster Vaccine Recombinant 12/28/2015   • influenza, trivalent,  adjuvanted 09/27/2019      Health Maintenance:         Topic Date Due   • Hepatitis C Screening  Never done   • Colorectal Cancer Screening  Never done   • Breast Cancer Screening: Mammogram  06/02/2024   • DXA SCAN  06/02/2025   • Lung Cancer Screening  Discontinued         Topic Date Due   • COVID-19 Vaccine (7 - 2023-24 season) 09/01/2023      Medicare Screening Tests and Risk Assessments:     Corinne is here for her Subsequent Wellness visit.     Health Risk Assessment:   Patient rates overall health as good. Patient feels that their physical health rating is slightly better. Patient is very satisfied with their life. Eyesight was rated as same. Hearing was rated as same. Patient feels that their emotional and mental health rating is much better. Patients states they are never, rarely angry. Patient states they are sometimes unusually tired/fatigued. Pain experienced in the last 7 days has been some. Patient's pain rating has been 3/10. Patient states that she has experienced no weight loss or gain in last 6 months.     Depression Screening:   PHQ-9 Score: 1      Fall Risk Screening:   In the past year, patient has experienced: no history of falling in past year      Urinary Incontinence Screening:   Patient has not leaked urine accidently in the last six months.     Home Safety:  Patient does not have trouble with stairs inside or outside of their home. Patient has working smoke alarms and has no working carbon monoxide detector. Home safety hazards include: none.     Nutrition:   Current diet is Low Cholesterol and Low Saturated Fat. Low salt    Medications:   Patient is currently taking over-the-counter supplements. OTC medications include: see medication list. Patient is able to manage medications.     Activities of Daily Living (ADLs)/Instrumental Activities of Daily Living (IADLs):   Walk and transfer into and out of bed and chair?: Yes  Dress and groom yourself?: Yes    Bathe or shower yourself?: Yes     Feed yourself? Yes  Do your laundry/housekeeping?: Yes  Manage your money, pay your bills and track your expenses?: Yes  Make your own meals?: Yes    Do your own shopping?: Yes    Durable Medical Equipment Suppliers  [facility], [place]    Previous Hospitalizations:   Any hospitalizations or ED visits within the last 12 months?: No      Advance Care Planning:   Living will: No    Durable POA for healthcare: No    Advanced directive: No    ACP document given: Yes      Cognitive Screening:   Provider or family/friend/caregiver concerned regarding cognition?: No    PREVENTIVE SCREENINGS      Cardiovascular Screening:    General: Screening Not Indicated and History Lipid Disorder      Diabetes Screening:     General: Screening Current      Colorectal Cancer Screening:       Due for: Cologuard      Breast Cancer Screening:     General: Screening Current      Cervical Cancer Screening:    General: Screening Not Indicated      Osteoporosis Screening:    General: Screening Current      Abdominal Aortic Aneurysm (AAA) Screening:        General: Patient Declines      Lung Cancer Screening:     General: Screening Not Indicated      Hepatitis C Screening:    General: Patient Declines    Screening, Brief Intervention, and Referral to Treatment (SBIRT)    Screening  Typical number of drinks in a day: 0  Typical number of drinks in a week: 0  Interpretation: Low risk drinking behavior.    AUDIT-C Screenin) How often did you have a drink containing alcohol in the past year? monthly or less  2) How many drinks did you have on a typical day when you were drinking in the past year? 0  3) How often did you have 6 or more drinks on one occasion in the past year? never    AUDIT-C Score: 1  Interpretation: Score 0-2 (female): Negative screen for alcohol misuse    Single Item Drug Screening:  How often have you used an illegal drug (including marijuana) or a prescription medication for non-medical reasons in the past year? never    Single Item  "Drug Screen Score: 0  Interpretation: Negative screen for possible drug use disorder    No results found.     Physical Exam:     /58 (BP Location: Left arm, Patient Position: Sitting, Cuff Size: Standard)   Pulse 57   Temp 98.4 °F (36.9 °C) (Tympanic)   Ht 5' 7.5\" (1.715 m)   Wt 75.6 kg (166 lb 9.6 oz)   SpO2 98% Comment: 2L nasal canula  BMI 25.71 kg/m²     Physical Exam  Constitutional:       Appearance: She is well-developed.   HENT:      Head: Normocephalic and atraumatic.      Right Ear: External ear normal.      Left Ear: External ear normal.      Nose: Nose normal.   Eyes:      Conjunctiva/sclera: Conjunctivae normal.   Cardiovascular:      Rate and Rhythm: Normal rate and regular rhythm.      Heart sounds: Normal heart sounds.   Pulmonary:      Effort: Pulmonary effort is normal.      Breath sounds: Normal breath sounds.   Abdominal:      General: Bowel sounds are normal.      Palpations: Abdomen is soft.   Musculoskeletal:         General: Normal range of motion.      Cervical back: Normal range of motion and neck supple.   Skin:     General: Skin is warm and dry.   Neurological:      Mental Status: She is alert and oriented to person, place, and time.   Psychiatric:         Behavior: Behavior normal.         Thought Content: Thought content normal.         Judgment: Judgment normal.          Rayna Hernandez PA-C  "

## 2023-12-18 NOTE — PATIENT INSTRUCTIONS
Medicare Preventive Visit Patient Instructions  Thank you for completing your Welcome to Medicare Visit or Medicare Annual Wellness Visit today. Your next wellness visit will be due in one year (12/18/2024).  The screening/preventive services that you may require over the next 5-10 years are detailed below. Some tests may not apply to you based off risk factors and/or age. Screening tests ordered at today's visit but not completed yet may show as past due. Also, please note that scanned in results may not display below.  Preventive Screenings:  Service Recommendations Previous Testing/Comments   Colorectal Cancer Screening  * Colonoscopy    * Fecal Occult Blood Test (FOBT)/Fecal Immunochemical Test (FIT)  * Fecal DNA/Cologuard Test  * Flexible Sigmoidoscopy Age: 45-75 years old   Colonoscopy: every 10 years (may be performed more frequently if at higher risk)  OR  FOBT/FIT: every 1 year  OR  Cologuard: every 3 years  OR  Sigmoidoscopy: every 5 years  Screening may be recommended earlier than age 45 if at higher risk for colorectal cancer. Also, an individualized decision between you and your healthcare provider will decide whether screening between the ages of 76-85 would be appropriate. Colonoscopy: Not on file  FOBT/FIT: Not on file  Cologuard: Not on file  Sigmoidoscopy: Not on file          Breast Cancer Screening Age: 40+ years old  Frequency: every 1-2 years  Not required if history of left and right mastectomy Mammogram: 06/02/2023    Screening Current   Cervical Cancer Screening Between the ages of 21-29, pap smear recommended once every 3 years.   Between the ages of 30-65, can perform pap smear with HPV co-testing every 5 years.   Recommendations may differ for women with a history of total hysterectomy, cervical cancer, or abnormal pap smears in past. Pap Smear: 11/09/2018    Screening Not Indicated   Hepatitis C Screening Once for adults born between 1945 and 1965  More frequently in patients at high  risk for Hepatitis C Hep C Antibody: Not on file        Diabetes Screening 1-2 times per year if you're at risk for diabetes or have pre-diabetes Fasting glucose: 91 mg/dL (4/7/2023)  A1C: 5.4 % (11/9/2021)  Screening Current   Cholesterol Screening Once every 5 years if you don't have a lipid disorder. May order more often based on risk factors. Lipid panel: 04/07/2023    Screening Not Indicated  History Lipid Disorder     Other Preventive Screenings Covered by Medicare:  Abdominal Aortic Aneurysm (AAA) Screening: covered once if your at risk. You're considered to be at risk if you have a family history of AAA.  Lung Cancer Screening: covers low dose CT scan once per year if you meet all of the following conditions: (1) Age 55-77; (2) No signs or symptoms of lung cancer; (3) Current smoker or have quit smoking within the last 15 years; (4) You have a tobacco smoking history of at least 20 pack years (packs per day multiplied by number of years you smoked); (5) You get a written order from a healthcare provider.  Glaucoma Screening: covered annually if you're considered high risk: (1) You have diabetes OR (2) Family history of glaucoma OR (3)  aged 50 and older OR (4)  American aged 65 and older  Osteoporosis Screening: covered every 2 years if you meet one of the following conditions: (1) You're estrogen deficient and at risk for osteoporosis based off medical history and other findings; (2) Have a vertebral abnormality; (3) On glucocorticoid therapy for more than 3 months; (4) Have primary hyperparathyroidism; (5) On osteoporosis medications and need to assess response to drug therapy.   Last bone density test (DXA Scan): 06/02/2023.  HIV Screening: covered annually if you're between the age of 15-65. Also covered annually if you are younger than 15 and older than 65 with risk factors for HIV infection. For pregnant patients, it is covered up to 3 times per  pregnancy.    Immunizations:  Immunization Recommendations   Influenza Vaccine Annual influenza vaccination during flu season is recommended for all persons aged >= 6 months who do not have contraindications   Pneumococcal Vaccine   * Pneumococcal conjugate vaccine = PCV13 (Prevnar 13), PCV15 (Vaxneuvance), PCV20 (Prevnar 20)  * Pneumococcal polysaccharide vaccine = PPSV23 (Pneumovax) Adults 19-65 yo with certain risk factors or if 65+ yo  If never received any pneumonia vaccine: recommend Prevnar 20 (PCV20)  Give PCV20 if previously received 1 dose of PCV13 or PPSV23   Hepatitis B Vaccine 3 dose series if at intermediate or high risk (ex: diabetes, end stage renal disease, liver disease)   Respiratory syncytial virus (RSV) Vaccine - COVERED BY MEDICARE PART D  * RSVPreF3 (Arexvy) CDC recommends that adults 60 years of age and older may receive a single dose of RSV vaccine using shared clinical decision-making (SCDM)   Tetanus (Td) Vaccine - COST NOT COVERED BY MEDICARE PART B Following completion of primary series, a booster dose should be given every 10 years to maintain immunity against tetanus. Td may also be given as tetanus wound prophylaxis.   Tdap Vaccine - COST NOT COVERED BY MEDICARE PART B Recommended at least once for all adults. For pregnant patients, recommended with each pregnancy.   Shingles Vaccine (Shingrix) - COST NOT COVERED BY MEDICARE PART B  2 shot series recommended in those 19 years and older who have or will have weakened immune systems or those 50 years and older     Health Maintenance Due:      Topic Date Due   • Hepatitis C Screening  Never done   • Colorectal Cancer Screening  Never done   • Breast Cancer Screening: Mammogram  06/02/2024   • DXA SCAN  06/02/2025   • Lung Cancer Screening  Discontinued     Immunizations Due:      Topic Date Due   • COVID-19 Vaccine (7 - 2023-24 season) 09/01/2023     Advance Directives   What are advance directives?  Advance directives are legal  documents that state your wishes and plans for medical care. These plans are made ahead of time in case you lose your ability to make decisions for yourself. Advance directives can apply to any medical decision, such as the treatments you want, and if you want to donate organs.   What are the types of advance directives?  There are many types of advance directives, and each state has rules about how to use them. You may choose a combination of any of the following:  Living will:  This is a written record of the treatment you want. You can also choose which treatments you do not want, which to limit, and which to stop at a certain time. This includes surgery, medicine, IV fluid, and tube feedings.   Durable power of  for healthcare (DPAHC):  This is a written record that states who you want to make healthcare choices for you when you are unable to make them for yourself. This person, called a proxy, is usually a family member or a friend. You may choose more than 1 proxy.  Do not resuscitate (DNR) order:  A DNR order is used in case your heart stops beating or you stop breathing. It is a request not to have certain forms of treatment, such as CPR. A DNR order may be included in other types of advance directives.  Medical directive:  This covers the care that you want if you are in a coma, near death, or unable to make decisions for yourself. You can list the treatments you want for each condition. Treatment may include pain medicine, surgery, blood transfusions, dialysis, IV or tube feedings, and a ventilator (breathing machine).  Values history:  This document has questions about your views, beliefs, and how you feel and think about life. This information can help others choose the care that you would choose.  Why are advance directives important?  An advance directive helps you control your care. Although spoken wishes may be used, it is better to have your wishes written down. Spoken wishes can be  misunderstood, or not followed. Treatments may be given even if you do not want them. An advance directive may make it easier for your family to make difficult choices about your care.   Cigarette Smoking and Your Health   Risks to your health if you smoke:  Nicotine and other chemicals found in tobacco damage every cell in your body. Even if you are a light smoker, you have an increased risk for cancer, heart disease, and lung disease. If you are pregnant or have diabetes, smoking increases your risk for complications.   Benefits to your health if you stop smoking:   You decrease respiratory symptoms such as coughing, wheezing, and shortness of breath.   You reduce your risk for cancers of the lung, mouth, throat, kidney, bladder, pancreas, stomach, and cervix. If you already have cancer, you increase the benefits of chemotherapy. You also reduce your risk for cancer returning or a second cancer from developing.   You reduce your risk for heart disease, blood clots, heart attack, and stroke.   You reduce your risk for lung infections, and diseases such as pneumonia, asthma, chronic bronchitis, and emphysema.  Your circulation improves. More oxygen can be delivered to your body. If you have diabetes, you lower your risk for complications, such as kidney, artery, and eye diseases. You also lower your risk for nerve damage. Nerve damage can lead to amputations, poor vision, and blindness.  You improve your body's ability to heal and to fight infections.  For more information and support to stop smoking:   [email or web address]  Phone: 5- 137 - [phone]  Web Address: www.[employer]  Weight Management   Why it is important to manage your weight:  Being overweight increases your risk of health conditions such as heart disease, high blood pressure, type 2 diabetes, and certain types of cancer. It can also increase your risk for osteoarthritis, sleep apnea, and other respiratory problems. Aim for a slow, steady weight loss.  Even a small amount of weight loss can lower your risk of health problems.  How to lose weight safely:  A safe and healthy way to lose weight is to eat fewer calories and get regular exercise. You can lose up about 1 pound a week by decreasing the number of calories you eat by 500 calories each day.   Healthy meal plan for weight management:  A healthy meal plan includes a variety of foods, contains fewer calories, and helps you stay healthy. A healthy meal plan includes the following:  Eat whole-grain foods more often.  A healthy meal plan should contain fiber. Fiber is the part of grains, fruits, and vegetables that is not broken down by your body. Whole-grain foods are healthy and provide extra fiber in your diet. Some examples of whole-grain foods are whole-wheat breads and pastas, oatmeal, brown rice, and bulgur.  Eat a variety of vegetables every day.  Include dark, leafy greens such as spinach, kale, lori greens, and mustard greens. Eat yellow and orange vegetables such as carrots, sweet potatoes, and winter squash.   Eat a variety of fruits every day.  Choose fresh or canned fruit (canned in its own juice or light syrup) instead of juice. Fruit juice has very little or no fiber.  Eat low-fat dairy foods.  Drink fat-free (skim) milk or 1% milk. Eat fat-free yogurt and low-fat cottage cheese. Try low-fat cheeses such as mozzarella and other reduced-fat cheeses.  Choose meat and other protein foods that are low in fat.  Choose beans or other legumes such as split peas or lentils. Choose fish, skinless poultry (chicken or turkey), or lean cuts of red meat (beef or pork). Before you cook meat or poultry, cut off any visible fat.   Use less fat and oil.  Try baking foods instead of frying them. Add less fat, such as margarine, sour cream, regular salad dressing and mayonnaise to foods. Eat fewer high-fat foods. Some examples of high-fat foods include french fries, doughnuts, ice cream, and cakes.  Eat fewer  sweets.  Limit foods and drinks that are high in sugar. This includes candy, cookies, regular soda, and sweetened drinks.  Exercise:  Exercise at least 30 minutes per day on most days of the week. Some examples of exercise include walking, biking, dancing, and swimming. You can also fit in more physical activity by taking the stairs instead of the elevator or parking farther away from stores. Ask your healthcare provider about the best exercise plan for you.      © Copyright [employer] 2018 Information is for End User's use only and may not be sold, redistributed or otherwise used for commercial purposes. All illustrations and images included in CareNotes® are the copyrighted property of [employer] or [employer]

## 2023-12-28 ENCOUNTER — OFFICE VISIT (OUTPATIENT)
Dept: PULMONOLOGY | Facility: CLINIC | Age: 73
End: 2023-12-28
Payer: COMMERCIAL

## 2023-12-28 VITALS
HEART RATE: 57 BPM | TEMPERATURE: 97.1 F | HEIGHT: 68 IN | DIASTOLIC BLOOD PRESSURE: 78 MMHG | BODY MASS INDEX: 25.46 KG/M2 | WEIGHT: 168 LBS | OXYGEN SATURATION: 97 % | SYSTOLIC BLOOD PRESSURE: 156 MMHG

## 2023-12-28 DIAGNOSIS — J44.9 COPD, SEVERE (HCC): Primary | ICD-10-CM

## 2023-12-28 DIAGNOSIS — J96.11 CHRONIC RESPIRATORY FAILURE WITH HYPOXIA (HCC): ICD-10-CM

## 2023-12-28 DIAGNOSIS — F17.211 CIGARETTE NICOTINE DEPENDENCE IN REMISSION: ICD-10-CM

## 2023-12-28 DIAGNOSIS — J30.9 ALLERGIC RHINITIS, UNSPECIFIED SEASONALITY, UNSPECIFIED TRIGGER: ICD-10-CM

## 2023-12-28 PROCEDURE — 99214 OFFICE O/P EST MOD 30 MIN: CPT

## 2023-12-28 RX ORDER — IPRATROPIUM BROMIDE AND ALBUTEROL SULFATE 2.5; .5 MG/3ML; MG/3ML
3 SOLUTION RESPIRATORY (INHALATION) 4 TIMES DAILY
Qty: 1080 ML | Refills: 0 | Status: SHIPPED | OUTPATIENT
Start: 2023-12-28

## 2023-12-28 RX ORDER — LEVALBUTEROL TARTRATE 45 UG/1
1-2 AEROSOL, METERED ORAL EVERY 4 HOURS PRN
Qty: 45 G | Refills: 0 | Status: SHIPPED | OUTPATIENT
Start: 2023-12-28

## 2023-12-28 RX ORDER — MOMETASONE FUROATE 50 UG/1
2 SPRAY, METERED NASAL DAILY
Qty: 51 G | Refills: 0 | Status: SHIPPED | OUTPATIENT
Start: 2023-12-28

## 2023-12-28 NOTE — PROGRESS NOTES
Pulmonary Follow Up Note  Corinne A Longo 73 y.o. female MRN: [id number]  12/28/2023    Assessment:    Allergic rhinitis  -Increase in symptoms since fall season change  -Patient will continue using Nasonex nasal spray, Zyrtec daily, saline nasal spray, and air purifier    COPD, severe (HCC)  -Symptoms remain stable without any recent exacerbations or frequent need for albuterol.    -Not experiencing shortness of breath or limitations of her physical activity due to her breathing.   -Completed pulmonary rehab a few years ago  -PFTs in May do not meet preliminary qualifications for Peggs valve, and patient does not seem to be a candidate at this time based on lack of symptoms/good activity tolerance  -I reviewed her recent CT chest from 11/20/2023.  There seems to be chronic lower lobe scarring/atelectasis.  Patient had some lower lobe rales on exam today, likely from chronic atelectasis.  Provided patient with incentive spirometer to use at home  -Will continue patient on Anoro Ellipta 1 puff daily, levalbuterol every 6 hours as needed for shortness of breath or wheezing  -UTD vaccinations  -Follow-up in 6 months or sooner if needed    Cigarette nicotine dependence in remission  -Remains in recent remission x 6 months  -Recent CT chest from 11/20/2023 with improvement of previous left upper lobe nodules, but new punctate left upper lobe 2 mm nodule  -Recommend continuing with CT lung cancer screening in 12 months, due November 2024.  This can be discussed and ordered at a future visit    Chronic respiratory failure with hypoxia (HCC)  -Patient will continue wearing 2 L nasal cannula with activities to maintain SpO2 above 88%    Plan:    Diagnoses and all orders for this visit:    COPD, severe (HCC)  -     ipratropium-albuterol (DUO-NEB) 0.5-2.5 mg/3 mL nebulizer solution; Take 3 mL by nebulization 4 (four) times a day  -     levalbuterol (Xopenex HFA) 45 mcg/act inhaler; Inhale 1-2 puffs every 4 (four) hours as  needed for wheezing    Chronic respiratory failure with hypoxia ([facility])    Cigarette nicotine dependence in remission    Allergic rhinitis, unspecified seasonality, unspecified trigger  -     mometasone (NASONEX) 50 mcg/act nasal spray; 2 sprays into each nostril daily      Return in about 6 months (around 6/28/2024).        History of Present Illness     Chief Complaint: No chief complaint on file.      Patient ID: Corinne is a 73 y.o. y.o. female has a past medical history of Allergic, Allergic rhinitis, Asthma, Atrial fibrillation ([facility]), Bronchitis, Bunion, COPD (chronic obstructive pulmonary disease) ([facility]), Coronary artery disease, Depression, Emphysema lung ([facility]), Fibroadenoma of breast, Gastroesophageal reflux disease without esophagitis (12/18/2023), Hyperlipidemia, Hypertension, Lung disease (2008), Memory difficulties (07/23/2019), Post-menopausal, Post-menopausal, Spondylisthesis, Stage 3 chronic kidney disease, unspecified whether stage 3a or 3b CKD ([facility]) (02/07/2022), and Vitamin D deficiency.      12/28/2023  HPI: Corinne A Longo is a 73 y.o. female who presents to the office today for a follow-up visit.  She has a past medical history of severe COPD, chronic hypoxic respiratory failure, and tobacco abuse in recent admission.  She was previously seen in the office 3 months ago.  Maintained on Anoro and levalbuterol as needed.      She returns today stating that her breathing has remained stable.  Denies any shortness of breath, wheezing, chest tightness, or chest pain.  Only requiring use of her rescue inhaler once per month.  She has been experiencing allergy type symptoms over the past 3 months with increased postnasal drainage, mucus, and cough.  She has been taking Zyrtec, Flonase nasal spray, saline nasal spray, and has air purifier's in the home.  Patient was also recently started on Prilosec last month by PCP for small hiatal hernia reviewed on prior CT scan.  Patient notices occasional mid  epigastric discomfort that is worse with bending over.  She feels Prilosec medication has slightly helped the symptoms.  She states that if her symptoms do not improve, her PCP is planning on sending her to GI for further evaluation.  She denies any other GERD type symptoms.      Review of Systems   Constitutional:  Negative for activity change, appetite change, chills, diaphoresis, fever and unexpected weight change.   HENT:  Positive for postnasal drip and sneezing. Negative for congestion, ear pain, rhinorrhea, sore throat, trouble swallowing and voice change.    Respiratory:  Negative for cough, chest tightness, shortness of breath and wheezing.    Cardiovascular:  Negative for chest pain, palpitations and leg swelling.   Allergic/Immunologic: Negative.        Historical Information   Past Medical History:   Diagnosis Date    Allergic     Allergic rhinitis     [id number]  RESOLVED    Asthma     Atrial fibrillation (HCC)     Bronchitis     Bunion     COPD (chronic obstructive pulmonary disease) ([facility])     home O2 at night at home 2L    Coronary artery disease     AFIB    Depression     Emphysema lung (HCC)     Fibroadenoma of breast     Gastroesophageal reflux disease without esophagitis 12/18/2023    Hyperlipidemia     Hypertension     Lung disease 2008    COPD    Memory difficulties 07/23/2019    Post-menopausal     Post-menopausal     Spondylisthesis     Stage 3 chronic kidney disease, unspecified whether stage 3a or 3b CKD ([facility]) 02/07/2022    Vitamin D deficiency      Past Surgical History:   Procedure Laterality Date    ADENOIDECTOMY      BREAST CYST EXCISION Bilateral     benign-1987, 2002, 2007    BUNIONECTOMY Bilateral     CATARACT EXTRACTION, BILATERAL      EYE SURGERY      Cataracts removed    MOLE REMOVAL      lip    TONSILLECTOMY      TUBAL LIGATION       Family History   Problem Relation Age of Onset    Hypertension Mother     Alcohol abuse Mother     Hypertension Father     Diabetes Father      Prostate cancer Father     Cancer Father         Prostate    Hearing loss Father     Vitamin D deficiency Sister     Diverticulitis Sister     Depression Sister     No Known Problems Sister     Heart failure Brother     Lung cancer Brother     Cancer Brother         Stage four lung cancer    Celiac disease Daughter     Alcohol abuse Daughter     Substance Abuse Daughter     Eczema Daughter     Fibromyalgia Daughter     Substance Abuse Daughter     Alcohol abuse Daughter     ADD / ADHD Daughter     Bipolar disorder Daughter     Anxiety disorder Daughter     Ulcerative colitis Daughter     No Known Problems Maternal Aunt     Heart disease Brother     Substance Abuse Brother     Drug abuse Brother     Breast cancer Neg Hx        Smoking history: She reports that she has been smoking cigarettes. She started smoking about 54 years ago. She has a 13.4 pack-year smoking history. She has never used smokeless tobacco.    Occupational History:     Immunization History   Administered Date(s) Administered    COVID-19 PFIZER VACCINE 0.3 ML IM 03/17/2021, 04/07/2021, 11/10/2021, 04/26/2022    COVID-19 Pfizer Vac BIVALENT Kevin-sucrose 12 Yr+ IM 09/29/2022    COVID-19 Pfizer vac (Kevin-sucrose, gray cap) 12 yr+ IM 04/26/2022    INFLUENZA 11/09/2015, 10/21/2016, 10/03/2017, 09/10/2018, 09/29/2021, 09/29/2022    Influenza Quadrivalent Preservative Free 3 years and older IM 11/09/2015, 10/21/2016, 10/03/2017    Influenza, high dose seasonal 0.7 mL 10/16/2020, 09/29/2022, 09/13/2023    Pneumococcal Conjugate 13-Valent 11/16/2015, 08/20/2018    Pneumococcal Conjugate Vaccine 20-valent (Pcv20), Polysace 10/31/2022    Pneumococcal Polysaccharide PPV23 08/29/2016    Tdap 12/28/2015    Zoster 12/28/2015    Zoster Vaccine Recombinant 12/28/2015    influenza, trivalent, adjuvanted 09/27/2019       Meds/Allergies     Current Outpatient Medications:     Anoro Ellipta 62.5-25 MCG/ACT inhaler, INHALE 1 PUFF BY MOUTH EVERY DAY, Disp: 180 each, Rfl:  3    ascorbic acid (VITAMIN C) 500 mg tablet, Take 500 mg by mouth daily, Disp: , Rfl:     aspirin 81 mg chewable tablet, Chew 1 tablet (81 mg total) daily, Disp: , Rfl:     atorvastatin (LIPITOR) 40 mg tablet, Take 1 tablet (40 mg total) by mouth daily, Disp: 90 tablet, Rfl: 1    cetirizine (ZyrTEC) 10 mg tablet, Take 10 mg by mouth daily, Disp: , Rfl:     Cholecalciferol (VITAMIN D3) 5000 units CAPS, Take 1 capsule by mouth daily, Disp: , Rfl:     clotrimazole-betamethasone (LOTRISONE) 1-0.05 % cream, Apply topically 2 (two) times a day, Disp: 45 g, Rfl: 1    guaiFENesin (MUCINEX) 600 mg 12 hr tablet, Take 1 tablet (600 mg total) by mouth every 12 (twelve) hours, Disp: 180 tablet, Rfl: 3    ipratropium-albuterol (DUO-NEB) 0.5-2.5 mg/3 mL nebulizer solution, Take 3 mL by nebulization 4 (four) times a day, Disp: 1080 mL, Rfl: 0    L-Lysine 500 MG CAPS, Take 1 capsule by mouth daily, Disp: , Rfl:     levalbuterol (Xopenex HFA) 45 mcg/act inhaler, Inhale 1-2 puffs every 4 (four) hours as needed for wheezing, Disp: 45 g, Rfl: 0    metoprolol tartrate (LOPRESSOR) 50 mg tablet, Take 1.5 tablets (75 mg total) by mouth 2 (two) times a day, Disp: 270 tablet, Rfl: 3    mometasone (NASONEX) 50 mcg/act nasal spray, 2 sprays into each nostril daily, Disp: 51 g, Rfl: 0    MULTIPLE VITAMINS-CALCIUM PO, Take by mouth daily, Disp: , Rfl:     omeprazole (PriLOSEC) 40 MG capsule, Take 1 capsule (40 mg total) by mouth daily, Disp: 90 capsule, Rfl: 0    Probiotic Product (PROBIOTIC-10 PO), Take 1 capsule by mouth daily, Disp: , Rfl:     triamcinolone (KENALOG) 0.1 % cream, Apply topically 2 (two) times a day, Disp: 15 g, Rfl: 0    Calcium Carbonate-Vit D-Min (CALCIUM 1200 PO), Take by mouth, Disp: , Rfl:     fexofenadine (ALLEGRA) 180 MG tablet, Take 180 mg by mouth daily (Patient not taking: Reported on 12/18/2023), Disp: , Rfl:     methocarbamol (ROBAXIN) 500 mg tablet, Take 1 tablet (500 mg total) by mouth 3 (three) times a day as  "needed for muscle spasms (Patient not taking: Reported on 12/18/2023), Disp: 30 tablet, Rfl: 0  Allergies:   Allergies   Allergen Reactions    Bupropion Itching    Ace Inhibitors Cough     Action Taken: stopped med and changed to ARB; Category: Adverse Reaction;     Citalopram Diarrhea and Nausea Only    Mixed Ragweed Cough    Pollen Extract Cough    Adhesive [Medical Tape] Rash     Patch          Vitals:  Vitals:    12/28/23 1008 12/28/23 1011   BP: 156/78    BP Location: Left arm    Patient Position: Sitting    Cuff Size: Standard    Pulse: 57    Temp: (!) 97.1 °F (36.2 °C)    TempSrc: Tympanic    SpO2: 94% 97%   Weight: 76.2 kg (168 lb)    Height: 5' 7.5\" (1.715 m)    Oxygen Therapy  SpO2: 97 % (2L)  Oxygen Therapy: Supplemental oxygen  O2 Delivery Method: Nasal cannula  O2 Flow Rate (L/min): 2 L/min  .  Wt Readings from Last 3 Encounters:   12/28/23 76.2 kg (168 lb)   12/18/23 75.6 kg (166 lb 9.6 oz)   09/29/23 77.4 kg (170 lb 9.6 oz)     Body mass index is 25.92 kg/m².    Physical Exam  Vitals and nursing note reviewed.   Constitutional:       General: She is not in acute distress.     Appearance: Normal appearance. She is well-developed.   Cardiovascular:      Rate and Rhythm: Normal rate and regular rhythm.      Heart sounds: Normal heart sounds, S1 normal and S2 normal. No murmur heard.  Pulmonary:      Effort: Pulmonary effort is normal.      Breath sounds: Examination of the right-lower field reveals rales. Examination of the left-lower field reveals rales. Rales present. No decreased breath sounds, wheezing or rhonchi.   Musculoskeletal:         General: No swelling.      Right lower leg: No edema.      Left lower leg: No edema.   Neurological:      Mental Status: She is alert.   Psychiatric:         Mood and Affect: Mood and affect normal.         Behavior: Behavior normal. Behavior is cooperative.           Labs: I have personally reviewed pertinent lab results.  Lab Results   Component Value Date    " "WBC 6.27 04/07/2023    HGB 13.6 04/07/2023    HCT 40.4 04/07/2023    MCV 93 04/07/2023     04/07/2023     Lab Results   Component Value Date    GLUCOSE 101 06/05/2015    CALCIUM 9.0 04/07/2023     06/05/2015    K 4.1 04/07/2023    CO2 28 04/07/2023     (H) 04/07/2023    BUN 23 04/07/2023    CREATININE 1.02 04/07/2023     No results found for: \"IGE\"  Lab Results   Component Value Date    ALT 23 04/07/2023    AST 16 04/07/2023    ALKPHOS 81 04/07/2023    BILITOT 0.5 06/04/2015       Imaging and other studies: I have personally reviewed pertinent reports and I have personally reviewed pertinent films in PACS     CT chest 11/20/2023  1. Severe background emphysema with overall improvement of left upper lobe nodules seen on most recent CT chest. These include a 4 mm nodule which has decreased in size from that exam (now measuring 1.5 mm). The other 2 nodules have resolved in the   interval. There is a new punctate nodule in the left upper lobe which measures 2 mm. Continued follow-up with lung cancer screening CT is recommended in 12 months.    Pulmonary function testing:     Pulmonary Functions Testing Results: 5/17/2023    FEV1/FVC ratio 47%    FEV1 52% predicted  FVC 85% predicted  (-) response to bronchodilators   % predicted   % predicted  DLCO corrected for hemoglobin 38 % predicted    Impression: Spirometry shows a moderately severe obstructive ventilatory defect.  No significant bronchodilator response.  Increased lung volumes consistent with hyperinflation and air trapping.  Severely reduced diffusion capacity.  Increased airway resistance.    6-minute walk test 5/17/2023  Baseline SPO2 95% on room air, heart rate 60.  Able to walk 320 m in 6 minutes, lowest SPO2 at 88% after 2 minutes.  Required 1 LPM/24% FiO2 to end exercise with SPO2 at 92%, maximal heart rate 95.    Answers submitted by the patient for this visit:  Pulmonology Questionnaire (Submitted on 12/22/2023)  Chief " Complaint: Primary symptoms  Chronicity: recurrent  When did you first notice your symptoms?: more than 1 month ago  How often do your symptoms occur?: 2 to 4 times per day  Since you first noticed this problem, how has it changed?: unchanged  Do you have shortness of breath that occurs with effort or exertion?: No  Do you have ear congestion?: No  Do you have heartburn?: No  Do you have fatigue?: No  Do you have nasal congestion?: No  Do you have shortness of breath when lying flat?: No  Do you have shortness of breath when you wake up?: No  Do you have sweats?: No  Have you experienced weight loss?: No  Which of the following makes your symptoms worse?: animal exposure, change in weather, exposure to fumes, exposure to smoke, pollen

## 2023-12-28 NOTE — ASSESSMENT & PLAN NOTE
-Increase in symptoms since fall season change  -Patient will continue using Nasonex nasal spray, Zyrtec daily, saline nasal spray, and air purifier

## 2023-12-28 NOTE — ASSESSMENT & PLAN NOTE
-Remains in recent remission x 6 months  -Recent CT chest from 11/20/2023 with improvement of previous left upper lobe nodules, but new punctate left upper lobe 2 mm nodule  -Recommend continuing with CT lung cancer screening in 12 months, due November 2024.  This can be discussed and ordered at a future visit

## 2023-12-28 NOTE — ASSESSMENT & PLAN NOTE
-Symptoms remain stable without any recent exacerbations or frequent need for albuterol.    -Not experiencing shortness of breath or limitations of her physical activity due to her breathing.   -Completed pulmonary rehab a few years ago  -PFTs in May do not meet preliminary qualifications for Ne[place]u valve, and patient does not seem to be a candidate at this time based on lack of symptoms/good activity tolerance  -I reviewed her recent CT chest from 11/20/2023.  There seems to be chronic lower lobe scarring/atelectasis.  Patient had some lower lobe rales on exam today, likely from chronic atelectasis.  Provided patient with incentive spirometer to use at home  -Will continue patient on Anoro Ellipta 1 puff daily, levalbuterol every 6 hours as needed for shortness of breath or wheezing  -UTD vaccinations  -Follow-up in 6 months or sooner if needed

## 2024-03-01 ENCOUNTER — TELEPHONE (OUTPATIENT)
Dept: INTERNAL MEDICINE CLINIC | Facility: CLINIC | Age: 74
End: 2024-03-01

## 2024-03-01 NOTE — TELEPHONE ENCOUNTER
Patient called about covid vaccine dates   Stated ishmael had records of covid vaccine from 9/27/23 but her my chart didn't show that   Called [facility] where vaccine was given to get record of it

## 2024-03-12 ENCOUNTER — OFFICE VISIT (OUTPATIENT)
Dept: CARDIOLOGY CLINIC | Facility: CLINIC | Age: 74
End: 2024-03-12
Payer: COMMERCIAL

## 2024-03-12 VITALS
SYSTOLIC BLOOD PRESSURE: 140 MMHG | HEART RATE: 64 BPM | HEIGHT: 68 IN | DIASTOLIC BLOOD PRESSURE: 80 MMHG | BODY MASS INDEX: 25.31 KG/M2 | WEIGHT: 167 LBS

## 2024-03-12 DIAGNOSIS — N18.30 STAGE 3 CHRONIC KIDNEY DISEASE, UNSPECIFIED WHETHER STAGE 3A OR 3B CKD (HCC): ICD-10-CM

## 2024-03-12 DIAGNOSIS — I48.0 PAROXYSMAL ATRIAL FIBRILLATION (HCC): Primary | ICD-10-CM

## 2024-03-12 DIAGNOSIS — F17.211 CIGARETTE NICOTINE DEPENDENCE IN REMISSION: ICD-10-CM

## 2024-03-12 DIAGNOSIS — E78.2 MIXED HYPERLIPIDEMIA: ICD-10-CM

## 2024-03-12 PROCEDURE — 93000 ELECTROCARDIOGRAM COMPLETE: CPT | Performed by: INTERNAL MEDICINE

## 2024-03-12 PROCEDURE — 99214 OFFICE O/P EST MOD 30 MIN: CPT | Performed by: INTERNAL MEDICINE

## 2024-03-12 RX ORDER — LANOLIN ALCOHOL/MO/W.PET/CERES
2 CREAM (GRAM) TOPICAL 2 TIMES DAILY
COMMUNITY

## 2024-03-12 RX ORDER — MULTIVITAMIN/IRON/FOLIC ACID 18MG-0.4MG
1 TABLET ORAL DAILY
COMMUNITY

## 2024-03-12 RX ORDER — METOPROLOL TARTRATE 75 MG/1
75 TABLET, FILM COATED ORAL 2 TIMES DAILY
Qty: 180 TABLET | Refills: 5 | Status: SHIPPED | OUTPATIENT
Start: 2024-03-12 | End: 2024-03-21

## 2024-03-12 NOTE — ASSESSMENT & PLAN NOTE
Tolerating high-intensity statin therapy and will continue current regimen.  Would continue giving statin for coronary calcification.

## 2024-03-12 NOTE — PROGRESS NOTES
" Patient ID: Corinne A Longo is a 73 y.o. female.        Plan:      Paroxysmal atrial fibrillation (HCC)  She feels lots of skipped beats but is actually not in atrial fibrillation today.   Never sustained.  Not clear that these feelings are atrial fibrillation.   Prior bleeding problems on more intense anticoagulation.  Low CHADSVASc.  OK to stay off aspirin but she would like to stay on for now.    Hyperlipidemia  Tolerating high-intensity statin therapy and will continue current regimen.  Would continue giving statin for coronary calcification.    Cigarette nicotine dependence in remission  Off almost 1 year.       Follow up Plan/Other summary comments:  Return in about 1 year (around 3/12/2025).    HPI: Patient seen in f/u regarding the above issues.  No CP.  She remains O2 dependent.    To reiterate, she has paroxysmal atrial fibrillation.  Because of significant epistaxis on Eliquis, she has reverted to aspirin and really strongly prefers this.  Results for orders placed or performed in visit on 03/12/24   POCT ECG    Impression    SR. PACs. Otherwise WNL.         Most recent or relevant cardiac/vascular testing:       Echocardiogram 08/01/2018: Mild LVH.  Normal LV function.    Past Surgical History:   Procedure Laterality Date    ADENOIDECTOMY      BREAST CYST EXCISION Bilateral     benign-1987, 2002, 2007    BUNIONECTOMY Bilateral     CATARACT EXTRACTION, BILATERAL      EYE SURGERY      Cataracts removed    MOLE REMOVAL      lip    TONSILLECTOMY      TUBAL LIGATION         Lipid Profile: Reviewed      Review of Systems   10  point ROS  was otherwise non pertinent or negative except as per HPI or as below.   Gait:  Normal.        Objective:     /80   Pulse 64   Ht 5' 7.5\" (1.715 m)   Wt 75.8 kg (167 lb)   BMI 25.77 kg/m²     PHYSICAL EXAM:    General:  Normal appearance in no distress.  Eyes:  Anicteric.  Oral mucosa:  Moist.  Neck:  No JVD. Carotid upstrokes are brisk without bruits.  No " masses.  Chest:  Decreased breath sounds throughout.  Cardiac:  No palpable PMI.  Normal S1 and S2.  No murmur gallop or rub.  Abdomen:  Soft and nontender. No palpable organomegaly or aortic enlargement.  Extremities:  No peripheral edema.  Musculoskeletal:  Symmetric.   Vascular:  Femoral pulses are brisk without bruits.  Popliteal pulses are intact bilaterally.   Pedal pulses are intact.  Neuro:  Grossly symmetric.  Psych:  Alert and oriented x3.      Meds reviewed.    Past Medical History:   Diagnosis Date    Allergic     Allergic rhinitis     2015  RESOLVED    Asthma     Atrial fibrillation (HCC)     Bronchitis     Bunion     COPD (chronic obstructive pulmonary disease) ([facility])     home O2 at night at home 2L    Coronary artery disease     AFIB    Depression     Emphysema lung ([facility])     Fibroadenoma of breast     Gastroesophageal reflux disease without esophagitis 2023    Hyperlipidemia     Hypertension     Lung disease     COPD    Memory difficulties 2019    Post-menopausal     Post-menopausal     Spondylisthesis     Stage 3 chronic kidney disease, unspecified whether stage 3a or 3b CKD ([facility]) 2022    Vitamin D deficiency            Social History     Tobacco Use   Smoking Status Every Day    Current packs/day: 0.00    Average packs/day: 0.3 packs/day for 53.5 years (13.4 ttl pk-yrs)    Types: Cigarettes    Start date: 1970    Last attempt to quit: 2023    Years since quittin.6   Smokeless Tobacco Never

## 2024-03-12 NOTE — ASSESSMENT & PLAN NOTE
She feels lots of skipped beats but is actually not in atrial fibrillation today.   Never sustained.  Not clear that these feelings are atrial fibrillation.   Prior bleeding problems on more intense anticoagulation.  Low CHADSVASc.  OK to stay off aspirin but she would like to stay on for now.

## 2024-03-20 ENCOUNTER — TELEPHONE (OUTPATIENT)
Dept: PULMONOLOGY | Facility: CLINIC | Age: 74
End: 2024-03-20

## 2024-03-20 NOTE — TELEPHONE ENCOUNTER
Left message and sent letter to call to schedule recall pulmonary appointment in June with Jena Gonzalez.   Cyclosporine Counseling:  I discussed with the patient the risks of cyclosporine including but not limited to hypertension, gingival hyperplasia,myelosuppression, immunosuppression, liver damage, kidney damage, neurotoxicity, lymphoma, and serious infections. The patient understands that monitoring is required including baseline blood pressure, CBC, CMP, lipid panel and uric acid, and then 1-2 times monthly CMP and blood pressure.

## 2024-03-21 DIAGNOSIS — I48.0 PAROXYSMAL ATRIAL FIBRILLATION (HCC): Primary | ICD-10-CM

## 2024-03-21 RX ORDER — METOPROLOL TARTRATE 50 MG/1
TABLET, FILM COATED ORAL
Qty: 180 TABLET | Refills: 3 | Status: SHIPPED | OUTPATIENT
Start: 2024-03-21

## 2024-04-02 ENCOUNTER — TELEPHONE (OUTPATIENT)
Dept: PULMONOLOGY | Facility: CLINIC | Age: 74
End: 2024-04-02

## 2024-04-02 DIAGNOSIS — J96.11 CHRONIC RESPIRATORY FAILURE WITH HYPOXIA (HCC): Primary | ICD-10-CM

## 2024-04-10 ENCOUNTER — RA CDI HCC (OUTPATIENT)
Dept: OTHER | Facility: HOSPITAL | Age: 74
End: 2024-04-10

## 2024-04-10 NOTE — PROGRESS NOTES
HCC coding opportunities       Chart reviewed, no opportunity found: CHART REVIEWED, NO OPPORTUNITY FOUND   [facility] Review     Patients Insurance     Medicare Insurance: [facility] [employer] Advantage           [id number]

## 2024-04-22 ENCOUNTER — OFFICE VISIT (OUTPATIENT)
Dept: INTERNAL MEDICINE CLINIC | Facility: CLINIC | Age: 74
End: 2024-04-22
Payer: COMMERCIAL

## 2024-04-22 VITALS
OXYGEN SATURATION: 98 % | HEART RATE: 82 BPM | WEIGHT: 169.2 LBS | DIASTOLIC BLOOD PRESSURE: 76 MMHG | HEIGHT: 68 IN | BODY MASS INDEX: 25.64 KG/M2 | TEMPERATURE: 97.2 F | SYSTOLIC BLOOD PRESSURE: 126 MMHG

## 2024-04-22 DIAGNOSIS — Z13.29 SCREENING FOR THYROID DISORDER: ICD-10-CM

## 2024-04-22 DIAGNOSIS — J30.2 SEASONAL ALLERGIES: ICD-10-CM

## 2024-04-22 DIAGNOSIS — L30.9 ECZEMA, UNSPECIFIED TYPE: ICD-10-CM

## 2024-04-22 DIAGNOSIS — J96.11 CHRONIC RESPIRATORY FAILURE WITH HYPOXIA (HCC): ICD-10-CM

## 2024-04-22 DIAGNOSIS — B35.6 TINEA CRURIS: ICD-10-CM

## 2024-04-22 DIAGNOSIS — E78.2 MIXED HYPERLIPIDEMIA: ICD-10-CM

## 2024-04-22 DIAGNOSIS — Z13.1 SCREENING FOR DIABETES MELLITUS: ICD-10-CM

## 2024-04-22 DIAGNOSIS — F33.9 DEPRESSION, RECURRENT (HCC): ICD-10-CM

## 2024-04-22 DIAGNOSIS — J44.9 COPD, SEVERE (HCC): Primary | ICD-10-CM

## 2024-04-22 DIAGNOSIS — E55.9 VITAMIN D DEFICIENCY: ICD-10-CM

## 2024-04-22 DIAGNOSIS — Z13.0 SCREENING FOR DEFICIENCY ANEMIA: ICD-10-CM

## 2024-04-22 DIAGNOSIS — Z11.59 NEED FOR HEPATITIS C SCREENING TEST: ICD-10-CM

## 2024-04-22 DIAGNOSIS — Z12.31 ENCOUNTER FOR SCREENING MAMMOGRAM FOR BREAST CANCER: ICD-10-CM

## 2024-04-22 PROCEDURE — G2211 COMPLEX E/M VISIT ADD ON: HCPCS | Performed by: PHYSICIAN ASSISTANT

## 2024-04-22 PROCEDURE — 99213 OFFICE O/P EST LOW 20 MIN: CPT | Performed by: PHYSICIAN ASSISTANT

## 2024-04-22 RX ORDER — TRIAMCINOLONE ACETONIDE 1 MG/G
CREAM TOPICAL 2 TIMES DAILY
Qty: 15 G | Refills: 0 | Status: SHIPPED | OUTPATIENT
Start: 2024-04-22

## 2024-04-22 RX ORDER — CLOTRIMAZOLE AND BETAMETHASONE DIPROPIONATE 10; .64 MG/G; MG/G
CREAM TOPICAL 2 TIMES DAILY
Qty: 45 G | Refills: 1 | Status: SHIPPED | OUTPATIENT
Start: 2024-04-22

## 2024-04-22 RX ORDER — MONTELUKAST SODIUM 10 MG/1
10 TABLET ORAL
Qty: 90 TABLET | Refills: 1 | Status: SHIPPED | OUTPATIENT
Start: 2024-04-22

## 2024-04-23 NOTE — PROGRESS NOTES
Name: Corinne A Longo      : 1950      MRN: [id number]  Encounter Provider: Rayna Hernandez PA-C  Encounter Date: 2024   Encounter department: [facility]    Assessment & Plan     1. COPD, severe (HCC)  Assessment & Plan:  Pts symptoms are stable with current regime. No changes at present.         2. Need for hepatitis C screening test  -     Hepatitis C Antibody; Future    3. Encounter for screening mammogram for breast cancer  -     Mammo screening bilateral w 3d & cad; Future; Expected date: 2024    4. Vitamin D deficiency  -     Vitamin D 25 hydroxy; Future    5. Mixed hyperlipidemia  -     Lipid panel; Future    6. Screening for deficiency anemia  -     CBC and differential; Future    7. Screening for thyroid disorder  -     TSH, 3rd generation; Future    8. Screening for diabetes mellitus  -     Comprehensive metabolic panel; Future    9. Chronic respiratory failure with hypoxia (HCC)    10. Depression, recurrent (HCC)    11. Eczema, unspecified type  -     triamcinolone (KENALOG) 0.1 % cream; Apply topically 2 (two) times a day    12. Tinea cruris  -     clotrimazole-betamethasone (LOTRISONE) 1-0.05 % cream; Apply topically 2 (two) times a day    13. Seasonal allergies  -     montelukast (SINGULAIR) 10 mg tablet; Take 1 tablet (10 mg total) by mouth daily at bedtime        Depression Screening and Follow-up Plan: Patient was screened for depression during today's encounter. They screened negative with a PHQ-9 score of 2.    Tobacco Cessation Counseling: Tobacco cessation counseling was not provided. The patient is sincerely urged to quit consumption of tobacco. She is not ready to quit tobacco.         Subjective      Pt presents for routine visit. She is doing well overall. She is up to date with dexa scan and AWV. She is due for labs, mammogram and CRC Screening. BP is controlled. COPD symptoms have been well managed. She continues with oxygen use and inhaler regimen.        Review of Systems   Constitutional:  Negative for chills and fever.   HENT:  Negative for congestion, ear pain, hearing loss, postnasal drip, rhinorrhea, sinus pressure, sinus pain, sore throat and trouble swallowing.    Eyes:  Negative for pain and visual disturbance.   Respiratory:  Negative for cough, chest tightness, shortness of breath and wheezing.    Cardiovascular: Negative.  Negative for chest pain, palpitations and leg swelling.   Gastrointestinal:  Negative for abdominal pain, blood in stool, constipation, diarrhea, nausea and vomiting.   Endocrine: Negative for cold intolerance, heat intolerance, polydipsia, polyphagia and polyuria.   Genitourinary:  Negative for difficulty urinating, dysuria, flank pain and urgency.   Musculoskeletal:  Negative for arthralgias, back pain, gait problem and myalgias.   Skin:  Negative for rash.   Allergic/Immunologic: Negative.    Neurological:  Negative for dizziness, weakness, light-headedness and headaches.   Hematological: Negative.    Psychiatric/Behavioral:  Negative for behavioral problems, dysphoric mood and sleep disturbance. The patient is not nervous/anxious.        Current Outpatient Medications on File Prior to Visit   Medication Sig    Anoro Ellipta 62.5-25 MCG/ACT inhaler INHALE 1 PUFF BY MOUTH EVERY DAY    ascorbic acid (VITAMIN C) 500 mg tablet Take 500 mg by mouth daily    aspirin 81 mg chewable tablet Chew 1 tablet (81 mg total) daily    atorvastatin (LIPITOR) 40 mg tablet Take 1 tablet (40 mg total) by mouth daily    calcium citrate-vitamin D 315 mg-5 mcg tablet Take 2 tablets by mouth 2 (two) times a day    cetirizine (ZyrTEC) 10 mg tablet Take 10 mg by mouth daily    Cholecalciferol (VITAMIN D3) 5000 units CAPS Take 1 capsule by mouth daily    guaiFENesin (MUCINEX) 600 mg 12 hr tablet Take 1 tablet (600 mg total) by mouth every 12 (twelve) hours    ipratropium-albuterol (DUO-NEB) 0.5-2.5 mg/3 mL nebulizer solution Take 3 mL by nebulization 4  "(four) times a day    L-Lysine 500 MG CAPS Take 1 capsule by mouth daily    levalbuterol (Xopenex HFA) 45 mcg/act inhaler Inhale 1-2 puffs every 4 (four) hours as needed for wheezing    methocarbamol (ROBAXIN) 500 mg tablet Take 1 tablet (500 mg total) by mouth 3 (three) times a day as needed for muscle spasms    metoprolol tartrate (LOPRESSOR) 25 mg tablet Take one tablet by mouth twice a day along with the 50mg twice a day to equal 75mg BID    metoprolol tartrate (LOPRESSOR) 50 mg tablet Take one tablet by mouth twice a day along with the 25mg twice a day to equal 75mg BID    mometasone (NASONEX) 50 mcg/act nasal spray 2 sprays into each nostril daily    MULTIPLE VITAMINS-CALCIUM PO Take by mouth daily    multivitamin-minerals (CENTRUM ADULTS) tablet Take 1 tablet by mouth daily    Probiotic Product (PROBIOTIC-10 PO) Take 1 capsule by mouth daily       Objective     /76   Pulse 82   Temp (!) 97.2 °F (36.2 °C)   Ht 5' 7.5\" (1.715 m)   Wt 76.7 kg (169 lb 3.2 oz)   SpO2 98%   BMI 26.11 kg/m²     Physical Exam  Vitals and nursing note reviewed.   Constitutional:       General: She is not in acute distress.     Appearance: Normal appearance. She is well-developed. She is not diaphoretic.   HENT:      Head: Normocephalic and atraumatic.      Right Ear: External ear normal.      Left Ear: External ear normal.      Nose: Nose normal.      Mouth/Throat:      Pharynx: No oropharyngeal exudate.   Eyes:      General: No scleral icterus.        Right eye: No discharge.         Left eye: No discharge.      Conjunctiva/sclera: Conjunctivae normal.      Pupils: Pupils are equal, round, and reactive to light.   Neck:      Thyroid: No thyromegaly.   Cardiovascular:      Rate and Rhythm: Normal rate and regular rhythm.      Heart sounds: Normal heart sounds. No murmur heard.     No friction rub. No gallop.   Pulmonary:      Effort: Pulmonary effort is normal. No respiratory distress.      Breath sounds: Normal breath " sounds. No wheezing or rales.   Abdominal:      General: Bowel sounds are normal. There is no distension.      Palpations: Abdomen is soft.      Tenderness: There is no abdominal tenderness.   Musculoskeletal:         General: No tenderness or deformity. Normal range of motion.      Cervical back: Normal range of motion and neck supple.   Skin:     General: Skin is warm and dry.   Neurological:      Mental Status: She is alert and oriented to person, place, and time.      Cranial Nerves: No cranial nerve deficit.   Psychiatric:         Behavior: Behavior normal.         Thought Content: Thought content normal.         Judgment: Judgment normal.       Rayna Hernandez PA-C

## 2024-05-11 DIAGNOSIS — E78.5 HYPERLIPIDEMIA, UNSPECIFIED HYPERLIPIDEMIA TYPE: ICD-10-CM

## 2024-05-13 RX ORDER — ATORVASTATIN CALCIUM 40 MG/1
40 TABLET, FILM COATED ORAL DAILY
Qty: 90 TABLET | Refills: 0 | Status: SHIPPED | OUTPATIENT
Start: 2024-05-13

## 2024-06-03 ENCOUNTER — HOSPITAL ENCOUNTER (OUTPATIENT)
Dept: MAMMOGRAPHY | Facility: HOSPITAL | Age: 74
Discharge: HOME | End: 2024-06-03
Payer: COMMERCIAL

## 2024-06-03 DIAGNOSIS — Z12.31 ENCOUNTER FOR SCREENING MAMMOGRAM FOR BREAST CANCER: ICD-10-CM

## 2024-06-03 PROCEDURE — 77067 SCR MAMMO BI INCL CAD: CPT

## 2024-06-03 PROCEDURE — 77063 BREAST TOMOSYNTHESIS BI: CPT

## 2024-06-04 NOTE — RESULT ENCOUNTER NOTE
Hi Corinne  I reviewed your mammogram and it was was normal. We recommend repeating in one year for maintenance. Have a great day.   Ignacio

## 2024-07-18 ENCOUNTER — OFFICE VISIT (OUTPATIENT)
Dept: PULMONOLOGY | Facility: CLINIC | Age: 74
End: 2024-07-18
Payer: COMMERCIAL

## 2024-07-18 VITALS
HEIGHT: 68 IN | TEMPERATURE: 98.5 F | SYSTOLIC BLOOD PRESSURE: 131 MMHG | OXYGEN SATURATION: 95 % | HEART RATE: 69 BPM | WEIGHT: 164 LBS | BODY MASS INDEX: 24.86 KG/M2 | DIASTOLIC BLOOD PRESSURE: 68 MMHG

## 2024-07-18 DIAGNOSIS — F17.211 CIGARETTE NICOTINE DEPENDENCE IN REMISSION: ICD-10-CM

## 2024-07-18 DIAGNOSIS — J44.9 COPD, SEVERE (HCC): Primary | ICD-10-CM

## 2024-07-18 DIAGNOSIS — J30.9 ALLERGIC RHINITIS, UNSPECIFIED SEASONALITY, UNSPECIFIED TRIGGER: ICD-10-CM

## 2024-07-18 DIAGNOSIS — J96.11 CHRONIC RESPIRATORY FAILURE WITH HYPOXIA (HCC): ICD-10-CM

## 2024-07-18 PROCEDURE — 99213 OFFICE O/P EST LOW 20 MIN: CPT

## 2024-07-18 NOTE — ASSESSMENT & PLAN NOTE
-FEV1 52% on PFTs in 2023. Also has severe emphysema on CT imaging  -Continues to remain well-controlled minus recently with the increased heat/humidity.  No recent exacerbations or frequent need for rescue inhaler  -Her lungs are clear on exam today  -She will continue Anoro 1 puff daily and levalbuterol every 6 hours PRN  -Recommend RSV vaccine, update Flu vaccine in the fall  -Follow up in 6 months or sooner if needed

## 2024-07-18 NOTE — ASSESSMENT & PLAN NOTE
-Approximately 50 pack-year history. In remission for 1 year now  -Due for yearly CT lung screening in November, order placed

## 2024-07-18 NOTE — ASSESSMENT & PLAN NOTE
-Wearing 2 L as needed with activities and nightly  -Continue 2 L supplemental oxygen to maintain SPO2 above 88%

## 2024-07-18 NOTE — PROGRESS NOTES
Pulmonary Follow Up Note  Corinne A Longo 73 y.o. female MRN: [id number]  7/18/2024    Assessment:    COPD, severe (HCC)  -FEV1 52% on PFTs in 2023. Also has severe emphysema on CT imaging  -Continues to remain well-controlled minus recently with the increased heat/humidity.  No recent exacerbations or frequent need for rescue inhaler  -Her lungs are clear on exam today  -She will continue Anoro 1 puff daily and levalbuterol every 6 hours PRN  -Recommend RSV vaccine, update Flu vaccine in the fall  -Follow up in 6 months or sooner if needed    Chronic respiratory failure with hypoxia (HCC)  -Wearing 2 L as needed with activities and nightly  -Continue 2 L supplemental oxygen to maintain SPO2 above 88%    Cigarette nicotine dependence in remission  -Approximately 50 pack-year history. In remission for 1 year now  -Due for yearly CT lung screening in November, order placed    Allergic rhinitis  -Seasonal allergies mostly in Spring and Fall  -Tried Singulair but caused nightmares  -She will continue Zyrtec daily and nasonex nasal spray    Plan:    Diagnoses and all orders for this visit:    COPD, severe (HCC)    Chronic respiratory failure with hypoxia (HCC)    Cigarette nicotine dependence in remission  -     CT lung screening program; Future    Allergic rhinitis, unspecified seasonality, unspecified trigger        Return in about 6 months (around 1/18/2025).        History of Present Illness     Chief Complaint: No chief complaint on file.      Patient ID: Corinne is a 73 y.o. y.o. female has a past medical history of Allergic, Allergic rhinitis, Asthma, Atrial fibrillation (HCC), Bronchitis, Bunion, COPD (chronic obstructive pulmonary disease) (HCC), Coronary artery disease, Depression, Emphysema lung (HCC), Fibroadenoma of breast, Gastroesophageal reflux disease without esophagitis (12/18/2023), Hyperlipidemia, Hypertension, Lung disease (2008), Memory difficulties (07/23/2019), Post-menopausal, Post-menopausal,  Spondylisthesis, Stage 3 chronic kidney disease, unspecified whether stage 3a or 3b CKD ([facility]) (02/07/2022), and Vitamin D deficiency.      7/18/2024  HPI: Corinne A Longo is a 73 y.o. female who presents to the office today for a follow-up visit.  She has a past medical history of severe COPD, chronic hypoxic respiratory failure on 2 L, and former 50 pack year smoker in remission since July 2023.  She was previously seen in the office 6 months ago.  Maintained on Anoro, and levalbuterol as needed. She returns today stating that she's doing well with her breathing as long as she avoids the heat/humidty. No cough, mucus, or wheezing. Last use of rescue inhaler 2 months ago. No recent exacerbations. She stopped taking Singulair as this caused nightmares. Remains on Zyrtec and nasonex. Allergies ok for now, but expects symptoms to worsen once Fall hits. Denies any chest pain or lower extremity swelling.       Review of Systems   Constitutional:  Negative for activity change, appetite change, chills, diaphoresis, fever and unexpected weight change.   HENT:  Negative for congestion, ear pain, postnasal drip, rhinorrhea, sneezing, sore throat, trouble swallowing and voice change.    Respiratory:  Negative for cough, chest tightness, shortness of breath and wheezing.    Cardiovascular:  Negative for chest pain, palpitations and leg swelling.   Musculoskeletal:  Negative for myalgias.   Allergic/Immunologic: Negative.    Neurological:  Negative for headaches.       Historical Information   Past Medical History:   Diagnosis Date    Allergic     Allergic rhinitis     [id number]  RESOLVED    Asthma     Atrial fibrillation (HCC)     Bronchitis     Bunion     COPD (chronic obstructive pulmonary disease) ([facility])     home O2 at night at home 2L    Coronary artery disease     AFIB    Depression     Emphysema lung (HCC)     Fibroadenoma of breast     Gastroesophageal reflux disease without esophagitis 12/18/2023    Hyperlipidemia      Hypertension     Lung disease 2008    COPD    Memory difficulties 07/23/2019    Post-menopausal     Post-menopausal     Spondylisthesis     Stage 3 chronic kidney disease, unspecified whether stage 3a or 3b CKD (HCC) 02/07/2022    Vitamin D deficiency      Past Surgical History:   Procedure Laterality Date    ADENOIDECTOMY      BREAST CYST EXCISION Bilateral     benign-1987, 2002, 2007    BUNIONECTOMY Bilateral     CATARACT EXTRACTION, BILATERAL      EYE SURGERY      Cataracts removed    MOLE REMOVAL      lip    TONSILLECTOMY      TUBAL LIGATION       Family History   Problem Relation Age of Onset    Hypertension Mother     Alcohol abuse Mother     Hypertension Father     Diabetes Father     Prostate cancer Father     Cancer Father         Prostate    Hearing loss Father     Vitamin D deficiency Sister     Diverticulitis Sister     Depression Sister     No Known Problems Sister     Heart failure Brother     Lung cancer Brother     Cancer Brother         Stage four lung cancer    Celiac disease Daughter     Alcohol abuse Daughter     Substance Abuse Daughter     Eczema Daughter     Fibromyalgia Daughter     Substance Abuse Daughter     Alcohol abuse Daughter     ADD / ADHD Daughter     Bipolar disorder Daughter     Anxiety disorder Daughter     Ulcerative colitis Daughter     No Known Problems Maternal Aunt     Heart disease Brother     Substance Abuse Brother     Drug abuse Brother     Breast cancer Neg Hx        Smoking history: She reports that she has been smoking cigarettes. She started smoking about 54 years ago. She has a 13.4 pack-year smoking history. She has never used smokeless tobacco.    Occupational History:     Immunization History   Administered Date(s) Administered    COVID-19 PFIZER VACCINE 0.3 ML IM 03/17/2021, 04/07/2021, 11/10/2021, 04/26/2022    COVID-19 Pfizer Vac BIVALENT Kevin-sucrose 12 Yr+ IM 09/29/2022    COVID-19 Pfizer mRNA vacc PF kevin-sucrose 12 yr and older (Comirnaty) 09/27/2023     COVID-19 Pfizer vac (Kevin-sucrose, gray cap) 12 yr+ IM 04/26/2022    H1N1, All Formulations 01/14/2010    INFLUENZA 11/09/2015, 10/21/2016, 10/03/2017, 09/10/2018, 09/29/2021, 09/29/2022    Influenza Quadrivalent Preservative Free 3 years and older IM 11/09/2015, 10/21/2016, 10/03/2017    Influenza, high dose seasonal 0.7 mL 10/16/2020, 09/29/2022, 09/13/2023    Pneumococcal Conjugate 13-Valent 11/16/2015, 08/20/2018    Pneumococcal Conjugate Vaccine 20-valent (Pcv20), Polysace 10/31/2022    Pneumococcal Polysaccharide PPV23 08/29/2016    Tdap 12/28/2015    Zoster 12/28/2015    Zoster Vaccine Recombinant 12/28/2015    influenza, trivalent, adjuvanted 09/27/2019       Meds/Allergies     Current Outpatient Medications:     Anoro Ellipta 62.5-25 MCG/ACT inhaler, INHALE 1 PUFF BY MOUTH EVERY DAY, Disp: 180 each, Rfl: 3    ascorbic acid (VITAMIN C) 500 mg tablet, Take 500 mg by mouth daily, Disp: , Rfl:     aspirin 81 mg chewable tablet, Chew 1 tablet (81 mg total) daily, Disp: , Rfl:     atorvastatin (LIPITOR) 40 mg tablet, Take 1 tablet by mouth daily, Disp: 90 tablet, Rfl: 0    calcium citrate-vitamin D 315 mg-5 mcg tablet, Take 2 tablets by mouth 2 (two) times a day, Disp: , Rfl:     cetirizine (ZyrTEC) 10 mg tablet, Take 10 mg by mouth daily, Disp: , Rfl:     Cholecalciferol (VITAMIN D3) 5000 units CAPS, Take 1 capsule by mouth daily, Disp: , Rfl:     guaiFENesin (MUCINEX) 600 mg 12 hr tablet, Take 1 tablet (600 mg total) by mouth every 12 (twelve) hours, Disp: 180 tablet, Rfl: 3    ipratropium-albuterol (DUO-NEB) 0.5-2.5 mg/3 mL nebulizer solution, Take 3 mL by nebulization 4 (four) times a day, Disp: 1080 mL, Rfl: 0    L-Lysine 500 MG CAPS, Take 1 capsule by mouth daily, Disp: , Rfl:     levalbuterol (Xopenex HFA) 45 mcg/act inhaler, Inhale 1-2 puffs every 4 (four) hours as needed for wheezing, Disp: 45 g, Rfl: 0    methocarbamol (ROBAXIN) 500 mg tablet, Take 1 tablet (500 mg total) by mouth 3 (three) times a day  "as needed for muscle spasms, Disp: 30 tablet, Rfl: 0    metoprolol tartrate (LOPRESSOR) 25 mg tablet, Take one tablet by mouth twice a day along with the 50mg twice a day to equal 75mg BID, Disp: 180 tablet, Rfl: 3    metoprolol tartrate (LOPRESSOR) 50 mg tablet, Take one tablet by mouth twice a day along with the 25mg twice a day to equal 75mg BID, Disp: 180 tablet, Rfl: 3    mometasone (NASONEX) 50 mcg/act nasal spray, 2 sprays into each nostril daily, Disp: 51 g, Rfl: 0    MULTIPLE VITAMINS-CALCIUM PO, Take by mouth daily, Disp: , Rfl:     multivitamin-minerals (CENTRUM ADULTS) tablet, Take 1 tablet by mouth daily, Disp: , Rfl:     Probiotic Product (PROBIOTIC-10 PO), Take 1 capsule by mouth daily, Disp: , Rfl:     triamcinolone (KENALOG) 0.1 % cream, Apply topically 2 (two) times a day, Disp: 15 g, Rfl: 0    clotrimazole-betamethasone (LOTRISONE) 1-0.05 % cream, Apply topically 2 (two) times a day, Disp: 45 g, Rfl: 1  Allergies:   Allergies   Allergen Reactions    Bupropion Itching    Ace Inhibitors Cough     Action Taken: stopped med and changed to ARB; Category: Adverse Reaction;     Citalopram Diarrhea and Nausea Only    Mixed Ragweed Cough    Pollen Extract Cough    Adhesive [Medical Tape] Rash     Patch          Vitals:  Vitals:    07/18/24 1433 07/18/24 1443   BP: 131/68    Pulse: 69    Temp: 98.5 °F (36.9 °C)    TempSrc: Tympanic    SpO2: 95% 95%   Weight: 74.4 kg (164 lb)    Height: 5' 7.5\" (1.715 m)      Oxygen Therapy  SpO2: 95 %  Oxygen Therapy:  (2 liters)  .  Wt Readings from Last 3 Encounters:   07/18/24 74.4 kg (164 lb)   04/22/24 76.7 kg (169 lb 3.2 oz)   03/12/24 75.8 kg (167 lb)     Body mass index is 25.31 kg/m².    Physical Exam  Vitals and nursing note reviewed.   Constitutional:       General: She is not in acute distress.     Appearance: Normal appearance. She is well-developed.   Cardiovascular:      Rate and Rhythm: Normal rate and regular rhythm.      Heart sounds: Normal heart sounds, " "S1 normal and S2 normal. No murmur heard.  Pulmonary:      Effort: Pulmonary effort is normal.      Breath sounds: No decreased breath sounds, wheezing, rhonchi or rales.   Musculoskeletal:         General: No swelling.      Right lower leg: No edema.      Left lower leg: No edema.   Neurological:      Mental Status: She is alert.   Psychiatric:         Mood and Affect: Mood and affect normal.         Behavior: Behavior normal. Behavior is cooperative.           Labs: I have personally reviewed pertinent lab results.  Lab Results   Component Value Date    WBC 6.27 04/07/2023    HGB 13.6 04/07/2023    HCT 40.4 04/07/2023    MCV 93 04/07/2023     04/07/2023     Lab Results   Component Value Date    GLUCOSE 101 06/05/2015    CALCIUM 9.0 04/07/2023     06/05/2015    K 4.1 04/07/2023    CO2 28 04/07/2023     (H) 04/07/2023    BUN 23 04/07/2023    CREATININE 1.02 04/07/2023     No results found for: \"IGE\"  Lab Results   Component Value Date    ALT 23 04/07/2023    AST 16 04/07/2023    ALKPHOS 81 04/07/2023    BILITOT 0.5 06/04/2015       Imaging and other studies: I have personally reviewed pertinent reports and I have personally reviewed pertinent films in PACS     CT chest 11/20/2023  1. Severe background emphysema with overall improvement of left upper lobe nodules seen on most recent CT chest. These include a 4 mm nodule which has decreased in size from that exam (now measuring 1.5 mm). The other 2 nodules have resolved in the   interval. There is a new punctate nodule in the left upper lobe which measures 2 mm. Continued follow-up with lung cancer screening CT is recommended in 12 months.    Pulmonary function testing:     Pulmonary Functions Testing Results: 5/17/2023    FEV1/FVC ratio 47%    FEV1 52% predicted  FVC 85% predicted  (-) response to bronchodilators   % predicted   % predicted  DLCO corrected for hemoglobin 38 % predicted    Impression: Spirometry shows a moderately " severe obstructive ventilatory defect.  No significant bronchodilator response.  Increased lung volumes consistent with hyperinflation and air trapping.  Severely reduced diffusion capacity.  Increased airway resistance.    6-minute walk test 5/17/2023  Baseline SPO2 95% on room air, heart rate 60.  Able to walk 320 m in 6 minutes, lowest SPO2 at 88% after 2 minutes.  Required 1 LPM/24% FiO2 to end exercise with SPO2 at 92%, maximal heart rate 95.

## 2024-07-18 NOTE — ASSESSMENT & PLAN NOTE
-Seasonal allergies mostly in Spring and Fall  -Tried Singulair but caused nightmares  -She will continue Zyrtec daily and nasonex nasal spray

## 2024-07-24 DIAGNOSIS — J44.9 CHRONIC OBSTRUCTIVE PULMONARY DISEASE, UNSPECIFIED COPD TYPE (HCC): ICD-10-CM

## 2024-07-24 RX ORDER — UMECLIDINIUM BROMIDE AND VILANTEROL TRIFENATATE 62.5; 25 UG/1; UG/1
1 POWDER RESPIRATORY (INHALATION) DAILY
Qty: 180 EACH | Refills: 1 | Status: SHIPPED | OUTPATIENT
Start: 2024-07-24

## 2024-08-08 DIAGNOSIS — E78.5 HYPERLIPIDEMIA, UNSPECIFIED HYPERLIPIDEMIA TYPE: ICD-10-CM

## 2024-08-08 RX ORDER — ATORVASTATIN CALCIUM 40 MG/1
40 TABLET, FILM COATED ORAL DAILY
Qty: 30 TABLET | Refills: 0 | Status: SHIPPED | OUTPATIENT
Start: 2024-08-08 | End: 2024-08-08 | Stop reason: SDUPTHER

## 2024-08-08 RX ORDER — ATORVASTATIN CALCIUM 40 MG/1
40 TABLET, FILM COATED ORAL DAILY
Qty: 90 TABLET | Refills: 0 | Status: SHIPPED | OUTPATIENT
Start: 2024-08-08

## 2024-08-08 NOTE — TELEPHONE ENCOUNTER
Not a dup. Pharmacy called and this needs to be a 90 day since it is mail order. Was sent over but it was for 30 days.      Reason for call:   [x] Refill   [] Prior Auth  [] Other:     Office:   [x] PCP/Provider -Rayna Hernandez/Oliva Velazquez    [] Specialty/Provider -     Medication: Lipitor    Dose/Frequency: 40 mg     Quantity: #90    Pharmacy: [facility]     Does the patient have enough for 3 days?   [x] Yes   [] No - Send as HP to POD

## 2024-08-20 ENCOUNTER — TELEPHONE (OUTPATIENT)
Age: 74
End: 2024-08-20

## 2024-08-20 NOTE — TELEPHONE ENCOUNTER
Patient called, she was summoned to jury duty in September, she isn't sure with her condition if she would be able to do this. She wears oxygen 24/7 and does not have a portable oxygen concentrator. She is asking if the Doctor would please write a letter to the court house, stating her diagnosis and excusing her from jury duty. Please advise    [facility] [employer]  Fax #[phone]

## 2024-10-21 ENCOUNTER — VBI (OUTPATIENT)
Dept: ADMINISTRATIVE | Facility: OTHER | Age: 74
End: 2024-10-21

## 2024-10-21 NOTE — TELEPHONE ENCOUNTER
10/21/24 10:01 AM     Chart reviewed for CRC: Colonoscopy was/were not submitted to the patient's insurance.     Lara Gilbert MA   [facility]

## 2024-10-28 ENCOUNTER — HOSPITAL ENCOUNTER (INPATIENT)
Facility: HOSPITAL | Age: 74
LOS: 9 days | Discharge: HOME HEALTH | DRG: 871 | End: 2024-11-06
Admitting: INTERNAL MEDICINE
Payer: COMMERCIAL

## 2024-10-28 ENCOUNTER — APPOINTMENT (EMERGENCY)
Dept: CT IMAGING | Facility: HOSPITAL | Age: 74
DRG: 871 | End: 2024-10-28
Payer: COMMERCIAL

## 2024-10-28 ENCOUNTER — APPOINTMENT (EMERGENCY)
Dept: RADIOLOGY | Facility: HOSPITAL | Age: 74
DRG: 871 | End: 2024-10-28
Payer: COMMERCIAL

## 2024-10-28 DIAGNOSIS — I48.91 ATRIAL FIBRILLATION WITH RVR (HCC): ICD-10-CM

## 2024-10-28 DIAGNOSIS — J18.9 PNEUMONIA: ICD-10-CM

## 2024-10-28 DIAGNOSIS — J18.9 SEPSIS DUE TO PNEUMONIA (HCC): Primary | ICD-10-CM

## 2024-10-28 DIAGNOSIS — A41.9 SEPSIS (HCC): ICD-10-CM

## 2024-10-28 DIAGNOSIS — A41.9 SEPSIS DUE TO PNEUMONIA (HCC): Primary | ICD-10-CM

## 2024-10-28 DIAGNOSIS — J44.9 COPD (CHRONIC OBSTRUCTIVE PULMONARY DISEASE) (HCC): ICD-10-CM

## 2024-10-28 DIAGNOSIS — R09.02 HYPOXIA: ICD-10-CM

## 2024-10-28 LAB
2HR DELTA HS TROPONIN: 0 NG/L
ALBUMIN SERPL BCG-MCNC: 3.4 G/DL (ref 3.5–5)
ALP SERPL-CCNC: 59 U/L (ref 34–104)
ALT SERPL W P-5'-P-CCNC: 28 U/L (ref 7–52)
ANION GAP SERPL CALCULATED.3IONS-SCNC: 13 MMOL/L (ref 4–13)
APTT PPP: 36 SECONDS (ref 23–34)
AST SERPL W P-5'-P-CCNC: 46 U/L (ref 13–39)
BASOPHILS # BLD AUTO: 0.02 THOUSANDS/ΜL (ref 0–0.1)
BASOPHILS NFR BLD AUTO: 0 % (ref 0–1)
BILIRUB SERPL-MCNC: 0.73 MG/DL (ref 0.2–1)
BNP SERPL-MCNC: 168 PG/ML (ref 0–100)
BUN SERPL-MCNC: 34 MG/DL (ref 5–25)
CALCIUM ALBUM COR SERPL-MCNC: 9.8 MG/DL (ref 8.3–10.1)
CALCIUM SERPL-MCNC: 9.3 MG/DL (ref 8.4–10.2)
CARDIAC TROPONIN I PNL SERPL HS: 14 NG/L
CARDIAC TROPONIN I PNL SERPL HS: 14 NG/L
CHLORIDE SERPL-SCNC: 106 MMOL/L (ref 96–108)
CO2 SERPL-SCNC: 21 MMOL/L (ref 21–32)
CREAT SERPL-MCNC: 1.03 MG/DL (ref 0.6–1.3)
EOSINOPHIL # BLD AUTO: 0.01 THOUSAND/ΜL (ref 0–0.61)
EOSINOPHIL NFR BLD AUTO: 0 % (ref 0–6)
ERYTHROCYTE [DISTWIDTH] IN BLOOD BY AUTOMATED COUNT: 13.1 % (ref 11.6–15.1)
FLUAV AG UPPER RESP QL IA.RAPID: NEGATIVE
FLUBV AG UPPER RESP QL IA.RAPID: NEGATIVE
GFR SERPL CREATININE-BSD FRML MDRD: 54 ML/MIN/1.73SQ M
GLUCOSE SERPL-MCNC: 152 MG/DL (ref 65–140)
HCT VFR BLD AUTO: 37.6 % (ref 34.8–46.1)
HGB BLD-MCNC: 12.4 G/DL (ref 11.5–15.4)
IMM GRANULOCYTES # BLD AUTO: 0.06 THOUSAND/UL (ref 0–0.2)
IMM GRANULOCYTES NFR BLD AUTO: 1 % (ref 0–2)
INR PPP: 1.26 (ref 0.85–1.19)
LACTATE SERPL-SCNC: 1.1 MMOL/L (ref 0.5–2)
LACTATE SERPL-SCNC: 2.3 MMOL/L (ref 0.5–2)
LYMPHOCYTES # BLD AUTO: 0.5 THOUSANDS/ΜL (ref 0.6–4.47)
LYMPHOCYTES NFR BLD AUTO: 7 % (ref 14–44)
MCH RBC QN AUTO: 30.5 PG (ref 26.8–34.3)
MCHC RBC AUTO-ENTMCNC: 33 G/DL (ref 31.4–37.4)
MCV RBC AUTO: 92 FL (ref 82–98)
MONOCYTES # BLD AUTO: 0.3 THOUSAND/ΜL (ref 0.17–1.22)
MONOCYTES NFR BLD AUTO: 4 % (ref 4–12)
NEUTROPHILS # BLD AUTO: 6.63 THOUSANDS/ΜL (ref 1.85–7.62)
NEUTS SEG NFR BLD AUTO: 88 % (ref 43–75)
NRBC BLD AUTO-RTO: 0 /100 WBCS
PLATELET # BLD AUTO: 306 THOUSANDS/UL (ref 149–390)
PMV BLD AUTO: 10.3 FL (ref 8.9–12.7)
POTASSIUM SERPL-SCNC: 3.9 MMOL/L (ref 3.5–5.3)
PROCALCITONIN SERPL-MCNC: 0.26 NG/ML
PROT SERPL-MCNC: 6.9 G/DL (ref 6.4–8.4)
PROTHROMBIN TIME: 16.3 SECONDS (ref 12.3–15)
RBC # BLD AUTO: 4.07 MILLION/UL (ref 3.81–5.12)
SARS-COV+SARS-COV-2 AG RESP QL IA.RAPID: NEGATIVE
SODIUM SERPL-SCNC: 140 MMOL/L (ref 135–147)
WBC # BLD AUTO: 7.52 THOUSAND/UL (ref 4.31–10.16)

## 2024-10-28 PROCEDURE — 70450 CT HEAD/BRAIN W/O DYE: CPT

## 2024-10-28 PROCEDURE — 99291 CRITICAL CARE FIRST HOUR: CPT

## 2024-10-28 PROCEDURE — 94760 N-INVAS EAR/PLS OXIMETRY 1: CPT

## 2024-10-28 PROCEDURE — 96365 THER/PROPH/DIAG IV INF INIT: CPT

## 2024-10-28 PROCEDURE — 71045 X-RAY EXAM CHEST 1 VIEW: CPT

## 2024-10-28 PROCEDURE — 85730 THROMBOPLASTIN TIME PARTIAL: CPT

## 2024-10-28 PROCEDURE — 83605 ASSAY OF LACTIC ACID: CPT

## 2024-10-28 PROCEDURE — 94640 AIRWAY INHALATION TREATMENT: CPT

## 2024-10-28 PROCEDURE — 36415 COLL VENOUS BLD VENIPUNCTURE: CPT

## 2024-10-28 PROCEDURE — 96366 THER/PROPH/DIAG IV INF ADDON: CPT

## 2024-10-28 PROCEDURE — 84145 PROCALCITONIN (PCT): CPT

## 2024-10-28 PROCEDURE — 84484 ASSAY OF TROPONIN QUANT: CPT

## 2024-10-28 PROCEDURE — 99223 1ST HOSP IP/OBS HIGH 75: CPT | Performed by: INTERNAL MEDICINE

## 2024-10-28 PROCEDURE — 99285 EMERGENCY DEPT VISIT HI MDM: CPT

## 2024-10-28 PROCEDURE — 80053 COMPREHEN METABOLIC PANEL: CPT

## 2024-10-28 PROCEDURE — 83880 ASSAY OF NATRIURETIC PEPTIDE: CPT

## 2024-10-28 PROCEDURE — 87804 INFLUENZA ASSAY W/OPTIC: CPT

## 2024-10-28 PROCEDURE — 94664 DEMO&/EVAL PT USE INHALER: CPT

## 2024-10-28 PROCEDURE — 96367 TX/PROPH/DG ADDL SEQ IV INF: CPT

## 2024-10-28 PROCEDURE — 71275 CT ANGIOGRAPHY CHEST: CPT

## 2024-10-28 PROCEDURE — 87040 BLOOD CULTURE FOR BACTERIA: CPT

## 2024-10-28 PROCEDURE — 93005 ELECTROCARDIOGRAM TRACING: CPT

## 2024-10-28 PROCEDURE — 1123F ACP DISCUSS/DSCN MKR DOCD: CPT | Performed by: INTERNAL MEDICINE

## 2024-10-28 PROCEDURE — 85025 COMPLETE CBC W/AUTO DIFF WBC: CPT

## 2024-10-28 PROCEDURE — 87811 SARS-COV-2 COVID19 W/OPTIC: CPT

## 2024-10-28 PROCEDURE — 85610 PROTHROMBIN TIME: CPT

## 2024-10-28 PROCEDURE — 96375 TX/PRO/DX INJ NEW DRUG ADDON: CPT

## 2024-10-28 RX ORDER — BUDESONIDE AND FORMOTEROL FUMARATE DIHYDRATE 160; 4.5 UG/1; UG/1
2 AEROSOL RESPIRATORY (INHALATION) 2 TIMES DAILY
Status: DISCONTINUED | OUTPATIENT
Start: 2024-10-28 | End: 2024-11-05

## 2024-10-28 RX ORDER — CEFEPIME HYDROCHLORIDE 2 G/1
2000 INJECTION, POWDER, FOR SOLUTION INTRAVENOUS EVERY 12 HOURS
Status: DISCONTINUED | OUTPATIENT
Start: 2024-10-29 | End: 2024-10-28 | Stop reason: RX

## 2024-10-28 RX ORDER — ACETAMINOPHEN 325 MG/1
650 TABLET ORAL EVERY 6 HOURS PRN
Status: DISCONTINUED | OUTPATIENT
Start: 2024-10-28 | End: 2024-11-06 | Stop reason: HOSPADM

## 2024-10-28 RX ORDER — SODIUM CHLORIDE 9 MG/ML
75 INJECTION, SOLUTION INTRAVENOUS CONTINUOUS
Status: DISCONTINUED | OUTPATIENT
Start: 2024-10-28 | End: 2024-10-29

## 2024-10-28 RX ORDER — SODIUM CHLORIDE, SODIUM GLUCONATE, SODIUM ACETATE, POTASSIUM CHLORIDE, MAGNESIUM CHLORIDE, SODIUM PHOSPHATE, DIBASIC, AND POTASSIUM PHOSPHATE .53; .5; .37; .037; .03; .012; .00082 G/100ML; G/100ML; G/100ML; G/100ML; G/100ML; G/100ML; G/100ML
1000 INJECTION, SOLUTION INTRAVENOUS ONCE
Status: COMPLETED | OUTPATIENT
Start: 2024-10-28 | End: 2024-10-28

## 2024-10-28 RX ORDER — DILTIAZEM HYDROCHLORIDE 5 MG/ML
10 INJECTION INTRAVENOUS ONCE
Status: COMPLETED | OUTPATIENT
Start: 2024-10-28 | End: 2024-10-28

## 2024-10-28 RX ORDER — HEPARIN SODIUM 5000 [USP'U]/ML
5000 INJECTION, SOLUTION INTRAVENOUS; SUBCUTANEOUS EVERY 8 HOURS SCHEDULED
Status: DISCONTINUED | OUTPATIENT
Start: 2024-10-28 | End: 2024-10-29

## 2024-10-28 RX ORDER — CEFEPIME HYDROCHLORIDE 2 G/50ML
2000 INJECTION, SOLUTION INTRAVENOUS EVERY 12 HOURS
Status: DISCONTINUED | OUTPATIENT
Start: 2024-10-29 | End: 2024-11-03

## 2024-10-28 RX ORDER — NICOTINE 21 MG/24HR
1 PATCH, TRANSDERMAL 24 HOURS TRANSDERMAL DAILY
Status: DISCONTINUED | OUTPATIENT
Start: 2024-10-28 | End: 2024-11-01

## 2024-10-28 RX ORDER — METHYLPREDNISOLONE SODIUM SUCCINATE 125 MG/2ML
100 INJECTION, POWDER, LYOPHILIZED, FOR SOLUTION INTRAMUSCULAR; INTRAVENOUS ONCE
Status: COMPLETED | OUTPATIENT
Start: 2024-10-28 | End: 2024-10-28

## 2024-10-28 RX ORDER — METOPROLOL TARTRATE 25 MG/1
25 TABLET, FILM COATED ORAL EVERY 12 HOURS SCHEDULED
Status: DISCONTINUED | OUTPATIENT
Start: 2024-10-28 | End: 2024-10-29

## 2024-10-28 RX ORDER — ASPIRIN 81 MG/1
81 TABLET, CHEWABLE ORAL DAILY
Status: DISCONTINUED | OUTPATIENT
Start: 2024-10-28 | End: 2024-10-29

## 2024-10-28 RX ORDER — GUAIFENESIN 600 MG/1
600 TABLET, EXTENDED RELEASE ORAL EVERY 12 HOURS SCHEDULED
Status: DISCONTINUED | OUTPATIENT
Start: 2024-10-28 | End: 2024-10-30

## 2024-10-28 RX ORDER — METOPROLOL TARTRATE 1 MG/ML
5 INJECTION, SOLUTION INTRAVENOUS EVERY 6 HOURS PRN
Status: DISCONTINUED | OUTPATIENT
Start: 2024-10-28 | End: 2024-10-30

## 2024-10-28 RX ORDER — LEVALBUTEROL INHALATION SOLUTION 1.25 MG/3ML
1.25 SOLUTION RESPIRATORY (INHALATION)
Status: DISCONTINUED | OUTPATIENT
Start: 2024-10-28 | End: 2024-11-06 | Stop reason: HOSPADM

## 2024-10-28 RX ORDER — ONDANSETRON 2 MG/ML
4 INJECTION INTRAMUSCULAR; INTRAVENOUS EVERY 6 HOURS PRN
Status: DISCONTINUED | OUTPATIENT
Start: 2024-10-28 | End: 2024-11-06 | Stop reason: HOSPADM

## 2024-10-28 RX ORDER — ATORVASTATIN CALCIUM 40 MG/1
40 TABLET, FILM COATED ORAL DAILY
Status: DISCONTINUED | OUTPATIENT
Start: 2024-10-28 | End: 2024-11-06 | Stop reason: HOSPADM

## 2024-10-28 RX ORDER — CEFTRIAXONE 1 G/50ML
1000 INJECTION, SOLUTION INTRAVENOUS ONCE
Status: COMPLETED | OUTPATIENT
Start: 2024-10-28 | End: 2024-10-28

## 2024-10-28 RX ORDER — VANCOMYCIN HYDROCHLORIDE 1 G/200ML
15 INJECTION, SOLUTION INTRAVENOUS EVERY 12 HOURS
Status: DISCONTINUED | OUTPATIENT
Start: 2024-10-28 | End: 2024-10-28

## 2024-10-28 RX ORDER — DILTIAZEM HYDROCHLORIDE 5 MG/ML
INJECTION INTRAVENOUS CODE/TRAUMA/SEDATION MEDICATION
Status: COMPLETED | OUTPATIENT
Start: 2024-10-28 | End: 2024-10-28

## 2024-10-28 RX ORDER — METOPROLOL TARTRATE 50 MG
50 TABLET ORAL EVERY 12 HOURS SCHEDULED
Status: DISCONTINUED | OUTPATIENT
Start: 2024-10-28 | End: 2024-10-29

## 2024-10-28 RX ADMIN — GUAIFENESIN 600 MG: 600 TABLET ORAL at 21:18

## 2024-10-28 RX ADMIN — METOPROLOL TARTRATE 25 MG: 25 TABLET, FILM COATED ORAL at 21:19

## 2024-10-28 RX ADMIN — SODIUM CHLORIDE, SODIUM GLUCONATE, SODIUM ACETATE, POTASSIUM CHLORIDE, MAGNESIUM CHLORIDE, SODIUM PHOSPHATE, DIBASIC, AND POTASSIUM PHOSPHATE 1000 ML: .53; .5; .37; .037; .03; .012; .00082 INJECTION, SOLUTION INTRAVENOUS at 14:08

## 2024-10-28 RX ADMIN — HEPARIN SODIUM 5000 UNITS: 5000 INJECTION INTRAVENOUS; SUBCUTANEOUS at 16:23

## 2024-10-28 RX ADMIN — IPRATROPIUM BROMIDE 0.5 MG: 0.5 SOLUTION RESPIRATORY (INHALATION) at 16:16

## 2024-10-28 RX ADMIN — ATORVASTATIN CALCIUM 40 MG: 40 TABLET, FILM COATED ORAL at 16:23

## 2024-10-28 RX ADMIN — LEVALBUTEROL HYDROCHLORIDE 1.25 MG: 1.25 SOLUTION RESPIRATORY (INHALATION) at 16:16

## 2024-10-28 RX ADMIN — METOPROLOL TARTRATE 50 MG: 50 TABLET, FILM COATED ORAL at 21:18

## 2024-10-28 RX ADMIN — DILTIAZEM HYDROCHLORIDE 10 MG: 5 INJECTION, SOLUTION INTRAVENOUS at 18:27

## 2024-10-28 RX ADMIN — METHYLPREDNISOLONE SODIUM SUCCINATE 100 MG: 125 INJECTION, POWDER, FOR SOLUTION INTRAMUSCULAR; INTRAVENOUS at 12:44

## 2024-10-28 RX ADMIN — CEFTRIAXONE 1000 MG: 1 INJECTION, SOLUTION INTRAVENOUS at 12:45

## 2024-10-28 RX ADMIN — IPRATROPIUM BROMIDE 0.5 MG: 0.5 SOLUTION RESPIRATORY (INHALATION) at 20:32

## 2024-10-28 RX ADMIN — AZITHROMYCIN MONOHYDRATE 500 MG: 500 INJECTION, POWDER, LYOPHILIZED, FOR SOLUTION INTRAVENOUS at 13:18

## 2024-10-28 RX ADMIN — METOPROLOL TARTRATE 5 MG: 5 INJECTION INTRAVENOUS at 18:58

## 2024-10-28 RX ADMIN — SODIUM CHLORIDE 500 ML: 0.9 INJECTION, SOLUTION INTRAVENOUS at 12:45

## 2024-10-28 RX ADMIN — VANCOMYCIN HYDROCHLORIDE 1750 MG: 1 INJECTION, POWDER, LYOPHILIZED, FOR SOLUTION INTRAVENOUS at 16:21

## 2024-10-28 RX ADMIN — DILTIAZEM HYDROCHLORIDE 10 MG: 5 INJECTION, SOLUTION INTRAVENOUS at 17:53

## 2024-10-28 RX ADMIN — LEVALBUTEROL HYDROCHLORIDE 1.25 MG: 1.25 SOLUTION RESPIRATORY (INHALATION) at 20:31

## 2024-10-28 RX ADMIN — DILTIAZEM HYDROCHLORIDE 5 MG/HR: 5 INJECTION, SOLUTION INTRAVENOUS at 18:07

## 2024-10-28 RX ADMIN — IOHEXOL 85 ML: 350 INJECTION, SOLUTION INTRAVENOUS at 14:00

## 2024-10-28 RX ADMIN — SODIUM CHLORIDE 125 ML/HR: 0.9 INJECTION, SOLUTION INTRAVENOUS at 16:21

## 2024-10-28 RX ADMIN — DILTIAZEM HYDROCHLORIDE 10 MG: 5 INJECTION, SOLUTION INTRAVENOUS at 15:09

## 2024-10-28 RX ADMIN — HEPARIN SODIUM 5000 UNITS: 5000 INJECTION INTRAVENOUS; SUBCUTANEOUS at 21:20

## 2024-10-28 NOTE — ASSESSMENT & PLAN NOTE
Present on admission as evidenced by hyperthermia, tachycardia, tachypnea  Likely secondary to multilobar pneumonia  Chest x-ray with evidence of consolidations  Initiate vancomycin and cefepime ; pharmacy consult for vancomycin trough management  Follow-up blood and urine cultures  IV fluid resuscitation  Trend fever curve/WBC count/procalcitonin  Pulmonology consultation appreciated

## 2024-10-28 NOTE — SEPSIS NOTE
"  Sepsis Note   Corinne A Longo 74 y.o. female MRN: [id number]  Unit/Bed#: ED 27 Encounter: [id number]       Initial Sepsis Screening       Row Name 10/28/24 1217                Is the patient's history suggestive of a new or worsening infection? Yes (Proceed)  -BS        Suspected source of infection pneumonia  -BS        Indicate SIRS criteria Tachycardia > 90 bpm;Tachypnea > 20 resp per min  -BS        Are two or more of the above signs & symptoms of infection both present and new to the patient? Yes (Proceed)  -BS        Assess for evidence of organ dysfunction: Are any of the below criteria present within 6 hours of suspected infection and SIRS criteria that are NOT considered to be chronic conditions? Lactate > 2.0  -BS        Date of presentation of severe sepsis 10/28/24  -BS        Time of presentation of severe sepsis 1146  -BS        Sepsis Note: Click \"NEXT\" below (NOT \"close\") to generate sepsis note based on above information. YES (proceed by clicking \"NEXT\")  -BS                  User Key  (r) = Recorded By, (t) = Taken By, (c) = Cosigned By      Initials Name Provider Type    BS [facility] Gamble PA-C Physician Assistant                        There is no height or weight on file to calculate BMI.  Wt Readings from Last 1 Encounters:   07/18/24 74.4 kg (164 lb)        Ideal body weight: 62.7 kg (138 lb 5.4 oz)  Adjusted ideal body weight: 67.4 kg (148 lb 9.7 oz)    "

## 2024-10-28 NOTE — PROGRESS NOTES
Corinne A Longo is a 74 y.o. female who is currently ordered Vancomycin IV with management by the Pharmacy Consult service.  Relevant clinical data and objective / subjective history reviewed.  Vancomycin Assessment:  Indication and Goal AUC/Trough: Pneumonia (goal -600, trough >10)  Clinical Status: stable  Micro:   Pending   Renal Function:  SCr: 1.03 mg/dL  CrCl: 49.8 mL/min  Renal replacement: Not on dialysis  Days of Therapy: 1  Current Dose: 1000 mg iv q 12 hrs  Vancomycin Plan:  New Dosin mg iv once, followed by 1500 mg iv q 24 hrs  Estimated AUC: 575 mcg*hr/mL  Estimated Trough: 14.6 mcg/mL  Next Level: 10/31/24 0600  Renal Function Monitoring: Daily BMP and UOP  Pharmacy will continue to follow closely for s/sx of nephrotoxicity, infusion reactions and appropriateness of therapy.  BMP and CBC will be ordered per protocol. We will continue to follow the patient’s culture results and clinical progress daily.    Jose M Hull, Pharmacist

## 2024-10-28 NOTE — NURSING NOTE
Patient arrived from [place] with the following belongings: shirt, pajama bottoms, coat, shoes, bra, chapstick, cough drops, O2 tank and travel case, wallet, phone and . This nurse went through belongings with patient present and engaged in process/conversation.    Tyesha AVILA RN

## 2024-10-28 NOTE — NURSING NOTE
"Pt noted to be in Afib on the monitor with a HR of 166 beats/per.  Verbalized \"Not feeling well\" RRT called.  Cardizem 10mg IV administered and ineffective.  Provider and Critical Care at beside.  Will move to ICU for Cardizem drip.  See vitals and RRT documentation.    "

## 2024-10-28 NOTE — ASSESSMENT & PLAN NOTE
Utilizes 2 L of nasal cannula supplemental O2 at baseline  Currently requiring 4-6 L in the setting of sepsis secondary to pneumonia  Respiratory protocol  Wean O2 as tolerated

## 2024-10-28 NOTE — ED PROVIDER NOTES
Time reflects when diagnosis was documented in both MDM as applicable and the Disposition within this note       Time User Action Codes Description Comment    10/28/2024  1:36 PM Karson Gambleon Add [A41.9] Sepsis (HCC)     10/28/2024  2:47 PM MavisKarson cervanteson Add [R91.8] Multilobar lung infiltrate     10/28/2024  2:47 PM MavisKarson cervanteson Remove [R91.8] Multilobar lung infiltrate     10/28/2024  2:47 PM Karson Gambleon Add [J18.9] Pneumonia     10/28/2024  2:47 PM MavisKarson cervanteson Add [R09.02] Hypoxia     10/28/2024  3:01 PM Karson Gambleon Add [J44.9] COPD (chronic obstructive pulmonary disease) ([facility])     10/28/2024  3:05 PM Karson Gambleon Add [I48.91] Atrial fibrillation with RVR ([facility])           ED Disposition       ED Disposition   Admit    Condition   Stable    Date/Time   Mon Oct 28, 2024  3:19 PM    Comment   Case was discussed with Dr. Sarmiento and the patient's admission status was agreed to be Admission Status: inpatient status to the service of Dr. Sarmiento.               Assessment & Plan       Medical Decision Making  Patient is an ill-appearing 74-year-old female presenting with pain and shortness of breath for 2-3 days. She is on 2 L nasal cannula at home secondary to COPD but she reports worsening shortness of breath. Her boyfriend brought her to the ER today.  Ordered sepsis labs including blood cultures since she was tachycardic, tachypneic, hypoxic, and in respiratory distress on initial exam. Concern for pneumonia versus PE. Will obtain cardiac workup including ECG, troponin, and chest x-ray given her symptoms. Will swab for COVID/flu. Will obtain CTA PE study with concerns for a PE. Moderate risk on Wells score. She is not anticoagulated and afebrile here. Will also obtain CT head without contrast since daughter reports dysarthria while on the phone with her around 9 AM this morning but she is nonfocal here so I suspect this was secondary to hypoxia. UA to rule out UTI. BNP to eval for acute CHF.    See ED course for interpretation of labs, imaging, and further medical decision making.   Titrated nasal cannula to spo2 >90%. Gentle IV fluids for precontrast hydration. IV steroids with history of COPD. Right lower lobe pneumonia upon my preliminary interpretation. Ordered IV Rocephin and azithromycin. She went into A-fib RVR while in the ED here, ordered Cardizem bolus. She remained comfortable and hemodynamically stable.  Dispo: Patient hospitalized to inpatient [facility] telemetry under the care of Dr. Sarmiento for further workup and management.    Amount and/or Complexity of Data Reviewed  External Data Reviewed: labs, radiology and notes.  Labs: ordered. Decision-making details documented in ED Course.  Radiology: ordered and independent interpretation performed. Decision-making details documented in ED Course.  ECG/medicine tests: ordered and independent interpretation performed.    Risk  Prescription drug management.  Decision regarding hospitalization.        ED Course as of 10/28/24 1529   Mon Oct 28, 2024   1153 Pulse(!): 115  Tachycardia, hypoxia, and tachypnea. Ordered sepsis labs. Nursing staff placed her on nasal cannula for moderate respiratory distress.   1227 Right lower lobe pneumonia upon my preliminary  interpretation. Ordered IV antibiotics.   1246 CBC and differential(!)  No leukocytosis, anemia, or platelet abnormality.   1304 Comprehensive metabolic panel(!)  Electrolytes WNL. No CLAUDIO or transaminitis. GFR 54.    1305 FLU/COVID Rapid Antigen (30 min. TAT) - Preferred screening test in ED  Negative for COVID/flu. Reassessed patient and she is comfortable on the nasal cannula.   1320 hs TnI 0hr: 14  Will repeat at 2 hours.   1320 BNP(!): 168   1320 Procalcitonin(!): 0.26   1335 LACTIC ACID(!): 2.3  Will order additional fluids.   1433 Patient now in A-fib RVR with a rate in the 140s.  Ordered Cardizem.   1446 CTA chest pe study  IMPRESSION:  No evidence for pulmonary embolism.  Extensive right  sided multilobar pneumonia. Small right parapneumonic effusion.  Tree-in-bud opacities in the left upper and lower lobes consistent with endobronchial impaction/infection.  Severe emphysematous changes.  Small pericardial effusion.   1503 CT head without contrast  IMPRESSION:  No acute intracranial abnormality.  Chronic microangiopathic changes.   1510 Delta 2hr hsTnI: 0  Patient remains comfortable. Rate improved after Cardizem. Will reach out to [facility] for hospitalization.       Medications   sodium chloride 0.9 % bolus 500 mL (0 mL Intravenous Stopped 10/28/24 1318)   methylPREDNISolone sodium succinate (Solu-MEDROL) injection 100 mg (100 mg Intravenous Given 10/28/24 1244)   cefTRIAXone (ROCEPHIN) IVPB (premix in dextrose) 1,000 mg 50 mL (0 mg Intravenous Stopped 10/28/24 1318)   azithromycin (ZITHROMAX) 500 mg in sodium chloride 0.9 % 250 mL IVPB (0 mg Intravenous Stopped 10/28/24 1507)   multi-electrolyte (ISOLYTE-S PH 7.4) bolus 1,000 mL (0 mL Intravenous Stopped 10/28/24 1513)   iohexol (OMNIPAQUE) 350 MG/ML injection (MULTI-DOSE) 85 mL (85 mL Intravenous Given 10/28/24 1400)   diltiazem (CARDIZEM) injection 10 mg (10 mg Intravenous Given 10/28/24 1509)       ED Risk Strat Scores   HEART Risk Score      Flowsheet Row Most Recent Value   Heart Score Risk Calculator    History 1 Filed at: 10/28/2024 1248   ECG 1 Filed at: 10/28/2024 1248   Age 2 Filed at: 10/28/2024 1248   Risk Factors 2 Filed at: 10/28/2024 1248   Troponin 1 Filed at: 10/28/2024 1248   HEART Score 7 Filed at: 10/28/2024 1248                               SBIRT 22yo+      Flowsheet Row Most Recent Value   Initial Alcohol Screen: US AUDIT-C     1. How often do you have a drink containing alcohol? 0 Filed at: 10/28/2024 1148   2. How many drinks containing alcohol do you have on a typical day you are drinking?  0 Filed at: 10/28/2024 1148   3a. Male UNDER 65: How often do you have five or more drinks on one occasion? 0 Filed at: 10/28/2024 1148    3b. FEMALE Any Age, or MALE 65+: How often do you have 4 or more drinks on one occassion? 0 Filed at: 10/28/2024 1148   Audit-C Score 0 Filed at: 10/28/2024 1148   LARRY: How many times in the past year have you...    Used an illegal drug or used a prescription medication for non-medical reasons? Never Filed at: 10/28/2024 1148            Wells' Criteria for PE      Flowsheet Row Most Recent Value   Wells' Criteria for PE    Clinical signs and symptoms of DVT 0 Filed at: 10/28/2024 1248   PE is primary diagnosis or equally likely 3 Filed at: 10/28/2024 1248   HR >100 1.5 Filed at: 10/28/2024 1248   Immobilization at least 3 days or Surgery in the previous 4 weeks 0 Filed at: 10/28/2024 1248   Previous, objectively diagnosed PE or DVT 0 Filed at: 10/28/2024 1248   Hemoptysis 0 Filed at: 10/28/2024 1248   Malignancy with treatment within 6 months or palliative 0 Filed at: 10/28/2024 1248   Wells' Criteria Total 4.5 Filed at: 10/28/2024 1248                        History of Present Illness       Chief Complaint   Patient presents with    Shortness of Breath    Chest Pain     Patient presents with shortness of breath and chest pain for about two days.   Patient states she has not felt well since she had the COVID vaccine last week.   Patient family states that she believed she had some slurred speech around 0900 today.        Past Medical History:   Diagnosis Date    Allergic     Allergic rhinitis     [id number]  RESOLVED    Asthma     Atrial fibrillation (HCC)     Bronchitis     Bunion     COPD (chronic obstructive pulmonary disease) (HCC)     home O2 at night at home 2L    Coronary artery disease     AFIB    Depression     Emphysema lung (HCC)     Fibroadenoma of breast     Gastroesophageal reflux disease without esophagitis 12/18/2023    Hyperlipidemia     Hypertension     Lung disease 2008    COPD    Memory difficulties 07/23/2019    Post-menopausal     Post-menopausal     Sepsis due to pneumonia ([facility]) 10/28/2024     Spondylisthesis     Stage 3 chronic kidney disease, unspecified whether stage 3a or 3b CKD ([facility]) 2022    Vitamin D deficiency       Past Surgical History:   Procedure Laterality Date    ADENOIDECTOMY      BREAST CYST EXCISION Bilateral     benign-, ,     BUNIONECTOMY Bilateral     CATARACT EXTRACTION, BILATERAL      EYE SURGERY      Cataracts removed    MOLE REMOVAL      lip    TONSILLECTOMY      TUBAL LIGATION        Family History   Problem Relation Age of Onset    Hypertension Mother     Alcohol abuse Mother     Hypertension Father     Diabetes Father     Prostate cancer Father     Cancer Father         Prostate    Hearing loss Father     Vitamin D deficiency Sister     Diverticulitis Sister     Depression Sister     No Known Problems Sister     Heart failure Brother     Lung cancer Brother     Cancer Brother         Stage four lung cancer    Celiac disease Daughter     Alcohol abuse Daughter     Substance Abuse Daughter     Eczema Daughter     Fibromyalgia Daughter     Substance Abuse Daughter     Alcohol abuse Daughter     ADD / ADHD Daughter     Bipolar disorder Daughter     Anxiety disorder Daughter     Ulcerative colitis Daughter     No Known Problems Maternal Aunt     Heart disease Brother     Substance Abuse Brother     Drug abuse Brother     Breast cancer Neg Hx       Social History     Tobacco Use    Smoking status: Every Day     Current packs/day: 0.00     Average packs/day: 0.3 packs/day for 53.5 years (13.4 ttl pk-yrs)     Types: Cigarettes     Start date: 1970     Last attempt to quit: 2023     Years since quittin.3    Smokeless tobacco: Never   Vaping Use    Vaping status: Never Used   Substance Use Topics    Alcohol use: Yes     Comment: I might have a glass of wine every two or three months    Drug use: No      E-Cigarette/Vaping    E-Cigarette Use Never User       E-Cigarette/Vaping Substances    Nicotine No     THC No     CBD No     Flavoring No     Other No      Unknown No       I have reviewed and agree with the history as documented.     Patient is a 74-year-old female with relevant past medical history of asthma, atrial fibrillation, COPD, CAD, depression, emphysema, GERD, hyperlipidemia, and HTN presenting with chest pain and shortness of breath x 3 days. She received her COVID booster on 10/16 and has not felt well since. She received her COVID booster and then started with arm pain at the injection site followed by lethargy. She reports worsening shortness of breath and chest pain for the last 2-3 days and decreased appetite. Her daughter called her this morning and noted some slurred speech on the phone and told the patient's boyfriend to bring her to the ER. No slurred speech or stroke-like symptoms here. She is on 2 L NC at baseline. She quit smoking 1-2 years ago. She reports mild 4/10 dull substernal chest pain and shortness of breath worse with exertion and lying down. She also endorses a productive cough and loose watery bowel movements. No facial droop, dysarthria, or unilateral weakness. She denies headache, visual changes, abdominal pain, N/V, or urinary symptoms.      History provided by:  Patient, significant other and relative (Sister)   used: No    Shortness of Breath  Associated symptoms: chest pain and cough    Associated symptoms: no abdominal pain, no ear pain, no fever, no headaches, no rash, no sore throat and no vomiting    Chest Pain  Associated symptoms: cough, fatigue and shortness of breath    Associated symptoms: no abdominal pain, no back pain, no dizziness, no fever, no headache, no nausea, no palpitations and not vomiting        Review of Systems   Constitutional:  Positive for appetite change and fatigue. Negative for chills and fever.   HENT:  Positive for congestion. Negative for ear pain, rhinorrhea and sore throat.    Eyes:  Negative for pain and visual disturbance.   Respiratory:  Positive for cough and shortness  of breath.    Cardiovascular:  Positive for chest pain. Negative for palpitations.   Gastrointestinal:  Positive for diarrhea. Negative for abdominal pain, constipation, nausea and vomiting.   Genitourinary:  Negative for dysuria, frequency, hematuria and urgency.   Musculoskeletal:  Negative for arthralgias and back pain.   Skin:  Negative for color change and rash.   Neurological:  Negative for dizziness, seizures, syncope, light-headedness and headaches.   Psychiatric/Behavioral:  Negative for agitation and confusion.            Objective       ED Triage Vitals   Temperature Pulse Blood Pressure Respirations SpO2 Patient Position - Orthostatic VS   10/28/24 1148 10/28/24 1148 10/28/24 1148 10/28/24 1148 10/28/24 1148 10/28/24 1245   98.9 °F (37.2 °C) (!) 115 137/64 22 (!) 87 % Sitting      Temp src Heart Rate Source BP Location FiO2 (%) Pain Score    -- 10/28/24 1148 10/28/24 1245 -- 10/28/24 1148     Monitor Left arm  No Pain      Vitals      Date and Time Temp Pulse SpO2 Resp BP Pain Score FACES Pain Rating User   10/28/24 1330 -- 97 90 % 22 109/53 -- -- AMF   10/28/24 1315 -- 99 92 % 22 153/61 -- -- AMF   10/28/24 1245 -- 107 81 % 22 122/58 -- -- AMF   10/28/24 1148 98.9 °F (37.2 °C) 115 87 % 22 137/64 No Pain --             Physical Exam  Vitals and nursing note reviewed.   Constitutional:       General: She is in acute distress.      Appearance: She is well-developed. She is ill-appearing.   HENT:      Head: Normocephalic and atraumatic.      Nose: Congestion present.   Eyes:      Conjunctiva/sclera: Conjunctivae normal.   Cardiovascular:      Rate and Rhythm: Regular rhythm. Tachycardia present.      Heart sounds: No murmur heard.  Pulmonary:      Effort: Tachypnea and respiratory distress present.      Breath sounds: Examination of the right-lower field reveals rales. Rales present. No wheezing or rhonchi.   Abdominal:      Palpations: Abdomen is soft.      Tenderness: There is no abdominal tenderness.  There is no guarding or rebound.   Musculoskeletal:         General: No swelling.      Cervical back: Neck supple.      Right lower leg: No edema.      Left lower leg: No edema.   Skin:     General: Skin is warm and dry.      Capillary Refill: Capillary refill takes less than 2 seconds.   Neurological:      General: No focal deficit present.      Mental Status: She is alert and oriented to person, place, and time.      GCS: GCS eye subscore is 4. GCS verbal subscore is 5. GCS motor subscore is 6.      Cranial Nerves: Cranial nerves 2-12 are intact.      Sensory: Sensation is intact.      Motor: Motor function is intact.      Coordination: Coordination is intact.      Gait: Gait is intact.   Psychiatric:         Mood and Affect: Mood normal.         Results Reviewed       Procedure Component Value Units Date/Time    Lactic acid 2 Hours [[id number]] Collected: 10/28/24 1511    Lab Status: In process Specimen: Blood from Arm, Right Updated: 10/28/24 1516    HS Troponin I 2hr [[id number]]  (Normal) Collected: 10/28/24 1425    Lab Status: Final result Specimen: Blood from Arm, Left Updated: 10/28/24 1455     hs TnI 2hr 14 ng/L      Delta 2hr hsTnI 0 ng/L     HS Troponin I 4hr [[id number]]     Lab Status: No result Specimen: Blood     Lactic acid [[id number]]  (Abnormal) Collected: 10/28/24 1232    Lab Status: Final result Specimen: Blood from Arm, Right Updated: 10/28/24 1320     LACTIC ACID 2.3 mmol/L     Narrative:      Result may be elevated if tourniquet was used during collection.    Procalcitonin [[id number]]  (Abnormal) Collected: 10/28/24 1232    Lab Status: Final result Specimen: Blood from Arm, Right Updated: 10/28/24 1313     Procalcitonin 0.26 ng/ml     Protime-INR [[id number]]  (Abnormal) Collected: 10/28/24 1232    Lab Status: Final result Specimen: Blood from Arm, Right Updated: 10/28/24 1311     Protime 16.3 seconds      INR 1.26    Narrative:      INR Therapeutic Range    Indication                                              INR Range      Atrial Fibrillation                                               2.0-3.0  Hypercoagulable State                                    2.0.2.3  Left Ventricular Asist Device                            2.0-3.0  Mechanical Heart Valve                                  -    Aortic(with afib, MI, embolism, HF, LA enlargement,    and/or coagulopathy)                                     2.0-3.0 (2.5-3.5)     Mitral                                                             2.5-3.5  Prosthetic/Bioprosthetic Heart Valve               2.0-3.0  Venous thromboembolism (VTE: VT, PE        2.0-3.0    APTT [[id number]]  (Abnormal) Collected: 10/28/24 1232    Lab Status: Final result Specimen: Blood from Arm, Right Updated: 10/28/24 1311     PTT 36 seconds     B-Type Natriuretic Peptide(BNP) [[id number]]  (Abnormal) Collected: 10/28/24 1236    Lab Status: Final result Specimen: Blood from Arm, Right Updated: 10/28/24 1310      pg/mL     HS Troponin 0hr (reflex protocol) [[id number]]  (Normal) Collected: 10/28/24 1232    Lab Status: Final result Specimen: Blood from Arm, Right Updated: 10/28/24 1310     hs TnI 0hr 14 ng/L     FLU/COVID Rapid Antigen (30 min. TAT) - Preferred screening test in ED [[id number]]  (Normal) Collected: 10/28/24 1232    Lab Status: Final result Specimen: Nares from Nose Updated: 10/28/24 1303     SARS COV Rapid Antigen Negative     Influenza A Rapid Antigen Negative     Influenza B Rapid Antigen Negative    Narrative:      This test has been performed using the Quidel Jennifer 2 FLU+SARS Antigen test under the Emergency Use Authorization (EUA). This test has been validated by the  and verified by the performing laboratory. The Jennifer uses lateral flow immunofluorescent sandwich assay to detect SARS-COV, Influenza A and Influenza B Antigen.     The Quidel Jennifer 2 SARS Antigen test does not differentiate between SARS-CoV and SARS-CoV-2.     Negative results are  presumptive and may be confirmed with a molecular assay, if necessary, for patient management. Negative results do not rule out SARS-CoV-2 or influenza infection and should not be used as the sole basis for treatment or patient management decisions. A negative test result may occur if the level of antigen in a sample is below the limit of detection of this test.     Positive results are indicative of the presence of viral antigens, but do not rule out bacterial infection or co-infection with other viruses.     All test results should be used as an adjunct to clinical observations and other information available to the provider.    FOR PEDIATRIC PATIENTS - copy/paste COVID Guidelines URL to browser: [email or web address]    Comprehensive metabolic panel [[id number]]  (Abnormal) Collected: 10/28/24 1232    Lab Status: Final result Specimen: Blood from Arm, Right Updated: 10/28/24 1259     Sodium 140 mmol/L      Potassium 3.9 mmol/L      Chloride 106 mmol/L      CO2 21 mmol/L      ANION GAP 13 mmol/L      BUN 34 mg/dL      Creatinine 1.03 mg/dL      Glucose 152 mg/dL      Calcium 9.3 mg/dL      Corrected Calcium 9.8 mg/dL      AST 46 U/L      ALT 28 U/L      Alkaline Phosphatase 59 U/L      Total Protein 6.9 g/dL      Albumin 3.4 g/dL      Total Bilirubin 0.73 mg/dL      eGFR 54 ml/min/1.73sq m     Narrative:      National Kidney Disease Foundation guidelines for Chronic Kidney Disease (CKD):     Stage 1 with normal or high GFR (GFR > 90 mL/min/1.73 square meters)    Stage 2 Mild CKD (GFR = 60-89 mL/min/1.73 square meters)    Stage 3A Moderate CKD (GFR = 45-59 mL/min/1.73 square meters)    Stage 3B Moderate CKD (GFR = 30-44 mL/min/1.73 square meters)    Stage 4 Severe CKD (GFR = 15-29 mL/min/1.73 square meters)    Stage 5 End Stage CKD (GFR <15 mL/min/1.73 square meters)  Note: GFR calculation is accurate only with a steady state creatinine    CBC and differential  [[id number]]  (Abnormal) Collected: 10/28/24 1232    Lab Status: Final result Specimen: Blood from Arm, Right Updated: 10/28/24 1245     WBC 7.52 Thousand/uL      RBC 4.07 Million/uL      Hemoglobin 12.4 g/dL      Hematocrit 37.6 %      MCV 92 fL      MCH 30.5 pg      MCHC 33.0 g/dL      RDW 13.1 %      MPV 10.3 fL      Platelets 306 Thousands/uL      nRBC 0 /100 WBCs      Segmented % 88 %      Immature Grans % 1 %      Lymphocytes % 7 %      Monocytes % 4 %      Eosinophils Relative 0 %      Basophils Relative 0 %      Absolute Neutrophils 6.63 Thousands/µL      Absolute Immature Grans 0.06 Thousand/uL      Absolute Lymphocytes 0.50 Thousands/µL      Absolute Monocytes 0.30 Thousand/µL      Eosinophils Absolute 0.01 Thousand/µL      Basophils Absolute 0.02 Thousands/µL     Blood culture #2 [[id number]] Collected: 10/28/24 1232    Lab Status: In process Specimen: Blood from Arm, Right Updated: 10/28/24 1243    Blood culture #1 [[id number]] Collected: 10/28/24 1232    Lab Status: In process Specimen: Blood from Arm, Left Updated: 10/28/24 1242    UA w Reflex to Microscopic w Reflex to Culture [[id number]]     Lab Status: No result Specimen: Urine             CT head without contrast   Final Interpretation by Kristine Sibley MD (10/28 1500)      No acute intracranial abnormality.  Chronic microangiopathic changes.                  Resident: Eder Cruz I, the attending radiologist, have reviewed the images and agree with the final report above.      Workstation performed: [id number]         CTA chest pe study   Final Interpretation by Yessi Hanson MD (10/28 8389)      No evidence for pulmonary embolism.      Extensive right sided multilobar pneumonia. Small right parapneumonic effusion.      Tree-in-bud opacities in the left upper and lower lobes consistent with endobronchial impaction/infection.      Severe emphysematous changes.      Small pericardial effusion.                  Workstation performed: [id number]          XR chest portable   ED Interpretation by Fernando Gamble PA-C (10/28 4418)   Right lower lobe pneumonia.      Final Interpretation by Abdulaziz Andres MD (10/28 7753)      1.  Multilobar pneumonia.      2.  Bilateral emphysematous changes.            Resident: Eze Tovar I, the attending radiologist, have reviewed the images and agree with the final report above.      Workstation performed: [id number]             ECG 12 Lead Documentation Only    Date/Time: 10/28/2024 11:57 AM    Performed by: Fernando Gamble PA-C  Authorized by: Fernando Gamlbe PA-C    Indications / Diagnosis:  Chest pain  ECG reviewed by me, the ED Provider: yes    Patient location:  ED  Previous ECG:     Comparison to cardiac monitor: Yes    Interpretation:     Interpretation: abnormal    Rate:     ECG rate:  110    ECG rate assessment: tachycardic    Rhythm:     Rhythm: sinus tachycardia    Ectopy:     Ectopy: none    QRS:     QRS axis:  Normal    QRS intervals:  Normal  Conduction:     Conduction: normal    ST segments:     ST segments:  Normal  T waves:     T waves: normal    CriticalCare Time    Date/Time: 10/28/2024 11:50 AM    Performed by: Fernando Gamble PA-C  Authorized by: Fernando Gamble PA-C    Critical care provider statement:     Critical care time (minutes):  60    Critical care time was exclusive of:  Separately billable procedures and treating other patients and teaching time    Critical care was necessary to treat or prevent imminent or life-threatening deterioration of the following conditions:  Respiratory failure, sepsis and cardiac failure    Critical care was time spent personally by me on the following activities:  Blood draw for specimens, obtaining history from patient or surrogate, development of treatment plan with patient or surrogate, discussions with consultants, evaluation of patient's response to treatment, examination of patient, interpretation of cardiac output measurements,  ordering and performing treatments and interventions, ordering and review of laboratory studies, ordering and review of radiographic studies, re-evaluation of patient's condition and review of old charts    I assumed direction of critical care for this patient from another provider in my specialty: no        ED Medication and Procedure Management   Prior to Admission Medications   Prescriptions Last Dose Informant Patient Reported? Taking?   Cholecalciferol (VITAMIN D3) 5000 units CAPS  Self Yes No   Sig: Take 1 capsule by mouth daily   L-Lysine 500 MG CAPS  Self Yes No   Sig: Take 1 capsule by mouth daily   MULTIPLE VITAMINS-CALCIUM PO  Self Yes No   Sig: Take by mouth daily   Probiotic Product (PROBIOTIC-10 PO)  Self Yes No   Sig: Take 1 capsule by mouth daily   ascorbic acid (VITAMIN C) 500 mg tablet  Self Yes No   Sig: Take 500 mg by mouth daily   aspirin 81 mg chewable tablet  Self No No   Sig: Chew 1 tablet (81 mg total) daily   atorvastatin (LIPITOR) 40 mg tablet   No No   Sig: Take 1 tablet (40 mg total) by mouth daily   calcium citrate-vitamin D 315 mg-5 mcg tablet   Yes No   Sig: Take 2 tablets by mouth 2 (two) times a day   cetirizine (ZyrTEC) 10 mg tablet  Self Yes No   Sig: Take 10 mg by mouth daily   clotrimazole-betamethasone (LOTRISONE) 1-0.05 % cream   No No   Sig: Apply topically 2 (two) times a day   guaiFENesin (MUCINEX) 600 mg 12 hr tablet  Self No No   Sig: Take 1 tablet (600 mg total) by mouth every 12 (twelve) hours   ipratropium-albuterol (DUO-NEB) 0.5-2.5 mg/3 mL nebulizer solution   No No   Sig: Take 3 mL by nebulization 4 (four) times a day   levalbuterol (Xopenex HFA) 45 mcg/act inhaler   No No   Sig: Inhale 1-2 puffs every 4 (four) hours as needed for wheezing   methocarbamol (ROBAXIN) 500 mg tablet  Self No No   Sig: Take 1 tablet (500 mg total) by mouth 3 (three) times a day as needed for muscle spasms   metoprolol tartrate (LOPRESSOR) 25 mg tablet   No No   Sig: Take one tablet by  mouth twice a day along with the 50mg twice a day to equal 75mg BID   metoprolol tartrate (LOPRESSOR) 50 mg tablet   No No   Sig: Take one tablet by mouth twice a day along with the 25mg twice a day to equal 75mg BID   mometasone (NASONEX) 50 mcg/act nasal spray   No No   Si sprays into each nostril daily   multivitamin-minerals (CENTRUM ADULTS) tablet   Yes No   Sig: Take 1 tablet by mouth daily   triamcinolone (KENALOG) 0.1 % cream   No No   Sig: Apply topically 2 (two) times a day   umeclidinium-vilanterol (Anoro Ellipta) 62.5-25 mcg/actuation inhaler   No No   Sig: Inhale 1 puff daily      Facility-Administered Medications: None     Patient's Medications   Discharge Prescriptions    No medications on file     No discharge procedures on file.  ED SEPSIS DOCUMENTATION   Time reflects when diagnosis was documented in both MDM as applicable and the Disposition within this note       Time User Action Codes Description Comment    10/28/2024  1:36 PM Fernando Gamble Add [A41.9] Sepsis (HCC)     10/28/2024  2:47 PM Fernando Gamble Add [R91.8] Multilobar lung infiltrate     10/28/2024  2:47 PM Fernando Gamble Remove [R91.8] Multilobar lung infiltrate     10/28/2024  2:47 PM Fernando Gamble Add [J18.9] Pneumonia     10/28/2024  2:47 PM Fernando Gamble Add [R09.02] Hypoxia     10/28/2024  3:01 PM Fernando Gamble Add [J44.9] COPD (chronic obstructive pulmonary disease) ([facility])     10/28/2024  3:05 PM Fernando Gamble Add [I48.91] Atrial fibrillation with RVR ([facility])            Initial Sepsis Screening       Row Name 10/28/24 1217                Is the patient's history suggestive of a new or worsening infection? Yes (Proceed)  -BS        Suspected source of infection pneumonia  -BS        Indicate SIRS criteria Tachycardia > 90 bpm;Tachypnea > 20 resp per min  -BS        Are two or more of the above signs & symptoms of infection both present and new to the patient? Yes (Proceed)  -BS        Assess for evidence of  "organ dysfunction: Are any of the below criteria present within 6 hours of suspected infection and SIRS criteria that are NOT considered to be chronic conditions? Lactate > 2.0  -BS        Date of presentation of severe sepsis 10/28/24  -BS        Time of presentation of severe sepsis 1146  -BS        Sepsis Note: Click \"NEXT\" below (NOT \"close\") to generate sepsis note based on above information. YES (proceed by clicking \"NEXT\")  -BS                  User Key  (r) = Recorded By, (t) = Taken By, (c) = Cosigned By      Initials Name Provider Type    BS Fernando Gamble PA-C Physician Assistant                       Fernando Gamble PA-C  10/28/24 1532    "

## 2024-10-28 NOTE — ASSESSMENT & PLAN NOTE
Possible acute exacerbation secondary to pneumonia  Utilizes chronic O2  Atrovent and Xopenex  Respiratory protocol  Pulmonology consultation appreciated  Hold off on IV corticosteroids in the setting of sepsis  Monitor respiratory status  Continue home Symbicort

## 2024-10-28 NOTE — PLAN OF CARE
Problem: Potential for Falls  Goal: Patient will remain free of falls  Description: INTERVENTIONS:  - Educate patient/family on patient safety including physical limitations  - Instruct patient to call for assistance with activity   - Consult OT/PT to assist with strengthening/mobility   - Keep Call bell within reach  - Keep bed low and locked with side rails adjusted as appropriate  - Keep care items and personal belongings within reach  - Initiate and maintain comfort rounds  - Make Fall Risk Sign visible to staff  - Offer Toileting every 2 Hours, in advance of need  - Initiate/Maintain bed alarm  - Obtain necessary fall risk management equipment: non skid socks  - Apply yellow socks and bracelet for high fall risk patients  - Consider moving patient to room near nurses station  Outcome: Progressing     Problem: PAIN - ADULT  Goal: Verbalizes/displays adequate comfort level or baseline comfort level  Description: Interventions:  - Encourage patient to monitor pain and request assistance  - Assess pain using appropriate pain scale  - Administer analgesics based on type and severity of pain and evaluate response  - Implement non-pharmacological measures as appropriate and evaluate response  - Consider cultural and social influences on pain and pain management  - Notify physician/advanced practitioner if interventions unsuccessful or patient reports new pain  Outcome: Progressing     Problem: INFECTION - ADULT  Goal: Absence or prevention of progression during hospitalization  Description: INTERVENTIONS:  - Assess and monitor for signs and symptoms of infection  - Monitor lab/diagnostic results  - Monitor all insertion sites, i.e. indwelling lines, tubes, and drains  - Monitor endotracheal if appropriate and nasal secretions for changes in amount and color  - [place] appropriate cooling/warming therapies per order  - Administer medications as ordered  - Instruct and encourage patient and family to use good hand  hygiene technique  - Identify and instruct in appropriate isolation precautions for identified infection/condition  Outcome: Progressing     Problem: SAFETY ADULT  Goal: Patient will remain free of falls  Description: INTERVENTIONS:  - Educate patient/family on patient safety including physical limitations  - Instruct patient to call for assistance with activity   - Consult OT/PT to assist with strengthening/mobility   - Keep Call bell within reach  - Keep bed low and locked with side rails adjusted as appropriate  - Keep care items and personal belongings within reach  - Initiate and maintain comfort rounds  - Make Fall Risk Sign visible to staff  - Offer Toileting every 2 Hours, in advance of need  - Initiate/Maintain bed alarm  - Obtain necessary fall risk management equipment: non skid socks  - Apply yellow socks and bracelet for high fall risk patients  - Consider moving patient to room near nurses station  Outcome: Progressing     Problem: CARDIOVASCULAR - ADULT  Goal: Maintains optimal cardiac output and hemodynamic stability  Description: INTERVENTIONS:  - Monitor I/O, vital signs and rhythm  - Monitor for S/S and trends of decreased cardiac output  - Administer and titrate ordered vasoactive medications to optimize hemodynamic stability  - Assess quality of pulses, skin color and temperature  - Assess for signs of decreased coronary artery perfusion  - Instruct patient to report change in severity of symptoms  Outcome: Progressing     Problem: RESPIRATORY - ADULT  Goal: Achieves optimal ventilation and oxygenation  Description: INTERVENTIONS:  - Assess for changes in respiratory status  - Assess for changes in mentation and behavior  - Position to facilitate oxygenation and minimize respiratory effort  - Oxygen administered by appropriate delivery if ordered  - Initiate smoking cessation education as indicated  - Encourage broncho-pulmonary hygiene including cough, deep breathe, Incentive Spirometry  -  Assess the need for suctioning and aspirate as needed  - Assess and instruct to report SOB or any respiratory difficulty  - Respiratory Therapy support as indicated  Outcome: Progressing     Problem: METABOLIC, FLUID AND ELECTROLYTES - ADULT  Goal: Fluid balance maintained  Description: INTERVENTIONS:  - Monitor labs   - Monitor I/O and WT  - Instruct patient on fluid and nutrition as appropriate  - Assess for signs & symptoms of volume excess or deficit  Outcome: Progressing     Problem: SKIN/TISSUE INTEGRITY - ADULT  Goal: Skin Integrity remains intact(Skin Breakdown Prevention)  Description: Assess:  -Perform Silvestre assessment every shift  -Clean and moisturize skin every shift  -Inspect skin when repositioning, toileting, and assisting with ADLS  -Assess extremities for adequate circulation and sensation     Bed Management:  -Have minimal linens on bed & keep smooth, unwrinkled  -Change linens as needed when moist or perspiring  -Avoid sitting or lying in one position for more than 2 hours while in bed  -Keep HOB at 25-45 degrees     Activity:  -Mobilize patient 3-4  times a day  -Encourage activity and walks on unit  -Encourage or provide ROM exercises   -Turn and reposition patient every 2 Hours or as tolerated  -Use appropriate equipment to lift or move patient in bed  -Instruct/ Assist with weight shifting every 2 hours when out of bed in chair    Skin Care:  -Avoid use of baby powder, tape, friction and shearing, hot water or constrictive clothing  -Relieve pressure over bony prominences using foam dressings  -Do not massage red bony areas    Outcome: Progressing  Goal: Pressure injury heals and does not worsen  Description: Interventions:  - Implement low air loss mattress or specialty surface (Criteria met)  - Apply silicone foam dressing  - Apply fecal or urinary incontinence containment device   - Turn and reposition patient & offload bony prominences every 2 hours   - Utilize friction reducing device  or surface for transfers   - Consider consults to  interdisciplinary teams such as wound care team  - Use incontinent care products after each incontinent episode such as cream and foam dressings  - Consider nutrition services referral as needed  Outcome: Progressing

## 2024-10-28 NOTE — ED NOTES
Pt oxygen Saturation on 4L still at 87%, increased O2 to 6L, provider notified and at bedside      Aline Ann RN  10/28/24 1200

## 2024-10-28 NOTE — ASSESSMENT & PLAN NOTE
Heart rates elevated on presentation secondary to volume depletion and sepsis  Improved with one-time bolus of IV diltiazem  Continue home beta-blocker  IV fluid hydration  Not on anticoagulation in the outpatient setting  Continue home aspirin  Outpatient follow-up with Cardiology

## 2024-10-28 NOTE — RAPID RESPONSE
Rapid Response Note  Corinne A Longo 74 y.o. female MRN: [id number]  Unit/Bed#: -01 Encounter: [id number]    Rapid Response Notification(s):   Response called date/time:  10/28/2024 5:50 PM  Response team arrival date/time:  10/28/2024 5:51 PM  Response end date/time:  10/28/2024 6:00 PM  [facility]  [facility]  Primary reason for rapid response call:  Acute change in heart rate    Rapid Response Intervention(s):   Airway:  None  Breathing:  None  Circulation:  None  Fluids administered:  None  Medications administered:  Other (comment) (cardizem)       Assessment:   AF/RVR    Plan:   Given 10mg IV cardizem  Transfer to SD2 for Cardizem gtt      Rapid Response Outcome:   Transfer:  Transfer to stepdown 2  Primary service notified of transfer: Yes    Code Status: Level 1 (Full Code)      Family notified: Primary team to notify Family Member       Background/Situation:   Corinne A Longo is a 74 y.o. female RR called for AF/RVR.. HD stable. Given 10mg IV cardizem with improvement. Plan to transfer to [facility] for cardizem gtt.     Review of Systems   Constitutional: Negative.    HENT: Negative.     Eyes: Negative.    Respiratory: Negative.     Cardiovascular: Negative.    Gastrointestinal: Negative.    Endocrine: Negative.    Genitourinary: Negative.    Musculoskeletal: Negative.    Skin: Negative.    Allergic/Immunologic: Negative.    Neurological: Negative.    Hematological: Negative.    Psychiatric/Behavioral: Negative.         Objective:   Vitals:    10/28/24 1751 10/28/24 1753 10/28/24 1754 10/28/24 1755   BP: 161/86 127/62  129/76   BP Location:       Pulse: (!) 136 (!) 116 93 105   Resp: 20 18  20   Temp:       TempSrc:       SpO2: (!) 89% (!) 88% (!) 88% (!) 88%   Weight:       Height:         Physical Exam  Constitutional:       General: She is not in acute distress.  HENT:      Head: Normocephalic and atraumatic.      Mouth/Throat:      Mouth: Mucous membranes are  moist.   Eyes:      Pupils: Pupils are equal, round, and reactive to light.   Cardiovascular:      Rate and Rhythm: Tachycardia present. Rhythm irregular.      Pulses: Normal pulses.      Heart sounds: Normal heart sounds.   Pulmonary:      Effort: Pulmonary effort is normal. No respiratory distress.      Breath sounds: Rhonchi present. No wheezing or rales.   Abdominal:      General: Bowel sounds are normal. There is no distension.      Palpations: Abdomen is soft.      Tenderness: There is no abdominal tenderness.   Musculoskeletal:         General: No swelling. Normal range of motion.   Skin:     General: Skin is warm and dry.   Neurological:      General: No focal deficit present.      Mental Status: She is alert and oriented to person, place, and time.      Cranial Nerves: No cranial nerve deficit.      Motor: No weakness.

## 2024-10-28 NOTE — NURSING NOTE
Patient transferred to ICU bed 9 from MS2 room 202 for A-fib with RVR.  Cardizem gtt initiated; Patient denies chest pain or pressure; Stat EKG obtained;  bpm at time of transfer. Cardizem bolus 10 mg IVP given. Attending made aware of elevated HR; stat dose of Lopressor IV 5 mg given.  O2 increased to 8 liters nasal cannula.

## 2024-10-28 NOTE — RESPIRATORY THERAPY NOTE
RT Protocol Note  Corinne A Longo 74 y.o. female MRN: [id number]  Unit/Bed#: - Encounter: [id number]    Assessment    Principal Problem:    Sepsis due to pneumonia (HCC)  Active Problems:    Emphysema/COPD (HCC)    Paroxysmal atrial fibrillation (HCC)    Chronic respiratory failure with hypoxia (HCC)      Home Pulmonary Medications:  [place]b QID  Home Devices/Therapy: Home O2    Past Medical History:   Diagnosis Date    Allergic     Allergic rhinitis     2015  RESOLVED    Asthma     Atrial fibrillation (HCC)     Bronchitis     Bunion     COPD (chronic obstructive pulmonary disease) ([facility])     home O2 at night at home 2L    Coronary artery disease     AFIB    Depression     Emphysema lung (HCC)     Fibroadenoma of breast     Gastroesophageal reflux disease without esophagitis 2023    Hyperlipidemia     Hypertension     Lung disease     COPD    Memory difficulties 2019    Post-menopausal     Post-menopausal     Sepsis due to pneumonia ([facility]) 10/28/2024    Spondylisthesis     Stage 3 chronic kidney disease, unspecified whether stage 3a or 3b CKD ([facility]) 2022    Vitamin D deficiency      Social History     Socioeconomic History    Marital status: Unknown     Spouse name: None    Number of children: None    Years of education: None    Highest education level: None   Occupational History    None   Tobacco Use    Smoking status: Every Day     Current packs/day: 0.00     Average packs/day: 0.3 packs/day for 53.5 years (13.4 ttl pk-yrs)     Types: Cigarettes     Start date: 1970     Last attempt to quit: 2023     Years since quittin.3    Smokeless tobacco: Never   Vaping Use    Vaping status: Never Used   Substance and Sexual Activity    Alcohol use: Yes     Comment: I might have a glass of wine every two or three months    Drug use: No    Sexual activity: Not Currently     Partners: Male     Birth control/protection: Abstinence     Comment: Had tubal ligation in the    Other  Topics Concern    None   Social History Narrative    -    Retired    Lives with significant other     Social Determinants of Health     Financial Resource Strain: Low Risk  (12/18/2023)    Overall Financial Resource Strain (CARDIA)     Difficulty of Paying Living Expenses: Not very hard   Food Insecurity: No Food Insecurity (12/21/2023)    Received from [facility]    Food Insecurity     Do you need food for this week?: No     Are you able to get enough food for your family? (Household - for ages 0-17 years): Not on file     Does your family need food this week? (Household - for ages 0-17 years): Not on file     Do you always have enough food for your family? (Household - for ages 0-17 years): Not on file   Transportation Needs: No Transportation Needs (12/21/2023)    Received from [facility]    Transportation Needs     READ ONLY Do you have trouble getting a ride to medical visits or work?: Never True     Does your family have a hard time getting a ride to doctors’ visits? (Household - for ages 0-17 years): Not on file     Has lack of transportation kept you from medical appointments, meetings, work, or from getting things needed for daily living? Check all that apply. (Adult - for ages 18 years and over): Not on file     Do you (or your family) have trouble finding or paying for a ride (transportation)? (Household - for ages 0-17 years): Not on file   Physical Activity: Inactive (7/18/2019)    Exercise Vital Sign     Days of Exercise per Week: 0 days     Minutes of Exercise per Session: 0 min   Stress: No Stress Concern Present (7/18/2019)    [employer] [place] [employer] - Occupational Stress Questionnaire     Feeling of Stress : Only a little   Social Connections: Socially Integrated (12/21/2023)    Received from [employer]    Social Connections     How often do you feel lonely or isolated from those around you?: Rarely   Intimate Partner Violence: Not At Risk (7/18/2019)    Humiliation,  "Afraid, Rape, and Kick questionnaire     Fear of Current or Ex-Partner: No     Emotionally Abused: No     Physically Abused: No     Sexually Abused: No   Housing Stability: Low Risk  (12/21/2023)    Received from [facility]    Housing Stability     Do you currently live in a shelter or have no steady place to sleep at night?: No     READ ONLY Do you think you are at risk of becoming homeless?: No     Does your family worry about paying for your home or becoming homeless? (Household - for ages 0-17 years): Not on file     Are you homeless or worried that you might be in the future? (Adult - for ages 18 years and over): Not on file     Are you (or your family) homeless or worried that you might be in the future? (Household - for ages 0-17 years): Not on file       Subjective         Objective    Physical Exam:   Assessment Type: Assess only  General Appearance: Awake  Respiratory Pattern: Normal  Chest Assessment: Chest expansion symmetrical  Bilateral Breath Sounds: Diminished    Vitals:  Blood pressure 163/85, pulse (!) 113, temperature (!) 96.9 °F (36.1 °C), resp. rate 22, height 5' 8\" (1.727 m), weight 70.8 kg (156 lb 2.8 oz), SpO2 90%, not currently breastfeeding.          Imaging and other studies: Results Review Statement: I reviewed radiology reports from this admission including: chest xray.          Plan    Respiratory Plan: Mild Distress pathway        Resp Comments: Pt admitted with sob, DX with pneumonia. Pt uses dunoeb QID at home. Will cont. xopenex//atrovent TID as ordered by physician. Pt seen today on 5l, 2l is her baseline. Pt c/o sob, BS decreased.   "

## 2024-10-29 ENCOUNTER — APPOINTMENT (INPATIENT)
Dept: NON INVASIVE DIAGNOSTICS | Facility: HOSPITAL | Age: 74
DRG: 871 | End: 2024-10-29
Payer: COMMERCIAL

## 2024-10-29 ENCOUNTER — APPOINTMENT (INPATIENT)
Dept: RADIOLOGY | Facility: HOSPITAL | Age: 74
DRG: 871 | End: 2024-10-29
Payer: COMMERCIAL

## 2024-10-29 LAB
ANION GAP SERPL CALCULATED.3IONS-SCNC: 8 MMOL/L (ref 4–13)
ANISOCYTOSIS BLD QL SMEAR: PRESENT
ATRIAL RATE: 107 BPM
ATRIAL RATE: 110 BPM
ATRIAL RATE: 80 BPM
ATRIAL RATE: 97 BPM
BASOPHILS # BLD MANUAL: 0 THOUSAND/UL (ref 0–0.1)
BASOPHILS NFR MAR MANUAL: 0 % (ref 0–1)
BILIRUB UR QL STRIP: NEGATIVE
BUN SERPL-MCNC: 33 MG/DL (ref 5–25)
CALCIUM SERPL-MCNC: 8.2 MG/DL (ref 8.4–10.2)
CHLORIDE SERPL-SCNC: 111 MMOL/L (ref 96–108)
CLARITY UR: CLEAR
CO2 SERPL-SCNC: 21 MMOL/L (ref 21–32)
COLOR UR: YELLOW
CREAT SERPL-MCNC: 0.87 MG/DL (ref 0.6–1.3)
EOSINOPHIL # BLD MANUAL: 0 THOUSAND/UL (ref 0–0.4)
EOSINOPHIL NFR BLD MANUAL: 0 % (ref 0–6)
ERYTHROCYTE [DISTWIDTH] IN BLOOD BY AUTOMATED COUNT: 13.1 % (ref 11.6–15.1)
GFR SERPL CREATININE-BSD FRML MDRD: 66 ML/MIN/1.73SQ M
GLUCOSE SERPL-MCNC: 159 MG/DL (ref 65–140)
GLUCOSE UR STRIP-MCNC: NEGATIVE MG/DL
HCT VFR BLD AUTO: 32.6 % (ref 34.8–46.1)
HGB BLD-MCNC: 10.7 G/DL (ref 11.5–15.4)
HGB UR QL STRIP.AUTO: NEGATIVE
KETONES UR STRIP-MCNC: NEGATIVE MG/DL
L PNEUMO1 AG UR QL IA.RAPID: NEGATIVE
LEUKOCYTE ESTERASE UR QL STRIP: NEGATIVE
LYMPHOCYTES # BLD AUTO: 0.72 THOUSAND/UL (ref 0.6–4.47)
LYMPHOCYTES # BLD AUTO: 9 % (ref 14–44)
MAGNESIUM SERPL-MCNC: 2.3 MG/DL (ref 1.9–2.7)
MCH RBC QN AUTO: 30.7 PG (ref 26.8–34.3)
MCHC RBC AUTO-ENTMCNC: 32.8 G/DL (ref 31.4–37.4)
MCV RBC AUTO: 93 FL (ref 82–98)
METAMYELOCYTE ABSOLUTE CT: 0.08 THOUSAND/UL (ref 0–0.1)
METAMYELOCYTES NFR BLD MANUAL: 1 % (ref 0–1)
MONOCYTES # BLD AUTO: 0.4 THOUSAND/UL (ref 0–1.22)
MONOCYTES NFR BLD: 5 % (ref 4–12)
NEUTROPHILS # BLD MANUAL: 6.81 THOUSAND/UL (ref 1.85–7.62)
NEUTS BAND NFR BLD MANUAL: 3 % (ref 0–8)
NEUTS SEG NFR BLD AUTO: 82 % (ref 43–75)
NITRITE UR QL STRIP: NEGATIVE
P AXIS: 70 DEGREES
P AXIS: 71 DEGREES
P AXIS: 74 DEGREES
PH UR STRIP.AUTO: 6 [PH]
PHOSPHATE SERPL-MCNC: 3.6 MG/DL (ref 2.3–4.1)
PLATELET # BLD AUTO: 266 THOUSANDS/UL (ref 149–390)
PLATELET BLD QL SMEAR: ADEQUATE
PMV BLD AUTO: 10.1 FL (ref 8.9–12.7)
POTASSIUM SERPL-SCNC: 3.7 MMOL/L (ref 3.5–5.3)
PR INTERVAL: 126 MS
PR INTERVAL: 132 MS
PR INTERVAL: 146 MS
PROCALCITONIN SERPL-MCNC: 0.51 NG/ML
PROT UR STRIP-MCNC: NEGATIVE MG/DL
QRS AXIS: 57 DEGREES
QRS AXIS: 62 DEGREES
QRS AXIS: 63 DEGREES
QRS AXIS: 66 DEGREES
QRSD INTERVAL: 76 MS
QRSD INTERVAL: 78 MS
QRSD INTERVAL: 84 MS
QRSD INTERVAL: 86 MS
QT INTERVAL: 306 MS
QT INTERVAL: 316 MS
QT INTERVAL: 322 MS
QT INTERVAL: 408 MS
QTC INTERVAL: 401 MS
QTC INTERVAL: 435 MS
QTC INTERVAL: 470 MS
QTC INTERVAL: 480 MS
RBC # BLD AUTO: 3.49 MILLION/UL (ref 3.81–5.12)
RBC MORPH BLD: PRESENT
S PNEUM AG UR QL: NEGATIVE
SODIUM SERPL-SCNC: 140 MMOL/L (ref 135–147)
SP GR UR STRIP.AUTO: 1.01
T WAVE AXIS: 10 DEGREES
T WAVE AXIS: 250 DEGREES
T WAVE AXIS: 32 DEGREES
T WAVE AXIS: 59 DEGREES
UROBILINOGEN UR QL STRIP.AUTO: 0.2 E.U./DL
VENTRICULAR RATE: 110 BPM
VENTRICULAR RATE: 148 BPM
VENTRICULAR RATE: 80 BPM
VENTRICULAR RATE: 97 BPM
WBC # BLD AUTO: 8.01 THOUSAND/UL (ref 4.31–10.16)

## 2024-10-29 PROCEDURE — 83735 ASSAY OF MAGNESIUM: CPT | Performed by: INTERNAL MEDICINE

## 2024-10-29 PROCEDURE — 99222 1ST HOSP IP/OBS MODERATE 55: CPT | Performed by: INTERNAL MEDICINE

## 2024-10-29 PROCEDURE — 94664 DEMO&/EVAL PT USE INHALER: CPT

## 2024-10-29 PROCEDURE — 93010 ELECTROCARDIOGRAM REPORT: CPT | Performed by: INTERNAL MEDICINE

## 2024-10-29 PROCEDURE — 71045 X-RAY EXAM CHEST 1 VIEW: CPT

## 2024-10-29 PROCEDURE — 97167 OT EVAL HIGH COMPLEX 60 MIN: CPT

## 2024-10-29 PROCEDURE — 87449 NOS EACH ORGANISM AG IA: CPT | Performed by: INTERNAL MEDICINE

## 2024-10-29 PROCEDURE — 87205 SMEAR GRAM STAIN: CPT | Performed by: INTERNAL MEDICINE

## 2024-10-29 PROCEDURE — 94640 AIRWAY INHALATION TREATMENT: CPT

## 2024-10-29 PROCEDURE — 84100 ASSAY OF PHOSPHORUS: CPT | Performed by: INTERNAL MEDICINE

## 2024-10-29 PROCEDURE — 84145 PROCALCITONIN (PCT): CPT | Performed by: INTERNAL MEDICINE

## 2024-10-29 PROCEDURE — 99223 1ST HOSP IP/OBS HIGH 75: CPT | Performed by: INTERNAL MEDICINE

## 2024-10-29 PROCEDURE — 85007 BL SMEAR W/DIFF WBC COUNT: CPT | Performed by: INTERNAL MEDICINE

## 2024-10-29 PROCEDURE — 97163 PT EVAL HIGH COMPLEX 45 MIN: CPT

## 2024-10-29 PROCEDURE — 99232 SBSQ HOSP IP/OBS MODERATE 35: CPT | Performed by: INTERNAL MEDICINE

## 2024-10-29 PROCEDURE — 85027 COMPLETE CBC AUTOMATED: CPT | Performed by: INTERNAL MEDICINE

## 2024-10-29 PROCEDURE — 80048 BASIC METABOLIC PNL TOTAL CA: CPT | Performed by: INTERNAL MEDICINE

## 2024-10-29 PROCEDURE — 94668 MNPJ CHEST WALL SBSQ: CPT

## 2024-10-29 PROCEDURE — 93005 ELECTROCARDIOGRAM TRACING: CPT

## 2024-10-29 PROCEDURE — 87081 CULTURE SCREEN ONLY: CPT | Performed by: INTERNAL MEDICINE

## 2024-10-29 PROCEDURE — 87070 CULTURE OTHR SPECIMN AEROBIC: CPT | Performed by: INTERNAL MEDICINE

## 2024-10-29 PROCEDURE — 94760 N-INVAS EAR/PLS OXIMETRY 1: CPT

## 2024-10-29 PROCEDURE — 81003 URINALYSIS AUTO W/O SCOPE: CPT | Performed by: INTERNAL MEDICINE

## 2024-10-29 RX ORDER — FUROSEMIDE 10 MG/ML
20 INJECTION INTRAMUSCULAR; INTRAVENOUS ONCE
Status: COMPLETED | OUTPATIENT
Start: 2024-10-29 | End: 2024-10-29

## 2024-10-29 RX ORDER — DILTIAZEM HCL 60 MG
60 TABLET ORAL EVERY 6 HOURS SCHEDULED
Status: DISCONTINUED | OUTPATIENT
Start: 2024-10-29 | End: 2024-10-30

## 2024-10-29 RX ORDER — DILTIAZEM HCL 60 MG
60 TABLET ORAL ONCE
Status: COMPLETED | OUTPATIENT
Start: 2024-10-29 | End: 2024-10-29

## 2024-10-29 RX ORDER — DILTIAZEM HYDROCHLORIDE 30 MG/1
30 TABLET, FILM COATED ORAL EVERY 6 HOURS SCHEDULED
Status: DISCONTINUED | OUTPATIENT
Start: 2024-10-29 | End: 2024-10-29

## 2024-10-29 RX ORDER — MICONAZOLE NITRATE 20 MG/G
CREAM TOPICAL 2 TIMES DAILY
Status: DISCONTINUED | OUTPATIENT
Start: 2024-10-29 | End: 2024-11-06 | Stop reason: HOSPADM

## 2024-10-29 RX ORDER — DOXYCYCLINE 100 MG/1
100 CAPSULE ORAL EVERY 12 HOURS SCHEDULED
Status: DISCONTINUED | OUTPATIENT
Start: 2024-10-29 | End: 2024-11-05

## 2024-10-29 RX ADMIN — METOPROLOL TARTRATE 5 MG: 5 INJECTION INTRAVENOUS at 09:10

## 2024-10-29 RX ADMIN — GUAIFENESIN 600 MG: 600 TABLET ORAL at 08:12

## 2024-10-29 RX ADMIN — IPRATROPIUM BROMIDE 0.5 MG: 0.5 SOLUTION RESPIRATORY (INHALATION) at 20:16

## 2024-10-29 RX ADMIN — CEFEPIME HYDROCHLORIDE 2000 MG: 2 INJECTION, SOLUTION INTRAVENOUS at 16:00

## 2024-10-29 RX ADMIN — LEVALBUTEROL HYDROCHLORIDE 1.25 MG: 1.25 SOLUTION RESPIRATORY (INHALATION) at 07:24

## 2024-10-29 RX ADMIN — DILTIAZEM HYDROCHLORIDE 30 MG: 30 TABLET, FILM COATED ORAL at 08:12

## 2024-10-29 RX ADMIN — ASPIRIN 81 MG 81 MG: 81 TABLET ORAL at 08:12

## 2024-10-29 RX ADMIN — HEPARIN SODIUM 5000 UNITS: 5000 INJECTION INTRAVENOUS; SUBCUTANEOUS at 05:00

## 2024-10-29 RX ADMIN — APIXABAN 5 MG: 5 TABLET, FILM COATED ORAL at 09:41

## 2024-10-29 RX ADMIN — DOXYCYCLINE HYCLATE 100 MG: 100 CAPSULE ORAL at 12:05

## 2024-10-29 RX ADMIN — DILTIAZEM HYDROCHLORIDE 60 MG: 60 TABLET, FILM COATED ORAL at 09:41

## 2024-10-29 RX ADMIN — Medication 5000 UNITS: at 16:05

## 2024-10-29 RX ADMIN — BUDESONIDE AND FORMOTEROL FUMARATE DIHYDRATE 2 PUFF: 160; 4.5 AEROSOL RESPIRATORY (INHALATION) at 17:11

## 2024-10-29 RX ADMIN — ACETAMINOPHEN 650 MG: 325 TABLET ORAL at 13:27

## 2024-10-29 RX ADMIN — ACETAMINOPHEN 650 MG: 325 TABLET ORAL at 18:03

## 2024-10-29 RX ADMIN — CEFEPIME HYDROCHLORIDE 2000 MG: 2 INJECTION, SOLUTION INTRAVENOUS at 03:35

## 2024-10-29 RX ADMIN — DILTIAZEM HYDROCHLORIDE 60 MG: 60 TABLET, FILM COATED ORAL at 23:18

## 2024-10-29 RX ADMIN — MICONAZOLE NITRATE: 20 CREAM TOPICAL at 17:11

## 2024-10-29 RX ADMIN — VANCOMYCIN HYDROCHLORIDE 1500 MG: 1 INJECTION, POWDER, LYOPHILIZED, FOR SOLUTION INTRAVENOUS at 09:46

## 2024-10-29 RX ADMIN — GUAIFENESIN 600 MG: 600 TABLET ORAL at 21:10

## 2024-10-29 RX ADMIN — DILTIAZEM HYDROCHLORIDE 60 MG: 60 TABLET, FILM COATED ORAL at 17:11

## 2024-10-29 RX ADMIN — APIXABAN 5 MG: 5 TABLET, FILM COATED ORAL at 17:11

## 2024-10-29 RX ADMIN — DILTIAZEM HYDROCHLORIDE 60 MG: 60 TABLET, FILM COATED ORAL at 12:05

## 2024-10-29 RX ADMIN — ATORVASTATIN CALCIUM 40 MG: 40 TABLET, FILM COATED ORAL at 08:12

## 2024-10-29 RX ADMIN — BUDESONIDE AND FORMOTEROL FUMARATE DIHYDRATE 2 PUFF: 160; 4.5 AEROSOL RESPIRATORY (INHALATION) at 08:13

## 2024-10-29 RX ADMIN — FUROSEMIDE 20 MG: 10 INJECTION, SOLUTION INTRAMUSCULAR; INTRAVENOUS at 16:06

## 2024-10-29 RX ADMIN — DOXYCYCLINE HYCLATE 100 MG: 100 CAPSULE ORAL at 21:09

## 2024-10-29 RX ADMIN — IPRATROPIUM BROMIDE 0.5 MG: 0.5 SOLUTION RESPIRATORY (INHALATION) at 07:24

## 2024-10-29 RX ADMIN — LEVALBUTEROL HYDROCHLORIDE 1.25 MG: 1.25 SOLUTION RESPIRATORY (INHALATION) at 20:16

## 2024-10-29 RX ADMIN — SODIUM CHLORIDE 75 ML/HR: 0.9 INJECTION, SOLUTION INTRAVENOUS at 04:58

## 2024-10-29 RX ADMIN — LEVALBUTEROL HYDROCHLORIDE 1.25 MG: 1.25 SOLUTION RESPIRATORY (INHALATION) at 13:07

## 2024-10-29 RX ADMIN — IPRATROPIUM BROMIDE 0.5 MG: 0.5 SOLUTION RESPIRATORY (INHALATION) at 13:07

## 2024-10-29 NOTE — ASSESSMENT & PLAN NOTE
Known atrial fibrillation  RVR on presentation due to volume depletion and sepsis  Received bolus of diltiazem with improvement but later required infusion.  Infusion titrated off this morning but heart rate became elevated with getting out of bed s/p IV Lopressor  Will increase diltiazem to 60 mg every 6 hours  In the acute setting, patient's CHADS2 Vascor is elevated.  She is agreeable to Eliquis which will be initiated

## 2024-10-29 NOTE — CASE MANAGEMENT
Case Management Assessment & Discharge Planning Note    Patient name Corinne A Longo  Location ICU /ICU  MRN [id number]  : 1950 Date 10/29/2024       Current Admission Date: 10/28/2024  Current Admission Diagnosis:Sepsis due to pneumonia (HCC)   Patient Active Problem List    Diagnosis Date Noted Date Diagnosed    Sepsis due to pneumonia (HCC) 10/28/2024     Gastroesophageal reflux disease without esophagitis 2023     Venous insufficiency 2023     Tinea cruris 2023     Lipoma of upper extremity 2023     Cervical strain 2023     Stage 3 chronic kidney disease, unspecified whether stage 3a or 3b CKD (HCC) 2022     Myxoid cyst 2021     Chronic respiratory failure with hypoxia ([facility]) 2021     COPD, severe ([facility]) 2020     Family history of autoimmune disorder 2020     Incidental pulmonary nodule, > 3mm and < 8mm 04/10/2019     Need for vaccination with 13-polyvalent pneumococcal conjugate vaccine 2018     Constipation 2018     Paroxysmal atrial fibrillation ([facility]) 2018     Vitamin D deficiency 10/04/2017     Allergic rhinitis 2017     Depression, recurrent ([facility]) 2016     Cigarette nicotine dependence in remission 11/10/2015     GABI (obstructive sleep apnea) 2015     Emphysema/COPD ([facility]) 2015     Hyperlipidemia 2015     Anxiety 06/10/2015       LOS (days): 1  Geometric Mean LOS (GMLOS) (days): 2.3  Days to GMLOS:1.5     OBJECTIVE:    Risk of Unplanned Readmission Score: 19.92     Current admission status: Inpatient    Preferred Pharmacy:   [facility] - [place] - [address]  [address]  [place] [address]  Phone: [phone] Fax: [phone]    [id number] - [place] - [address]  [address]  [place] [address]  Phone: [phone] Fax: [phone]    Primary Care Provider: Rayna Hernandez  MARILUZ    Primary Insurance: [employer]  Secondary Insurance:     ASSESSMENT:  Active Health Care Proxies       Aaron Mcpherson Health Care Representative - Significant Other   Primary Phone: [phone] (Home)                 Advance Directives  Does patient have a Health Care POA?: No  Was patient offered paperwork?: Yes (Provided paperwork)  Does patient currently have a Health Care decision maker?: Yes, please see Health Care Proxy section  Does patient have Advance Directives?: No  Was patient offered paperwork?: Yes (Paperwork provided)  Primary Contact: Aaron Mcpherson    Readmission Root Cause  30 Day Readmission: No    Patient Information  Admitted from:: Home  Mental Status: Alert  During Assessment patient was accompanied by: Not accompanied during assessment  Assessment information provided by:: Patient  Primary Caregiver: Self  Support Systems: Spouse/significant other  County of Residence: [facility] do you live in?: [place]  Home entry access options. Select all that apply.: Stairs  Number of steps to enter home.: 1  Do the steps have railings?: Yes  Type of Current Residence: [facility]  Living Arrangements: Lives w/ Spouse/significant other  Is patient a ?: No    Activities of Daily Living Prior to Admission  Functional Status: Independent  Completes ADLs independently?: Yes  Ambulates independently?: Yes  Does patient use assisted devices?: No  Does patient currently own DME?: Yes  What DME does the patient currently own?: Home Oxygen concentrator, Portable Oxygen tanks, Other (Comment) ([employer], [employer])  Does patient have a history of Outpatient Therapy (PT/OT)?: Yes ( Pulmonary Rehab)  Does the patient have a history of Short-Term Rehab?: No  Does patient have a history of HHC?: No  Does patient currently have HHC?: No    Patient Information Continued  Income Source: Pension/skilled nursing  Does patient have prescription coverage?: Yes ([employer])  Does patient  receive dialysis treatments?: No  Does patient have a history of substance abuse?: No  Does patient have a history of Mental Health Diagnosis?: No    Means of Transportation  Means of Transport to Appts:: Drives Self    DISCHARGE DETAILS:    Discharge planning discussed with:: Pt    Contacts  Patient Contacts: Aaron YEPEZ  Relationship to Patient:: Other (Comment) (SO)  Contact Method: Phone  Phone Number: [phone]  Reason/Outcome: Emergency Contact    Requested Home Health Care         Is the patient interested in HHC at discharge?: No    DME Referral Provided  Referral made for DME?: No    Would you like to participate in our [facility] service program?  : No - Declined    Treatment Team Recommendation: Home  Discharge Destination Plan:: Home  Transport at Discharge : Family  Pt oxygen is 2L at baseline, brought home portable tank from [facility].Do not anticipate any care needs upon DC.  SO will transport home.

## 2024-10-29 NOTE — WOUND OSTOMY CARE
Consult Note - Wound   Corinne A Longo 74 y.o. female MRN: [id number]  Unit/Bed#: ICU 09-01 Encounter: [id number]      Assessment :   Patient admitted to [facility] due to sepsis due to pneumonia. History of a-fib., asthma, COPD, GERD, HLD, HTN, CKD. Wound care consulted for sacral wound. Wound care consult completed remotely. Wound was pictured on admission on 10/28, primary nurse today states skin appears same as pictured. Spoke with Primary nurse about appearance of skin and treatment plan. Spoke with attending physician about plan of treatment as well. Primary nurse states patient is continent of bowel and bladder.    1. Bilateral sacrum and buttock- Per report from primary nurse the skin is dry and intact, blanchable pink and blanchable red skin, with a red irregular bordered rash. Suspect moisture component and fungal component.- Will recommend KINGSTON antifungal barrier cream for treatment of rash.    2. No other wounds or skin changes noted by primary nurse.       Wound care will sign off at this time, please re-consult if needed. Please follow skin care recommendations written as orders for skin protection and prevention.    Skin care plans:  1-Apply KINGSTON to sacrum, buttocks BID and PRN  2-Hydraguard to bilateral heel BID and PRN  3-Elevate heels to offload pressure  4-Ehob cushion when out of bed.  5-Turn/repoisiton q2h for pressure re-distribution on skin.  6-Moisturize skin daily with skin nourishing cream       Wound 10/28/24 Buttocks (Active)   Wound Image   10/28/24 1900       Wound Care will sign off  Contact through [employer] Secure Chat for any questions/concerns    Magdalene CASTRON RN CWON  Wound and Ostomy care

## 2024-10-29 NOTE — UTILIZATION REVIEW
Initial Clinical Review    Admission: Date/Time/Statement:   Admission Orders (From admission, onward)       Ordered        10/28/24 1519  INPATIENT ADMISSION  Once                          Orders Placed This Encounter   Procedures    INPATIENT ADMISSION     Standing Status:   Standing     Number of Occurrences:   1     Order Specific Question:   Level of Care     Answer:   Med Surg [16]     Order Specific Question:   Estimated length of stay     Answer:   More than 2 Midnights     Order Specific Question:   Certification     Answer:   I certify that inpatient services are medically necessary for this patient for a duration of greater than two midnights. See H&P and MD Progress Notes for additional information about the patient's course of treatment.     ED Arrival Information       Expected   -    Arrival   10/28/2024 11:39    Acuity   Emergent              Means of arrival   Wheelchair    Escorted by   Family Member    Service   Hospitalist    Admission type   Emergency              Arrival complaint   chest pain, sob, afib             Chief Complaint   Patient presents with    Shortness of Breath    Chest Pain     Patient presents with shortness of breath and chest pain for about two days.   Patient states she has not felt well since she had the COVID vaccine last week.   Patient family states that she believed she had some slurred speech around 0900 today.        Initial Presentation: 74 y.o. female with PMHx includes COPD, paroxysmal atrial fibrillation, chronic respiratory failure with hypoxia, who presented on 10/28/24 to [facility] ED due to shortness of breath.  On exam, tachycardic at 115, tachypneic at 22, O2 sat 81% on 6L O2 via NC.  Labs revealed BUN 34, AST 46, alb 3.4, procal 0.26, lactic acid 2.3, .  CXR revealed multilobar pneumonia.  CTA chest PE study negative for PE, revealed extensive multilobar pneumonia, right parapneumonic effusion, see full report below. Given 1.5 L IVF bolus,  IV steroid, IV rocephin and IV Zithromax in ED.    Plan:  Admit Inpatient status to ICU dt Sepsis due to pneumonia :   continue IV ABX, IVF. FU on blood and urine cxs, trend fever and WBC, Pulmonology consult, monitor resp status, continue neb txs. Pt noted to be in PAFib and tachycardic dt volume depletion and sepsis, given IV dilitiazem, continue home BB, ASA and IVF.    Anticipated Length of Stay/Certification Statement: Patient will be admitted on an inpatient basis with an anticipated length of stay of greater than 2 midnights secondary to sepsis 2/2 PNA.     Date: 10/29   Day 2: Patient's heart rate significantly improved on diltiazem infusion. Continue above tx plan including monitor VS, labs, fu on cxs, continue IV ABX, IVF.     10/29 Per Pulmonology: Continue IV cefepime, vanco and add doxycycline. Ordered MRSA swab, urine antigens and sputum cx. Continue Atrovent/Xopenex 3 times daily. Wean O2 as able. Monitor clinically for the next few days, if not improving will consider bronchoscopy for deeper sample     10/29 Per Cardiology: Increase diltiazem to 60 mg every 6 hours.  Patient was not previously on anticoagulation given low [id number] score.  However, in the acute setting, she is noted to have elevated blood sugar and elevated blood pressures.  This is in addition to her age and gender increases her stroke risk.  This was discussed and she is agreeable to Eliquis 5 mg twice daily.  We will reassess once recovered from her acute issues. AV pam blockade is being increased. Will also add eliquis for stroke prevention.    Date: 10/30  Day 3: Has surpassed a 2nd midnight with active treatments and services. Patient's heart rate significantly improved on diltiazem infusion. Cardiology and Pulmonology following. Continue to monitor resp status, VS, labs, fu on echo, supplemental O2 prn.     ED Treatment-Medication Administration from 10/28/2024 4219 to 10/28/2024 1546         Date/Time Order Dose Route  Action     10/28/2024 1245 sodium chloride 0.9 % bolus 500 mL 500 mL Intravenous New Bag     10/28/2024 1244 methylPREDNISolone sodium succinate (Solu-MEDROL) injection 100 mg 100 mg Intravenous Given     10/28/2024 1245 cefTRIAXone (ROCEPHIN) IVPB (premix in dextrose) 1,000 mg 50 mL 1,000 mg Intravenous New Bag     10/28/2024 1318 azithromycin (ZITHROMAX) 500 mg in sodium chloride 0.9 % 250 mL IVPB 500 mg Intravenous New Bag     10/28/2024 1408 multi-electrolyte (ISOLYTE-S PH 7.4) bolus 1,000 mL 1,000 mL Intravenous New Bag     10/28/2024 1400 iohexol (OMNIPAQUE) 350 MG/ML injection (MULTI-DOSE) 85 mL 85 mL Intravenous Given     10/28/2024 1509 diltiazem (CARDIZEM) injection 10 mg 10 mg Intravenous Given            Scheduled Medications:  apixaban, 5 mg, Oral, BID  atorvastatin, 40 mg, Oral, Daily  budesonide-formoterol, 2 puff, Inhalation, BID  cefepime, 2,000 mg, Intravenous, Q12H  Cholecalciferol, 5,000 Units, Oral, Daily  diltiazem, 90 mg, Oral, Q6H YING  doxycycline hyclate, 100 mg, Oral, Q12H YING  guaiFENesin, 600 mg, Oral, Q12H YING  ipratropium, 0.5 mg, Nebulization, TID  levalbuterol, 1.25 mg, Nebulization, TID  [place] ANTIFUNGAL, , Topical, BID  nicotine, 1 patch, Transdermal, Daily  vancomycin, 1,000 mg, Intravenous, Q24H      Continuous IV Infusions:  sodium chloride 0.45 % with KCl 20 mEq/L, 100 mL/hr, Intravenous, Continuous        PRN Meds:  acetaminophen, 650 mg, Oral, Q6H PRN  metoprolol, 5 mg, Intravenous, Q6H PRN  ondansetron, 4 mg, Intravenous, Q6H PRN      ED Triage Vitals [10/28/24 1148]   Temperature Pulse Respirations Blood Pressure SpO2 Pain Score   98.9 °F (37.2 °C) (!) 115 22 137/64 (!) 87 % No Pain     Weight (last 2 days)       Date/Time Weight    10/30/24 0740 72.1 (159)    10/29/24 0600 72.4 (159.61)    10/28/24 1553 70.8 (156.17)            Vital Signs (last 3 days)       Date/Time Temp Pulse Resp BP MAP (mmHg) SpO2 Calculated FIO2 (%) - Nasal Cannula O2 Flow Rate (L/min) Nasal Cannula  O2 Flow Rate (L/min) O2 Device O2 Interface Device Patient Position - Orthostatic VS [place] Coma Scale Score Pain    10/30/24 0938 -- -- -- -- -- 90 % -- -- -- -- -- -- -- --    10/30/24 0915 -- -- -- -- -- 87 % 44 6 L/min 6 L/min Mid flow nasal cannula -- -- -- --    10/30/24 0914 -- -- -- -- -- 88 % -- -- -- -- -- -- -- --    10/30/24 0740 -- 99 29 129/60 -- 90 % -- -- -- -- -- -- -- --    10/30/24 0730 -- 95 31 -- -- 90 % -- -- -- -- -- -- -- --    10/30/24 0720 -- 96 27 -- -- 90 % -- -- -- -- -- -- -- --    10/30/24 0710 -- 89 13 -- -- 92 % -- -- -- -- -- -- -- --    10/30/24 0700 -- 90 22 -- -- 93 % -- -- -- -- -- -- -- --    10/30/24 0650 -- 92 23 -- -- 92 % -- -- -- -- -- -- -- --    10/30/24 0600 -- 99 13 122/59 85 90 % 40 -- 5 L/min  Mid flow nasal cannula -- -- -- --    10/30/24 0518 97.5 °F (36.4 °C) 79 22 109/57 78 94 % 40 -- 5 L/min Mid flow nasal cannula -- -- -- --    10/30/24 0400 97 °F (36.1 °C) 73 17 97/53 70 94 % 40 5 L/min 5 L/min Mid flow nasal cannula -- -- 15 No Pain    10/30/24 0300 -- 82 17 -- -- 92 % -- -- -- -- -- -- -- --    10/30/24 0200 -- 89 14 98/52 71 92 % -- -- -- -- -- -- -- --    10/30/24 0100 -- 86 20 -- -- 91 % -- -- -- -- -- -- -- --    10/30/24 0000 97 °F (36.1 °C) 86 18 103/54 75 91 % 40 -- 5 L/min Mid flow nasal cannula -- Lying 15 No Pain    10/29/24 2318 -- 86 22 110/53 77 91 % 40 -- 5 L/min Mid flow nasal cannula -- -- -- --    10/29/24 2300 -- 87 17 -- -- 89 % -- -- -- -- -- -- -- --    10/29/24 2200 -- 102 18 116/56 80 92 % -- -- -- -- -- -- -- --    10/29/24 2100 -- 102 19 -- -- 90 % -- -- -- -- -- -- -- --    10/29/24 2016 -- -- -- -- -- -- 40 5 L/min 5 L/min  Mid flow nasal cannula MFNC prongs -- -- --    10/29/24 2006 -- 85 16 111/52 75 94 % 48 -- 7 L/min Mid flow nasal cannula -- Lying -- --    10/29/24 2000 -- 91 22 -- -- 93 % 48 -- 7 L/min Mid flow nasal cannula -- -- 15 No Pain    10/29/24 1955 -- 97 22 -- -- 93 % 48 -- 7 L/min Mid flow nasal cannula -- -- --  No Pain    10/29/24 1940 97.3 °F (36.3 °C) 108 19 -- -- 90 % 52 -- 8 L/min Mid flow nasal cannula -- -- -- --    10/29/24 1803 -- 98 21 117/56 80 95 % 52 -- 8 L/min Mid flow nasal cannula -- Lying -- 7    10/29/24 1711 -- 96 24 121/56 80 90 % 52 -- 8 L/min Mid flow nasal cannula -- Lying -- --    10/29/24 1605 97.6 °F (36.4 °C) 95 24 115/58 79 92 % 52 -- 8 L/min Mid flow nasal cannula -- Sitting 15 --    10/29/24 1400 -- 106 24 112/53 77 88 % 52 -- 8 L/min Mid flow nasal cannula -- Sitting -- --    10/29/24 1327 -- 104 20 -- -- 88 % 52 -- 8 L/min Mid flow nasal cannula -- -- -- 6    10/29/24 1307 -- -- -- -- -- 89 % 52 -- 8 L/min Mid flow nasal cannula -- -- -- --    10/29/24 1300 -- 105 23 -- -- 89 % 52 -- 8 L/min Mid flow nasal cannula -- -- -- --    10/29/24 1230 -- 108 23 -- -- 85 % 360 -- 85 L/min Mid flow nasal cannula -- -- -- --    10/29/24 1205 -- 109 23 123/58 83 86 % 44 -- 6 L/min Nasal cannula -- Sitting -- --    10/29/24 1200 -- 112 24 106/56 81 86 % -- -- -- -- -- -- 15 --    10/29/24 1108 97.3 °F (36.3 °C) 106 18 127/53 76 88 % 40 -- 5 L/min Nasal cannula -- Sitting -- --    10/29/24 1000 -- 119 23 120/56 81 87 % 40 -- 5 L/min Nasal cannula -- Sitting -- --    10/29/24 0941 -- -- -- 117/55 -- -- -- -- -- -- -- -- -- --    10/29/24 0909 -- 141 24 153/72 -- 88 % -- -- -- -- -- -- -- --    10/29/24 0855 -- -- -- -- -- -- -- -- -- -- -- -- -- 8    10/29/24 0853 -- -- -- -- -- -- -- -- -- -- -- -- -- 8    10/29/24 0812 -- -- -- 113/55 -- -- -- -- -- -- -- -- -- --    10/29/24 0800 -- 89 22 113/55 79 88 % 40 -- 5 L/min Nasal cannula -- Lying 15 --    10/29/24 0724 -- -- -- -- -- 93 % 40 -- 5 L/min Nasal cannula -- -- -- --    10/29/24 0718 97.8 °F (36.6 °C) -- -- -- -- -- -- -- -- -- -- -- -- --    10/29/24 0706 -- 79 -- 100/52 -- -- -- -- -- -- -- -- -- --    10/29/24 0600 -- 78 22 113/56 81 91 % -- -- -- Nasal cannula -- -- -- --    10/29/24 0556 -- 85 27 -- -- 91 % -- -- -- -- -- -- -- --    10/29/24  0530 -- 92 13 111/59 82 91 % -- -- -- -- -- -- -- --    10/29/24 0500 97.7 °F (36.5 °C) 93 23 117/58 80 93 % 40 -- 5 L/min Nasal cannula -- -- -- --    10/29/24 0430 -- 86 16 101/55 74 88 % -- -- -- -- -- -- -- --    10/29/24 0400 96.9 °F (36.1 °C) 90 10 100/58 76 91 % -- -- -- -- -- -- 15 No Pain    10/29/24 0330 -- 84 22 96/55 71 89 % -- -- -- -- -- -- -- --    10/29/24 0300 -- 82 18 90/51 64 89 % -- -- -- -- -- -- -- --    10/29/24 0230 -- 85 15 92/50 66 88 % -- -- -- -- -- -- -- --    10/29/24 0200 -- 90 20 98/65 76 87 % 40 -- 5 L/min Nasal cannula -- -- -- --    10/29/24 0115 -- 85 22 94/53 70 90 % -- -- -- -- -- -- -- --    10/29/24 0100 -- 82 20 93/50 66 90 % -- -- -- -- -- -- -- --    10/29/24 0045 -- 85 22 100/53 72 89 % 40 -- 5 L/min Nasal cannula -- -- -- --    10/29/24 0030 -- 85 22 99/53 72 88 % -- -- -- -- -- -- -- --    10/29/24 0015 -- 80 23 91/54 68 88 % -- -- -- -- -- -- -- --    10/29/24 0000 97 °F (36.1 °C) 80 22 85/49 61 88 % 40 -- 5 L/min Nasal cannula -- Lying 15 No Pain    10/28/24 2345 -- 74 21 97/51 67 90 % 40 -- 5 L/min Nasal cannula -- -- -- --    10/28/24 2301 -- 76 -- 83/44 -- -- -- -- -- -- -- -- -- --    10/28/24 2300 -- 77 22 83/44 58 90 % 40 -- 5 L/min Nasal cannula -- -- -- --    10/28/24 2247 -- 79 -- 85/46 -- -- -- -- -- -- -- -- -- --    10/28/24 2245 -- 76 20 85/46 60 91 % 40 -- 5 L/min Nasal cannula -- -- -- --    10/28/24 2230 -- 79 21 87/50 64 92 % 44 -- 6 L/min Nasal cannula -- Lying -- --    10/28/24 2215 -- 88 -- -- -- -- -- -- -- -- -- -- -- --    10/28/24 2200 -- 71 21 100/55 73 92 % 44 -- 6 L/min Nasal cannula -- -- -- --    10/28/24 2130 -- 108 22 107/55 73 88 % -- -- -- -- -- -- -- --    10/28/24 2118 -- 109 -- 109/53 -- -- -- -- -- -- -- -- -- --    10/28/24 2115 -- 108 18 109/53 77 89 % -- -- -- -- -- -- -- --    10/28/24 2100 -- 104 20 102/53 74 90 % -- -- -- -- -- -- -- --    10/28/24 2045 -- 102 20 99/52 71 91 % 44 -- 6 L/min Nasal cannula -- -- -- --     10/28/24 2032 -- -- -- -- -- 91 % 44 -- 6 L/min Nasal cannula -- -- -- --    10/28/24 2030 -- 95 20 96/53 68 91 % -- -- -- -- -- -- -- --    10/28/24 2015 -- 103 20 98/55 69 92 % -- -- -- -- -- -- -- --    10/28/24 2000 97.5 °F (36.4 °C) 100 22 94/54 70 92 % 52 -- 8 L/min Nasal cannula -- Lying 15 No Pain    10/28/24 1945 -- 119 22 101/52 73 92 % -- -- -- -- -- -- -- --    10/28/24 1930 -- 111 22 98/52 70 90 % -- -- -- -- -- -- -- --    10/28/24 1915 -- 115 25 93/50 66 92 % -- -- -- -- -- Lying -- --    10/28/24 1900 -- 158 33 117/56 80 90 % 52 -- 8 L/min  Nasal cannula -- Lying -- No Pain    10/28/24 1850 -- 156 37 119/57 78 92 % -- -- -- -- -- -- -- --    10/28/24 1845 -- 163 26 119/57 78 89 % -- -- -- -- -- -- -- --    10/28/24 1840 -- 152 30 114/56 -- 95 % -- -- -- -- -- -- -- --    10/28/24 1831 97.3 °F (36.3 °C) 150 28 114/56 75 90 % 52 -- 8 L/min Nasal cannula -- Lying -- No Pain    10/28/24 1830 -- 139 31 114/56 -- 89 % -- -- -- -- -- -- -- --    10/28/24 1820 -- 157 30 121/63 83 90 % -- -- -- -- -- -- -- --    10/28/24 1815 -- 165 -- -- -- -- -- -- -- -- -- -- 15 --    10/28/24 1810 -- 148 25 99/57 71 89 % -- -- -- -- -- -- -- --    10/28/24 1803 97.1 °F (36.2 °C) -- -- -- -- -- -- -- -- -- -- -- -- --    10/28/24 17:55:41 -- 105 20 129/76 94 88 % -- -- -- -- -- -- -- --    10/28/24 17:54:25 -- 93 -- -- -- 88 % -- -- -- -- -- -- -- --    10/28/24 17:53:31 -- 116 18 127/62 84 88 % -- -- -- -- -- -- -- --    10/28/24 17:51:43 -- 136 20 161/86 111 89 % -- -- -- -- -- -- -- --    10/28/24 1750 -- 143 -- 131/78 96 89 % -- -- -- -- -- -- -- --    10/28/24 17:49:40 -- 143 20 131/78 96 89 % -- -- -- -- -- -- -- --    10/28/24 17:47:25 -- 115 -- 145/78 100 86 % -- -- -- -- -- -- -- --    10/28/24 1616 -- -- -- -- -- 90 % 40 -- 5 L/min Nasal cannula -- -- -- --    10/28/24 1553 -- -- -- -- -- -- -- -- -- -- -- -- 15 No Pain    10/28/24 15:48:18 96.9 °F (36.1 °C) 113 -- 163/85 111 84 % -- -- -- -- -- -- -- --     10/28/24 1530 -- 101 22 109/53 -- 90 % 40 -- 5 L/min Nasal cannula -- Sitting -- --    10/28/24 1500 -- 104 22 125/64 88 91 % 40 -- 5 L/min Nasal cannula -- Sitting -- --    10/28/24 1330 -- 97 22 109/53 76 90 % 44 -- 6 L/min Nasal cannula -- Sitting -- --    10/28/24 1315 -- 99 22 153/61 88 92 % 44 -- 6 L/min Nasal cannula -- Sitting -- --    10/28/24 1245 -- 107 22 122/58 83 81 % 44 -- 6 L/min Nasal cannula -- Sitting -- --    10/28/24 1154 -- -- -- -- -- -- -- -- -- -- -- -- 15 --    10/28/24 1148 98.9 °F (37.2 °C) 115 22 137/64 -- 87 % 36 -- 4 L/min  Nasal cannula -- -- -- No Pain              Pertinent Labs/Diagnostic Test Results:   Radiology:  XR chest portable ICU   Final Interpretation by Gal Fuentes MD (10/30 0602)   Multifocal pneumonia. Emphysema.            Workstation performed: [id number]         CT head without contrast   Final Interpretation by Kristine Sibley MD (10/28 1500)      No acute intracranial abnormality.  Chronic microangiopathic changes.                  Resident: Eder Cruz I, the attending radiologist, have reviewed the images and agree with the final report above.      Workstation performed: [id number]         CTA chest pe study   Final Interpretation by Yessi Hanson MD (10/28 1437)      No evidence for pulmonary embolism.      Extensive right sided multilobar pneumonia. Small right parapneumonic effusion.      Tree-in-bud opacities in the left upper and lower lobes consistent with endobronchial impaction/infection.      Severe emphysematous changes.      Small pericardial effusion.                  Workstation performed: [id number]         XR chest portable   ED Interpretation by Fernando Gamble PA-C (10/28 1227)   Right lower lobe pneumonia.      Final Interpretation by Abdulaziz Andres MD (10/28 8188)      1.  Multilobar pneumonia.      2.  Bilateral emphysematous changes.            Resident: Eze Tovar I, the attending radiologist, have reviewed  the images and agree with the final report above.      Workstation performed: [id number]           Cardiology:  Echo complete w/ contrast if indicated   Final Result by Benny Reddy MD (10/30 0917)        Left Ventricle: Left ventricular cavity size is normal. Wall thickness    is normal. The left ventricular ejection fraction is 65%. Systolic    function is normal. Wall motion is normal.     Left Atrium: The atrium is mildly dilated.     Mitral Valve: There is annular calcification. There is no evidence of    regurgitation.         ECG 12 lead   Final Result by Benny Reddy MD (10/29 1719)   Normal sinus rhythm   When compared with ECG of 28-OCT-2024 18:12, (unconfirmed)   Sinus rhythm has replaced Atrial fibrillation   Vent. rate has decreased BY  68 BPM   Confirmed by Benny Reddy ([id number]) on 10/29/2024 5:19:46 PM      ECG 12 lead   Final Result by Benny Reddy MD (10/29 1651)   Atrial fibrillation with rapid ventricular response   Nonspecific ST-t wave changes   Abnormal ECG   When compared with ECG of 28-OCT-2024 14:22, (unconfirmed)   Atrial fibrillation has replaced Sinus rhythm   Vent. rate has increased BY  51 BPM   Confirmed by Benny Reddy ([id number]) on 10/29/2024 4:51:35 PM      ECG 12 lead   Final Result by Benny Reddy MD (10/29 1700)   Normal sinus rhythm with sinus arrhythmia   V1 and V5 are switched    When compared with ECG of 28-OCT-2024 11:47, (unconfirmed)   No significant change was found      Confirmed by Benny Reddy ([id number]) on 10/29/2024 5:00:00 PM      ECG 12 lead   Final Result by Benny Reddy MD (10/29 0894)   Sinus tachycardia   V1 and V5 are switched    Nonspecific ST-t wave changes   No previous ECGs available   Confirmed by Benny Reddy ([id number]) on 10/29/2024 8:29:43 AM        GI:  No orders to display           Results from last 7 days   Lab Units 10/30/24  0524 10/29/24  0452 10/28/24  1232   WBC Thousand/uL 16.87* 8.01 7.52   HEMOGLOBIN g/dL 9.8*  10.7* 12.4   HEMATOCRIT % 29.8* 32.6* 37.6   PLATELETS Thousands/uL 283 266 306   TOTAL NEUT ABS Thousands/µL  --   --  6.63   BANDS PCT %  --  3  --          Results from last 7 days   Lab Units 10/30/24  0524 10/29/24  0452 10/28/24  1232   SODIUM mmol/L 142 140 140   POTASSIUM mmol/L 3.4* 3.7 3.9   CHLORIDE mmol/L 113* 111* 106   CO2 mmol/L 22 21 21   ANION GAP mmol/L 7 8 13   BUN mg/dL 41* 33* 34*   CREATININE mg/dL 1.26 0.87 1.03   EGFR ml/min/1.73sq m 42 66 54   CALCIUM mg/dL 8.7 8.2* 9.3   MAGNESIUM mg/dL 2.3 2.3  --    PHOSPHORUS mg/dL 3.2 3.6  --      Results from last 7 days   Lab Units 10/28/24  1232   AST U/L 46*   ALT U/L 28   ALK PHOS U/L 59   TOTAL PROTEIN g/dL 6.9   ALBUMIN g/dL 3.4*   TOTAL BILIRUBIN mg/dL 0.73         Results from last 7 days   Lab Units 10/30/24  0524 10/29/24  0452 10/28/24  1232   GLUCOSE RANDOM mg/dL 146* 159* 152*     Results from last 7 days   Lab Units 10/28/24  1425 10/28/24  1232   HS TNI 0HR ng/L  --  14   HS TNI 2HR ng/L 14  --    HSTNI D2 ng/L 0  --          Results from last 7 days   Lab Units 10/28/24  1232   PROTIME seconds 16.3*   INR  1.26*   PTT seconds 36*         Results from last 7 days   Lab Units 10/30/24  0524 10/29/24  0452 10/28/24  1232   PROCALCITONIN ng/ml 0.58* 0.51* 0.26*     Results from last 7 days   Lab Units 10/28/24  1511 10/28/24  1232   LACTIC ACID mmol/L 1.1 2.3*     Results from last 7 days   Lab Units 10/28/24  1236   BNP pg/mL 168*     Results from last 7 days   Lab Units 10/29/24  1400   CLARITY UA  Clear   COLOR UA  Yellow   SPEC GRAV UA  1.015   PH UA  6.0   GLUCOSE UA mg/dl Negative   KETONES UA mg/dl Negative   BLOOD UA  Negative   PROTEIN UA mg/dl Negative   NITRITE UA  Negative   BILIRUBIN UA  Negative   UROBILINOGEN UA E.U./dl 0.2   LEUKOCYTES UA  Negative     Results from last 7 days   Lab Units 10/29/24  1357   STREP PNEUMONIAE ANTIGEN, URINE  Negative   LEGIONELLA URINARY ANTIGEN  Negative     Results from last 7 days   Lab Units  10/28/24  1232   BLOOD CULTURE  No Growth at 24 hrs.  No Growth at 24 hrs.     Past Medical History:   Diagnosis Date    Allergic     Allergic rhinitis     [id number]  RESOLVED    Asthma     Atrial fibrillation (HCC)     Bronchitis     Bunion     COPD (chronic obstructive pulmonary disease) (HCC)     home O2 at night at home 2L    Coronary artery disease     AFIB    Depression     Emphysema lung (HCC)     Fibroadenoma of breast     Gastroesophageal reflux disease without esophagitis 12/18/2023    Hyperlipidemia     Hypertension     Lung disease 2008    COPD    Memory difficulties 07/23/2019    Post-menopausal     Post-menopausal     Sepsis due to pneumonia (HCC) 10/28/2024    Spondylisthesis     Stage 3 chronic kidney disease, unspecified whether stage 3a or 3b CKD (HCC) 02/07/2022    Vitamin D deficiency      Present on Admission:   Sepsis due to pneumonia (HCC)   Emphysema/COPD (HCC)   Paroxysmal atrial fibrillation (HCC)   Chronic respiratory failure with hypoxia (HCC)      Admitting Diagnosis: COPD (chronic obstructive pulmonary disease) (HCC) [J44.9]  Chest pain [R07.9]  Pneumonia [J18.9]  SOB (shortness of breath) [R06.02]  Hypoxia [R09.02]  Atrial fibrillation with RVR (HCC) [I48.91]  Sepsis (HCC) [A41.9]  Sepsis due to pneumonia (HCC) [J18.9, A41.9]  Age/Sex: 74 y.o. female    Network Utilization Review Department  ATTENTION: Please call with any questions or concerns to [phone] and carefully listen to the prompts so that you are directed to the right person. All voicemails are confidential.   For Discharge needs, contact Care Management DC Support Team at [phone] opt. [phone]  Send all requests for admission clinical reviews, approved or denied determinations and any other requests to dedicated fax number below belonging to the campus where the patient is receiving treatment. List of dedicated fax numbers for the Facilities:  FACILITY NAME UR FAX NUMBER   ADMISSION DENIALS (Administrative/Medical  Necessity) [phone]   DISCHARGE SUPPORT TEAM (NETWORK) [phone]   PARENT CHILD HEALTH (Maternity/NICU/Pediatrics) [phone]   [facility] [phone]   [facility] [phone]   [facility] [phone]   [facility] [phone]   Critical access hospital [phone]   [facility] [phone]   [facility] [phone]   [facility] [phone]   [facility] [phone]   [facility] [phone]   [facility] [phone]   [facility] [phone]

## 2024-10-29 NOTE — PHYSICAL THERAPY NOTE
PHYSICAL THERAPY EVALUATION  NAME:  Corinne A Longo  DATE: 10/29/24    AGE:   74 y.o.  Mrn:   [id number]  ADMIT DX:  COPD (chronic obstructive pulmonary disease) (HCC) [J44.9]  Chest pain [R07.9]  Pneumonia [J18.9]  SOB (shortness of breath) [R06.02]  Hypoxia [R09.02]  Atrial fibrillation with RVR (HCC) [I48.91]  Sepsis (HCC) [A41.9]  Sepsis due to pneumonia (HCC) [J18.9, A41.9]  Problem List:   Patient Active Problem List   Diagnosis    Allergic rhinitis    Anxiety    Emphysema/COPD (HCC)    Cigarette nicotine dependence in remission    Depression, recurrent (HCC)    Hyperlipidemia    GABI (obstructive sleep apnea)    Vitamin D deficiency    Paroxysmal atrial fibrillation (HCC)    Need for vaccination with 13-polyvalent pneumococcal conjugate vaccine    Constipation    Incidental pulmonary nodule, > 3mm and < 8mm    COPD, severe (HCC)    Family history of autoimmune disorder    Chronic respiratory failure with hypoxia (HCC)    Myxoid cyst    Stage 3 chronic kidney disease, unspecified whether stage 3a or 3b CKD (HCC)    Cervical strain    Tinea cruris    Lipoma of upper extremity    Venous insufficiency    Gastroesophageal reflux disease without esophagitis    Sepsis due to pneumonia (HCC)       Past Medical History  Past Medical History:   Diagnosis Date    Allergic     Allergic rhinitis     [id number]  RESOLVED    Asthma     Atrial fibrillation (HCC)     Bronchitis     Bunion     COPD (chronic obstructive pulmonary disease) (HCC)     home O2 at night at home 2L    Coronary artery disease     AFIB    Depression     Emphysema lung (HCC)     Fibroadenoma of breast     Gastroesophageal reflux disease without esophagitis 12/18/2023    Hyperlipidemia     Hypertension     Lung disease 2008    COPD    Memory difficulties 07/23/2019    Post-menopausal     Post-menopausal     Sepsis due to pneumonia (HCC) 10/28/2024    Spondylisthesis     Stage 3 chronic kidney disease, unspecified whether stage 3a or 3b CKD (HCC) 02/07/2022     Vitamin D deficiency        Past Surgical History  Past Surgical History:   Procedure Laterality Date    ADENOIDECTOMY      BREAST CYST EXCISION Bilateral     benign-1987, 2002, 2007    BUNIONECTOMY Bilateral     CATARACT EXTRACTION, BILATERAL      EYE SURGERY      Cataracts removed    MOLE REMOVAL      lip    TONSILLECTOMY      TUBAL LIGATION         Length Of Stay: 1  Performed at least 2 patient identifiers during session: Name and Epic photo       10/29/24 0855   PT Last Visit   PT Visit Date 10/29/24   Note Type   Note type Evaluation   Pain Assessment   Pain Assessment Tool 0-10   Pain Score 8   Pain Location/Orientation   (back side)   Patient's Stated Pain Goal No pain   Hospital Pain Intervention(s) Ambulation/increased activity;Repositioned   Restrictions/Precautions   Weight Bearing Precautions Per Order No   Other Precautions Chair Alarm;Bed Alarm;Multiple lines;Telemetry;O2;Fall Risk;Pain   Home Living   Type of Home House   Home Layout One level;Stairs to enter with rails  (1 YESY)   Bathroom Shower/Tub Tub/shower unit   Bathroom Toilet Standard   Bathroom Equipment Grab bars in shower;Shower chair   Bathroom Accessibility Accessible   Home Equipment   (Home O2)   Prior Function   Level of [place] Independent with ADLs;Independent with functional mobility;Needs assistance with IADLS   Lives With Significant other   Receives Help From Family   IADLs Independent with driving;Independent with meal prep;Independent with medication management   Falls in the last 6 months 0   Cognition   Overall Cognitive Status WFL   Arousal/Participation Alert   Attention Within functional limits   Orientation Level Oriented X4   Memory Within functional limits   Following Commands Follows all commands and directions without difficulty   RLE Assessment   RLE Assessment   (Generalized weakness)   LLE Assessment   LLE Assessment   (generalized weakness)   Vision-Basic Assessment   Current Vision Wears glasses only for  reading   Coordination   Sensation WFL   Bed Mobility   Supine to Sit 5  Supervision   Additional items Assist x 1;Increased time required;Verbal cues   Transfers   Sit to Stand 4  Minimal assistance   Additional items Assist x 1;Increased time required;Verbal cues   Stand to Sit 4  Minimal assistance   Additional items Assist x 1;Increased time required;Verbal cues   Stand pivot 4  Minimal assistance   Additional items Assist x 1;Increased time required;Verbal cues   Toilet transfer 4  Minimal assistance   Additional items Assist x 1;Commode;Increased time required;Verbal cues   Additional Comments Patient had significant O2 drop while ambulating, O2 dropped to 81% during tranfer, once back in chair, O2 returned to ~ 89%. Nursing aware of O2 and was present during evaluation   Balance   Static Sitting Good   Dynamic Sitting Fair +   Static Standing Fair   Dynamic Standing Fair -   Ambulatory Fair -   Activity Tolerance   Activity Tolerance Patient limited by fatigue;Patient limited by pain   Medical Staff Made Aware OT Mulligan   Nurse Made Aware RN Arlene   Assessment   Prognosis Fair   Problem List Decreased strength;Decreased endurance;Decreased mobility   Assessment Pt is 74 y.o. female seen for PT evaluation s/p admit to [facility] on 10/28/2024 w/ Sepsis due to pneumonia (HCC). PT consulted to assess pt's functional mobility and d/c needs. Order placed for PT eval and tx, w/ up and OOB as tolerated order. Pt agreeable to PT  session upon arrival, pt found supine in bed.  PTA, pt was independent w/ all functional mobility w/ no AD and lives w/ spouse in 1 level home .  Pt to benefit from continued PT tx to address deficits and maximize level of functional independent mobility and consistency. Upon conclusion pt  seated in recliner. Complexity: Comorbidities affecting pt's physical performance at time of assessment include: COPD, respiratory failure, anxiety, and Afib . Personal factors affecting pt at time  of IE include: advanced age, limited mobility, steps to enter home, and anxiety. Please find objective findings from PT assessment regarding body systems outlined above with impairments and limitations including weakness, impaired balance, decreased endurance, decreased activity tolerance, and decreased functional mobility tolerance.  Pt's clinical presentation is currently unstable/unpredictable seen in pt's presentation of hypertension, tachycardia, increased RR, abnormal H&H, and abnormal blood sugar levels. The patient's [facility] Basic Mobility Inpatient Short Form Raw Score is 20.  Based on patient presentations and impairments, pt would most appropriately benefit from Level 2 resource intensity upon discharge. Please also refer to the recommendation of the Physical Therapist for safe discharge planning. RN verbalized pt appropriate for PT session.   Goals   Patient Goals To return home at previous level of function   LTG Expiration Date 11/08/24   Long Term Goal #1 Perform transfers to modified I to increase Indep in home environment, decrease risk for falls, and improve ease of transfers. Perform ambulation with no AD for 50 ft to  increase Indep in home environment, decrease risk for falls, and improve activity tolerance. Increase dynamic standing balance to F+ to decrease fall risk.   Increase OOB activity tolerance to 10 minutes without s/s of exertion to decrease fall risk.   PT Treatment Day 0   Plan   Treatment/Interventions Functional transfer training;LE strengthening/ROM;Elevations;Therapeutic exercise;Endurance training;Gait training   PT Frequency 3-5x/wk   Discharge Recommendation   Rehab Resource Intensity Level, PT II (Moderate Resource Intensity)  (could change based on next sessions)   [facility] Basic Mobility Inpatient   Turning in Flat Bed Without Bedrails 4   Lying on Back to Sitting on Edge of Flat Bed Without Bedrails 4   Moving Bed to Chair 3   Standing Up From Chair Using Arms 3   Walk in  Room 3   Climb 3-5 Stairs With Railing 3   Basic Mobility Inpatient Raw Score 20   Basic Mobility Standardized Score 43.99   [facility] Highest Level Of Mobility   -HL Goal 6: Walk 10 steps or more   -HLM Achieved 5: Stand (1 or more minutes)     Pt seen as a co-eval with OT due to the patient's co-morbidities, clinically unstable presentation, and present impairments which are a regression from the patient's baseline.       Time In: 0855  Time Out: 0910  Total Evaluation Minutes: 15    Chema Chang PT

## 2024-10-29 NOTE — OCCUPATIONAL THERAPY NOTE
Occupational Therapy Evaluation     Patient Name: Corinne A Longo  Today's Date: 10/29/2024  Problem List  Principal Problem:    Sepsis due to pneumonia (HCC)  Active Problems:    Emphysema/COPD (HCC)    Paroxysmal atrial fibrillation (HCC)    Chronic respiratory failure with hypoxia (HCC)    Past Medical History  Past Medical History:   Diagnosis Date    Allergic     Allergic rhinitis     [id number]  RESOLVED    Asthma     Atrial fibrillation (HCC)     Bronchitis     Bunion     COPD (chronic obstructive pulmonary disease) (HCC)     home O2 at night at home 2L    Coronary artery disease     AFIB    Depression     Emphysema lung (HCC)     Fibroadenoma of breast     Gastroesophageal reflux disease without esophagitis 12/18/2023    Hyperlipidemia     Hypertension     Lung disease 2008    COPD    Memory difficulties 07/23/2019    Post-menopausal     Post-menopausal     Sepsis due to pneumonia ([facility]) 10/28/2024    Spondylisthesis     Stage 3 chronic kidney disease, unspecified whether stage 3a or 3b CKD ([facility]) 02/07/2022    Vitamin D deficiency      Past Surgical History  Past Surgical History:   Procedure Laterality Date    ADENOIDECTOMY      BREAST CYST EXCISION Bilateral     benign-1987, 2002, 2007    BUNIONECTOMY Bilateral     CATARACT EXTRACTION, BILATERAL      EYE SURGERY      Cataracts removed    MOLE REMOVAL      lip    TONSILLECTOMY      TUBAL LIGATION             10/29/24 0853   OT Last Visit   OT Visit Date 10/29/24   Note Type   Note type Evaluation   Pain Assessment   Pain Assessment Tool 0-10   Pain Score 8   Pain Location/Orientation Other (Comment)  (backside)   Pain Onset/Description Onset: Ongoing   Patient's Stated Pain Goal No pain   Hospital Pain Intervention(s) Repositioned;Ambulation/increased activity;Emotional support   Multiple Pain Sites No   Restrictions/Precautions   Weight Bearing Precautions Per Order No   Other Precautions Chair Alarm;Bed Alarm;Multiple lines;Telemetry;O2;Fall  "Risk;Pain  (5L O2)   Home Living   Type of Home House   Home Layout One level;Stairs to enter with rails  (1 YESY)   Bathroom Shower/Tub Tub/shower unit   Bathroom Toilet Standard   Bathroom Equipment Grab bars in shower;Shower chair   Bathroom Accessibility Accessible   Home Equipment Other (Comment)  (home O2)   Prior Function   Level of [place] Independent with ADLs;Independent with functional mobility;Needs assistance with IADLS   Lives With Significant other   Receives Help From Family   IADLs Independent with driving;Independent with meal prep;Independent with medication management   Falls in the last 6 months 0   Vocational Retired   Lifestyle   Autonomy PTA pt reports independence with self care tasks, mobility no AD and shares IADLs with her s/o. +.   Reciprocal Relationships Supportive family.   Service to Others Retired.   Intrinsic Gratification Active PTA.   Subjective   Subjective \"I'm having a little bit of anxiety\"   ADL   Toileting Assistance  4  Minimal Assistance   Toileting Deficit Setup;Steadying;Perineal hygiene;Bedside commode   Bed Mobility   Supine to Sit 5  Supervision   Additional items Assist x 1;Increased time required;Verbal cues   Transfers   Sit to Stand 4  Minimal assistance   Additional items Assist x 1;Increased time required;Verbal cues   Stand to Sit 4  Minimal assistance   Additional items Assist x 1;Increased time required;Verbal cues   Stand pivot 4  Minimal assistance   Additional items Assist x 1;Increased time required;Verbal cues   Toilet transfer 4  Minimal assistance   Additional items Assist x 1;Increased time required;Verbal cues;Commode   Additional Comments SpO2 dropping to 81-85% when seated EOB and during/following transfer - recovering to 89% when seated in recliner chair at the end of the session - nursing informed and present during evaluation and at the end of the session to provide intervention.   Balance   Static Sitting Good   Dynamic Sitting Fair " +   Static Standing Fair   Dynamic Standing Fair -   Ambulatory Fair -   Activity Tolerance   Activity Tolerance Patient limited by fatigue;Patient limited by pain   Medical Staff Made Aware POP Bear   Nurse Made Aware Yes   RUE Assessment   RUE Assessment WFL   LUE Assessment   LUE Assessment WFL   Vision-Basic Assessment   Current Vision Wears glasses only for reading   Psychosocial   Psychosocial (WDL) WDL   Patient Behaviors/Mood Anxious;Cooperative;Pleasant   Cognition   Overall Cognitive Status WFL   Arousal/Participation Responsive;Alert;Arousable;Cooperative   Attention Within functional limits   Orientation Level Oriented X4   Memory Within functional limits   Following Commands Follows all commands and directions without difficulty   Assessment   Limitation Decreased ADL status;Decreased UE strength;Decreased endurance;Decreased self-care trans;Decreased high-level ADLs   Prognosis Good   Assessment Pt is a 74 y.o. female who was admitted to  [facility]  on 10/28/2024 w/ Sepsis due to pneumonia ([facility]). Pt has a past medical history of asthma, aFib, COPD, sepsis, and pneumonia. Pt lives with her significant other in a 1 story home with 1 [facility]. At baseline, pt was completing ADLs independently and functional mobility and transfers independently no AD. Currently, pt requires min A to supervision overall for ADLs and min A for mobility and transfers. Pt presents with deficits in the following categories: difficulty performing ADLS and difficulty performing IADLS  activity tolerance, endurance, and standing balance/tolerance. These deficits, along with pt's  pain limit the pt's ability to safely engage in areas of occupation such as: bathing/shower, toilet hygiene, dressing, functional mobility, community mobility, and household maintenance. Pt would benefit from continued skilled acute OT in order to maximize independence and safety in ADLs, mobility, and transfers. he patient's raw score on the AM-PAC Daily Activity  Inpatient Short Form is 20. A raw score of greater than or equal to 19 suggests the patient may benefit from discharge to home. Discharge recommendation at this time is level III.  Pt benefited from co-evaluation of skilled OT and PT therapists in order to most appropriately address functional deficits d/t extensive assistance required for safe functional mobility, decreased activity tolerance, and regression from functioning level prior to admission and/or onset of present illness. OT/PT objectives were addressed separately; please see PT note for specific goal areas targeted.   Goals   Patient Goals to have less pain and return home at [facility] Time Frame 10-14   Long Term Goal #1 refer to established goals   Plan   Treatment Interventions ADL retraining;Functional transfer training;UE strengthening/ROM;Endurance training;Patient/family training;Equipment evaluation/education;Energy conservation;Activityengagement   Goal Expiration Date 11/12/24   OT Frequency 3-5x/wk   Discharge Recommendation   Rehab Resource Intensity Level, OT III (Minimum Resource Intensity)   AM-PAC Daily Activity Inpatient   Lower Body Dressing 3   Bathing 3   Toileting 3   Upper Body Dressing 3   Grooming 4   Eating 4   Daily Activity Raw Score 20   Daily Activity Standardized Score (Calc for Raw Score >=11) 42.03   AM-PAC Applied Cognition Inpatient   Following a Speech/Presentation 4   Understanding Ordinary Conversation 4   Taking Medications 4   Remembering Where Things Are Placed or Put Away 4   Remembering List of 4-5 Errands 4   Taking Care of Complicated Tasks 4   Applied Cognition Raw Score 24   Applied Cognition Standardized Score 62.21       OCCUPATIONAL THERAPY GOALS:  Mod I/I with all UB and LB ADLs with AD prn   Mod I/I with all functional mobility and transfers while demonstrating good safety and judgement  Mod I/I with toileting and clothing management   Increase activity tolerance to 40-45 minutes in order to participate  in ADLs and leisure activities   Demonstrate good carryover of body mechanics and energy conservation techniques in order to participate in functional mobility, transfers, ADLs, IADLs, and leisure exploration   Assess DME needs      Ese Ordaz OTR/L

## 2024-10-29 NOTE — PLAN OF CARE
Problem: Potential for Falls  Goal: Patient will remain free of falls  Description: INTERVENTIONS:  - Educate patient/family on patient safety including physical limitations  - Instruct patient to call for assistance with activity   - Consult OT/PT to assist with strengthening/mobility   - Keep Call bell within reach  - Keep bed low and locked with side rails adjusted as appropriate  - Keep care items and personal belongings within reach  - Initiate and maintain comfort rounds  - Make Fall Risk Sign visible to staff  - Offer Toileting every 2 Hours, in advance of need  - Initiate/Maintain bed alarm  - Obtain necessary fall risk management equipment: non skid socks  - Apply yellow socks and bracelet for high fall risk patients  - Consider moving patient to room near nurses station  Outcome: Progressing     Problem: PAIN - ADULT  Goal: Verbalizes/displays adequate comfort level or baseline comfort level  Description: Interventions:  - Encourage patient to monitor pain and request assistance  - Assess pain using appropriate pain scale  - Administer analgesics based on type and severity of pain and evaluate response  - Implement non-pharmacological measures as appropriate and evaluate response  - Consider cultural and social influences on pain and pain management  - Notify physician/advanced practitioner if interventions unsuccessful or patient reports new pain  Outcome: Progressing     Problem: INFECTION - ADULT  Goal: Absence or prevention of progression during hospitalization  Description: INTERVENTIONS:  - Assess and monitor for signs and symptoms of infection  - Monitor lab/diagnostic results  - Monitor all insertion sites, i.e. indwelling lines, tubes, and drains  - Monitor endotracheal if appropriate and nasal secretions for changes in amount and color  - Mcintosh appropriate cooling/warming therapies per order  - Administer medications as ordered  - Instruct and encourage patient and family to use good hand  hygiene technique  - Identify and instruct in appropriate isolation precautions for identified infection/condition  Outcome: Progressing  Goal: Absence of fever/infection during neutropenic period  Description: INTERVENTIONS:  - Monitor WBC    Outcome: Progressing     Problem: SAFETY ADULT  Goal: Patient will remain free of falls  Description: INTERVENTIONS:  - Educate patient/family on patient safety including physical limitations  - Instruct patient to call for assistance with activity   - Consult OT/PT to assist with strengthening/mobility   - Keep Call bell within reach  - Keep bed low and locked with side rails adjusted as appropriate  - Keep care items and personal belongings within reach  - Initiate and maintain comfort rounds  - Make Fall Risk Sign visible to staff  - Offer Toileting every 2 Hours, in advance of need  - Initiate/Maintain bed alarm  - Obtain necessary fall risk management equipment: non skid socks  - Apply yellow socks and bracelet for high fall risk patients  - Consider moving patient to room near nurses station  Outcome: Progressing  Goal: Maintain or return to baseline ADL function  Description: INTERVENTIONS:  -  Assess patient's ability to carry out ADLs; assess patient's baseline for ADL function and identify physical deficits which impact ability to perform ADLs (bathing, care of mouth/teeth, toileting, grooming, dressing, etc.)  - Assess/evaluate cause of self-care deficits   - Assess range of motion  - Assess patient's mobility; develop plan if impaired  - Assess patient's need for assistive devices and provide as appropriate  - Encourage maximum independence but intervene and supervise when necessary  - Involve family in performance of ADLs  - Assess for home care needs following discharge   - Consider OT consult to assist with ADL evaluation and planning for discharge  - Provide patient education as appropriate  Outcome: Progressing  Goal: Maintains/Returns to pre admission  functional level  Description: INTERVENTIONS:  - Perform AM-PAC 6 Click Basic Mobility/ Daily Activity assessment daily.  - Set and communicate daily mobility goal to care team and patient/family/caregiver.   - Collaborate with rehabilitation services on mobility goals if consulted  - Perform Range of Motion 2 times a day.  - Reposition patient every 2 hours.  - Dangle patient 2 times a day  - Stand patient 2 times a day  - Ambulate patient 2 times a day  - Out of bed to chair 3 times a day   - Out of bed for meals 3 times a day  - Out of bed for toileting  - Record patient progress and toleration of activity level   Outcome: Progressing     Problem: DISCHARGE PLANNING  Goal: Discharge to home or other facility with appropriate resources  Description: INTERVENTIONS:  - Identify barriers to discharge w/patient and caregiver  - Identify discharge learning needs (meds, wound care, etc.)  - Arrange for interpretive services to assist at discharge as needed  - Refer to Case Management Department for coordinating discharge planning if the patient needs post-hospital services based on physician/advanced practitioner order or complex needs related to functional status, cognitive ability, or social support system  Outcome: Progressing     Problem: Knowledge Deficit  Goal: Patient/family/caregiver demonstrates understanding of disease process, treatment plan, medications, and discharge instructions  Description: Complete learning assessment and assess knowledge base.  Interventions:  - Provide teaching at level of understanding  - Provide teaching via preferred learning methods  Outcome: Progressing     Problem: CARDIOVASCULAR - ADULT  Goal: Maintains optimal cardiac output and hemodynamic stability  Description: INTERVENTIONS:  - Monitor I/O, vital signs and rhythm  - Monitor for S/S and trends of decreased cardiac output  - Administer and titrate ordered vasoactive medications to optimize hemodynamic stability  - Assess  quality of pulses, skin color and temperature  - Assess for signs of decreased coronary artery perfusion  - Instruct patient to report change in severity of symptoms  Outcome: Progressing  Goal: Absence of cardiac dysrhythmias or at baseline rhythm  Description: INTERVENTIONS:  - Continuous cardiac monitoring, vital signs, obtain 12 lead EKG if ordered  - Administer antiarrhythmic and heart rate control medications as ordered  - Monitor electrolytes and administer replacement therapy as ordered  Outcome: Progressing     Problem: NEUROSENSORY - ADULT  Goal: Achieves stable or improved neurological status  Description: INTERVENTIONS  - Monitor and report changes in neurological status  - Monitor vital signs such as temperature, blood pressure, glucose, and any other labs ordered   - Initiate measures to prevent increased intracranial pressure  - Monitor for seizure activity and implement precautions if appropriate      Outcome: Progressing  Goal: Remains free of injury related to seizures activity  Description: INTERVENTIONS  - Maintain airway, patient safety  and administer oxygen as ordered  - Monitor patient for seizure activity, document and report duration and description of seizure to physician/advanced practitioner  - If seizure occurs,  ensure patient safety during seizure  - Reorient patient post seizure  - Seizure pads on all 4 side rails  - Instruct patient/family to notify RN of any seizure activity including if an aura is experienced  - Instruct patient/family to call for assistance with activity based on nursing assessment  - Administer anti-seizure medications if ordered    Outcome: Progressing  Goal: Achieves maximal functionality and self care  Description: INTERVENTIONS  - Monitor swallowing and airway patency with patient fatigue and changes in neurological status  - Encourage and assist patient to increase activity and self care.   - Encourage visually impaired, hearing impaired and aphasic  patients to use assistive/communication devices  Outcome: Progressing     Problem: RESPIRATORY - ADULT  Goal: Achieves optimal ventilation and oxygenation  Description: INTERVENTIONS:  - Assess for changes in respiratory status  - Assess for changes in mentation and behavior  - Position to facilitate oxygenation and minimize respiratory effort  - Oxygen administered by appropriate delivery if ordered  - Initiate smoking cessation education as indicated  - Encourage broncho-pulmonary hygiene including cough, deep breathe, Incentive Spirometry  - Assess the need for suctioning and aspirate as needed  - Assess and instruct to report SOB or any respiratory difficulty  - Respiratory Therapy support as indicated  Outcome: Progressing     Problem: GASTROINTESTINAL - ADULT  Goal: Minimal or absence of nausea and/or vomiting  Description: INTERVENTIONS:  - Administer IV fluids if ordered to ensure adequate hydration  - Maintain NPO status until nausea and vomiting are resolved  - Nasogastric tube if ordered  - Administer ordered antiemetic medications as needed  - Provide nonpharmacologic comfort measures as appropriate  - Advance diet as tolerated, if ordered  - Consider nutrition services referral to assist patient with adequate nutrition and appropriate food choices  Outcome: Progressing  Goal: Maintains or returns to baseline bowel function  Description: INTERVENTIONS:  - Assess bowel function  - Encourage oral fluids to ensure adequate hydration  - Administer IV fluids if ordered to ensure adequate hydration  - Administer ordered medications as needed  - Encourage mobilization and activity  - Consider nutritional services referral to assist patient with adequate nutrition and appropriate food choices  Outcome: Progressing  Goal: Maintains adequate nutritional intake  Description: INTERVENTIONS:  - Monitor percentage of each meal consumed  - Identify factors contributing to decreased intake, treat as appropriate  -  Assist with meals as needed  - Monitor I&O, weight, and lab values if indicated  - Obtain nutrition services referral as needed  Outcome: Progressing  Goal: Establish and maintain optimal ostomy function  Description: INTERVENTIONS:  - Assess bowel function  - Encourage oral fluids to ensure adequate hydration  - Administer IV fluids if ordered to ensure adequate hydration   - Administer ordered medications as needed  - Encourage mobilization and activity  - Nutrition services referral to assist patient with appropriate food choices  - Assess stoma site  - Consider wound care consult   Outcome: Progressing  Goal: Oral mucous membranes remain intact  Description: INTERVENTIONS  - Assess oral mucosa and hygiene practices  - Implement preventative oral hygiene regimen  - Implement oral medicated treatments as ordered  - Initiate Nutrition services referral as needed  Outcome: Progressing     Problem: GENITOURINARY - ADULT  Goal: Maintains or returns to baseline urinary function  Description: INTERVENTIONS:  - Assess urinary function  - Encourage oral fluids to ensure adequate hydration if ordered  - Administer IV fluids as ordered to ensure adequate hydration  - Administer ordered medications as needed  - Offer frequent toileting  - Follow urinary retention protocol if ordered  Outcome: Progressing  Goal: Absence of urinary retention  Description: INTERVENTIONS:  - Assess patient’s ability to void and empty bladder  - Monitor I/O  - Bladder scan as needed  - Discuss with physician/AP medications to alleviate retention as needed  - Discuss catheterization for long term situations as appropriate  Outcome: Progressing  Goal: Urinary catheter remains patent  Description: INTERVENTIONS:  - Assess patency of urinary catheter  - If patient has a chronic zuñiga, consider changing catheter if non-functioning  - Follow guidelines for intermittent irrigation of non-functioning urinary catheter  Outcome: Progressing     Problem:  METABOLIC, FLUID AND ELECTROLYTES - ADULT  Goal: Electrolytes maintained within normal limits  Description: INTERVENTIONS:  - Monitor labs and assess patient for signs and symptoms of electrolyte imbalances  - Administer electrolyte replacement as ordered  - Monitor response to electrolyte replacements, including repeat lab results as appropriate  - Instruct patient on fluid and nutrition as appropriate  Outcome: Progressing  Goal: Fluid balance maintained  Description: INTERVENTIONS:  - Monitor labs   - Monitor I/O and WT  - Instruct patient on fluid and nutrition as appropriate  - Assess for signs & symptoms of volume excess or deficit  Outcome: Progressing  Goal: Glucose maintained within target range  Description: INTERVENTIONS:  - Monitor Blood Glucose as ordered  - Assess for signs and symptoms of hyperglycemia and hypoglycemia  - Administer ordered medications to maintain glucose within target range  - Assess nutritional intake and initiate nutrition service referral as needed  Outcome: Progressing     Problem: SKIN/TISSUE INTEGRITY - ADULT  Goal: Skin Integrity remains intact(Skin Breakdown Prevention)  Description: Assess:  -Perform Silvestre assessment every shift  -Clean and moisturize skin every shift  -Inspect skin when repositioning, toileting, and assisting with ADLS  -Assess under medical devices such  -Assess extremities for adequate circulation and sensation     Bed Management:  -Have minimal linens on bed & keep smooth, unwrinkled  -Change linens as needed when moist or perspiring  -Avoid sitting or lying in one position for more than 2 hours while in bed  -Keep HOB at 30 degrees     Toileting:  -Offer bedside commode      Activity:  -Mobilize patient 3 times a day  -Encourage activity and walks on unit  -Encourage or provide ROM exercises   -Turn and reposition patient every 2 Hours  -Use appropriate equipment to lift or move patient in bed  -Instruct/ Assist with weight shifting every 60 minutes  when out of bed in chair  -Consider limitation of chair time 4 hour intervals    Skin Care:  -Avoid use of baby powder, tape, friction and shearing, hot water or constrictive clothing  -Relieve pressure over bony prominences using Mepelex foam dressing  -Do not massage red bony areas    Outcome: Progressing  Goal: Incision(s), wounds(s) or drain site(s) healing without S/S of infection  Description: INTERVENTIONS  - Assess and document dressing, incision, wound bed, drain sites and surrounding tissue  - Provide patient and family education  - Perform skin care/dressing changes   Outcome: Progressing  Goal: Pressure injury heals and does not worsen  Description: Interventions:  - Implement low air loss mattress or specialty surface (Criteria met)  - Apply silicone foam dressing  - Instruct/assist with weight shifting every 60 minutes when in chair   - Limit chair time to 4 hour intervals  - Use special pressure reducing interventions such as waffle cushion when in chair   - Apply fecal or urinary incontinence containment device   - Perform passive or active ROM every shift  - Turn and reposition patient & offload bony prominences every 2 hours   - Utilize friction reducing device or surface for transfers   - Consider nutrition services referral as needed  Outcome: Progressing     Problem: HEMATOLOGIC - ADULT  Goal: Maintains hematologic stability  Description: INTERVENTIONS  - Assess for signs and symptoms of bleeding or hemorrhage  - Monitor labs  - Administer supportive blood products/factors as ordered and appropriate  Outcome: Progressing     Problem: MUSCULOSKELETAL - ADULT  Goal: Maintain or return mobility to safest level of function  Description: INTERVENTIONS:  - Assess patient's ability to carry out ADLs; assess patient's baseline for ADL function and identify physical deficits which impact ability to perform ADLs (bathing, care of mouth/teeth, toileting, grooming, dressing, etc.)  - Assess/evaluate cause  of self-care deficits   - Assess range of motion  - Assess patient's mobility  - Assess patient's need for assistive devices and provide as appropriate  - Encourage maximum independence but intervene and supervise when necessary  - Involve family in performance of ADLs  - Assess for home care needs following discharge   - Consider OT consult to assist with ADL evaluation and planning for discharge  - Provide patient education as appropriate  Outcome: Progressing  Goal: Maintain proper alignment of affected body part  Description: INTERVENTIONS:  - Support, maintain and protect limb and body alignment  - Provide patient/ family with appropriate education  Outcome: Progressing

## 2024-10-29 NOTE — ASSESSMENT & PLAN NOTE
Oxygen dependent at baseline  Currently with increased oxygen requirements likely related to pneumonia  Management per medical team and pulmonary

## 2024-10-29 NOTE — CONSULTS
Consultation - Cardiology   Name: Corinne A Longo 74 y.o. female I MRN: [id number]  Unit/Bed#: ICU 09-01 I Date of Admission: 10/28/2024   Date of Service: 10/29/2024 I Hospital Day: 1   Inpatient consult to Cardiology  Consult performed by: ROGELIO Hill  Consult ordered by: Fernando Sarmiento DO        Physician Requesting Evaluation: Fernando Sarmiento DO   Reason for Evaluation / Principal Problem: Rapid A-fib    Assessment & Plan  Sepsis due to pneumonia (HCC)  Primary reason for admission  Management per medical team  Emphysema/COPD (HCC)  Oxygen dependent at baseline  Pulmonary has been consulted  Paroxysmal atrial fibrillation (HCC)  Known atrial fibrillation  RVR on presentation due to volume depletion and sepsis  Received bolus of diltiazem with improvement but later required infusion.  Infusion titrated off this morning but heart rate became elevated with getting out of bed s/p IV Lopressor  Will increase diltiazem to 60 mg every 6 hours  In the acute setting, patient's CHADS2 Vascor is elevated.  She is agreeable to Eliquis which will be initiated  Chronic respiratory failure with hypoxia (HCC)  Oxygen dependent at baseline  Currently with increased oxygen requirements likely related to pneumonia  Management per medical team and pulmonary    Other summary comments:   Increase diltiazem to 60 mg every 6 hours.  Patient was not previously on anticoagulation given low [id number]-ZQPp score.  However, in the acute setting, she is noted to have elevated blood sugar and elevated blood pressures.  This is in addition to her age and gender increases her stroke risk.  This was discussed and she is agreeable to Eliquis 5 mg twice daily.  We will reassess once recovered from her acute issues.    Once rate controlled, transition to daily dosing of diltiazem.      Will arrange follow-up including an echo in the outpatient setting (office notified).    Outpatient Cardiologist: Dr. Reddy    HPI: Corinne A Longo  is a 74 y.o. year old female who presented with worsening shortness of breath.    Corinne reports that she got her COVID-vaccine several weeks ago and began feeling ill afterwards.  After 1 week, her symptoms did not improve and she presented for evaluation.    In the ER, she met sepsis criteria and was found to have pneumonia.    On 10/28, she was a rapid response for afib with RVR and was transferred to [facility] on a Cardizem infusion.  She was asymptomatic.    This morning, Cardizem infusion was discontinued and HR was stable in the 70's.  After getting out of bed, HR increased to the 120's and a dose of IV lopressor was given.    At the time of my evaluation, Corinne is out of bed in the chair.  She says she is feeling a little better.  Denies any chest discomfort or palpitations.  Breathing is slightly improved since admission.      EKG: Sinus tachycardia, V1 and V5 are switched, nonspecific ST-T wave changes      MOST  RECENT CARDIAC IMAGING:   Echo 8/1/2018  EF 60%, no regional wall motion abnormalities  Mild LVH    Review of Systems: a 10 point review of systems was conducted and is negative except for as mentioned in the HPI or as below.        Historical Information   Past Medical History:   Diagnosis Date    Allergic     Allergic rhinitis     [id number]  RESOLVED    Asthma     Atrial fibrillation (HCC)     Bronchitis     Bunion     COPD (chronic obstructive pulmonary disease) ([facility])     home O2 at night at home 2L    Coronary artery disease     AFIB    Depression     Emphysema lung ([facility])     Fibroadenoma of breast     Gastroesophageal reflux disease without esophagitis 12/18/2023    Hyperlipidemia     Hypertension     Lung disease 2008    COPD    Memory difficulties 07/23/2019    Post-menopausal     Post-menopausal     Sepsis due to pneumonia ([facility]) 10/28/2024    Spondylisthesis     Stage 3 chronic kidney disease, unspecified whether stage 3a or 3b CKD ([facility]) 02/07/2022    Vitamin D deficiency      Past Surgical  History:   Procedure Laterality Date    ADENOIDECTOMY      BREAST CYST EXCISION Bilateral     benign-, ,     BUNIONECTOMY Bilateral     CATARACT EXTRACTION, BILATERAL      EYE SURGERY      Cataracts removed    MOLE REMOVAL      lip    TONSILLECTOMY      TUBAL LIGATION       Social History     Substance and Sexual Activity   Alcohol Use Yes    Comment: I might have a glass of wine every two or three months     Social History     Substance and Sexual Activity   Drug Use No     Social History     Tobacco Use   Smoking Status Every Day    Current packs/day: 0.00    Average packs/day: 0.3 packs/day for 53.5 years (13.4 ttl pk-yrs)    Types: Cigarettes    Start date: 1970    Last attempt to quit: 2023    Years since quittin.3   Smokeless Tobacco Never       Family History: No longer relevant due to patient age      Meds/Allergies   all current active meds have been reviewed    Medications Prior to Admission:     ascorbic acid (VITAMIN C) 500 mg tablet    aspirin 81 mg chewable tablet    atorvastatin (LIPITOR) 40 mg tablet    calcium citrate-vitamin D 315 mg-5 mcg tablet    cetirizine (ZyrTEC) 10 mg tablet    Cholecalciferol (VITAMIN D3) 5000 units CAPS    clotrimazole-betamethasone (LOTRISONE) 1-0.05 % cream    guaiFENesin (MUCINEX) 600 mg 12 hr tablet    ipratropium-albuterol (DUO-NEB) 0.5-2.5 mg/3 mL nebulizer solution    L-Lysine 500 MG CAPS    levalbuterol (Xopenex HFA) 45 mcg/act inhaler    methocarbamol (ROBAXIN) 500 mg tablet    metoprolol tartrate (LOPRESSOR) 25 mg tablet    metoprolol tartrate (LOPRESSOR) 50 mg tablet    mometasone (NASONEX) 50 mcg/act nasal spray    MULTIPLE VITAMINS-CALCIUM PO    multivitamin-minerals (CENTRUM ADULTS) tablet    Probiotic Product (PROBIOTIC-10 PO)    triamcinolone (KENALOG) 0.1 % cream    umeclidinium-vilanterol (Anoro Ellipta) 62.5-25 mcg/actuation inhaler    Allergies   Allergen Reactions    Bupropion Itching    Ace Inhibitors Cough     Action Taken:  "stopped med and changed to ARB; Category: Adverse Reaction;     Citalopram Diarrhea and Nausea Only    Mixed Ragweed Cough    Pollen Extract Cough    Adhesive [Medical Tape] Rash     Patch        Objective   Vitals: Blood pressure 117/55, pulse (!) 141, temperature 97.8 °F (36.6 °C), temperature source Temporal, resp. rate (!) 24, height 5' 8\" (1.727 m), weight 72.4 kg (159 lb 9.8 oz), SpO2 (!) 88%, not currently breastfeeding., Body mass index is 24.27 kg/m².,   Orthostatic Blood Pressures      Flowsheet Row Most Recent Value   Blood Pressure 117/55 filed at 10/29/2024 0941   Patient Position - Orthostatic VS Lying filed at 10/29/2024 0800            Systolic (24hrs), Av , Min:83 , Max:163     Diastolic (24hrs), Av, Min:44, Max:86      Physical Exam:    General:  Normal appearance in no distress.  Eyes:  Anicteric.  Oral mucosa:  Moist.  Neck:  No JVD. Carotid upstrokes are brisk without bruits.  No masses.  Chest: Decreased cardiac: Irregularly irregular, tachycardic.  No palpable PMI.  Normal S1 and S2.  No murmur gallop or rub.  Abdomen:  Soft and nontender. No palpable organomegaly or aortic enlargement.  Extremities:  No peripheral edema.  Musculoskeletal:  Symmetric.   Vascular:  Pedal pulses are intact.  Neuro:  Grossly symmetric.  Psych:  Alert and oriented x3.        Lab Results:   Labs reviewed and prominent abnormalities reviewed above and/or below.    Troponins:    Results from last 7 days   Lab Units 10/28/24  1425 10/28/24  1232   HS TNI 0HR ng/L  --  14   HS TNI 2HR ng/L 14  --    HSTNI D2 ng/L 0  --      BNP:   Results from last 6 Months   Lab Units 10/28/24  1236   BNP pg/mL 168*               "

## 2024-10-29 NOTE — NURSING NOTE
1845: This nurse received report from Polly SEXTON RN. Bedside shift report done. 2 nurse skin assessment done between this nurse and Polly SEXTON RN. Wound on sacrum, picture taken, and documented; see Media tab. Wound care consult placed.     1858: This nurse administered 5 mg Lopressor for a HR consistently in the 150's -160's.     0825-9790: This nurse discussed face to face with SHANI Carroll PA-C administration of po meds. See MAR for note.    [id number]: This nurse titrated cardizem gtt following parameter requirements.     [id number]: This nurse stopped cardizem gtt for HR in 60-80's and hypotension. See Flowsheets for VS. SHANI Carroll PA-C made aware by this nurse via Epic Chat. SHANI Carroll PA-C OK with gtt on hold.     Tyesha AVILA RN

## 2024-10-29 NOTE — PLAN OF CARE
Problem: PHYSICAL THERAPY ADULT  Goal: Performs mobility at highest level of function for planned discharge setting.  See evaluation for individualized goals.  Description: Treatment/Interventions: Functional transfer training, LE strengthening/ROM, Elevations, Therapeutic exercise, Endurance training, Gait training          See flowsheet documentation for full assessment, interventions and recommendations.  Outcome: Progressing  Note: Prognosis: Fair  Problem List: Decreased strength, Decreased endurance, Decreased mobility  Assessment: Pt is 74 y.o. female seen for PT evaluation s/p admit to [facility] on 10/28/2024 w/ Sepsis due to pneumonia (HCC). PT consulted to assess pt's functional mobility and d/c needs. Order placed for PT eval and tx, w/ up and OOB as tolerated order. Pt agreeable to PT  session upon arrival, pt found supine in bed.  PTA, pt was independent w/ all functional mobility w/ no AD and lives w/ spouse in 1 level home .  Pt to benefit from continued PT tx to address deficits and maximize level of functional independent mobility and consistency. Upon conclusion pt  seated in recliner. Complexity: Comorbidities affecting pt's physical performance at time of assessment include: COPD, respiratory failure, anxiety, and Afib . Personal factors affecting pt at time of IE include: advanced age, limited mobility, steps to enter home, and anxiety. Please find objective findings from PT assessment regarding body systems outlined above with impairments and limitations including weakness, impaired balance, decreased endurance, decreased activity tolerance, and decreased functional mobility tolerance.  Pt's clinical presentation is currently unstable/unpredictable seen in pt's presentation of hypertension, tachycardia, increased RR, abnormal H&H, and abnormal blood sugar levels. The patient's AM-PAC Basic Mobility Inpatient Short Form Raw Score is 20.  Based on patient presentations and impairments,  pt would most appropriately benefit from Level 2 resource intensity upon discharge. Please also refer to the recommendation of the Physical Therapist for safe discharge planning. RN verbalized pt appropriate for PT session.        Rehab Resource Intensity Level, PT: II (Moderate Resource Intensity) (could change based on next sessions)    See flowsheet documentation for full assessment.

## 2024-10-29 NOTE — PROGRESS NOTES
Progress Note - Hospitalist   Name: Corinne A Longo 74 y.o. female I MRN: [id number]  Unit/Bed#: ICU 09-01 I Date of Admission: 10/28/2024   Date of Service: 10/29/2024 I Hospital Day: 1    Assessment & Plan  Sepsis due to pneumonia (HCC)  Present on admission as evidenced by hyperthermia, tachycardia, tachypnea  Likely secondary to multilobar pneumonia  Chest x-ray with evidence of consolidations  Initiate vancomycin and cefepime ; pharmacy consult for vancomycin trough management  Follow-up blood and urine cultures  IV fluid resuscitation  Trend fever curve/WBC count/procalcitonin  Pulmonology consultation appreciated  Paroxysmal atrial fibrillation (HCC)  Patient went into atrial fibrillation with rapid ventricular response on 10/28/2024  Rapid response was called and patient was transitioned to level 2 stepdown  Initiated on diltiazem infusion  Heart rates have significantly improved  Initiate short acting diltiazem  Not on anticoagulation in the outpatient setting  Continue home aspirin  Cardiology consultation appreciated  Hemodynamics stable  Emphysema/COPD (HCC)  Possible acute exacerbation secondary to pneumonia  Utilizes chronic O2  Atrovent and Xopenex  Respiratory protocol  Pulmonology consultation appreciated  Hold off on IV corticosteroids in the setting of sepsis  Monitor respiratory status  Continue home Symbicort  Chronic respiratory failure with hypoxia (HCC)  Utilizes 2 L of nasal cannula supplemental O2 at baseline  Currently requiring 4-6 L in the setting of sepsis secondary to pneumonia  Respiratory protocol  Wean O2 as tolerated    VTE Pharmacologic Prophylaxis: VTE Score: 5 High Risk (Score >/= 5) - Pharmacological DVT Prophylaxis Ordered: heparin. Sequential Compression Devices Ordered.    Mobility:   Basic Mobility Inpatient Raw Score: 20  JH-HLM Goal: 6: Walk 10 steps or more  JH-HLM Achieved: 2: Bed activities/Dependent transfer (pt was an ICU transfer as an RRT for increasing oxygen  requirements and tachycardia)  JH-HLM Goal NOT achieved. Continue with multidisciplinary rounding and encourage appropriate mobility to improve upon JH-HLM goals.    Patient Centered Rounds: I performed bedside rounds with nursing staff today.     Discussions with Specialists or Other Care Team Provider: Cardiology, Pulmonology    Education and Discussions with Family / Patient: Updated  () via phone.    Current Length of Stay: 1 day(s)  Current Patient Status: Inpatient   Certification Statement: The patient will continue to require additional inpatient hospital stay due to acute hypoxic respiratory failure  Discharge Plan: Anticipate discharge in 48 hrs to discharge location to be determined pending rehab evaluations.    Code Status: Level 1 - Full Code    Subjective   Patient seen and examined at bedside.  No acute events overnight.  Patient's heart rate significantly improved on diltiazem infusion.  Cardiology and Pulmonology consultations appreciated.    Objective :  Temp:  [96.9 °F (36.1 °C)-98.9 °F (37.2 °C)] 97.8 °F (36.6 °C)  HR:  [] 79  BP: ()/(44-86) 100/52  Resp:  [10-37] 22  SpO2:  [81 %-95 %] 91 %  O2 Device: Nasal cannula  Nasal Cannula O2 Flow Rate (L/min):  [4 L/min-8 L/min] 5 L/min    Body mass index is 24.27 kg/m².     Input and Output Summary (last 24 hours):     Intake/Output Summary (Last 24 hours) at 10/29/2024 0723  Last data filed at 10/29/2024 0618  Gross per 24 hour   Intake 4235.49 ml   Output 450 ml   Net 3785.49 ml       Physical Exam  Vitals and nursing note reviewed.   Constitutional:       General: She is not in acute distress.     Appearance: She is well-developed.   HENT:      Head: Normocephalic and atraumatic.   Eyes:      Conjunctiva/sclera: Conjunctivae normal.   Cardiovascular:      Rate and Rhythm: Normal rate and regular rhythm.      Heart sounds: No murmur heard.  Pulmonary:      Effort: Pulmonary effort is normal. No respiratory distress.       Breath sounds: Normal breath sounds.   Abdominal:      Palpations: Abdomen is soft.      Tenderness: There is no abdominal tenderness.   Musculoskeletal:         General: No swelling.      Cervical back: Neck supple.   Skin:     General: Skin is warm and dry.      Capillary Refill: Capillary refill takes less than 2 seconds.   Neurological:      Mental Status: She is alert.   Psychiatric:         Mood and Affect: Mood normal.       Telemetry:  Telemetry Orders (From admission, onward)               24 Hour Telemetry Monitoring  Continuous x 24 Hours (Telem)        Question:  Reason for 24 Hour Telemetry  Answer:  Alcohol withdrawal and CIWA >7, electrolyte abnormalities, abnormal ECG and/or heart disease                     Telemetry Reviewed: Atrial fibrillation. HR averaging 90s-110s  Indication for Continued Telemetry Use: Arrthymias requiring medical therapy               Lab Results: I have reviewed the following results:   Results from last 7 days   Lab Units 10/29/24  0452 10/28/24  1232   WBC Thousand/uL 8.01 7.52   HEMOGLOBIN g/dL 10.7* 12.4   HEMATOCRIT % 32.6* 37.6   PLATELETS Thousands/uL 266 306   SEGS PCT %  --  88*   LYMPHO PCT %  --  7*   MONO PCT %  --  4   EOS PCT %  --  0     Results from last 7 days   Lab Units 10/29/24  0452 10/28/24  1232   SODIUM mmol/L 140 140   POTASSIUM mmol/L 3.7 3.9   CHLORIDE mmol/L 111* 106   CO2 mmol/L 21 21   BUN mg/dL 33* 34*   CREATININE mg/dL 0.87 1.03   ANION GAP mmol/L 8 13   CALCIUM mg/dL 8.2* 9.3   ALBUMIN g/dL  --  3.4*   TOTAL BILIRUBIN mg/dL  --  0.73   ALK PHOS U/L  --  59   ALT U/L  --  28   AST U/L  --  46*   GLUCOSE RANDOM mg/dL 159* 152*     Results from last 7 days   Lab Units 10/28/24  1232   INR  1.26*             Results from last 7 days   Lab Units 10/29/24  0452 10/28/24  1511 10/28/24  1232   LACTIC ACID mmol/L  --  1.1 2.3*   PROCALCITONIN ng/ml 0.51*  --  0.26*       Recent Cultures (last 7 days):   Results from last 7 days   Lab Units  10/28/24  1232   BLOOD CULTURE  Received in Microbiology Lab. Culture in Progress.  Received in Microbiology Lab. Culture in Progress.       Imaging Results Review: I reviewed radiology reports from this admission including: CT chest.  Other Study Results Review: No additional pertinent studies reviewed.    Last 24 Hours Medication List:     Current Facility-Administered Medications:     acetaminophen (TYLENOL) tablet 650 mg, Q6H PRN    aspirin chewable tablet 81 mg, Daily    atorvastatin (LIPITOR) tablet 40 mg, Daily    budesonide-formoterol (SYMBICORT) 160-4.5 mcg/act inhaler 2 puff, BID    cefepime (MAXIPIME) IVPB (premix in dextrose) 2,000 mg 50 mL, Q12H, Last Rate: Stopped (10/29/24 0618)    diltiazem (CARDIZEM) 125 mg in sodium chloride 0.9 % 125 mL infusion, Titrated, Last Rate: Stopped (10/29/24 0706)    diltiazem (CARDIZEM) tablet 30 mg, Q6H YING    guaiFENesin (MUCINEX) 12 hr tablet 600 mg, Q12H YING    heparin (porcine) subcutaneous injection 5,000 Units, Q8H YING **AND** [CANCELED] Platelet count, Once    ipratropium (ATROVENT) 0.02 % inhalation solution 0.5 mg, TID    levalbuterol (XOPENEX) inhalation solution 1.25 mg, TID    metoprolol (LOPRESSOR) injection 5 mg, Q6H PRN    nicotine (NICODERM CQ) 21 mg/24 hr TD 24 hr patch 1 patch, Daily    ondansetron (ZOFRAN) injection 4 mg, Q6H PRN    sodium chloride 0.9 % infusion, Continuous, Last Rate: 75 mL/hr (10/29/24 0458)    vancomycin (VANCOCIN) 1500 mg in sodium chloride 0.9% 250 mL IVPB, Q24H    Administrative Statements   Today, Patient Was Seen By: Fernando Sarmiento, DO  I have spent a total time of 35 minutes in caring for this patient on the day of the visit/encounter including Diagnostic results, Prognosis, Risks and benefits of tx options, Instructions for management, Patient and family education, Importance of tx compliance, Risk factor reductions, Impressions, Counseling / Coordination of care, Documenting in the medical record, Reviewing / ordering  tests, medicine, procedures  , Obtaining or reviewing history  , and Communicating with other healthcare professionals .    **Please Note: This note may have been constructed using a voice recognition system.**

## 2024-10-29 NOTE — PLAN OF CARE
Problem: OCCUPATIONAL THERAPY ADULT  Goal: Performs self-care activities at highest level of function for planned discharge setting.  See evaluation for individualized goals.  Description: Treatment Interventions: ADL retraining, Functional transfer training, UE strengthening/ROM, Endurance training, Patient/family training, Equipment evaluation/education, Energy conservation, Activityengagement          See flowsheet documentation for full assessment, interventions and recommendations.   Note: Limitation: Decreased ADL status, Decreased UE strength, Decreased endurance, Decreased self-care trans, Decreased high-level ADLs  Prognosis: Good  Assessment: Pt is a 74 y.o. female who was admitted to  [facility]  on 10/28/2024 w/ Sepsis due to pneumonia ([facility]). Pt has a past medical history of asthma, aFib, COPD, sepsis, and pneumonia. Pt lives with her significant other in a 1 story home with 1 [facility]. At baseline, pt was completing ADLs independently and functional mobility and transfers independently no AD. Currently, pt requires min A to supervision overall for ADLs and min A for mobility and transfers. Pt presents with deficits in the following categories: difficulty performing ADLS and difficulty performing IADLS  activity tolerance, endurance, and standing balance/tolerance. These deficits, along with pt's  pain limit the pt's ability to safely engage in areas of occupation such as: bathing/shower, toilet hygiene, dressing, functional mobility, community mobility, and household maintenance. Pt would benefit from continued skilled acute OT in order to maximize independence and safety in ADLs, mobility, and transfers. he patient's raw score on the -PAC Daily Activity Inpatient Short Form is 20. A raw score of greater than or equal to 19 suggests the patient may benefit from discharge to home. Discharge recommendation at this time is level III.  Pt benefited from co-evaluation of skilled OT and PT therapists in order to  most appropriately address functional deficits d/t extensive assistance required for safe functional mobility, decreased activity tolerance, and regression from functioning level prior to admission and/or onset of present illness. OT/PT objectives were addressed separately; please see PT note for specific goal areas targeted.     Rehab Resource Intensity Level, OT: III (Minimum Resource Intensity)

## 2024-10-29 NOTE — NUTRITION
"   10/29/24 1437   Biochemical Data,Medical Tests, and Procedures   Biochemical Data/Medical Tests/Procedures Lab values reviewed;Meds reviewed   Labs (Comment) 10/29/2024 hemoglobin 10.7, hematocrit 32.6, chloride 111, BUN 33, calcium 8.2   Meds (Comment) Eliquis, atorvastatin, Maxipime, Cardizem, Lopressor, Tylenol   Nutrition-Focused Physical Exam   Nutrition-Focused Physical Exam Findings RN skin assessment reviewed;Edema;Wound  (Trace bilateral lower extremity edema.  Wound at buttocks.)   Nutrition-Focused Physical Exam Findings Appears adequately nourished   Medical-Related Concerns hyperlipidemia, GABI, vitamin D deficiency, COPD, stage III CKD, GERD   Adequacy of Intake   Nutrition Modality PO   Feeding Route   PO Independent   Current PO Intake   Current Diet Order Regular diet, thin liquids   Current Meal Intake 25-50%   Estimated calorie intake compared to estimated need Nutrient needs are not met   PES Statement   Problem Intake   Energy Balance (1) Inadequate energy intake NI-1.2   Related to Other (Comment)  (Acute clinical condition)   As evidenced by: I/O's;Intake < estimated needs   Recommendations/Interventions   Malnutrition/BMI Present No  (Does not meet 2 criteria present)   Summary Wound care RN consulted.  Presents with SOB and chest pain.  Found to have sepsis due to pneumonia.  Past medical history significant for hyperlipidemia, GABI, vitamin D deficiency, COPD, stage III CKD, GERD.  Weight history reviewed.  No significant changes.  Trace bilateral lower extremity edema.  Wound at buttocks.  Prescribed a regular diet, thin liquids.  Meal completion 25%.  Nutrient needs are not met.  Patient reports her appetite is \"ehh'\" with medication changes and her taste being affected.  Reports consuming 2-3 meals daily.  Consumes foods such as pork chops, turkey, red meat 1-2 days weekly, fish 2 days weekly, sandwiches, salad.  Follows a low-salt, low sugar diet.  Patient cooks from scratch.  Her "  grocery shops.  Patient reads food labels.  Avoids tomato and other spicy foods.  Reports history of intentional weight loss.  RD discussed diet as prescribed.  Discussed nutrition supplements in setting of acute illness.  Agreeable to Ensure twice daily.  Will monitor tolerance at follow-up.   Interventions/Recommendations Continue current diet order;Monitor I & O's;Supplement initiate   Education Assessment   Education Education not indicated at this time   Patient Nutrition Goals   Goal Increase kcal/PRO intake;Avoid weight loss   Goal Status Initiated   Timeframe to complete goal by next f/u   Nutrition Complexity Risk   Nutrition complexity level Moderate risk   Follow up date 11/04/24

## 2024-10-29 NOTE — CONSULTS
Pulmonary Medicine-Consultation  Corinne A Longo 74 y.o. female MRN: [id number]  Unit/Bed#: ICU 09-01 Encounter: [id number]      Assessment:  Acute hypoxic respiratory failure  Multifocal airspace disease/pneumonia  Severe COPD/emphysema  Tobacco abuse disorder  A-fib/RVR    Recommendations/discussion:  Continue cefepime/vancomycin, added doxycycline  Ordered MRSA swab, urine antigens for Legionella/strep pneumoniae, and sputum cultures  Continue Atrovent/Xopenex 3 times daily  Airway clearance protocol/mucus clearance therapy added flutter valve/incentive spirometer, continue guaifenesin  Wean FiO2 for SpO2 above 88%  Monitor clinically for the next few days, if not improving will consider bronchoscopy for deeper sample    Will continue to follow    Physician Requesting Consult: Fernando Samriento DO    Reason for Consult / Principal Problem: Acute hypoxic respiratory failure    HPI:   74 y.o. female with a h/o CAD, depression, acid reflux, paroxysmal A-fib, chronic respiratory failure on 2 LPM at baseline, dyslipidemia, HTN, CKD 3, asthma/COPD, active tobacco abuse    Presented to the ED 10/28, with shortness of breath noted to be in respiratory distress/met SIRS criteria, chest x-ray showed multifocal opacities believed to be from pneumonia.  Acute hypoxic respiratory failure required up to 6-8 LPM from baseline 2 LPM, noted borderline PCT 0.26>> 0.51 today/elevated , admitted to  IM, started on IV antibiotics/fluid resuscitation.  RRT called for A-fib last night, started on Cardizem.  Chest CT showed extensive right-sided multilobar opacities/tree-in-bud nodularity at EMILIA/mild endobronchial impaction.    On my assessment, patient is resting in chair, feeling better compared to yesterday, less weak/fatigued, easier to breathe.  Still with dyspnea on minimal exertion, chest congestion.  Cough is now productive to clear mucus, still feeling sticky mucus in the chest.    Reports exposure to sick contact,  daughter and grandson were sick.  States that current illness started after receiving the COVID shot about 1.5 weeks ago.  Quit smoking 2 years ago.    Inpatient consult to Pulmonology  Consult performed by: Son Montalvo MD  Consult ordered by: Fernando Sarmiento DO          Review of Systems  As per hpi, all other systems reviewed and were negative      Studies:    Imaging Studies: I have personally reviewed pertinent films in PACS    EKG, Pathology, and Other Studies: I have personally reviewed pertinent films in PACS    Pulmonary Results (PFTs, PSG): Reviewed    Historical Information   Past Medical History:   Diagnosis Date    Allergic     Allergic rhinitis     [id number]  RESOLVED    Asthma     Atrial fibrillation (HCC)     Bronchitis     Bunion     COPD (chronic obstructive pulmonary disease) ([facility])     home O2 at night at home 2L    Coronary artery disease     AFIB    Depression     Emphysema lung ([facility])     Fibroadenoma of breast     Gastroesophageal reflux disease without esophagitis 12/18/2023    Hyperlipidemia     Hypertension     Lung disease 2008    COPD    Memory difficulties 07/23/2019    Post-menopausal     Post-menopausal     Sepsis due to pneumonia ([facility]) 10/28/2024    Spondylisthesis     Stage 3 chronic kidney disease, unspecified whether stage 3a or 3b CKD ([facility]) 02/07/2022    Vitamin D deficiency      Past Surgical History:   Procedure Laterality Date    ADENOIDECTOMY      BREAST CYST EXCISION Bilateral     benign-1987, 2002, 2007    BUNIONECTOMY Bilateral     CATARACT EXTRACTION, BILATERAL      EYE SURGERY      Cataracts removed    MOLE REMOVAL      lip    TONSILLECTOMY      TUBAL LIGATION       Social History   Social History     Substance and Sexual Activity   Alcohol Use Yes    Comment: I might have a glass of wine every two or three months     Social History     Substance and Sexual Activity   Drug Use No     Social History     Tobacco Use   Smoking Status Every Day    Current  "packs/day: 0.00    Average packs/day: 0.3 packs/day for 53.5 years (13.4 ttl pk-yrs)    Types: Cigarettes    Start date: 1970    Last attempt to quit: 2023    Years since quittin.3   Smokeless Tobacco Never     E-Cigarette/Vaping    E-Cigarette Use Never User      E-Cigarette/Vaping Substances    Nicotine No     THC No     CBD No     Flavoring No     Other No     Unknown No          Family History:   Family History   Problem Relation Age of Onset    Hypertension Mother     Alcohol abuse Mother     Hypertension Father     Diabetes Father     Prostate cancer Father     Cancer Father         Prostate    Hearing loss Father     Vitamin D deficiency Sister     Diverticulitis Sister     Depression Sister     No Known Problems Sister     Heart failure Brother     Lung cancer Brother     Cancer Brother         Stage four lung cancer    Celiac disease Daughter     Alcohol abuse Daughter     Substance Abuse Daughter     Eczema Daughter     Fibromyalgia Daughter     Substance Abuse Daughter     Alcohol abuse Daughter     ADD / ADHD Daughter     Bipolar disorder Daughter     Anxiety disorder Daughter     Ulcerative colitis Daughter     No Known Problems Maternal Aunt     Heart disease Brother     Substance Abuse Brother     Drug abuse Brother     Breast cancer Neg Hx        Meds/Allergies   all current active meds have been reviewed    Allergies   Allergen Reactions    Bupropion Itching    Ace Inhibitors Cough     Action Taken: stopped med and changed to ARB; Category: Adverse Reaction;     Citalopram Diarrhea and Nausea Only    Mixed Ragweed Cough    Pollen Extract Cough    Adhesive [Medical Tape] Rash     Patch        Objective   Vitals: Blood pressure 117/55, pulse (!) 141, temperature 97.8 °F (36.6 °C), temperature source Temporal, resp. rate (!) 24, height 5' 8\" (1.727 m), weight 72.4 kg (159 lb 9.8 oz), SpO2 (!) 88%, not currently breastfeeding.,Body mass index is 24.27 kg/m².    Intake/Output Summary (Last  " "hours) at 10/29/2024 1020  Last data filed at 10/29/2024 0800  Gross per 24 hour   Intake 4506.59 ml   Output 450 ml   Net 4056.59 ml     Invasive Devices       Peripheral Intravenous Line  Duration             Peripheral IV 10/28/24 Left Antecubital <1 day    Peripheral IV 10/28/24 Right;Ventral (anterior) Hand <1 day                    Physical Exam  Body mass index is 24.27 kg/m².   Gen: not in acute distress, sitting in chair   Neck/Eyes: supple, PERRL  Ear: normal appearance, no significant hearing impairment  Nose:  normal nasal mucosa, no drainage  Mouth:  unremarkable/normal appearance of lips  Oropharynx: mucosa is moist, no focal lesions or erythema  Salivary glands: soft nontender  Chest: normal respiratory efforts, mild rhonchi on the right, poor air movement  CV: irregular rhythm, distant heart sounds, mild peripheral edema  Abdomen: soft, non tender  Extremities:  No observed deformity   Neuro: AAO X3, no focal motor deficit       Lab Results: I have personally reviewed pertinent lab results.          None    Portions of the record may have been created with voice recognition software.  Occasional wrong word or \"sound a like\" substitutions may have occurred due to the inherent limitations of voice recognition software.  Read the chart carefully and recognize, using context, where substitutions have occurred.  "

## 2024-10-29 NOTE — ASSESSMENT & PLAN NOTE
Patient went into atrial fibrillation with rapid ventricular response on 10/28/2024  Rapid response was called and patient was transitioned to level 2 stepdown  Initiated on diltiazem infusion  Heart rates have significantly improved  Initiate short acting diltiazem  Not on anticoagulation in the outpatient setting  Continue home aspirin  Cardiology consultation appreciated  Hemodynamics stable

## 2024-10-29 NOTE — PROGRESS NOTES
Corinne A Longo is a 74 y.o. female who is currently ordered Vancomycin IV with management by the Pharmacy Consult service.  Relevant clinical data and objective / subjective history reviewed.  Vancomycin Assessment:  Indication and Goal AUC/Trough: Pneumonia (goal -600, trough >10), -600, trough >10  Clinical Status: stable  Micro:     Renal Function:  SCr: 0.87 mg/dL  CrCl: 58.9 mL/min  Renal replacement: Not on dialysis  Days of Therapy: 2  Current Dose: 1500mg IV q24h  Vancomycin Plan:  New Dosinmg IV q24h  Estimated AUC: 533 mcg*hr/mL  Estimated Trough: 12.9 mcg/mL  Next Level: 10/31 AM  Renal Function Monitoring: Daily BMP and UOP  Pharmacy will continue to follow closely for s/sx of nephrotoxicity, infusion reactions and appropriateness of therapy.  BMP and CBC will be ordered per protocol. We will continue to follow the patient’s culture results and clinical progress daily.    Sergio Davis, Pharmacist

## 2024-10-29 NOTE — NURSING NOTE
0700- Assumed care of pt. Report received. Pt has Cardizem infusing. Drip rate verified bedside w/ night shift RN    0706- Pt NSR on mo itor. HR in 70's. Cardizem drip     0800-Assessment complete. See flowsheets. Pt alert and oriented. Pt on 5 L nasal cannula. Lungs diminished. Moist cough.     0855- PT in to work with pt. Pt placed on commode and then in recliner    0910- Pt HR elevated in 140's-150's after working with PT.  PRN 5 mg IV Lopressor administered as ordered.     0930- Cardiology at bedside to see pt    0941- An additional 60 mg PO Cardizem ordered and administered.     1205- Pt O2 sats reading 86-87%. Nasal cannula increased to 6 liters at this time.    1230- O2 sats continue to read 86-87%. Pt placed on 8 liters Midflow at this time. Respiratory made aware.     1325- IV fluids stopped a this time    1606- 20 mg IV Lasix ordered and administered at this time    1621- Xray at bedside for portable chest xray    1700- Pt assisted back to bed per her request    1800- Pt remains on 8 L midlfow.

## 2024-10-30 ENCOUNTER — TELEPHONE (OUTPATIENT)
Dept: PULMONOLOGY | Facility: CLINIC | Age: 74
End: 2024-10-30

## 2024-10-30 ENCOUNTER — APPOINTMENT (INPATIENT)
Dept: NON INVASIVE DIAGNOSTICS | Facility: HOSPITAL | Age: 74
DRG: 871 | End: 2024-10-30
Payer: COMMERCIAL

## 2024-10-30 LAB
ANION GAP SERPL CALCULATED.3IONS-SCNC: 7 MMOL/L (ref 4–13)
AORTIC ROOT: 3.3 CM
APICAL FOUR CHAMBER EJECTION FRACTION: 54 %
ASCENDING AORTA: 3.2 CM
BSA FOR ECHO PROCEDURE: 1.85 M2
BUN SERPL-MCNC: 41 MG/DL (ref 5–25)
CALCIUM SERPL-MCNC: 8.7 MG/DL (ref 8.4–10.2)
CHLORIDE SERPL-SCNC: 113 MMOL/L (ref 96–108)
CO2 SERPL-SCNC: 22 MMOL/L (ref 21–32)
CREAT SERPL-MCNC: 1.26 MG/DL (ref 0.6–1.3)
E WAVE DECELERATION TIME: 182 MS
E/A RATIO: 103
ERYTHROCYTE [DISTWIDTH] IN BLOOD BY AUTOMATED COUNT: 13.2 % (ref 11.6–15.1)
FRACTIONAL SHORTENING: 35 (ref 28–44)
GFR SERPL CREATININE-BSD FRML MDRD: 42 ML/MIN/1.73SQ M
GLUCOSE SERPL-MCNC: 146 MG/DL (ref 65–140)
HCT VFR BLD AUTO: 29.8 % (ref 34.8–46.1)
HGB BLD-MCNC: 9.8 G/DL (ref 11.5–15.4)
INTERVENTRICULAR SEPTUM IN DIASTOLE (PARASTERNAL SHORT AXIS VIEW): 1.1 CM
INTERVENTRICULAR SEPTUM: 1.1 CM (ref 0.6–1.1)
IVC: 23 MM
LAAS-AP2: 15 CM2
LAAS-AP4: 16.4 CM2
LEFT ATRIUM SIZE: 3.8 CM
LEFT ATRIUM VOLUME (MOD BIPLANE): 42 ML
LEFT ATRIUM VOLUME INDEX (MOD BIPLANE): 22.6 ML/M2
LEFT INTERNAL DIMENSION IN SYSTOLE: 2.6 CM (ref 2.1–4)
LEFT VENTRICULAR INTERNAL DIMENSION IN DIASTOLE: 4 CM (ref 3.5–6)
LEFT VENTRICULAR POSTERIOR WALL IN END DIASTOLE: 1.1 CM
LEFT VENTRICULAR STROKE VOLUME: 47 ML
LVSV (TEICH): 47 ML
MAGNESIUM SERPL-MCNC: 2.3 MG/DL (ref 1.9–2.7)
MCH RBC QN AUTO: 30.3 PG (ref 26.8–34.3)
MCHC RBC AUTO-ENTMCNC: 32.9 G/DL (ref 31.4–37.4)
MCV RBC AUTO: 92 FL (ref 82–98)
MRSA NOSE QL CULT: NORMAL
MV PEAK A VEL: 0.01 M/S
MV PEAK E VEL: 103 CM/S
MV STENOSIS PRESSURE HALF TIME: 53 MS
MV VALVE AREA P 1/2 METHOD: 4.15
PHOSPHATE SERPL-MCNC: 3.2 MG/DL (ref 2.3–4.1)
PLATELET # BLD AUTO: 283 THOUSANDS/UL (ref 149–390)
PMV BLD AUTO: 10.2 FL (ref 8.9–12.7)
POTASSIUM SERPL-SCNC: 3.4 MMOL/L (ref 3.5–5.3)
PROCALCITONIN SERPL-MCNC: 0.58 NG/ML
RBC # BLD AUTO: 3.23 MILLION/UL (ref 3.81–5.12)
RIGHT ATRIUM AREA SYSTOLE A4C: 9.7 CM2
RIGHT VENTRICLE ID DIMENSION: 2.5 CM
SL CV LEFT ATRIUM LENGTH A2C: 4.6 CM
SL CV LV EF: 65
SL CV PED ECHO LEFT VENTRICLE DIASTOLIC VOLUME (MOD BIPLANE) 2D: 72 ML
SL CV PED ECHO LEFT VENTRICLE SYSTOLIC VOLUME (MOD BIPLANE) 2D: 25 ML
SODIUM SERPL-SCNC: 142 MMOL/L (ref 135–147)
TR MAX PG: 7 MMHG
TR PEAK VELOCITY: 1.3 M/S
TRICUSPID ANNULAR PLANE SYSTOLIC EXCURSION: 2.2 CM
TRICUSPID VALVE PEAK REGURGITATION VELOCITY: 1.29 M/S
WBC # BLD AUTO: 16.87 THOUSAND/UL (ref 4.31–10.16)

## 2024-10-30 PROCEDURE — 84145 PROCALCITONIN (PCT): CPT | Performed by: INTERNAL MEDICINE

## 2024-10-30 PROCEDURE — 85025 COMPLETE CBC W/AUTO DIFF WBC: CPT | Performed by: INTERNAL MEDICINE

## 2024-10-30 PROCEDURE — 94668 MNPJ CHEST WALL SBSQ: CPT

## 2024-10-30 PROCEDURE — 93306 TTE W/DOPPLER COMPLETE: CPT

## 2024-10-30 PROCEDURE — 99232 SBSQ HOSP IP/OBS MODERATE 35: CPT | Performed by: INTERNAL MEDICINE

## 2024-10-30 PROCEDURE — 80048 BASIC METABOLIC PNL TOTAL CA: CPT | Performed by: INTERNAL MEDICINE

## 2024-10-30 PROCEDURE — 94640 AIRWAY INHALATION TREATMENT: CPT

## 2024-10-30 PROCEDURE — 84100 ASSAY OF PHOSPHORUS: CPT | Performed by: INTERNAL MEDICINE

## 2024-10-30 PROCEDURE — 83735 ASSAY OF MAGNESIUM: CPT | Performed by: INTERNAL MEDICINE

## 2024-10-30 PROCEDURE — 93005 ELECTROCARDIOGRAM TRACING: CPT

## 2024-10-30 PROCEDURE — 93306 TTE W/DOPPLER COMPLETE: CPT | Performed by: INTERNAL MEDICINE

## 2024-10-30 PROCEDURE — 97116 GAIT TRAINING THERAPY: CPT

## 2024-10-30 PROCEDURE — 94760 N-INVAS EAR/PLS OXIMETRY 1: CPT

## 2024-10-30 PROCEDURE — 94664 DEMO&/EVAL PT USE INHALER: CPT

## 2024-10-30 RX ORDER — GUAIFENESIN 600 MG/1
1200 TABLET, EXTENDED RELEASE ORAL EVERY 12 HOURS SCHEDULED
Status: DISCONTINUED | OUTPATIENT
Start: 2024-10-30 | End: 2024-11-06 | Stop reason: HOSPADM

## 2024-10-30 RX ORDER — VANCOMYCIN HYDROCHLORIDE 1 G/200ML
1000 INJECTION, SOLUTION INTRAVENOUS EVERY 24 HOURS
Status: DISCONTINUED | OUTPATIENT
Start: 2024-10-30 | End: 2024-10-31

## 2024-10-30 RX ORDER — SODIUM CHLORIDE FOR INHALATION 3 %
4 VIAL, NEBULIZER (ML) INHALATION
Status: DISCONTINUED | OUTPATIENT
Start: 2024-10-30 | End: 2024-11-04

## 2024-10-30 RX ORDER — METHYLPREDNISOLONE SODIUM SUCCINATE 125 MG/2ML
60 INJECTION, POWDER, LYOPHILIZED, FOR SOLUTION INTRAMUSCULAR; INTRAVENOUS EVERY 8 HOURS SCHEDULED
Status: DISCONTINUED | OUTPATIENT
Start: 2024-10-30 | End: 2024-11-02

## 2024-10-30 RX ORDER — LORAZEPAM 0.5 MG/1
0.5 TABLET ORAL EVERY 8 HOURS PRN
Status: DISCONTINUED | OUTPATIENT
Start: 2024-10-30 | End: 2024-11-06 | Stop reason: HOSPADM

## 2024-10-30 RX ORDER — METOPROLOL TARTRATE 1 MG/ML
10 INJECTION, SOLUTION INTRAVENOUS EVERY 6 HOURS PRN
Status: DISCONTINUED | OUTPATIENT
Start: 2024-10-30 | End: 2024-11-06 | Stop reason: HOSPADM

## 2024-10-30 RX ORDER — POTASSIUM CHLORIDE 1500 MG/1
40 TABLET, EXTENDED RELEASE ORAL ONCE
Status: COMPLETED | OUTPATIENT
Start: 2024-10-30 | End: 2024-10-30

## 2024-10-30 RX ORDER — SODIUM CHLORIDE AND POTASSIUM CHLORIDE 150; 450 MG/100ML; MG/100ML
100 INJECTION, SOLUTION INTRAVENOUS CONTINUOUS
Status: DISCONTINUED | OUTPATIENT
Start: 2024-10-30 | End: 2024-10-30

## 2024-10-30 RX ADMIN — DILTIAZEM HYDROCHLORIDE 90 MG: 60 TABLET, FILM COATED ORAL at 11:34

## 2024-10-30 RX ADMIN — POTASSIUM CHLORIDE AND SODIUM CHLORIDE 100 ML/HR: 450; 150 INJECTION, SOLUTION INTRAVENOUS at 10:35

## 2024-10-30 RX ADMIN — POTASSIUM CHLORIDE 40 MEQ: 1500 TABLET, EXTENDED RELEASE ORAL at 09:00

## 2024-10-30 RX ADMIN — CEFEPIME HYDROCHLORIDE 2000 MG: 2 INJECTION, SOLUTION INTRAVENOUS at 05:18

## 2024-10-30 RX ADMIN — VANCOMYCIN HYDROCHLORIDE 1000 MG: 1 INJECTION, SOLUTION INTRAVENOUS at 10:28

## 2024-10-30 RX ADMIN — APIXABAN 5 MG: 5 TABLET, FILM COATED ORAL at 08:59

## 2024-10-30 RX ADMIN — MICONAZOLE NITRATE 1 APPLICATION: 20 CREAM TOPICAL at 17:43

## 2024-10-30 RX ADMIN — IPRATROPIUM BROMIDE 0.5 MG: 0.5 SOLUTION RESPIRATORY (INHALATION) at 09:14

## 2024-10-30 RX ADMIN — DOXYCYCLINE HYCLATE 100 MG: 100 CAPSULE ORAL at 20:59

## 2024-10-30 RX ADMIN — METHYLPREDNISOLONE SODIUM SUCCINATE 60 MG: 125 INJECTION, POWDER, FOR SOLUTION INTRAMUSCULAR; INTRAVENOUS at 14:27

## 2024-10-30 RX ADMIN — APIXABAN 5 MG: 5 TABLET, FILM COATED ORAL at 17:42

## 2024-10-30 RX ADMIN — POTASSIUM CHLORIDE: 2 INJECTION, SOLUTION, CONCENTRATE INTRAVENOUS at 11:32

## 2024-10-30 RX ADMIN — MICONAZOLE NITRATE 1 APPLICATION: 20 CREAM TOPICAL at 08:59

## 2024-10-30 RX ADMIN — CEFEPIME HYDROCHLORIDE 2000 MG: 2 INJECTION, SOLUTION INTRAVENOUS at 14:27

## 2024-10-30 RX ADMIN — SODIUM CHLORIDE SOLN NEBU 3% 4 ML: 3 NEBU SOLN at 14:12

## 2024-10-30 RX ADMIN — GUAIFENESIN 600 MG: 600 TABLET ORAL at 08:59

## 2024-10-30 RX ADMIN — METHYLPREDNISOLONE SODIUM SUCCINATE 60 MG: 125 INJECTION, POWDER, FOR SOLUTION INTRAMUSCULAR; INTRAVENOUS at 21:00

## 2024-10-30 RX ADMIN — GUAIFENESIN 1200 MG: 600 TABLET ORAL at 20:59

## 2024-10-30 RX ADMIN — DILTIAZEM HYDROCHLORIDE 90 MG: 60 TABLET, FILM COATED ORAL at 23:07

## 2024-10-30 RX ADMIN — LEVALBUTEROL HYDROCHLORIDE 1.25 MG: 1.25 SOLUTION RESPIRATORY (INHALATION) at 14:11

## 2024-10-30 RX ADMIN — LEVALBUTEROL HYDROCHLORIDE 1.25 MG: 1.25 SOLUTION RESPIRATORY (INHALATION) at 09:14

## 2024-10-30 RX ADMIN — Medication 5000 UNITS: at 08:58

## 2024-10-30 RX ADMIN — DOXYCYCLINE HYCLATE 100 MG: 100 CAPSULE ORAL at 08:59

## 2024-10-30 RX ADMIN — DILTIAZEM HYDROCHLORIDE 60 MG: 60 TABLET, FILM COATED ORAL at 05:18

## 2024-10-30 RX ADMIN — LEVALBUTEROL HYDROCHLORIDE 1.25 MG: 1.25 SOLUTION RESPIRATORY (INHALATION) at 19:35

## 2024-10-30 RX ADMIN — DILTIAZEM HYDROCHLORIDE 90 MG: 60 TABLET, FILM COATED ORAL at 17:42

## 2024-10-30 RX ADMIN — SODIUM CHLORIDE SOLN NEBU 3% 4 ML: 3 NEBU SOLN at 19:35

## 2024-10-30 RX ADMIN — LORAZEPAM 0.5 MG: 0.5 TABLET ORAL at 23:07

## 2024-10-30 RX ADMIN — POTASSIUM CHLORIDE: 2 INJECTION, SOLUTION, CONCENTRATE INTRAVENOUS at 22:34

## 2024-10-30 RX ADMIN — ATORVASTATIN CALCIUM 40 MG: 40 TABLET, FILM COATED ORAL at 08:58

## 2024-10-30 NOTE — ASSESSMENT & PLAN NOTE
Present on admission as evidenced by hyperthermia, tachycardia, tachypnea  Likely secondary to multilobar pneumonia  Chest x-ray with evidence of consolidations  Initiate vancomycin and cefepime ; pharmacy consult for vancomycin trough management  Follow-up blood and urine cultures  Trend fever curve/WBC count/procalcitonin  Pulmonology consultation appreciated  Procalcitonin slightly uptrending  Leukocytosis also worsening  Patient may need bronchoscopy if continues to worsen   5

## 2024-10-30 NOTE — PROGRESS NOTES
Progress Note - Hospitalist   Name: Corinne A Longo 74 y.o. female I MRN: [id number]  Unit/Bed#: ICU 09-01 I Date of Admission: 10/28/2024   Date of Service: 10/30/2024 I Hospital Day: 2    Assessment & Plan  Sepsis due to pneumonia (HCC)  Present on admission as evidenced by hyperthermia, tachycardia, tachypnea  Likely secondary to multilobar pneumonia  Chest x-ray with evidence of consolidations  Initiate vancomycin and cefepime ; pharmacy consult for vancomycin trough management  Follow-up blood and urine cultures  Trend fever curve/WBC count/procalcitonin  Pulmonology consultation appreciated  Procalcitonin slightly uptrending  Leukocytosis also worsening  Patient may need bronchoscopy if continues to worsen  Paroxysmal atrial fibrillation (HCC)  Patient went into atrial fibrillation with rapid ventricular response on 10/28/2024  Rapid response was called and patient was transitioned to level 2 stepdown  Initiated on diltiazem infusion which has since been discontinued  Heart rates have significantly improved  Initiate short acting diltiazem  Not on anticoagulation in the outpatient setting  Continue home aspirin  Cardiology consultation appreciated  Hemodynamics stable  Initiated on Eliquis by cardiology  Check 2D echocardiogram  Emphysema/COPD (HCC)  Possible acute exacerbation secondary to pneumonia  Utilizes chronic O2  Atrovent and Xopenex  Respiratory protocol  Pulmonology consultation appreciated  Hold off on IV corticosteroids in the setting of sepsis  Monitor respiratory status  Continue home Symbicort  Chronic respiratory failure with hypoxia (HCC)  Utilizes 2 L of nasal cannula supplemental O2 at baseline  Currently requiring 4-6 L in the setting of sepsis secondary to pneumonia  Respiratory protocol  Wean O2 as tolerated    VTE Pharmacologic Prophylaxis: VTE Score: 5 High Risk (Score >/= 5) - Pharmacological DVT Prophylaxis Ordered: heparin. Sequential Compression Devices Ordered.    Mobility:   Basic  Mobility Inpatient Raw Score: 20  JH-HLM Goal: 6: Walk 10 steps or more  JH-HLM Achieved: 6: Walk 10 steps or more  JH-HLM Goal NOT achieved. Continue with multidisciplinary rounding and encourage appropriate mobility to improve upon JH-HLM goals.    Patient Centered Rounds: I performed bedside rounds with nursing staff today.     Discussions with Specialists or Other Care Team Provider: Cardiology, Pulmonology    Education and Discussions with Family / Patient: Updated  () via phone.    Current Length of Stay: 2 day(s)  Current Patient Status: Inpatient   Certification Statement: The patient will continue to require additional inpatient hospital stay due to acute hypoxic respiratory failure  Discharge Plan: Anticipate discharge in 48 hrs to discharge location to be determined pending rehab evaluations.    Code Status: Level 1 - Full Code    Subjective   Patient seen and examined at bedside.  No acute events overnight.  Patient's heart rate significantly improved on diltiazem infusion.  Cardiology and Pulmonology consultations appreciated.    Objective :  Temp:  [97 °F (36.1 °C)-97.8 °F (36.6 °C)] 97.5 °F (36.4 °C)  HR:  [] 99  BP: ()/(52-72) 122/59  Resp:  [13-24] 13  SpO2:  [85 %-95 %] 90 %  O2 Device: Mid flow nasal cannula  Nasal Cannula O2 Flow Rate (L/min):  [5 L/min-85 L/min] 5 L/min    Body mass index is 24.27 kg/m².     Input and Output Summary (last 24 hours):     Intake/Output Summary (Last 24 hours) at 10/30/2024 0716  Last data filed at 10/30/2024 0600  Gross per 24 hour   Intake 1915.35 ml   Output 1600 ml   Net 315.35 ml       Physical Exam  Vitals and nursing note reviewed.   Constitutional:       General: She is not in acute distress.     Appearance: She is well-developed.   HENT:      Head: Normocephalic and atraumatic.   Eyes:      Conjunctiva/sclera: Conjunctivae normal.   Cardiovascular:      Rate and Rhythm: Normal rate and regular rhythm.      Heart sounds: No  murmur heard.  Pulmonary:      Effort: Pulmonary effort is normal. No respiratory distress.      Breath sounds: Normal breath sounds.   Abdominal:      Palpations: Abdomen is soft.      Tenderness: There is no abdominal tenderness.   Musculoskeletal:         General: No swelling.      Cervical back: Neck supple.   Skin:     General: Skin is warm and dry.      Capillary Refill: Capillary refill takes less than 2 seconds.   Neurological:      Mental Status: She is alert.   Psychiatric:         Mood and Affect: Mood normal.       Telemetry:  Telemetry Orders (From admission, onward)               24 Hour Telemetry Monitoring  Continuous x 24 Hours (Telem)        Question:  Reason for 24 Hour Telemetry  Answer:  Alcohol withdrawal and CIWA >7, electrolyte abnormalities, abnormal ECG and/or heart disease                     Telemetry Reviewed: Atrial fibrillation. HR averaging 90s-110s  Indication for Continued Telemetry Use: Arrthymias requiring medical therapy               Lab Results: I have reviewed the following results:   Results from last 7 days   Lab Units 10/30/24  0524 10/29/24  0452 10/28/24  1232   WBC Thousand/uL 16.87* 8.01 7.52   HEMOGLOBIN g/dL 9.8* 10.7* 12.4   HEMATOCRIT % 29.8* 32.6* 37.6   PLATELETS Thousands/uL 283 266 306   BANDS PCT %  --  3  --    SEGS PCT %  --   --  88*   LYMPHO PCT %  --  9* 7*   MONO PCT %  --  5 4   EOS PCT %  --  0 0     Results from last 7 days   Lab Units 10/30/24  0524 10/29/24  0452 10/28/24  1232   SODIUM mmol/L 142   < > 140   POTASSIUM mmol/L 3.4*   < > 3.9   CHLORIDE mmol/L 113*   < > 106   CO2 mmol/L 22   < > 21   BUN mg/dL 41*   < > 34*   CREATININE mg/dL 1.26   < > 1.03   ANION GAP mmol/L 7   < > 13   CALCIUM mg/dL 8.7   < > 9.3   ALBUMIN g/dL  --   --  3.4*   TOTAL BILIRUBIN mg/dL  --   --  0.73   ALK PHOS U/L  --   --  59   ALT U/L  --   --  28   AST U/L  --   --  46*   GLUCOSE RANDOM mg/dL 146*   < > 152*    < > = values in this interval not displayed.      Results from last 7 days   Lab Units 10/28/24  1232   INR  1.26*             Results from last 7 days   Lab Units 10/30/24  0524 10/29/24  0452 10/28/24  1511 10/28/24  1232   LACTIC ACID mmol/L  --   --  1.1 2.3*   PROCALCITONIN ng/ml 0.58* 0.51*  --  0.26*       Recent Cultures (last 7 days):   Results from last 7 days   Lab Units 10/29/24  1357 10/28/24  1232   BLOOD CULTURE   --  No Growth at 24 hrs.  No Growth at 24 hrs.   LEGIONELLA URINARY ANTIGEN  Negative  --        Imaging Results Review: I reviewed radiology reports from this admission including: CT chest.  Other Study Results Review: No additional pertinent studies reviewed.    Last 24 Hours Medication List:     Current Facility-Administered Medications:     acetaminophen (TYLENOL) tablet 650 mg, Q6H PRN    apixaban (ELIQUIS) tablet 5 mg, BID    atorvastatin (LIPITOR) tablet 40 mg, Daily    budesonide-formoterol (SYMBICORT) 160-4.5 mcg/act inhaler 2 puff, BID    cefepime (MAXIPIME) IVPB (premix in dextrose) 2,000 mg 50 mL, Q12H, Last Rate: 2,000 mg (10/30/24 0518)    Cholecalciferol (VITAMIN D3) tablet 5,000 Units, Daily    diltiazem (CARDIZEM) tablet 60 mg, Q6H YING    doxycycline hyclate (VIBRAMYCIN) capsule 100 mg, Q12H YING    guaiFENesin (MUCINEX) 12 hr tablet 600 mg, Q12H YING    ipratropium (ATROVENT) 0.02 % inhalation solution 0.5 mg, TID    levalbuterol (XOPENEX) inhalation solution 1.25 mg, TID    metoprolol (LOPRESSOR) injection 5 mg, Q6H PRN    moisture barrier miconazole 2% cream (aka [place] MOISTURE BARRIER ANTIFUNGAL CREAM), BID    nicotine (NICODERM CQ) 21 mg/24 hr TD 24 hr patch 1 patch, Daily    ondansetron (ZOFRAN) injection 4 mg, Q6H PRN    potassium chloride (Klor-Con M20) CR tablet 40 mEq, Once    vancomycin (VANCOCIN) 1500 mg in sodium chloride 0.9% 250 mL IVPB, Q24H, Last Rate: Stopped (10/29/24 1115)    Administrative Statements   Today, Patient Was Seen By: Fernando Sarmiento, DO  I have spent a total time of 35 minutes in caring  for this patient on the day of the visit/encounter including Diagnostic results, Prognosis, Risks and benefits of tx options, Instructions for management, Patient and family education, Importance of tx compliance, Risk factor reductions, Impressions, Counseling / Coordination of care, Documenting in the medical record, Reviewing / ordering tests, medicine, procedures  , Obtaining or reviewing history  , and Communicating with other healthcare professionals .    **Please Note: This note may have been constructed using a voice recognition system.**

## 2024-10-30 NOTE — PROGRESS NOTES
"Progress Note - Pulmonary   Corinne SHANI [place] 74 y.o. female MRN: [id number]  Unit/Bed#: ICU 09-01 Encounter: [id number]      Assessment:  Acute hypoxic respiratory failure-secondary to #2/3  Multifocal airspace disease/pneumonia  Severe COPD/emphysema-with exacerbation  Tobacco abuse disorder  A-fib/RVR    Plan:  Noted symptomatic relief since yesterday, easier to breathe/less chest congestion  Continue empiric cefepime/vancomycin/doxycycline, pending MRSA swab  Sputum cultures NGTD  Enhance mucus clearing: Increase guaifenesin to 1200 mg, 83% sodium chloride  Encouraged/supervised using the incentive spirometer/flutter valve  Discontinued ipratropium, continue Xopenex 3 times daily/Symbicort  Added steroids/Solu-Medrol 60 Q8  N.p.o. after midnight/if not improving oxygenation by tomorrow, will consider bedside bronchoscopy/clear airway and for deeper sample    Discussed with the primary team     Subjective:   Feeling better/easier to breathe and chest congestion/mucus started to loosen up.    Vitals: Blood pressure 126/60, pulse (!) 122, temperature 98 °F (36.7 °C), temperature source Tympanic, resp. rate (!) 36, height 5' 8\" (1.727 m), weight 72.1 kg (159 lb), SpO2 92%, not currently breastfeeding., Body mass index is 24.18 kg/m².      Intake/Output Summary (Last 24 hours) at 10/30/2024 1135  Last data filed at 10/30/2024 0600  Gross per 24 hour   Intake 1244.25 ml   Output 1600 ml   Net -355.75 ml       Physical Exam  Gen: not in acute distress, ill-appearing, nontoxic sitting in chair, Body mass index is 24.18 kg/m².   HEENT:supple, no JVD appreciated  Cardiac: Regular tachycardia, distant heart sounds, no murmurs appreciated  Lungs: normal respiratory effort, diminished/poor air movement, mild scattered rhonchi  Abdomen: soft, nontender, normoactive bowel sounds  Extremities: Mild lower extremity pitting edema  Neuro: AAO X3, no focal motor deficit    Labs: I have personally reviewed pertinent lab " results.        Son Montalvo MD

## 2024-10-30 NOTE — ASSESSMENT & PLAN NOTE
Patient went into atrial fibrillation with rapid ventricular response on 10/28/2024  Rapid response was called and patient was transitioned to level 2 stepdown  Initiated on diltiazem infusion which has since been discontinued  Heart rates have significantly improved  Initiate short acting diltiazem  Not on anticoagulation in the outpatient setting  Continue home aspirin  Cardiology consultation appreciated  Hemodynamics stable  Initiated on Eliquis by cardiology  Check 2D echocardiogram

## 2024-10-30 NOTE — UTILIZATION REVIEW
NOTIFICATION OF INPATIENT ADMISSION   AUTHORIZATION REQUEST   SERVICING FACILITY:   [place] [address]  Tax ID: [id number]  NPI: [id number]   ATTENDING PROVIDER:  Attending Name and NPI#: Fernando Sarmiento Do [[id number]]  Address: [address] [place] [address]  Phone: [phone]     ADMISSION INFORMATION:  Place of Service: Inpatient Acute [facility]  Place of Service Code: 21  Inpatient Admission Date/Time: 10/28/24  3:19 PM  Discharge Date/Time: No discharge date for patient encounter.  Admitting Diagnosis Code/Description:  COPD (chronic obstructive pulmonary disease) (HCC) [J44.9]  Chest pain [R07.9]  Pneumonia [J18.9]  SOB (shortness of breath) [R06.02]  Hypoxia [R09.02]  Atrial fibrillation with RVR (HCC) [I48.91]  Sepsis (HCC) [A41.9]  Sepsis due to pneumonia (HCC) [J18.9, A41.9]     UTILIZATION REVIEW CONTACT:  Yaya Lanier, Supervisor, Utilization Review Assistants  Network Utilization Review Department  Phone: [phone]  Fax [phone]  Email: [email or web address]  Contact for approvals/pending authorizations, clinical reviews, and discharge.     PHYSICIAN ADVISORY SERVICES:  Medical Necessity Denial & Ljeq-ga-Jvlm Review  Phone: [phone]  Fax: [phone]  Email: [email or web address]     DISCHARGE SUPPORT TEAM:  For Patients Discharge Needs & Updates  Phone: [phone] opt. 2 Fax: [phone]  Email: [email or web address]

## 2024-10-30 NOTE — PLAN OF CARE
Problem: PHYSICAL THERAPY ADULT  Goal: Performs mobility at highest level of function for planned discharge setting.  See evaluation for individualized goals.  Description: Treatment/Interventions: Functional transfer training, LE strengthening/ROM, Elevations, Therapeutic exercise, Endurance training, Gait training          See flowsheet documentation for full assessment, interventions and recommendations.  Outcome: Progressing  Note: Prognosis: Fair  Problem List: Decreased strength, Decreased endurance, Decreased mobility  Assessment: Pt seen for PT treatment session this date with interventions consisting of gait training to normalize gait pattern to decrease fall risk and therapeutic activity to educate patient on safe transfers to progress as able. Pt agreeable to PT treatment session upon arrival, pt found seated OOB in recliner, in no apparent distress. Since previous session, pt has made fair progress as evidenced by improved balance and improved safety awareness with mobitliy  Barriers during this session include anxiety, SOB, decreased SaO2 levels, and fatigue.  Pt continues to be functioning below baseline level, and remains limited 2* factors listed above and including impaired activity tolerance, impaired  balance, and decreased endurance.  Pt prognosis for achieving goals is fair, pending pt progress with hospitalization/medical status improvements, and indicated by motivated to participate in therapy, success with treatment, and consistent progress with treatment. PT will continue to see pt during current hospitalization in order to address the deficits listed above and provide interventions consistent w/ POC in effort to achieve goals. Current goals and POC remain appropriate, pt continues to have rehab potential  Upon conclusion pt supine in bed. The patient's AM-PAC Basic Mobility Inpatient Short Form Raw Score is 19.  Based on patient presentations and impairments, pt would most appropriately  benefit from Level 2 resource intensity upon discharge.  Please also refer to the recommendation of the Physical Therapist for safe discharge planning. RN verbalized pt appropriate for PT session.  Barriers to Discharge: None     Rehab Resource Intensity Level, PT: III (Minimum Resource Intensity)    See flowsheet documentation for full assessment.

## 2024-10-30 NOTE — PHYSICAL THERAPY NOTE
"PT Treatment Note    NAME:  Corinne A Longo  DATE: 10/30/24    AGE:   74 y.o.  Mrn:   [id number]  ADMIT DX:  COPD (chronic obstructive pulmonary disease) (HCC) [J44.9]  Chest pain [R07.9]  Pneumonia [J18.9]  SOB (shortness of breath) [R06.02]  Hypoxia [R09.02]  Atrial fibrillation with RVR (HCC) [I48.91]  Sepsis (HCC) [A41.9]  Sepsis due to pneumonia (HCC) [J18.9, A41.9]  Performed at least 2 patient identifiers during session: Name and ID bracelet       10/30/24 1024   PT Last Visit   PT Visit Date 10/30/24   Note Type   Note Type Treatment   Pain Assessment   Pain Assessment Tool 0-10   Pain Score No Pain   Restrictions/Precautions   Other Precautions Chair Alarm;O2;Fall Risk;Multiple lines;Telemetry   General   Chart Reviewed Yes   Family/Caregiver Present Yes   Cognition   Overall Cognitive Status WFL   Arousal/Participation Responsive;Alert;Arousable;Cooperative   Attention Within functional limits   Orientation Level Oriented X4   Memory Within functional limits   Following Commands Follows all commands and directions without difficulty   Transfers   Sit to Stand 4  Minimal assistance   Additional items Assist x 1;Armrests;Increased time required;Verbal cues   Stand to Sit 4  Minimal assistance   Additional items Increased time required;Verbal cues;Armrests   Toilet transfer 4  Minimal assistance   Additional items Assist x 1;Standard toilet;Increased time required  (R grab bar)   Additional Comments Pts HR increased to 140s-170s during toilet transfer, and SpO2 dropped to 84%. Pts nurse consulted at this time, and nurse alerted us that after she is done on the toilet to get her back in bed. pt reports that she is feeling \"anxious\" when on toilet.   Ambulation/Elevation   Gait pattern Wide ROBY;Forward Flexion;Step to;Short stride   Gait Assistance 4  Minimal assist   Additional items Assist x 1;Verbal cues   Assistive Device Rolling walker   Distance 10 ft  and 20 ft   Balance   Static Sitting Good   Dynamic " Sitting Fair +   Static Standing Fair   Dynamic Standing Fair   Ambulatory Fair -   Endurance Deficit   Endurance Deficit Yes   Endurance Deficit Description pt limited by low Spo2 levels and high HR but asymptomatic   Activity Tolerance   Activity Tolerance Patient limited by fatigue   Nurse Made Aware Nurse Polly may aware of pts high HR, recommended that she gets back into bed to relax for a bit.   Assessment   Prognosis Fair   Problem List Decreased strength;Decreased endurance;Decreased mobility   Assessment Pt seen for PT treatment session this date with interventions consisting of gait training to normalize gait pattern to decrease fall risk and therapeutic activity to educate patient on safe transfers to progress as able. Pt agreeable to PT treatment session upon arrival, pt found seated OOB in recliner, in no apparent distress. Since previous session, pt has made fair progress as evidenced by improved balance and improved safety awareness with casandra  Barriers during this session include anxiety, SOB, decreased SaO2 levels, and fatigue.  Pt continues to be functioning below baseline level, and remains limited 2* factors listed above and including impaired activity tolerance, impaired  balance, and decreased endurance.  Pt prognosis for achieving goals is fair, pending pt progress with hospitalization/medical status improvements, and indicated by motivated to participate in therapy, success with treatment, and consistent progress with treatment. PT will continue to see pt during current hospitalization in order to address the deficits listed above and provide interventions consistent w/ POC in effort to achieve goals. Current goals and POC remain appropriate, pt continues to have rehab potential  Upon conclusion pt supine in bed. The patient's AM-PAC Basic Mobility Inpatient Short Form Raw Score is 19.  Based on patient presentations and impairments, pt would most appropriately benefit from Level 3  resource intensity upon discharge.  Please also refer to the recommendation of the Physical Therapist for safe discharge planning. RN verbalized pt appropriate for PT session.   Barriers to Discharge None   Goals   Patient Goals to go to the [facility] Expiration Date 11/08/24   PT Treatment Day 1   Plan   Treatment/Interventions Functional transfer training;LE strengthening/ROM;Therapeutic exercise;Endurance training;Gait training;Equipment eval/education   Progress Progressing toward goals   PT Frequency 3-5x/wk   Discharge Recommendation   Rehab Resource Intensity Level, PT III (Minimal Resource Intensity)   AM-PAC Basic Mobility Inpatient   Turning in Flat Bed Without Bedrails 4   Lying on Back to Sitting on Edge of Flat Bed Without Bedrails 4   Moving Bed to Chair 3   Standing Up From Chair Using Arms 3   Walk in Room 3   Climb 3-5 Stairs With Railing 2   Basic Mobility Inpatient Raw Score 19   Basic Mobility Standardized Score 42.48   [facility] Highest Level Of Mobility   JH-HLM Goal 6: Walk 10 steps or more   JH-HLM Achieved 6: Walk 10 steps or more         Time In: 1007  Time Out: 1024  Total Treatment Minutes: 17    Sigrid Roth

## 2024-10-30 NOTE — PROGRESS NOTES
Corinne A Longo is a 74 y.o. female who is currently ordered Vancomycin IV with management by the Pharmacy Consult service.  Relevant clinical data and objective / subjective history reviewed.  Vancomycin Assessment:  Indication and Goal AUC/Trough: Pneumonia (goal -600, trough >10), -600, trough >10  Clinical Status: worsening  Micro:     Renal Function:  SCr: 1.26 mg/dL  CrCl: 40.6 mL/min  Renal replacement: Not on dialysis  Days of Therapy: 3  Current Dose: 1500mg IV q24h  Vancomycin Plan:  New Dosinmg IV q24h  Estimated AUC: 530 mcg*hr/mL  Estimated Trough: 15.2 mcg/mL  Next Level: 10 AM   Renal Function Monitoring: Daily BMP and UOP  Pharmacy will continue to follow closely for s/sx of nephrotoxicity, infusion reactions and appropriateness of therapy.  BMP and CBC will be ordered per protocol. We will continue to follow the patient’s culture results and clinical progress daily.    Sergio Davis, Pharmacist

## 2024-10-30 NOTE — TELEPHONE ENCOUNTER
Spoke to her . Calling to schedule Pulmonary follow up in January. She is currently admitted at [place] with pneumonia. He will tell her we called to schedule, but she may be coming in sooner.

## 2024-10-30 NOTE — ASSESSMENT & PLAN NOTE
Known atrial fibrillation  RVR on presentation due to volume depletion and sepsis  Received bolus of diltiazem with improvement but later required infusion.    Infusion titrated off but heart rate became elevated with getting out of bed    IV Lopressor administered   Rate remains rapid   Will increase diltiazem to 90 mg every 6 hours  In the acute setting, patient's CHADS2-VASc score is elevated.  She is agreeable to Eliquis    On Eliquis 5 mg BID

## 2024-10-30 NOTE — PLAN OF CARE
Problem: Potential for Falls  Goal: Patient will remain free of falls  Description: INTERVENTIONS:  - Educate patient/family on patient safety including physical limitations  - Instruct patient to call for assistance with activity   - Consult OT/PT to assist with strengthening/mobility   - Keep Call bell within reach  - Keep bed low and locked with side rails adjusted as appropriate  - Keep care items and personal belongings within reach  - Initiate and maintain comfort rounds  - Make Fall Risk Sign visible to staff  - Offer Toileting every 2 Hours, in advance of need  - Initiate/Maintain bed alarm  - Obtain necessary fall risk management equipment: non skid socks  - Apply yellow socks and bracelet for high fall risk patients  - Consider moving patient to room near nurses station  Outcome: Progressing     Problem: PAIN - ADULT  Goal: Verbalizes/displays adequate comfort level or baseline comfort level  Description: Interventions:  - Encourage patient to monitor pain and request assistance  - Assess pain using appropriate pain scale  - Administer analgesics based on type and severity of pain and evaluate response  - Implement non-pharmacological measures as appropriate and evaluate response  - Consider cultural and social influences on pain and pain management  - Notify physician/advanced practitioner if interventions unsuccessful or patient reports new pain  Outcome: Progressing     Problem: INFECTION - ADULT  Goal: Absence or prevention of progression during hospitalization  Description: INTERVENTIONS:  - Assess and monitor for signs and symptoms of infection  - Monitor lab/diagnostic results  - Monitor all insertion sites, i.e. indwelling lines, tubes, and drains  - Monitor endotracheal if appropriate and nasal secretions for changes in amount and color  - [place] appropriate cooling/warming therapies per order  - Administer medications as ordered  - Instruct and encourage patient and family to use good hand  hygiene technique  - Identify and instruct in appropriate isolation precautions for identified infection/condition  Outcome: Progressing     Problem: INFECTION - ADULT  Goal: Absence of fever/infection during neutropenic period  Description: INTERVENTIONS:  - Monitor WBC    Outcome: Progressing     Problem: SAFETY ADULT  Goal: Patient will remain free of falls  Description: INTERVENTIONS:  - Educate patient/family on patient safety including physical limitations  - Instruct patient to call for assistance with activity   - Consult OT/PT to assist with strengthening/mobility   - Keep Call bell within reach  - Keep bed low and locked with side rails adjusted as appropriate  - Keep care items and personal belongings within reach  - Initiate and maintain comfort rounds  - Make Fall Risk Sign visible to staff  - Offer Toileting every 2 Hours, in advance of need  - Initiate/Maintain bed alarm  - Obtain necessary fall risk management equipment: non skid socks  - Apply yellow socks and bracelet for high fall risk patients  - Consider moving patient to room near nurses station  Outcome: Progressing     Problem: GASTROINTESTINAL - ADULT  Goal: Maintains adequate nutritional intake  Description: INTERVENTIONS:  - Monitor percentage of each meal consumed  - Identify factors contributing to decreased intake, treat as appropriate  - Assist with meals as needed  - Monitor I&O, weight, and lab values if indicated  - Obtain nutrition services referral as needed  Outcome: Not Progressing     Problem: GENITOURINARY - ADULT  Goal: Maintains or returns to baseline urinary function  Description: INTERVENTIONS:  - Assess urinary function  - Encourage oral fluids to ensure adequate hydration if ordered  - Administer IV fluids as ordered to ensure adequate hydration  - Administer ordered medications as needed  - Offer frequent toileting  - Follow urinary retention protocol if ordered  Outcome: Progressing     Problem: METABOLIC, FLUID AND  ELECTROLYTES - ADULT  Goal: Electrolytes maintained within normal limits  Description: INTERVENTIONS:  - Monitor labs and assess patient for signs and symptoms of electrolyte imbalances  - Administer electrolyte replacement as ordered  - Monitor response to electrolyte replacements, including repeat lab results as appropriate  - Instruct patient on fluid and nutrition as appropriate  Outcome: Progressing

## 2024-10-30 NOTE — PLAN OF CARE
Problem: Potential for Falls  Goal: Patient will remain free of falls  Description: INTERVENTIONS:  - Educate patient/family on patient safety including physical limitations  - Instruct patient to call for assistance with activity   - Consult OT/PT to assist with strengthening/mobility   - Keep Call bell within reach  - Keep bed low and locked with side rails adjusted as appropriate  - Keep care items and personal belongings within reach  - Initiate and maintain comfort rounds  - Make Fall Risk Sign visible to staff  - Offer Toileting every 2 Hours, in advance of need  - Initiate/Maintain bed alarm  - Obtain necessary fall risk management equipment: non skid socks  - Apply yellow socks and bracelet for high fall risk patients  - Consider moving patient to room near nurses station  Outcome: Progressing     Problem: PAIN - ADULT  Goal: Verbalizes/displays adequate comfort level or baseline comfort level  Description: Interventions:  - Encourage patient to monitor pain and request assistance  - Assess pain using appropriate pain scale  - Administer analgesics based on type and severity of pain and evaluate response  - Implement non-pharmacological measures as appropriate and evaluate response  - Consider cultural and social influences on pain and pain management  - Notify physician/advanced practitioner if interventions unsuccessful or patient reports new pain  Outcome: Progressing     Problem: SAFETY ADULT  Goal: Patient will remain free of falls  Description: INTERVENTIONS:  - Educate patient/family on patient safety including physical limitations  - Instruct patient to call for assistance with activity   - Consult OT/PT to assist with strengthening/mobility   - Keep Call bell within reach  - Keep bed low and locked with side rails adjusted as appropriate  - Keep care items and personal belongings within reach  - Initiate and maintain comfort rounds  - Make Fall Risk Sign visible to staff  - Offer Toileting every 2  Hours, in advance of need  - Initiate/Maintain bed alarm  - Obtain necessary fall risk management equipment: non skid socks  - Apply yellow socks and bracelet for high fall risk patients  - Consider moving patient to room near nurses station  Outcome: Progressing     Problem: CARDIOVASCULAR - ADULT  Goal: Maintains optimal cardiac output and hemodynamic stability  Description: INTERVENTIONS:  - Monitor I/O, vital signs and rhythm  - Monitor for S/S and trends of decreased cardiac output  - Administer and titrate ordered vasoactive medications to optimize hemodynamic stability  - Assess quality of pulses, skin color and temperature  - Assess for signs of decreased coronary artery perfusion  - Instruct patient to report change in severity of symptoms  Outcome: Progressing     Problem: RESPIRATORY - ADULT  Goal: Achieves optimal ventilation and oxygenation  Description: INTERVENTIONS:  - Assess for changes in respiratory status  - Assess for changes in mentation and behavior  - Position to facilitate oxygenation and minimize respiratory effort  - Oxygen administered by appropriate delivery if ordered  - Initiate smoking cessation education as indicated  - Encourage broncho-pulmonary hygiene including cough, deep breathe, Incentive Spirometry  - Assess the need for suctioning and aspirate as needed  - Assess and instruct to report SOB or any respiratory difficulty  - Respiratory Therapy support as indicated  Outcome: Progressing     Problem: GASTROINTESTINAL - ADULT  Goal: Maintains adequate nutritional intake  Description: INTERVENTIONS:  - Monitor percentage of each meal consumed  - Identify factors contributing to decreased intake, treat as appropriate  - Assist with meals as needed  - Monitor I&O, weight, and lab values if indicated  - Obtain nutrition services referral as needed  Outcome: Progressing     Problem: GENITOURINARY - ADULT  Goal: Absence of urinary retention  Description: INTERVENTIONS:  - Assess  patient’s ability to void and empty bladder  - Monitor I/O  - Bladder scan as needed  - Discuss with physician/AP medications to alleviate retention as needed  - Discuss catheterization for long term situations as appropriate  Outcome: Progressing     Problem: METABOLIC, FLUID AND ELECTROLYTES - ADULT  Goal: Electrolytes maintained within normal limits  Description: INTERVENTIONS:  - Monitor labs and assess patient for signs and symptoms of electrolyte imbalances  - Administer electrolyte replacement as ordered  - Monitor response to electrolyte replacements, including repeat lab results as appropriate  - Instruct patient on fluid and nutrition as appropriate  Outcome: Progressing  Goal: Fluid balance maintained  Description: INTERVENTIONS:  - Monitor labs   - Monitor I/O and WT  - Instruct patient on fluid and nutrition as appropriate  - Assess for signs & symptoms of volume excess or deficit  Outcome: Progressing     Problem: SKIN/TISSUE INTEGRITY - ADULT  Goal: Skin Integrity remains intact(Skin Breakdown Prevention)  Description: Assess:  -Perform Silvestre assessment every shift  -Clean and moisturize skin every shift  -Inspect skin when repositioning, toileting, and assisting with ADLS  -Assess under medical devices such  -Assess extremities for adequate circulation and sensation     Bed Management:  -Have minimal linens on bed & keep smooth, unwrinkled  -Change linens as needed when moist or perspiring  -Avoid sitting or lying in one position for more than 2 hours while in bed  -Keep HOB at 30 degrees     Toileting:  -Offer bedside commode      Activity:  -Mobilize patient 3 times a day  -Encourage activity and walks on unit  -Encourage or provide ROM exercises   -Turn and reposition patient every 2 Hours  -Use appropriate equipment to lift or move patient in bed  -Instruct/ Assist with weight shifting every 60 minutes when out of bed in chair  -Consider limitation of chair time 4 hour intervals    Skin  Care:  -Avoid use of baby powder, tape, friction and shearing, hot water or constrictive clothing  -Relieve pressure over bony prominences using Mepelex foam dressing  -Do not massage red bony areas    Outcome: Progressing     Problem: MUSCULOSKELETAL - ADULT  Goal: Maintain or return mobility to safest level of function  Description: INTERVENTIONS:  - Assess patient's ability to carry out ADLs; assess patient's baseline for ADL function and identify physical deficits which impact ability to perform ADLs (bathing, care of mouth/teeth, toileting, grooming, dressing, etc.)  - Assess/evaluate cause of self-care deficits   - Assess range of motion  - Assess patient's mobility  - Assess patient's need for assistive devices and provide as appropriate  - Encourage maximum independence but intervene and supervise when necessary  - Involve family in performance of ADLs  - Assess for home care needs following discharge   - Consider OT consult to assist with ADL evaluation and planning for discharge  - Provide patient education as appropriate  Outcome: Progressing

## 2024-10-30 NOTE — PROGRESS NOTES
Pastoral Care Progress Note  CH initiated support visit to pt. Pt received CH upright in bed, family present bedside.  Pt shared regarding her medical incident. Pt shared that she is well supported.  CH introduced self and role, encouraged pt to ask for her if she can be of service.      10/30/24 1100   Clinical Encounter Type   Visited With Patient and family together

## 2024-10-30 NOTE — RESPIRATORY THERAPY NOTE
10/29/24 2016   Respiratory Assessment   Assessment Type Pre-treatment   General Appearance Alert;Awake   Respiratory Pattern Normal   Chest Assessment Chest expansion symmetrical   Bilateral Breath Sounds Diminished;Clear   Resp Comments no distress tx given   O2 Device MFNC   Oxygen Therapy/Pulse Ox   O2 Device Mid flow nasal cannula   O2 Therapy Oxygen humidified   Nasal Cannula O2 Flow Rate (L/min) (S)  5 L/min   Calculated FIO2 (%) - Nasal Cannula 40   O2 Flow Rate (L/min) 5 L/min   SpO2 Activity At Rest   $ Pulse Oximetry Spot Check Charge Completed

## 2024-10-30 NOTE — PROGRESS NOTES
"Progress Note - Cardiology   Name: Corinne A Longo 74 y.o. female I MRN: [id number]  Unit/Bed#: ICU 09-01 I Date of Admission: 10/28/2024   Date of Service: 10/30/2024 I Hospital Day: 2    Assessment & Plan  Sepsis due to pneumonia (HCC)  Primary reason for admission  Management per medical team  Paroxysmal atrial fibrillation (HCC)  Known atrial fibrillation  RVR on presentation due to volume depletion and sepsis  Received bolus of diltiazem with improvement but later required infusion.    Infusion titrated off but heart rate became elevated with getting out of bed    IV Lopressor administered   Rate remains rapid   Will increase diltiazem to 90 mg every 6 hours  In the acute setting, patient's CHADS2-VASc score is elevated.  She is agreeable to Eliquis    On Eliquis 5 mg BID  Chronic respiratory failure with hypoxia (HCC)  Oxygen dependent at baseline  Currently with increased oxygen requirements likely related to pneumonia  Management per medical team and pulmonary  Summary Comments:  Rate is still not optimally controlled. Remains rapid while resting in bed. Will increase Diltiazem to 90 mg q 6 hours and continue to monitor. Continue Eliquis for stroke risk prevention.     Once rate is better controlled, ok to transition of daily dosing of Diltiazem.     Outpatient Cardiologist: Dr. Reddy     Subjective:  Patient seen and examined at the bedside.  No events overnight.  Feeling better. No chest pain dyspnea, or palpitations. No edema. Rate remains rapid even while resting in bed.     Objective:   Vitals: Blood pressure 129/60, pulse 99, temperature 97.5 °F (36.4 °C), temperature source Temporal, resp. rate (!) 29, height 5' 8\" (1.727 m), weight 72.1 kg (159 lb), SpO2 90%, not currently breastfeeding.,   Orthostatic Blood Pressures      Flowsheet Row Most Recent Value   Blood Pressure 129/60 filed at 10/30/2024 0740   Patient Position - Orthostatic VS Lying filed at 10/30/2024 0000      "       Telemetry/ECG/Cardiac Testing:   Atrial fibrillation with RVR     Physical Exam:  Physical Exam  Vitals and nursing note reviewed.   Constitutional:       Appearance: She is well-developed.   HENT:      Head: Normocephalic and atraumatic.      Comments: NC in place     Mouth/Throat:      Mouth: Mucous membranes are moist.   Eyes:      General: No scleral icterus.     Conjunctiva/sclera: Conjunctivae normal.   Neck:      Vascular: No JVD.      Trachea: No tracheal deviation.   Cardiovascular:      Rate and Rhythm: Tachycardia present. Rhythm irregularly irregular.      Pulses: Intact distal pulses.      Heart sounds: S1 normal and S2 normal. Murmur heard.      Systolic murmur is present with a grade of 2/6.      No friction rub. No gallop.   Pulmonary:      Effort: Pulmonary effort is normal. No respiratory distress.      Breath sounds: Normal breath sounds. No wheezing or rales.   Chest:      Chest wall: No tenderness.   Abdominal:      General: Bowel sounds are normal. There is no distension.      Palpations: Abdomen is soft.      Tenderness: There is no abdominal tenderness.      Comments: Aorta not palpable    Musculoskeletal:         General: No tenderness. Normal range of motion.      Cervical back: Normal range of motion and neck supple.      Right lower leg: No edema.      Left lower leg: No edema.   Skin:     General: Skin is warm and dry.      Coloration: Skin is not pale.      Findings: No erythema or rash.   Neurological:      General: No focal deficit present.      Mental Status: She is alert and oriented to person, place, and time.   Psychiatric:         Mood and Affect: Mood normal.         Behavior: Behavior normal.         Judgment: Judgment normal.         Medications:    Current Facility-Administered Medications:     acetaminophen (TYLENOL) tablet 650 mg, 650 mg, Oral, Q6H PRN, Fernando Sarmiento DO, 650 mg at 10/29/24 1803    apixaban (ELIQUIS) tablet 5 mg, 5 mg, Oral, BID, Benny Reddy,  MD, 5 mg at 10/30/24 0859    atorvastatin (LIPITOR) tablet 40 mg, 40 mg, Oral, Daily, Fernando C Yorty, DO, 40 mg at 10/30/24 0858    budesonide-formoterol (SYMBICORT) 160-4.5 mcg/act inhaler 2 puff, 2 puff, Inhalation, BID, Fernando C Yorty, DO, 2 puff at 10/29/24 1711    cefepime (MAXIPIME) IVPB (premix in dextrose) 2,000 mg 50 mL, 2,000 mg, Intravenous, Q12H, Fernando C Yorty, DO, Last Rate: 100 mL/hr at 10/30/24 0518, 2,000 mg at 10/30/24 0518    Cholecalciferol (VITAMIN D3) tablet 5,000 Units, 5,000 Units, Oral, Daily, Fernando C Yorty, DO, 5,000 Units at 10/30/24 0858    diltiazem (CARDIZEM) tablet 90 mg, 90 mg, Oral, Q6H YING, Sonia Candelaria PA-C    doxycycline hyclate (VIBRAMYCIN) capsule 100 mg, 100 mg, Oral, Q12H YING, [facility] Mendez Montalvo MD, 100 mg at 10/30/24 0859    guaiFENesin (MUCINEX) 12 hr tablet 600 mg, 600 mg, Oral, Q12H YING, Fernando C Yorty, DO, 600 mg at 10/30/24 0859    ipratropium (ATROVENT) 0.02 % inhalation solution 0.5 mg, 0.5 mg, Nebulization, TID, Fernando C Yorty, DO, 0.5 mg at 10/30/24 0914    levalbuterol (XOPENEX) inhalation solution 1.25 mg, 1.25 mg, Nebulization, TID, Fernando C Yorty, DO, 1.25 mg at 10/30/24 0914    metoprolol (LOPRESSOR) injection 5 mg, 5 mg, Intravenous, Q6H PRN, Fernando C Yorty, DO, 5 mg at 10/29/24 0910    moisture barrier miconazole 2% cream (aka [place] MOISTURE BARRIER ANTIFUNGAL CREAM), , Topical, BID, Fernando C Yorty, DO, 1 Application at 10/30/24 0859    nicotine (NICODERM CQ) 21 mg/24 hr TD 24 hr patch 1 patch, 1 patch, Transdermal, Daily, Fernando Sarmiento DO    ondansetron (ZOFRAN) injection 4 mg, 4 mg, Intravenous, Q6H PRN, Fernando Sarmiento DO    sodium chloride 0.45 % with KCl 20 mEq/L infusion (premix), 100 mL/hr, Intravenous, Continuous, Benny Reddy MD    vancomycin (VANCOCIN) IVPB (premix in dextrose) 1,000 mg 200 mL, 1,000 mg, Intravenous, Q24H, Fernando Sarmiento DO    Labs & Results:  Results from last 7 days   Lab Units 10/28/24  1236   BNP  pg/mL 168*     Results from last 7 days   Lab Units 10/30/24  0524   WBC Thousand/uL 16.87*   HEMOGLOBIN g/dL 9.8*   HEMATOCRIT % 29.8*   PLATELETS Thousands/uL 283         Results from last 7 days   Lab Units 10/30/24  0524   POTASSIUM mmol/L 3.4*   CHLORIDE mmol/L 113*   CO2 mmol/L 22   BUN mg/dL 41*   CREATININE mg/dL 1.26   MAGNESIUM mg/dL 2.3     Results from last 7 days   Lab Units 10/28/24  1232   INR  1.26*

## 2024-10-31 LAB
ANION GAP SERPL CALCULATED.3IONS-SCNC: 6 MMOL/L (ref 4–13)
ATRIAL RATE: 144 BPM
BACTERIA SPT RESP CULT: ABNORMAL
BUN SERPL-MCNC: 37 MG/DL (ref 5–25)
CALCIUM SERPL-MCNC: 8.6 MG/DL (ref 8.4–10.2)
CHLORIDE SERPL-SCNC: 114 MMOL/L (ref 96–108)
CO2 SERPL-SCNC: 19 MMOL/L (ref 21–32)
CREAT SERPL-MCNC: 1.27 MG/DL (ref 0.6–1.3)
ERYTHROCYTE [DISTWIDTH] IN BLOOD BY AUTOMATED COUNT: 13.9 % (ref 11.6–15.1)
GFR SERPL CREATININE-BSD FRML MDRD: 42 ML/MIN/1.73SQ M
GLUCOSE SERPL-MCNC: 177 MG/DL (ref 65–140)
GRAM STN SPEC: ABNORMAL
HCT VFR BLD AUTO: 29.4 % (ref 34.8–46.1)
HGB BLD-MCNC: 9.6 G/DL (ref 11.5–15.4)
MAGNESIUM SERPL-MCNC: 2.4 MG/DL (ref 1.9–2.7)
MCH RBC QN AUTO: 30.7 PG (ref 26.8–34.3)
MCHC RBC AUTO-ENTMCNC: 32.7 G/DL (ref 31.4–37.4)
MCV RBC AUTO: 94 FL (ref 82–98)
PHOSPHATE SERPL-MCNC: 3.1 MG/DL (ref 2.3–4.1)
PLATELET # BLD AUTO: 311 THOUSANDS/UL (ref 149–390)
PMV BLD AUTO: 10.4 FL (ref 8.9–12.7)
POTASSIUM SERPL-SCNC: 5.1 MMOL/L (ref 3.5–5.3)
PROCALCITONIN SERPL-MCNC: 0.37 NG/ML
QRS AXIS: 59 DEGREES
QRSD INTERVAL: 74 MS
QT INTERVAL: 280 MS
QTC INTERVAL: 406 MS
RBC # BLD AUTO: 3.13 MILLION/UL (ref 3.81–5.12)
SODIUM SERPL-SCNC: 139 MMOL/L (ref 135–147)
T WAVE AXIS: -19 DEGREES
VANCOMYCIN TROUGH SERPL-MCNC: 17.2 UG/ML (ref 10–20)
VENTRICULAR RATE: 127 BPM
WBC # BLD AUTO: 11.05 THOUSAND/UL (ref 4.31–10.16)

## 2024-10-31 PROCEDURE — 94640 AIRWAY INHALATION TREATMENT: CPT

## 2024-10-31 PROCEDURE — 94668 MNPJ CHEST WALL SBSQ: CPT

## 2024-10-31 PROCEDURE — 94760 N-INVAS EAR/PLS OXIMETRY 1: CPT

## 2024-10-31 PROCEDURE — 99232 SBSQ HOSP IP/OBS MODERATE 35: CPT | Performed by: INTERNAL MEDICINE

## 2024-10-31 PROCEDURE — 80202 ASSAY OF VANCOMYCIN: CPT | Performed by: INTERNAL MEDICINE

## 2024-10-31 PROCEDURE — 94664 DEMO&/EVAL PT USE INHALER: CPT

## 2024-10-31 PROCEDURE — 85027 COMPLETE CBC AUTOMATED: CPT | Performed by: INTERNAL MEDICINE

## 2024-10-31 PROCEDURE — 84100 ASSAY OF PHOSPHORUS: CPT | Performed by: INTERNAL MEDICINE

## 2024-10-31 PROCEDURE — 99232 SBSQ HOSP IP/OBS MODERATE 35: CPT | Performed by: NURSE PRACTITIONER

## 2024-10-31 PROCEDURE — 80048 BASIC METABOLIC PNL TOTAL CA: CPT | Performed by: INTERNAL MEDICINE

## 2024-10-31 PROCEDURE — 93010 ELECTROCARDIOGRAM REPORT: CPT | Performed by: INTERNAL MEDICINE

## 2024-10-31 PROCEDURE — 84145 PROCALCITONIN (PCT): CPT | Performed by: INTERNAL MEDICINE

## 2024-10-31 PROCEDURE — 83735 ASSAY OF MAGNESIUM: CPT | Performed by: INTERNAL MEDICINE

## 2024-10-31 RX ADMIN — DILTIAZEM HYDROCHLORIDE 90 MG: 60 TABLET, FILM COATED ORAL at 13:08

## 2024-10-31 RX ADMIN — DILTIAZEM HYDROCHLORIDE 90 MG: 60 TABLET, FILM COATED ORAL at 18:13

## 2024-10-31 RX ADMIN — CEFEPIME HYDROCHLORIDE 2000 MG: 2 INJECTION, SOLUTION INTRAVENOUS at 18:13

## 2024-10-31 RX ADMIN — CEFEPIME HYDROCHLORIDE 2000 MG: 2 INJECTION, SOLUTION INTRAVENOUS at 03:23

## 2024-10-31 RX ADMIN — DOXYCYCLINE HYCLATE 100 MG: 100 CAPSULE ORAL at 10:14

## 2024-10-31 RX ADMIN — METHYLPREDNISOLONE SODIUM SUCCINATE 60 MG: 125 INJECTION, POWDER, FOR SOLUTION INTRAMUSCULAR; INTRAVENOUS at 13:09

## 2024-10-31 RX ADMIN — Medication 5000 UNITS: at 10:13

## 2024-10-31 RX ADMIN — GUAIFENESIN 1200 MG: 600 TABLET ORAL at 10:14

## 2024-10-31 RX ADMIN — SODIUM CHLORIDE SOLN NEBU 3% 4 ML: 3 NEBU SOLN at 20:02

## 2024-10-31 RX ADMIN — MICONAZOLE NITRATE 1 APPLICATION: 20 CREAM TOPICAL at 18:13

## 2024-10-31 RX ADMIN — GUAIFENESIN 1200 MG: 600 TABLET ORAL at 21:18

## 2024-10-31 RX ADMIN — APIXABAN 5 MG: 5 TABLET, FILM COATED ORAL at 10:14

## 2024-10-31 RX ADMIN — LEVALBUTEROL HYDROCHLORIDE 1.25 MG: 1.25 SOLUTION RESPIRATORY (INHALATION) at 20:02

## 2024-10-31 RX ADMIN — APIXABAN 5 MG: 5 TABLET, FILM COATED ORAL at 18:13

## 2024-10-31 RX ADMIN — MICONAZOLE NITRATE 1 APPLICATION: 20 CREAM TOPICAL at 08:51

## 2024-10-31 RX ADMIN — LEVALBUTEROL HYDROCHLORIDE 1.25 MG: 1.25 SOLUTION RESPIRATORY (INHALATION) at 13:55

## 2024-10-31 RX ADMIN — LEVALBUTEROL HYDROCHLORIDE 1.25 MG: 1.25 SOLUTION RESPIRATORY (INHALATION) at 08:05

## 2024-10-31 RX ADMIN — SODIUM CHLORIDE SOLN NEBU 3% 4 ML: 3 NEBU SOLN at 08:05

## 2024-10-31 RX ADMIN — METHYLPREDNISOLONE SODIUM SUCCINATE 60 MG: 125 INJECTION, POWDER, FOR SOLUTION INTRAMUSCULAR; INTRAVENOUS at 06:04

## 2024-10-31 RX ADMIN — SODIUM CHLORIDE SOLN NEBU 3% 4 ML: 3 NEBU SOLN at 13:55

## 2024-10-31 RX ADMIN — ATORVASTATIN CALCIUM 40 MG: 40 TABLET, FILM COATED ORAL at 10:14

## 2024-10-31 RX ADMIN — DOXYCYCLINE HYCLATE 100 MG: 100 CAPSULE ORAL at 21:18

## 2024-10-31 RX ADMIN — VANCOMYCIN HYDROCHLORIDE 1000 MG: 1 INJECTION, SOLUTION INTRAVENOUS at 10:24

## 2024-10-31 RX ADMIN — METHYLPREDNISOLONE SODIUM SUCCINATE 60 MG: 125 INJECTION, POWDER, FOR SOLUTION INTRAMUSCULAR; INTRAVENOUS at 21:18

## 2024-10-31 RX ADMIN — DILTIAZEM HYDROCHLORIDE 90 MG: 60 TABLET, FILM COATED ORAL at 06:04

## 2024-10-31 NOTE — NURSING NOTE
2100- This RN in patient room to administer medication, no complaints of pain and offers no concerns at this time. Medication explained, education provided, and no questions offered by patient. Patient stated that she would like to remain in the chair until 2200.

## 2024-10-31 NOTE — NURSING NOTE
Bedside shift report completed with ANGELA Matias. Patient OOB in chair and reports she is resting comfortably.

## 2024-10-31 NOTE — ASSESSMENT & PLAN NOTE
Known atrial fibrillation  RVR on presentation due to volume depletion and sepsis  Received bolus of diltiazem with improvement but later required infusion.   Infusion titrated off but heart rate increases with activity  Diltiazem increased to 90 mg every 6 hours yesterday.    Overall, rate control is better improved but still exacerbated by activity and patient's anxiety.  Continue to monitor on current regimen.  Hopeful transition to once daily dosing regimen tomorrow  In the acute setting, patient's CHADS2-VASc score is elevated.  Continue Eliquis 5 mg twice daily

## 2024-10-31 NOTE — ASSESSMENT & PLAN NOTE
Present on admission as evidenced by hyperthermia, tachycardia, tachypnea  Likely secondary to multilobar pneumonia  Chest x-ray with evidence of consolidations  Initiate vancomycin and cefepime ; pharmacy consult for vancomycin trough management  De-escalate antibiotics as appropriate  Follow-up blood and urine cultures  Trend fever curve/WBC count/procalcitonin  Pulmonology consultation appreciated  Procalcitonin slightly uptrending  Leukocytosis also worsening  Plan for bedside bronchoscopy with Pulmonology

## 2024-10-31 NOTE — PLAN OF CARE
Problem: Potential for Falls  Goal: Patient will remain free of falls  Description: INTERVENTIONS:  - Educate patient/family on patient safety including physical limitations  - Instruct patient to call for assistance with activity   - Consult OT/PT to assist with strengthening/mobility   - Keep Call bell within reach  - Keep bed low and locked with side rails adjusted as appropriate  - Keep care items and personal belongings within reach  - Initiate and maintain comfort rounds  - Make Fall Risk Sign visible to staff  - Offer Toileting every 2 Hours, in advance of need  - Initiate/Maintain bed alarm  - Obtain necessary fall risk management equipment: non skid socks  - Apply yellow socks and bracelet for high fall risk patients  - Consider moving patient to room near nurses station  Outcome: Progressing     Problem: PAIN - ADULT  Goal: Verbalizes/displays adequate comfort level or baseline comfort level  Description: Interventions:  - Encourage patient to monitor pain and request assistance  - Assess pain using appropriate pain scale  - Administer analgesics based on type and severity of pain and evaluate response  - Implement non-pharmacological measures as appropriate and evaluate response  - Consider cultural and social influences on pain and pain management  - Notify physician/advanced practitioner if interventions unsuccessful or patient reports new pain  Outcome: Progressing     Problem: INFECTION - ADULT  Goal: Absence or prevention of progression during hospitalization  Description: INTERVENTIONS:  - Assess and monitor for signs and symptoms of infection  - Monitor lab/diagnostic results  - Monitor all insertion sites, i.e. indwelling lines, tubes, and drains  - Monitor endotracheal if appropriate and nasal secretions for changes in amount and color  - [place] appropriate cooling/warming therapies per order  - Administer medications as ordered  - Instruct and encourage patient and family to use good hand  hygiene technique  - Identify and instruct in appropriate isolation precautions for identified infection/condition  Outcome: Progressing  Goal: Absence of fever/infection during neutropenic period  Description: INTERVENTIONS:  - Monitor WBC    Outcome: Progressing     Problem: SAFETY ADULT  Goal: Patient will remain free of falls  Description: INTERVENTIONS:  - Educate patient/family on patient safety including physical limitations  - Instruct patient to call for assistance with activity   - Consult OT/PT to assist with strengthening/mobility   - Keep Call bell within reach  - Keep bed low and locked with side rails adjusted as appropriate  - Keep care items and personal belongings within reach  - Initiate and maintain comfort rounds  - Make Fall Risk Sign visible to staff  - Offer Toileting every 2 Hours, in advance of need  - Initiate/Maintain bed alarm  - Obtain necessary fall risk management equipment: non skid socks  - Apply yellow socks and bracelet for high fall risk patients  - Consider moving patient to room near nurses station  Outcome: Progressing  Goal: Maintain or return to baseline ADL function  Description: INTERVENTIONS:  -  Assess patient's ability to carry out ADLs; assess patient's baseline for ADL function and identify physical deficits which impact ability to perform ADLs (bathing, care of mouth/teeth, toileting, grooming, dressing, etc.)  - Assess/evaluate cause of self-care deficits   - Assess range of motion  - Assess patient's mobility; develop plan if impaired  - Assess patient's need for assistive devices and provide as appropriate  - Encourage maximum independence but intervene and supervise when necessary  - Involve family in performance of ADLs  - Assess for home care needs following discharge   - Consider OT consult to assist with ADL evaluation and planning for discharge  - Provide patient education as appropriate  Outcome: Progressing  Goal: Maintains/Returns to pre admission  functional level  Description: INTERVENTIONS:  - Perform AM-PAC 6 Click Basic Mobility/ Daily Activity assessment daily.  - Set and communicate daily mobility goal to care team and patient/family/caregiver.   - Collaborate with rehabilitation services on mobility goals if consulted  - Perform Range of Motion 2 times a day.  - Reposition patient every 2 hours.  - Dangle patient 2 times a day  - Stand patient 2 times a day  - Ambulate patient 2 times a day  - Out of bed to chair 3 times a day   - Out of bed for meals 3 times a day  - Out of bed for toileting  - Record patient progress and toleration of activity level   Outcome: Progressing     Problem: DISCHARGE PLANNING  Goal: Discharge to home or other facility with appropriate resources  Description: INTERVENTIONS:  - Identify barriers to discharge w/patient and caregiver  - Identify discharge learning needs (meds, wound care, etc.)  - Arrange for interpretive services to assist at discharge as needed  - Refer to Case Management Department for coordinating discharge planning if the patient needs post-hospital services based on physician/advanced practitioner order or complex needs related to functional status, cognitive ability, or social support system  Outcome: Progressing     Problem: RESPIRATORY - ADULT  Goal: Achieves optimal ventilation and oxygenation  Description: INTERVENTIONS:  - Assess for changes in respiratory status  - Assess for changes in mentation and behavior  - Position to facilitate oxygenation and minimize respiratory effort  - Oxygen administered by appropriate delivery if ordered  - Initiate smoking cessation education as indicated  - Encourage broncho-pulmonary hygiene including cough, deep breathe, Incentive Spirometry  - Assess the need for suctioning and aspirate as needed  - Assess and instruct to report SOB or any respiratory difficulty  - Respiratory Therapy support as indicated  Outcome: Progressing     Problem: METABOLIC, FLUID AND  ELECTROLYTES - ADULT  Goal: Electrolytes maintained within normal limits  Description: INTERVENTIONS:  - Monitor labs and assess patient for signs and symptoms of electrolyte imbalances  - Administer electrolyte replacement as ordered  - Monitor response to electrolyte replacements, including repeat lab results as appropriate  - Instruct patient on fluid and nutrition as appropriate  Outcome: Progressing  Goal: Fluid balance maintained  Description: INTERVENTIONS:  - Monitor labs   - Monitor I/O and WT  - Instruct patient on fluid and nutrition as appropriate  - Assess for signs & symptoms of volume excess or deficit  Outcome: Progressing  Goal: Glucose maintained within target range  Description: INTERVENTIONS:  - Monitor Blood Glucose as ordered  - Assess for signs and symptoms of hyperglycemia and hypoglycemia  - Administer ordered medications to maintain glucose within target range  - Assess nutritional intake and initiate nutrition service referral as needed  Outcome: Progressing     Problem: SKIN/TISSUE INTEGRITY - ADULT  Goal: Skin Integrity remains intact(Skin Breakdown Prevention)  Description: Assess:  -Perform Silvestre assessment every shift  -Clean and moisturize skin every shift  -Inspect skin when repositioning, toileting, and assisting with ADLS  -Assess under medical devices such  -Assess extremities for adequate circulation and sensation     Bed Management:  -Have minimal linens on bed & keep smooth, unwrinkled  -Change linens as needed when moist or perspiring  -Avoid sitting or lying in one position for more than 2 hours while in bed  -Keep HOB at 30 degrees     Toileting:  -Offer bedside commode      Activity:  -Mobilize patient 3 times a day  -Encourage activity and walks on unit  -Encourage or provide ROM exercises   -Turn and reposition patient every 2 Hours  -Use appropriate equipment to lift or move patient in bed  -Instruct/ Assist with weight shifting every 60 minutes when out of bed in  chair  -Consider limitation of chair time 4 hour intervals    Skin Care:  -Avoid use of baby powder, tape, friction and shearing, hot water or constrictive clothing  -Relieve pressure over bony prominences using Mepelex foam dressing  -Do not massage red bony areas    Outcome: Progressing  Goal: Incision(s), wounds(s) or drain site(s) healing without S/S of infection  Description: INTERVENTIONS  - Assess and document dressing, incision, wound bed, drain sites and surrounding tissue  - Provide patient and family education  - Perform skin care/dressing changes   Outcome: Progressing  Goal: Pressure injury heals and does not worsen  Description: Interventions:  - Implement low air loss mattress or specialty surface (Criteria met)  - Apply silicone foam dressing  - Instruct/assist with weight shifting every 60 minutes when in chair   - Limit chair time to 4 hour intervals  - Use special pressure reducing interventions such as waffle cushion when in chair   - Apply fecal or urinary incontinence containment device   - Perform passive or active ROM every shift  - Turn and reposition patient & offload bony prominences every 2 hours   - Utilize friction reducing device or surface for transfers   - Consider nutrition services referral as needed  Outcome: Progressing

## 2024-10-31 NOTE — NURSING NOTE
Zoe Carroll PA-C at bedside, addressed patient's concerns. Patient agreeable to care and has no complaints at this time.

## 2024-10-31 NOTE — PLAN OF CARE
Problem: Potential for Falls  Goal: Patient will remain free of falls  Description: INTERVENTIONS:  - Educate patient/family on patient safety including physical limitations  - Instruct patient to call for assistance with activity   - Consult OT/PT to assist with strengthening/mobility   - Keep Call bell within reach  - Keep bed low and locked with side rails adjusted as appropriate  - Keep care items and personal belongings within reach  - Initiate and maintain comfort rounds  - Make Fall Risk Sign visible to staff  - Offer Toileting every 2 Hours, in advance of need  - Initiate/Maintain bed alarm  - Obtain necessary fall risk management equipment: non skid socks  - Apply yellow socks and bracelet for high fall risk patients  - Consider moving patient to room near nurses station  Outcome: Progressing     Problem: PAIN - ADULT  Goal: Verbalizes/displays adequate comfort level or baseline comfort level  Description: Interventions:  - Encourage patient to monitor pain and request assistance  - Assess pain using appropriate pain scale  - Administer analgesics based on type and severity of pain and evaluate response  - Implement non-pharmacological measures as appropriate and evaluate response  - Consider cultural and social influences on pain and pain management  - Notify physician/advanced practitioner if interventions unsuccessful or patient reports new pain  Outcome: Progressing     Problem: INFECTION - ADULT  Goal: Absence or prevention of progression during hospitalization  Description: INTERVENTIONS:  - Assess and monitor for signs and symptoms of infection  - Monitor lab/diagnostic results  - Monitor all insertion sites, i.e. indwelling lines, tubes, and drains  - Monitor endotracheal if appropriate and nasal secretions for changes in amount and color  - [place] appropriate cooling/warming therapies per order  - Administer medications as ordered  - Instruct and encourage patient and family to use good hand  hygiene technique  - Identify and instruct in appropriate isolation precautions for identified infection/condition  Outcome: Progressing     Problem: INFECTION - ADULT  Goal: Absence of fever/infection during neutropenic period  Description: INTERVENTIONS:  - Monitor WBC    Outcome: Progressing     Problem: SAFETY ADULT  Goal: Patient will remain free of falls  Description: INTERVENTIONS:  - Educate patient/family on patient safety including physical limitations  - Instruct patient to call for assistance with activity   - Consult OT/PT to assist with strengthening/mobility   - Keep Call bell within reach  - Keep bed low and locked with side rails adjusted as appropriate  - Keep care items and personal belongings within reach  - Initiate and maintain comfort rounds  - Make Fall Risk Sign visible to staff  - Offer Toileting every 2 Hours, in advance of need  - Initiate/Maintain bed alarm  - Obtain necessary fall risk management equipment: non skid socks  - Apply yellow socks and bracelet for high fall risk patients  - Consider moving patient to room near nurses station  Outcome: Progressing

## 2024-10-31 NOTE — PROGRESS NOTES
Progress Note - Cardiology   Name: Corinne A Longo 74 y.o. female I MRN: [id number]  Unit/Bed#: ICU 09-01 I Date of Admission: 10/28/2024   Date of Service: 10/31/2024 I Hospital Day: 3    Assessment & Plan  Sepsis due to pneumonia (HCC)  Primary reason for admission  Management per medical team  Planning for possible bronc today with pulmonary  Paroxysmal atrial fibrillation (HCC)  Known atrial fibrillation  RVR on presentation due to volume depletion and sepsis  Received bolus of diltiazem with improvement but later required infusion.   Infusion titrated off but heart rate increases with activity  Diltiazem increased to 90 mg every 6 hours yesterday.    Overall, rate control is better improved but still exacerbated by activity and patient's anxiety.  Continue to monitor on current regimen.  Hopeful transition to once daily dosing regimen tomorrow  In the acute setting, patient's CHADS2-VASc score is elevated.  Continue Eliquis 5 mg twice daily  Chronic respiratory failure with hypoxia (HCC)  Oxygen dependent at baseline  Currently with increased oxygen requirements likely related to pneumonia  Management per medical team and pulmonary  Emphysema/COPD (HCC)  Oxygen dependent at baseline  Pulmonary following    Outpatient Cardiologist: Dr. Reddy      Subjective:   Patient seen and examined.    Patient became very anxious last night, reports feeling better today.  She claims to be in a better mindset and is feeling better and notes slight improvement in her breathing.      Summary comments:  Current was admitted with acute hypoxic respiratory failure secondary to pneumonia with underlying severe COPD (oxygen dependent at baseline and asthma.  She has a known history of paroxysmal atrial fibrillation with an in the acute setting, developed RVR.    She continues to experience increased heart rates with minimal activity also exacerbated by underlying anxiety and probable hypovolemia, evidenced by labs.    Heart rate is  "better controlled on current regimen of diltiazem 90 mg every 6 hours.  Continue to monitor on this regimen.  IV Lopressor may be used for sustained heart rates above 120.  Would avoid daily beta-blocker therapy given her severe pulmonary disease.  Consider digoxin if additional rate control is needed, though expect rates will continue to improve.    Continue Eliquis for stroke risk reduction.      Telemetry/ECG/Cardiac testing:   Echo 10/30/2024  EF 65%, normal wall motion  Mild LAE    Echo 8/1/2018  EF 60%, no regional wall motion abnormalities  Mild LVH       Vitals: Blood pressure 133/60, pulse 85, temperature (!) 97.4 °F (36.3 °C), temperature source Tympanic, resp. rate (!) 27, height 5' 8\" (1.727 m), weight 72.1 kg (159 lb), SpO2 93%, not currently breastfeeding.,   Orthostatic Blood Pressures      Flowsheet Row Most Recent Value   Blood Pressure 133/60 filed at 10/31/2024 0800   Patient Position - Orthostatic VS Lying filed at 10/31/2024 0600        ,   Weight (last 2 days)       Date/Time Weight    10/30/24 0740 72.1 (159)    10/29/24 0600 72.4 (159.61)            Physical Exam:    General:  Normal appearance in no distress.  Eyes:  Anicteric.  Oral mucosa:  Moist.  Neck:  No JVD. Carotid upstrokes are brisk without bruits.  No masses.  Chest: Decreased throughout   Cardiac: Irregularly irregular, tachycardic, no palpable PMI.  Normal S1 and S2.  No murmur gallop or rub.  Abdomen:  Soft and nontender. No palpable organomegaly or aortic enlargement.  Extremities:  No peripheral edema.  Neuro:  Grossly symmetric.  Psych:  Alert and oriented x3.      Medications:      Current Facility-Administered Medications:     acetaminophen (TYLENOL) tablet 650 mg, 650 mg, Oral, Q6H PRN, Fernando Sarmiento DO, 650 mg at 10/29/24 1803    apixaban (ELIQUIS) tablet 5 mg, 5 mg, Oral, BID, Benny Reddy MD, 5 mg at 10/30/24 1742    atorvastatin (LIPITOR) tablet 40 mg, 40 mg, Oral, Daily, Fernando Sarmiento DO, 40 mg at 10/30/24 " 0858    budesonide-formoterol (SYMBICORT) 160-4.5 mcg/act inhaler 2 puff, 2 puff, Inhalation, BID, Fernando Sarmiento DO, 2 puff at 10/29/24 1711    cefepime (MAXIPIME) IVPB (premix in dextrose) 2,000 mg 50 mL, 2,000 mg, Intravenous, Q12H, Fernando Sarmiento DO, Last Rate: 100 mL/hr at 10/31/24 0601, Rate Verify at 10/31/24 0601    Cholecalciferol (VITAMIN D3) tablet 5,000 Units, 5,000 Units, Oral, Daily, Fernando Sarmiento DO, 5,000 Units at 10/30/24 0858    diltiazem (CARDIZEM) tablet 90 mg, 90 mg, Oral, Q6H YING, Sonia Candelaria PA-C, 90 mg at 10/31/24 0604    doxycycline hyclate (VIBRAMYCIN) capsule 100 mg, 100 mg, Oral, Q12H YING, Son Montalvo MD, 100 mg at 10/30/24 2059    guaiFENesin (MUCINEX) 12 hr tablet 1,200 mg, 1,200 mg, Oral, Q12H YING, Son Montalvo MD, 1,200 mg at 10/30/24 2059    levalbuterol (XOPENEX) inhalation solution 1.25 mg, 1.25 mg, Nebulization, TID, Fernando Sarmiento DO, 1.25 mg at 10/31/24 0805    LORazepam (ATIVAN) tablet 0.5 mg, 0.5 mg, Oral, Q8H PRN, Zoe Carroll PA-C, 0.5 mg at 10/30/24 2307    methylPREDNISolone sodium succinate (Solu-MEDROL) injection 60 mg, 60 mg, Intravenous, Q8H YING, Son Montalvo MD, 60 mg at 10/31/24 0604    metoprolol (LOPRESSOR) injection 10 mg, 10 mg, Intravenous, Q6H PRN, Fernando Sarmiento DO    moisture barrier miconazole 2% cream (aka [place] MOISTURE BARRIER ANTIFUNGAL CREAM), , Topical, BID, Fernando Sarmiento DO, 1 Application at 10/31/24 0851    nicotine (NICODERM CQ) 21 mg/24 hr TD 24 hr patch 1 patch, 1 patch, Transdermal, Daily, Fernando Sarmiento DO    ondansetron (ZOFRAN) injection 4 mg, 4 mg, Intravenous, Q6H PRN, Fernando Sarmiento DO    sodium chloride 3 % inhalation solution 4 mL, 4 mL, Nebulization, TID, Cline Mendez Montalvo MD, 4 mL at 10/31/24 0805    vancomycin (VANCOCIN) IVPB (premix in dextrose) 1,000 mg 200 mL, 1,000 mg, Intravenous, Q24H, Fernando Sarmiento DO, Last Rate: 200 mL/hr at  10/30/24 1028, 1,000 mg at 10/30/24 1028     Labs & Results:  Labs reviewed and prominent abnormalities reviewed above and/or below.  Troponins:    Results from last 7 days   Lab Units 10/28/24  1425 10/28/24  1232   HS TNI 0HR ng/L  --  14   HS TNI 2HR ng/L 14  --    HSTNI D2 ng/L 0  --         BNP:   Results from last 6 Months   Lab Units 10/28/24  1236   BNP pg/mL 168*

## 2024-10-31 NOTE — PROGRESS NOTES
Progress Note - Hospitalist   Name: Corinne A Longo 74 y.o. female I MRN: [id number]  Unit/Bed#: ICU 09-01 I Date of Admission: 10/28/2024   Date of Service: 10/31/2024 I Hospital Day: 3    Assessment & Plan  Sepsis due to pneumonia (HCC)  Present on admission as evidenced by hyperthermia, tachycardia, tachypnea  Likely secondary to multilobar pneumonia  Chest x-ray with evidence of consolidations  Initiate vancomycin and cefepime ; pharmacy consult for vancomycin trough management  De-escalate antibiotics as appropriate  Follow-up blood and urine cultures  Trend fever curve/WBC count/procalcitonin  Pulmonology consultation appreciated  Procalcitonin slightly uptrending  Leukocytosis also worsening  Plan for bedside bronchoscopy with Pulmonology  Paroxysmal atrial fibrillation (HCC)  Patient went into atrial fibrillation with rapid ventricular response on 10/28/2024  Rapid response was called and patient was transitioned to level 2 stepdown  Initiated on diltiazem infusion which has since been discontinued  Heart rates have significantly improved  Diltiazem 90 mg every 6 hours  Not on anticoagulation in the outpatient setting  Continue home aspirin  Cardiology consultation appreciated  Hemodynamics stable  Initiated on Eliquis by cardiology  2D echocardiogram with ejection fraction 65%  Emphysema/COPD (HCC)  Possible acute exacerbation secondary to pneumonia  Utilizes chronic O2  Atrovent and Xopenex  Respiratory protocol  Pulmonology consultation appreciated  IV corticosteroids as per Pulmonology  Monitor respiratory status  Continue home Symbicort  Chronic respiratory failure with hypoxia (HCC)  Utilizes 2 L of nasal cannula supplemental O2 at baseline  Currently requiring 4-6 L in the setting of sepsis secondary to pneumonia  Respiratory protocol  Wean O2 as tolerated    VTE Pharmacologic Prophylaxis: VTE Score: 5 High Risk (Score >/= 5) - Pharmacological DVT Prophylaxis Ordered: heparin. Sequential Compression  Devices Ordered.    Mobility:   Basic Mobility Inpatient Raw Score: 19  JH-HLM Goal: 6: Walk 10 steps or more  JH-HLM Achieved: 6: Walk 10 steps or more  JH-HLM Goal NOT achieved. Continue with multidisciplinary rounding and encourage appropriate mobility to improve upon JH-HLM goals.    Patient Centered Rounds: I performed bedside rounds with nursing staff today.     Discussions with Specialists or Other Care Team Provider: Cardiology, Pulmonology    Education and Discussions with Family / Patient: Updated  () via phone.    Current Length of Stay: 3 day(s)  Current Patient Status: Inpatient   Certification Statement: The patient will continue to require additional inpatient hospital stay due to acute hypoxic respiratory failure  Discharge Plan: Anticipate discharge in 48 hrs to discharge location to be determined pending rehab evaluations.    Code Status: Level 1 - Full Code    Subjective   Patient seen and examined at bedside.  No acute events overnight.  Patient's heart rate significantly improved.  Currently requiring 8 L of nasal cannula supplemental O2.  Plan for bronchoscopy with Pulmonology today.    Objective :  Temp:  [97 °F (36.1 °C)-98 °F (36.7 °C)] 97.2 °F (36.2 °C)  HR:  [] 81  BP: ()/(52-96) 123/58  Resp:  [11-36] 33  SpO2:  [85 %-96 %] 91 %  O2 Device: Mid flow nasal cannula  Nasal Cannula O2 Flow Rate (L/min):  [6 L/min-8 L/min] 8 L/min    Body mass index is 24.18 kg/m².     Input and Output Summary (last 24 hours):     Intake/Output Summary (Last 24 hours) at 10/31/2024 0715  Last data filed at 10/31/2024 0601  Gross per 24 hour   Intake 2020 ml   Output 500 ml   Net 1520 ml       Physical Exam  Vitals and nursing note reviewed.   Constitutional:       General: She is not in acute distress.     Appearance: She is well-developed.   HENT:      Head: Normocephalic and atraumatic.   Eyes:      Conjunctiva/sclera: Conjunctivae normal.   Cardiovascular:      Rate and  Rhythm: Normal rate and regular rhythm.      Heart sounds: No murmur heard.  Pulmonary:      Effort: Pulmonary effort is normal. No respiratory distress.      Breath sounds: Normal breath sounds.   Abdominal:      Palpations: Abdomen is soft.      Tenderness: There is no abdominal tenderness.   Musculoskeletal:         General: No swelling.      Cervical back: Neck supple.   Skin:     General: Skin is warm and dry.      Capillary Refill: Capillary refill takes less than 2 seconds.   Neurological:      Mental Status: She is alert.   Psychiatric:         Mood and Affect: Mood normal.       Telemetry:  Telemetry Orders (From admission, onward)               24 Hour Telemetry Monitoring  Continuous x 24 Hours (Telem)        Question:  Reason for 24 Hour Telemetry  Answer:  Alcohol withdrawal and CIWA >7, electrolyte abnormalities, abnormal ECG and/or heart disease                     Telemetry Reviewed: Atrial fibrillation. HR averaging 90s-110s  Indication for Continued Telemetry Use: Arrthymias requiring medical therapy               Lab Results: I have reviewed the following results:   Results from last 7 days   Lab Units 10/31/24  0558 10/30/24  0524 10/29/24  0452 10/28/24  1232   WBC Thousand/uL 11.05*   < > 8.01 7.52   HEMOGLOBIN g/dL 9.6*   < > 10.7* 12.4   HEMATOCRIT % 29.4*   < > 32.6* 37.6   PLATELETS Thousands/uL 311   < > 266 306   BANDS PCT %  --   --  3  --    SEGS PCT %  --   --   --  88*   LYMPHO PCT %  --   --  9* 7*   MONO PCT %  --   --  5 4   EOS PCT %  --   --  0 0    < > = values in this interval not displayed.     Results from last 7 days   Lab Units 10/31/24  0558 10/29/24  0452 10/28/24  1232   SODIUM mmol/L 139   < > 140   POTASSIUM mmol/L 5.1   < > 3.9   CHLORIDE mmol/L 114*   < > 106   CO2 mmol/L 19*   < > 21   BUN mg/dL 37*   < > 34*   CREATININE mg/dL 1.27   < > 1.03   ANION GAP mmol/L 6   < > 13   CALCIUM mg/dL 8.6   < > 9.3   ALBUMIN g/dL  --   --  3.4*   TOTAL BILIRUBIN mg/dL  --   --   0.73   ALK PHOS U/L  --   --  59   ALT U/L  --   --  28   AST U/L  --   --  46*   GLUCOSE RANDOM mg/dL 177*   < > 152*    < > = values in this interval not displayed.     Results from last 7 days   Lab Units 10/28/24  1232   INR  1.26*             Results from last 7 days   Lab Units 10/31/24  0558 10/30/24  0524 10/29/24  0452 10/28/24  1511 10/28/24  1232   LACTIC ACID mmol/L  --   --   --  1.1 2.3*   PROCALCITONIN ng/ml 0.37* 0.58* 0.51*  --  0.26*       Recent Cultures (last 7 days):   Results from last 7 days   Lab Units 10/29/24  1945 10/29/24  1357 10/28/24  1232   BLOOD CULTURE   --   --  No Growth at 48 hrs.  No Growth at 48 hrs.   SPUTUM CULTURE  Culture too young- will reincubate  --   --    GRAM STAIN RESULT  1+ Epithelial cells per low power field*  1+ Polys*  1+ Gram positive rods*  --   --    LEGIONELLA URINARY ANTIGEN   --  Negative  --        Imaging Results Review: I reviewed radiology reports from this admission including: CT chest.  Other Study Results Review: No additional pertinent studies reviewed.    Last 24 Hours Medication List:     Current Facility-Administered Medications:     acetaminophen (TYLENOL) tablet 650 mg, Q6H PRN    apixaban (ELIQUIS) tablet 5 mg, BID    atorvastatin (LIPITOR) tablet 40 mg, Daily    budesonide-formoterol (SYMBICORT) 160-4.5 mcg/act inhaler 2 puff, BID    cefepime (MAXIPIME) IVPB (premix in dextrose) 2,000 mg 50 mL, Q12H, Last Rate: 100 mL/hr at 10/31/24 0601    Cholecalciferol (VITAMIN D3) tablet 5,000 Units, Daily    diltiazem (CARDIZEM) tablet 90 mg, Q6H YING    doxycycline hyclate (VIBRAMYCIN) capsule 100 mg, Q12H YING    guaiFENesin (MUCINEX) 12 hr tablet 1,200 mg, Q12H YING    levalbuterol (XOPENEX) inhalation solution 1.25 mg, TID    LORazepam (ATIVAN) tablet 0.5 mg, Q8H PRN    methylPREDNISolone sodium succinate (Solu-MEDROL) injection 60 mg, Q8H YING    metoprolol (LOPRESSOR) injection 10 mg, Q6H PRN    moisture barrier miconazole 2% cream (aka [place]  MOISTURE BARRIER ANTIFUNGAL CREAM), BID    nicotine (NICODERM CQ) 21 mg/24 hr TD 24 hr patch 1 patch, Daily    ondansetron (ZOFRAN) injection 4 mg, Q6H PRN    potassium chloride 20 mEq in sodium chloride 0.45 % 1,000 mL infusion, Continuous, Last Rate: 100 mL/hr at 10/31/24 0601    sodium chloride 3 % inhalation solution 4 mL, TID    vancomycin (VANCOCIN) IVPB (premix in dextrose) 1,000 mg 200 mL, Q24H, Last Rate: 1,000 mg (10/30/24 1028)    Administrative Statements   Today, Patient Was Seen By: Fernando Sarmiento, DO  I have spent a total time of 35 minutes in caring for this patient on the day of the visit/encounter including Diagnostic results, Prognosis, Risks and benefits of tx options, Instructions for management, Patient and family education, Importance of tx compliance, Risk factor reductions, Impressions, Counseling / Coordination of care, Documenting in the medical record, Reviewing / ordering tests, medicine, procedures  , Obtaining or reviewing history  , and Communicating with other healthcare professionals .    **Please Note: This note may have been constructed using a voice recognition system.**

## 2024-10-31 NOTE — ASSESSMENT & PLAN NOTE
Primary reason for admission  Management per medical team  Planning for possible bronc today with pulmonary

## 2024-10-31 NOTE — PROGRESS NOTES
"Progress Note - Pulmonary   Corinne SHANI Carolina 74 y.o. female MRN: [id number]  Unit/Bed#: ICU 09-01 Encounter: [id number]      Assessment:  Acute hypoxic respiratory failure-secondary to #2/3  Improving, feels easier to breathe/no dyspnea at rest  Improving mucus production over the past 2 days  Multifocal airspace disease/pneumonia  Severe COPD/emphysema-with exacerbation  Tobacco abuse disorder  A-fib/RVR    Plan/discussion:  Continue broad antibiotics cefepime/doxycycline D3, for 7 days  Negative MRSA swab/discontinued the vancomycin  Started steroids 10/30, continue Solu-Medrol 60 mg q8 D2/3, then will taper  Continue Mucinex/3% sodium chloride  Airway clearance protocol, incentive spirometer/flutter valve  Xopenex 3 times daily/Symbicort  No bronchoscopy given the continued improvement  Wean FiO2 for SpO2 above 88%  Treatment for A-fib as per the primary team/cardiology    We will continue to follow    Subjective:   Feeling better, heart rate is better controlled.  Easier to breathe/able to produce more mucus compared to yesterday.    Vitals: Blood pressure 146/60, pulse (!) 110, temperature 97.6 °F (36.4 °C), temperature source Temporal, resp. rate (!) 29, height 5' 8\" (1.727 m), weight 72.1 kg (159 lb), SpO2 (!) 89%, not currently breastfeeding., Body mass index is 24.18 kg/m².      Intake/Output Summary (Last 24 hours) at 10/31/2024 1215  Last data filed at 10/31/2024 0849  Gross per 24 hour   Intake 1580 ml   Output 1225 ml   Net 355 ml       Physical Exam  Gen: not in acute distress, Body mass index is 24.18 kg/m².   HEENT:supple, no JVD appreciated  Cardiac: Irregular rhythm, no murmurs appreciated  Lungs: normal respiratory effort, moderate rhonchi on the left, scattered wheezes on right  Abdomen: soft, nontender, normoactive bowel sounds  Extremities: mild peripheral edema  Neuro: AAO X3, no focal motor deficit    Labs: I have personally reviewed pertinent lab results.        Son Hebert-Sayed, " MD

## 2024-10-31 NOTE — ASSESSMENT & PLAN NOTE
Patient went into atrial fibrillation with rapid ventricular response on 10/28/2024  Rapid response was called and patient was transitioned to level 2 stepdown  Initiated on diltiazem infusion which has since been discontinued  Heart rates have significantly improved  Diltiazem 90 mg every 6 hours  Not on anticoagulation in the outpatient setting  Continue home aspirin  Cardiology consultation appreciated  Hemodynamics stable  Initiated on Eliquis by cardiology  2D echocardiogram with ejection fraction 65%

## 2024-10-31 NOTE — ASSESSMENT & PLAN NOTE
Possible acute exacerbation secondary to pneumonia  Utilizes chronic O2  Atrovent and Xopenex  Respiratory protocol  Pulmonology consultation appreciated  IV corticosteroids as per Pulmonology  Monitor respiratory status  Continue home Symbicort

## 2024-10-31 NOTE — NURSING NOTE
"Patient requesting to talk to a provider in regards to her care. She is requesting Dr. Sarmiento and when nurse had mentioned he is not present in the hospital, she stated that she will \"no longer participate in care and will refuse everything\" since she feels \"abandoned\". When asked what she is refusing she stated \"everything.\" Patient also stated that she is \"very upset that her [employer] is not updated,\" but RN explained that there is no nursing assistant present on the unit at this time so that position will remain empty on sign. This RN name present at time of conversation. Patient remains connected to her continuous fluids at this time and OOB in chair. Zoe Carroll PA-C made aware.  "

## 2024-10-31 NOTE — PROGRESS NOTES
Corinne A Longo is a 74 y.o. female who is currently ordered Vancomycin IV with management by the Pharmacy Consult service.  Relevant clinical data and objective / subjective history reviewed.  Vancomycin Assessment:  Indication and Goal AUC/Trough: Pneumonia (goal -600, trough >10), -600, trough >10  Clinical Status: stable  Micro:     Renal Function:  SCr: 1.27 mg/dL  CrCl: 40.3 mL/min  Renal replacement: Not on dialysis  Days of Therapy: 4  Current Dose: 1000mg IV q24h  Vancomycin Plan:  New Dosinmg IV q24h  Estimated AUC: 570 mcg*hr/mL  Estimated Trough: 16.7 mcg/mL  Next Level: 11/6 AM labs  Renal Function Monitoring: Daily BMP and UOP  Pharmacy will continue to follow closely for s/sx of nephrotoxicity, infusion reactions and appropriateness of therapy.  BMP and CBC will be ordered per protocol. We will continue to follow the patient’s culture results and clinical progress daily.    Sergio Davis, Pharmacist

## 2024-11-01 LAB
ANION GAP SERPL CALCULATED.3IONS-SCNC: 5 MMOL/L (ref 4–13)
ANISOCYTOSIS BLD QL SMEAR: PRESENT
BASOPHILS # BLD MANUAL: 0 THOUSAND/UL (ref 0–0.1)
BASOPHILS NFR MAR MANUAL: 0 % (ref 0–1)
BUN SERPL-MCNC: 37 MG/DL (ref 5–25)
CALCIUM SERPL-MCNC: 8.9 MG/DL (ref 8.4–10.2)
CHLORIDE SERPL-SCNC: 115 MMOL/L (ref 96–108)
CO2 SERPL-SCNC: 20 MMOL/L (ref 21–32)
CREAT SERPL-MCNC: 1.4 MG/DL (ref 0.6–1.3)
EOSINOPHIL # BLD MANUAL: 0 THOUSAND/UL (ref 0–0.4)
EOSINOPHIL NFR BLD MANUAL: 0 % (ref 0–6)
ERYTHROCYTE [DISTWIDTH] IN BLOOD BY AUTOMATED COUNT: 14.1 % (ref 11.6–15.1)
GFR SERPL CREATININE-BSD FRML MDRD: 37 ML/MIN/1.73SQ M
GLUCOSE SERPL-MCNC: 177 MG/DL (ref 65–140)
HCT VFR BLD AUTO: 28.3 % (ref 34.8–46.1)
HGB BLD-MCNC: 9.2 G/DL (ref 11.5–15.4)
LYMPHOCYTES # BLD AUTO: 0.49 THOUSAND/UL (ref 0.6–4.47)
LYMPHOCYTES # BLD AUTO: 3 % (ref 14–44)
MAGNESIUM SERPL-MCNC: 2.3 MG/DL (ref 1.9–2.7)
MCH RBC QN AUTO: 30.7 PG (ref 26.8–34.3)
MCHC RBC AUTO-ENTMCNC: 32.5 G/DL (ref 31.4–37.4)
MCV RBC AUTO: 94 FL (ref 82–98)
MONOCYTES # BLD AUTO: 0.33 THOUSAND/UL (ref 0–1.22)
MONOCYTES NFR BLD: 2 % (ref 4–12)
NEUTROPHILS # BLD MANUAL: 15.54 THOUSAND/UL (ref 1.85–7.62)
NEUTS SEG NFR BLD AUTO: 95 % (ref 43–75)
PHOSPHATE SERPL-MCNC: 2.9 MG/DL (ref 2.3–4.1)
PLATELET # BLD AUTO: 323 THOUSANDS/UL (ref 149–390)
PLATELET BLD QL SMEAR: ADEQUATE
PMV BLD AUTO: 9.9 FL (ref 8.9–12.7)
POTASSIUM SERPL-SCNC: 4.2 MMOL/L (ref 3.5–5.3)
PROCALCITONIN SERPL-MCNC: 0.27 NG/ML
RBC # BLD AUTO: 3 MILLION/UL (ref 3.81–5.12)
RBC MORPH BLD: PRESENT
SODIUM SERPL-SCNC: 140 MMOL/L (ref 135–147)
WBC # BLD AUTO: 16.36 THOUSAND/UL (ref 4.31–10.16)

## 2024-11-01 PROCEDURE — 94760 N-INVAS EAR/PLS OXIMETRY 1: CPT

## 2024-11-01 PROCEDURE — 94664 DEMO&/EVAL PT USE INHALER: CPT

## 2024-11-01 PROCEDURE — 84145 PROCALCITONIN (PCT): CPT | Performed by: INTERNAL MEDICINE

## 2024-11-01 PROCEDURE — 97110 THERAPEUTIC EXERCISES: CPT

## 2024-11-01 PROCEDURE — 94668 MNPJ CHEST WALL SBSQ: CPT

## 2024-11-01 PROCEDURE — 80048 BASIC METABOLIC PNL TOTAL CA: CPT | Performed by: INTERNAL MEDICINE

## 2024-11-01 PROCEDURE — 85027 COMPLETE CBC AUTOMATED: CPT | Performed by: INTERNAL MEDICINE

## 2024-11-01 PROCEDURE — 93005 ELECTROCARDIOGRAM TRACING: CPT

## 2024-11-01 PROCEDURE — 99232 SBSQ HOSP IP/OBS MODERATE 35: CPT | Performed by: INTERNAL MEDICINE

## 2024-11-01 PROCEDURE — 85007 BL SMEAR W/DIFF WBC COUNT: CPT | Performed by: INTERNAL MEDICINE

## 2024-11-01 PROCEDURE — 83735 ASSAY OF MAGNESIUM: CPT | Performed by: INTERNAL MEDICINE

## 2024-11-01 PROCEDURE — 94640 AIRWAY INHALATION TREATMENT: CPT

## 2024-11-01 PROCEDURE — 84100 ASSAY OF PHOSPHORUS: CPT | Performed by: INTERNAL MEDICINE

## 2024-11-01 PROCEDURE — 97535 SELF CARE MNGMENT TRAINING: CPT

## 2024-11-01 RX ORDER — DILTIAZEM HYDROCHLORIDE 180 MG/1
360 CAPSULE, COATED, EXTENDED RELEASE ORAL DAILY
Status: DISCONTINUED | OUTPATIENT
Start: 2024-11-02 | End: 2024-11-06 | Stop reason: HOSPADM

## 2024-11-01 RX ORDER — DILTIAZEM HYDROCHLORIDE 240 MG/1
240 CAPSULE, COATED, EXTENDED RELEASE ORAL ONCE
Status: COMPLETED | OUTPATIENT
Start: 2024-11-01 | End: 2024-11-01

## 2024-11-01 RX ADMIN — GUAIFENESIN 1200 MG: 600 TABLET ORAL at 20:40

## 2024-11-01 RX ADMIN — METHYLPREDNISOLONE SODIUM SUCCINATE 60 MG: 125 INJECTION, POWDER, FOR SOLUTION INTRAMUSCULAR; INTRAVENOUS at 05:09

## 2024-11-01 RX ADMIN — SODIUM CHLORIDE SOLN NEBU 3% 4 ML: 3 NEBU SOLN at 14:05

## 2024-11-01 RX ADMIN — METHYLPREDNISOLONE SODIUM SUCCINATE 60 MG: 125 INJECTION, POWDER, FOR SOLUTION INTRAMUSCULAR; INTRAVENOUS at 14:39

## 2024-11-01 RX ADMIN — DILTIAZEM HYDROCHLORIDE 240 MG: 240 CAPSULE, EXTENDED RELEASE ORAL at 12:02

## 2024-11-01 RX ADMIN — DOXYCYCLINE HYCLATE 100 MG: 100 CAPSULE ORAL at 08:00

## 2024-11-01 RX ADMIN — Medication 5000 UNITS: at 08:00

## 2024-11-01 RX ADMIN — SODIUM CHLORIDE SOLN NEBU 3% 4 ML: 3 NEBU SOLN at 07:41

## 2024-11-01 RX ADMIN — LORAZEPAM 0.5 MG: 0.5 TABLET ORAL at 20:40

## 2024-11-01 RX ADMIN — MICONAZOLE NITRATE: 20 CREAM TOPICAL at 08:01

## 2024-11-01 RX ADMIN — DILTIAZEM HYDROCHLORIDE 90 MG: 60 TABLET, FILM COATED ORAL at 00:12

## 2024-11-01 RX ADMIN — DOXYCYCLINE HYCLATE 100 MG: 100 CAPSULE ORAL at 20:40

## 2024-11-01 RX ADMIN — LEVALBUTEROL HYDROCHLORIDE 1.25 MG: 1.25 SOLUTION RESPIRATORY (INHALATION) at 07:41

## 2024-11-01 RX ADMIN — APIXABAN 5 MG: 5 TABLET, FILM COATED ORAL at 17:04

## 2024-11-01 RX ADMIN — ATORVASTATIN CALCIUM 40 MG: 40 TABLET, FILM COATED ORAL at 08:00

## 2024-11-01 RX ADMIN — METHYLPREDNISOLONE SODIUM SUCCINATE 60 MG: 125 INJECTION, POWDER, FOR SOLUTION INTRAMUSCULAR; INTRAVENOUS at 21:59

## 2024-11-01 RX ADMIN — SODIUM CHLORIDE SOLN NEBU 3% 4 ML: 3 NEBU SOLN at 19:37

## 2024-11-01 RX ADMIN — DILTIAZEM HYDROCHLORIDE 90 MG: 60 TABLET, FILM COATED ORAL at 05:08

## 2024-11-01 RX ADMIN — CEFEPIME HYDROCHLORIDE 2000 MG: 2 INJECTION, SOLUTION INTRAVENOUS at 03:33

## 2024-11-01 RX ADMIN — LEVALBUTEROL HYDROCHLORIDE 1.25 MG: 1.25 SOLUTION RESPIRATORY (INHALATION) at 14:05

## 2024-11-01 RX ADMIN — GUAIFENESIN 1200 MG: 600 TABLET ORAL at 08:00

## 2024-11-01 RX ADMIN — CEFEPIME HYDROCHLORIDE 2000 MG: 2 INJECTION, SOLUTION INTRAVENOUS at 14:39

## 2024-11-01 RX ADMIN — MICONAZOLE NITRATE: 20 CREAM TOPICAL at 17:05

## 2024-11-01 RX ADMIN — LEVALBUTEROL HYDROCHLORIDE 1.25 MG: 1.25 SOLUTION RESPIRATORY (INHALATION) at 19:37

## 2024-11-01 RX ADMIN — APIXABAN 5 MG: 5 TABLET, FILM COATED ORAL at 08:01

## 2024-11-01 NOTE — PROGRESS NOTES
Progress Note - Hospitalist   Name: Corinne A Longo 74 y.o. female I MRN: [id number]  Unit/Bed#: ICU 09-01 I Date of Admission: 10/28/2024   Date of Service: 11/1/2024 I Hospital Day: 4    Assessment & Plan  Sepsis due to pneumonia (HCC)  Present on admission as evidenced by hyperthermia, tachycardia, tachypnea  Likely secondary to multilobar pneumonia  Chest x-ray with evidence of consolidations  Discontinue vancomycin  Continue cefepime  Follow-up blood and urine cultures  Trend fever curve/WBC count/procalcitonin  Pulmonology following  Procalcitonin improving  Paroxysmal atrial fibrillation (HCC)  Patient went into atrial fibrillation with rapid ventricular response on 10/28/2024  Rapid response was called and patient was transitioned to level 2 stepdown  Initiated on diltiazem infusion which has since been discontinued  Heart rates have significantly improved  Diltiazem 90 mg every 6 hours  Not on anticoagulation in the outpatient setting  Continue home aspirin  Cardiology consultation appreciated  Hemodynamics stable  Initiated on Eliquis by cardiology  2D echocardiogram with ejection fraction 65%  Emphysema/COPD (HCC)  Possible acute exacerbation secondary to pneumonia  Utilizes chronic O2  Atrovent and Xopenex  Respiratory protocol  Pulmonology consultation appreciated  IV corticosteroids as per Pulmonology  Monitor respiratory status  Continue home Symbicort  Chronic respiratory failure with hypoxia (HCC)  Utilizes 2 L of nasal cannula supplemental O2 at baseline  Currently requiring 4-6 L in the setting of sepsis secondary to pneumonia  Respiratory protocol  Wean O2 as tolerated    VTE Pharmacologic Prophylaxis: VTE Score: 5 High Risk (Score >/= 5) - Pharmacological DVT Prophylaxis Ordered: heparin. Sequential Compression Devices Ordered.    Mobility:   Basic Mobility Inpatient Raw Score: 19  JH-HLM Goal: 6: Walk 10 steps or more  JH-HLM Achieved: 6: Walk 10 steps or more  JH-HLM Goal NOT achieved. Continue  with multidisciplinary rounding and encourage appropriate mobility to improve upon [facility] goals.    Patient Centered Rounds: I performed bedside rounds with nursing staff today.     Discussions with Specialists or Other Care Team Provider: Cardiology, Pulmonology    Education and Discussions with Family / Patient: Updated  () via phone.    Current Length of Stay: 4 day(s)  Current Patient Status: Inpatient   Certification Statement: The patient will continue to require additional inpatient hospital stay due to acute hypoxic respiratory failure  Discharge Plan: Anticipate discharge in 48 hrs to discharge location to be determined pending rehab evaluations.    Code Status: Level 1 - Full Code    Subjective   Patient seen and examined at bedside.  No acute events overnight.  Patient's heart rate significantly improved.  Currently requiring 8 L of nasal cannula supplemental O2.  Hemodynamically stable.    Objective :  Temp:  [97.1 °F (36.2 °C)-98.3 °F (36.8 °C)] 98.3 °F (36.8 °C)  HR:  [] 79  BP: (103-146)/(52-90) 126/61  Resp:  [14-45] 18  SpO2:  [85 %-96 %] 93 %  O2 Device: Mid flow nasal cannula  Nasal Cannula O2 Flow Rate (L/min):  [8 L/min] 8 L/min    Body mass index is 24.18 kg/m².     Input and Output Summary (last 24 hours):     Intake/Output Summary (Last 24 hours) at 11/1/2024 0723  Last data filed at 11/1/2024 0534  Gross per 24 hour   Intake 880 ml   Output 2275 ml   Net -1395 ml       Physical Exam  Vitals and nursing note reviewed.   Constitutional:       General: She is not in acute distress.     Appearance: She is well-developed.   HENT:      Head: Normocephalic and atraumatic.   Eyes:      Conjunctiva/sclera: Conjunctivae normal.   Cardiovascular:      Rate and Rhythm: Normal rate and regular rhythm.      Heart sounds: No murmur heard.  Pulmonary:      Effort: Pulmonary effort is normal. No respiratory distress.      Breath sounds: Normal breath sounds.   Abdominal:       Palpations: Abdomen is soft.      Tenderness: There is no abdominal tenderness.   Musculoskeletal:         General: No swelling.      Cervical back: Neck supple.   Skin:     General: Skin is warm and dry.      Capillary Refill: Capillary refill takes less than 2 seconds.   Neurological:      Mental Status: She is alert.   Psychiatric:         Mood and Affect: Mood normal.       Telemetry:  Telemetry Orders (From admission, onward)               24 Hour Telemetry Monitoring  Continuous x 24 Hours (Telem)        Question:  Reason for 24 Hour Telemetry  Answer:  Alcohol withdrawal and CIWA >7, electrolyte abnormalities, abnormal ECG and/or heart disease                     Telemetry Reviewed: Atrial fibrillation. HR averaging 90s-110s  Indication for Continued Telemetry Use: Arrthymias requiring medical therapy               Lab Results: I have reviewed the following results:   Results from last 7 days   Lab Units 11/01/24  0511 10/30/24  0524 10/29/24  0452 10/28/24  1232 10/28/24  1232   WBC Thousand/uL 16.36*   < > 8.01  --  7.52   HEMOGLOBIN g/dL 9.2*   < > 10.7*  --  12.4   HEMATOCRIT % 28.3*   < > 32.6*  --  37.6   PLATELETS Thousands/uL 323   < > 266  --  306   BANDS PCT %  --   --  3  --   --    SEGS PCT %  --   --   --   --  88*   LYMPHO PCT % 3*  --  9*   < > 7*   MONO PCT % 2*  --  5   < > 4   EOS PCT % 0  --  0   < > 0    < > = values in this interval not displayed.     Results from last 7 days   Lab Units 11/01/24  0511 10/29/24  0452 10/28/24  1232   SODIUM mmol/L 140   < > 140   POTASSIUM mmol/L 4.2   < > 3.9   CHLORIDE mmol/L 115*   < > 106   CO2 mmol/L 20*   < > 21   BUN mg/dL 37*   < > 34*   CREATININE mg/dL 1.40*   < > 1.03   ANION GAP mmol/L 5   < > 13   CALCIUM mg/dL 8.9   < > 9.3   ALBUMIN g/dL  --   --  3.4*   TOTAL BILIRUBIN mg/dL  --   --  0.73   ALK PHOS U/L  --   --  59   ALT U/L  --   --  28   AST U/L  --   --  46*   GLUCOSE RANDOM mg/dL 177*   < > 152*    < > = values in this interval not  displayed.     Results from last 7 days   Lab Units 10/28/24  1232   INR  1.26*             Results from last 7 days   Lab Units 11/01/24  0511 10/31/24  0558 10/30/24  0524 10/29/24  0452 10/28/24  1511 10/28/24  1232   LACTIC ACID mmol/L  --   --   --   --  1.1 2.3*   PROCALCITONIN ng/ml 0.27* 0.37* 0.58* 0.51*  --  0.26*       Recent Cultures (last 7 days):   Results from last 7 days   Lab Units 10/29/24  1945 10/29/24  1357 10/28/24  1232   BLOOD CULTURE   --   --  No Growth at 72 hrs.  No Growth at 72 hrs.   SPUTUM CULTURE  1+ Growth of  --   --    GRAM STAIN RESULT  1+ Epithelial cells per low power field*  1+ Polys*  1+ Gram positive rods*  --   --    LEGIONELLA URINARY ANTIGEN   --  Negative  --        Imaging Results Review: I reviewed radiology reports from this admission including: CT chest.  Other Study Results Review: No additional pertinent studies reviewed.    Last 24 Hours Medication List:     Current Facility-Administered Medications:     acetaminophen (TYLENOL) tablet 650 mg, Q6H PRN    apixaban (ELIQUIS) tablet 5 mg, BID    atorvastatin (LIPITOR) tablet 40 mg, Daily    budesonide-formoterol (SYMBICORT) 160-4.5 mcg/act inhaler 2 puff, BID    cefepime (MAXIPIME) IVPB (premix in dextrose) 2,000 mg 50 mL, Q12H, Last Rate: 2,000 mg (11/01/24 0333)    Cholecalciferol (VITAMIN D3) tablet 5,000 Units, Daily    diltiazem (CARDIZEM) tablet 90 mg, Q6H YING    doxycycline hyclate (VIBRAMYCIN) capsule 100 mg, Q12H YING    guaiFENesin (MUCINEX) 12 hr tablet 1,200 mg, Q12H YING    levalbuterol (XOPENEX) inhalation solution 1.25 mg, TID    LORazepam (ATIVAN) tablet 0.5 mg, Q8H PRN    methylPREDNISolone sodium succinate (Solu-MEDROL) injection 60 mg, Q8H YING    metoprolol (LOPRESSOR) injection 10 mg, Q6H PRN    moisture barrier miconazole 2% cream (aka [place] MOISTURE BARRIER ANTIFUNGAL CREAM), BID    nicotine (NICODERM CQ) 21 mg/24 hr TD 24 hr patch 1 patch, Daily    ondansetron (ZOFRAN) injection 4 mg, Q6H PRN     sodium chloride 3 % inhalation solution 4 mL, TID    Administrative Statements   Today, Patient Was Seen By: Fernando Sarmiento, DO  I have spent a total time of 35 minutes in caring for this patient on the day of the visit/encounter including Diagnostic results, Prognosis, Risks and benefits of tx options, Instructions for management, Patient and family education, Importance of tx compliance, Risk factor reductions, Impressions, Counseling / Coordination of care, Documenting in the medical record, Reviewing / ordering tests, medicine, procedures  , Obtaining or reviewing history  , and Communicating with other healthcare professionals .    **Please Note: This note may have been constructed using a voice recognition system.**

## 2024-11-01 NOTE — PROGRESS NOTES
"Progress Note - Cardiology   Name: Corinne A Longo 74 y.o. female I MRN: [id number]  Unit/Bed#: ICU 09-01 I Date of Admission: 10/28/2024   Date of Service: 11/1/2024 I Hospital Day: 4    Assessment & Plan  Paroxysmal atrial fibrillation (HCC)  In NSR this am.  Likely to recur.  CHADS2-VASc score is elevated.    Continue Eliquis 5 mg twice daily.  Will change diltiazem to once daily dosing.  Chronic respiratory failure with hypoxia (HCC)  Oxygen dependent at baseline  Currently with increased oxygen requirements likely related to pneumonia  Management per medical team and pulmonary  Emphysema/COPD (HCC)  Oxygen dependent at baseline  Pulmonary following  Outpatient Cardiologist: Barry      Subjective:   Patient seen and examined.  No significant events overnight.    Perhaps slight improvement      Summary comments:  Changing diltiazem to long acting.  No other changes from my perspective     Telemetry/ECG/Cardiac testing:   Sinus at present.  TTE 10/30/2024: Normal LVfxn.    Vitals: Blood pressure 141/63, pulse 101, temperature 98.3 °F (36.8 °C), temperature source Temporal, resp. rate (!) 28, height 5' 8\" (1.727 m), weight 72.1 kg (159 lb), SpO2 (!) 86%, not currently breastfeeding.,   Orthostatic Blood Pressures      Flowsheet Row Most Recent Value   Blood Pressure 141/63 filed at 11/01/2024 1000   Patient Position - Orthostatic VS Lying filed at 11/01/2024 0700        ,   Weight (last 2 days)       Date/Time Weight    10/30/24 0740 72.1 (159)            Physical Exam:    General:  Normal appearance in no distress.  Eyes:  Anicteric.  Oral mucosa:  Moist.  Neck:  No JVD. Carotid upstrokes are brisk without bruits.  No masses.  Chest:  Decreased breath sounds throughout.     Cardiac:  Normal PMI.  Normal S1 and S2.  No murmur gallop or rub.  Abdomen:  Soft and nontender. No palpable organomegaly or aortic enlargement.  Extremities:  No peripheral edema.  Musculoskeletal:  Symmetric.   Vascular:     Popliteal " pulses are intact bilaterally.   Pedal pulses are intact.  Neuro:  Grossly symmetric.  Psych:  Alert and oriented x3.      Medications:      Current Facility-Administered Medications:     acetaminophen (TYLENOL) tablet 650 mg, 650 mg, Oral, Q6H PRN, Fernando Sarmiento DO, 650 mg at 10/29/24 1803    apixaban (ELIQUIS) tablet 5 mg, 5 mg, Oral, BID, Benny Reddy MD, 5 mg at 11/01/24 0801    atorvastatin (LIPITOR) tablet 40 mg, 40 mg, Oral, Daily, Fernando Sarmiento DO, 40 mg at 11/01/24 0800    budesonide-formoterol (SYMBICORT) 160-4.5 mcg/act inhaler 2 puff, 2 puff, Inhalation, BID, Fernando Sarmiento DO, 2 puff at 10/29/24 1711    cefepime (MAXIPIME) IVPB (premix in dextrose) 2,000 mg 50 mL, 2,000 mg, Intravenous, Q12H, Fernando Sarmiento DO, Last Rate: 100 mL/hr at 11/01/24 0333, 2,000 mg at 11/01/24 0333    Cholecalciferol (VITAMIN D3) tablet 5,000 Units, 5,000 Units, Oral, Daily, Fernando Sarmiento DO, 5,000 Units at 11/01/24 0800    diltiazem (CARDIZEM CD) 24 hr capsule 240 mg, 240 mg, Oral, Once **FOLLOWED BY** [START ON 11/2/2024] diltiazem (CARDIZEM CD) 24 hr capsule 360 mg, 360 mg, Oral, Daily, Benny Reddy MD    doxycycline hyclate (VIBRAMYCIN) capsule 100 mg, 100 mg, Oral, Q12H YING, Son Montalvo MD, 100 mg at 11/01/24 0800    guaiFENesin (MUCINEX) 12 hr tablet 1,200 mg, 1,200 mg, Oral, Q12H YING, Son Montalvo MD, 1,200 mg at 11/01/24 0800    levalbuterol (XOPENEX) inhalation solution 1.25 mg, 1.25 mg, Nebulization, TID, Fernando Sarmiento DO, 1.25 mg at 11/01/24 0741    LORazepam (ATIVAN) tablet 0.5 mg, 0.5 mg, Oral, Q8H PRN, Zoe Carroll PA-C, 0.5 mg at 10/30/24 2307    methylPREDNISolone sodium succinate (Solu-MEDROL) injection 60 mg, 60 mg, Intravenous, Q8H YING, Cline Mendez Montalvo MD, 60 mg at 11/01/24 0509    metoprolol (LOPRESSOR) injection 10 mg, 10 mg, Intravenous, Q6H PRN, Fernando Sarmiento DO    moisture barrier miconazole 2% cream (aka [place]  MOISTURE BARRIER ANTIFUNGAL CREAM), , Topical, BID, Fernando Sarmiento DO, Given at 11/01/24 0801    ondansetron (ZOFRAN) injection 4 mg, 4 mg, Intravenous, Q6H PRN, Fernando Sarmiento DO    sodium chloride 3 % inhalation solution 4 mL, 4 mL, Nebulization, TID, Son Montalvo MD, 4 mL at 11/01/24 0741     Labs & Results:  Labs reviewed and prominent abnormalities reviewed above and/or below.  Troponins:    Results from last 7 days   Lab Units 10/28/24  1425 10/28/24  1232   HS TNI 0HR ng/L  --  14   HS TNI 2HR ng/L 14  --    HSTNI D2 ng/L 0  --         BNP:   Results from last 6 Months   Lab Units 10/28/24  1236   BNP pg/mL 168*

## 2024-11-01 NOTE — PLAN OF CARE
Problem: OCCUPATIONAL THERAPY ADULT  Goal: Performs self-care activities at highest level of function for planned discharge setting.  See evaluation for individualized goals.  Description: Treatment Interventions: ADL retraining, Functional transfer training, UE strengthening/ROM, Endurance training, Patient/family training, Equipment evaluation/education, Energy conservation, Activityengagement          See flowsheet documentation for full assessment, interventions and recommendations.   Outcome: Progressing  Note: Limitation: Decreased ADL status, Decreased UE strength, Decreased endurance, Decreased self-care trans, Decreased high-level ADLs  Prognosis: Good  Assessment: Pt seen for OT treatment session focusing on ADLs/IADLs, functional mobility, functional standing tolerance, functional transfers, patient education, continued evaluation. Pt greeted OOB in recliner at start of session. Pt alert and cooperative throughout session. Pt with good sitting balance and fair - dynamic standing balance. Pt tolerated treatment well. Spoke to nursing, can work with pt in chair but nothing further at this time due to decrease in O2 with functional mobility. Pt completed UE shoulder flexion/extension seated in chair with supervision. Verbal cues for slow pacing and proper breathing throughout session. O2 86%-88% during session. Pt completed brushing teeth seated in chair with multiple rest breaks. Pt then completed doff of sock and don of socks. Pt O2 dropping to 82%, guided pt to breath and rest. Assisted pt with other sock due to SOB. Pt educated on ADLs/IADLs, and breathing techniques. Pt reports no pain and no dizziness. Pt's vitals include: stable with drop in BP during activity seated in chair, RN aware.     Pt ended session seated in chair. Call bell and phone within reach. All needs met and pt reports no further questions at this time. Continue to recommend III; Minimum Resource Intensity when medically cleared. OT  will continue to follow pt on caseload.     Rehab Resource Intensity Level, OT: III (Minimum Resource Intensity)

## 2024-11-01 NOTE — OCCUPATIONAL THERAPY NOTE
"  Occupational Therapy Progress Note     Patient Name: Corinne A Longo  Today's Date: 11/1/2024  Problem List  Principal Problem:    Sepsis due to pneumonia (HCC)  Active Problems:    Emphysema/COPD (HCC)    Paroxysmal atrial fibrillation (HCC)    Chronic respiratory failure with hypoxia (HCC)          11/01/24 1003   OT Last Visit   OT Visit Date 11/01/24   Note Type   Note Type Treatment   Pain Assessment   Pain Assessment Tool 0-10   Pain Score No Pain   Restrictions/Precautions   Weight Bearing Precautions Per Order No   Other Precautions Chair Alarm;Bed Alarm;Multiple lines;Telemetry;O2;Fall Risk;Pain  (5L O2)   Lifestyle   Autonomy PTA pt reports independence with self care tasks, mobility no AD and shares IADLs with her s/o. +.   Reciprocal Relationships Supportive family.   Service to Others Retired.   Intrinsic Gratification Active PTA.   ADL   Where Assessed Chair   Grooming Assistance 5  Supervision/Setup   Grooming Deficit Setup;Verbal cueing;Supervision/safety;Increased time to complete;Teeth care   LB Dressing Assistance 3  Moderate Assistance   LB Dressing Deficit Setup;Verbal cueing;Supervision/safety;Increased time to complete;Don/doff L sock;Don/doff R sock   Bed Mobility   Supine to Sit Unable to assess   Sit to Supine Unable to assess   Additional Comments pt greeted OOB in chair   Transfers   Additional Comments Nurse requested pt stay seated in chair due to O2 stats at this time.   Therapeutic Exercise - ROM   UE-ROM Yes   ROM- Right Upper Extremities   R Shoulder AROM;Flexion;Extension   R Weight/Reps/Sets 10x   RUE ROM Comment verbal cues for slow pacing and breathing   ROM - Left Upper Extremities    L Shoulder AROM;Flexion;Extension   L Weight/Reps/Sets 10x   Subjective   Subjective \"I am going to take it slow\"   Cognition   Overall Cognitive Status WFL   Arousal/Participation Responsive;Alert;Arousable;Cooperative   Attention Within functional limits   Orientation Level Oriented X4 "   Memory Within functional limits   Following Commands Follows all commands and directions without difficulty   Activity Tolerance   Activity Tolerance Patient limited by fatigue   Medical Staff Made Aware RN yes   Assessment   Assessment Pt seen for OT treatment session focusing on ADLs/IADLs, functional mobility, functional standing tolerance, functional transfers, patient education, continued evaluation. Pt greeted OOB in recliner at start of session. Pt alert and cooperative throughout session. Pt with good sitting balance and fair - dynamic standing balance. Pt tolerated treatment well. Spoke to nursing, can work with pt in chair but nothing further at this time due to decrease in O2 with functional mobility. Pt completed UE shoulder flexion/extension seated in chair with supervision. Verbal cues for slow pacing and proper breathing throughout session. O2 86%-88% during session. Pt completed brushing teeth seated in chair with multiple rest breaks. Pt then completed doff of sock and don of socks. Pt O2 dropping to 82%, guided pt to breath and rest. Assisted pt with other sock due to SOB. Pt educated on ADLs/IADLs, and breathing techniques. Pt reports no pain and no dizziness. Pt's vitals include: stable with drop in BP during activity seated in chair, RN aware.     Pt ended session seated in chair. Call bell and phone within reach. All needs met and pt reports no further questions at this time. Continue to recommend III; Minimum Resource Intensity when medically cleared. OT will continue to follow pt on caseload.   Plan   Treatment Interventions ADL retraining;Functional transfer training;Endurance training;Patient/family training;Equipment evaluation/education;Compensatory technique education;Continued evaluation;Energy conservation;Activityengagement   Goal Expiration Date 11/12/24   OT Treatment Day 1   OT Frequency 3-5x/wk   Discharge Recommendation   Rehab Resource Intensity Level, OT III (Minimum Resource  Intensity)   Additional Comments  The patient's raw score on the AM-PAC Daily Activity Inpatient Short Form is 19. A raw score of greater than or equal to 19 suggests the patient may benefit from discharge to home. Please refer to the recommendation of the Occupational Therapist for safe discharge planning.   AM-PAC Daily Activity Inpatient   Lower Body Dressing 3   Bathing 3   Toileting 3   Upper Body Dressing 3   Grooming 3   Eating 4   Daily Activity Raw Score 19   Daily Activity Standardized Score (Calc for Raw Score >=11) 40.22   AM-PAC Applied Cognition Inpatient   Following a Speech/Presentation 4   Understanding Ordinary Conversation 4   Taking Medications 4   Remembering Where Things Are Placed or Put Away 4   Remembering List of 4-5 Errands 4   Taking Care of Complicated Tasks 4   Applied Cognition Raw Score 24   Applied Cognition Standardized Score 62.21   End of Consult   Education Provided Yes;Family or social support of family present for education by provider   Patient Position at End of Consult Bedside chair;All needs within reach;Bed/Chair alarm activated   Nurse Communication Nurse aware of consult     Juliet Tubbs, OT

## 2024-11-01 NOTE — ASSESSMENT & PLAN NOTE
Present on admission as evidenced by hyperthermia, tachycardia, tachypnea  Likely secondary to multilobar pneumonia  Chest x-ray with evidence of consolidations  Discontinue vancomycin  Continue cefepime  Follow-up blood and urine cultures  Trend fever curve/WBC count/procalcitonin  Pulmonology following  Procalcitonin improving

## 2024-11-01 NOTE — PLAN OF CARE
Problem: Potential for Falls  Goal: Patient will remain free of falls  Description: INTERVENTIONS:  - Educate patient/family on patient safety including physical limitations  - Instruct patient to call for assistance with activity   - Consult OT/PT to assist with strengthening/mobility   - Keep Call bell within reach  - Keep bed low and locked with side rails adjusted as appropriate  - Keep care items and personal belongings within reach  - Initiate and maintain comfort rounds  - Make Fall Risk Sign visible to staff  - Offer Toileting every 2 Hours, in advance of need  - Initiate/Maintain bed alarm  - Obtain necessary fall risk management equipment: non skid socks  - Apply yellow socks and bracelet for high fall risk patients  - Consider moving patient to room near nurses station  Outcome: Progressing     Problem: PAIN - ADULT  Goal: Verbalizes/displays adequate comfort level or baseline comfort level  Description: Interventions:  - Encourage patient to monitor pain and request assistance  - Assess pain using appropriate pain scale  - Administer analgesics based on type and severity of pain and evaluate response  - Implement non-pharmacological measures as appropriate and evaluate response  - Consider cultural and social influences on pain and pain management  - Notify physician/advanced practitioner if interventions unsuccessful or patient reports new pain  Outcome: Progressing     Problem: INFECTION - ADULT  Goal: Absence or prevention of progression during hospitalization  Description: INTERVENTIONS:  - Assess and monitor for signs and symptoms of infection  - Monitor lab/diagnostic results  - Monitor all insertion sites, i.e. indwelling lines, tubes, and drains  - Monitor endotracheal if appropriate and nasal secretions for changes in amount and color  - [place] appropriate cooling/warming therapies per order  - Administer medications as ordered  - Instruct and encourage patient and family to use good hand  hygiene technique  - Identify and instruct in appropriate isolation precautions for identified infection/condition  Outcome: Progressing  Goal: Absence of fever/infection during neutropenic period  Description: INTERVENTIONS:  - Monitor WBC    Outcome: Progressing     Problem: SAFETY ADULT  Goal: Patient will remain free of falls  Description: INTERVENTIONS:  - Educate patient/family on patient safety including physical limitations  - Instruct patient to call for assistance with activity   - Consult OT/PT to assist with strengthening/mobility   - Keep Call bell within reach  - Keep bed low and locked with side rails adjusted as appropriate  - Keep care items and personal belongings within reach  - Initiate and maintain comfort rounds  - Make Fall Risk Sign visible to staff  - Offer Toileting every 2 Hours, in advance of need  - Initiate/Maintain bed alarm  - Obtain necessary fall risk management equipment: non skid socks  - Apply yellow socks and bracelet for high fall risk patients  - Consider moving patient to room near nurses station  Outcome: Progressing  Goal: Maintain or return to baseline ADL function  Description: INTERVENTIONS:  -  Assess patient's ability to carry out ADLs; assess patient's baseline for ADL function and identify physical deficits which impact ability to perform ADLs (bathing, care of mouth/teeth, toileting, grooming, dressing, etc.)  - Assess/evaluate cause of self-care deficits   - Assess range of motion  - Assess patient's mobility; develop plan if impaired  - Assess patient's need for assistive devices and provide as appropriate  - Encourage maximum independence but intervene and supervise when necessary  - Involve family in performance of ADLs  - Assess for home care needs following discharge   - Consider OT consult to assist with ADL evaluation and planning for discharge  - Provide patient education as appropriate  Outcome: Progressing  Goal: Maintains/Returns to pre admission  functional level  Description: INTERVENTIONS:  - Perform AM-PAC 6 Click Basic Mobility/ Daily Activity assessment daily.  - Set and communicate daily mobility goal to care team and patient/family/caregiver.   - Collaborate with rehabilitation services on mobility goals if consulted  - Perform Range of Motion 2 times a day.  - Reposition patient every 2 hours.  - Dangle patient 2 times a day  - Stand patient 2 times a day  - Ambulate patient 2 times a day  - Out of bed to chair 3 times a day   - Out of bed for meals 3 times a day  - Out of bed for toileting  - Record patient progress and toleration of activity level   Outcome: Progressing     Problem: DISCHARGE PLANNING  Goal: Discharge to home or other facility with appropriate resources  Description: INTERVENTIONS:  - Identify barriers to discharge w/patient and caregiver  - Identify discharge learning needs (meds, wound care, etc.)  - Arrange for interpretive services to assist at discharge as needed  - Refer to Case Management Department for coordinating discharge planning if the patient needs post-hospital services based on physician/advanced practitioner order or complex needs related to functional status, cognitive ability, or social support system  Outcome: Progressing     Problem: Knowledge Deficit  Goal: Patient/family/caregiver demonstrates understanding of disease process, treatment plan, medications, and discharge instructions  Description: Complete learning assessment and assess knowledge base.  Interventions:  - Provide teaching at level of understanding  - Provide teaching via preferred learning methods  Outcome: Progressing     Problem: CARDIOVASCULAR - ADULT  Goal: Maintains optimal cardiac output and hemodynamic stability  Description: INTERVENTIONS:  - Monitor I/O, vital signs and rhythm  - Monitor for S/S and trends of decreased cardiac output  - Administer and titrate ordered vasoactive medications to optimize hemodynamic stability  - Assess  quality of pulses, skin color and temperature  - Assess for signs of decreased coronary artery perfusion  - Instruct patient to report change in severity of symptoms  Outcome: Progressing  Goal: Absence of cardiac dysrhythmias or at baseline rhythm  Description: INTERVENTIONS:  - Continuous cardiac monitoring, vital signs, obtain 12 lead EKG if ordered  - Administer antiarrhythmic and heart rate control medications as ordered  - Monitor electrolytes and administer replacement therapy as ordered  Outcome: Progressing     Problem: NEUROSENSORY - ADULT  Goal: Achieves stable or improved neurological status  Description: INTERVENTIONS  - Monitor and report changes in neurological status  - Monitor vital signs such as temperature, blood pressure, glucose, and any other labs ordered   - Initiate measures to prevent increased intracranial pressure  - Monitor for seizure activity and implement precautions if appropriate      Outcome: Progressing  Goal: Remains free of injury related to seizures activity  Description: INTERVENTIONS  - Maintain airway, patient safety  and administer oxygen as ordered  - Monitor patient for seizure activity, document and report duration and description of seizure to physician/advanced practitioner  - If seizure occurs,  ensure patient safety during seizure  - Reorient patient post seizure  - Seizure pads on all 4 side rails  - Instruct patient/family to notify RN of any seizure activity including if an aura is experienced  - Instruct patient/family to call for assistance with activity based on nursing assessment  - Administer anti-seizure medications if ordered    Outcome: Progressing  Goal: Achieves maximal functionality and self care  Description: INTERVENTIONS  - Monitor swallowing and airway patency with patient fatigue and changes in neurological status  - Encourage and assist patient to increase activity and self care.   - Encourage visually impaired, hearing impaired and aphasic  patients to use assistive/communication devices  Outcome: Progressing     Problem: RESPIRATORY - ADULT  Goal: Achieves optimal ventilation and oxygenation  Description: INTERVENTIONS:  - Assess for changes in respiratory status  - Assess for changes in mentation and behavior  - Position to facilitate oxygenation and minimize respiratory effort  - Oxygen administered by appropriate delivery if ordered  - Initiate smoking cessation education as indicated  - Encourage broncho-pulmonary hygiene including cough, deep breathe, Incentive Spirometry  - Assess the need for suctioning and aspirate as needed  - Assess and instruct to report SOB or any respiratory difficulty  - Respiratory Therapy support as indicated  Outcome: Progressing     Problem: GASTROINTESTINAL - ADULT  Goal: Minimal or absence of nausea and/or vomiting  Description: INTERVENTIONS:  - Administer IV fluids if ordered to ensure adequate hydration  - Maintain NPO status until nausea and vomiting are resolved  - Nasogastric tube if ordered  - Administer ordered antiemetic medications as needed  - Provide nonpharmacologic comfort measures as appropriate  - Advance diet as tolerated, if ordered  - Consider nutrition services referral to assist patient with adequate nutrition and appropriate food choices  Outcome: Progressing  Goal: Maintains or returns to baseline bowel function  Description: INTERVENTIONS:  - Assess bowel function  - Encourage oral fluids to ensure adequate hydration  - Administer IV fluids if ordered to ensure adequate hydration  - Administer ordered medications as needed  - Encourage mobilization and activity  - Consider nutritional services referral to assist patient with adequate nutrition and appropriate food choices  Outcome: Progressing  Goal: Maintains adequate nutritional intake  Description: INTERVENTIONS:  - Monitor percentage of each meal consumed  - Identify factors contributing to decreased intake, treat as appropriate  -  Assist with meals as needed  - Monitor I&O, weight, and lab values if indicated  - Obtain nutrition services referral as needed  Outcome: Progressing  Goal: Establish and maintain optimal ostomy function  Description: INTERVENTIONS:  - Assess bowel function  - Encourage oral fluids to ensure adequate hydration  - Administer IV fluids if ordered to ensure adequate hydration   - Administer ordered medications as needed  - Encourage mobilization and activity  - Nutrition services referral to assist patient with appropriate food choices  - Assess stoma site  - Consider wound care consult   Outcome: Progressing  Goal: Oral mucous membranes remain intact  Description: INTERVENTIONS  - Assess oral mucosa and hygiene practices  - Implement preventative oral hygiene regimen  - Implement oral medicated treatments as ordered  - Initiate Nutrition services referral as needed  Outcome: Progressing     Problem: GENITOURINARY - ADULT  Goal: Maintains or returns to baseline urinary function  Description: INTERVENTIONS:  - Assess urinary function  - Encourage oral fluids to ensure adequate hydration if ordered  - Administer IV fluids as ordered to ensure adequate hydration  - Administer ordered medications as needed  - Offer frequent toileting  - Follow urinary retention protocol if ordered  Outcome: Progressing  Goal: Absence of urinary retention  Description: INTERVENTIONS:  - Assess patient’s ability to void and empty bladder  - Monitor I/O  - Bladder scan as needed  - Discuss with physician/AP medications to alleviate retention as needed  - Discuss catheterization for long term situations as appropriate  Outcome: Progressing  Goal: Urinary catheter remains patent  Description: INTERVENTIONS:  - Assess patency of urinary catheter  - If patient has a chronic zuñiga, consider changing catheter if non-functioning  - Follow guidelines for intermittent irrigation of non-functioning urinary catheter  Outcome: Progressing     Problem:  METABOLIC, FLUID AND ELECTROLYTES - ADULT  Goal: Electrolytes maintained within normal limits  Description: INTERVENTIONS:  - Monitor labs and assess patient for signs and symptoms of electrolyte imbalances  - Administer electrolyte replacement as ordered  - Monitor response to electrolyte replacements, including repeat lab results as appropriate  - Instruct patient on fluid and nutrition as appropriate  Outcome: Progressing  Goal: Fluid balance maintained  Description: INTERVENTIONS:  - Monitor labs   - Monitor I/O and WT  - Instruct patient on fluid and nutrition as appropriate  - Assess for signs & symptoms of volume excess or deficit  Outcome: Progressing  Goal: Glucose maintained within target range  Description: INTERVENTIONS:  - Monitor Blood Glucose as ordered  - Assess for signs and symptoms of hyperglycemia and hypoglycemia  - Administer ordered medications to maintain glucose within target range  - Assess nutritional intake and initiate nutrition service referral as needed  Outcome: Progressing     Problem: SKIN/TISSUE INTEGRITY - ADULT  Goal: Skin Integrity remains intact(Skin Breakdown Prevention)  Description: Assess:  -Perform Silvestre assessment every shift  -Clean and moisturize skin every shift  -Inspect skin when repositioning, toileting, and assisting with ADLS  -Assess under medical devices such  -Assess extremities for adequate circulation and sensation     Bed Management:  -Have minimal linens on bed & keep smooth, unwrinkled  -Change linens as needed when moist or perspiring  -Avoid sitting or lying in one position for more than 2 hours while in bed  -Keep HOB at 30 degrees     Toileting:  -Offer bedside commode      Activity:  -Mobilize patient 3 times a day  -Encourage activity and walks on unit  -Encourage or provide ROM exercises   -Turn and reposition patient every 2 Hours  -Use appropriate equipment to lift or move patient in bed  -Instruct/ Assist with weight shifting every 60 minutes  when out of bed in chair  -Consider limitation of chair time 4 hour intervals    Skin Care:  -Avoid use of baby powder, tape, friction and shearing, hot water or constrictive clothing  -Relieve pressure over bony prominences using Mepelex foam dressing  -Do not massage red bony areas    Outcome: Progressing  Goal: Incision(s), wounds(s) or drain site(s) healing without S/S of infection  Description: INTERVENTIONS  - Assess and document dressing, incision, wound bed, drain sites and surrounding tissue  - Provide patient and family education  - Perform skin care/dressing changes   Outcome: Progressing  Goal: Pressure injury heals and does not worsen  Description: Interventions:  - Implement low air loss mattress or specialty surface (Criteria met)  - Apply silicone foam dressing  - Instruct/assist with weight shifting every 60 minutes when in chair   - Limit chair time to 4 hour intervals  - Use special pressure reducing interventions such as waffle cushion when in chair   - Apply fecal or urinary incontinence containment device   - Perform passive or active ROM every shift  - Turn and reposition patient & offload bony prominences every 2 hours   - Utilize friction reducing device or surface for transfers   - Consider nutrition services referral as needed  Outcome: Progressing     Problem: HEMATOLOGIC - ADULT  Goal: Maintains hematologic stability  Description: INTERVENTIONS  - Assess for signs and symptoms of bleeding or hemorrhage  - Monitor labs  - Administer supportive blood products/factors as ordered and appropriate  Outcome: Progressing     Problem: MUSCULOSKELETAL - ADULT  Goal: Maintain or return mobility to safest level of function  Description: INTERVENTIONS:  - Assess patient's ability to carry out ADLs; assess patient's baseline for ADL function and identify physical deficits which impact ability to perform ADLs (bathing, care of mouth/teeth, toileting, grooming, dressing, etc.)  - Assess/evaluate cause  of self-care deficits   - Assess range of motion  - Assess patient's mobility  - Assess patient's need for assistive devices and provide as appropriate  - Encourage maximum independence but intervene and supervise when necessary  - Involve family in performance of ADLs  - Assess for home care needs following discharge   - Consider OT consult to assist with ADL evaluation and planning for discharge  - Provide patient education as appropriate  Outcome: Progressing  Goal: Maintain proper alignment of affected body part  Description: INTERVENTIONS:  - Support, maintain and protect limb and body alignment  - Provide patient/ family with appropriate education  Outcome: Progressing     Problem: Prexisting or High Potential for Compromised Skin Integrity  Goal: Skin integrity is maintained or improved  Description: INTERVENTIONS:  - Identify patients at risk for skin breakdown  - Assess and monitor skin integrity  - Assess and monitor nutrition and hydration status  - Monitor labs   - Assess for incontinence   - Turn and reposition patient  - Assist with mobility/ambulation  - Relieve pressure over bony prominences  - Avoid friction and shearing  - Provide appropriate hygiene as needed including keeping skin clean and dry  - Evaluate need for skin moisturizer/barrier cream  - Collaborate with interdisciplinary team   - Patient/family teaching  - Consider wound care consult   Outcome: Progressing

## 2024-11-01 NOTE — ASSESSMENT & PLAN NOTE
In NSR this am.  Likely to recur.  CHADS2-VASc score is elevated.    Continue Eliquis 5 mg twice daily.  Will change diltiazem to once daily dosing.

## 2024-11-01 NOTE — PLAN OF CARE
Problem: Potential for Falls  Goal: Patient will remain free of falls  Description: INTERVENTIONS:  - Educate patient/family on patient safety including physical limitations  - Instruct patient to call for assistance with activity   - Consult OT/PT to assist with strengthening/mobility   - Keep Call bell within reach  - Keep bed low and locked with side rails adjusted as appropriate  - Keep care items and personal belongings within reach  - Initiate and maintain comfort rounds  - Make Fall Risk Sign visible to staff  - Offer Toileting every 2 Hours, in advance of need  - Initiate/Maintain bed alarm  - Obtain necessary fall risk management equipment: non skid socks  - Apply yellow socks and bracelet for high fall risk patients  - Consider moving patient to room near nurses station  Outcome: Progressing     Problem: PAIN - ADULT  Goal: Verbalizes/displays adequate comfort level or baseline comfort level  Description: Interventions:  - Encourage patient to monitor pain and request assistance  - Assess pain using appropriate pain scale  - Administer analgesics based on type and severity of pain and evaluate response  - Implement non-pharmacological measures as appropriate and evaluate response  - Consider cultural and social influences on pain and pain management  - Notify physician/advanced practitioner if interventions unsuccessful or patient reports new pain  Outcome: Progressing     Problem: INFECTION - ADULT  Goal: Absence or prevention of progression during hospitalization  Description: INTERVENTIONS:  - Assess and monitor for signs and symptoms of infection  - Monitor lab/diagnostic results  - Monitor all insertion sites, i.e. indwelling lines, tubes, and drains  - Monitor endotracheal if appropriate and nasal secretions for changes in amount and color  - [place] appropriate cooling/warming therapies per order  - Administer medications as ordered  - Instruct and encourage patient and family to use good hand  hygiene technique  - Identify and instruct in appropriate isolation precautions for identified infection/condition  Outcome: Progressing  Goal: Absence of fever/infection during neutropenic period  Description: INTERVENTIONS:  - Monitor WBC    Outcome: Progressing     Problem: SAFETY ADULT  Goal: Patient will remain free of falls  Description: INTERVENTIONS:  - Educate patient/family on patient safety including physical limitations  - Instruct patient to call for assistance with activity   - Consult OT/PT to assist with strengthening/mobility   - Keep Call bell within reach  - Keep bed low and locked with side rails adjusted as appropriate  - Keep care items and personal belongings within reach  - Initiate and maintain comfort rounds  - Make Fall Risk Sign visible to staff  - Offer Toileting every 2 Hours, in advance of need  - Initiate/Maintain bed alarm  - Obtain necessary fall risk management equipment: non skid socks  - Apply yellow socks and bracelet for high fall risk patients  - Consider moving patient to room near nurses station  Outcome: Progressing  Goal: Maintain or return to baseline ADL function  Description: INTERVENTIONS:  -  Assess patient's ability to carry out ADLs; assess patient's baseline for ADL function and identify physical deficits which impact ability to perform ADLs (bathing, care of mouth/teeth, toileting, grooming, dressing, etc.)  - Assess/evaluate cause of self-care deficits   - Assess range of motion  - Assess patient's mobility; develop plan if impaired  - Assess patient's need for assistive devices and provide as appropriate  - Encourage maximum independence but intervene and supervise when necessary  - Involve family in performance of ADLs  - Assess for home care needs following discharge   - Consider OT consult to assist with ADL evaluation and planning for discharge  - Provide patient education as appropriate  Outcome: Progressing  Goal: Maintains/Returns to pre admission  functional level  Description: INTERVENTIONS:  - Perform AM-PAC 6 Click Basic Mobility/ Daily Activity assessment daily.  - Set and communicate daily mobility goal to care team and patient/family/caregiver.   - Collaborate with rehabilitation services on mobility goals if consulted  - Perform Range of Motion 2 times a day.  - Reposition patient every 2 hours.  - Dangle patient 2 times a day  - Stand patient 2 times a day  - Ambulate patient 2 times a day  - Out of bed to chair 3 times a day   - Out of bed for meals 3 times a day  - Out of bed for toileting  - Record patient progress and toleration of activity level   Outcome: Progressing

## 2024-11-02 DIAGNOSIS — E78.5 HYPERLIPIDEMIA, UNSPECIFIED HYPERLIPIDEMIA TYPE: ICD-10-CM

## 2024-11-02 LAB
ANION GAP SERPL CALCULATED.3IONS-SCNC: 7 MMOL/L (ref 4–13)
BACTERIA BLD CULT: NORMAL
BACTERIA BLD CULT: NORMAL
BUN SERPL-MCNC: 42 MG/DL (ref 5–25)
CALCIUM SERPL-MCNC: 8.9 MG/DL (ref 8.4–10.2)
CHLORIDE SERPL-SCNC: 113 MMOL/L (ref 96–108)
CO2 SERPL-SCNC: 20 MMOL/L (ref 21–32)
CREAT SERPL-MCNC: 1.38 MG/DL (ref 0.6–1.3)
ERYTHROCYTE [DISTWIDTH] IN BLOOD BY AUTOMATED COUNT: 14.4 % (ref 11.6–15.1)
GFR SERPL CREATININE-BSD FRML MDRD: 38 ML/MIN/1.73SQ M
GLUCOSE SERPL-MCNC: 164 MG/DL (ref 65–140)
HCT VFR BLD AUTO: 28.8 % (ref 34.8–46.1)
HGB BLD-MCNC: 9.3 G/DL (ref 11.5–15.4)
MAGNESIUM SERPL-MCNC: 2.3 MG/DL (ref 1.9–2.7)
MCH RBC QN AUTO: 30.5 PG (ref 26.8–34.3)
MCHC RBC AUTO-ENTMCNC: 32.3 G/DL (ref 31.4–37.4)
MCV RBC AUTO: 94 FL (ref 82–98)
PHOSPHATE SERPL-MCNC: 3.4 MG/DL (ref 2.3–4.1)
PLATELET # BLD AUTO: 314 THOUSANDS/UL (ref 149–390)
PMV BLD AUTO: 9.7 FL (ref 8.9–12.7)
POTASSIUM SERPL-SCNC: 4.4 MMOL/L (ref 3.5–5.3)
PROCALCITONIN SERPL-MCNC: 0.21 NG/ML
RBC # BLD AUTO: 3.05 MILLION/UL (ref 3.81–5.12)
SODIUM SERPL-SCNC: 140 MMOL/L (ref 135–147)
WBC # BLD AUTO: 15.84 THOUSAND/UL (ref 4.31–10.16)

## 2024-11-02 PROCEDURE — 84145 PROCALCITONIN (PCT): CPT | Performed by: INTERNAL MEDICINE

## 2024-11-02 PROCEDURE — 99232 SBSQ HOSP IP/OBS MODERATE 35: CPT | Performed by: INTERNAL MEDICINE

## 2024-11-02 PROCEDURE — 84100 ASSAY OF PHOSPHORUS: CPT | Performed by: INTERNAL MEDICINE

## 2024-11-02 PROCEDURE — 83735 ASSAY OF MAGNESIUM: CPT | Performed by: INTERNAL MEDICINE

## 2024-11-02 PROCEDURE — 94668 MNPJ CHEST WALL SBSQ: CPT

## 2024-11-02 PROCEDURE — 99232 SBSQ HOSP IP/OBS MODERATE 35: CPT | Performed by: PHYSICIAN ASSISTANT

## 2024-11-02 PROCEDURE — 94664 DEMO&/EVAL PT USE INHALER: CPT

## 2024-11-02 PROCEDURE — 85027 COMPLETE CBC AUTOMATED: CPT | Performed by: INTERNAL MEDICINE

## 2024-11-02 PROCEDURE — 80048 BASIC METABOLIC PNL TOTAL CA: CPT | Performed by: INTERNAL MEDICINE

## 2024-11-02 PROCEDURE — 94640 AIRWAY INHALATION TREATMENT: CPT

## 2024-11-02 PROCEDURE — 94760 N-INVAS EAR/PLS OXIMETRY 1: CPT

## 2024-11-02 RX ORDER — METHYLPREDNISOLONE SODIUM SUCCINATE 125 MG/2ML
60 INJECTION, POWDER, LYOPHILIZED, FOR SOLUTION INTRAMUSCULAR; INTRAVENOUS EVERY 12 HOURS SCHEDULED
Status: DISCONTINUED | OUTPATIENT
Start: 2024-11-02 | End: 2024-11-05

## 2024-11-02 RX ORDER — METOPROLOL SUCCINATE 25 MG/1
25 TABLET, EXTENDED RELEASE ORAL DAILY
Status: DISCONTINUED | OUTPATIENT
Start: 2024-11-02 | End: 2024-11-02

## 2024-11-02 RX ORDER — METOPROLOL TARTRATE 25 MG/1
25 TABLET, FILM COATED ORAL EVERY 12 HOURS SCHEDULED
Status: DISCONTINUED | OUTPATIENT
Start: 2024-11-02 | End: 2024-11-03

## 2024-11-02 RX ADMIN — METOPROLOL TARTRATE 25 MG: 25 TABLET, FILM COATED ORAL at 09:36

## 2024-11-02 RX ADMIN — SODIUM CHLORIDE SOLN NEBU 3% 4 ML: 3 NEBU SOLN at 14:03

## 2024-11-02 RX ADMIN — MICONAZOLE NITRATE: 20 CREAM TOPICAL at 18:04

## 2024-11-02 RX ADMIN — METOPROLOL TARTRATE 25 MG: 25 TABLET, FILM COATED ORAL at 21:53

## 2024-11-02 RX ADMIN — MICONAZOLE NITRATE: 20 CREAM TOPICAL at 09:32

## 2024-11-02 RX ADMIN — METHYLPREDNISOLONE SODIUM SUCCINATE 60 MG: 125 INJECTION, POWDER, FOR SOLUTION INTRAMUSCULAR; INTRAVENOUS at 15:01

## 2024-11-02 RX ADMIN — APIXABAN 5 MG: 5 TABLET, FILM COATED ORAL at 18:04

## 2024-11-02 RX ADMIN — CEFEPIME HYDROCHLORIDE 2000 MG: 2 INJECTION, SOLUTION INTRAVENOUS at 15:02

## 2024-11-02 RX ADMIN — APIXABAN 5 MG: 5 TABLET, FILM COATED ORAL at 09:30

## 2024-11-02 RX ADMIN — SODIUM CHLORIDE SOLN NEBU 3% 4 ML: 3 NEBU SOLN at 07:30

## 2024-11-02 RX ADMIN — CEFEPIME HYDROCHLORIDE 2000 MG: 2 INJECTION, SOLUTION INTRAVENOUS at 04:41

## 2024-11-02 RX ADMIN — LEVALBUTEROL HYDROCHLORIDE 1.25 MG: 1.25 SOLUTION RESPIRATORY (INHALATION) at 14:03

## 2024-11-02 RX ADMIN — DOXYCYCLINE HYCLATE 100 MG: 100 CAPSULE ORAL at 09:30

## 2024-11-02 RX ADMIN — DILTIAZEM HYDROCHLORIDE 360 MG: 180 CAPSULE, COATED, EXTENDED RELEASE ORAL at 09:30

## 2024-11-02 RX ADMIN — LEVALBUTEROL HYDROCHLORIDE 1.25 MG: 1.25 SOLUTION RESPIRATORY (INHALATION) at 21:14

## 2024-11-02 RX ADMIN — METHYLPREDNISOLONE SODIUM SUCCINATE 60 MG: 125 INJECTION, POWDER, FOR SOLUTION INTRAMUSCULAR; INTRAVENOUS at 21:57

## 2024-11-02 RX ADMIN — ATORVASTATIN CALCIUM 40 MG: 40 TABLET, FILM COATED ORAL at 09:30

## 2024-11-02 RX ADMIN — BUDESONIDE AND FORMOTEROL FUMARATE DIHYDRATE 2 PUFF: 160; 4.5 AEROSOL RESPIRATORY (INHALATION) at 18:04

## 2024-11-02 RX ADMIN — Medication 5000 UNITS: at 09:36

## 2024-11-02 RX ADMIN — LEVALBUTEROL HYDROCHLORIDE 1.25 MG: 1.25 SOLUTION RESPIRATORY (INHALATION) at 07:30

## 2024-11-02 RX ADMIN — GUAIFENESIN 1200 MG: 600 TABLET ORAL at 09:30

## 2024-11-02 RX ADMIN — METHYLPREDNISOLONE SODIUM SUCCINATE 60 MG: 125 INJECTION, POWDER, FOR SOLUTION INTRAMUSCULAR; INTRAVENOUS at 05:05

## 2024-11-02 RX ADMIN — GUAIFENESIN 1200 MG: 600 TABLET ORAL at 21:53

## 2024-11-02 RX ADMIN — SODIUM CHLORIDE SOLN NEBU 3% 4 ML: 3 NEBU SOLN at 21:14

## 2024-11-02 RX ADMIN — BUDESONIDE AND FORMOTEROL FUMARATE DIHYDRATE 2 PUFF: 160; 4.5 AEROSOL RESPIRATORY (INHALATION) at 09:31

## 2024-11-02 RX ADMIN — DOXYCYCLINE HYCLATE 100 MG: 100 CAPSULE ORAL at 21:53

## 2024-11-02 NOTE — ASSESSMENT & PLAN NOTE
Now in sinus tachycardia with periods of PACs   Afib Likely to recur.  CHADS2-VASc score is elevated.    Continue Eliquis 5 mg twice daily.  Will change diltiazem to once daily dosing.  Will add metoprolol

## 2024-11-02 NOTE — ASSESSMENT & PLAN NOTE
Utilizes 2 L of nasal cannula supplemental O2 at baseline  Currently requiring 7 L in the setting of sepsis secondary to pneumonia  Respiratory protocol  Wean O2 as tolerated

## 2024-11-02 NOTE — PLAN OF CARE
Problem: Potential for Falls  Goal: Patient will remain free of falls  Description: INTERVENTIONS:  - Educate patient/family on patient safety including physical limitations  - Instruct patient to call for assistance with activity   - Consult OT/PT to assist with strengthening/mobility   - Keep Call bell within reach  - Keep bed low and locked with side rails adjusted as appropriate  - Keep care items and personal belongings within reach  - Initiate and maintain comfort rounds  - Make Fall Risk Sign visible to staff  - Offer Toileting every 2 Hours, in advance of need  - Initiate/Maintain bed alarm  - Obtain necessary fall risk management equipment: non skid socks  - Apply yellow socks and bracelet for high fall risk patients  - Consider moving patient to room near nurses station  Outcome: Progressing     Problem: PAIN - ADULT  Goal: Verbalizes/displays adequate comfort level or baseline comfort level  Description: Interventions:  - Encourage patient to monitor pain and request assistance  - Assess pain using appropriate pain scale  - Administer analgesics based on type and severity of pain and evaluate response  - Implement non-pharmacological measures as appropriate and evaluate response  - Consider cultural and social influences on pain and pain management  - Notify physician/advanced practitioner if interventions unsuccessful or patient reports new pain  Outcome: Progressing     Problem: INFECTION - ADULT  Goal: Absence or prevention of progression during hospitalization  Description: INTERVENTIONS:  - Assess and monitor for signs and symptoms of infection  - Monitor lab/diagnostic results  - Monitor all insertion sites, i.e. indwelling lines, tubes, and drains  - Monitor endotracheal if appropriate and nasal secretions for changes in amount and color  - [place] appropriate cooling/warming therapies per order  - Administer medications as ordered  - Instruct and encourage patient and family to use good hand  hygiene technique  - Identify and instruct in appropriate isolation precautions for identified infection/condition  Outcome: Progressing  Goal: Absence of fever/infection during neutropenic period  Description: INTERVENTIONS:  - Monitor WBC    Outcome: Progressing     Problem: SAFETY ADULT  Goal: Patient will remain free of falls  Description: INTERVENTIONS:  - Educate patient/family on patient safety including physical limitations  - Instruct patient to call for assistance with activity   - Consult OT/PT to assist with strengthening/mobility   - Keep Call bell within reach  - Keep bed low and locked with side rails adjusted as appropriate  - Keep care items and personal belongings within reach  - Initiate and maintain comfort rounds  - Make Fall Risk Sign visible to staff  - Offer Toileting every 2 Hours, in advance of need  - Initiate/Maintain bed alarm  - Obtain necessary fall risk management equipment: non skid socks  - Apply yellow socks and bracelet for high fall risk patients  - Consider moving patient to room near nurses station  Outcome: Progressing  Goal: Maintain or return to baseline ADL function  Description: INTERVENTIONS:  -  Assess patient's ability to carry out ADLs; assess patient's baseline for ADL function and identify physical deficits which impact ability to perform ADLs (bathing, care of mouth/teeth, toileting, grooming, dressing, etc.)  - Assess/evaluate cause of self-care deficits   - Assess range of motion  - Assess patient's mobility; develop plan if impaired  - Assess patient's need for assistive devices and provide as appropriate  - Encourage maximum independence but intervene and supervise when necessary  - Involve family in performance of ADLs  - Assess for home care needs following discharge   - Consider OT consult to assist with ADL evaluation and planning for discharge  - Provide patient education as appropriate  Outcome: Progressing  Goal: Maintains/Returns to pre admission  functional level  Description: INTERVENTIONS:  - Perform AM-PAC 6 Click Basic Mobility/ Daily Activity assessment daily.  - Set and communicate daily mobility goal to care team and patient/family/caregiver.   - Collaborate with rehabilitation services on mobility goals if consulted  - Perform Range of Motion 2 times a day.  - Reposition patient every 2 hours.  - Dangle patient 2 times a day  - Stand patient 2 times a day  - Ambulate patient 2 times a day  - Out of bed to chair 3 times a day   - Out of bed for meals 3 times a day  - Out of bed for toileting  - Record patient progress and toleration of activity level   Outcome: Progressing     Problem: DISCHARGE PLANNING  Goal: Discharge to home or other facility with appropriate resources  Description: INTERVENTIONS:  - Identify barriers to discharge w/patient and caregiver  - Identify discharge learning needs (meds, wound care, etc.)  - Arrange for interpretive services to assist at discharge as needed  - Refer to Case Management Department for coordinating discharge planning if the patient needs post-hospital services based on physician/advanced practitioner order or complex needs related to functional status, cognitive ability, or social support system  Outcome: Progressing     Problem: Knowledge Deficit  Goal: Patient/family/caregiver demonstrates understanding of disease process, treatment plan, medications, and discharge instructions  Description: Complete learning assessment and assess knowledge base.  Interventions:  - Provide teaching at level of understanding  - Provide teaching via preferred learning methods  Outcome: Progressing     Problem: CARDIOVASCULAR - ADULT  Goal: Maintains optimal cardiac output and hemodynamic stability  Description: INTERVENTIONS:  - Monitor I/O, vital signs and rhythm  - Monitor for S/S and trends of decreased cardiac output  - Administer and titrate ordered vasoactive medications to optimize hemodynamic stability  - Assess  quality of pulses, skin color and temperature  - Assess for signs of decreased coronary artery perfusion  - Instruct patient to report change in severity of symptoms  Outcome: Progressing  Goal: Absence of cardiac dysrhythmias or at baseline rhythm  Description: INTERVENTIONS:  - Continuous cardiac monitoring, vital signs, obtain 12 lead EKG if ordered  - Administer antiarrhythmic and heart rate control medications as ordered  - Monitor electrolytes and administer replacement therapy as ordered  Outcome: Progressing     Problem: NEUROSENSORY - ADULT  Goal: Achieves stable or improved neurological status  Description: INTERVENTIONS  - Monitor and report changes in neurological status  - Monitor vital signs such as temperature, blood pressure, glucose, and any other labs ordered   - Initiate measures to prevent increased intracranial pressure  - Monitor for seizure activity and implement precautions if appropriate      Outcome: Progressing  Goal: Remains free of injury related to seizures activity  Description: INTERVENTIONS  - Maintain airway, patient safety  and administer oxygen as ordered  - Monitor patient for seizure activity, document and report duration and description of seizure to physician/advanced practitioner  - If seizure occurs,  ensure patient safety during seizure  - Reorient patient post seizure  - Seizure pads on all 4 side rails  - Instruct patient/family to notify RN of any seizure activity including if an aura is experienced  - Instruct patient/family to call for assistance with activity based on nursing assessment  - Administer anti-seizure medications if ordered    Outcome: Progressing  Goal: Achieves maximal functionality and self care  Description: INTERVENTIONS  - Monitor swallowing and airway patency with patient fatigue and changes in neurological status  - Encourage and assist patient to increase activity and self care.   - Encourage visually impaired, hearing impaired and aphasic  patients to use assistive/communication devices  Outcome: Progressing     Problem: GASTROINTESTINAL - ADULT  Goal: Minimal or absence of nausea and/or vomiting  Description: INTERVENTIONS:  - Administer IV fluids if ordered to ensure adequate hydration  - Maintain NPO status until nausea and vomiting are resolved  - Nasogastric tube if ordered  - Administer ordered antiemetic medications as needed  - Provide nonpharmacologic comfort measures as appropriate  - Advance diet as tolerated, if ordered  - Consider nutrition services referral to assist patient with adequate nutrition and appropriate food choices  Outcome: Progressing  Goal: Maintains or returns to baseline bowel function  Description: INTERVENTIONS:  - Assess bowel function  - Encourage oral fluids to ensure adequate hydration  - Administer IV fluids if ordered to ensure adequate hydration  - Administer ordered medications as needed  - Encourage mobilization and activity  - Consider nutritional services referral to assist patient with adequate nutrition and appropriate food choices  Outcome: Progressing  Goal: Maintains adequate nutritional intake  Description: INTERVENTIONS:  - Monitor percentage of each meal consumed  - Identify factors contributing to decreased intake, treat as appropriate  - Assist with meals as needed  - Monitor I&O, weight, and lab values if indicated  - Obtain nutrition services referral as needed  Outcome: Progressing  Goal: Establish and maintain optimal ostomy function  Description: INTERVENTIONS:  - Assess bowel function  - Encourage oral fluids to ensure adequate hydration  - Administer IV fluids if ordered to ensure adequate hydration   - Administer ordered medications as needed  - Encourage mobilization and activity  - Nutrition services referral to assist patient with appropriate food choices  - Assess stoma site  - Consider wound care consult   Outcome: Progressing  Goal: Oral mucous membranes remain intact  Description:  INTERVENTIONS  - Assess oral mucosa and hygiene practices  - Implement preventative oral hygiene regimen  - Implement oral medicated treatments as ordered  - Initiate Nutrition services referral as needed  Outcome: Progressing     Problem: METABOLIC, FLUID AND ELECTROLYTES - ADULT  Goal: Electrolytes maintained within normal limits  Description: INTERVENTIONS:  - Monitor labs and assess patient for signs and symptoms of electrolyte imbalances  - Administer electrolyte replacement as ordered  - Monitor response to electrolyte replacements, including repeat lab results as appropriate  - Instruct patient on fluid and nutrition as appropriate  Outcome: Progressing  Goal: Fluid balance maintained  Description: INTERVENTIONS:  - Monitor labs   - Monitor I/O and WT  - Instruct patient on fluid and nutrition as appropriate  - Assess for signs & symptoms of volume excess or deficit  Outcome: Progressing  Goal: Glucose maintained within target range  Description: INTERVENTIONS:  - Monitor Blood Glucose as ordered  - Assess for signs and symptoms of hyperglycemia and hypoglycemia  - Administer ordered medications to maintain glucose within target range  - Assess nutritional intake and initiate nutrition service referral as needed  Outcome: Progressing     Problem: SKIN/TISSUE INTEGRITY - ADULT  Goal: Skin Integrity remains intact(Skin Breakdown Prevention)  Description: Assess:  -Perform Silvestre assessment every shift  -Clean and moisturize skin every shift  -Inspect skin when repositioning, toileting, and assisting with ADLS  -Assess under medical devices such  -Assess extremities for adequate circulation and sensation     Bed Management:  -Have minimal linens on bed & keep smooth, unwrinkled  -Change linens as needed when moist or perspiring  -Avoid sitting or lying in one position for more than 2 hours while in bed  -Keep HOB at 30 degrees     Toileting:  -Offer bedside commode      Activity:  -Mobilize patient 3 times a  day  -Encourage activity and walks on unit  -Encourage or provide ROM exercises   -Turn and reposition patient every 2 Hours  -Use appropriate equipment to lift or move patient in bed  -Instruct/ Assist with weight shifting every 60 minutes when out of bed in chair  -Consider limitation of chair time 4 hour intervals    Skin Care:  -Avoid use of baby powder, tape, friction and shearing, hot water or constrictive clothing  -Relieve pressure over bony prominences using Mepelex foam dressing  -Do not massage red bony areas    Outcome: Progressing  Goal: Incision(s), wounds(s) or drain site(s) healing without S/S of infection  Description: INTERVENTIONS  - Assess and document dressing, incision, wound bed, drain sites and surrounding tissue  - Provide patient and family education  - Perform skin care/dressing changes   Outcome: Progressing  Goal: Pressure injury heals and does not worsen  Description: Interventions:  - Implement low air loss mattress or specialty surface (Criteria met)  - Apply silicone foam dressing  - Instruct/assist with weight shifting every 60 minutes when in chair   - Limit chair time to 4 hour intervals  - Use special pressure reducing interventions such as waffle cushion when in chair   - Apply fecal or urinary incontinence containment device   - Perform passive or active ROM every shift  - Turn and reposition patient & offload bony prominences every 2 hours   - Utilize friction reducing device or surface for transfers   - Consider nutrition services referral as needed  Outcome: Progressing     Problem: HEMATOLOGIC - ADULT  Goal: Maintains hematologic stability  Description: INTERVENTIONS  - Assess for signs and symptoms of bleeding or hemorrhage  - Monitor labs  - Administer supportive blood products/factors as ordered and appropriate  Outcome: Progressing     Problem: MUSCULOSKELETAL - ADULT  Goal: Maintain or return mobility to safest level of function  Description: INTERVENTIONS:  - Assess  patient's ability to carry out ADLs; assess patient's baseline for ADL function and identify physical deficits which impact ability to perform ADLs (bathing, care of mouth/teeth, toileting, grooming, dressing, etc.)  - Assess/evaluate cause of self-care deficits   - Assess range of motion  - Assess patient's mobility  - Assess patient's need for assistive devices and provide as appropriate  - Encourage maximum independence but intervene and supervise when necessary  - Involve family in performance of ADLs  - Assess for home care needs following discharge   - Consider OT consult to assist with ADL evaluation and planning for discharge  - Provide patient education as appropriate  Outcome: Progressing  Goal: Maintain proper alignment of affected body part  Description: INTERVENTIONS:  - Support, maintain and protect limb and body alignment  - Provide patient/ family with appropriate education  Outcome: Progressing     Problem: Prexisting or High Potential for Compromised Skin Integrity  Goal: Skin integrity is maintained or improved  Description: INTERVENTIONS:  - Identify patients at risk for skin breakdown  - Assess and monitor skin integrity  - Assess and monitor nutrition and hydration status  - Monitor labs   - Assess for incontinence   - Turn and reposition patient  - Assist with mobility/ambulation  - Relieve pressure over bony prominences  - Avoid friction and shearing  - Provide appropriate hygiene as needed including keeping skin clean and dry  - Evaluate need for skin moisturizer/barrier cream  - Collaborate with interdisciplinary team   - Patient/family teaching  - Consider wound care consult   Outcome: Progressing

## 2024-11-02 NOTE — ASSESSMENT & PLAN NOTE
Patient went into atrial fibrillation with rapid ventricular response on 10/28/2024  Rapid response was called and patient was transitioned to level 2 stepdown  Initiated on diltiazem infusion which has since been discontinued  Heart rates have significantly improved  Diltiazem 360 mg PO daily  Not on anticoagulation in the outpatient setting  Continue home aspirin  Cardiology consultation appreciated  Hemodynamics stable  Initiated on Eliquis by cardiology  2D echocardiogram with ejection fraction 65%

## 2024-11-02 NOTE — PROGRESS NOTES
"Progress Note - Pulmonary   Corinne A Longo 74 y.o. female MRN: [id number]  Unit/Bed#: ICU 09-01 Encounter: [id number]      Assessment:  Acute hypoxic respiratory failure-secondary to #2/3  Continues to improve, weaning supplemental oxygen to 4-5 LPM currently from 8 LPM 2 days ago  Improving dyspnea/mucus production  Multifocal airspace disease/pneumonia  Severe COPD/emphysema-with exacerbation  Tobacco abuse disorder  A-fib/RVR    Plan/discussion:  Day 5/7 abx with cefepime  Switch Solu-Medrol 60 mg to q12 for today/and tomorrow, then prednisone 40 as below  Continue Mucinex/3% sodium chloride via nebulizer  Continue to wean FiO2 for SpO2 above 88%    On discharge  --Prednisone 40 mg for 3 days, then down by 10 mg every 3 days to stop  --Switch back to home inhalers Anoro/and DuoNeb via nebulizer  --Needs follow-up CT chest in 6 to 8 weeks to ensure improvement of the pulmonary infiltrates  -Smoking cessation encouraged  --We will arrange outpatient follow-up with pulmonary    Pulmonary will sign off, please do not hesitate to call with any questions      Subjective:   No overnight acute events.  Continues to feel easier to breathe over the past 2 days, minimally productive cough but intermittent much improved from admission time.  No pleuritic chest pain.  Using incentive spirometer/flutter valve    Vitals: Blood pressure 133/61, pulse 85, temperature 98.9 °F (37.2 °C), temperature source Temporal, resp. rate (!) 28, height 5' 8\" (1.727 m), weight 75.6 kg (166 lb 10.7 oz), SpO2 90%, not currently breastfeeding., Body mass index is 25.34 kg/m².      Intake/Output Summary (Last 24 hours) at 11/2/2024 1616  Last data filed at 11/2/2024 1358  Gross per 24 hour   Intake 1070 ml   Output 1450 ml   Net -380 ml       Physical Exam  Gen: not in acute distress, Body mass index is 25.34 kg/m².   HEENT:supple, no accessory muscle use, no JVD appreciated  Cardiac: RRR, no murmurs appreciated  Lungs: normal respiratory effort, " mild expiratory wheeze at the right apex/mid lung fields, otherwise diminished but clear sounds bilaterally  Abdomen: soft, nontender, normoactive bowel sounds  Extremities: Mild peripheral  Neuro: AAO X3, no focal motor deficit    Labs: I have personally reviewed pertinent lab results.        Son Montalvo MD

## 2024-11-02 NOTE — PLAN OF CARE
Problem: PAIN - ADULT  Goal: Verbalizes/displays adequate comfort level or baseline comfort level  Description: Interventions:  - Encourage patient to monitor pain and request assistance  - Assess pain using appropriate pain scale  - Administer analgesics based on type and severity of pain and evaluate response  - Implement non-pharmacological measures as appropriate and evaluate response  - Consider cultural and social influences on pain and pain management  - Notify physician/advanced practitioner if interventions unsuccessful or patient reports new pain  Outcome: Progressing     Problem: INFECTION - ADULT  Goal: Absence or prevention of progression during hospitalization  Description: INTERVENTIONS:  - Assess and monitor for signs and symptoms of infection  - Monitor lab/diagnostic results  - Monitor all insertion sites, i.e. indwelling lines, tubes, and drains  - Monitor endotracheal if appropriate and nasal secretions for changes in amount and color  - [place] appropriate cooling/warming therapies per order  - Administer medications as ordered  - Instruct and encourage patient and family to use good hand hygiene technique  - Identify and instruct in appropriate isolation precautions for identified infection/condition  Outcome: Progressing  Goal: Absence of fever/infection during neutropenic period  Description: INTERVENTIONS:  - Monitor WBC    Outcome: Progressing     Problem: CARDIOVASCULAR - ADULT  Goal: Maintains optimal cardiac output and hemodynamic stability  Description: INTERVENTIONS:  - Monitor I/O, vital signs and rhythm  - Monitor for S/S and trends of decreased cardiac output  - Administer and titrate ordered vasoactive medications to optimize hemodynamic stability  - Assess quality of pulses, skin color and temperature  - Assess for signs of decreased coronary artery perfusion  - Instruct patient to report change in severity of symptoms  Outcome: Progressing  Goal: Absence of cardiac  dysrhythmias or at baseline rhythm  Description: INTERVENTIONS:  - Continuous cardiac monitoring, vital signs, obtain 12 lead EKG if ordered  - Administer antiarrhythmic and heart rate control medications as ordered  - Monitor electrolytes and administer replacement therapy as ordered  Outcome: Progressing     Problem: NEUROSENSORY - ADULT  Goal: Achieves stable or improved neurological status  Description: INTERVENTIONS  - Monitor and report changes in neurological status  - Monitor vital signs such as temperature, blood pressure, glucose, and any other labs ordered   - Initiate measures to prevent increased intracranial pressure  - Monitor for seizure activity and implement precautions if appropriate      Outcome: Progressing  Goal: Remains free of injury related to seizures activity  Description: INTERVENTIONS  - Maintain airway, patient safety  and administer oxygen as ordered  - Monitor patient for seizure activity, document and report duration and description of seizure to physician/advanced practitioner  - If seizure occurs,  ensure patient safety during seizure  - Reorient patient post seizure  - Seizure pads on all 4 side rails  - Instruct patient/family to notify RN of any seizure activity including if an aura is experienced  - Instruct patient/family to call for assistance with activity based on nursing assessment  - Administer anti-seizure medications if ordered    Outcome: Progressing  Goal: Achieves maximal functionality and self care  Description: INTERVENTIONS  - Monitor swallowing and airway patency with patient fatigue and changes in neurological status  - Encourage and assist patient to increase activity and self care.   - Encourage visually impaired, hearing impaired and aphasic patients to use assistive/communication devices  Outcome: Progressing     Problem: GENITOURINARY - ADULT  Goal: Maintains or returns to baseline urinary function  Description: INTERVENTIONS:  - Assess urinary function  -  Encourage oral fluids to ensure adequate hydration if ordered  - Administer IV fluids as ordered to ensure adequate hydration  - Administer ordered medications as needed  - Offer frequent toileting  - Follow urinary retention protocol if ordered  Outcome: Progressing  Goal: Absence of urinary retention  Description: INTERVENTIONS:  - Assess patient’s ability to void and empty bladder  - Monitor I/O  - Bladder scan as needed  - Discuss with physician/AP medications to alleviate retention as needed  - Discuss catheterization for long term situations as appropriate  Outcome: Progressing  Goal: Urinary catheter remains patent  Description: INTERVENTIONS:  - Assess patency of urinary catheter  - If patient has a chronic zuñiga, consider changing catheter if non-functioning  - Follow guidelines for intermittent irrigation of non-functioning urinary catheter  Outcome: Progressing

## 2024-11-02 NOTE — PROGRESS NOTES
"Progress Note - Cardiology   Name: Corinne A Longo 74 y.o. female I MRN: [id number]  Unit/Bed#: ICU 09-01 I Date of Admission: 10/28/2024   Date of Service: 11/2/2024 I Hospital Day: 5    Assessment & Plan  Paroxysmal atrial fibrillation (HCC)  Now in sinus tachycardia with periods of PACs   Afib Likely to recur.  CHADS2-VASc score is elevated.    Continue Eliquis 5 mg twice daily.  Will change diltiazem to once daily dosing.  Will add metoprolol   Chronic respiratory failure with hypoxia (HCC)  Oxygen dependent at baseline  Currently with increased oxygen requirements likely related to pneumonia  Management per medical team and pulmonary  Emphysema/COPD (HCC)  Oxygen dependent at baseline  Pulmonary following  Summary Comments:  Continue long acting diltiazem.  Will increase to 360 mg daily from 240.  Will add metoprolol 25 mg twice daily in light of current tachycardia.    Outpatient Cardiologist: Dr. Reddy    Subjective:  Patient seen and examined at the bedside.  No events overnight.  Feeling ok today. No chest pain or palpitations. No edema. Patient states that breathing is not improved today.     Objective:   Vitals: Blood pressure 144/68, pulse 101, temperature 98.3 °F (36.8 °C), temperature source Temporal, resp. rate 22, height 5' 8\" (1.727 m), weight 75.6 kg (166 lb 10.7 oz), SpO2 93%, not currently breastfeeding.,   Orthostatic Blood Pressures      Flowsheet Row Most Recent Value   Blood Pressure 144/68 filed at 11/02/2024 0600   Patient Position - Orthostatic VS Lying filed at 11/02/2024 0700            Telemetry/ECG/Cardiac Testing:   Sinus tachycardia with periods of PACs    Physical Exam:  Physical Exam  Vitals and nursing note reviewed.   Constitutional:       Appearance: She is well-developed.   HENT:      Head: Normocephalic and atraumatic.      Comments: Nasal cannula present     Mouth/Throat:      Mouth: Mucous membranes are moist.   Eyes:      General: No scleral icterus.     " Conjunctiva/sclera: Conjunctivae normal.   Neck:      Vascular: No JVD.      Trachea: No tracheal deviation.   Cardiovascular:      Rate and Rhythm: Regular rhythm. Tachycardia present.      Pulses: Intact distal pulses.      Heart sounds: S1 normal and S2 normal. Murmur heard.      Systolic murmur is present with a grade of 2/6.      No friction rub. No gallop.   Pulmonary:      Effort: Pulmonary effort is normal. No respiratory distress.      Breath sounds: Rhonchi present. No wheezing or rales.      Comments: Coarse breath sounds  Chest:      Chest wall: No tenderness.   Abdominal:      General: Bowel sounds are normal. There is no distension.      Palpations: Abdomen is soft.      Tenderness: There is no abdominal tenderness.      Comments: Aorta not palpable    Musculoskeletal:         General: No tenderness. Normal range of motion.      Cervical back: Normal range of motion and neck supple.      Right lower leg: No edema.      Left lower leg: No edema.   Skin:     General: Skin is warm and dry.      Coloration: Skin is not pale.      Findings: No erythema or rash.   Neurological:      General: No focal deficit present.      Mental Status: She is alert and oriented to person, place, and time.   Psychiatric:         Mood and Affect: Mood normal.         Behavior: Behavior normal.         Judgment: Judgment normal.         Medications:    Current Facility-Administered Medications:     acetaminophen (TYLENOL) tablet 650 mg, 650 mg, Oral, Q6H PRN, Fernando Sarmiento DO, 650 mg at 10/29/24 1803    apixaban (ELIQUIS) tablet 5 mg, 5 mg, Oral, BID, Benny Reddy MD, 5 mg at 11/02/24 0930    atorvastatin (LIPITOR) tablet 40 mg, 40 mg, Oral, Daily, Fernando Sarmiento DO, 40 mg at 11/02/24 0930    budesonide-formoterol (SYMBICORT) 160-4.5 mcg/act inhaler 2 puff, 2 puff, Inhalation, BID, Fernando Sarmiento DO, 2 puff at 11/02/24 0931    cefepime (MAXIPIME) IVPB (premix in dextrose) 2,000 mg 50 mL, 2,000 mg, Intravenous, Q12H,  Fernando Sarmiento DO, Last Rate: 100 mL/hr at 11/02/24 0441, 2,000 mg at 11/02/24 0441    Cholecalciferol (VITAMIN D3) tablet 5,000 Units, 5,000 Units, Oral, Daily, Fernando Sarmiento DO, 5,000 Units at 11/02/24 0936    [COMPLETED] diltiazem (CARDIZEM CD) 24 hr capsule 240 mg, 240 mg, Oral, Once, 240 mg at 11/01/24 1202 **FOLLOWED BY** diltiazem (CARDIZEM CD) 24 hr capsule 360 mg, 360 mg, Oral, Daily, Benny Reddy MD, 360 mg at 11/02/24 0930    doxycycline hyclate (VIBRAMYCIN) capsule 100 mg, 100 mg, Oral, Q12H YING, Son Montalvo MD, 100 mg at 11/02/24 0930    guaiFENesin (MUCINEX) 12 hr tablet 1,200 mg, 1,200 mg, Oral, Q12H YING, Son Montalvo MD, 1,200 mg at 11/02/24 0930    levalbuterol (XOPENEX) inhalation solution 1.25 mg, 1.25 mg, Nebulization, TID, Fernando Sarmiento DO, 1.25 mg at 11/02/24 0730    LORazepam (ATIVAN) tablet 0.5 mg, 0.5 mg, Oral, Q8H PRN, Zoe Carroll PA-C, 0.5 mg at 11/01/24 2040    methylPREDNISolone sodium succinate (Solu-MEDROL) injection 60 mg, 60 mg, Intravenous, Q8H YING, Son Montalvo MD, 60 mg at 11/02/24 0505    metoprolol (LOPRESSOR) injection 10 mg, 10 mg, Intravenous, Q6H PRN, Fernando Sarmiento DO    metoprolol tartrate (LOPRESSOR) tablet 25 mg, 25 mg, Oral, Q12H YING, Sonia Candelaria PA-C, 25 mg at 11/02/24 0936    moisture barrier miconazole 2% cream (aka [place] MOISTURE BARRIER ANTIFUNGAL CREAM), , Topical, BID, Fernando Sarmiento DO, Given at 11/02/24 0932    ondansetron (ZOFRAN) injection 4 mg, 4 mg, Intravenous, Q6H PRN, Fernando Sarmiento DO    sodium chloride 3 % inhalation solution 4 mL, 4 mL, Nebulization, TID, Son Montalvo MD, 4 mL at 11/02/24 0730    Labs & Results:  Results from last 7 days   Lab Units 10/28/24  1236   BNP pg/mL 168*     Results from last 7 days   Lab Units 11/02/24  0452   WBC Thousand/uL 15.84*   HEMOGLOBIN g/dL 9.3*   HEMATOCRIT % 28.8*   PLATELETS Thousands/uL 314         Results  from last 7 days   Lab Units 11/02/24  0452   POTASSIUM mmol/L 4.4   CHLORIDE mmol/L 113*   CO2 mmol/L 20*   BUN mg/dL 42*   CREATININE mg/dL 1.38*   MAGNESIUM mg/dL 2.3     Results from last 7 days   Lab Units 10/28/24  1232   INR  1.26*

## 2024-11-02 NOTE — PROGRESS NOTES
Progress Note - Hospitalist   Name: Corinne A Longo 74 y.o. female I MRN: [id number]  Unit/Bed#: ICU 09-01 I Date of Admission: 10/28/2024   Date of Service: 11/2/2024 I Hospital Day: 5    Assessment & Plan  Sepsis due to pneumonia (HCC)  Present on admission as evidenced by hyperthermia, tachycardia, tachypnea  Likely secondary to multilobar pneumonia  Chest x-ray with evidence of consolidations  Discontinue vancomycin  Continue cefepime  Follow-up blood and urine cultures  Trend fever curve/WBC count/procalcitonin  Pulmonology following  Procalcitonin improving  Paroxysmal atrial fibrillation (HCC)  Patient went into atrial fibrillation with rapid ventricular response on 10/28/2024  Rapid response was called and patient was transitioned to level 2 stepdown  Initiated on diltiazem infusion which has since been discontinued  Heart rates have significantly improved  Diltiazem 360 mg PO daily  Not on anticoagulation in the outpatient setting  Continue home aspirin  Cardiology consultation appreciated  Hemodynamics stable  Initiated on Eliquis by cardiology  2D echocardiogram with ejection fraction 65%  Emphysema/COPD (HCC)  Possible acute exacerbation secondary to pneumonia  Utilizes chronic O2  Atrovent and Xopenex  Respiratory protocol  Pulmonology consultation appreciated  IV corticosteroids as per Pulmonology  Monitor respiratory status  Continue home Symbicort  Chronic respiratory failure with hypoxia (HCC)  Utilizes 2 L of nasal cannula supplemental O2 at baseline  Currently requiring 7 L in the setting of sepsis secondary to pneumonia  Respiratory protocol  Wean O2 as tolerated    VTE Pharmacologic Prophylaxis: VTE Score: 5 High Risk (Score >/= 5) - Pharmacological DVT Prophylaxis Ordered: heparin. Sequential Compression Devices Ordered.    Mobility:   Basic Mobility Inpatient Raw Score: 19  JH-HLM Goal: 6: Walk 10 steps or more  JH-HLM Achieved: 6: Walk 10 steps or more  JH-HLM Goal NOT achieved. Continue with  multidisciplinary rounding and encourage appropriate mobility to improve upon [facility] goals.    Patient Centered Rounds: I performed bedside rounds with nursing staff today.     Discussions with Specialists or Other Care Team Provider: Cardiology, Pulmonology    Education and Discussions with Family / Patient: Updated  () via phone.    Current Length of Stay: 5 day(s)  Current Patient Status: Inpatient   Certification Statement: The patient will continue to require additional inpatient hospital stay due to acute hypoxic respiratory failure  Discharge Plan: Anticipate discharge in 48 hrs to discharge location to be determined pending rehab evaluations.    Code Status: Level 1 - Full Code    Subjective   Patient seen and examined at bedside.  No acute events overnight.  Patient's heart rate significantly improved.  Currently requiring 7 L of nasal cannula supplemental O2.  Hemodynamically stable.    Objective :  Temp:  [97 °F (36.1 °C)-98.4 °F (36.9 °C)] 98.4 °F (36.9 °C)  HR:  [] 101  BP: (109-169)/(55-73) 144/68  Resp:  [14-43] 22  SpO2:  [86 %-96 %] 93 %  O2 Device: Mid flow nasal cannula  Nasal Cannula O2 Flow Rate (L/min):  [7 L/min-8 L/min] 7 L/min    Body mass index is 25.34 kg/m².     Input and Output Summary (last 24 hours):     Intake/Output Summary (Last 24 hours) at 11/2/2024 0719  Last data filed at 11/2/2024 0501  Gross per 24 hour   Intake 1210 ml   Output 775 ml   Net 435 ml       Physical Exam  Vitals and nursing note reviewed.   Constitutional:       General: She is not in acute distress.     Appearance: She is well-developed.   HENT:      Head: Normocephalic and atraumatic.   Eyes:      Conjunctiva/sclera: Conjunctivae normal.   Cardiovascular:      Rate and Rhythm: Normal rate and regular rhythm.      Heart sounds: No murmur heard.  Pulmonary:      Effort: Pulmonary effort is normal. No respiratory distress.      Breath sounds: Normal breath sounds.   Abdominal:       Palpations: Abdomen is soft.      Tenderness: There is no abdominal tenderness.   Musculoskeletal:         General: No swelling.      Cervical back: Neck supple.   Skin:     General: Skin is warm and dry.      Capillary Refill: Capillary refill takes less than 2 seconds.   Neurological:      Mental Status: She is alert.   Psychiatric:         Mood and Affect: Mood normal.       Telemetry:  Telemetry Orders (From admission, onward)               24 Hour Telemetry Monitoring  Continuous x 24 Hours (Telem)        Question:  Reason for 24 Hour Telemetry  Answer:  Alcohol withdrawal and CIWA >7, electrolyte abnormalities, abnormal ECG and/or heart disease                     Telemetry Reviewed: Atrial fibrillation. HR averaging 90s-110s  Indication for Continued Telemetry Use: Arrthymias requiring medical therapy               Lab Results: I have reviewed the following results:   Results from last 7 days   Lab Units 11/02/24 0452 11/01/24  0511 10/30/24  0524 10/29/24  0452 10/28/24  1232 10/28/24  1232   WBC Thousand/uL 15.84* 16.36*   < > 8.01  --  7.52   HEMOGLOBIN g/dL 9.3* 9.2*   < > 10.7*  --  12.4   HEMATOCRIT % 28.8* 28.3*   < > 32.6*  --  37.6   PLATELETS Thousands/uL 314 323   < > 266  --  306   BANDS PCT %  --   --   --  3  --   --    SEGS PCT %  --   --   --   --   --  88*   LYMPHO PCT %  --  3*  --  9*   < > 7*   MONO PCT %  --  2*  --  5   < > 4   EOS PCT %  --  0  --  0   < > 0    < > = values in this interval not displayed.     Results from last 7 days   Lab Units 11/02/24  0452 10/29/24  0452 10/28/24  1232   SODIUM mmol/L 140   < > 140   POTASSIUM mmol/L 4.4   < > 3.9   CHLORIDE mmol/L 113*   < > 106   CO2 mmol/L 20*   < > 21   BUN mg/dL 42*   < > 34*   CREATININE mg/dL 1.38*   < > 1.03   ANION GAP mmol/L 7   < > 13   CALCIUM mg/dL 8.9   < > 9.3   ALBUMIN g/dL  --   --  3.4*   TOTAL BILIRUBIN mg/dL  --   --  0.73   ALK PHOS U/L  --   --  59   ALT U/L  --   --  28   AST U/L  --   --  46*   GLUCOSE  RANDOM mg/dL 164*   < > 152*    < > = values in this interval not displayed.     Results from last 7 days   Lab Units 10/28/24  1232   INR  1.26*             Results from last 7 days   Lab Units 11/02/24  0452 11/01/24  0511 10/31/24  0558 10/30/24  0524 10/29/24  0452 10/28/24  1511 10/28/24  1232   LACTIC ACID mmol/L  --   --   --   --   --  1.1 2.3*   PROCALCITONIN ng/ml 0.21 0.27* 0.37* 0.58* 0.51*  --  0.26*       Recent Cultures (last 7 days):   Results from last 7 days   Lab Units 10/29/24  1945 10/29/24  1357 10/28/24  1232   BLOOD CULTURE   --   --  No Growth After 4 Days.  No Growth After 4 Days.   SPUTUM CULTURE  1+ Growth of  --   --    GRAM STAIN RESULT  1+ Epithelial cells per low power field*  1+ Polys*  1+ Gram positive rods*  --   --    LEGIONELLA URINARY ANTIGEN   --  Negative  --        Imaging Results Review: I reviewed radiology reports from this admission including: CT chest.  Other Study Results Review: No additional pertinent studies reviewed.    Last 24 Hours Medication List:     Current Facility-Administered Medications:     acetaminophen (TYLENOL) tablet 650 mg, Q6H PRN    apixaban (ELIQUIS) tablet 5 mg, BID    atorvastatin (LIPITOR) tablet 40 mg, Daily    budesonide-formoterol (SYMBICORT) 160-4.5 mcg/act inhaler 2 puff, BID    cefepime (MAXIPIME) IVPB (premix in dextrose) 2,000 mg 50 mL, Q12H, Last Rate: 2,000 mg (11/02/24 0441)    Cholecalciferol (VITAMIN D3) tablet 5,000 Units, Daily    [COMPLETED] diltiazem (CARDIZEM CD) 24 hr capsule 240 mg, Once **FOLLOWED BY** diltiazem (CARDIZEM CD) 24 hr capsule 360 mg, Daily    doxycycline hyclate (VIBRAMYCIN) capsule 100 mg, Q12H YING    guaiFENesin (MUCINEX) 12 hr tablet 1,200 mg, Q12H YING    levalbuterol (XOPENEX) inhalation solution 1.25 mg, TID    LORazepam (ATIVAN) tablet 0.5 mg, Q8H PRN    methylPREDNISolone sodium succinate (Solu-MEDROL) injection 60 mg, Q8H YING    metoprolol (LOPRESSOR) injection 10 mg, Q6H PRN    moisture barrier  miconazole 2% cream (aka [place] MOISTURE BARRIER ANTIFUNGAL CREAM), BID    ondansetron (ZOFRAN) injection 4 mg, Q6H PRN    sodium chloride 3 % inhalation solution 4 mL, TID    Administrative Statements   Today, Patient Was Seen By: Fernando Sarmiento, DO  I have spent a total time of 35 minutes in caring for this patient on the day of the visit/encounter including Diagnostic results, Prognosis, Risks and benefits of tx options, Instructions for management, Patient and family education, Importance of tx compliance, Risk factor reductions, Impressions, Counseling / Coordination of care, Documenting in the medical record, Reviewing / ordering tests, medicine, procedures  , Obtaining or reviewing history  , and Communicating with other healthcare professionals .    **Please Note: This note may have been constructed using a voice recognition system.**

## 2024-11-03 LAB
ANION GAP SERPL CALCULATED.3IONS-SCNC: 6 MMOL/L (ref 4–13)
ATRIAL RATE: 97 BPM
BUN SERPL-MCNC: 46 MG/DL (ref 5–25)
CALCIUM SERPL-MCNC: 8.9 MG/DL (ref 8.4–10.2)
CHLORIDE SERPL-SCNC: 112 MMOL/L (ref 96–108)
CO2 SERPL-SCNC: 21 MMOL/L (ref 21–32)
CREAT SERPL-MCNC: 1.39 MG/DL (ref 0.6–1.3)
ERYTHROCYTE [DISTWIDTH] IN BLOOD BY AUTOMATED COUNT: 14.2 % (ref 11.6–15.1)
GFR SERPL CREATININE-BSD FRML MDRD: 37 ML/MIN/1.73SQ M
GLUCOSE SERPL-MCNC: 157 MG/DL (ref 65–140)
HCT VFR BLD AUTO: 30 % (ref 34.8–46.1)
HGB BLD-MCNC: 9.7 G/DL (ref 11.5–15.4)
MAGNESIUM SERPL-MCNC: 2.2 MG/DL (ref 1.9–2.7)
MCH RBC QN AUTO: 30.5 PG (ref 26.8–34.3)
MCHC RBC AUTO-ENTMCNC: 32.3 G/DL (ref 31.4–37.4)
MCV RBC AUTO: 94 FL (ref 82–98)
P AXIS: 67 DEGREES
PHOSPHATE SERPL-MCNC: 3.6 MG/DL (ref 2.3–4.1)
PLATELET # BLD AUTO: 321 THOUSANDS/UL (ref 149–390)
PMV BLD AUTO: 9.6 FL (ref 8.9–12.7)
POTASSIUM SERPL-SCNC: 4 MMOL/L (ref 3.5–5.3)
PR INTERVAL: 152 MS
PROCALCITONIN SERPL-MCNC: 0.17 NG/ML
QRS AXIS: 50 DEGREES
QRSD INTERVAL: 78 MS
QT INTERVAL: 350 MS
QTC INTERVAL: 444 MS
RBC # BLD AUTO: 3.18 MILLION/UL (ref 3.81–5.12)
SODIUM SERPL-SCNC: 139 MMOL/L (ref 135–147)
T WAVE AXIS: 35 DEGREES
VENTRICULAR RATE: 97 BPM
WBC # BLD AUTO: 16.11 THOUSAND/UL (ref 4.31–10.16)

## 2024-11-03 PROCEDURE — 84100 ASSAY OF PHOSPHORUS: CPT | Performed by: INTERNAL MEDICINE

## 2024-11-03 PROCEDURE — 93010 ELECTROCARDIOGRAM REPORT: CPT | Performed by: PHYSICIAN ASSISTANT

## 2024-11-03 PROCEDURE — 94664 DEMO&/EVAL PT USE INHALER: CPT

## 2024-11-03 PROCEDURE — 80048 BASIC METABOLIC PNL TOTAL CA: CPT | Performed by: INTERNAL MEDICINE

## 2024-11-03 PROCEDURE — 94640 AIRWAY INHALATION TREATMENT: CPT

## 2024-11-03 PROCEDURE — 85027 COMPLETE CBC AUTOMATED: CPT | Performed by: INTERNAL MEDICINE

## 2024-11-03 PROCEDURE — 99232 SBSQ HOSP IP/OBS MODERATE 35: CPT | Performed by: INTERNAL MEDICINE

## 2024-11-03 PROCEDURE — 83735 ASSAY OF MAGNESIUM: CPT | Performed by: INTERNAL MEDICINE

## 2024-11-03 PROCEDURE — 94668 MNPJ CHEST WALL SBSQ: CPT

## 2024-11-03 PROCEDURE — 94760 N-INVAS EAR/PLS OXIMETRY 1: CPT

## 2024-11-03 PROCEDURE — 99232 SBSQ HOSP IP/OBS MODERATE 35: CPT | Performed by: PHYSICIAN ASSISTANT

## 2024-11-03 PROCEDURE — 84145 PROCALCITONIN (PCT): CPT | Performed by: INTERNAL MEDICINE

## 2024-11-03 RX ORDER — METOPROLOL TARTRATE 50 MG
50 TABLET ORAL EVERY 12 HOURS SCHEDULED
Status: DISCONTINUED | OUTPATIENT
Start: 2024-11-03 | End: 2024-11-06 | Stop reason: HOSPADM

## 2024-11-03 RX ORDER — CEFTRIAXONE 1 G/50ML
1000 INJECTION, SOLUTION INTRAVENOUS EVERY 24 HOURS
Status: DISCONTINUED | OUTPATIENT
Start: 2024-11-03 | End: 2024-11-05

## 2024-11-03 RX ADMIN — SODIUM CHLORIDE SOLN NEBU 3% 4 ML: 3 NEBU SOLN at 07:15

## 2024-11-03 RX ADMIN — ATORVASTATIN CALCIUM 40 MG: 40 TABLET, FILM COATED ORAL at 08:41

## 2024-11-03 RX ADMIN — CEFTRIAXONE 1000 MG: 1 INJECTION, SOLUTION INTRAVENOUS at 09:07

## 2024-11-03 RX ADMIN — LEVALBUTEROL HYDROCHLORIDE 1.25 MG: 1.25 SOLUTION RESPIRATORY (INHALATION) at 13:11

## 2024-11-03 RX ADMIN — DOXYCYCLINE HYCLATE 100 MG: 100 CAPSULE ORAL at 21:48

## 2024-11-03 RX ADMIN — METOPROLOL TARTRATE 50 MG: 50 TABLET, FILM COATED ORAL at 21:47

## 2024-11-03 RX ADMIN — BUDESONIDE AND FORMOTEROL FUMARATE DIHYDRATE 2 PUFF: 160; 4.5 AEROSOL RESPIRATORY (INHALATION) at 08:46

## 2024-11-03 RX ADMIN — APIXABAN 5 MG: 5 TABLET, FILM COATED ORAL at 08:41

## 2024-11-03 RX ADMIN — METOPROLOL TARTRATE 50 MG: 50 TABLET, FILM COATED ORAL at 08:45

## 2024-11-03 RX ADMIN — BUDESONIDE AND FORMOTEROL FUMARATE DIHYDRATE 2 PUFF: 160; 4.5 AEROSOL RESPIRATORY (INHALATION) at 17:47

## 2024-11-03 RX ADMIN — LEVALBUTEROL HYDROCHLORIDE 1.25 MG: 1.25 SOLUTION RESPIRATORY (INHALATION) at 07:15

## 2024-11-03 RX ADMIN — SODIUM CHLORIDE SOLN NEBU 3% 4 ML: 3 NEBU SOLN at 13:11

## 2024-11-03 RX ADMIN — LEVALBUTEROL HYDROCHLORIDE 1.25 MG: 1.25 SOLUTION RESPIRATORY (INHALATION) at 19:48

## 2024-11-03 RX ADMIN — MICONAZOLE NITRATE: 20 CREAM TOPICAL at 08:42

## 2024-11-03 RX ADMIN — DILTIAZEM HYDROCHLORIDE 360 MG: 180 CAPSULE, COATED, EXTENDED RELEASE ORAL at 08:40

## 2024-11-03 RX ADMIN — MICONAZOLE NITRATE: 20 CREAM TOPICAL at 17:47

## 2024-11-03 RX ADMIN — GUAIFENESIN 1200 MG: 600 TABLET ORAL at 08:40

## 2024-11-03 RX ADMIN — APIXABAN 5 MG: 5 TABLET, FILM COATED ORAL at 17:47

## 2024-11-03 RX ADMIN — CEFEPIME HYDROCHLORIDE 2000 MG: 2 INJECTION, SOLUTION INTRAVENOUS at 02:46

## 2024-11-03 RX ADMIN — Medication 5000 UNITS: at 08:40

## 2024-11-03 RX ADMIN — METHYLPREDNISOLONE SODIUM SUCCINATE 60 MG: 125 INJECTION, POWDER, FOR SOLUTION INTRAMUSCULAR; INTRAVENOUS at 21:48

## 2024-11-03 RX ADMIN — METHYLPREDNISOLONE SODIUM SUCCINATE 60 MG: 125 INJECTION, POWDER, FOR SOLUTION INTRAMUSCULAR; INTRAVENOUS at 09:07

## 2024-11-03 RX ADMIN — DOXYCYCLINE HYCLATE 100 MG: 100 CAPSULE ORAL at 08:41

## 2024-11-03 RX ADMIN — GUAIFENESIN 1200 MG: 600 TABLET ORAL at 21:47

## 2024-11-03 RX ADMIN — SODIUM CHLORIDE SOLN NEBU 3% 4 ML: 3 NEBU SOLN at 19:48

## 2024-11-03 NOTE — ASSESSMENT & PLAN NOTE
Utilizes 2 L of nasal cannula supplemental O2 at baseline  Currently requiring 4 L in the setting of sepsis secondary to pneumonia  Respiratory protocol  Wean O2 as tolerated

## 2024-11-03 NOTE — PROGRESS NOTES
"Progress Note - Cardiology   Name: Corinne A Longo 74 y.o. female I MRN: [id number]  Unit/Bed#: -01 I Date of Admission: 10/28/2024   Date of Service: 11/3/2024 I Hospital Day: 6    Assessment & Plan  Paroxysmal atrial fibrillation (HCC)  Remains tachycardic  Afib Likely to recur.  CHADS2-VASc score is elevated.    Continue Eliquis 5 mg twice daily.  Now on diltiazem once daily dosing  Will increase metoprolol to 50 mg twice daily  Chronic respiratory failure with hypoxia (HCC)  Oxygen dependent at baseline  Currently with increased oxygen requirements likely related to pneumonia  Management per medical team and pulmonary  Emphysema/COPD (HCC)  Oxygen dependent at baseline  Pulmonary following  Summary Comments:  Patient remains tachycardic.  Will increase metoprolol to 50 mg twice daily.  Continue diltiazem 360 mg daily and Eliquis for stroke risk prevention    Outpatient Cardiologist: Dr. Reddy    Subjective:  Patient seen and examined at the bedside.  No events overnight.  Feeling better. No chest pain, or palpitations.  No edema.  Patient reports breathing is better today.     Objective:   Vitals: Blood pressure 169/83, pulse 103, temperature 98 °F (36.7 °C), temperature source Oral, resp. rate 18, height 5' 8\" (1.727 m), weight 75.6 kg (166 lb 10.7 oz), SpO2 91%, not currently breastfeeding.,   Orthostatic Blood Pressures      Flowsheet Row Most Recent Value   Blood Pressure 169/83 filed at 11/03/2024 0705   Patient Position - Orthostatic VS Sitting filed at 11/02/2024 1900            Telemetry/ECG/Cardiac Testing:   Telemetry is off    Physical Exam:  Physical Exam  Vitals and nursing note reviewed.   Constitutional:       Appearance: She is well-developed.   HENT:      Head: Normocephalic and atraumatic.      Comments: Nasal cannula present     Mouth/Throat:      Mouth: Mucous membranes are moist.   Eyes:      General: No scleral icterus.     Conjunctiva/sclera: Conjunctivae normal.   Neck:      " Patient request prescription to be sent to [facility].    Vascular: No JVD.      Trachea: No tracheal deviation.   Cardiovascular:      Rate and Rhythm: Regular rhythm. Tachycardia present.      Pulses: Intact distal pulses.      Heart sounds: S1 normal and S2 normal. Murmur heard.      Systolic murmur is present with a grade of 2/6.      No friction rub. No gallop.   Pulmonary:      Effort: Pulmonary effort is normal. No respiratory distress.      Breath sounds: No wheezing, rhonchi or rales.      Comments: Coarse breath sounds  Chest:      Chest wall: No tenderness.   Abdominal:      General: Bowel sounds are normal. There is no distension.      Palpations: Abdomen is soft.      Tenderness: There is no abdominal tenderness.      Comments: Aorta not palpable    Musculoskeletal:         General: No tenderness. Normal range of motion.      Cervical back: Normal range of motion and neck supple.      Right lower leg: No edema.      Left lower leg: No edema.   Skin:     General: Skin is warm and dry.      Coloration: Skin is not pale.      Findings: No erythema or rash.   Neurological:      General: No focal deficit present.      Mental Status: She is alert and oriented to person, place, and time.   Psychiatric:         Mood and Affect: Mood normal.         Behavior: Behavior normal.         Judgment: Judgment normal.         Medications:    Current Facility-Administered Medications:     acetaminophen (TYLENOL) tablet 650 mg, 650 mg, Oral, Q6H PRN, Fernando Sarmiento DO, 650 mg at 10/29/24 1803    apixaban (ELIQUIS) tablet 5 mg, 5 mg, Oral, BID, Benny Reddy MD, 5 mg at 11/03/24 0841    atorvastatin (LIPITOR) tablet 40 mg, 40 mg, Oral, Daily, Fernando Sarmiento DO, 40 mg at 11/03/24 0841    budesonide-formoterol (SYMBICORT) 160-4.5 mcg/act inhaler 2 puff, 2 puff, Inhalation, BID, Fernando Sarmiento DO, 2 puff at 11/03/24 0846    cefTRIAXone (ROCEPHIN) IVPB (premix in dextrose) 1,000 mg 50 mL, 1,000 mg, Intravenous, Q24H, Fernando Sarmiento DO    Cholecalciferol (VITAMIN D3) tablet  5,000 Units, 5,000 Units, Oral, Daily, Fernando Sarmiento DO, 5,000 Units at 11/03/24 0840    [COMPLETED] diltiazem (CARDIZEM CD) 24 hr capsule 240 mg, 240 mg, Oral, Once, 240 mg at 11/01/24 1202 **FOLLOWED BY** diltiazem (CARDIZEM CD) 24 hr capsule 360 mg, 360 mg, Oral, Daily, Benny Reddy MD, 360 mg at 11/03/24 0840    doxycycline hyclate (VIBRAMYCIN) capsule 100 mg, 100 mg, Oral, Q12H YING, Son Montalvo MD, 100 mg at 11/03/24 0841    guaiFENesin (MUCINEX) 12 hr tablet 1,200 mg, 1,200 mg, Oral, Q12H YING, Son Montalvo MD, 1,200 mg at 11/03/24 0840    levalbuterol (XOPENEX) inhalation solution 1.25 mg, 1.25 mg, Nebulization, TID, Fernando Sarmiento DO, 1.25 mg at 11/03/24 0715    LORazepam (ATIVAN) tablet 0.5 mg, 0.5 mg, Oral, Q8H PRN, Zoe Carroll PA-C, 0.5 mg at 11/01/24 2040    methylPREDNISolone sodium succinate (Solu-MEDROL) injection 60 mg, 60 mg, Intravenous, Q12H YING, Son Montalvo MD, 60 mg at 11/02/24 2157    metoprolol (LOPRESSOR) injection 10 mg, 10 mg, Intravenous, Q6H PRN, Fernando Sarmiento DO    metoprolol tartrate (LOPRESSOR) tablet 50 mg, 50 mg, Oral, Q12H YING, Sonia Candelaria PA-C, 50 mg at 11/03/24 0845    moisture barrier miconazole 2% cream (aka [place] MOISTURE BARRIER ANTIFUNGAL CREAM), , Topical, BID, Fernando Sarmiento DO, Given at 11/03/24 0842    ondansetron (ZOFRAN) injection 4 mg, 4 mg, Intravenous, Q6H PRN, Fernando C Yorty, DO    sodium chloride 3 % inhalation solution 4 mL, 4 mL, Nebulization, TID, Cline Mendez Montalvo MD, 4 mL at 11/03/24 0715    Labs & Results:  Results from last 7 days   Lab Units 10/28/24  1236   BNP pg/mL 168*     Results from last 7 days   Lab Units 11/03/24  0423   WBC Thousand/uL 16.11*   HEMOGLOBIN g/dL 9.7*   HEMATOCRIT % 30.0*   PLATELETS Thousands/uL 321         Results from last 7 days   Lab Units 11/03/24  0423   POTASSIUM mmol/L 4.0   CHLORIDE mmol/L 112*   CO2 mmol/L 21   BUN mg/dL 46*    CREATININE mg/dL 1.39*   MAGNESIUM mg/dL 2.2     Results from last 7 days   Lab Units 10/28/24  1232   INR  1.26*

## 2024-11-03 NOTE — ASSESSMENT & PLAN NOTE
Remains tachycardic  Afib Likely to recur.  CHADS2-VASc score is elevated.    Continue Eliquis 5 mg twice daily.  Now on diltiazem once daily dosing  Will increase metoprolol to 50 mg twice daily

## 2024-11-03 NOTE — PLAN OF CARE
Problem: SAFETY ADULT  Goal: Patient will remain free of falls  Description: INTERVENTIONS:  - Educate patient/family on patient safety including physical limitations  - Instruct patient to call for assistance with activity   - Consult OT/PT to assist with strengthening/mobility   - Keep Call bell within reach  - Keep bed low and locked with side rails adjusted as appropriate  - Keep care items and personal belongings within reach  - Initiate and maintain comfort rounds  - Make Fall Risk Sign visible to staff  - Offer Toileting every 2 Hours, in advance of need  - Initiate/Maintain bed alarm  - Obtain necessary fall risk management equipment: non skid socks  - Apply yellow socks and bracelet for high fall risk patients  - Consider moving patient to room near nurses station  Outcome: Progressing  Goal: Maintain or return to baseline ADL function  Description: INTERVENTIONS:  -  Assess patient's ability to carry out ADLs; assess patient's baseline for ADL function and identify physical deficits which impact ability to perform ADLs (bathing, care of mouth/teeth, toileting, grooming, dressing, etc.)  - Assess/evaluate cause of self-care deficits   - Assess range of motion  - Assess patient's mobility; develop plan if impaired  - Assess patient's need for assistive devices and provide as appropriate  - Encourage maximum independence but intervene and supervise when necessary  - Involve family in performance of ADLs  - Assess for home care needs following discharge   - Consider OT consult to assist with ADL evaluation and planning for discharge  - Provide patient education as appropriate  Outcome: Progressing  Goal: Maintains/Returns to pre admission functional level  Description: INTERVENTIONS:  - Perform AM-PAC 6 Click Basic Mobility/ Daily Activity assessment daily.  - Set and communicate daily mobility goal to care team and patient/family/caregiver.   - Collaborate with rehabilitation services on mobility goals if  consulted  - Perform Range of Motion 2 times a day.  - Reposition patient every 2 hours.  - Dangle patient 2 times a day  - Stand patient 2 times a day  - Ambulate patient 2 times a day  - Out of bed to chair 3 times a day   - Out of bed for meals 3 times a day  - Out of bed for toileting  - Record patient progress and toleration of activity level   Outcome: Progressing

## 2024-11-03 NOTE — PLAN OF CARE
Problem: Potential for Falls  Goal: Patient will remain free of falls  Description: INTERVENTIONS:  - Educate patient/family on patient safety including physical limitations  - Instruct patient to call for assistance with activity   - Consult OT/PT to assist with strengthening/mobility   - Keep Call bell within reach  - Keep bed low and locked with side rails adjusted as appropriate  - Keep care items and personal belongings within reach  - Initiate and maintain comfort rounds  - Make Fall Risk Sign visible to staff  - Offer Toileting every 2 Hours, in advance of need  - Initiate/Maintain bed alarm  - Obtain necessary fall risk management equipment: non skid socks  - Apply yellow socks and bracelet for high fall risk patients  - Consider moving patient to room near nurses station  Outcome: Progressing     Problem: PAIN - ADULT  Goal: Verbalizes/displays adequate comfort level or baseline comfort level  Description: Interventions:  - Encourage patient to monitor pain and request assistance  - Assess pain using appropriate pain scale  - Administer analgesics based on type and severity of pain and evaluate response  - Implement non-pharmacological measures as appropriate and evaluate response  - Consider cultural and social influences on pain and pain management  - Notify physician/advanced practitioner if interventions unsuccessful or patient reports new pain  Outcome: Progressing     Problem: INFECTION - ADULT  Goal: Absence or prevention of progression during hospitalization  Description: INTERVENTIONS:  - Assess and monitor for signs and symptoms of infection  - Monitor lab/diagnostic results  - Monitor all insertion sites, i.e. indwelling lines, tubes, and drains  - Monitor endotracheal if appropriate and nasal secretions for changes in amount and color  - Hernandez appropriate cooling/warming therapies per order  - Administer medications as ordered  - Instruct and encourage patient and family to use good hand  hygiene technique  - Identify and instruct in appropriate isolation precautions for identified infection/condition  Outcome: Progressing  Goal: Absence of fever/infection during neutropenic period  Description: INTERVENTIONS:  - Monitor WBC    Outcome: Progressing     Problem: SAFETY ADULT  Goal: Patient will remain free of falls  Description: INTERVENTIONS:  - Educate patient/family on patient safety including physical limitations  - Instruct patient to call for assistance with activity   - Consult OT/PT to assist with strengthening/mobility   - Keep Call bell within reach  - Keep bed low and locked with side rails adjusted as appropriate  - Keep care items and personal belongings within reach  - Initiate and maintain comfort rounds  - Make Fall Risk Sign visible to staff  - Offer Toileting every 2 Hours, in advance of need  - Initiate/Maintain bed alarm  - Obtain necessary fall risk management equipment: non skid socks  - Apply yellow socks and bracelet for high fall risk patients  - Consider moving patient to room near nurses station  Outcome: Progressing  Goal: Maintain or return to baseline ADL function  Description: INTERVENTIONS:  -  Assess patient's ability to carry out ADLs; assess patient's baseline for ADL function and identify physical deficits which impact ability to perform ADLs (bathing, care of mouth/teeth, toileting, grooming, dressing, etc.)  - Assess/evaluate cause of self-care deficits   - Assess range of motion  - Assess patient's mobility; develop plan if impaired  - Assess patient's need for assistive devices and provide as appropriate  - Encourage maximum independence but intervene and supervise when necessary  - Involve family in performance of ADLs  - Assess for home care needs following discharge   - Consider OT consult to assist with ADL evaluation and planning for discharge  - Provide patient education as appropriate  Outcome: Progressing  Goal: Maintains/Returns to pre admission  functional level  Description: INTERVENTIONS:  - Perform AM-PAC 6 Click Basic Mobility/ Daily Activity assessment daily.  - Set and communicate daily mobility goal to care team and patient/family/caregiver.   - Collaborate with rehabilitation services on mobility goals if consulted  - Perform Range of Motion 2 times a day.  - Reposition patient every 2 hours.  - Dangle patient 2 times a day  - Stand patient 2 times a day  - Ambulate patient 2 times a day  - Out of bed to chair 3 times a day   - Out of bed for meals 3 times a day  - Out of bed for toileting  - Record patient progress and toleration of activity level   Outcome: Progressing

## 2024-11-03 NOTE — ASSESSMENT & PLAN NOTE
Present on admission as evidenced by hyperthermia, tachycardia, tachypnea  Likely secondary to multilobar pneumonia  Chest x-ray with evidence of consolidations  Discontinue vancomycin  De-escalate antibiotics to IV ceftriaxone  Blood cultures within normal limits  Trend fever curve/WBC count/procalcitonin  Pulmonology following  Procalcitonin within normal limits

## 2024-11-03 NOTE — PROGRESS NOTES
Progress Note - Hospitalist   Name: Corinne A Longo 74 y.o. female I MRN: [id number]  Unit/Bed#: -01 I Date of Admission: 10/28/2024   Date of Service: 11/3/2024 I Hospital Day: 6    Assessment & Plan  Sepsis due to pneumonia (HCC)  Present on admission as evidenced by hyperthermia, tachycardia, tachypnea  Likely secondary to multilobar pneumonia  Chest x-ray with evidence of consolidations  Discontinue vancomycin  De-escalate antibiotics to IV ceftriaxone  Blood cultures within normal limits  Trend fever curve/WBC count/procalcitonin  Pulmonology following  Procalcitonin within normal limits  Paroxysmal atrial fibrillation (HCC)  Patient went into atrial fibrillation with rapid ventricular response on 10/28/2024  Rapid response was called and patient was transitioned to level 2 stepdown  Initiated on diltiazem infusion which has since been discontinued  Heart rates have significantly improved  Diltiazem 360 mg PO daily  Not on anticoagulation in the outpatient setting  Continue home aspirin  Cardiology consultation appreciated  Hemodynamics stable  Initiated on Eliquis by cardiology  2D echocardiogram with ejection fraction 65%  Emphysema/COPD (HCC)  Possible acute exacerbation secondary to pneumonia  Utilizes chronic O2  Atrovent and Xopenex  Respiratory protocol  Pulmonology consultation appreciated  IV corticosteroids as per Pulmonology  Monitor respiratory status  Continue home Symbicort  Chronic respiratory failure with hypoxia (HCC)  Utilizes 2 L of nasal cannula supplemental O2 at baseline  Currently requiring 4 L in the setting of sepsis secondary to pneumonia  Respiratory protocol  Wean O2 as tolerated    VTE Pharmacologic Prophylaxis: VTE Score: 5 High Risk (Score >/= 5) - Pharmacological DVT Prophylaxis Ordered: heparin. Sequential Compression Devices Ordered.    Mobility:   Basic Mobility Inpatient Raw Score: 19  -HLM Goal: 6: Walk 10 steps or more  -HLM Achieved: 6: Walk 10 steps or  more  JH-HLM Goal NOT achieved. Continue with multidisciplinary rounding and encourage appropriate mobility to improve upon JH-HLM goals.    Patient Centered Rounds: I performed bedside rounds with nursing staff today.     Discussions with Specialists or Other Care Team Provider: Cardiology, Pulmonology    Education and Discussions with Family / Patient: Updated  () via phone.    Current Length of Stay: 6 day(s)  Current Patient Status: Inpatient   Certification Statement: The patient will continue to require additional inpatient hospital stay due to acute hypoxic respiratory failure  Discharge Plan: Anticipate discharge in 48 hrs to discharge location to be determined pending rehab evaluations.    Code Status: Level 1 - Full Code    Subjective   Patient seen and examined at bedside.  No acute events overnight.  Patient's heart rate significantly improved.  Respiratory status improving.  Saturating well on 4.5 L nasal cannula supplemental O2.    Objective :  Temp:  [97.6 °F (36.4 °C)-98.9 °F (37.2 °C)] 97.6 °F (36.4 °C)  HR:  [] 103  BP: (131-169)/(60-83) 169/83  Resp:  [18-31] 18  SpO2:  [88 %-93 %] 91 %  O2 Device: Nasal cannula  Nasal Cannula O2 Flow Rate (L/min):  [4.5 L/min-6 L/min] 4.5 L/min    Body mass index is 25.34 kg/m².     Input and Output Summary (last 24 hours):     Intake/Output Summary (Last 24 hours) at 11/3/2024 0747  Last data filed at 11/2/2024 2033  Gross per 24 hour   Intake 540 ml   Output 1300 ml   Net -760 ml       Physical Exam  Vitals and nursing note reviewed.   Constitutional:       General: She is not in acute distress.     Appearance: She is well-developed.   HENT:      Head: Normocephalic and atraumatic.   Eyes:      Conjunctiva/sclera: Conjunctivae normal.   Cardiovascular:      Rate and Rhythm: Normal rate and regular rhythm.      Heart sounds: No murmur heard.  Pulmonary:      Effort: Pulmonary effort is normal. No respiratory distress.      Breath  sounds: Normal breath sounds.   Abdominal:      Palpations: Abdomen is soft.      Tenderness: There is no abdominal tenderness.   Musculoskeletal:         General: No swelling.      Cervical back: Neck supple.   Skin:     General: Skin is warm and dry.      Capillary Refill: Capillary refill takes less than 2 seconds.   Neurological:      Mental Status: She is alert.   Psychiatric:         Mood and Affect: Mood normal.       Telemetry:  Telemetry Orders (From admission, onward)               24 Hour Telemetry Monitoring  Continuous x 24 Hours (Telem)        Question:  Reason for 24 Hour Telemetry  Answer:  Alcohol withdrawal and CIWA >7, electrolyte abnormalities, abnormal ECG and/or heart disease                     Telemetry Reviewed: Atrial fibrillation. HR averaging 90s-110s  Indication for Continued Telemetry Use: Arrthymias requiring medical therapy               Lab Results: I have reviewed the following results:   Results from last 7 days   Lab Units 11/03/24 0423 11/02/24 0452 11/01/24  0511 10/30/24  0524 10/29/24  0452 10/28/24  1232 10/28/24  1232   WBC Thousand/uL 16.11*   < > 16.36*   < > 8.01  --  7.52   HEMOGLOBIN g/dL 9.7*   < > 9.2*   < > 10.7*  --  12.4   HEMATOCRIT % 30.0*   < > 28.3*   < > 32.6*  --  37.6   PLATELETS Thousands/uL 321   < > 323   < > 266  --  306   BANDS PCT %  --   --   --   --  3  --   --    SEGS PCT %  --   --   --   --   --   --  88*   LYMPHO PCT %  --   --  3*  --  9*   < > 7*   MONO PCT %  --   --  2*  --  5   < > 4   EOS PCT %  --   --  0  --  0   < > 0    < > = values in this interval not displayed.     Results from last 7 days   Lab Units 11/03/24  0423 10/29/24  0452 10/28/24  1232   SODIUM mmol/L 139   < > 140   POTASSIUM mmol/L 4.0   < > 3.9   CHLORIDE mmol/L 112*   < > 106   CO2 mmol/L 21   < > 21   BUN mg/dL 46*   < > 34*   CREATININE mg/dL 1.39*   < > 1.03   ANION GAP mmol/L 6   < > 13   CALCIUM mg/dL 8.9   < > 9.3   ALBUMIN g/dL  --   --  3.4*   TOTAL BILIRUBIN  mg/dL  --   --  0.73   ALK PHOS U/L  --   --  59   ALT U/L  --   --  28   AST U/L  --   --  46*   GLUCOSE RANDOM mg/dL 157*   < > 152*    < > = values in this interval not displayed.     Results from last 7 days   Lab Units 10/28/24  1232   INR  1.26*             Results from last 7 days   Lab Units 11/03/24  0423 11/02/24  0452 11/01/24  0511 10/31/24  0558 10/30/24  0524 10/29/24  0452 10/28/24  1511 10/28/24  1232   LACTIC ACID mmol/L  --   --   --   --   --   --  1.1 2.3*   PROCALCITONIN ng/ml 0.17 0.21 0.27* 0.37* 0.58*   < >  --  0.26*    < > = values in this interval not displayed.       Recent Cultures (last 7 days):   Results from last 7 days   Lab Units 10/29/24  1945 10/29/24  1357 10/28/24  1232   BLOOD CULTURE   --   --  No Growth After 5 Days.  No Growth After 5 Days.   SPUTUM CULTURE  1+ Growth of  --   --    GRAM STAIN RESULT  1+ Epithelial cells per low power field*  1+ Polys*  1+ Gram positive rods*  --   --    LEGIONELLA URINARY ANTIGEN   --  Negative  --        Imaging Results Review: I reviewed radiology reports from this admission including: CT chest.  Other Study Results Review: No additional pertinent studies reviewed.    Last 24 Hours Medication List:     Current Facility-Administered Medications:     acetaminophen (TYLENOL) tablet 650 mg, Q6H PRN    apixaban (ELIQUIS) tablet 5 mg, BID    atorvastatin (LIPITOR) tablet 40 mg, Daily    budesonide-formoterol (SYMBICORT) 160-4.5 mcg/act inhaler 2 puff, BID    cefTRIAXone (ROCEPHIN) IVPB (premix in dextrose) 1,000 mg 50 mL, Q24H    Cholecalciferol (VITAMIN D3) tablet 5,000 Units, Daily    [COMPLETED] diltiazem (CARDIZEM CD) 24 hr capsule 240 mg, Once **FOLLOWED BY** diltiazem (CARDIZEM CD) 24 hr capsule 360 mg, Daily    doxycycline hyclate (VIBRAMYCIN) capsule 100 mg, Q12H YING    guaiFENesin (MUCINEX) 12 hr tablet 1,200 mg, Q12H YING    levalbuterol (XOPENEX) inhalation solution 1.25 mg, TID    LORazepam (ATIVAN) tablet 0.5 mg, Q8H PRN     methylPREDNISolone sodium succinate (Solu-MEDROL) injection 60 mg, Q12H YING    metoprolol (LOPRESSOR) injection 10 mg, Q6H PRN    metoprolol tartrate (LOPRESSOR) tablet 25 mg, Q12H YING    moisture barrier miconazole 2% cream (aka [place] MOISTURE BARRIER ANTIFUNGAL CREAM), BID    ondansetron (ZOFRAN) injection 4 mg, Q6H PRN    sodium chloride 3 % inhalation solution 4 mL, TID    Administrative Statements   Today, Patient Was Seen By: Fernando Sarmiento, DO  I have spent a total time of 35 minutes in caring for this patient on the day of the visit/encounter including Diagnostic results, Prognosis, Risks and benefits of tx options, Instructions for management, Patient and family education, Importance of tx compliance, Risk factor reductions, Impressions, Counseling / Coordination of care, Documenting in the medical record, Reviewing / ordering tests, medicine, procedures  , Obtaining or reviewing history  , and Communicating with other healthcare professionals .    **Please Note: This note may have been constructed using a voice recognition system.**

## 2024-11-03 NOTE — NURSING NOTE
Pt sitting oob in her chair since this am, tolerating well, presently sitting oob in her chair in no acute distress visiting with family

## 2024-11-04 ENCOUNTER — TELEPHONE (OUTPATIENT)
Dept: PULMONOLOGY | Facility: CLINIC | Age: 74
End: 2024-11-04

## 2024-11-04 LAB
ANION GAP SERPL CALCULATED.3IONS-SCNC: 8 MMOL/L (ref 4–13)
ANISOCYTOSIS BLD QL SMEAR: PRESENT
BASOPHILS # BLD MANUAL: 0 THOUSAND/UL (ref 0–0.1)
BASOPHILS NFR MAR MANUAL: 0 % (ref 0–1)
BUN SERPL-MCNC: 45 MG/DL (ref 5–25)
CALCIUM SERPL-MCNC: 9.2 MG/DL (ref 8.4–10.2)
CHLORIDE SERPL-SCNC: 111 MMOL/L (ref 96–108)
CO2 SERPL-SCNC: 22 MMOL/L (ref 21–32)
CREAT SERPL-MCNC: 1.33 MG/DL (ref 0.6–1.3)
EOSINOPHIL # BLD MANUAL: 0 THOUSAND/UL (ref 0–0.4)
EOSINOPHIL NFR BLD MANUAL: 0 % (ref 0–6)
ERYTHROCYTE [DISTWIDTH] IN BLOOD BY AUTOMATED COUNT: 14.3 % (ref 11.6–15.1)
GFR SERPL CREATININE-BSD FRML MDRD: 39 ML/MIN/1.73SQ M
GLUCOSE SERPL-MCNC: 136 MG/DL (ref 65–140)
HCT VFR BLD AUTO: 32.8 % (ref 34.8–46.1)
HGB BLD-MCNC: 10.6 G/DL (ref 11.5–15.4)
LYMPHOCYTES # BLD AUTO: 1.13 THOUSAND/UL (ref 0.6–4.47)
LYMPHOCYTES # BLD AUTO: 6 % (ref 14–44)
MAGNESIUM SERPL-MCNC: 2.2 MG/DL (ref 1.9–2.7)
MCH RBC QN AUTO: 30.3 PG (ref 26.8–34.3)
MCHC RBC AUTO-ENTMCNC: 32.3 G/DL (ref 31.4–37.4)
MCV RBC AUTO: 94 FL (ref 82–98)
METAMYELOCYTE ABSOLUTE CT: 0.38 THOUSAND/UL (ref 0–0.1)
METAMYELOCYTES NFR BLD MANUAL: 2 % (ref 0–1)
MONOCYTES # BLD AUTO: 0.57 THOUSAND/UL (ref 0–1.22)
MONOCYTES NFR BLD: 3 % (ref 4–12)
NEUTROPHILS # BLD MANUAL: 16.79 THOUSAND/UL (ref 1.85–7.62)
NEUTS SEG NFR BLD AUTO: 89 % (ref 43–75)
PHOSPHATE SERPL-MCNC: 3.4 MG/DL (ref 2.3–4.1)
PLATELET # BLD AUTO: 390 THOUSANDS/UL (ref 149–390)
PLATELET BLD QL SMEAR: ADEQUATE
PMV BLD AUTO: 9.8 FL (ref 8.9–12.7)
POTASSIUM SERPL-SCNC: 3.9 MMOL/L (ref 3.5–5.3)
PROCALCITONIN SERPL-MCNC: 0.14 NG/ML
RBC # BLD AUTO: 3.5 MILLION/UL (ref 3.81–5.12)
RBC MORPH BLD: PRESENT
SODIUM SERPL-SCNC: 141 MMOL/L (ref 135–147)
WBC # BLD AUTO: 18.87 THOUSAND/UL (ref 4.31–10.16)

## 2024-11-04 PROCEDURE — 94640 AIRWAY INHALATION TREATMENT: CPT

## 2024-11-04 PROCEDURE — 97110 THERAPEUTIC EXERCISES: CPT

## 2024-11-04 PROCEDURE — 94668 MNPJ CHEST WALL SBSQ: CPT

## 2024-11-04 PROCEDURE — 80048 BASIC METABOLIC PNL TOTAL CA: CPT | Performed by: INTERNAL MEDICINE

## 2024-11-04 PROCEDURE — 85007 BL SMEAR W/DIFF WBC COUNT: CPT | Performed by: INTERNAL MEDICINE

## 2024-11-04 PROCEDURE — 85027 COMPLETE CBC AUTOMATED: CPT | Performed by: INTERNAL MEDICINE

## 2024-11-04 PROCEDURE — 83735 ASSAY OF MAGNESIUM: CPT | Performed by: INTERNAL MEDICINE

## 2024-11-04 PROCEDURE — 99232 SBSQ HOSP IP/OBS MODERATE 35: CPT | Performed by: INTERNAL MEDICINE

## 2024-11-04 PROCEDURE — 94664 DEMO&/EVAL PT USE INHALER: CPT

## 2024-11-04 PROCEDURE — 97116 GAIT TRAINING THERAPY: CPT

## 2024-11-04 PROCEDURE — 84145 PROCALCITONIN (PCT): CPT | Performed by: INTERNAL MEDICINE

## 2024-11-04 PROCEDURE — 84100 ASSAY OF PHOSPHORUS: CPT | Performed by: INTERNAL MEDICINE

## 2024-11-04 PROCEDURE — 99232 SBSQ HOSP IP/OBS MODERATE 35: CPT | Performed by: PHYSICIAN ASSISTANT

## 2024-11-04 PROCEDURE — 94760 N-INVAS EAR/PLS OXIMETRY 1: CPT

## 2024-11-04 RX ORDER — ATORVASTATIN CALCIUM 40 MG/1
40 TABLET, FILM COATED ORAL DAILY
Qty: 90 TABLET | Refills: 0 | Status: SHIPPED | OUTPATIENT
Start: 2024-11-04

## 2024-11-04 RX ADMIN — DOXYCYCLINE HYCLATE 100 MG: 100 CAPSULE ORAL at 08:32

## 2024-11-04 RX ADMIN — METHYLPREDNISOLONE SODIUM SUCCINATE 60 MG: 125 INJECTION, POWDER, FOR SOLUTION INTRAMUSCULAR; INTRAVENOUS at 08:31

## 2024-11-04 RX ADMIN — APIXABAN 5 MG: 5 TABLET, FILM COATED ORAL at 08:33

## 2024-11-04 RX ADMIN — LEVALBUTEROL HYDROCHLORIDE 1.25 MG: 1.25 SOLUTION RESPIRATORY (INHALATION) at 07:32

## 2024-11-04 RX ADMIN — LEVALBUTEROL HYDROCHLORIDE 1.25 MG: 1.25 SOLUTION RESPIRATORY (INHALATION) at 20:15

## 2024-11-04 RX ADMIN — METOPROLOL TARTRATE 50 MG: 50 TABLET, FILM COATED ORAL at 20:29

## 2024-11-04 RX ADMIN — APIXABAN 5 MG: 5 TABLET, FILM COATED ORAL at 17:03

## 2024-11-04 RX ADMIN — GUAIFENESIN 1200 MG: 600 TABLET ORAL at 20:29

## 2024-11-04 RX ADMIN — ATORVASTATIN CALCIUM 40 MG: 40 TABLET, FILM COATED ORAL at 08:33

## 2024-11-04 RX ADMIN — MICONAZOLE NITRATE: 20 CREAM TOPICAL at 17:03

## 2024-11-04 RX ADMIN — MICONAZOLE NITRATE: 20 CREAM TOPICAL at 08:33

## 2024-11-04 RX ADMIN — METOPROLOL TARTRATE 50 MG: 50 TABLET, FILM COATED ORAL at 08:33

## 2024-11-04 RX ADMIN — METHYLPREDNISOLONE SODIUM SUCCINATE 60 MG: 125 INJECTION, POWDER, FOR SOLUTION INTRAMUSCULAR; INTRAVENOUS at 20:29

## 2024-11-04 RX ADMIN — LORAZEPAM 0.5 MG: 0.5 TABLET ORAL at 20:29

## 2024-11-04 RX ADMIN — DOXYCYCLINE HYCLATE 100 MG: 100 CAPSULE ORAL at 20:29

## 2024-11-04 RX ADMIN — GUAIFENESIN 1200 MG: 600 TABLET ORAL at 08:32

## 2024-11-04 RX ADMIN — SODIUM CHLORIDE SOLN NEBU 3% 4 ML: 3 NEBU SOLN at 07:32

## 2024-11-04 RX ADMIN — DILTIAZEM HYDROCHLORIDE 360 MG: 180 CAPSULE, COATED, EXTENDED RELEASE ORAL at 08:33

## 2024-11-04 RX ADMIN — BUDESONIDE AND FORMOTEROL FUMARATE DIHYDRATE 2 PUFF: 160; 4.5 AEROSOL RESPIRATORY (INHALATION) at 08:33

## 2024-11-04 RX ADMIN — Medication 5000 UNITS: at 08:32

## 2024-11-04 RX ADMIN — LEVALBUTEROL HYDROCHLORIDE 1.25 MG: 1.25 SOLUTION RESPIRATORY (INHALATION) at 14:55

## 2024-11-04 RX ADMIN — BUDESONIDE AND FORMOTEROL FUMARATE DIHYDRATE 2 PUFF: 160; 4.5 AEROSOL RESPIRATORY (INHALATION) at 17:03

## 2024-11-04 RX ADMIN — CEFTRIAXONE 1000 MG: 1 INJECTION, SOLUTION INTRAVENOUS at 08:32

## 2024-11-04 RX ADMIN — SODIUM CHLORIDE SOLN NEBU 3% 4 ML: 3 NEBU SOLN at 14:55

## 2024-11-04 NOTE — PROGRESS NOTES
Pastoral Care Progress Note   initiated introductory visit, introduced self and role. Pt received sitting upright in bedside chair, SO and daughter present.  Pt states she feels much better today and is hopeful she can go home soon. Is appreciative of the care she has received here. Feels well-supported by SO and daughter.  Pt expressed no particular spiritual care needs at this time but appreciated  visit.         11/04/24 1400   Clinical Encounter Type   Visited With Patient and family together

## 2024-11-04 NOTE — CASE MANAGEMENT
Case Management Discharge Planning Note    Patient name Corinne A Longo  Location /[facility] - MRN [id number]  : 1950 Date 2024       Current Admission Date: 10/28/2024  Current Admission Diagnosis:Sepsis due to pneumonia (HCC)   Patient Active Problem List    Diagnosis Date Noted Date Diagnosed    Sepsis due to pneumonia (HCC) 10/28/2024     Gastroesophageal reflux disease without esophagitis 2023     Venous insufficiency 2023     Tinea cruris 2023     Lipoma of upper extremity 2023     Cervical strain 2023     Stage 3 chronic kidney disease, unspecified whether stage 3a or 3b CKD ([facility]) 2022     Myxoid cyst 2021     Chronic respiratory failure with hypoxia ([facility]) 2021     COPD, severe ([facility]) 2020     Family history of autoimmune disorder 2020     Incidental pulmonary nodule, > 3mm and < 8mm 04/10/2019     Need for vaccination with 13-polyvalent pneumococcal conjugate vaccine 2018     Constipation 2018     Paroxysmal atrial fibrillation ([facility]) 2018     Vitamin D deficiency 10/04/2017     Allergic rhinitis 2017     Depression, recurrent ([facility]) 2016     Cigarette nicotine dependence in remission 11/10/2015     GABI (obstructive sleep apnea) 2015     Emphysema/COPD ([facility]) 2015     Hyperlipidemia 2015     Anxiety 06/10/2015       LOS (days): 7  Geometric Mean LOS (GMLOS) (days): 4.9  Days to GMLOS:-2     OBJECTIVE:  Risk of Unplanned Readmission Score: 16.57         Current admission status: Inpatient   Preferred Pharmacy:   [facility] - [place] - [address]  [address]  [place] [address]  Phone: [phone] Fax: [phone]    [id number] - [place] - [address]  [address]  [place] [address]  Phone: [phone] Fax: [phone]    Primary Care Provider: Rayna Hernandez PA-C    Primary Insurance:  [employer]  Secondary Insurance:     DISCHARGE DETAILS:    Requested Home Health Care         Is the patient interested in HHC at discharge?: Yes  Home Health Discipline requested:: Nursing, Occupational Therapy, Physical Therapy  HHA External Referral Reason (only applicable if external HHA name selected): Patient has established relationship with provider  Home Health Follow-Up Provider:: PCP  Oxygen LPM Ordered (if applicable based on home health services needed):: 2 LPM  Homebound Criteria Met:: Uses an Assist Device (i.e. cane, walker, etc), Requires the Assistance of Another Person for Safe Ambulation or to Leave the Home  Supporting Clincal Findings:: Fatigues Easliy in Short Distances, Limited Endurance, Requires Oxygen, Dyspnea with Exertion    DME Referral Provided  Referral made for DME?: Yes  DME referral completed for the following items:: [facility] [employer]  DME Supplier Name:: [employer]    Other Referral/Resources/Interventions Provided:  Programs:: COPD    Would you like to participate in our [facility] service program?  : No - Declined    Treatment Team Recommendation: Home with Home Health Care  Discharge Destination Plan:: Home with Home Health Care     IMM Given (Date):: 11/04/24  IMM Given to:: Patient (Reviewed the IMM with the pt and SO who are in agreement with same.)   Provided the choice list for HHC.  Pt will review with the SO and choose an agency.  Pt states she would like a walker and BSC upon DC.  SO will transport tomer upon DC.  Pt is aware she will most likely be able to be DC tomorrow.

## 2024-11-04 NOTE — PROGRESS NOTES
Progress Note - Hospitalist   Name: Corinne A Longo 74 y.o. female I MRN: [id number]  Unit/Bed#: -01 I Date of Admission: 10/28/2024   Date of Service: 11/4/2024 I Hospital Day: 7    Assessment & Plan  Sepsis due to pneumonia (HCC)  Present on admission as evidenced by hyperthermia, tachycardia, tachypnea  Likely secondary to multilobar pneumonia  Chest x-ray with evidence of consolidations  Discontinue vancomycin  De-escalate antibiotics to IV ceftriaxone  Blood cultures within normal limits  Trend fever curve/WBC count/procalcitonin  Pulmonology following  Procalcitonin within normal limits  Paroxysmal atrial fibrillation (HCC)  Patient went into atrial fibrillation with rapid ventricular response on 10/28/2024  Rapid response was called and patient was transitioned to level 2 stepdown  Initiated on diltiazem infusion which has since been discontinued  Heart rates have significantly improved  Diltiazem 360 mg PO daily  Not on anticoagulation in the outpatient setting  Continue home aspirin  Cardiology consultation appreciated  Hemodynamics stable  Initiated on Eliquis by cardiology  2D echocardiogram with ejection fraction 65%  Emphysema/COPD (HCC)  Possible acute exacerbation secondary to pneumonia  Utilizes chronic O2  Atrovent and Xopenex  Respiratory protocol  Pulmonology consultation appreciated  IV corticosteroids as per Pulmonology  Monitor respiratory status  Continue home Symbicort  Chronic respiratory failure with hypoxia (HCC)  Utilizes 2 L of nasal cannula supplemental O2 at baseline  Currently requiring 2-3 L in the setting of sepsis secondary to pneumonia  Respiratory protocol  Wean O2 as tolerated    VTE Pharmacologic Prophylaxis: VTE Score: 5 Moderate Risk (Score 3-4) - Pharmacological DVT Prophylaxis Ordered: apixaban (Eliquis).    Mobility:   Basic Mobility Inpatient Raw Score: 19  JH-HLM Goal: 6: Walk 10 steps or more  JH-HLM Achieved: 7: Walk 25 feet or more  JH-HLM Goal achieved. Continue  to encourage appropriate mobility.    Patient Centered Rounds: I performed bedside rounds with nursing staff today.   Discussions with Specialists or Other Care Team Provider: yes    Education and Discussions with Family / Patient: Updated  () at bedside.    Current Length of Stay: 7 day(s)  Current Patient Status: Inpatient   Certification Statement: The patient will continue to require additional inpatient hospital stay due to copd exacerbation  Discharge Plan: Anticipate discharge tomorrow to home with home services.    Code Status: Level 1 - Full Code    Subjective   Patient gradually improving.  Currently requiring 2 to 3 L nasal cannula    Objective :  Temp:  [97.8 °F (36.6 °C)-98 °F (36.7 °C)] 97.8 °F (36.6 °C)  HR:  [73-95] 73  BP: (149-172)/(52-77) 172/52  Resp:  [19-20] 20  SpO2:  [87 %-93 %] 93 %  O2 Device: Nasal cannula  Nasal Cannula O2 Flow Rate (L/min):  [2 L/min-3 L/min] 3 L/min    Body mass index is 25.34 kg/m².     Input and Output Summary (last 24 hours):     Intake/Output Summary (Last 24 hours) at 11/4/2024 1638  Last data filed at 11/4/2024 0732  Gross per 24 hour   Intake 360 ml   Output --   Net 360 ml       Physical Exam  Constitutional:       General: She is not in acute distress.  HENT:      Head: Normocephalic and atraumatic.      Nose: Nose normal.      Mouth/Throat:      Mouth: Mucous membranes are moist.   Eyes:      Extraocular Movements: Extraocular movements intact.      Conjunctiva/sclera: Conjunctivae normal.   Cardiovascular:      Rate and Rhythm: Normal rate and regular rhythm.   Pulmonary:      Effort: Pulmonary effort is normal. No respiratory distress.      Breath sounds: Wheezing present.   Abdominal:      Palpations: Abdomen is soft.      Tenderness: There is no abdominal tenderness.   Musculoskeletal:         General: Normal range of motion.      Cervical back: Normal range of motion and neck supple.      Comments: Generalized weakness   Skin:      General: Skin is warm and dry.   Neurological:      General: No focal deficit present.      Mental Status: She is alert. Mental status is at baseline.      Cranial Nerves: No cranial nerve deficit.   Psychiatric:         Mood and Affect: Mood normal.         Behavior: Behavior normal.           Lines/Drains:        Lab Results: I have reviewed the following results:   Results from last 7 days   Lab Units 11/04/24  0531 10/30/24  0524 10/29/24  0452   WBC Thousand/uL 18.87*   < > 8.01   HEMOGLOBIN g/dL 10.6*   < > 10.7*   HEMATOCRIT % 32.8*   < > 32.6*   PLATELETS Thousands/uL 390   < > 266   BANDS PCT %  --   --  3   LYMPHO PCT % 6*   < > 9*   MONO PCT % 3*   < > 5   EOS PCT % 0   < > 0    < > = values in this interval not displayed.     Results from last 7 days   Lab Units 11/04/24  0531   SODIUM mmol/L 141   POTASSIUM mmol/L 3.9   CHLORIDE mmol/L 111*   CO2 mmol/L 22   BUN mg/dL 45*   CREATININE mg/dL 1.33*   ANION GAP mmol/L 8   CALCIUM mg/dL 9.2   GLUCOSE RANDOM mg/dL 136                 Results from last 7 days   Lab Units 11/04/24  0531 11/03/24  0423 11/02/24  0452 11/01/24  0511 10/31/24  0558   PROCALCITONIN ng/ml 0.14 0.17 0.21 0.27* 0.37*       Recent Cultures (last 7 days):   Results from last 7 days   Lab Units 10/29/24  1945 10/29/24  1357   SPUTUM CULTURE  1+ Growth of  --    GRAM STAIN RESULT  1+ Epithelial cells per low power field*  1+ Polys*  1+ Gram positive rods*  --    LEGIONELLA URINARY ANTIGEN   --  Negative       Imaging Results Review: No pertinent imaging studies reviewed.  Other Study Results Review: No additional pertinent studies reviewed.    Last 24 Hours Medication List:     Current Facility-Administered Medications:     acetaminophen (TYLENOL) tablet 650 mg, Q6H PRN    apixaban (ELIQUIS) tablet 5 mg, BID    atorvastatin (LIPITOR) tablet 40 mg, Daily    budesonide-formoterol (SYMBICORT) 160-4.5 mcg/act inhaler 2 puff, BID    cefTRIAXone (ROCEPHIN) IVPB (premix in dextrose) 1,000 mg  50 mL, Q24H, Last Rate: 1,000 mg (11/04/24 0832)    Cholecalciferol (VITAMIN D3) tablet 5,000 Units, Daily    [COMPLETED] diltiazem (CARDIZEM CD) 24 hr capsule 240 mg, Once **FOLLOWED BY** diltiazem (CARDIZEM CD) 24 hr capsule 360 mg, Daily    doxycycline hyclate (VIBRAMYCIN) capsule 100 mg, Q12H YING    guaiFENesin (MUCINEX) 12 hr tablet 1,200 mg, Q12H YING    levalbuterol (XOPENEX) inhalation solution 1.25 mg, TID    LORazepam (ATIVAN) tablet 0.5 mg, Q8H PRN    methylPREDNISolone sodium succinate (Solu-MEDROL) injection 60 mg, Q12H YING    metoprolol (LOPRESSOR) injection 10 mg, Q6H PRN    metoprolol tartrate (LOPRESSOR) tablet 50 mg, Q12H YING    moisture barrier miconazole 2% cream (aka [place] MOISTURE BARRIER ANTIFUNGAL CREAM), BID    ondansetron (ZOFRAN) injection 4 mg, Q6H PRN    sodium chloride 3 % inhalation solution 4 mL, TID    Administrative Statements   Today, Patient Was Seen By: Cece Ro MD      **Please Note: This note may have been constructed using a voice recognition system.**

## 2024-11-04 NOTE — ASSESSMENT & PLAN NOTE
Remains tachycardic  Afib Likely to recur.  CHADS2-VASc score is elevated.    Continue Eliquis 5 mg twice daily.  Now on diltiazem once daily dosing as well as metoprolol.

## 2024-11-04 NOTE — PHYSICAL THERAPY NOTE
PT Treatment Note    NAME:  Corinne A Longo  DATE: 11/04/24    AGE:   74 y.o.  Mrn:   [id number]  ADMIT DX:  COPD (chronic obstructive pulmonary disease) (HCC) [J44.9]  Chest pain [R07.9]  Pneumonia [J18.9]  SOB (shortness of breath) [R06.02]  Hypoxia [R09.02]  Atrial fibrillation with RVR (HCC) [I48.91]  Sepsis (HCC) [A41.9]  Sepsis due to pneumonia (HCC) [J18.9, A41.9]  Performed at least 2 patient identifiers during session: Name and Epic photo       11/04/24 1014   PT Last Visit   PT Visit Date 11/04/24   Note Type   Note Type Treatment   Pain Assessment   Pain Assessment Tool 0-10   Pain Score No Pain   Restrictions/Precautions   Weight Bearing Precautions Per Order No   Other Precautions Chair Alarm;Bed Alarm;Multiple lines;Telemetry;O2;Fall Risk  (2 L O2)   General   Chart Reviewed Yes   Cognition   Overall Cognitive Status WFL   Arousal/Participation Responsive;Alert;Arousable;Cooperative   Attention Within functional limits   Orientation Level Oriented X4;Oriented to person;Oriented to place;Oriented to time;Oriented to situation   Memory Within functional limits   Following Commands Follows all commands and directions without difficulty   Subjective   Subjective I am feeling much better today   Bed Mobility   Additional Comments Pt was received out of bed and in the chair   Transfers   Sit to Stand 5  Supervision   Additional items Armrests;Increased time required   Stand to Sit 5  Supervision   Additional items Increased time required;Verbal cues   Ambulation/Elevation   Gait pattern Improper Weight shift;Wide ROBY;Decreased foot clearance;Short stride   Gait Assistance   (cga)   Additional items Verbal cues   Assistive Device Rolling walker   Distance 20 ft   Ambulation/Elevation Additional Comments Pt O2 dropped from 90 to 84 with ambulation, to raise saturation, O2 was turned up to 3 and rest was given. It took ~ 5 min to return back to 90   Balance   Static Sitting Good   Dynamic Sitting Fair +    Static Standing Fair   Dynamic Standing Fair   Ambulatory Fair   Activity Tolerance   Activity Tolerance Patient limited by fatigue   Nurse Made Aware RN made aware of O2   Exercises   Knee AROM Long Arc Quad Sitting;10 reps;Bilateral;AROM   Marching Sitting;10 reps;Bilateral   Assessment   Prognosis Good   Problem List Decreased strength;Decreased endurance;Decreased mobility   Assessment Pt seen for PT treatment session this date with interventions consisting of gait training to normalize gait pattern to decrease fall risk and therapeutic exercise to improve strength to improve functional mobility. Pt agreeable to PT treatment session upon arrival, pt found seated OOB in recliner, in no apparent distress, A&O x 4, and responsive. Since previous session, pt has made good progress as evidenced by increased distance ambulated and decreased assistance required with mobility  Barriers during this session include decreased SaO2 levels and fatigue.  Pt continues to be functioning below baseline level, and remains limited 2* factors listed above and including decreased strength, impaired activity tolerance, and impaired  balance.  Pt prognosis for achieving goals is fair, pending pt progress with hospitalization/medical status improvements, and indicated by motivated to participate in therapy, ability to follow cues, and supportive family. PT will continue to see pt during current hospitalization in order to address the deficits listed above and provide interventions consistent w/ POC in effort to achieve goals. Current goals and POC remain appropriate, pt continues to have rehab potential  Upon conclusion pt seated OOB in recliner. The patient's AM-PAC Basic Mobility Inpatient Short Form Raw Score is 19.  Based on patient presentations and impairments, pt would most appropriately benefit from Level 3 resource intensity upon discharge.  Please also refer to the recommendation of the Physical Therapist for safe discharge  planning. RN verbalized pt appropriate for PT session.   Goals   Patient Goals To go home   LTG Expiration Date 11/08/24   PT Treatment Day 2   Plan   Treatment/Interventions Functional transfer training;LE strengthening/ROM;Elevations;Therapeutic exercise;Endurance training;Gait training   Progress Improving as expected   PT Frequency 3-5x/wk   Discharge Recommendation   Rehab Resource Intensity Level, PT III (Minimum Resource Intensity)   Equipment Recommended Walker;Cane   AM-PAC Basic Mobility Inpatient   Turning in Flat Bed Without Bedrails 4   Lying on Back to Sitting on Edge of Flat Bed Without Bedrails 4   Moving Bed to Chair 3   Standing Up From Chair Using Arms 3   Walk in Room 3   Climb 3-5 Stairs With Railing 2   Basic Mobility Inpatient Raw Score 19   Basic Mobility Standardized Score 42.48   [facility] Highest Level Of Mobility   -HLM Goal 6: Walk 10 steps or more   JH-HLM Achieved 6: Walk 10 steps or more         Time In: 1014  Time Out: 1039  Total Treatment Minutes: 25    Chema Chang, PT

## 2024-11-04 NOTE — PROGRESS NOTES
"Progress Note - Cardiology   Name: Corinne A Longo 74 y.o. female I MRN: [id number]  Unit/Bed#: -01 I Date of Admission: 10/28/2024   Date of Service: 11/4/2024 I Hospital Day: 7    Assessment & Plan  Paroxysmal atrial fibrillation (HCC)  Remains tachycardic  Afib Likely to recur.  CHADS2-VASc score is elevated.    Continue Eliquis 5 mg twice daily.  Now on diltiazem once daily dosing as well as metoprolol.    Chronic respiratory failure with hypoxia (HCC)  Oxygen dependent at baseline  Currently with increased oxygen requirements likely related to pneumonia  Management per medical team and pulmonary  Emphysema/COPD (HCC)  Oxygen dependent at baseline  Pulmonary following  Outpatient Cardiologist: Barry      Subjective:   Patient seen and examined.  No significant events overnight.        Summary comments:  Doing better. Getting PT.  We will see again in hospital as needed.  I will make arrangements for her to see us in the office in 1 month.     Telemetry/ECG/Cardiac testing:    TTE 10/30/2024: Normal LVfxn.    Vitals: Blood pressure 160/77, pulse 91, temperature 98 °F (36.7 °C), resp. rate 20, height 5' 8\" (1.727 m), weight 75.6 kg (166 lb 10.7 oz), SpO2 90%, not currently breastfeeding.,   Orthostatic Blood Pressures      Flowsheet Row Most Recent Value   Blood Pressure 160/77 filed at 11/04/2024 0648   Patient Position - Orthostatic VS Sitting filed at 11/03/2024 1401        ,   Weight (last 2 days)       Date/Time Weight    11/02/24 0500 75.6 (166.67)    11/02/24 0000 --    Comment rows:    OBSERV: sleeping at 11/02/24 0000            Physical Exam:     General:  Normal appearance in no distress.  Eyes:  Anicteric.  Oral mucosa:  Moist.  Neck:  No JVD. Carotid upstrokes are brisk without bruits.  No masses.  Chest:  Decreased breath sounds throughout but more air movement than prior.   Cardiac:  Normal PMI.  Normal S1 and S2.  No murmur gallop or rub.  Abdomen:  Soft and nontender. No palpable " organomegaly or aortic enlargement.  Extremities:  No peripheral edema.  Musculoskeletal:  Symmetric.   Vascular:     Popliteal pulses are intact bilaterally.   Pedal pulses are intact.  Neuro:  Grossly symmetric.  Psych:  Alert and oriented x3.    Medications:      Current Facility-Administered Medications:     acetaminophen (TYLENOL) tablet 650 mg, 650 mg, Oral, Q6H PRN, Fernando Sarmiento DO, 650 mg at 10/29/24 1803    apixaban (ELIQUIS) tablet 5 mg, 5 mg, Oral, BID, Benny Reddy MD, 5 mg at 11/04/24 0833    atorvastatin (LIPITOR) tablet 40 mg, 40 mg, Oral, Daily, Fernando Sarmiento DO, 40 mg at 11/04/24 0833    budesonide-formoterol (SYMBICORT) 160-4.5 mcg/act inhaler 2 puff, 2 puff, Inhalation, BID, Fernando Sarmiento DO, 2 puff at 11/04/24 0833    cefTRIAXone (ROCEPHIN) IVPB (premix in dextrose) 1,000 mg 50 mL, 1,000 mg, Intravenous, Q24H, Fernando Sarmiento DO, Last Rate: 100 mL/hr at 11/04/24 0832, 1,000 mg at 11/04/24 0832    Cholecalciferol (VITAMIN D3) tablet 5,000 Units, 5,000 Units, Oral, Daily, Fernando Sarmiento DO, 5,000 Units at 11/04/24 0832    [COMPLETED] diltiazem (CARDIZEM CD) 24 hr capsule 240 mg, 240 mg, Oral, Once, 240 mg at 11/01/24 1202 **FOLLOWED BY** diltiazem (CARDIZEM CD) 24 hr capsule 360 mg, 360 mg, Oral, Daily, Benny Reddy MD, 360 mg at 11/04/24 0833    doxycycline hyclate (VIBRAMYCIN) capsule 100 mg, 100 mg, Oral, Q12H [facility], Son Montalvo MD, 100 mg at 11/04/24 0832    guaiFENesin (MUCINEX) 12 hr tablet 1,200 mg, 1,200 mg, Oral, Q12H [facility], Son Montalvo MD, 1,200 mg at 11/04/24 0832    levalbuterol (XOPENEX) inhalation solution 1.25 mg, 1.25 mg, Nebulization, TID, Fernando Sarmiento DO, 1.25 mg at 11/04/24 0732    LORazepam (ATIVAN) tablet 0.5 mg, 0.5 mg, Oral, Q8H PRN, Zoe Carroll PA-C, 0.5 mg at 11/01/24 2040    methylPREDNISolone sodium succinate (Solu-MEDROL) injection 60 mg, 60 mg, Intravenous, Q12H [facility], Cline Mendez Montalvo MD,  60 mg at 11/04/24 0831    metoprolol (LOPRESSOR) injection 10 mg, 10 mg, Intravenous, Q6H PRN, Fernando Sarmiento DO    metoprolol tartrate (LOPRESSOR) tablet 50 mg, 50 mg, Oral, Q12H [facility] Hospital[facility], 50 mg at 11/04/24 0833    moisture barrier miconazole 2% cream (aka [place] MOISTURE BARRIER ANTIFUNGAL CREAM), , Topical, BID, Fernando Sarmiento DO, Given at 11/04/24 0833    ondansetron (ZOFRAN) injection 4 mg, 4 mg, Intravenous, Q6H PRN, Fernando Sarmiento DO    sodium chloride 3 % inhalation solution 4 mL, 4 mL, Nebulization, TID, Son Montalvo MD, 4 mL at 11/04/24 0732     Labs & Results:  Labs reviewed and prominent abnormalities reviewed above and/or below.  Troponins:    Results from last 7 days   Lab Units 10/28/24  1425 10/28/24  1232   HS TNI 0HR ng/L  --  14   HS TNI 2HR ng/L 14  --    HSTNI D2 ng/L 0  --         BNP:   Results from last 6 Months   Lab Units 10/28/24  1236   BNP pg/mL 168*

## 2024-11-04 NOTE — PROGRESS NOTES
Pastoral Care Progress Note  CH initiated follow-up visit, introduced self and role. Pt received CH sitting upright in bed,  present.  Pt expressed appreciation for eucharistic  visit, states she is eager to return home but is waiting for a few more test results. States that she feels well-supported. Expressed appreciation for the care she is receiving here, and appreciated the CH visit.         11/04/24 1400   Clinical Encounter Type   Visited With Patient and family together

## 2024-11-04 NOTE — PLAN OF CARE
Problem: PHYSICAL THERAPY ADULT  Goal: Performs mobility at highest level of function for planned discharge setting.  See evaluation for individualized goals.  Description: Treatment/Interventions: Functional transfer training, LE strengthening/ROM, Elevations, Therapeutic exercise, Endurance training, Gait training          See flowsheet documentation for full assessment, interventions and recommendations.  11/4/2024 1303 by Chema Chang PT  Outcome: Progressing  Note: Prognosis: Good  Problem List: Decreased strength, Decreased endurance, Decreased mobility  Assessment: Pt seen for PT treatment session this date with interventions consisting of gait training to normalize gait pattern to decrease fall risk and therapeutic exercise to improve strength to improve functional mobility. Pt agreeable to PT treatment session upon arrival, pt found seated OOB in recliner, in no apparent distress, A&O x 4, and responsive. Since previous session, pt has made good progress as evidenced by increased distance ambulated and decreased assistance required with mobility  Barriers during this session include decreased SaO2 levels and fatigue.  Pt continues to be functioning below baseline level, and remains limited 2* factors listed above and including decreased strength, impaired activity tolerance, and impaired  balance.  Pt prognosis for achieving goals is fair, pending pt progress with hospitalization/medical status improvements, and indicated by motivated to participate in therapy, ability to follow cues, and supportive family. PT will continue to see pt during current hospitalization in order to address the deficits listed above and provide interventions consistent w/ POC in effort to achieve goals. Current goals and POC remain appropriate, pt continues to have rehab potential  Upon conclusion pt seated OOB in recliner. The patient's AM-PAC Basic Mobility Inpatient Short Form Raw Score is 19.  Based on patient presentations  and impairments, pt would most appropriately benefit from Level 3 resource intensity upon discharge.  Please also refer to the recommendation of the Physical Therapist for safe discharge planning. RN verbalized pt appropriate for PT session.  Barriers to Discharge: None     Rehab Resource Intensity Level, PT: III (Minimum Resource Intensity)    See flowsheet documentation for full assessment.     11/4/2024 1303 by Chema Chang PT  Outcome: Progressing  Note: Prognosis: Good  Problem List: Decreased strength, Decreased endurance, Decreased mobility  Assessment: Pt seen for PT treatment session this date with interventions consisting of gait training to normalize gait pattern to decrease fall risk and therapeutic exercise to improve strength to improve functional mobility. Pt agreeable to PT treatment session upon arrival, pt found seated OOB in recliner, in no apparent distress, A&O x 4, and responsive. Since previous session, pt has made good progress as evidenced by increased distance ambulated and decreased assistance required with mobility  Barriers during this session include decreased SaO2 levels and fatigue.  Pt continues to be functioning below baseline level, and remains limited 2* factors listed above and including decreased strength, impaired activity tolerance, and impaired  balance.  Pt prognosis for achieving goals is fair, pending pt progress with hospitalization/medical status improvements, and indicated by motivated to participate in therapy, ability to follow cues, and supportive family. PT will continue to see pt during current hospitalization in order to address the deficits listed above and provide interventions consistent w/ POC in effort to achieve goals. Current goals and POC remain appropriate, pt continues to have rehab potential  Upon conclusion pt seated OOB in recliner. The patient's AM-PAC Basic Mobility Inpatient Short Form Raw Score is 19.  Based on patient presentations and  impairments, pt would most appropriately benefit from Level 3 resource intensity upon discharge.  Please also refer to the recommendation of the Physical Therapist for safe discharge planning. RN verbalized pt appropriate for PT session.  Barriers to Discharge: None     Rehab Resource Intensity Level, PT: III (Minimum Resource Intensity)    See flowsheet documentation for full assessment.

## 2024-11-04 NOTE — TELEPHONE ENCOUNTER
----- Message from Son Montalvo MD sent at 11/2/2024  4:25 PM EDT -----  Regarding: Hospital discharge follow-up  Please arrange hospital discharge follow-up first available is fine

## 2024-11-05 ENCOUNTER — APPOINTMENT (INPATIENT)
Dept: RADIOLOGY | Facility: HOSPITAL | Age: 74
DRG: 871 | End: 2024-11-05
Payer: COMMERCIAL

## 2024-11-05 ENCOUNTER — APPOINTMENT (INPATIENT)
Dept: MRI IMAGING | Facility: HOSPITAL | Age: 74
DRG: 871 | End: 2024-11-05
Payer: COMMERCIAL

## 2024-11-05 ENCOUNTER — HOME HEALTH ADMISSION (OUTPATIENT)
Dept: HOME HEALTH SERVICES | Facility: HOME HEALTHCARE | Age: 74
End: 2024-11-05
Payer: COMMERCIAL

## 2024-11-05 PROBLEM — J96.21 ACUTE ON CHRONIC RESPIRATORY FAILURE WITH HYPOXIA (HCC): Status: ACTIVE | Noted: 2021-05-20

## 2024-11-05 LAB
ANION GAP SERPL CALCULATED.3IONS-SCNC: 8 MMOL/L (ref 4–13)
BUN SERPL-MCNC: 53 MG/DL (ref 5–25)
CALCIUM SERPL-MCNC: 8.8 MG/DL (ref 8.4–10.2)
CHLORIDE SERPL-SCNC: 107 MMOL/L (ref 96–108)
CO2 SERPL-SCNC: 23 MMOL/L (ref 21–32)
CREAT SERPL-MCNC: 1.4 MG/DL (ref 0.6–1.3)
ERYTHROCYTE [DISTWIDTH] IN BLOOD BY AUTOMATED COUNT: 14.2 % (ref 11.6–15.1)
GFR SERPL CREATININE-BSD FRML MDRD: 37 ML/MIN/1.73SQ M
GLUCOSE SERPL-MCNC: 243 MG/DL (ref 65–140)
HCT VFR BLD AUTO: 30.6 % (ref 34.8–46.1)
HGB BLD-MCNC: 10 G/DL (ref 11.5–15.4)
MCH RBC QN AUTO: 30.5 PG (ref 26.8–34.3)
MCHC RBC AUTO-ENTMCNC: 32.7 G/DL (ref 31.4–37.4)
MCV RBC AUTO: 93 FL (ref 82–98)
PLATELET # BLD AUTO: 389 THOUSANDS/UL (ref 149–390)
PMV BLD AUTO: 10 FL (ref 8.9–12.7)
POTASSIUM SERPL-SCNC: 3.8 MMOL/L (ref 3.5–5.3)
RBC # BLD AUTO: 3.28 MILLION/UL (ref 3.81–5.12)
SODIUM SERPL-SCNC: 138 MMOL/L (ref 135–147)
WBC # BLD AUTO: 22.05 THOUSAND/UL (ref 4.31–10.16)

## 2024-11-05 PROCEDURE — 94664 DEMO&/EVAL PT USE INHALER: CPT

## 2024-11-05 PROCEDURE — 94668 MNPJ CHEST WALL SBSQ: CPT

## 2024-11-05 PROCEDURE — 71045 X-RAY EXAM CHEST 1 VIEW: CPT

## 2024-11-05 PROCEDURE — 85025 COMPLETE CBC W/AUTO DIFF WBC: CPT | Performed by: PHYSICIAN ASSISTANT

## 2024-11-05 PROCEDURE — 94760 N-INVAS EAR/PLS OXIMETRY 1: CPT

## 2024-11-05 PROCEDURE — 80048 BASIC METABOLIC PNL TOTAL CA: CPT | Performed by: PHYSICIAN ASSISTANT

## 2024-11-05 PROCEDURE — 94640 AIRWAY INHALATION TREATMENT: CPT

## 2024-11-05 PROCEDURE — 70551 MRI BRAIN STEM W/O DYE: CPT

## 2024-11-05 PROCEDURE — 99232 SBSQ HOSP IP/OBS MODERATE 35: CPT | Performed by: INTERNAL MEDICINE

## 2024-11-05 RX ORDER — PREDNISONE 20 MG/1
40 TABLET ORAL DAILY
Status: DISCONTINUED | OUTPATIENT
Start: 2024-11-05 | End: 2024-11-06 | Stop reason: HOSPADM

## 2024-11-05 RX ORDER — BUDESONIDE 0.5 MG/2ML
0.5 INHALANT ORAL
Status: DISCONTINUED | OUTPATIENT
Start: 2024-11-05 | End: 2024-11-06 | Stop reason: HOSPADM

## 2024-11-05 RX ORDER — LORAZEPAM 2 MG/ML
0.5 INJECTION INTRAMUSCULAR ONCE
Status: COMPLETED | OUTPATIENT
Start: 2024-11-05 | End: 2024-11-05

## 2024-11-05 RX ORDER — ECHINACEA PURPUREA EXTRACT 125 MG
1 TABLET ORAL
Status: DISCONTINUED | OUTPATIENT
Start: 2024-11-05 | End: 2024-11-06 | Stop reason: HOSPADM

## 2024-11-05 RX ORDER — FUROSEMIDE 10 MG/ML
40 INJECTION INTRAMUSCULAR; INTRAVENOUS ONCE
Status: COMPLETED | OUTPATIENT
Start: 2024-11-05 | End: 2024-11-05

## 2024-11-05 RX ADMIN — BUDESONIDE AND FORMOTEROL FUMARATE DIHYDRATE 2 PUFF: 160; 4.5 AEROSOL RESPIRATORY (INHALATION) at 09:23

## 2024-11-05 RX ADMIN — METOPROLOL TARTRATE 50 MG: 50 TABLET, FILM COATED ORAL at 09:22

## 2024-11-05 RX ADMIN — LEVALBUTEROL HYDROCHLORIDE 1.25 MG: 1.25 SOLUTION RESPIRATORY (INHALATION) at 07:33

## 2024-11-05 RX ADMIN — ATORVASTATIN CALCIUM 40 MG: 40 TABLET, FILM COATED ORAL at 09:23

## 2024-11-05 RX ADMIN — Medication 5000 UNITS: at 09:22

## 2024-11-05 RX ADMIN — FUROSEMIDE 40 MG: 10 INJECTION, SOLUTION INTRAMUSCULAR; INTRAVENOUS at 14:37

## 2024-11-05 RX ADMIN — MICONAZOLE NITRATE: 20 CREAM TOPICAL at 09:23

## 2024-11-05 RX ADMIN — PREDNISONE 40 MG: 20 TABLET ORAL at 09:22

## 2024-11-05 RX ADMIN — MICONAZOLE NITRATE: 20 CREAM TOPICAL at 17:30

## 2024-11-05 RX ADMIN — APIXABAN 5 MG: 5 TABLET, FILM COATED ORAL at 09:22

## 2024-11-05 RX ADMIN — APIXABAN 5 MG: 5 TABLET, FILM COATED ORAL at 17:30

## 2024-11-05 RX ADMIN — METOPROLOL TARTRATE 50 MG: 50 TABLET, FILM COATED ORAL at 21:06

## 2024-11-05 RX ADMIN — GUAIFENESIN 1200 MG: 600 TABLET ORAL at 09:22

## 2024-11-05 RX ADMIN — GUAIFENESIN 1200 MG: 600 TABLET ORAL at 21:06

## 2024-11-05 RX ADMIN — LORAZEPAM 0.5 MG: 2 INJECTION INTRAMUSCULAR; INTRAVENOUS at 21:21

## 2024-11-05 RX ADMIN — LEVALBUTEROL HYDROCHLORIDE 1.25 MG: 1.25 SOLUTION RESPIRATORY (INHALATION) at 13:56

## 2024-11-05 RX ADMIN — DILTIAZEM HYDROCHLORIDE 360 MG: 180 CAPSULE, COATED, EXTENDED RELEASE ORAL at 09:22

## 2024-11-05 NOTE — ASSESSMENT & PLAN NOTE
Presented with A-fib with RVR-converted back to sinus rhythm  Suspect her initial presentation meeting sepsis criteria and receiving IV fluid resuscitation with A-fib with RVR likely led to volume overload.  Cardiology following

## 2024-11-05 NOTE — ASSESSMENT & PLAN NOTE
Patient has history of severe COPD-home regimen includes an oral Ellipta 1 puff daily, albuterol HFA and DuoNebs every 6 hours as needed for shortness of breath/wheeze  While inpatient, continue scheduled nebulizers with Xopenex three times daily.  Discontinue Symbicort and replace with budesonide SHANELLE nebulized and also add Atrovent 3 times daily

## 2024-11-05 NOTE — PLAN OF CARE
Problem: Potential for Falls  Goal: Patient will remain free of falls  Description: INTERVENTIONS:  - Educate patient/family on patient safety including physical limitations  - Instruct patient to call for assistance with activity   - Consult OT/PT to assist with strengthening/mobility   - Keep Call bell within reach  - Keep bed low and locked with side rails adjusted as appropriate  - Keep care items and personal belongings within reach  - Initiate and maintain comfort rounds  - Make Fall Risk Sign visible to staff  - Offer Toileting every 2 Hours, in advance of need  - Initiate/Maintain bed alarm  - Obtain necessary fall risk management equipment: non skid socks  - Apply yellow socks and bracelet for high fall risk patients  - Consider moving patient to room near nurses station  Outcome: Progressing     Problem: PAIN - ADULT  Goal: Verbalizes/displays adequate comfort level or baseline comfort level  Description: Interventions:  - Encourage patient to monitor pain and request assistance  - Assess pain using appropriate pain scale  - Administer analgesics based on type and severity of pain and evaluate response  - Implement non-pharmacological measures as appropriate and evaluate response  - Consider cultural and social influences on pain and pain management  - Notify physician/advanced practitioner if interventions unsuccessful or patient reports new pain  Outcome: Progressing     Problem: INFECTION - ADULT  Goal: Absence or prevention of progression during hospitalization  Description: INTERVENTIONS:  - Assess and monitor for signs and symptoms of infection  - Monitor lab/diagnostic results  - Monitor all insertion sites, i.e. indwelling lines, tubes, and drains  - Monitor endotracheal if appropriate and nasal secretions for changes in amount and color  - [place] appropriate cooling/warming therapies per order  - Administer medications as ordered  - Instruct and encourage patient and family to use good hand  hygiene technique  - Identify and instruct in appropriate isolation precautions for identified infection/condition  Outcome: Progressing  Goal: Absence of fever/infection during neutropenic period  Description: INTERVENTIONS:  - Monitor WBC    Outcome: Progressing     Problem: SAFETY ADULT  Goal: Patient will remain free of falls  Description: INTERVENTIONS:  - Educate patient/family on patient safety including physical limitations  - Instruct patient to call for assistance with activity   - Consult OT/PT to assist with strengthening/mobility   - Keep Call bell within reach  - Keep bed low and locked with side rails adjusted as appropriate  - Keep care items and personal belongings within reach  - Initiate and maintain comfort rounds  - Make Fall Risk Sign visible to staff  - Offer Toileting every 2 Hours, in advance of need  - Initiate/Maintain bed alarm  - Obtain necessary fall risk management equipment: non skid socks  - Apply yellow socks and bracelet for high fall risk patients  - Consider moving patient to room near nurses station  Outcome: Progressing  Goal: Maintain or return to baseline ADL function  Description: INTERVENTIONS:  -  Assess patient's ability to carry out ADLs; assess patient's baseline for ADL function and identify physical deficits which impact ability to perform ADLs (bathing, care of mouth/teeth, toileting, grooming, dressing, etc.)  - Assess/evaluate cause of self-care deficits   - Assess range of motion  - Assess patient's mobility; develop plan if impaired  - Assess patient's need for assistive devices and provide as appropriate  - Encourage maximum independence but intervene and supervise when necessary  - Involve family in performance of ADLs  - Assess for home care needs following discharge   - Consider OT consult to assist with ADL evaluation and planning for discharge  - Provide patient education as appropriate  Outcome: Progressing  Goal: Maintains/Returns to pre admission  functional level  Description: INTERVENTIONS:  - Perform AM-PAC 6 Click Basic Mobility/ Daily Activity assessment daily.  - Set and communicate daily mobility goal to care team and patient/family/caregiver.   - Collaborate with rehabilitation services on mobility goals if consulted  - Perform Range of Motion 2 times a day.  - Reposition patient every 2 hours.  - Dangle patient 2 times a day  - Stand patient 2 times a day  - Ambulate patient 2 times a day  - Out of bed to chair 3 times a day   - Out of bed for meals 3 times a day  - Out of bed for toileting  - Record patient progress and toleration of activity level   Outcome: Progressing     Problem: DISCHARGE PLANNING  Goal: Discharge to home or other facility with appropriate resources  Description: INTERVENTIONS:  - Identify barriers to discharge w/patient and caregiver  - Identify discharge learning needs (meds, wound care, etc.)  - Arrange for interpretive services to assist at discharge as needed  - Refer to Case Management Department for coordinating discharge planning if the patient needs post-hospital services based on physician/advanced practitioner order or complex needs related to functional status, cognitive ability, or social support system  Outcome: Progressing     Problem: Knowledge Deficit  Goal: Patient/family/caregiver demonstrates understanding of disease process, treatment plan, medications, and discharge instructions  Description: Complete learning assessment and assess knowledge base.  Interventions:  - Provide teaching at level of understanding  - Provide teaching via preferred learning methods  Outcome: Progressing     Problem: CARDIOVASCULAR - ADULT  Goal: Maintains optimal cardiac output and hemodynamic stability  Description: INTERVENTIONS:  - Monitor I/O, vital signs and rhythm  - Monitor for S/S and trends of decreased cardiac output  - Administer and titrate ordered vasoactive medications to optimize hemodynamic stability  - Assess  quality of pulses, skin color and temperature  - Assess for signs of decreased coronary artery perfusion  - Instruct patient to report change in severity of symptoms  Outcome: Progressing  Goal: Absence of cardiac dysrhythmias or at baseline rhythm  Description: INTERVENTIONS:  - Continuous cardiac monitoring, vital signs, obtain 12 lead EKG if ordered  - Administer antiarrhythmic and heart rate control medications as ordered  - Monitor electrolytes and administer replacement therapy as ordered  Outcome: Progressing     Problem: NEUROSENSORY - ADULT  Goal: Achieves stable or improved neurological status  Description: INTERVENTIONS  - Monitor and report changes in neurological status  - Monitor vital signs such as temperature, blood pressure, glucose, and any other labs ordered   - Initiate measures to prevent increased intracranial pressure  - Monitor for seizure activity and implement precautions if appropriate      Outcome: Progressing  Goal: Remains free of injury related to seizures activity  Description: INTERVENTIONS  - Maintain airway, patient safety  and administer oxygen as ordered  - Monitor patient for seizure activity, document and report duration and description of seizure to physician/advanced practitioner  - If seizure occurs,  ensure patient safety during seizure  - Reorient patient post seizure  - Seizure pads on all 4 side rails  - Instruct patient/family to notify RN of any seizure activity including if an aura is experienced  - Instruct patient/family to call for assistance with activity based on nursing assessment  - Administer anti-seizure medications if ordered    Outcome: Progressing  Goal: Achieves maximal functionality and self care  Description: INTERVENTIONS  - Monitor swallowing and airway patency with patient fatigue and changes in neurological status  - Encourage and assist patient to increase activity and self care.   - Encourage visually impaired, hearing impaired and aphasic  patients to use assistive/communication devices  Outcome: Progressing     Problem: RESPIRATORY - ADULT  Goal: Achieves optimal ventilation and oxygenation  Description: INTERVENTIONS:  - Assess for changes in respiratory status  - Assess for changes in mentation and behavior  - Position to facilitate oxygenation and minimize respiratory effort  - Oxygen administered by appropriate delivery if ordered  - Initiate smoking cessation education as indicated  - Encourage broncho-pulmonary hygiene including cough, deep breathe, Incentive Spirometry  - Assess the need for suctioning and aspirate as needed  - Assess and instruct to report SOB or any respiratory difficulty  - Respiratory Therapy support as indicated  Outcome: Progressing     Problem: GASTROINTESTINAL - ADULT  Goal: Minimal or absence of nausea and/or vomiting  Description: INTERVENTIONS:  - Administer IV fluids if ordered to ensure adequate hydration  - Maintain NPO status until nausea and vomiting are resolved  - Nasogastric tube if ordered  - Administer ordered antiemetic medications as needed  - Provide nonpharmacologic comfort measures as appropriate  - Advance diet as tolerated, if ordered  - Consider nutrition services referral to assist patient with adequate nutrition and appropriate food choices  Outcome: Progressing  Goal: Maintains or returns to baseline bowel function  Description: INTERVENTIONS:  - Assess bowel function  - Encourage oral fluids to ensure adequate hydration  - Administer IV fluids if ordered to ensure adequate hydration  - Administer ordered medications as needed  - Encourage mobilization and activity  - Consider nutritional services referral to assist patient with adequate nutrition and appropriate food choices  Outcome: Progressing  Goal: Maintains adequate nutritional intake  Description: INTERVENTIONS:  - Monitor percentage of each meal consumed  - Identify factors contributing to decreased intake, treat as appropriate  -  Assist with meals as needed  - Monitor I&O, weight, and lab values if indicated  - Obtain nutrition services referral as needed  Outcome: Progressing  Goal: Establish and maintain optimal ostomy function  Description: INTERVENTIONS:  - Assess bowel function  - Encourage oral fluids to ensure adequate hydration  - Administer IV fluids if ordered to ensure adequate hydration   - Administer ordered medications as needed  - Encourage mobilization and activity  - Nutrition services referral to assist patient with appropriate food choices  - Assess stoma site  - Consider wound care consult   Outcome: Progressing  Goal: Oral mucous membranes remain intact  Description: INTERVENTIONS  - Assess oral mucosa and hygiene practices  - Implement preventative oral hygiene regimen  - Implement oral medicated treatments as ordered  - Initiate Nutrition services referral as needed  Outcome: Progressing     Problem: GENITOURINARY - ADULT  Goal: Maintains or returns to baseline urinary function  Description: INTERVENTIONS:  - Assess urinary function  - Encourage oral fluids to ensure adequate hydration if ordered  - Administer IV fluids as ordered to ensure adequate hydration  - Administer ordered medications as needed  - Offer frequent toileting  - Follow urinary retention protocol if ordered  Outcome: Progressing  Goal: Absence of urinary retention  Description: INTERVENTIONS:  - Assess patient’s ability to void and empty bladder  - Monitor I/O  - Bladder scan as needed  - Discuss with physician/AP medications to alleviate retention as needed  - Discuss catheterization for long term situations as appropriate  Outcome: Progressing  Goal: Urinary catheter remains patent  Description: INTERVENTIONS:  - Assess patency of urinary catheter  - If patient has a chronic zuñiga, consider changing catheter if non-functioning  - Follow guidelines for intermittent irrigation of non-functioning urinary catheter  Outcome: Progressing     Problem:  METABOLIC, FLUID AND ELECTROLYTES - ADULT  Goal: Electrolytes maintained within normal limits  Description: INTERVENTIONS:  - Monitor labs and assess patient for signs and symptoms of electrolyte imbalances  - Administer electrolyte replacement as ordered  - Monitor response to electrolyte replacements, including repeat lab results as appropriate  - Instruct patient on fluid and nutrition as appropriate  Outcome: Progressing  Goal: Fluid balance maintained  Description: INTERVENTIONS:  - Monitor labs   - Monitor I/O and WT  - Instruct patient on fluid and nutrition as appropriate  - Assess for signs & symptoms of volume excess or deficit  Outcome: Progressing  Goal: Glucose maintained within target range  Description: INTERVENTIONS:  - Monitor Blood Glucose as ordered  - Assess for signs and symptoms of hyperglycemia and hypoglycemia  - Administer ordered medications to maintain glucose within target range  - Assess nutritional intake and initiate nutrition service referral as needed  Outcome: Progressing     Problem: SKIN/TISSUE INTEGRITY - ADULT  Goal: Skin Integrity remains intact(Skin Breakdown Prevention)  Description: Assess:  -Perform Silvestre assessment every shift  -Clean and moisturize skin every shift  -Inspect skin when repositioning, toileting, and assisting with ADLS  -Assess under medical devices such  -Assess extremities for adequate circulation and sensation     Bed Management:  -Have minimal linens on bed & keep smooth, unwrinkled  -Change linens as needed when moist or perspiring  -Avoid sitting or lying in one position for more than 2 hours while in bed  -Keep HOB at 30 degrees     Toileting:  -Offer bedside commode      Activity:  -Mobilize patient 3 times a day  -Encourage activity and walks on unit  -Encourage or provide ROM exercises   -Turn and reposition patient every 2 Hours  -Use appropriate equipment to lift or move patient in bed  -Instruct/ Assist with weight shifting every 60 minutes  when out of bed in chair  -Consider limitation of chair time 4 hour intervals    Skin Care:  -Avoid use of baby powder, tape, friction and shearing, hot water or constrictive clothing  -Relieve pressure over bony prominences using Mepelex foam dressing  -Do not massage red bony areas    Outcome: Progressing  Goal: Incision(s), wounds(s) or drain site(s) healing without S/S of infection  Description: INTERVENTIONS  - Assess and document dressing, incision, wound bed, drain sites and surrounding tissue  - Provide patient and family education  - Perform skin care/dressing changes   Outcome: Progressing  Goal: Pressure injury heals and does not worsen  Description: Interventions:  - Implement low air loss mattress or specialty surface (Criteria met)  - Apply silicone foam dressing  - Instruct/assist with weight shifting every 60 minutes when in chair   - Limit chair time to 4 hour intervals  - Use special pressure reducing interventions such as waffle cushion when in chair   - Apply fecal or urinary incontinence containment device   - Perform passive or active ROM every shift  - Turn and reposition patient & offload bony prominences every 2 hours   - Utilize friction reducing device or surface for transfers   - Consider nutrition services referral as needed  Outcome: Progressing     Problem: HEMATOLOGIC - ADULT  Goal: Maintains hematologic stability  Description: INTERVENTIONS  - Assess for signs and symptoms of bleeding or hemorrhage  - Monitor labs  - Administer supportive blood products/factors as ordered and appropriate  Outcome: Progressing     Problem: MUSCULOSKELETAL - ADULT  Goal: Maintain or return mobility to safest level of function  Description: INTERVENTIONS:  - Assess patient's ability to carry out ADLs; assess patient's baseline for ADL function and identify physical deficits which impact ability to perform ADLs (bathing, care of mouth/teeth, toileting, grooming, dressing, etc.)  - Assess/evaluate cause  of self-care deficits   - Assess range of motion  - Assess patient's mobility  - Assess patient's need for assistive devices and provide as appropriate  - Encourage maximum independence but intervene and supervise when necessary  - Involve family in performance of ADLs  - Assess for home care needs following discharge   - Consider OT consult to assist with ADL evaluation and planning for discharge  - Provide patient education as appropriate  Outcome: Progressing  Goal: Maintain proper alignment of affected body part  Description: INTERVENTIONS:  - Support, maintain and protect limb and body alignment  - Provide patient/ family with appropriate education  Outcome: Progressing     Problem: Prexisting or High Potential for Compromised Skin Integrity  Goal: Skin integrity is maintained or improved  Description: INTERVENTIONS:  - Identify patients at risk for skin breakdown  - Assess and monitor skin integrity  - Assess and monitor nutrition and hydration status  - Monitor labs   - Assess for incontinence   - Turn and reposition patient  - Assist with mobility/ambulation  - Relieve pressure over bony prominences  - Avoid friction and shearing  - Provide appropriate hygiene as needed including keeping skin clean and dry  - Evaluate need for skin moisturizer/barrier cream  - Collaborate with interdisciplinary team   - Patient/family teaching  - Consider wound care consult   Outcome: Progressing     Problem: Prexisting or High Potential for Compromised Skin Integrity  Goal: Skin integrity is maintained or improved  Description: INTERVENTIONS:  - Identify patients at risk for skin breakdown  - Assess and monitor skin integrity  - Assess and monitor nutrition and hydration status  - Monitor labs   - Assess for incontinence   - Turn and reposition patient  - Assist with mobility/ambulation  - Relieve pressure over bony prominences  - Avoid friction and shearing  - Provide appropriate hygiene as needed including keeping skin  clean and dry  - Evaluate need for skin moisturizer/barrier cream  - Collaborate with interdisciplinary team   - Patient/family teaching  - Consider wound care consult   Outcome: Progressing     Problem: Nutrition/Hydration-ADULT  Goal: Nutrient/Hydration intake appropriate for improving, restoring or maintaining nutritional needs  Description: Monitor and assess patient's nutrition/hydration status for malnutrition. Collaborate with interdisciplinary team and initiate plan and interventions as ordered.  Monitor patient's weight and dietary intake as ordered or per policy. Utilize nutrition screening tool and intervene as necessary. Determine patient's food preferences and provide high-protein, high-caloric foods as appropriate.     INTERVENTIONS:  - Monitor oral intake, urinary output, labs, and treatment plans  - Assess nutrition and hydration status and recommend course of action  - Evaluate amount of meals eaten  - Assist patient with eating if necessary   - Allow adequate time for meals  - Recommend/ encourage appropriate diets, oral nutritional supplements, and vitamin/mineral supplements  - Order, calculate, and assess calorie counts as needed  - Recommend, monitor, and adjust tube feedings and TPN/PPN based on assessed needs  - Assess need for intravenous fluids  - Provide specific nutrition/hydration education as appropriate  - Include patient/family/caregiver in decisions related to nutrition  Outcome: Progressing

## 2024-11-05 NOTE — PLAN OF CARE
Problem: INFECTION - ADULT  Goal: Absence or prevention of progression during hospitalization  Description: INTERVENTIONS:  - Assess and monitor for signs and symptoms of infection  - Monitor lab/diagnostic results  - Monitor all insertion sites, i.e. indwelling lines, tubes, and drains  - Monitor endotracheal if appropriate and nasal secretions for changes in amount and color  - [place] appropriate cooling/warming therapies per order  - Administer medications as ordered  - Instruct and encourage patient and family to use good hand hygiene technique  - Identify and instruct in appropriate isolation precautions for identified infection/condition  Outcome: Progressing     Problem: CARDIOVASCULAR - ADULT  Goal: Maintains optimal cardiac output and hemodynamic stability  Description: INTERVENTIONS:  - Monitor I/O, vital signs and rhythm  - Monitor for S/S and trends of decreased cardiac output  - Administer and titrate ordered vasoactive medications to optimize hemodynamic stability  - Assess quality of pulses, skin color and temperature  - Assess for signs of decreased coronary artery perfusion  - Instruct patient to report change in severity of symptoms  Outcome: Progressing     Problem: RESPIRATORY - ADULT  Goal: Achieves optimal ventilation and oxygenation  Description: INTERVENTIONS:  - Assess for changes in respiratory status  - Assess for changes in mentation and behavior  - Position to facilitate oxygenation and minimize respiratory effort  - Oxygen administered by appropriate delivery if ordered  - Initiate smoking cessation education as indicated  - Encourage broncho-pulmonary hygiene including cough, deep breathe, Incentive Spirometry  - Assess the need for suctioning and aspirate as needed  - Assess and instruct to report SOB or any respiratory difficulty  - Respiratory Therapy support as indicated  Outcome: Progressing

## 2024-11-05 NOTE — ASSESSMENT & PLAN NOTE
Utilizes 2 L of nasal cannula supplemental O2 at baseline  Oxygen requirements have been fluctuating  Respiratory protocol  Wean O2 as tolerated  Repeat chest x-ray  Will give a dose of IV Lasix for possible fluid overload

## 2024-11-05 NOTE — PROGRESS NOTES
Progress Note - Hospitalist   Name: Corinne A Longo 74 y.o. female I MRN: [id number]  Unit/Bed#: -01 I Date of Admission: 10/28/2024   Date of Service: 11/5/2024 I Hospital Day: 8    Assessment & Plan  Sepsis due to pneumonia (HCC)  Present on admission as evidenced by hyperthermia, tachycardia, tachypnea  Likely secondary to multilobar pneumonia  Chest x-ray with evidence of consolidations  Discontinue vancomycin  Patient completed 7 days of IV antibiotics  Blood cultures within normal limits  Pulmonology following  Procalcitonin within normal limits  Paroxysmal atrial fibrillation (HCC)  Patient went into atrial fibrillation with rapid ventricular response on 10/28/2024  Rapid response was called and patient was transitioned to level 2 stepdown  Initiated on diltiazem infusion which has since been discontinued  Heart rates have significantly improved  Diltiazem 360 mg PO daily  Not on anticoagulation in the outpatient setting  Continue home aspirin  Cardiology consultation appreciated  Hemodynamics stable  Initiated on Eliquis by cardiology  2D echocardiogram with ejection fraction 65%  COPD with acute exacerbation (HCC)  Possible acute exacerbation secondary to pneumonia  Utilizes chronic O2  Atrovent and Xopenex  Respiratory protocol  Pulmonology consultation appreciated  Patient transitioned to prednisone today  Monitor respiratory status  Continue home Symbicort  Acute on chronic respiratory failure with hypoxia (HCC)  Utilizes 2 L of nasal cannula supplemental O2 at baseline  Oxygen requirements have been fluctuating  Respiratory protocol  Wean O2 as tolerated  Repeat chest x-ray  Will give a dose of IV Lasix for possible fluid overload    VTE Pharmacologic Prophylaxis: VTE Score: 5 Moderate Risk (Score 3-4) - Pharmacological DVT Prophylaxis Ordered: apixaban (Eliquis).    Mobility:   Basic Mobility Inpatient Raw Score: 19  -[facility] Goal: 6: Walk 10 steps or more  -HLM Achieved: 6: Walk 10 steps or  more  [facility]-HLM Goal achieved. Continue to encourage appropriate mobility.    Patient Centered Rounds: I performed bedside rounds with nursing staff today.   Discussions with Specialists or Other Care Team Provider: yes    Education and Discussions with Family / Patient: Updated  () at bedside.    Current Length of Stay: 8 day(s)  Current Patient Status: Inpatient   Certification Statement: The patient will continue to require additional inpatient hospital stay due to hypoxia  Discharge Plan: Anticipate discharge in 24-48 hrs to home with home services.    Code Status: Level 1 - Full Code    Subjective   No overnight events noted.  Patient still requiring up to 4-5 L nasal cannula oxygen    Objective :  Temp:  [97.4 °F (36.3 °C)-98 °F (36.7 °C)] 98 °F (36.7 °C)  HR:  [77-96] 96  BP: (138-141)/(62-72) 139/62  Resp:  [16-19] 16  SpO2:  [87 %-93 %] 90 %  O2 Device: Nasal cannula  Nasal Cannula O2 Flow Rate (L/min):  [3 L/min-5 L/min] 4 L/min    Body mass index is 25.34 kg/m².     Input and Output Summary (last 24 hours):     Intake/Output Summary (Last 24 hours) at 11/5/2024 1416  Last data filed at 11/5/2024 0849  Gross per 24 hour   Intake 480 ml   Output 1450 ml   Net -970 ml       Physical Exam  Constitutional:       General: She is not in acute distress.     Comments: Chronically ill-appearing   HENT:      Head: Normocephalic and atraumatic.      Nose: Nose normal.      Mouth/Throat:      Mouth: Mucous membranes are moist.   Eyes:      Extraocular Movements: Extraocular movements intact.      Conjunctiva/sclera: Conjunctivae normal.   Cardiovascular:      Rate and Rhythm: Normal rate and regular rhythm.   Pulmonary:      Effort: Pulmonary effort is normal. No respiratory distress.      Breath sounds: Rhonchi present.   Abdominal:      Palpations: Abdomen is soft.      Tenderness: There is no abdominal tenderness.   Musculoskeletal:         General: Normal range of motion.      Cervical back:  Normal range of motion and neck supple.      Comments: Generalized weakness   Skin:     General: Skin is warm and dry.   Neurological:      General: No focal deficit present.      Mental Status: She is alert. Mental status is at baseline.      Cranial Nerves: No cranial nerve deficit.   Psychiatric:         Mood and Affect: Mood normal.         Behavior: Behavior normal.           Lines/Drains:        Lab Results: I have reviewed the following results:   Results from last 7 days   Lab Units 11/05/24  1219 11/04/24  0531   WBC Thousand/uL 22.05* 18.87*   HEMOGLOBIN g/dL 10.0* 10.6*   HEMATOCRIT % 30.6* 32.8*   PLATELETS Thousands/uL 389 390   LYMPHO PCT %  --  6*   MONO PCT %  --  3*   EOS PCT %  --  0     Results from last 7 days   Lab Units 11/05/24  1219   SODIUM mmol/L 138   POTASSIUM mmol/L 3.8   CHLORIDE mmol/L 107   CO2 mmol/L 23   BUN mg/dL 53*   CREATININE mg/dL 1.40*   ANION GAP mmol/L 8   CALCIUM mg/dL 8.8   GLUCOSE RANDOM mg/dL 243*                 Results from last 7 days   Lab Units 11/04/24  0531 11/03/24  0423 11/02/24  0452 11/01/24  0511 10/31/24  0558   PROCALCITONIN ng/ml 0.14 0.17 0.21 0.27* 0.37*       Recent Cultures (last 7 days):   Results from last 7 days   Lab Units 10/29/24  1945   SPUTUM CULTURE  1+ Growth of   GRAM STAIN RESULT  1+ Epithelial cells per low power field*  1+ Polys*  1+ Gram positive rods*       Imaging Results Review: No pertinent imaging studies reviewed.  Other Study Results Review: No additional pertinent studies reviewed.    Last 24 Hours Medication List:     Current Facility-Administered Medications:     acetaminophen (TYLENOL) tablet 650 mg, Q6H PRN    apixaban (ELIQUIS) tablet 5 mg, BID    atorvastatin (LIPITOR) tablet 40 mg, Daily    budesonide (PULMICORT) inhalation solution 0.5 mg, Q12H    Cholecalciferol (VITAMIN D3) tablet 5,000 Units, Daily    [COMPLETED] diltiazem (CARDIZEM CD) 24 hr capsule 240 mg, Once **FOLLOWED BY** diltiazem (CARDIZEM CD) 24 hr capsule  360 mg, Daily    furosemide (LASIX) injection 40 mg, Once    guaiFENesin (MUCINEX) 12 hr tablet 1,200 mg, Q12H YING    ipratropium (ATROVENT) 0.02 % inhalation solution 0.5 mg, TID    levalbuterol (XOPENEX) inhalation solution 1.25 mg, TID    LORazepam (ATIVAN) tablet 0.5 mg, Q8H PRN    metoprolol (LOPRESSOR) injection 10 mg, Q6H PRN    metoprolol tartrate (LOPRESSOR) tablet 50 mg, Q12H YING    moisture barrier miconazole 2% cream (aka [place] MOISTURE BARRIER ANTIFUNGAL CREAM), BID    ondansetron (ZOFRAN) injection 4 mg, Q6H PRN    predniSONE tablet 40 mg, Daily    sodium chloride (OCEAN) 0.65 % nasal spray 1 spray, Q1H PRN    Administrative Statements   Today, Patient Was Seen By: Cece Ro MD      **Please Note: This note may have been constructed using a voice recognition system.**

## 2024-11-05 NOTE — ASSESSMENT & PLAN NOTE
Present on admission as evidenced by hyperthermia, tachycardia, tachypnea  Likely secondary to multilobar pneumonia  Chest x-ray with evidence of consolidations  Discontinue vancomycin  Patient completed 7 days of IV antibiotics  Blood cultures within normal limits  Pulmonology following  Procalcitonin within normal limits

## 2024-11-05 NOTE — PROGRESS NOTES
Patient:    MRN:  [id number]    [employer] Request ID:  [id number]    Level of care reserved:  Home Health Agency    Partner Reserved:  [facility], [place] [address] [phone]    Clinical needs requested:    Geography searched:  [phone]    Start of Service:    Request sent:  10:15am EST on 11/4/2024 by Nallely Garcia    Partner reserved:  9:02am EST on 11/5/2024 by Nallely Garcia    Choice list shared:  11:16am EST on 11/4/2024 by Nallely Garcia

## 2024-11-05 NOTE — ASSESSMENT & PLAN NOTE
Possible acute exacerbation secondary to pneumonia  Utilizes chronic O2  Atrovent and Xopenex  Respiratory protocol  Pulmonology consultation appreciated  Patient transitioned to prednisone today  Monitor respiratory status  Continue home Symbicort

## 2024-11-05 NOTE — PROGRESS NOTES
Progress Note - Pulmonology   Name: Corinne A Longo 74 y.o. female I MRN: [id number]  Unit/Bed#: -01 I Date of Admission: 10/28/2024   Date of Service: 11/5/2024 I Hospital Day: 8    Assessment & Plan  Acute on chronic respiratory failure with hypoxia (HCC)  Pulmonary was asked to reevaluate patient today due to worsening hypoxia from yesterday to today.  Now requiring 5 L nasal cannula.  Baseline ox requirement is 2 L nasal cannula with activities.  Repeated chest x-ray shows persistent multifocal infiltrates not acutely worse however there is new blunting of the costophrenic angles on the left side.  That coupled with her increased lower extremity edema leads high clinical suspicion for volume overload.  Discussed with primary team and recommended cardiology reevaluate for need of diuretics.  Sepsis due to pneumonia (HCC)  Present on admission and secondary to multilobar pneumonia  Patient completed a 7-day course of cephalosporins first with cefepime then de-escalated to ceftriaxone to finish out course yesterday.  Vancomycin discontinued after MRSA culture negative.  Blood cultures x 2 with no growth to date  Further treatment per primary team  COPD with acute exacerbation (HCC)  Patient has history of severe COPD-home regimen includes an oral Ellipta 1 puff daily, albuterol HFA and DuoNebs every 6 hours as needed for shortness of breath/wheeze  While inpatient, continue scheduled nebulizers with Xopenex three times daily.  Discontinue Symbicort and replace with budesonide SHANELLE nebulized and also add Atrovent 3 times daily  Paroxysmal atrial fibrillation (HCC)  Presented with A-fib with RVR-converted back to sinus rhythm  Suspect her initial presentation meeting sepsis criteria and receiving IV fluid resuscitation with A-fib with RVR likely led to volume overload.  Cardiology following    24 Hour Events : worsening hypoxia  Subjective : Patient was seen and examined today.  No overnight events reported.   Patient feels a little more run down compared to yesterday. She also reports increased lower extremity edema worse than baseline, confirmed by daughter at bedside. Wheeze and cough are improved.    Objective :  Temp:  [97.4 °F (36.3 °C)-98 °F (36.7 °C)] 98 °F (36.7 °C)  HR:  [77-96] 96  BP: (138-141)/(62-72) 139/62  Resp:  [16-19] 16  SpO2:  [87 %-93 %] 90 %  O2 Device: Nasal cannula  Nasal Cannula O2 Flow Rate (L/min):  [3 L/min-5 L/min] 5 L/min    Physical Exam  Vitals reviewed.   Constitutional:       Appearance: Normal appearance. She is well-developed.   HENT:      Head: Normocephalic and atraumatic.      Nose: Nose normal.      Mouth/Throat:      Mouth: Mucous membranes are moist.   Eyes:      Extraocular Movements: Extraocular movements intact.   Cardiovascular:      Rate and Rhythm: Normal rate and regular rhythm.      Heart sounds: Normal heart sounds.   Pulmonary:      Effort: Pulmonary effort is normal. No respiratory distress.      Breath sounds: No wheezing, rhonchi or rales.   Musculoskeletal:         General: No swelling.   Skin:     General: Skin is warm and dry.   Neurological:      Mental Status: She is alert. Mental status is at baseline.   Psychiatric:         Mood and Affect: Mood normal.         Behavior: Behavior normal.         Lab Results: I have reviewed the following results:   .     11/05/24  1219   WBC 22.05*   HGB 10.0*   HCT 30.6*      SODIUM 138   K 3.8      CO2 23   BUN 53*   CREATININE 1.40*   GLUC 243*     ABG: No new results in last 24 hours.    Imaging Results Review: I personally reviewed the following image studies in PACS and associated radiology reports: chest xray. My interpretation of the radiology images/reports is: CXR interpretation reveals persistent right lower lobe infiltrate and bilateral meniscus line/blunting of the costophrenic angles..  Other Study Results Review: No additional pertinent studies reviewed.  PFT Results Reviewed: reviewed

## 2024-11-05 NOTE — ASSESSMENT & PLAN NOTE
Present on admission and secondary to multilobar pneumonia  Patient completed a 7-day course of cephalosporins first with cefepime then de-escalated to ceftriaxone to finish out course yesterday.  Vancomycin discontinued after MRSA culture negative.  Blood cultures x 2 with no growth to date  Further treatment per primary team

## 2024-11-05 NOTE — ASSESSMENT & PLAN NOTE
Pulmonary was asked to reevaluate patient today due to worsening hypoxia from yesterday to today.  Now requiring 5 L nasal cannula.  Baseline ox requirement is 2 L nasal cannula with activities.  Repeated chest x-ray shows persistent multifocal infiltrates not acutely worse however there is new blunting of the costophrenic angles on the left side.  That coupled with her increased lower extremity edema leads high clinical suspicion for volume overload.  Discussed with primary team and recommended cardiology reevaluate for need of diuretics.

## 2024-11-05 NOTE — CASE MANAGEMENT
Case Management Discharge Planning Note    Patient name Corinne A Longo  Location /MS - MRN [id number]  : 1950 Date 2024       Current Admission Date: 10/28/2024  Current Admission Diagnosis:Sepsis due to pneumonia (HCC)   Patient Active Problem List    Diagnosis Date Noted Date Diagnosed    Sepsis due to pneumonia (HCC) 10/28/2024     Gastroesophageal reflux disease without esophagitis 2023     Venous insufficiency 2023     Tinea cruris 2023     Lipoma of upper extremity 2023     Cervical strain 2023     Stage 3 chronic kidney disease, unspecified whether stage 3a or 3b CKD ([facility]) 2022     Myxoid cyst 2021     Chronic respiratory failure with hypoxia ([facility]) 2021     COPD, severe ([facility]) 2020     Family history of autoimmune disorder 2020     Incidental pulmonary nodule, > 3mm and < 8mm 04/10/2019     Need for vaccination with 13-polyvalent pneumococcal conjugate vaccine 2018     Constipation 2018     Paroxysmal atrial fibrillation ([facility]) 2018     Vitamin D deficiency 10/04/2017     Allergic rhinitis 2017     Depression, recurrent ([facility]) 2016     Cigarette nicotine dependence in remission 11/10/2015     GABI (obstructive sleep apnea) 2015     Emphysema/COPD ([facility]) 2015     Hyperlipidemia 2015     Anxiety 06/10/2015       LOS (days): 8  Geometric Mean LOS (GMLOS) (days): 4.9  Days to GMLOS:-2.8     OBJECTIVE:  Risk of Unplanned Readmission Score: 16.25         Current admission status: Inpatient   Preferred Pharmacy:   [facility] - [place] - [address]  [address]  [place] [address]  Phone: [phone] Fax: [phone]    [id number] - [place] - [address]  [address]  [place] [address]  Phone: [phone] Fax: [phone]    Primary Care Provider: Rayna Hernandez PA-C    Primary Insurance:  RADHA  REP  Secondary Insurance:     DISCHARGE DETAILS:  Requested Home Health Care         Home Health Agency Name:: [facility]  Home Health Services Needed:: Oxygen Via Nasal Cannula, Evaluate Functional Status and Safety, COPD Management, Strengthening/Theraputic Exercises to Improve Function    Pt chose [facility] from the provider list for HHC.  Reserved in [facility].  Anticipate DC today.  Spouse will transport home, has her portable O2 here.

## 2024-11-06 ENCOUNTER — TELEPHONE (OUTPATIENT)
Dept: INTERNAL MEDICINE CLINIC | Facility: CLINIC | Age: 74
End: 2024-11-06

## 2024-11-06 ENCOUNTER — TRANSITIONAL CARE MANAGEMENT (OUTPATIENT)
Dept: INTERNAL MEDICINE CLINIC | Facility: CLINIC | Age: 74
End: 2024-11-06

## 2024-11-06 VITALS
RESPIRATION RATE: 20 BRPM | SYSTOLIC BLOOD PRESSURE: 118 MMHG | BODY MASS INDEX: 25.26 KG/M2 | DIASTOLIC BLOOD PRESSURE: 44 MMHG | TEMPERATURE: 97.6 F | HEART RATE: 83 BPM | WEIGHT: 166.67 LBS | HEIGHT: 68 IN | OXYGEN SATURATION: 85 %

## 2024-11-06 PROBLEM — R41.82 ALTERED MENTAL STATUS: Status: ACTIVE | Noted: 2024-11-06

## 2024-11-06 LAB
ANION GAP SERPL CALCULATED.3IONS-SCNC: 6 MMOL/L (ref 4–13)
BUN SERPL-MCNC: 56 MG/DL (ref 5–25)
CALCIUM SERPL-MCNC: 8.5 MG/DL (ref 8.4–10.2)
CHLORIDE SERPL-SCNC: 108 MMOL/L (ref 96–108)
CO2 SERPL-SCNC: 26 MMOL/L (ref 21–32)
CREAT SERPL-MCNC: 1.52 MG/DL (ref 0.6–1.3)
ERYTHROCYTE [DISTWIDTH] IN BLOOD BY AUTOMATED COUNT: 14.3 % (ref 11.6–15.1)
GFR SERPL CREATININE-BSD FRML MDRD: 34 ML/MIN/1.73SQ M
GLUCOSE SERPL-MCNC: 112 MG/DL (ref 65–140)
HCT VFR BLD AUTO: 29.4 % (ref 34.8–46.1)
HGB BLD-MCNC: 9.4 G/DL (ref 11.5–15.4)
MCH RBC QN AUTO: 30 PG (ref 26.8–34.3)
MCHC RBC AUTO-ENTMCNC: 32 G/DL (ref 31.4–37.4)
MCV RBC AUTO: 94 FL (ref 82–98)
PLATELET # BLD AUTO: 302 THOUSANDS/UL (ref 149–390)
PMV BLD AUTO: 10.1 FL (ref 8.9–12.7)
POTASSIUM SERPL-SCNC: 3.8 MMOL/L (ref 3.5–5.3)
RBC # BLD AUTO: 3.13 MILLION/UL (ref 3.81–5.12)
SODIUM SERPL-SCNC: 140 MMOL/L (ref 135–147)
WBC # BLD AUTO: 17.36 THOUSAND/UL (ref 4.31–10.16)

## 2024-11-06 PROCEDURE — 99239 HOSP IP/OBS DSCHRG MGMT >30: CPT | Performed by: INTERNAL MEDICINE

## 2024-11-06 PROCEDURE — 94760 N-INVAS EAR/PLS OXIMETRY 1: CPT

## 2024-11-06 PROCEDURE — 94668 MNPJ CHEST WALL SBSQ: CPT

## 2024-11-06 PROCEDURE — 85027 COMPLETE CBC AUTOMATED: CPT | Performed by: INTERNAL MEDICINE

## 2024-11-06 PROCEDURE — 80048 BASIC METABOLIC PNL TOTAL CA: CPT | Performed by: INTERNAL MEDICINE

## 2024-11-06 PROCEDURE — 94640 AIRWAY INHALATION TREATMENT: CPT

## 2024-11-06 PROCEDURE — 94664 DEMO&/EVAL PT USE INHALER: CPT

## 2024-11-06 RX ORDER — PREDNISONE 20 MG/1
TABLET ORAL
Qty: 12 TABLET | Refills: 0 | Status: SHIPPED | OUTPATIENT
Start: 2024-11-07 | End: 2024-11-15

## 2024-11-06 RX ORDER — METOPROLOL TARTRATE 50 MG
50 TABLET ORAL EVERY 12 HOURS SCHEDULED
Qty: 60 TABLET | Refills: 0 | Status: SHIPPED | OUTPATIENT
Start: 2024-11-06

## 2024-11-06 RX ORDER — DILTIAZEM HYDROCHLORIDE 360 MG/1
360 CAPSULE, EXTENDED RELEASE ORAL DAILY
Qty: 30 CAPSULE | Refills: 0 | Status: SHIPPED | OUTPATIENT
Start: 2024-11-07

## 2024-11-06 RX ADMIN — APIXABAN 5 MG: 5 TABLET, FILM COATED ORAL at 09:28

## 2024-11-06 RX ADMIN — Medication 5000 UNITS: at 09:28

## 2024-11-06 RX ADMIN — BUDESONIDE INHALATION 0.5 MG: 0.5 SUSPENSION RESPIRATORY (INHALATION) at 07:11

## 2024-11-06 RX ADMIN — IPRATROPIUM BROMIDE 0.5 MG: 0.5 SOLUTION RESPIRATORY (INHALATION) at 07:11

## 2024-11-06 RX ADMIN — DILTIAZEM HYDROCHLORIDE 360 MG: 180 CAPSULE, COATED, EXTENDED RELEASE ORAL at 09:28

## 2024-11-06 RX ADMIN — MICONAZOLE NITRATE: 20 CREAM TOPICAL at 09:30

## 2024-11-06 RX ADMIN — LEVALBUTEROL HYDROCHLORIDE 1.25 MG: 1.25 SOLUTION RESPIRATORY (INHALATION) at 07:11

## 2024-11-06 RX ADMIN — PREDNISONE 40 MG: 20 TABLET ORAL at 09:28

## 2024-11-06 RX ADMIN — METOPROLOL TARTRATE 50 MG: 50 TABLET, FILM COATED ORAL at 09:28

## 2024-11-06 RX ADMIN — ATORVASTATIN CALCIUM 40 MG: 40 TABLET, FILM COATED ORAL at 09:28

## 2024-11-06 RX ADMIN — GUAIFENESIN 1200 MG: 600 TABLET ORAL at 09:28

## 2024-11-06 NOTE — NURSING NOTE
Pt DC to home via daughter in stable condition. All belongings packed by family. IV catheter removed fully intact. AVS reviewed with pt and daughter face to face, education provided about new medications, and all questions answered.

## 2024-11-06 NOTE — CASE MANAGEMENT
Case Management Discharge Planning Note    Patient name Corinne A Longo  Location /MS - MRN [id number]  : 1950 Date 2024       Current Admission Date: 10/28/2024  Current Admission Diagnosis:Sepsis due to pneumonia (HCC)   Patient Active Problem List    Diagnosis Date Noted Date Diagnosed    Altered mental status 2024     Sepsis due to pneumonia (HCC) 10/28/2024     Gastroesophageal reflux disease without esophagitis 2023     Venous insufficiency 2023     Tinea cruris 2023     Lipoma of upper extremity 2023     Cervical strain 2023     Stage 3 chronic kidney disease, unspecified whether stage 3a or 3b CKD (HCC) 2022     Myxoid cyst 2021     Acute on chronic respiratory failure with hypoxia (HCC) 2021     COPD, severe (HCC) 2020     Family history of autoimmune disorder 2020     Incidental pulmonary nodule, > 3mm and < 8mm 04/10/2019     Need for vaccination with 13-polyvalent pneumococcal conjugate vaccine 2018     Constipation 2018     Paroxysmal atrial fibrillation (HCC) 2018     Vitamin D deficiency 10/04/2017     Allergic rhinitis 2017     Depression, recurrent (HCC) 2016     Cigarette nicotine dependence in remission 11/10/2015     GABI (obstructive sleep apnea) 2015     COPD with acute exacerbation (HCC) 2015     Hyperlipidemia 2015     Anxiety 06/10/2015       LOS (days): 9  Geometric Mean LOS (GMLOS) (days): 4.9  Days to GMLOS:-4     OBJECTIVE:  Risk of Unplanned Readmission Score: 17.78         Current admission status: Inpatient   Preferred Pharmacy:   [facility] - [place] - [address]  [address]  [place] [address]  Phone: [phone] Fax: [phone]    [id number] - [place] - [address]  [address]  [place] [address]  Phone: [phone] Fax: [phone]    Primary  Care Provider: Rayna Hernandez PA-C    Primary Insurance: ADOR REP  Secondary Insurance:     DISCHARGE DETAILS:  Pt has been cleared by [facility] for DC.  Provided a walker for home.  Family at the bedside to transport.  Pt has her portable oxygen.  Will start services with [facility].                                     DME Referral Provided  DME referral completed for the following items:: Walker  DME Supplier Name:: [employer]

## 2024-11-06 NOTE — PLAN OF CARE
Problem: PAIN - ADULT  Goal: Verbalizes/displays adequate comfort level or baseline comfort level  Description: Interventions:  - Encourage patient to monitor pain and request assistance  - Assess pain using appropriate pain scale  - Administer analgesics based on type and severity of pain and evaluate response  - Implement non-pharmacological measures as appropriate and evaluate response  - Consider cultural and social influences on pain and pain management  - Notify physician/advanced practitioner if interventions unsuccessful or patient reports new pain  Outcome: Progressing     Problem: INFECTION - ADULT  Goal: Absence or prevention of progression during hospitalization  Description: INTERVENTIONS:  - Assess and monitor for signs and symptoms of infection  - Monitor lab/diagnostic results  - Monitor all insertion sites, i.e. indwelling lines, tubes, and drains  - Monitor endotracheal if appropriate and nasal secretions for changes in amount and color  - Wesley appropriate cooling/warming therapies per order  - Administer medications as ordered  - Instruct and encourage patient and family to use good hand hygiene technique  - Identify and instruct in appropriate isolation precautions for identified infection/condition  Outcome: Progressing     Problem: CARDIOVASCULAR - ADULT  Goal: Maintains optimal cardiac output and hemodynamic stability  Description: INTERVENTIONS:  - Monitor I/O, vital signs and rhythm  - Monitor for S/S and trends of decreased cardiac output  - Administer and titrate ordered vasoactive medications to optimize hemodynamic stability  - Assess quality of pulses, skin color and temperature  - Assess for signs of decreased coronary artery perfusion  - Instruct patient to report change in severity of symptoms  Outcome: Progressing     Problem: RESPIRATORY - ADULT  Goal: Achieves optimal ventilation and oxygenation  Description: INTERVENTIONS:  - Assess for changes in respiratory status  -  Assess for changes in mentation and behavior  - Position to facilitate oxygenation and minimize respiratory effort  - Oxygen administered by appropriate delivery if ordered  - Initiate smoking cessation education as indicated  - Encourage broncho-pulmonary hygiene including cough, deep breathe, Incentive Spirometry  - Assess the need for suctioning and aspirate as needed  - Assess and instruct to report SOB or any respiratory difficulty  - Respiratory Therapy support as indicated  Outcome: Progressing

## 2024-11-06 NOTE — RESPIRATORY THERAPY NOTE
RT Protocol Note  Corinne A Longo 74 y.o. female MRN: [id number]  Unit/Bed#: - Encounter: [id number]    Assessment    Principal Problem:    Sepsis due to pneumonia (HCC)  Active Problems:    COPD with acute exacerbation (HCC)    Paroxysmal atrial fibrillation (HCC)    Acute on chronic respiratory failure with hypoxia (HCC)      Home Pulmonary Medications:    Home Devices/Therapy: Home O2    Past Medical History:   Diagnosis Date    Allergic     Allergic rhinitis     2015  RESOLVED    Asthma     Atrial fibrillation (HCC)     Bronchitis     Bunion     COPD (chronic obstructive pulmonary disease) (HCC)     home O2 at night at home 2L    Coronary artery disease     AFIB    Depression     Emphysema lung (HCC)     Fibroadenoma of breast     Gastroesophageal reflux disease without esophagitis 2023    Hyperlipidemia     Hypertension     Lung disease     COPD    Memory difficulties 2019    Post-menopausal     Post-menopausal     Sepsis due to pneumonia ([facility]) 10/28/2024    Spondylisthesis     Stage 3 chronic kidney disease, unspecified whether stage 3a or 3b CKD ([facility]) 2022    Vitamin D deficiency      Social History     Socioeconomic History    Marital status: Unknown     Spouse name: None    Number of children: None    Years of education: None    Highest education level: None   Occupational History    None   Tobacco Use    Smoking status: Every Day     Current packs/day: 0.00     Average packs/day: 0.3 packs/day for 53.5 years (13.4 ttl pk-yrs)     Types: Cigarettes     Start date: 1970     Last attempt to quit: 2023     Years since quittin.3    Smokeless tobacco: Never   Vaping Use    Vaping status: Never Used   Substance and Sexual Activity    Alcohol use: Yes     Comment: I might have a glass of wine every two or three months    Drug use: No    Sexual activity: Not Currently     Partners: Male     Birth control/protection: Abstinence     Comment: Had tubal ligation in the     Other Topics Concern    None   Social History Narrative    -    Retired    Lives with significant other     Social Determinants of Health     Financial Resource Strain: Low Risk  (2023)    Overall Financial Resource Strain (CARDIA)     Difficulty of Paying Living Expenses: Not very hard   Food Insecurity: No Food Insecurity (10/28/2024)    Nursing - Inadequate Food Risk Classification     Worried About Running Out of Food in the Last Year: Not on file     Ran Out of Food in the Last Year: Not on file     Ran Out of Food in the Last Year: 1   Transportation Needs: No Transportation Needs (10/28/2024)    Nursing - Transportation Risk Classification     Lack of Transportation: Not on file     Lack of Transportation: 2   Physical Activity: Inactive (2019)    Exercise Vital Sign     Days of Exercise per Week: 0 days     Minutes of Exercise per Session: 0 min   Stress: No Stress Concern Present (2019)    [place] [employer] - Occupational Stress Questionnaire     Feeling of Stress : Only a little   Social Connections: Socially Integrated (2023)    Received from [facility]     How often do you feel lonely or isolated from those around you?: Rarely   Intimate Partner Violence: Unknown (10/28/2024)    Nursing IPS     Feels Physically and Emotionally Safe: Not on file     Physically Hurt by Someone: Not on file     Humiliated or Emotionally Abused by Someone: Not on file     Physically Hurt by Someone: 2     Hurt or Threatened by Someone: 2   Housing Stability: Unknown (10/28/2024)    Nursing: Inadequate Housing Risk Classification     Has Housing: Not on file     Worried About Losing Housing: Not on file     Unable to Get Utilities: Not on file     Unable to Pay for Housing in the Last Year: 2     Has Housin       Subjective         Objective    Physical Exam:   Assessment Type: During-treatment  General Appearance: Alert,  "Awake  Respiratory Pattern: Normal  Chest Assessment: Chest expansion symmetrical  Bilateral Breath Sounds: Diminished  Cough: Non-productive  O2 Device: 2L NC    Vitals:  Blood pressure (!) 118/44, pulse 83, temperature 97.6 °F (36.4 °C), resp. rate 20, height 5' 8\" (1.727 m), weight 75.6 kg (166 lb 10.7 oz), SpO2 (!) 85%, not currently breastfeeding.          Imaging and other studies:     O2 Device: 2L NC     Plan    Respiratory Plan: Mild Distress pathway        Resp Comments: Pt on 2L NC stated she feels her breathing is good today, no sob or dyspnea   "

## 2024-11-06 NOTE — ASSESSMENT & PLAN NOTE
Possible acute exacerbation secondary to pneumonia  Utilizes chronic O2  Atrovent and Xopenex  Respiratory protocol  Pulmonology consultation appreciated  Patient has been transitioned to prednisone taper on discharge  Continue home [facility]   Ambulatory referral to pulmonary rehab and Pulmonology

## 2024-11-06 NOTE — ASSESSMENT & PLAN NOTE
Per daughter at bedside, patient has been confused since prior to coming into the hospital  MRI brain negative for acute stroke  Patient completed antibiotics for infection  Advised patient to follow-up with PCP as outpatient and if mental status is not improving can consider outpatient follow-up with neurology

## 2024-11-06 NOTE — ASSESSMENT & PLAN NOTE
Utilizes 2 L of nasal cannula supplemental O2 at baseline  Oxygen requirements have been fluctuating  Respiratory protocol  Wean O2 as tolerated  Repeat chest x-ray with emphysema noted and stable airspace opacity.  Patient completed course of antibiotics  Received IV Lasix with improvement in respiratory status  Follow-up with pulmonology as outpatient

## 2024-11-06 NOTE — DISCHARGE SUMMARY
Discharge Summary - Hospitalist   Name: Corinne A Longo 74 y.o. female I MRN: [id number]  Unit/Bed#: -01 I Date of Admission: 10/28/2024   Date of Service: 11/6/2024 I Hospital Day: 9     Assessment & Plan  Sepsis due to pneumonia (HCC)  Present on admission as evidenced by hyperthermia, tachycardia, tachypnea  Likely secondary to multilobar pneumonia  Chest x-ray with evidence of consolidations  Discontinue vancomycin  Patient completed 7 days of IV antibiotics  Blood cultures within normal limits  Pulmonology following  Procalcitonin within normal limits  Paroxysmal atrial fibrillation (HCC)  Patient went into atrial fibrillation with rapid ventricular response on 10/28/2024  Rapid response was called and patient was transitioned to level 2 stepdown  Initiated on diltiazem infusion which has since been discontinued  Heart rates have significantly improved  Diltiazem 360 mg PO daily  Daily metoprolol 50 mg twice daily  Not on anticoagulation in the outpatient setting  Home aspirin discontinued  Initiated on Eliquis by cardiology  2D echocardiogram with ejection fraction 65%  COPD with acute exacerbation ([facility])  Possible acute exacerbation secondary to pneumonia  Utilizes chronic O2  Atrovent and Xopenex  Respiratory protocol  Pulmonology consultation appreciated  Patient has been transitioned to prednisone taper on discharge  Continue home [facility]   Ambulatory referral to pulmonary rehab and Pulmonology  Acute on chronic respiratory failure with hypoxia ([facility])  Utilizes 2 L of nasal cannula supplemental O2 at baseline  Oxygen requirements have been fluctuating  Respiratory protocol  Wean O2 as tolerated  Repeat chest x-ray with emphysema noted and stable airspace opacity.  Patient completed course of antibiotics  Received IV Lasix with improvement in respiratory status  Follow-up with pulmonology as outpatient  Stage 3 chronic kidney disease, unspecified whether stage 3a or 3b CKD (HCC)  Lab Results    Component Value Date    EGFR 34 11/06/2024    EGFR 37 11/05/2024    EGFR 39 11/04/2024    CREATININE 1.52 (H) 11/06/2024    CREATININE 1.40 (H) 11/05/2024    CREATININE 1.33 (H) 11/04/2024   Creatinine slightly elevated above baseline.  Advised to have repeat BMP in 1 week to monitor kidney function  Altered mental status  Per daughter at bedside, patient has been confused since prior to coming into the hospital  MRI brain negative for acute stroke  Patient completed antibiotics for infection  Advised patient to follow-up with PCP as outpatient and if mental status is not improving can consider outpatient follow-up with neurology     Medical Problems       Resolved Problems  Date Reviewed: 11/2/2024   None       Discharging Physician / Practitioner: Cece Ro MD  PCP: Rayna Hernandez PA-C  Admission Date:   Admission Orders (From admission, onward)       Ordered        10/28/24 1519  INPATIENT ADMISSION  Once                          Discharge Date: 11/06/24    Consultations During Hospital Stay:  Pulmonology, cardiology    Procedures Performed:   None    Significant Findings / Test Results:   Pneumonia, COPD exacerbation, A-fib    Incidental Findings:   None    Test Results Pending at Discharge (will require follow up):   none     Outpatient Tests Requested:  Routine labs with PCP as outpatient.  Advised to have BMP in 1 week to monitor kidney function    Complications:  none    Reason for Admission: Pneumonia, COPD exacerbation    Hospital Course:   Corinne A Longo is a 74 y.o. female patient who originally presented to the hospital on 10/28/2024 due to shortness of breath.  Patient was admitted with sepsis secondary to pneumonia.  Started on IV antibiotics.  Also with COPD exacerbation, started on steroids.  Found to be in rapid A-fib and rapid response was called.  Patient received IV Cardizem and heart rate improved.  Cardiology was consulted.  Medications were optimized.  Patient was started  "on Eliquis for anticoagulation.  Pulmonology was also consulted regarding patient's respiratory status.  Oxygen levels gradually improved with antibiotics, steroids, nebulizers.  CT chest was performed which was negative for PE.  Patient was found to have extensive pneumonia for which infectious workup was initiated.  Patient completed 1 week of antibiotics.  Pericardial effusion noted on CT imaging however echo with no effusion noted.  Patient has clinically improved and is currently requiring 2 to 3 L nasal cannula.  Daughter at bedside was concerned due to patient's mental status stating that patient did not appear to be at baseline.  Patient is awake and alert, following commands.  Moving all 4 extremities.  MRI brain was performed, negative for acute stroke.  Altered mental status attributed to patient's prolonged hospital stay as well as intermittent hypoxia.  Patient will be discharged home with ambulatory referral to pulmonology.  Advised to return with any worsening symptoms.  Regarding patient's mental status, advised daughter to follow-up with her PCP and if no improvement can consider neurology referral.    Please see above list of diagnoses and related plan for additional information.     Condition at Discharge: stable    Discharge Day Visit / Exam:   Subjective: Patient states she is feeling much better today  Vitals: Blood Pressure: (!) 118/44 (11/06/24 0928)  Pulse: 83 (11/06/24 0928)  Temperature: 97.6 °F (36.4 °C) (11/06/24 0703)  Temp Source: Oral (11/05/24 1438)  Respirations: 20 (11/06/24 0735)  Height: 5' 8\" (172.7 cm) (10/30/24 0740)  Weight - Scale: 75.6 kg (166 lb 10.7 oz) (11/02/24 0500)  SpO2: 91% (11/06/24 1030)    Physical Exam  Constitutional:       General: She is not in acute distress.     Comments: Chronically ill-appearing   HENT:      Head: Normocephalic and atraumatic.      Nose: Nose normal.      Mouth/Throat:      Mouth: Mucous membranes are moist.   Eyes:      Extraocular " Movements: Extraocular movements intact.      Conjunctiva/sclera: Conjunctivae normal.   Cardiovascular:      Rate and Rhythm: Normal rate and regular rhythm.   Pulmonary:      Effort: Pulmonary effort is normal. No respiratory distress.   Abdominal:      Palpations: Abdomen is soft.      Tenderness: There is no abdominal tenderness.   Musculoskeletal:         General: Normal range of motion.      Cervical back: Normal range of motion and neck supple.      Comments: Generalized weakness   Skin:     General: Skin is warm and dry.   Neurological:      General: No focal deficit present.      Mental Status: She is alert. Mental status is at baseline.      Cranial Nerves: No cranial nerve deficit.   Psychiatric:         Mood and Affect: Mood normal.         Behavior: Behavior normal.          Discussion with Family: Updated  (daughter) at bedside.    Discharge instructions/Information to patient and family:   See after visit summary for information provided to patient and family.      Provisions for Follow-Up Care:  See after visit summary for information related to follow-up care and any pertinent home health orders.      Mobility at time of Discharge:   Basic Mobility Inpatient Raw Score: 19  JH-HLM Goal: 6: Walk 10 steps or more  JH-HLM Achieved: 6: Walk 10 steps or more  HLM Goal achieved. Continue to encourage appropriate mobility.     Disposition:   Home with VNA Services (Reminder: Complete face to face encounter)    Planned Readmission: no    Discharge Medications:  See after visit summary for reconciled discharge medications provided to patient and/or family.      Administrative Statements   Discharge Statement:  I have spent a total time of 35 minutes in caring for this patient on the day of the visit/encounter. >30 minutes of time was spent on: Counseling / Coordination of care, Documenting in the medical record, Reviewing / ordering tests, medicine, procedures  , and Communicating with other  healthcare professionals .    **Please Note: This note may have been constructed using a voice recognition system**

## 2024-11-06 NOTE — ASSESSMENT & PLAN NOTE
Lab Results   Component Value Date    EGFR 34 11/06/2024    EGFR 37 11/05/2024    EGFR 39 11/04/2024    CREATININE 1.52 (H) 11/06/2024    CREATININE 1.40 (H) 11/05/2024    CREATININE 1.33 (H) 11/04/2024   Creatinine slightly elevated above baseline.  Advised to have repeat BMP in 1 week to monitor kidney function

## 2024-11-06 NOTE — ASSESSMENT & PLAN NOTE
Patient went into atrial fibrillation with rapid ventricular response on 10/28/2024  Rapid response was called and patient was transitioned to level 2 stepdown  Initiated on diltiazem infusion which has since been discontinued  Heart rates have significantly improved  Diltiazem 360 mg PO daily  Daily metoprolol 50 mg twice daily  Not on anticoagulation in the outpatient setting  Home aspirin discontinued  Initiated on Eliquis by cardiology  2D echocardiogram with ejection fraction 65%

## 2024-11-06 NOTE — PLAN OF CARE
Problem: Potential for Falls  Goal: Patient will remain free of falls  Description: INTERVENTIONS:  - Educate patient/family on patient safety including physical limitations  - Instruct patient to call for assistance with activity   - Consult OT/PT to assist with strengthening/mobility   - Keep Call bell within reach  - Keep bed low and locked with side rails adjusted as appropriate  - Keep care items and personal belongings within reach  - Initiate and maintain comfort rounds  - Make Fall Risk Sign visible to staff  - Offer Toileting every 2 Hours, in advance of need  - Initiate/Maintain bed alarm  - Obtain necessary fall risk management equipment: non skid socks  - Apply yellow socks and bracelet for high fall risk patients  - Consider moving patient to room near nurses station  11/6/2024 1041 by Fernanda Mckeon RN  Outcome: Adequate for Discharge  11/6/2024 0754 by Fernanda Mckeon RN  Outcome: Progressing     Problem: PAIN - ADULT  Goal: Verbalizes/displays adequate comfort level or baseline comfort level  Description: Interventions:  - Encourage patient to monitor pain and request assistance  - Assess pain using appropriate pain scale  - Administer analgesics based on type and severity of pain and evaluate response  - Implement non-pharmacological measures as appropriate and evaluate response  - Consider cultural and social influences on pain and pain management  - Notify physician/advanced practitioner if interventions unsuccessful or patient reports new pain  11/6/2024 1041 by Fernanda Mckeon RN  Outcome: Adequate for Discharge  11/6/2024 0754 by Fernanda Mckeon RN  Outcome: Progressing     Problem: INFECTION - ADULT  Goal: Absence or prevention of progression during hospitalization  Description: INTERVENTIONS:  - Assess and monitor for signs and symptoms of infection  - Monitor lab/diagnostic results  - Monitor all insertion sites, i.e. indwelling lines, tubes, and drains  - Monitor endotracheal if appropriate and  nasal secretions for changes in amount and color  - [place] appropriate cooling/warming therapies per order  - Administer medications as ordered  - Instruct and encourage patient and family to use good hand hygiene technique  - Identify and instruct in appropriate isolation precautions for identified infection/condition  11/6/2024 1041 by Fernanda Mckeon RN  Outcome: Adequate for Discharge  11/6/2024 0754 by Fernanda Mckeon RN  Outcome: Progressing  Goal: Absence of fever/infection during neutropenic period  Description: INTERVENTIONS:  - Monitor WBC    11/6/2024 1041 by Fernanda Mckeon RN  Outcome: Adequate for Discharge  11/6/2024 0754 by Fernanda Mckeon RN  Outcome: Progressing     Problem: SAFETY ADULT  Goal: Patient will remain free of falls  Description: INTERVENTIONS:  - Educate patient/family on patient safety including physical limitations  - Instruct patient to call for assistance with activity   - Consult OT/PT to assist with strengthening/mobility   - Keep Call bell within reach  - Keep bed low and locked with side rails adjusted as appropriate  - Keep care items and personal belongings within reach  - Initiate and maintain comfort rounds  - Make Fall Risk Sign visible to staff  - Offer Toileting every 2 Hours, in advance of need  - Initiate/Maintain bed alarm  - Obtain necessary fall risk management equipment: non skid socks  - Apply yellow socks and bracelet for high fall risk patients  - Consider moving patient to room near nurses station  11/6/2024 1041 by Fernanda Mckeon RN  Outcome: Adequate for Discharge  11/6/2024 0754 by Fernanda Mckeon RN  Outcome: Progressing  Goal: Maintain or return to baseline ADL function  Description: INTERVENTIONS:  -  Assess patient's ability to carry out ADLs; assess patient's baseline for ADL function and identify physical deficits which impact ability to perform ADLs (bathing, care of mouth/teeth, toileting, grooming, dressing, etc.)  - Assess/evaluate cause of self-care deficits    - Assess range of motion  - Assess patient's mobility; develop plan if impaired  - Assess patient's need for assistive devices and provide as appropriate  - Encourage maximum independence but intervene and supervise when necessary  - Involve family in performance of ADLs  - Assess for home care needs following discharge   - Consider OT consult to assist with ADL evaluation and planning for discharge  - Provide patient education as appropriate  11/6/2024 1041 by Fernanda Mckeon RN  Outcome: Adequate for Discharge  11/6/2024 0754 by Fernanda Mckeon RN  Outcome: Progressing  Goal: Maintains/Returns to pre admission functional level  Description: INTERVENTIONS:  - Perform AM-PAC 6 Click Basic Mobility/ Daily Activity assessment daily.  - Set and communicate daily mobility goal to care team and patient/family/caregiver.   - Collaborate with rehabilitation services on mobility goals if consulted  - Perform Range of Motion 2 times a day.  - Reposition patient every 2 hours.  - Dangle patient 2 times a day  - Stand patient 2 times a day  - Ambulate patient 2 times a day  - Out of bed to chair 3 times a day   - Out of bed for meals 3 times a day  - Out of bed for toileting  - Record patient progress and toleration of activity level   11/6/2024 1041 by Fernanda Mckeon RN  Outcome: Adequate for Discharge  11/6/2024 0754 by Fernanda Mckeon RN  Outcome: Progressing     Problem: DISCHARGE PLANNING  Goal: Discharge to home or other facility with appropriate resources  Description: INTERVENTIONS:  - Identify barriers to discharge w/patient and caregiver  - Identify discharge learning needs (meds, wound care, etc.)  - Arrange for interpretive services to assist at discharge as needed  - Refer to Case Management Department for coordinating discharge planning if the patient needs post-hospital services based on physician/advanced practitioner order or complex needs related to functional status, cognitive ability, or social support  system  11/6/2024 1041 by Fernanda Mckeon RN  Outcome: Adequate for Discharge  11/6/2024 0754 by Fernanda Mckeon RN  Outcome: Progressing     Problem: Knowledge Deficit  Goal: Patient/family/caregiver demonstrates understanding of disease process, treatment plan, medications, and discharge instructions  Description: Complete learning assessment and assess knowledge base.  Interventions:  - Provide teaching at level of understanding  - Provide teaching via preferred learning methods  11/6/2024 1041 by Fernanda Mckeon RN  Outcome: Adequate for Discharge  11/6/2024 0754 by Fernanda Mckeon RN  Outcome: Progressing     Problem: CARDIOVASCULAR - ADULT  Goal: Maintains optimal cardiac output and hemodynamic stability  Description: INTERVENTIONS:  - Monitor I/O, vital signs and rhythm  - Monitor for S/S and trends of decreased cardiac output  - Administer and titrate ordered vasoactive medications to optimize hemodynamic stability  - Assess quality of pulses, skin color and temperature  - Assess for signs of decreased coronary artery perfusion  - Instruct patient to report change in severity of symptoms  11/6/2024 1041 by Fernanda Mckeon RN  Outcome: Adequate for Discharge  11/6/2024 0754 by Fernanda Mckeon RN  Outcome: Progressing  Goal: Absence of cardiac dysrhythmias or at baseline rhythm  Description: INTERVENTIONS:  - Continuous cardiac monitoring, vital signs, obtain 12 lead EKG if ordered  - Administer antiarrhythmic and heart rate control medications as ordered  - Monitor electrolytes and administer replacement therapy as ordered  11/6/2024 1041 by Fernanda Mckeon RN  Outcome: Adequate for Discharge  11/6/2024 0754 by Fernanda Mckeon RN  Outcome: Progressing     Problem: NEUROSENSORY - ADULT  Goal: Achieves stable or improved neurological status  Description: INTERVENTIONS  - Monitor and report changes in neurological status  - Monitor vital signs such as temperature, blood pressure, glucose, and any other labs ordered   - Initiate  measures to prevent increased intracranial pressure  - Monitor for seizure activity and implement precautions if appropriate      11/6/2024 1041 by Fernanda Mckeon RN  Outcome: Adequate for Discharge  11/6/2024 0754 by Fernanda Mckeon RN  Outcome: Progressing  Goal: Remains free of injury related to seizures activity  Description: INTERVENTIONS  - Maintain airway, patient safety  and administer oxygen as ordered  - Monitor patient for seizure activity, document and report duration and description of seizure to physician/advanced practitioner  - If seizure occurs,  ensure patient safety during seizure  - Reorient patient post seizure  - Seizure pads on all 4 side rails  - Instruct patient/family to notify RN of any seizure activity including if an aura is experienced  - Instruct patient/family to call for assistance with activity based on nursing assessment  - Administer anti-seizure medications if ordered    11/6/2024 1041 by Fernanda Mckeon RN  Outcome: Adequate for Discharge  11/6/2024 0754 by Fernanda Mckeon RN  Outcome: Progressing  Goal: Achieves maximal functionality and self care  Description: INTERVENTIONS  - Monitor swallowing and airway patency with patient fatigue and changes in neurological status  - Encourage and assist patient to increase activity and self care.   - Encourage visually impaired, hearing impaired and aphasic patients to use assistive/communication devices  11/6/2024 1041 by Fernanda Mckeon RN  Outcome: Adequate for Discharge  11/6/2024 0754 by Fernanda Mckeon RN  Outcome: Progressing     Problem: RESPIRATORY - ADULT  Goal: Achieves optimal ventilation and oxygenation  Description: INTERVENTIONS:  - Assess for changes in respiratory status  - Assess for changes in mentation and behavior  - Position to facilitate oxygenation and minimize respiratory effort  - Oxygen administered by appropriate delivery if ordered  - Initiate smoking cessation education as indicated  - Encourage broncho-pulmonary hygiene  including cough, deep breathe, Incentive Spirometry  - Assess the need for suctioning and aspirate as needed  - Assess and instruct to report SOB or any respiratory difficulty  - Respiratory Therapy support as indicated  11/6/2024 1041 by Fernanda Mckeon RN  Outcome: Adequate for Discharge  11/6/2024 0754 by Fernanda Mckeon RN  Outcome: Progressing     Problem: GASTROINTESTINAL - ADULT  Goal: Minimal or absence of nausea and/or vomiting  Description: INTERVENTIONS:  - Administer IV fluids if ordered to ensure adequate hydration  - Maintain NPO status until nausea and vomiting are resolved  - Nasogastric tube if ordered  - Administer ordered antiemetic medications as needed  - Provide nonpharmacologic comfort measures as appropriate  - Advance diet as tolerated, if ordered  - Consider nutrition services referral to assist patient with adequate nutrition and appropriate food choices  11/6/2024 1041 by Fernanda Mckeon RN  Outcome: Adequate for Discharge  11/6/2024 0754 by Fernanda Mckeon RN  Outcome: Progressing  Goal: Maintains or returns to baseline bowel function  Description: INTERVENTIONS:  - Assess bowel function  - Encourage oral fluids to ensure adequate hydration  - Administer IV fluids if ordered to ensure adequate hydration  - Administer ordered medications as needed  - Encourage mobilization and activity  - Consider nutritional services referral to assist patient with adequate nutrition and appropriate food choices  11/6/2024 1041 by Fernanda Mckeon RN  Outcome: Adequate for Discharge  11/6/2024 0754 by Fernanda Mckeon RN  Outcome: Progressing  Goal: Maintains adequate nutritional intake  Description: INTERVENTIONS:  - Monitor percentage of each meal consumed  - Identify factors contributing to decreased intake, treat as appropriate  - Assist with meals as needed  - Monitor I&O, weight, and lab values if indicated  - Obtain nutrition services referral as needed  11/6/2024 1041 by Fernanda Mckeon RN  Outcome: Adequate for  Discharge  11/6/2024 0754 by Fernanda Mckeon RN  Outcome: Progressing  Goal: Establish and maintain optimal ostomy function  Description: INTERVENTIONS:  - Assess bowel function  - Encourage oral fluids to ensure adequate hydration  - Administer IV fluids if ordered to ensure adequate hydration   - Administer ordered medications as needed  - Encourage mobilization and activity  - Nutrition services referral to assist patient with appropriate food choices  - Assess stoma site  - Consider wound care consult   11/6/2024 1041 by Fernanda Mckeon RN  Outcome: Adequate for Discharge  11/6/2024 0754 by Fernanda Mckeon RN  Outcome: Progressing  Goal: Oral mucous membranes remain intact  Description: INTERVENTIONS  - Assess oral mucosa and hygiene practices  - Implement preventative oral hygiene regimen  - Implement oral medicated treatments as ordered  - Initiate Nutrition services referral as needed  11/6/2024 1041 by Fernanda Mckeon RN  Outcome: Adequate for Discharge  11/6/2024 0754 by Fernanda Mckeon RN  Outcome: Progressing     Problem: GENITOURINARY - ADULT  Goal: Maintains or returns to baseline urinary function  Description: INTERVENTIONS:  - Assess urinary function  - Encourage oral fluids to ensure adequate hydration if ordered  - Administer IV fluids as ordered to ensure adequate hydration  - Administer ordered medications as needed  - Offer frequent toileting  - Follow urinary retention protocol if ordered  11/6/2024 1041 by Fernanda Mckeon RN  Outcome: Adequate for Discharge  11/6/2024 0754 by Fernanda Mckeon RN  Outcome: Progressing  Goal: Absence of urinary retention  Description: INTERVENTIONS:  - Assess patient’s ability to void and empty bladder  - Monitor I/O  - Bladder scan as needed  - Discuss with physician/AP medications to alleviate retention as needed  - Discuss catheterization for long term situations as appropriate  11/6/2024 1041 by Fernanda Mckeon RN  Outcome: Adequate for Discharge  11/6/2024 0754 by Fernanda Mckeon  RN  Outcome: Progressing  Goal: Urinary catheter remains patent  Description: INTERVENTIONS:  - Assess patency of urinary catheter  - If patient has a chronic zuñiga, consider changing catheter if non-functioning  - Follow guidelines for intermittent irrigation of non-functioning urinary catheter  11/6/2024 1041 by Fernanda Mckeon RN  Outcome: Adequate for Discharge  11/6/2024 0754 by Fernanda Mckeon RN  Outcome: Progressing     Problem: METABOLIC, FLUID AND ELECTROLYTES - ADULT  Goal: Electrolytes maintained within normal limits  Description: INTERVENTIONS:  - Monitor labs and assess patient for signs and symptoms of electrolyte imbalances  - Administer electrolyte replacement as ordered  - Monitor response to electrolyte replacements, including repeat lab results as appropriate  - Instruct patient on fluid and nutrition as appropriate  11/6/2024 1041 by Fernanda Mckeon RN  Outcome: Adequate for Discharge  11/6/2024 0754 by Fernanda Mckeon RN  Outcome: Progressing  Goal: Fluid balance maintained  Description: INTERVENTIONS:  - Monitor labs   - Monitor I/O and WT  - Instruct patient on fluid and nutrition as appropriate  - Assess for signs & symptoms of volume excess or deficit  11/6/2024 1041 by Fernanda Mckeon RN  Outcome: Adequate for Discharge  11/6/2024 0754 by Fernanda Mckeon RN  Outcome: Progressing  Goal: Glucose maintained within target range  Description: INTERVENTIONS:  - Monitor Blood Glucose as ordered  - Assess for signs and symptoms of hyperglycemia and hypoglycemia  - Administer ordered medications to maintain glucose within target range  - Assess nutritional intake and initiate nutrition service referral as needed  11/6/2024 1041 by Fernanda Mckeon RN  Outcome: Adequate for Discharge  11/6/2024 0754 by Fernanda Mckeon RN  Outcome: Progressing     Problem: SKIN/TISSUE INTEGRITY - ADULT  Goal: Skin Integrity remains intact(Skin Breakdown Prevention)  Description: Assess:  -Perform Silvestre assessment every shift  -Clean and  moisturize skin every shift  -Inspect skin when repositioning, toileting, and assisting with ADLS  -Assess under medical devices such  -Assess extremities for adequate circulation and sensation     Bed Management:  -Have minimal linens on bed & keep smooth, unwrinkled  -Change linens as needed when moist or perspiring  -Avoid sitting or lying in one position for more than 2 hours while in bed  -Keep HOB at 30 degrees     Toileting:  -Offer bedside commode      Activity:  -Mobilize patient 3 times a day  -Encourage activity and walks on unit  -Encourage or provide ROM exercises   -Turn and reposition patient every 2 Hours  -Use appropriate equipment to lift or move patient in bed  -Instruct/ Assist with weight shifting every 60 minutes when out of bed in chair  -Consider limitation of chair time 4 hour intervals    Skin Care:  -Avoid use of baby powder, tape, friction and shearing, hot water or constrictive clothing  -Relieve pressure over bony prominences using Mepelex foam dressing  -Do not massage red bony areas    11/6/2024 1041 by Fernanda Mckeon RN  Outcome: Adequate for Discharge  11/6/2024 0754 by Fernanda Mckeon RN  Outcome: Progressing  Goal: Incision(s), wounds(s) or drain site(s) healing without S/S of infection  Description: INTERVENTIONS  - Assess and document dressing, incision, wound bed, drain sites and surrounding tissue  - Provide patient and family education  - Perform skin care/dressing changes   11/6/2024 1041 by Fernanda Mckeon RN  Outcome: Adequate for Discharge  11/6/2024 0754 by Fernanda Mckeon RN  Outcome: Progressing  Goal: Pressure injury heals and does not worsen  Description: Interventions:  - Implement low air loss mattress or specialty surface (Criteria met)  - Apply silicone foam dressing  - Instruct/assist with weight shifting every 60 minutes when in chair   - Limit chair time to 4 hour intervals  - Use special pressure reducing interventions such as waffle cushion when in chair   - Apply  fecal or urinary incontinence containment device   - Perform passive or active ROM every shift  - Turn and reposition patient & offload bony prominences every 2 hours   - Utilize friction reducing device or surface for transfers   - Consider nutrition services referral as needed  11/6/2024 1041 by Fernanda Mckeon RN  Outcome: Adequate for Discharge  11/6/2024 0754 by Fernanda Mckeon RN  Outcome: Progressing     Problem: HEMATOLOGIC - ADULT  Goal: Maintains hematologic stability  Description: INTERVENTIONS  - Assess for signs and symptoms of bleeding or hemorrhage  - Monitor labs  - Administer supportive blood products/factors as ordered and appropriate  11/6/2024 1041 by Fernanda Mckeon RN  Outcome: Adequate for Discharge  11/6/2024 0754 by Fernanda Mckeon RN  Outcome: Progressing     Problem: MUSCULOSKELETAL - ADULT  Goal: Maintain or return mobility to safest level of function  Description: INTERVENTIONS:  - Assess patient's ability to carry out ADLs; assess patient's baseline for ADL function and identify physical deficits which impact ability to perform ADLs (bathing, care of mouth/teeth, toileting, grooming, dressing, etc.)  - Assess/evaluate cause of self-care deficits   - Assess range of motion  - Assess patient's mobility  - Assess patient's need for assistive devices and provide as appropriate  - Encourage maximum independence but intervene and supervise when necessary  - Involve family in performance of ADLs  - Assess for home care needs following discharge   - Consider OT consult to assist with ADL evaluation and planning for discharge  - Provide patient education as appropriate  11/6/2024 1041 by Fernanda Mckeon RN  Outcome: Adequate for Discharge  11/6/2024 0754 by Fernanda Mckeon RN  Outcome: Progressing  Goal: Maintain proper alignment of affected body part  Description: INTERVENTIONS:  - Support, maintain and protect limb and body alignment  - Provide patient/ family with appropriate education  11/6/2024 1041 by Fernanda  ANGELA Mckeon  Outcome: Adequate for Discharge  11/6/2024 0754 by Fernanda Mckeon RN  Outcome: Progressing     Problem: OCCUPATIONAL THERAPY ADULT  Goal: Performs self-care activities at highest level of function for planned discharge setting.  See evaluation for individualized goals.  Description: Treatment Interventions: ADL retraining, Functional transfer training, UE strengthening/ROM, Endurance training, Patient/family training, Equipment evaluation/education, Energy conservation, Activityengagement          See flowsheet documentation for full assessment, interventions and recommendations.   Outcome: Adequate for Discharge     Problem: PHYSICAL THERAPY ADULT  Goal: Performs mobility at highest level of function for planned discharge setting.  See evaluation for individualized goals.  Description: Treatment/Interventions: Functional transfer training, LE strengthening/ROM, Elevations, Therapeutic exercise, Endurance training, Gait training          See flowsheet documentation for full assessment, interventions and recommendations.  Outcome: Adequate for Discharge     Problem: Prexisting or High Potential for Compromised Skin Integrity  Goal: Skin integrity is maintained or improved  Description: INTERVENTIONS:  - Identify patients at risk for skin breakdown  - Assess and monitor skin integrity  - Assess and monitor nutrition and hydration status  - Monitor labs   - Assess for incontinence   - Turn and reposition patient  - Assist with mobility/ambulation  - Relieve pressure over bony prominences  - Avoid friction and shearing  - Provide appropriate hygiene as needed including keeping skin clean and dry  - Evaluate need for skin moisturizer/barrier cream  - Collaborate with interdisciplinary team   - Patient/family teaching  - Consider wound care consult   11/6/2024 1041 by Fernanda Mckeon RN  Outcome: Adequate for Discharge  11/6/2024 0754 by Fernanda Mckeon RN  Outcome: Progressing     Problem:  Nutrition/Hydration-ADULT  Goal: Nutrient/Hydration intake appropriate for improving, restoring or maintaining nutritional needs  Description: Monitor and assess patient's nutrition/hydration status for malnutrition. Collaborate with interdisciplinary team and initiate plan and interventions as ordered.  Monitor patient's weight and dietary intake as ordered or per policy. Utilize nutrition screening tool and intervene as necessary. Determine patient's food preferences and provide high-protein, high-caloric foods as appropriate.     INTERVENTIONS:  - Monitor oral intake, urinary output, labs, and treatment plans  - Assess nutrition and hydration status and recommend course of action  - Evaluate amount of meals eaten  - Assist patient with eating if necessary   - Allow adequate time for meals  - Recommend/ encourage appropriate diets, oral nutritional supplements, and vitamin/mineral supplements  - Order, calculate, and assess calorie counts as needed  - Recommend, monitor, and adjust tube feedings and TPN/PPN based on assessed needs  - Assess need for intravenous fluids  - Provide specific nutrition/hydration education as appropriate  - Include patient/family/caregiver in decisions related to nutrition  11/6/2024 1041 by Fernanda Mckeon RN  Outcome: Adequate for Discharge  11/6/2024 0754 by Fernanda Mckeon RN  Outcome: Progressing

## 2024-11-07 DIAGNOSIS — M79.89 LOCALIZED SWELLING OF LOWER EXTREMITY: Primary | ICD-10-CM

## 2024-11-07 LAB
DME PARACHUTE DELIVERY DATE ACTUAL: NORMAL
DME PARACHUTE DELIVERY DATE REQUESTED: NORMAL
DME PARACHUTE DELIVERY NOTE: NORMAL
DME PARACHUTE ITEM DESCRIPTION: NORMAL
DME PARACHUTE ORDER STATUS: NORMAL
DME PARACHUTE SUPPLIER NAME: NORMAL
DME PARACHUTE SUPPLIER PHONE: NORMAL

## 2024-11-07 RX ORDER — FUROSEMIDE 20 MG/1
20 TABLET ORAL DAILY
Qty: 30 TABLET | Refills: 0 | Status: SHIPPED | OUTPATIENT
Start: 2024-11-07

## 2024-11-07 NOTE — TELEPHONE ENCOUNTER
Daughter states she just got home yesterday. Both feet and ankles are very swollen, legs are a little swollen. No weeping, redness or pain. Daughter is having a hard time putting on the compression stockings so she is removing the one that she did get on until she hears back to see if she should use them. Patient's has a scale and was weighed just now- 160.6lb per patient that is her normal.

## 2024-11-07 NOTE — TELEPHONE ENCOUNTER
I wasn't aware of the severity per previous msg. Are her legs weeping at all? Is she doing daily weights at home?

## 2024-11-07 NOTE — TELEPHONE ENCOUNTER
I will send in some lasix but less will be best with this as her kidney function does not warrant this being used for an extended time.

## 2024-11-07 NOTE — TELEPHONE ENCOUNTER
Daughter krystyna called regarding pt. States that since leaving the hospital patients feet has been swollen and would like permission to use the compression stockings on her. She also wanted to make you aware that the hospital did want her to have a cognitive test. I did put that in the appt note as well.     Phone number to call boris back [phone]

## 2024-11-07 NOTE — UTILIZATION REVIEW
NOTIFICATION OF ADMISSION DISCHARGE   [facility]. Please be advised that this patient has been discharge from our facility. Below you will find the admission and discharge date and time including the patient’s disposition.   UTILIZATION REVIEW CONTACT:  Adele Oakes  Utilization   Network Utilization Review Department  Phone: [phone] x carefully listen to the prompts. All voicemails are confidential.  Email: [email or web address]     ADMISSION INFORMATION  PRESENTATION DATE: 10/28/2024 11:42 AM  OBERVATION ADMISSION DATE: N/A  INPATIENT ADMISSION DATE: 10/28/24  3:19 PM   DISCHARGE DATE: 11/6/2024  1:41 PM   DISPOSITION:Home with Home Health Care    Network Utilization Review Department  ATTENTION: Please call with any questions or concerns to [phone] and carefully listen to the prompts so that you are directed to the right person. All voicemails are confidential.   For Discharge needs, contact Care Management DC Support Team at [phone] opt. 2  Send all requests for admission clinical reviews, approved or denied determinations and any other requests to dedicated fax number below belonging to the campus where the patient is receiving treatment. List of dedicated fax numbers for the Facilities:  FACILITY NAME UR FAX NUMBER   ADMISSION DENIALS (Administrative/Medical Necessity) [phone]   DISCHARGE SUPPORT TEAM (Health system) [phone]   PARENT CHILD HEALTH (Maternity/NICU/Pediatrics) [phone]   [facility] [phone]   [facility] [phone]   [facility] [phone]   [facility] [phone]   [facility] [phone]   [facility] [phone]   [facility] [phone]   [facility]  [place] [phone]   [facility] [phone]   [facility] [phone]   [facility] [phone]   [facility] [phone]

## 2024-11-07 NOTE — TELEPHONE ENCOUNTER
Spoke with patient's daughter Pilar. She would like to know if the lasix can be picked up tomorrow after the appt as it is and hour and a half drive back and forth from the pharmacy and they were already there earlier.

## 2024-11-07 NOTE — TELEPHONE ENCOUNTER
Daughter would like to know if you are concerned about the edema on her legs since it is really bad. She had her legs elevated last night but it didn't help. While she was in the hospital she was given lasix for one day- the day before she left. They didn't discharge the patient with any lasix due to not being able to monitor her kidney function/creatinine. Patient does have an appt tomorrow morning.

## 2024-11-08 ENCOUNTER — TELEPHONE (OUTPATIENT)
Age: 74
End: 2024-11-08

## 2024-11-08 ENCOUNTER — OFFICE VISIT (OUTPATIENT)
Dept: INTERNAL MEDICINE CLINIC | Facility: CLINIC | Age: 74
End: 2024-11-08
Payer: COMMERCIAL

## 2024-11-08 ENCOUNTER — HOME CARE VISIT (OUTPATIENT)
Dept: HOME HEALTH SERVICES | Facility: HOME HEALTHCARE | Age: 74
End: 2024-11-08
Payer: COMMERCIAL

## 2024-11-08 VITALS
SYSTOLIC BLOOD PRESSURE: 118 MMHG | OXYGEN SATURATION: 93 % | HEIGHT: 68 IN | HEART RATE: 53 BPM | TEMPERATURE: 97.6 F | DIASTOLIC BLOOD PRESSURE: 56 MMHG | BODY MASS INDEX: 24.04 KG/M2 | WEIGHT: 158.6 LBS

## 2024-11-08 VITALS
HEART RATE: 56 BPM | DIASTOLIC BLOOD PRESSURE: 92 MMHG | SYSTOLIC BLOOD PRESSURE: 162 MMHG | OXYGEN SATURATION: 91 % | TEMPERATURE: 97.5 F | RESPIRATION RATE: 16 BRPM

## 2024-11-08 DIAGNOSIS — K59.00 CONSTIPATION, UNSPECIFIED CONSTIPATION TYPE: ICD-10-CM

## 2024-11-08 DIAGNOSIS — F41.9 ANXIETY: ICD-10-CM

## 2024-11-08 DIAGNOSIS — R79.89 ELEVATED BRAIN NATRIURETIC PEPTIDE (BNP) LEVEL: ICD-10-CM

## 2024-11-08 DIAGNOSIS — Z12.11 SCREENING FOR COLON CANCER: ICD-10-CM

## 2024-11-08 DIAGNOSIS — Z13.29 SCREENING FOR THYROID DISORDER: ICD-10-CM

## 2024-11-08 DIAGNOSIS — Z99.81 OXYGEN DEPENDENT: ICD-10-CM

## 2024-11-08 DIAGNOSIS — J18.9 PNEUMONIA OF BOTH LOWER LOBES DUE TO INFECTIOUS ORGANISM: Primary | ICD-10-CM

## 2024-11-08 PROCEDURE — G0299 HHS/HOSPICE OF RN EA 15 MIN: HCPCS

## 2024-11-08 PROCEDURE — 99496 TRANSJ CARE MGMT HIGH F2F 7D: CPT | Performed by: PHYSICIAN ASSISTANT

## 2024-11-08 PROCEDURE — 400013 VN SOC

## 2024-11-08 RX ORDER — LORAZEPAM 0.5 MG/1
0.5 TABLET ORAL
Qty: 30 TABLET | Refills: 0 | Status: SHIPPED | OUTPATIENT
Start: 2024-11-08

## 2024-11-08 RX ORDER — DOCUSATE SODIUM 100 MG/1
100 CAPSULE, LIQUID FILLED ORAL 2 TIMES DAILY
Qty: 60 CAPSULE | Refills: 0 | Status: SHIPPED | OUTPATIENT
Start: 2024-11-08

## 2024-11-08 RX ORDER — LIDOCAINE/PRILOCAINE 2.5 %-2.5%
CREAM (GRAM) TOPICAL AS NEEDED
Qty: 15 G | Refills: 1 | Status: SHIPPED | OUTPATIENT
Start: 2024-11-08 | End: 2024-11-11

## 2024-11-08 NOTE — PROGRESS NOTES
Transition of Care Visit  Name: Corinne A Longo      : 1950      MRN: [id number]  Encounter Provider: Rayna Hernandez PA-C  Encounter Date: 2024   Encounter department: [facility]    Assessment & Plan  Screening for colon cancer         Pneumonia of both lower lobes due to infectious organism    Orders:    CBC and differential; Future    Comprehensive metabolic panel; Future    B-Type Natriuretic Peptide(BNP); Future    Elevated brain natriuretic peptide (BNP) level    Orders:    B-Type Natriuretic Peptide(BNP); Future    Screening for thyroid disorder    Orders:    TSH, 3rd generation; Future    Constipation, unspecified constipation type    Orders:    docusate sodium (COLACE) 100 mg capsule; Take 1 capsule (100 mg total) by mouth 2 (two) times a day    Anxiety    Orders:    LORazepam (Ativan) 0.5 mg tablet; Take 1 tablet (0.5 mg total) by mouth daily at bedtime    Oxygen dependent    Orders:    lidocaine-prilocaine (EMLA) cream; Apply topically as needed for mild pain         History of Present Illness     Transitional Care Management Review:   Corinne A Longo is a 74 y.o. female here for TCM follow up.     During the TCM phone call patient stated:  TCM Call       Date and time call was made  2024  1:46 PM    Hospital care reviewed  Records reviewed    Patient was hospitialized at  [facility]    Date of Admission  10/28/24    Date of discharge  24    Diagnosis  Sepsis due to pneumonia    Disposition  Home    Were the patients medications reviewed and updated  Yes    Current Symptoms  None          TCM Call       Post hospital issues  None    Should patient be enrolled in anticoag monitoring?  No    Scheduled for follow up?  Yes    Did you obtain your prescribed medications  Yes    Do you need help managing your prescriptions or medications  No    Is transportation to your appointment needed  No    I have advised the patient to call PCP with any new or worsening  symptoms  evelyne prabhakar          Pt presents for TCM. She was admitted to [facility] on 10/28. She was experiencing SOB and altered mental status. Workup included CT head, CXR, and labs and revealed pneumonia, sepsis and COPD exacerbation. She was started on IV abx and her O2 via nasal cannula was increased from her usual 2L up to 10L. She had an episode of Rapid a.fib and rapid response was called. She was given IV cardizem with improvement. She was consulted by cardiology and pulmonary. She had Echo and CT chest and MRI brain that did not reveal any new findings. She was started on eliquis. She improved throughout her stay and was discharged home on 11/6. She has home health and PT. Will need to get repeat labs. She had cognitive screening today which is showing that she is not yet back to her baseline but improving each day. She is feeling better each day.       Review of Systems   Constitutional:  Negative for chills and fever.   HENT:  Negative for congestion, ear pain, hearing loss, postnasal drip, rhinorrhea, sinus pressure, sinus pain, sore throat and trouble swallowing.    Eyes:  Negative for pain and visual disturbance.   Respiratory:  Positive for cough and shortness of breath. Negative for chest tightness and wheezing.    Cardiovascular: Negative.  Negative for chest pain, palpitations and leg swelling.   Gastrointestinal:  Negative for abdominal pain, blood in stool, constipation, diarrhea, nausea and vomiting.   Endocrine: Negative for cold intolerance, heat intolerance, polydipsia, polyphagia and polyuria.   Genitourinary:  Negative for difficulty urinating, dysuria, flank pain and urgency.   Musculoskeletal:  Negative for arthralgias, back pain, gait problem and myalgias.   Skin:  Negative for rash.   Allergic/Immunologic: Negative.    Neurological:  Negative for dizziness, weakness, light-headedness and headaches.   Hematological: Negative.    Psychiatric/Behavioral:  Negative for behavioral  "problems, dysphoric mood and sleep disturbance. The patient is not nervous/anxious.      Objective     /56 (BP Location: Left arm, Patient Position: Sitting, Cuff Size: Adult)   Pulse (!) 53   Temp 97.6 °F (36.4 °C) (Tympanic)   Ht 5' 8\" (1.727 m)   Wt 71.9 kg (158 lb 9.6 oz)   SpO2 93%   BMI 24.12 kg/m²     Physical Exam  Vitals and nursing note reviewed.   Constitutional:       General: She is not in acute distress.     Appearance: Normal appearance. She is well-developed. She is not diaphoretic.   HENT:      Head: Normocephalic and atraumatic.      Right Ear: External ear normal.      Left Ear: External ear normal.      Nose: Nose normal.      Mouth/Throat:      Pharynx: No oropharyngeal exudate.   Eyes:      General: No scleral icterus.        Right eye: No discharge.         Left eye: No discharge.      Conjunctiva/sclera: Conjunctivae normal.      Pupils: Pupils are equal, round, and reactive to light.   Neck:      Thyroid: No thyromegaly.   Cardiovascular:      Rate and Rhythm: Normal rate and regular rhythm.      Heart sounds: Normal heart sounds. No murmur heard.     No friction rub. No gallop.   Pulmonary:      Effort: Pulmonary effort is normal. No respiratory distress.      Breath sounds: Normal breath sounds. No wheezing or rales.   Abdominal:      General: Bowel sounds are normal. There is no distension.      Palpations: Abdomen is soft.      Tenderness: There is no abdominal tenderness.   Musculoskeletal:         General: No tenderness or deformity. Normal range of motion.      Cervical back: Normal range of motion and neck supple.   Skin:     General: Skin is warm and dry.   Neurological:      Mental Status: She is alert and oriented to person, place, and time.      Cranial Nerves: No cranial nerve deficit.   Psychiatric:         Behavior: Behavior normal.         Thought Content: Thought content normal.         Judgment: Judgment normal.       Medications have been reviewed by provider " in current encounter    Administrative Statements   I have spent a total time of 30 minutes in caring for this patient on the day of the visit/encounter including Risks and benefits of tx options, Instructions for management, Patient and family education, Importance of tx compliance, Risk factor reductions, Impressions, Counseling / Coordination of care, Documenting in the medical record, Reviewing / ordering tests, medicine, procedures  , and Obtaining or reviewing history  .

## 2024-11-08 NOTE — ASSESSMENT & PLAN NOTE
Orders:    docusate sodium (COLACE) 100 mg capsule; Take 1 capsule (100 mg total) by mouth 2 (two) times a day

## 2024-11-08 NOTE — TELEPHONE ENCOUNTER
Pt's daughter called and wanted to review some medications that pt picked up from pharmacy today.      Daughter states she is unsure of EMLA cream.  States it sounds like a pain medication instead of a moisturizing ointment for pt.  Daughter clarified that pt has complained of nasal dryness from O2 use.  Informed daughter that this RN is not familiar with a moisturizing ointment that would be good to be used in conjunction with O2.  Offered to place an order with "[employer] for humidifying water to moisturize O2 for pt.    Daughter states she's ok and that she sees something on [employer] that can moisturize pt's nasal passages for O2.  Daughter requests that EMLA cream be d/c'd from pt's chart.  Please review.

## 2024-11-08 NOTE — ASSESSMENT & PLAN NOTE
Orders:    LORazepam (Ativan) 0.5 mg tablet; Take 1 tablet (0.5 mg total) by mouth daily at bedtime

## 2024-11-11 ENCOUNTER — HOME CARE VISIT (OUTPATIENT)
Dept: HOME HEALTH SERVICES | Facility: HOME HEALTHCARE | Age: 74
End: 2024-11-11
Payer: COMMERCIAL

## 2024-11-11 ENCOUNTER — APPOINTMENT (OUTPATIENT)
Dept: LAB | Facility: CLINIC | Age: 74
End: 2024-11-11
Payer: COMMERCIAL

## 2024-11-11 VITALS — OXYGEN SATURATION: 93 % | SYSTOLIC BLOOD PRESSURE: 130 MMHG | DIASTOLIC BLOOD PRESSURE: 60 MMHG | HEART RATE: 70 BPM

## 2024-11-11 VITALS
SYSTOLIC BLOOD PRESSURE: 118 MMHG | TEMPERATURE: 97.8 F | HEART RATE: 56 BPM | DIASTOLIC BLOOD PRESSURE: 56 MMHG | RESPIRATION RATE: 20 BRPM | OXYGEN SATURATION: 89 %

## 2024-11-11 DIAGNOSIS — J18.9 PNEUMONIA OF BOTH LOWER LOBES DUE TO INFECTIOUS ORGANISM: ICD-10-CM

## 2024-11-11 DIAGNOSIS — Z13.29 SCREENING FOR THYROID DISORDER: ICD-10-CM

## 2024-11-11 DIAGNOSIS — R79.89 ELEVATED BRAIN NATRIURETIC PEPTIDE (BNP) LEVEL: ICD-10-CM

## 2024-11-11 LAB
ALBUMIN SERPL BCG-MCNC: 3.2 G/DL (ref 3.5–5)
ALP SERPL-CCNC: 63 U/L (ref 34–104)
ALT SERPL W P-5'-P-CCNC: 54 U/L (ref 7–52)
ANION GAP SERPL CALCULATED.3IONS-SCNC: 5 MMOL/L (ref 4–13)
ANISOCYTOSIS BLD QL SMEAR: PRESENT
AST SERPL W P-5'-P-CCNC: 30 U/L (ref 13–39)
BASOPHILS # BLD MANUAL: 0 THOUSAND/UL (ref 0–0.1)
BASOPHILS NFR MAR MANUAL: 0 % (ref 0–1)
BILIRUB SERPL-MCNC: 0.81 MG/DL (ref 0.2–1)
BUN SERPL-MCNC: 37 MG/DL (ref 5–25)
CALCIUM ALBUM COR SERPL-MCNC: 9.5 MG/DL (ref 8.3–10.1)
CALCIUM SERPL-MCNC: 8.9 MG/DL (ref 8.4–10.2)
CHLORIDE SERPL-SCNC: 104 MMOL/L (ref 96–108)
CO2 SERPL-SCNC: 31 MMOL/L (ref 21–32)
CREAT SERPL-MCNC: 1.05 MG/DL (ref 0.6–1.3)
EOSINOPHIL # BLD MANUAL: 0 THOUSAND/UL (ref 0–0.4)
EOSINOPHIL NFR BLD MANUAL: 0 % (ref 0–6)
ERYTHROCYTE [DISTWIDTH] IN BLOOD BY AUTOMATED COUNT: 15.8 % (ref 11.6–15.1)
GFR SERPL CREATININE-BSD FRML MDRD: 53 ML/MIN/1.73SQ M
GLUCOSE SERPL-MCNC: 98 MG/DL (ref 65–140)
HCT VFR BLD AUTO: 33.9 % (ref 34.8–46.1)
HGB BLD-MCNC: 11 G/DL (ref 11.5–15.4)
LYMPHOCYTES # BLD AUTO: 0.33 THOUSAND/UL (ref 0.6–4.47)
LYMPHOCYTES # BLD AUTO: 2 % (ref 14–44)
MACROCYTES BLD QL AUTO: PRESENT
MCH RBC QN AUTO: 32.1 PG (ref 26.8–34.3)
MCHC RBC AUTO-ENTMCNC: 32.4 G/DL (ref 31.4–37.4)
MCV RBC AUTO: 99 FL (ref 82–98)
MONOCYTES # BLD AUTO: 0.16 THOUSAND/UL (ref 0–1.22)
MONOCYTES NFR BLD: 1 % (ref 4–12)
NEUTROPHILS # BLD MANUAL: 15.91 THOUSAND/UL (ref 1.85–7.62)
NEUTS SEG NFR BLD AUTO: 97 % (ref 43–75)
PLATELET # BLD AUTO: 249 THOUSANDS/UL (ref 149–390)
PLATELET BLD QL SMEAR: ADEQUATE
PMV BLD AUTO: 10.8 FL (ref 8.9–12.7)
POIKILOCYTOSIS BLD QL SMEAR: PRESENT
POLYCHROMASIA BLD QL SMEAR: PRESENT
POTASSIUM SERPL-SCNC: 4.5 MMOL/L (ref 3.5–5.3)
PROT SERPL-MCNC: 5.8 G/DL (ref 6.4–8.4)
RBC # BLD AUTO: 3.43 MILLION/UL (ref 3.81–5.12)
RBC MORPH BLD: PRESENT
SODIUM SERPL-SCNC: 140 MMOL/L (ref 135–147)
TSH SERPL DL<=0.05 MIU/L-ACNC: 0.98 UIU/ML (ref 0.45–4.5)
WBC # BLD AUTO: 16.4 THOUSAND/UL (ref 4.31–10.16)

## 2024-11-11 PROCEDURE — G0299 HHS/HOSPICE OF RN EA 15 MIN: HCPCS

## 2024-11-11 PROCEDURE — G0152 HHCP-SERV OF OT,EA 15 MIN: HCPCS | Performed by: OCCUPATIONAL THERAPIST

## 2024-11-11 PROCEDURE — 84443 ASSAY THYROID STIM HORMONE: CPT

## 2024-11-11 PROCEDURE — 36415 COLL VENOUS BLD VENIPUNCTURE: CPT

## 2024-11-11 PROCEDURE — 85027 COMPLETE CBC AUTOMATED: CPT

## 2024-11-11 PROCEDURE — 85007 BL SMEAR W/DIFF WBC COUNT: CPT

## 2024-11-11 PROCEDURE — 80053 COMPREHEN METABOLIC PANEL: CPT

## 2024-11-12 ENCOUNTER — OFFICE VISIT (OUTPATIENT)
Dept: PULMONOLOGY | Facility: CLINIC | Age: 74
End: 2024-11-12
Payer: COMMERCIAL

## 2024-11-12 VITALS
SYSTOLIC BLOOD PRESSURE: 100 MMHG | RESPIRATION RATE: 18 BRPM | WEIGHT: 156 LBS | TEMPERATURE: 97.5 F | BODY MASS INDEX: 23.64 KG/M2 | HEIGHT: 68 IN | DIASTOLIC BLOOD PRESSURE: 50 MMHG | OXYGEN SATURATION: 95 % | HEART RATE: 58 BPM

## 2024-11-12 DIAGNOSIS — F17.211 CIGARETTE NICOTINE DEPENDENCE IN REMISSION: ICD-10-CM

## 2024-11-12 DIAGNOSIS — J18.9 PNEUMONIA OF BOTH LOWER LOBES DUE TO INFECTIOUS ORGANISM: ICD-10-CM

## 2024-11-12 DIAGNOSIS — J96.11 CHRONIC RESPIRATORY FAILURE WITH HYPOXIA (HCC): ICD-10-CM

## 2024-11-12 DIAGNOSIS — J44.9 COPD, SEVERE (HCC): Primary | ICD-10-CM

## 2024-11-12 PROCEDURE — 99214 OFFICE O/P EST MOD 30 MIN: CPT

## 2024-11-12 RX ORDER — IPRATROPIUM BROMIDE AND ALBUTEROL SULFATE 2.5; .5 MG/3ML; MG/3ML
3 SOLUTION RESPIRATORY (INHALATION) EVERY 6 HOURS PRN
Qty: 1080 ML | Refills: 0 | Status: SHIPPED | OUTPATIENT
Start: 2024-11-12

## 2024-11-12 NOTE — ASSESSMENT & PLAN NOTE
-Required up to 8 L during recent hospitalization.  Now back to her baseline of 2 L.  -She will continue 2 L supplemental oxygen to maintain SpO2 above 88%

## 2024-11-12 NOTE — ASSESSMENT & PLAN NOTE
-S/p hospitalization for multilobar pneumonia.  Also had a small right parapneumonic effusion  -Patient symptoms improving and nearly back to her baseline.  Her lungs sound clear on exam  -Will repeat CT chest in 6 to 8 weeks to ensure improvement of pulmonary infiltrates

## 2024-11-12 NOTE — ASSESSMENT & PLAN NOTE
-Patient reports remission x 2 years now  -Approximately 50-pack-year history.  Will resume yearly CT lung cancer screening once follow-up imaging complete for recent pneumonia

## 2024-11-12 NOTE — PROGRESS NOTES
Pulmonary Follow Up Note  Corinne A Longo 74 y.o. female MRN: [id number]  11/12/2024    Assessment:    COPD, severe (HCC)  -FEV1 52% on PFTs in 2023.  Also with severe emphysema on CT imaging  -She is s/p hospitalization for exacerbation secondary to multilobar pneumonia.  Symptoms improving and nearly back to her baseline  -Her lungs are clear on exam today    Plan:  -Continue Anoro inhaler daily and lev albuterol/DuoNebs every 6 hours as needed  -Continue 2 L supplemental oxygen  -Continue home PT  -UTD vaccinations  -Return in 2 months for follow-up    Chronic respiratory failure with hypoxia (HCC)  -Required up to 8 L during recent hospitalization.  Now back to her baseline of 2 L.  -She will continue 2 L supplemental oxygen to maintain SpO2 above 88%    Pneumonia of both lower lobes due to infectious organism  -S/p hospitalization for multilobar pneumonia.  Also had a small right parapneumonic effusion  -Patient symptoms improving and nearly back to her baseline.  Her lungs sound clear on exam  -Will repeat CT chest in 6 to 8 weeks to ensure improvement of pulmonary infiltrates    Cigarette nicotine dependence in remission  -Patient reports remission x 2 years now  -Approximately 50-pack-year history.  Will resume yearly CT lung cancer screening once follow-up imaging complete for recent pneumonia      Plan:    Diagnoses and all orders for this visit:    COPD, severe (HCC)  -     ipratropium-albuterol (DUO-NEB) 0.5-2.5 mg/3 mL nebulizer solution; Take 3 mL by nebulization every 6 (six) hours as needed for wheezing or shortness of breath    Chronic respiratory failure with hypoxia (HCC)    Pneumonia of both lower lobes due to infectious organism  -     CT chest wo contrast; Future    Cigarette nicotine dependence in remission          Return in about 2 months (around 1/12/2025).        History of Present Illness     Chief Complaint:   Chief Complaint   Patient presents with    Hospital Follow-up       Patient ID:  Corinne is a 74 y.o. y.o. female has a past medical history of Allergic, Allergic rhinitis, Asthma, Atrial fibrillation (HCC), Bronchitis, Bunion, COPD (chronic obstructive pulmonary disease) (HCC), Coronary artery disease, Depression, Emphysema lung (HCC), Fibroadenoma of breast, Gastroesophageal reflux disease without esophagitis (12/18/2023), Hyperlipidemia, Hypertension, Lung disease (2008), Memory difficulties (07/23/2019), Post-menopausal, Post-menopausal, Sepsis due to pneumonia ([facility]) (10/28/2024), Spondylisthesis, Stage 3 chronic kidney disease, unspecified whether stage 3a or 3b CKD ([facility]) (02/07/2022), and Vitamin D deficiency.      11/12/2024  HPI: Corinne A Longo is a 74 y.o. female who presents to the office today for a hospital follow-up visit.  She has a past medical history of severe COPD, chronic hypoxic respiratory failure on 2 L, and former 50 pack year smoker in remission since July 2023. She was admitted to [facility] 10/28/2024 - 11/6/2024 initially presenting with shortness of breath and altered mental status.  Workup revealed multilobar pneumonia, sepsis, and COPD exacerbation.  She required supplemental oxygen up to 8 L, and was treated with IV steroids, antibiotics, and nebulizer treatments.  She also developed episode of rapid A-fib and was treated by cardiology.    Patient is feeling better.  Denies any significant shortness of breath.  She is working with home PT and doing well.  She does get tired at times and has to rest.  Has occasional cough with mucus here and there and occasional wheezing.  Not needing her rescue inhaler/nebs frequently.  She denies any chest pain, her lower extremity swelling is improving.  She is on her baseline 2 L oxygen.    Review of Systems   Constitutional:  Positive for fatigue. Negative for activity change, appetite change, chills, diaphoresis, fever and unexpected weight change.   HENT:  Negative for congestion, ear pain, postnasal drip,  rhinorrhea, sneezing, sore throat, trouble swallowing and voice change.    Respiratory:  Positive for cough and wheezing. Negative for chest tightness and shortness of breath.    Cardiovascular:  Positive for leg swelling. Negative for chest pain and palpitations.   Musculoskeletal:  Negative for myalgias.   Allergic/Immunologic: Negative.    Neurological:  Negative for headaches.       Historical Information   Past Medical History:   Diagnosis Date    Allergic     Allergic rhinitis     [id number]  RESOLVED    Asthma     Atrial fibrillation ([facility])     Bronchitis     Bunion     COPD (chronic obstructive pulmonary disease) ([facility])     home O2 at night at home 2L    Coronary artery disease     AFIB    Depression     Emphysema lung ([facility])     Fibroadenoma of breast     Gastroesophageal reflux disease without esophagitis 12/18/2023    Hyperlipidemia     Hypertension     Lung disease 2008    COPD    Memory difficulties 07/23/2019    Post-menopausal     Post-menopausal     Sepsis due to pneumonia ([facility]) 10/28/2024    Spondylisthesis     Stage 3 chronic kidney disease, unspecified whether stage 3a or 3b CKD ([facility]) 02/07/2022    Vitamin D deficiency      Past Surgical History:   Procedure Laterality Date    ADENOIDECTOMY      BREAST CYST EXCISION Bilateral     benign-1987, 2002, 2007    BUNIONECTOMY Bilateral     CATARACT EXTRACTION, BILATERAL      EYE SURGERY      Cataracts removed    MOLE REMOVAL      lip    TONSILLECTOMY      TUBAL LIGATION       Family History   Problem Relation Age of Onset    Hypertension Mother     Alcohol abuse Mother     Hypertension Father     Diabetes Father     Prostate cancer Father     Cancer Father         Prostate    Hearing loss Father     Vitamin D deficiency Sister     Diverticulitis Sister     Depression Sister     No Known Problems Sister     Heart failure Brother     Lung cancer Brother     Cancer Brother         Stage four lung cancer    Celiac disease Daughter     Alcohol abuse Daughter      Substance Abuse Daughter     Eczema Daughter     Fibromyalgia Daughter     Substance Abuse Daughter     Alcohol abuse Daughter     ADD / ADHD Daughter     Bipolar disorder Daughter     Anxiety disorder Daughter     Ulcerative colitis Daughter     No Known Problems Maternal Aunt     Heart disease Brother     Substance Abuse Brother     Drug abuse Brother     Breast cancer Neg Hx        Smoking history: She reports that she quit smoking about 16 months ago. Her smoking use included cigarettes. She started smoking about 54 years ago. She has a 13.4 pack-year smoking history. She has been exposed to tobacco smoke. She has never used smokeless tobacco.    Occupational History:     Immunization History   Administered Date(s) Administered    COVID-19 PFIZER VACCINE 0.3 ML IM 03/17/2021, 04/07/2021, 11/10/2021, 04/26/2022    COVID-19 Pfizer Vac BIVALENT Kevin-sucrose 12 Yr+ IM 09/29/2022    COVID-19 Pfizer mRNA vacc PF kevin-sucrose 12 yr and older (Comirnaty) 09/27/2023, 10/16/2024    COVID-19 Pfizer vac (Kevin-sucrose, gray cap) 12 yr+ IM 04/26/2022    H1N1, All Formulations 01/14/2010    INFLUENZA 11/09/2015, 10/21/2016, 10/03/2017, 09/10/2018, 09/29/2021, 09/29/2022    Influenza Quadrivalent Preservative Free 3 years and older IM 11/09/2015, 10/21/2016, 10/03/2017    Influenza, high dose seasonal 0.7 mL 10/16/2020, 09/29/2022, 09/13/2023    Pneumococcal Conjugate 13-Valent 11/16/2015, 08/20/2018    Pneumococcal Conjugate Vaccine 20-valent (Pcv20), Polysace 10/31/2022    Pneumococcal Polysaccharide PPV23 08/29/2016    Respiratory Syncytial Virus Vaccine (Recombinant, Adjuvanted) 09/05/2024    Tdap 12/28/2015    Zoster 12/28/2015    Zoster Vaccine Recombinant 12/28/2015    influenza, trivalent, adjuvanted 09/27/2019, 09/05/2024       Meds/Allergies     Current Outpatient Medications:     apixaban (ELIQUIS) 5 mg, Take 1 tablet (5 mg total) by mouth 2 (two) times a day, Disp: 60 tablet, Rfl: 0    ascorbic acid (VITAMIN C) 500  mg tablet, Take 500 mg by mouth daily, Disp: , Rfl:     atorvastatin (LIPITOR) 40 mg tablet, Take 1 tablet (40 mg total) by mouth daily, Disp: 90 tablet, Rfl: 0    Calcium Citrate-Vitamin D (CALCIUM CITRATE + PO), Take 300 mg by mouth daily., Disp: , Rfl:     calcium citrate-vitamin D 315 mg-5 mcg tablet, Take 2 tablets by mouth 2 (two) times a day, Disp: , Rfl:     cetirizine (ZyrTEC) 10 mg tablet, Take 10 mg by mouth daily, Disp: , Rfl:     Cholecalciferol (VITAMIN D3) 5000 units CAPS, Take 1 capsule by mouth daily, Disp: , Rfl:     clotrimazole-betamethasone (LOTRISONE) 1-0.05 % cream, Apply topically 2 (two) times a day (Patient taking differently: Apply 1 Application topically 2 (two) times a day Bilateral breasts), Disp: 45 g, Rfl: 1    diltiazem (CARDIZEM CD) 360 MG 24 hr capsule, Take 1 capsule (360 mg total) by mouth daily, Disp: 30 capsule, Rfl: 0    docusate sodium (COLACE) 100 mg capsule, Take 1 capsule (100 mg total) by mouth 2 (two) times a day, Disp: 60 capsule, Rfl: 0    guaiFENesin (MUCINEX) 600 mg 12 hr tablet, Take 1 tablet (600 mg total) by mouth every 12 (twelve) hours, Disp: 180 tablet, Rfl: 3    ipratropium-albuterol (DUO-NEB) 0.5-2.5 mg/3 mL nebulizer solution, Take 3 mL by nebulization every 6 (six) hours as needed for wheezing or shortness of breath, Disp: 1080 mL, Rfl: 0    L-Lysine 500 MG CAPS, Take 1 capsule by mouth daily, Disp: , Rfl:     levalbuterol (Xopenex HFA) 45 mcg/act inhaler, Inhale 1-2 puffs every 4 (four) hours as needed for wheezing, Disp: 45 g, Rfl: 0    LORazepam (Ativan) 0.5 mg tablet, Take 1 tablet (0.5 mg total) by mouth daily at bedtime, Disp: 30 tablet, Rfl: 0    methocarbamol (ROBAXIN) 500 mg tablet, Take 1 tablet (500 mg total) by mouth 3 (three) times a day as needed for muscle spasms, Disp: 30 tablet, Rfl: 0    metoprolol tartrate (LOPRESSOR) 50 mg tablet, Take 1 tablet (50 mg total) by mouth every 12 (twelve) hours, Disp: 60 tablet, Rfl: 0    mometasone  "(NASONEX) 50 mcg/act nasal spray, 2 sprays into each nostril daily, Disp: 51 g, Rfl: 0    MULTIPLE VITAMINS-CALCIUM PO, Take by mouth daily, Disp: , Rfl:     multivitamin-minerals (CENTRUM ADULTS) tablet, Take 1 tablet by mouth daily, Disp: , Rfl:     Omega-3 Fatty Acids (fish oil) 1,000 mg, Take 1,000 mg by mouth 2 (two) times a day., Disp: , Rfl:     oxygen gas, Inhale 2 L/min continuous. via nasal cannula, Disp: , Rfl:     predniSONE 20 mg tablet, Take 2 tablets (40 mg total) by mouth daily for 4 days, THEN 1 tablet (20 mg total) daily for 4 days., Disp: 12 tablet, Rfl: 0    Probiotic Product (PROBIOTIC-10 PO), Take 1 capsule by mouth daily, Disp: , Rfl:     triamcinolone (KENALOG) 0.1 % cream, Apply topically 2 (two) times a day (Patient taking differently: Apply 1 Application topically 2 (two) times a day RIGHT EAR), Disp: 15 g, Rfl: 0    umeclidinium-vilanterol (Anoro Ellipta) 62.5-25 mcg/actuation inhaler, Inhale 1 puff daily, Disp: 180 each, Rfl: 1    furosemide (LASIX) 20 mg tablet, Take 1 tablet (20 mg total) by mouth daily (Patient not taking: Reported on 11/12/2024), Disp: 30 tablet, Rfl: 0  Allergies:   Allergies   Allergen Reactions    Bupropion Itching    Ace Inhibitors Cough     Action Taken: stopped med and changed to ARB; Category: Adverse Reaction;     Citalopram Diarrhea and Nausea Only    Mixed Ragweed Cough    Pollen Extract Cough    Adhesive [Medical Tape] Rash     Patch          Vitals:  Vitals:    11/12/24 1320   BP: 100/50   BP Location: Right arm   Patient Position: Sitting   Cuff Size: Standard   Pulse: 58   Resp: 18   Temp: 97.5 °F (36.4 °C)   TempSrc: Temporal   SpO2: 95%   Weight: 70.8 kg (156 lb)   Height: 5' 8\" (1.727 m)       Oxygen Therapy  SpO2: 95 % (on 2 L of o2)  .  Wt Readings from Last 3 Encounters:   11/12/24 70.8 kg (156 lb)   11/08/24 71.9 kg (158 lb 9.6 oz)   11/02/24 75.6 kg (166 lb 10.7 oz)     Body mass index is 23.72 kg/m².    Physical Exam  Vitals and nursing note " "reviewed.   Constitutional:       General: She is not in acute distress.     Appearance: Normal appearance. She is well-developed.   Cardiovascular:      Rate and Rhythm: Normal rate. Rhythm irregular.      Heart sounds: Normal heart sounds, S1 normal and S2 normal. No murmur heard.  Pulmonary:      Effort: Pulmonary effort is normal.      Breath sounds: No decreased breath sounds, wheezing, rhonchi or rales.   Musculoskeletal:         General: No swelling.      Right lower le+ Edema present.      Left lower le+ Edema present.   Neurological:      Mental Status: She is alert.   Psychiatric:         Mood and Affect: Mood and affect normal.         Behavior: Behavior normal. Behavior is cooperative.           Labs: I have personally reviewed pertinent lab results.  Lab Results   Component Value Date    WBC 16.40 (H) 2024    HGB 11.0 (L) 2024    HCT 33.9 (L) 2024    MCV 99 (H) 2024     2024     Lab Results   Component Value Date    GLUCOSE 101 2015    CALCIUM 8.9 2024     2015    K 4.5 2024    CO2 31 2024     2024    BUN 37 (H) 2024    CREATININE 1.05 2024     No results found for: \"IGE\"  Lab Results   Component Value Date    ALT 54 (H) 2024    AST 30 2024    ALKPHOS 63 2024    BILITOT 0.5 2015       Imaging and other studies: I have personally reviewed pertinent reports and I have personally reviewed pertinent films in PACS     CXR 2024  Stable airspace opacity in the right mid to lower lung field. Emphysema    CT Chest 10/28/24  No evidence for pulmonary embolism.  Extensive right sided multilobar pneumonia. Small right parapneumonic effusion.  Tree-in-bud opacities in the left upper and lower lobes consistent with endobronchial impaction/infection.  Severe emphysematous changes.  Small pericardial effusion.      CT chest 2023  1. Severe background emphysema with overall " improvement of left upper lobe nodules seen on most recent CT chest. These include a 4 mm nodule which has decreased in size from that exam (now measuring 1.5 mm). The other 2 nodules have resolved in the   interval. There is a new punctate nodule in the left upper lobe which measures 2 mm. Continued follow-up with lung cancer screening CT is recommended in 12 months.    Pulmonary function testing:     Pulmonary Functions Testing Results: 5/17/2023    FEV1/FVC ratio 47%    FEV1 52% predicted  FVC 85% predicted  (-) response to bronchodilators   % predicted   % predicted  DLCO corrected for hemoglobin 38 % predicted    Impression: Spirometry shows a moderately severe obstructive ventilatory defect.  No significant bronchodilator response.  Increased lung volumes consistent with hyperinflation and air trapping.  Severely reduced diffusion capacity.  Increased airway resistance.    6-minute walk test 5/17/2023  Baseline SPO2 95% on room air, heart rate 60.  Able to walk 320 m in 6 minutes, lowest SPO2 at 88% after 2 minutes.  Required 1 LPM/24% FiO2 to end exercise with SPO2 at 92%, maximal heart rate 95.

## 2024-11-12 NOTE — ASSESSMENT & PLAN NOTE
-FEV1 52% on PFTs in 2023.  Also with severe emphysema on CT imaging  -She is s/p hospitalization for exacerbation secondary to multilobar pneumonia.  Symptoms improving and nearly back to her baseline  -Her lungs are clear on exam today    Plan:  -Continue Anoro inhaler daily and lev albuterol/DuoNebs every 6 hours as needed  -Continue 2 L supplemental oxygen  -Continue home PT  -UTD vaccinations  -Return in 2 months for follow-up

## 2024-11-12 NOTE — CASE COMMUNICATION
[facility] OT evaluation 11/11/24    OT to continue OT services 1 wk 1 then 2 wk 2 for  therapeutic exercise program, ADL training for review of lower extremity dressing with dressing tools,  review of safety with transfers/mobility and additional recommendations for DME/AE as appropriate.

## 2024-11-13 ENCOUNTER — HOME CARE VISIT (OUTPATIENT)
Dept: HOME HEALTH SERVICES | Facility: HOME HEALTHCARE | Age: 74
End: 2024-11-13
Payer: COMMERCIAL

## 2024-11-13 VITALS
HEART RATE: 64 BPM | OXYGEN SATURATION: 92 % | SYSTOLIC BLOOD PRESSURE: 114 MMHG | BODY MASS INDEX: 23.72 KG/M2 | WEIGHT: 156 LBS | DIASTOLIC BLOOD PRESSURE: 56 MMHG

## 2024-11-13 PROCEDURE — G0151 HHCP-SERV OF PT,EA 15 MIN: HCPCS

## 2024-11-14 ENCOUNTER — HOME CARE VISIT (OUTPATIENT)
Dept: HOME HEALTH SERVICES | Facility: HOME HEALTHCARE | Age: 74
End: 2024-11-14
Payer: COMMERCIAL

## 2024-11-14 NOTE — CASE COMMUNICATION
PT eval complete. Patient on chronic O@ but new to RW and demo reduced mobility from baseline with short distance amb tolerated only. Functional wekness and instability present.  Will plan 1 x this week then [id number] to improve mobility and safety and hope to advance past need for RW.

## 2024-11-18 ENCOUNTER — HOME CARE VISIT (OUTPATIENT)
Dept: HOME HEALTH SERVICES | Facility: HOME HEALTHCARE | Age: 74
End: 2024-11-18
Payer: COMMERCIAL

## 2024-11-18 VITALS
DIASTOLIC BLOOD PRESSURE: 50 MMHG | HEART RATE: 68 BPM | TEMPERATURE: 97.5 F | BODY MASS INDEX: 22.35 KG/M2 | WEIGHT: 147 LBS | OXYGEN SATURATION: 95 % | SYSTOLIC BLOOD PRESSURE: 100 MMHG | RESPIRATION RATE: 20 BRPM

## 2024-11-18 VITALS — OXYGEN SATURATION: 97 % | HEART RATE: 70 BPM | SYSTOLIC BLOOD PRESSURE: 108 MMHG | DIASTOLIC BLOOD PRESSURE: 52 MMHG

## 2024-11-18 PROCEDURE — G0299 HHS/HOSPICE OF RN EA 15 MIN: HCPCS

## 2024-11-18 PROCEDURE — G0151 HHCP-SERV OF PT,EA 15 MIN: HCPCS

## 2024-11-19 ENCOUNTER — HOME CARE VISIT (OUTPATIENT)
Dept: HOME HEALTH SERVICES | Facility: HOME HEALTHCARE | Age: 74
End: 2024-11-19
Payer: COMMERCIAL

## 2024-11-19 VITALS — HEART RATE: 78 BPM | SYSTOLIC BLOOD PRESSURE: 117 MMHG | DIASTOLIC BLOOD PRESSURE: 60 MMHG | OXYGEN SATURATION: 90 %

## 2024-11-19 PROCEDURE — G0152 HHCP-SERV OF OT,EA 15 MIN: HCPCS | Performed by: OCCUPATIONAL THERAPIST

## 2024-11-20 ENCOUNTER — HOME CARE VISIT (OUTPATIENT)
Dept: HOME HEALTH SERVICES | Facility: HOME HEALTHCARE | Age: 74
End: 2024-11-20
Payer: COMMERCIAL

## 2024-11-20 VITALS — OXYGEN SATURATION: 97 % | SYSTOLIC BLOOD PRESSURE: 124 MMHG | DIASTOLIC BLOOD PRESSURE: 64 MMHG | HEART RATE: 64 BPM

## 2024-11-20 PROCEDURE — G0151 HHCP-SERV OF PT,EA 15 MIN: HCPCS

## 2024-11-21 ENCOUNTER — HOME CARE VISIT (OUTPATIENT)
Dept: HOME HEALTH SERVICES | Facility: HOME HEALTHCARE | Age: 74
End: 2024-11-21
Payer: COMMERCIAL

## 2024-11-21 VITALS — DIASTOLIC BLOOD PRESSURE: 60 MMHG | HEART RATE: 68 BPM | SYSTOLIC BLOOD PRESSURE: 118 MMHG | OXYGEN SATURATION: 99 %

## 2024-11-21 VITALS
SYSTOLIC BLOOD PRESSURE: 126 MMHG | RESPIRATION RATE: 16 BRPM | DIASTOLIC BLOOD PRESSURE: 68 MMHG | TEMPERATURE: 98.6 F | OXYGEN SATURATION: 97 % | HEART RATE: 70 BPM

## 2024-11-21 PROCEDURE — G0152 HHCP-SERV OF OT,EA 15 MIN: HCPCS | Performed by: OCCUPATIONAL THERAPIST

## 2024-11-21 PROCEDURE — G0300 HHS/HOSPICE OF LPN EA 15 MIN: HCPCS

## 2024-11-22 ENCOUNTER — RESULTS FOLLOW-UP (OUTPATIENT)
Dept: INTERNAL MEDICINE CLINIC | Facility: CLINIC | Age: 74
End: 2024-11-22

## 2024-11-22 DIAGNOSIS — J18.9 PNEUMONIA OF BOTH LOWER LOBES DUE TO INFECTIOUS ORGANISM: Primary | ICD-10-CM

## 2024-11-25 ENCOUNTER — HOME CARE VISIT (OUTPATIENT)
Dept: HOME HEALTH SERVICES | Facility: HOME HEALTHCARE | Age: 74
End: 2024-11-25
Payer: COMMERCIAL

## 2024-11-25 ENCOUNTER — TELEPHONE (OUTPATIENT)
Dept: INTERNAL MEDICINE CLINIC | Facility: CLINIC | Age: 74
End: 2024-11-25

## 2024-11-25 VITALS — OXYGEN SATURATION: 98 % | HEART RATE: 74 BPM | DIASTOLIC BLOOD PRESSURE: 60 MMHG | SYSTOLIC BLOOD PRESSURE: 110 MMHG

## 2024-11-25 VITALS — HEART RATE: 73 BPM | DIASTOLIC BLOOD PRESSURE: 86 MMHG | SYSTOLIC BLOOD PRESSURE: 136 MMHG | OXYGEN SATURATION: 99 %

## 2024-11-25 DIAGNOSIS — I48.91 ATRIAL FIBRILLATION WITH RVR (HCC): ICD-10-CM

## 2024-11-25 DIAGNOSIS — E78.5 HYPERLIPIDEMIA, UNSPECIFIED HYPERLIPIDEMIA TYPE: ICD-10-CM

## 2024-11-25 DIAGNOSIS — S16.1XXA STRAIN OF NECK MUSCLE, INITIAL ENCOUNTER: ICD-10-CM

## 2024-11-25 DIAGNOSIS — F41.9 ANXIETY: ICD-10-CM

## 2024-11-25 PROCEDURE — G0151 HHCP-SERV OF PT,EA 15 MIN: HCPCS

## 2024-11-25 PROCEDURE — G0152 HHCP-SERV OF OT,EA 15 MIN: HCPCS | Performed by: OCCUPATIONAL THERAPIST

## 2024-11-25 RX ORDER — DILTIAZEM HYDROCHLORIDE 360 MG/1
360 CAPSULE, EXTENDED RELEASE ORAL DAILY
Qty: 90 CAPSULE | Refills: 1 | Status: SHIPPED | OUTPATIENT
Start: 2024-11-25

## 2024-11-25 RX ORDER — METOPROLOL TARTRATE 50 MG
50 TABLET ORAL EVERY 12 HOURS SCHEDULED
Qty: 90 TABLET | Refills: 1 | Status: SHIPPED | OUTPATIENT
Start: 2024-11-25

## 2024-11-25 RX ORDER — METHOCARBAMOL 500 MG/1
500 TABLET, FILM COATED ORAL 3 TIMES DAILY PRN
Qty: 90 TABLET | Refills: 0 | Status: SHIPPED | OUTPATIENT
Start: 2024-11-25

## 2024-11-25 RX ORDER — LORAZEPAM 0.5 MG/1
0.5 TABLET ORAL
Qty: 90 TABLET | Refills: 0 | Status: SHIPPED | OUTPATIENT
Start: 2024-11-25

## 2024-11-25 RX ORDER — ATORVASTATIN CALCIUM 40 MG/1
40 TABLET, FILM COATED ORAL DAILY
Qty: 90 TABLET | Refills: 1 | Status: SHIPPED | OUTPATIENT
Start: 2024-11-25

## 2024-11-25 NOTE — CASE COMMUNICATION
[facility] Pulmonary Rehab assessment planned for next week.

## 2024-11-25 NOTE — CASE COMMUNICATION
OT performed discharge assessment today per plan of care with goals met.    OT assisted patient with therapeutic exercise, ADL training, review of safety with transfers/ mobility and assistance with recommendations for DME to maximize safety/ independence  with performance of self care/daily activities.    No further home health care OT services are planned at this time as patient has achieved OT goals.   SW/CM Discharge Plan  Anticipate that patient will be medically cleared for discharge today. Patient’s discharge destination is home. Patient to be picked up by son. Patient/family aware and agreeable to discharge plan.  Discharge plan communicated to MD, RN and CLARISSA.

## 2024-11-25 NOTE — TELEPHONE ENCOUNTER
Patient called the RX Refill Line. Message is being forwarded to the office.     Patient is requesting call back. would like Judy to look over medication list was in hospital end of October beginning of November.Patient would like refills on medications Judy would like patient to continue.     Please contact patient at [phone]

## 2024-11-27 ENCOUNTER — HOME CARE VISIT (OUTPATIENT)
Dept: HOME HEALTH SERVICES | Facility: HOME HEALTHCARE | Age: 74
End: 2024-11-27
Payer: COMMERCIAL

## 2024-11-27 VITALS — HEART RATE: 77 BPM | OXYGEN SATURATION: 98 % | SYSTOLIC BLOOD PRESSURE: 108 MMHG | DIASTOLIC BLOOD PRESSURE: 64 MMHG

## 2024-11-27 PROBLEM — A41.9 SEPSIS DUE TO PNEUMONIA (HCC): Status: RESOLVED | Noted: 2024-10-28 | Resolved: 2024-11-27

## 2024-11-27 PROBLEM — J18.9 SEPSIS DUE TO PNEUMONIA (HCC): Status: RESOLVED | Noted: 2024-10-28 | Resolved: 2024-11-27

## 2024-11-27 PROCEDURE — G0151 HHCP-SERV OF PT,EA 15 MIN: HCPCS

## 2024-11-27 NOTE — CASE COMMUNICATION
Patient is discharged from [facility] to [facility] with pulmonary rehab eval planned for next week.  She progressed with strength and endurance to indep level for transfers and gait in home with SPC.  Supervision recommended for outdoor and community amb short distances and for steps to exit home, shower transfer.  She is indep in [facility].  No further [facility] needed

## 2024-11-30 DIAGNOSIS — K59.00 CONSTIPATION, UNSPECIFIED CONSTIPATION TYPE: ICD-10-CM

## 2024-12-02 ENCOUNTER — NURSE TRIAGE (OUTPATIENT)
Age: 74
End: 2024-12-02

## 2024-12-02 NOTE — TELEPHONE ENCOUNTER
"Called pt to gather further information on her s/s.  PT states she takes Anoro Ellipta inhaler which can cause Thrush.  Pt states she hasn't had thrush in the past but states her kids did when she was breast feeding them.  Pt denies fever.  States that patches are mostly in the front of mouth, tongue and back of throat.  S/s started yesterday with pain 4/10.  Pt states she is gargling salt water and is not eating anything with sugar.  Pt is scheduled to see PCP tomorrow 12/3/24 at 1140.    Reason for Disposition   White patches that stick to tongue or inner cheek, which can be wiped off    Answer Assessment - Initial Assessment Questions  1. SYMPTOM: \"What's the main symptom you're concerned about?\" (e.g., chapped lips, dry mouth, lump, sores)      Front of mouth, tongue and throat    2. ONSET: \"When did the  S/s  start?\"      Yesterday.    3. PAIN: \"Is there any pain?\" If Yes, ask: \"How bad is it?\" (Scale: 1-10; mild, moderate, severe)      4-10    4. CAUSE: \"What do you think is causing the symptoms?\"      Thinks it is the Anora Ellipta    5. OTHER SYMPTOMS: \"Do you have any other symptoms?\" (e.g., fever, sore throat, toothache, swelling)      Sore throat,    Protocols used: Mouth Symptoms-Adult-[place]    "

## 2024-12-02 NOTE — TELEPHONE ENCOUNTER
Patient called stating she is not feeling well. She thinks she may have Thrush in her mouth. She states her tongue is red at the center and white all around. She mentioned that she takes medication which can cause thrush. PCP is not in the office today.  She declined an appt. With providers Danny and Roger. She would like to know if she should go to the  today? She can't come in tomorrow due to having another appt. Please advise.         Patient can be reached at [phone]

## 2024-12-03 ENCOUNTER — OFFICE VISIT (OUTPATIENT)
Dept: INTERNAL MEDICINE CLINIC | Facility: CLINIC | Age: 74
End: 2024-12-03
Payer: COMMERCIAL

## 2024-12-03 ENCOUNTER — APPOINTMENT (OUTPATIENT)
Dept: LAB | Facility: CLINIC | Age: 74
End: 2024-12-03
Payer: COMMERCIAL

## 2024-12-03 VITALS
HEIGHT: 68 IN | BODY MASS INDEX: 22.85 KG/M2 | HEART RATE: 85 BPM | DIASTOLIC BLOOD PRESSURE: 56 MMHG | OXYGEN SATURATION: 99 % | WEIGHT: 150.8 LBS | SYSTOLIC BLOOD PRESSURE: 104 MMHG | TEMPERATURE: 97.5 F

## 2024-12-03 DIAGNOSIS — Z13.29 SCREENING FOR THYROID DISORDER: ICD-10-CM

## 2024-12-03 DIAGNOSIS — Z13.1 SCREENING FOR DIABETES MELLITUS: ICD-10-CM

## 2024-12-03 DIAGNOSIS — Z13.0 SCREENING FOR DEFICIENCY ANEMIA: ICD-10-CM

## 2024-12-03 DIAGNOSIS — J18.9 PNEUMONIA OF BOTH LOWER LOBES DUE TO INFECTIOUS ORGANISM: ICD-10-CM

## 2024-12-03 DIAGNOSIS — E78.2 MIXED HYPERLIPIDEMIA: ICD-10-CM

## 2024-12-03 DIAGNOSIS — Z11.59 NEED FOR HEPATITIS C SCREENING TEST: ICD-10-CM

## 2024-12-03 DIAGNOSIS — E55.9 VITAMIN D DEFICIENCY: ICD-10-CM

## 2024-12-03 DIAGNOSIS — B37.0 ORAL CANDIDIASIS: Primary | ICD-10-CM

## 2024-12-03 LAB
ALBUMIN SERPL BCG-MCNC: 3.6 G/DL (ref 3.5–5)
ALP SERPL-CCNC: 92 U/L (ref 34–104)
ALT SERPL W P-5'-P-CCNC: 11 U/L (ref 7–52)
ANION GAP SERPL CALCULATED.3IONS-SCNC: 10 MMOL/L (ref 4–13)
AST SERPL W P-5'-P-CCNC: 17 U/L (ref 13–39)
BASOPHILS # BLD AUTO: 0.03 THOUSANDS/ΜL (ref 0–0.1)
BASOPHILS NFR BLD AUTO: 0 % (ref 0–1)
BILIRUB SERPL-MCNC: 0.49 MG/DL (ref 0.2–1)
BNP SERPL-MCNC: 94 PG/ML (ref 0–100)
BUN SERPL-MCNC: 22 MG/DL (ref 5–25)
CALCIUM SERPL-MCNC: 9.4 MG/DL (ref 8.4–10.2)
CHLORIDE SERPL-SCNC: 102 MMOL/L (ref 96–108)
CHOLEST SERPL-MCNC: 136 MG/DL (ref ?–200)
CO2 SERPL-SCNC: 29 MMOL/L (ref 21–32)
CREAT SERPL-MCNC: 1.38 MG/DL (ref 0.6–1.3)
EOSINOPHIL # BLD AUTO: 0.01 THOUSAND/ΜL (ref 0–0.61)
EOSINOPHIL NFR BLD AUTO: 0 % (ref 0–6)
ERYTHROCYTE [DISTWIDTH] IN BLOOD BY AUTOMATED COUNT: 15 % (ref 11.6–15.1)
GFR SERPL CREATININE-BSD FRML MDRD: 38 ML/MIN/1.73SQ M
GLUCOSE SERPL-MCNC: 113 MG/DL (ref 65–140)
HCT VFR BLD AUTO: 29.6 % (ref 34.8–46.1)
HDLC SERPL-MCNC: 50 MG/DL
HGB BLD-MCNC: 10.2 G/DL (ref 11.5–15.4)
IMM GRANULOCYTES # BLD AUTO: 0.08 THOUSAND/UL (ref 0–0.2)
IMM GRANULOCYTES NFR BLD AUTO: 1 % (ref 0–2)
LDLC SERPL CALC-MCNC: 65 MG/DL (ref 0–100)
LYMPHOCYTES # BLD AUTO: 0.95 THOUSANDS/ΜL (ref 0.6–4.47)
LYMPHOCYTES NFR BLD AUTO: 13 % (ref 14–44)
MCH RBC QN AUTO: 33.2 PG (ref 26.8–34.3)
MCHC RBC AUTO-ENTMCNC: 34.5 G/DL (ref 31.4–37.4)
MCV RBC AUTO: 96 FL (ref 82–98)
MONOCYTES # BLD AUTO: 0.85 THOUSAND/ΜL (ref 0.17–1.22)
MONOCYTES NFR BLD AUTO: 12 % (ref 4–12)
NEUTROPHILS # BLD AUTO: 5.48 THOUSANDS/ΜL (ref 1.85–7.62)
NEUTS SEG NFR BLD AUTO: 74 % (ref 43–75)
NONHDLC SERPL-MCNC: 86 MG/DL
NRBC BLD AUTO-RTO: 0 /100 WBCS
PLATELET # BLD AUTO: 448 THOUSANDS/UL (ref 149–390)
PMV BLD AUTO: 9.5 FL (ref 8.9–12.7)
POTASSIUM SERPL-SCNC: 4.6 MMOL/L (ref 3.5–5.3)
PROT SERPL-MCNC: 6.9 G/DL (ref 6.4–8.4)
RBC # BLD AUTO: 3.07 MILLION/UL (ref 3.81–5.12)
SODIUM SERPL-SCNC: 141 MMOL/L (ref 135–147)
TRIGL SERPL-MCNC: 107 MG/DL (ref ?–150)
TSH SERPL DL<=0.05 MIU/L-ACNC: 1.05 UIU/ML (ref 0.45–4.5)
WBC # BLD AUTO: 7.4 THOUSAND/UL (ref 4.31–10.16)

## 2024-12-03 PROCEDURE — 99213 OFFICE O/P EST LOW 20 MIN: CPT | Performed by: PHYSICIAN ASSISTANT

## 2024-12-03 PROCEDURE — 36415 COLL VENOUS BLD VENIPUNCTURE: CPT

## 2024-12-03 PROCEDURE — 86803 HEPATITIS C AB TEST: CPT

## 2024-12-03 PROCEDURE — 82306 VITAMIN D 25 HYDROXY: CPT

## 2024-12-03 PROCEDURE — 83880 ASSAY OF NATRIURETIC PEPTIDE: CPT

## 2024-12-03 PROCEDURE — G2211 COMPLEX E/M VISIT ADD ON: HCPCS | Performed by: PHYSICIAN ASSISTANT

## 2024-12-03 PROCEDURE — 80053 COMPREHEN METABOLIC PANEL: CPT

## 2024-12-03 PROCEDURE — 84443 ASSAY THYROID STIM HORMONE: CPT

## 2024-12-03 PROCEDURE — 80061 LIPID PANEL: CPT

## 2024-12-03 PROCEDURE — 85025 COMPLETE CBC W/AUTO DIFF WBC: CPT

## 2024-12-03 RX ORDER — NYSTATIN 100000 [USP'U]/ML
500000 SUSPENSION ORAL 4 TIMES DAILY
Qty: 473 ML | Refills: 0 | Status: SHIPPED | OUTPATIENT
Start: 2024-12-03

## 2024-12-03 RX ORDER — DOCUSATE SODIUM 100 MG/1
100 CAPSULE, LIQUID FILLED ORAL 2 TIMES DAILY
Qty: 60 CAPSULE | Refills: 0 | Status: SHIPPED | OUTPATIENT
Start: 2024-12-03

## 2024-12-04 PROBLEM — B37.0 ORAL CANDIDIASIS: Status: ACTIVE | Noted: 2024-12-04

## 2024-12-04 LAB — HCV AB SER QL: NORMAL

## 2024-12-04 NOTE — PROGRESS NOTES
Name: Corinne A Longo      : 1950      MRN: [id number]  Encounter Provider: Rayna Hernandez PA-C  Encounter Date: 12/3/2024   Encounter department: [facility]  :  Assessment & Plan  Oral candidiasis  Start nystatin oral rinse. Directions for use and possible side effects discussed and patient verbalized understanding of these.      Orders:    nystatin (MYCOSTATIN) 500,000 units/5 mL suspension; Apply 5 mL (500,000 Units total) to the mouth or throat 4 (four) times a day          Depression Screening and Follow-up Plan: Patient was screened for depression during today's encounter. They screened negative with a PHQ-9 score of 0.      History of Present Illness     Pt presents for evaluation of thick white/yellow coating on her tongue along with distorted sensation of her tongue noting that it feels like it is burning. She has tried gargling with apple cider vinegar without benefit. She notes symptoms began yesterday. She has severe COPD and uses inhalers and was also recently on multiple antibiotics while hospitalized for pneumonia.       Review of Systems   Constitutional:  Negative for chills and fever.   HENT:  Negative for congestion, ear pain, hearing loss, postnasal drip, rhinorrhea, sinus pressure, sinus pain, sore throat and trouble swallowing.    Eyes:  Negative for pain and visual disturbance.   Respiratory:  Negative for cough, chest tightness, shortness of breath and wheezing.    Cardiovascular: Negative.  Negative for chest pain, palpitations and leg swelling.   Gastrointestinal:  Negative for abdominal pain, blood in stool, constipation, diarrhea, nausea and vomiting.   Endocrine: Negative for cold intolerance, heat intolerance, polydipsia, polyphagia and polyuria.   Genitourinary:  Negative for difficulty urinating, dysuria, flank pain and urgency.   Musculoskeletal:  Negative for arthralgias, back pain, gait problem and myalgias.   Skin:  Negative for rash.  "  Allergic/Immunologic: Negative.    Neurological:  Negative for dizziness, weakness, light-headedness and headaches.   Hematological: Negative.    Psychiatric/Behavioral:  Negative for behavioral problems, dysphoric mood and sleep disturbance. The patient is not nervous/anxious.           Objective   /56   Pulse 85   Temp 97.5 °F (36.4 °C)   Ht 5' 8\" (1.727 m)   Wt 68.4 kg (150 lb 12.8 oz)   SpO2 99%   BMI 22.93 kg/m²      Physical Exam  Vitals and nursing note reviewed.   Constitutional:       General: She is not in acute distress.     Appearance: Normal appearance. She is well-developed. She is not diaphoretic.   HENT:      Head: Normocephalic and atraumatic.      Right Ear: External ear normal.      Left Ear: External ear normal.      Nose: Nose normal.      Mouth/Throat:      Pharynx: No oropharyngeal exudate.     Eyes:      General: No scleral icterus.        Right eye: No discharge.         Left eye: No discharge.      Conjunctiva/sclera: Conjunctivae normal.      Pupils: Pupils are equal, round, and reactive to light.   Neck:      Thyroid: No thyromegaly.   Cardiovascular:      Rate and Rhythm: Normal rate and regular rhythm.      Heart sounds: Normal heart sounds. No murmur heard.     No friction rub. No gallop.   Pulmonary:      Effort: Pulmonary effort is normal. No respiratory distress.      Breath sounds: Normal breath sounds. No wheezing or rales.   Abdominal:      General: Bowel sounds are normal. There is no distension.      Palpations: Abdomen is soft.      Tenderness: There is no abdominal tenderness.   Musculoskeletal:         General: No tenderness or deformity. Normal range of motion.      Cervical back: Normal range of motion and neck supple.   Skin:     General: Skin is warm and dry.   Neurological:      Mental Status: She is alert and oriented to person, place, and time.      Cranial Nerves: No cranial nerve deficit.   Psychiatric:         Behavior: Behavior normal.         " Thought Content: Thought content normal.         Judgment: Judgment normal.

## 2024-12-04 NOTE — ASSESSMENT & PLAN NOTE
Start nystatin oral rinse. Directions for use and possible side effects discussed and patient verbalized understanding of these.      Orders:    nystatin (MYCOSTATIN) 500,000 units/5 mL suspension; Apply 5 mL (500,000 Units total) to the mouth or throat 4 (four) times a day

## 2024-12-05 LAB — 25(OH)D3 SERPL-MCNC: 118.9 NG/ML (ref 30–100)

## 2024-12-06 DIAGNOSIS — J18.9 PNEUMONIA OF BOTH LOWER LOBES DUE TO INFECTIOUS ORGANISM: Primary | ICD-10-CM

## 2024-12-11 NOTE — PROGRESS NOTES
Pulmonary Rehabilitation   Assessment and Individualized Treatment Plan  INITIAL       Today's date: 2024  # of Exercise Sessions Completed: 1  Patient name: Corinne A Longo     : 1950       MRN: [id number]  Referring Physician: Cece Ro MD  Pulmonologist: Rosy Rodriges DO / ROGELIO Crawley  Provider: Oliva  Clinician: Erika Sacks, MS    Dx:  COPD  Date of onset: Hospitalized 10/28/2024 - 2024      Treatment is tailored to this patient's individual needs.  The ITP was reviewed with the patient and all questions were answered to their satisfaction.  Additional ITP documentation can be found electronically including daily and monthly exercise summaries, daily session notes with ECG summaries, education notes, daily medication reconciliation, and daily physician supervision.      COMMENTS:  Corinne was seen today for her initial evaluation to begin pulmonary rehab for the diagnosis of COPD. She was hospitalized 10/28 - 2024 for sepsis due to pneumonia and COPD. She states she is beginning to feel better post hospitalization.     She completed an initial 6MWT on 2L/min walking 675 ft (1.98 METs) maintaining an SpO2 of 94-97% while rating her MERCHANT a 4/10.     Her program will be progressed as tolerated and she will receive educational handouts.     She will call the [place] department when she gets home and can speak with her boyfriend regarding scheduling as he drives her to all of her appointments.       ASSESSMENT      Medical History:   Past Medical History:   Diagnosis Date    Allergic     Allergic rhinitis     2015  RESOLVED    Asthma     Atrial fibrillation (HCC)     Bronchitis     Bunion     COPD (chronic obstructive pulmonary disease) (HCC)     home O2 at night at home 2L    Coronary artery disease     AFIB    Depression     Emphysema lung (HCC)     Fibroadenoma of breast     Gastroesophageal reflux disease without esophagitis 2023    Hyperlipidemia      Hypertension     Lung disease 2008    COPD    Memory difficulties 07/23/2019    Post-menopausal     Post-menopausal     Sepsis due to pneumonia ([facility]) 10/28/2024    Spondylisthesis     Stage 3 chronic kidney disease, unspecified whether stage 3a or 3b CKD ([facility]) 02/07/2022    Vitamin D deficiency        Family History:  Family History   Problem Relation Age of Onset    Hypertension Mother     Alcohol abuse Mother     Hypertension Father     Diabetes Father     Prostate cancer Father     Cancer Father         Prostate    Hearing loss Father     Vitamin D deficiency Sister     Diverticulitis Sister     Depression Sister     No Known Problems Sister     Heart failure Brother     Lung cancer Brother     Cancer Brother         Stage four lung cancer    Celiac disease Daughter     Alcohol abuse Daughter     Substance Abuse Daughter     Eczema Daughter     Fibromyalgia Daughter     Substance Abuse Daughter     Alcohol abuse Daughter     ADD / ADHD Daughter     Bipolar disorder Daughter     Anxiety disorder Daughter     Ulcerative colitis Daughter     No Known Problems Maternal Aunt     Heart disease Brother     Substance Abuse Brother     Drug abuse Brother     Breast cancer Neg Hx        Allergies:   Bupropion, Ace inhibitors, Citalopram, Mixed ragweed, Pollen extract, and Adhesive [medical tape]    Current Medications:   Current Outpatient Medications   Medication Sig Dispense Refill    apixaban (ELIQUIS) 5 mg Take 1 tablet (5 mg total) by mouth 2 (two) times a day 180 tablet 1    ascorbic acid (VITAMIN C) 500 mg tablet Take 500 mg by mouth daily      atorvastatin (LIPITOR) 40 mg tablet Take 1 tablet (40 mg total) by mouth daily 90 tablet 1    Calcium Citrate-Vitamin D (CALCIUM CITRATE + PO) Take 300 mg by mouth daily.      calcium citrate-vitamin D 315 mg-5 mcg tablet Take 2 tablets by mouth 2 (two) times a day      cetirizine (ZyrTEC) 10 mg tablet Take 10 mg by mouth daily      Cholecalciferol (VITAMIN D3) 5000 units  CAPS Take 1 capsule by mouth daily      clotrimazole-betamethasone (LOTRISONE) 1-0.05 % cream Apply topically 2 (two) times a day (Patient taking differently: Apply 1 Application topically 2 (two) times a day Bilateral breasts) 45 g 1    diltiazem (CARDIZEM CD) 360 MG 24 hr capsule Take 1 capsule (360 mg total) by mouth daily 90 capsule 1    docusate sodium (COLACE) 100 mg capsule take 1 capsule by mouth twice a day (Patient not taking: Reported on 12/3/2024) 60 capsule 0    guaiFENesin (MUCINEX) 600 mg 12 hr tablet Take 1 tablet (600 mg total) by mouth every 12 (twelve) hours 180 tablet 3    ipratropium-albuterol (DUO-NEB) 0.5-2.5 mg/3 mL nebulizer solution Take 3 mL by nebulization every 6 (six) hours as needed for wheezing or shortness of breath 1080 mL 0    L-Lysine 500 MG CAPS Take 1 capsule by mouth daily      levalbuterol (Xopenex HFA) 45 mcg/act inhaler Inhale 1-2 puffs every 4 (four) hours as needed for wheezing 45 g 0    LORazepam (Ativan) 0.5 mg tablet Take 1 tablet (0.5 mg total) by mouth daily at bedtime 90 tablet 0    methocarbamol (ROBAXIN) 500 mg tablet Take 1 tablet (500 mg total) by mouth 3 (three) times a day as needed for muscle spasms 90 tablet 0    metoprolol tartrate (LOPRESSOR) 50 mg tablet Take 1 tablet (50 mg total) by mouth every 12 (twelve) hours 90 tablet 1    mometasone (NASONEX) 50 mcg/act nasal spray 2 sprays into each nostril daily 51 g 0    MULTIPLE VITAMINS-CALCIUM PO Take by mouth daily      multivitamin-minerals (CENTRUM ADULTS) tablet Take 1 tablet by mouth daily      nystatin (MYCOSTATIN) 500,000 units/5 mL suspension Apply 5 mL (500,000 Units total) to the mouth or throat 4 (four) times a day 473 mL 0    Omega-3 Fatty Acids (fish oil) 1,000 mg Take 1,000 mg by mouth 2 (two) times a day.      oxygen gas Inhale 2 L/min continuous. via nasal cannula      Probiotic Product (PROBIOTIC-10 PO) Take 1 capsule by mouth daily      triamcinolone (KENALOG) 0.1 % cream Apply topically 2  (two) times a day (Patient taking differently: Apply 1 Application topically 2 (two) times a day RIGHT EAR) 15 g 0    umeclidinium-vilanterol (Anoro Ellipta) 62.5-25 mcg/actuation inhaler Inhale 1 puff daily 180 each 1     No current facility-administered medications for this visit.       Physical Limitations: Decreased strength, foot pain due to osteopenia     Fall Risk: Low   Comments: Ambulates with a steady gait with no assist device    Cultural needs: none    PFT:  Yes   FEV1/FVC ratio of 47%   FEV1 of 52% predicted  Moderate to severe obstruction    Medication compliance: Yes  Comments: Pt reports to be compliant with medications      Pulmonary Disease Risk Factors:  second hand smoke  smoking    Sleep Disorders:   obstructive sleep apnea:  CPCP/BiPAP:  no      EXERCISE ASSESSMENT:      Initial Fitness Assessment:   6MWT:  675 ft = 1.98 METs  Rest: HR 73, /56, SpO2 98% on 2L/min, 0/10 SOB  Exercise: HR 92, /58, SpO2 94-97% on 2L/min, 4/10 MERCHANT  Recovery: HR 70, /54, SpO2 98% on 2L/min, 0/10 SOB      Current Functional Status:  Occupation: retired  Recreation/Physical activity: Sedentary  ADL’s:Capable of performing light ADLs only  [place]: Capable of performing light ADLs only  Exercise:  None  Home exercise equipment: None  Other Comments: None    SMART Exercise Goals:   reduced score on CAT, improved 6MWT distance, reduced dyspnea during exercise, improved exercise tolerance based on peak METs tolerated in pulmonary rehab exercise session, and SpO2 >88% during exercise    Patient Specific EXERCISE GOALS:     reduced number of COPD exacerbations, attend pulmonary rehab regularly, decrease sitting time at home, reduced pulmonary related hospital readmissions , and increased energy    PSYCHOSOCIAL ASSESSMENT:    Date of last assessment: 12/12/2024  Depression screening:  PHQ-9 = 3    Interpretation:  1-4 = Minimal Depression  Anxiety screening:  LOUIS-7 = 5    Interpretation: 5-9 = Mild  "anxiety    Pt self-report of depression and anxiety   Patient has a history of depression and anxiety. Patient is prescribed Ativan.    Self-reported stress level:  Patient states typically she is a low stress level (3/10) daily but certain things (going in public, going somewhere new, rushing around) will cause her stress level to be high (7/10)  Stress Management: practice Relaxation Techniques and keep a positive mindset    Quality of Life Screen:  (Higher score indicates disease impact on QOL)  [facility] COOP score: 28/40      CAT: 26/40      Shortness of breath questionnaire: 67/120    Social Support:   significant other, children, and friends  Community/Social Activities: Spend time with family and friends    SMART Goals:    Reduce perceived stress to 1-3/10, improved [facility] QOL < 27, improved sleeping habits, feel less tired with more energy, and Improved appetite control    Patient Specific PSYCHOCOSOCIAL GOALS:    maintain compliance with medical therapy and spend time with family     Psychosocial Assessment as it relates to rehabilitation: Patient denies issues with his/her family or home life that may affect their rehabilitation efforts.       NUTRITION ASSESSMENT:    Initial Weight:  147  Current Weight:     Height:   Ht Readings from Last 1 Encounters:   12/03/24 5' 8\" (1.727 m)       Rate Your Plate Score: 71/81    Self-reported Current Dietary Habits:  Patient states since her boyfriend had CABG a few years ago they have been eating healthier. She states that she hasn't had much of an appetite since she was hospitalized and that food is finally starting to taste better.     ETOH:   Social History     Substance and Sexual Activity   Alcohol Use Yes    Comment: I might have a glass of wine every two or three months       SMART Goals:   Improved Rate Your Plate score  >58    Patient Specific NUTRITION GOALS:    increase water intake to reduce/thin mucus production decrease caloric intake weight gain " increased muscle mass      OTHER CORE COMPONENT ASSESSMENT:    Hospitalizations in the past year: Hospitalized 10/28/2024 - 2024 for sepsis due to pneumonia    Oxygen needs:   Rest:  supplemental O2 via nasal cannula @ 2L/min   Exercise/physical activity:  supplemental O2 via nasal cannula @ 2L/min   Sleep:   supplemental O2 via nasal cannula @ 2L/min     Does Pt monitor home SpO2? yes   Average SpO2 at rest:  92-94%   Average SpO2 with ADLs/physical activity:  Does not check      Use of Rescue Inhaler: Patient states she will only use her rescue inhaler if absolutely necessary because it makes her feel jittery.    Use of Maintenance Inhaler: Yes; 1x/day (morning)    Use of Nebulizer Treatments:  Patient states she has a nebulizer that she uses PRN. She states she mainly only used it during seasonal allergies but has been using it a bit more frequently since her hospitalization due to ongoing cough and increased shortness of breath.     Patient practices breathing techniques at home:  Yes and Reviewed with patient on:  12/3/85937    CAD Risk Factors:  Cholesterol: No  HTN: No  DM: No  Obesity: No   Smoking: Patient states she quit smoking on 2023 but believes she relapsed right before her hospitalization. She does not remember smoking any cigarettes but states her daughter found a pack. She has remained smoke free since she was hospitalized and avoids secondhand smoke.    SMART Core Component Goals:   consistent, controlled resting BP < 130/80, medication compliance, and demonstrate pursed lipped breathing    Patient Specific CORE COMPONENT GOALS:    medication compliance, compliance with supplemental oxygen, and monitor home pulse oximetry      Blood Pressure:    Restin/56  Exercise: 138/58  Recovery: 120/54      INDIVIDUALIZED TREATMENT PLAN    The patient is agreeable to attend 24 pulmonary rehab exercise sessions.      EXERCISE GOALS AND PLAN    PHYSCIAN PRESCRIBED EXERCISE    Current Aerobic  Exercise Prescription       Frequency: 2-3 days/week   Supplement with home exercise 2+ days/wk as tolerated        Minutes: 25-35         METS: 2-3              SpO2: >88%              RPD: 4-6                      HR: RHR +30-40bpm   RPE: 4-5         Modalities: Treadmill, UBE, NuStep, and Recumbent bike      Aerobic Exercise Prescription Plan for Progression:    Frequency: 2-3 days/week    Supplement with home exercise 2+ days/wk as tolerated       Minutes: 35-45        METS: 3-4              SpO2: >88%              RPD: 4-6                      HR:RHR +30-40bpm   RPE: 4-5        Modalities: Treadmill, UBE, NuStep, and Recumbent bike        Supplemental Oxygen Needs with Exercise:  supplemental O2 2L/min via nasal canula.    Strength trainin-3 days / week  12-15 repetitions  1-2 sets per modality    Modalities: Leg Press, Chest Press, and Pull Downs    Education: pursed lipped breathing, diaphragmatic breathing, RPE scale, RPD scale, O2 saturation monitoring, appropriate O2 response to exercise, and education class: Exercise For The Pulmonary Patient    Progress toward Exercise Goals:    Reviewed Pt goals and determined plan of care, Will continue to educate and progress as tolerated.    Exercise Plan:  Titrate supplemental oxygen as needed to maintain SpO2>88% with exercise, learn to conserve energy with ADLs , practice diaphragmatic breathing, reduce time sitting at home, utilize PLB with physical activity, and begin a home exercise program     Readiness to change: Preparation:  (Getting ready to change)       NUTRITION GOALS AND PLAN    Progress toward Nutritional goals:    Reviewed Pt goals and determined plan of care, Will continue to educate and progress as tolerated.    Nutrition Plan: group class: Reading Food Labels and group Class: Heart Healthy Eating    SMART goals are based Rate Your Plate Dietary Self-Assessment. These are the areas in which the patient could score higher on the assessment.   Goals include recommendations for a heart healthy diet based on American Heart Association.    Nutrition Education: heart healthy eating principles  maintaining hydration  group class: Heart Healthy Eating  group class:  Label Reading  group class: healthy eating for managing pulmonary illness    Readiness to change: Preparation:  (Getting ready to change)       PSYCHOSOCIAL GOALS AND PLAN    Information to begin using [facility] was provided as well as contact information for counseling through  [facility].    Progress toward Psychosocial goals:    Reviewed Pt goals and determined plan of care, Will continue to educate and progress as tolerated.    Psychosocial: Class: Stress and Your Health, Class:  Stress and Pulmonary Disease, Enroll in [facility], Practice relaxation techniques, Keep a positive mindset, and Enjoy family    Psychosocial Education: stress management techniques, benefits of enrolling in [facility], depression and pulmonary disease, tools to manage fear/anxiety related to becoming SOB, class:  Stress and Your Health , and class:  Stress and Pulmonary Disease    Readiness to change: Preparation:  (Getting ready to change)       OTHER CORE COMPONENTS GOALS AND PLAN    Tobacco Use Intervention:     Patient states she quit smoking on 7/4/2023 (she averaged 0.25-0.3 ppd for 53.5 years) but believes she relapsed right before her hospitalization. She does not remember smoking any cigarettes but states her daughter found a pack. She has remained smoke free since she was hospitalized and avoids secondhand smoke.    Oxygen Goal: Maintain SpO2>88% during exercise or as advised by pulmonolgist    Progress toward Core Component goals:    Reviewed Pt goals and determined plan of care, Will continue to educate and progress as tolerated.    Core Component Plan: medication compliance, complete abstention from smoking, avoid places with second hand smoke, group class: Pulmonary Anatomy and Physiology,  and group class:  Pulmonary Medications    Core Component Education:  pathophysiology of pulmonary disease, preventing infections, relapse education, control coughing, harmonica therapy, education: Pulmonary Anatomy and Physiology, education:  Pulmonary Medications, education:  Clearing Secretions, and education: Oxygen    Readiness to change: Preparation:  (Getting ready to change)

## 2024-12-12 ENCOUNTER — CLINICAL SUPPORT (OUTPATIENT)
Dept: PULMONOLOGY | Facility: HOSPITAL | Age: 74
End: 2024-12-12
Attending: INTERNAL MEDICINE
Payer: COMMERCIAL

## 2024-12-12 DIAGNOSIS — J44.9 COPD (CHRONIC OBSTRUCTIVE PULMONARY DISEASE) (HCC): Primary | ICD-10-CM

## 2024-12-12 PROBLEM — J18.9 PNEUMONIA OF BOTH LOWER LOBES DUE TO INFECTIOUS ORGANISM: Status: RESOLVED | Noted: 2024-11-12 | Resolved: 2024-12-12

## 2024-12-12 PROCEDURE — 94626 PHY/QHP OP PULM RHB W/MNTR: CPT

## 2024-12-17 ENCOUNTER — HOSPITAL ENCOUNTER (OUTPATIENT)
Dept: CT IMAGING | Facility: HOSPITAL | Age: 74
Discharge: HOME | End: 2024-12-17
Payer: COMMERCIAL

## 2024-12-17 DIAGNOSIS — J18.9 PNEUMONIA OF BOTH LOWER LOBES DUE TO INFECTIOUS ORGANISM: ICD-10-CM

## 2024-12-17 PROCEDURE — 71250 CT THORAX DX C-: CPT

## 2024-12-19 ENCOUNTER — TELEPHONE (OUTPATIENT)
Age: 74
End: 2024-12-19

## 2024-12-19 NOTE — TELEPHONE ENCOUNTER
Pt called in regards to labs that were drawn on 12/5. She is concerned about the abnormal readings. She is mostly concerned about the HBG because she states she was being evaluated for infection. Looking for recommendations from Rayna WATERS. Please call patient at [phone]

## 2024-12-19 NOTE — TELEPHONE ENCOUNTER
Labs from 12/3 overall are ok. Her white cells are now normal showing infection is resolved. She is slightly anemic so I think she would benefit from taking an iron supplement

## 2024-12-23 ENCOUNTER — OFFICE VISIT (OUTPATIENT)
Dept: CARDIOLOGY CLINIC | Facility: CLINIC | Age: 74
End: 2024-12-23
Payer: COMMERCIAL

## 2024-12-23 VITALS
HEIGHT: 68 IN | HEART RATE: 72 BPM | SYSTOLIC BLOOD PRESSURE: 130 MMHG | WEIGHT: 155.6 LBS | BODY MASS INDEX: 23.58 KG/M2 | DIASTOLIC BLOOD PRESSURE: 72 MMHG

## 2024-12-23 DIAGNOSIS — I48.0 PAROXYSMAL ATRIAL FIBRILLATION (HCC): Primary | ICD-10-CM

## 2024-12-23 DIAGNOSIS — J44.9 COPD, SEVERE (HCC): ICD-10-CM

## 2024-12-23 PROCEDURE — 99214 OFFICE O/P EST MOD 30 MIN: CPT | Performed by: INTERNAL MEDICINE

## 2024-12-23 RX ORDER — FERROUS SULFATE 325(65) MG
325 TABLET, DELAYED RELEASE (ENTERIC COATED) ORAL
COMMUNITY

## 2024-12-23 NOTE — PROGRESS NOTES
" Patient ID: Corinne A Longo is a 74 y.o. female.        Plan:      Assessment & Plan  Paroxysmal atrial fibrillation (HCC)   Afib Likely to recur.  CHADS2-VASc score is elevated.    Continue Eliquis 5 mg twice daily.  Now on diltiazem once daily dosing as well as metoprolol.    COPD, severe (HCC)  Remains on oxygen.      Follow up Plan/Other summary comments:  March follow-up.    HPI: Patient seen in follow-up today regarding the above.  Since last visit she was hospitalized for sepsis and worsening COPD.  There was confusion.  Periodically she was in rapid A-fib.  She has slowly improved since discharge.    To reiterate, she has paroxysmal atrial fibrillation.  Because of significant epistaxis on Eliquis, she had reverted to aspirin but now back on this again.        Most recent or relevant cardiac/vascular testing:     TTE 10/30/2024: Normal LVfxn.      Past Surgical History:   Procedure Laterality Date    ADENOIDECTOMY      BREAST CYST EXCISION Bilateral     benign-1987, 2002, 2007    BUNIONECTOMY Bilateral     CATARACT EXTRACTION, BILATERAL      EYE SURGERY      Cataracts removed    MOLE REMOVAL      lip    TONSILLECTOMY      TUBAL LIGATION         Lipid Profile: Reviewed      Review of Systems   10  point ROS  was otherwise non pertinent or negative except as per HPI or as below.   Gait:  Normal.        Objective:     /72   Pulse 72   Ht 5' 8\" (1.727 m)   Wt 70.6 kg (155 lb 9.6 oz)   BMI 23.66 kg/m²     PHYSICAL EXAM:    General:  Normal appearance in no distress.  Eyes:  Anicteric.  Oral mucosa:  Moist.  Neck:  No JVD. Carotid upstrokes are brisk without bruits.  No masses.  Chest:  Clear to auscultation.  Cardiac:  No palpable PMI.  Normal S1 and S2.  No murmur gallop or rub.  Abdomen:  Soft and nontender. No palpable organomegaly or aortic enlargement.  Extremities:  No peripheral edema.  Musculoskeletal:  Symmetric.   Vascular:  Femoral pulses are brisk without bruits.  Popliteal pulses are " intact bilaterally.   Pedal pulses are intact.  Neuro:  Grossly symmetric.  Psych:  Alert and oriented x3.      Meds reviewed.    Past Medical History:   Diagnosis Date    Allergic     Allergic rhinitis     2015  RESOLVED    Asthma     Atrial fibrillation ([facility])     Bronchitis     Bunion     COPD (chronic obstructive pulmonary disease) ([facility])     home O2 at night at home 2L    Coronary artery disease     AFIB    Depression     Emphysema lung ([facility])     Fibroadenoma of breast     Gastroesophageal reflux disease without esophagitis 2023    Hyperlipidemia     Hypertension     Lung disease     COPD    Memory difficulties 2019    Post-menopausal     Post-menopausal     Sepsis due to pneumonia ([facility]) 10/28/2024    Spondylisthesis     Stage 3 chronic kidney disease, unspecified whether stage 3a or 3b CKD ([facility]) 2022    Vitamin D deficiency            Social History     Tobacco Use   Smoking Status Every Day    Current packs/day: 0.00    Average packs/day: 0.3 packs/day for 53.5 years (13.4 ttl pk-yrs)    Types: Cigarettes    Start date: 1970    Last attempt to quit: 2023    Years since quittin.4    Passive exposure: Past   Smokeless Tobacco Never

## 2024-12-23 NOTE — PATIENT INSTRUCTIONS
Don't forget about a little bit of water in the legs.  This is partially related to medication that you are on to help with controlling atrial fibrillation.  If it is not irritating recurrent significant problem I can prescribe a gentler water pill to be used as needed.

## 2024-12-23 NOTE — ASSESSMENT & PLAN NOTE
Afib Likely to recur.  CHADS2-VASc score is elevated.    Continue Eliquis 5 mg twice daily.  Now on diltiazem once daily dosing as well as metoprolol.

## 2024-12-26 ENCOUNTER — RESULTS FOLLOW-UP (OUTPATIENT)
Dept: PULMONOLOGY | Facility: CLINIC | Age: 74
End: 2024-12-26

## 2024-12-30 ENCOUNTER — CLINICAL SUPPORT (OUTPATIENT)
Dept: PULMONOLOGY | Facility: HOSPITAL | Age: 74
End: 2024-12-30
Attending: INTERNAL MEDICINE
Payer: COMMERCIAL

## 2024-12-30 ENCOUNTER — TELEPHONE (OUTPATIENT)
Age: 74
End: 2024-12-30

## 2024-12-30 DIAGNOSIS — J44.9 CHRONIC OBSTRUCTIVE PULMONARY DISEASE, UNSPECIFIED COPD TYPE (HCC): Primary | ICD-10-CM

## 2024-12-30 PROCEDURE — 94626 PHY/QHP OP PULM RHB W/MNTR: CPT

## 2024-12-30 NOTE — TELEPHONE ENCOUNTER
Pt called, had some questions about 2 medications. Her Lorazepam she has about 8 pills left, she contemplated weening off of it, wondering what pcp suggests. Also, had a question concerning her Ditiazem. She has had some swelling in her feet/ankles. Also experiencing a reaction with her taste buds, some foods she eats just taste terrible. Met with Dr. Reddy, cardiologist, and said those can be side effects from the meds. He said he could prescribe her a water pill to help with the swelling, but pt wanted to check with pcp first. Is there an alternative to this script with less side effects? Please advise and give pt a call back.

## 2024-12-31 NOTE — TELEPHONE ENCOUNTER
She can attempt to wean, all she would have to do is take a half tab of the ativan until is gone since she is on a very low dose. The diltiazem is necessary so she should continue that, a water pill is reasonable and she may not need to take it daily, just as needed

## 2025-01-06 ENCOUNTER — TELEPHONE (OUTPATIENT)
Dept: INTERNAL MEDICINE CLINIC | Facility: CLINIC | Age: 75
End: 2025-01-06

## 2025-01-06 ENCOUNTER — APPOINTMENT (OUTPATIENT)
Dept: PULMONOLOGY | Facility: HOSPITAL | Age: 75
End: 2025-01-06
Attending: INTERNAL MEDICINE
Payer: COMMERCIAL

## 2025-01-06 NOTE — TELEPHONE ENCOUNTER
Phone call from patient to reschedule appointment for 1/29/25. Patient unhappy with how far out she will be rescheduled which was in May 2025. Patient upset because this happened either last year or the year prior and it pushed her wellness exam out months . Patient wants to speak with the  Nery Hernandez  to see if they can accommodate her sooner.  Please Advise. Thank you.

## 2025-01-07 ENCOUNTER — TELEPHONE (OUTPATIENT)
Dept: INTERNAL MEDICINE CLINIC | Facility: CLINIC | Age: 75
End: 2025-01-07

## 2025-01-07 NOTE — TELEPHONE ENCOUNTER
Spoke with pt this morning. Explained that I could get her in sooner in beginning of April for a follow up on medication or current issues for a 20 minute follow up however with the Annual visit it is a longer visit being 40 minutes and we are booking out for those until end of May. She was not happy with this. She did not want to schedule an appt at this time. I explained the longer she waited to schedule the farther out her appt would be. I did urge her several times to schedule now even if she wanted to cancel later. She declined at this time stating that she would call [facility] to discuss her care as she is certain they are dictating the time the provider must spend with each patient. I told her she was welcome to call back to schedule whenever she would like however I will be canceling her 1/29/25 appt. To which she understood.

## 2025-01-08 ENCOUNTER — NURSE TRIAGE (OUTPATIENT)
Age: 75
End: 2025-01-08

## 2025-01-08 DIAGNOSIS — R60.0 LOCALIZED EDEMA: Primary | ICD-10-CM

## 2025-01-08 RX ORDER — TRIAMTERENE AND HYDROCHLOROTHIAZIDE 37.5; 25 MG/1; MG/1
1 CAPSULE ORAL EVERY MORNING
Qty: 30 CAPSULE | Refills: 5 | Status: SHIPPED | OUTPATIENT
Start: 2025-01-08 | End: 2025-01-15

## 2025-01-08 NOTE — TELEPHONE ENCOUNTER
"Pt called in stating that when she saw Dr. Reddy on 12/23/24 she advised to call if her feet and ankle swelling becomes irritating. She states that it is irritating and at times she is unable to put her shoes on. She is asking if a \"gentler\" water pill can be prescribed.Pt states that a 30day supply can go to [facility] but any refills after that should go TO [facility].    Please advise     Answer Assessment - Initial Assessment Questions  1. ONSET: \"When did the swelling start?\" (e.g., minutes, hours, days)      On going   2. LOCATION: \"What part of the leg is swollen?\"  \"Are both legs swollen or just one leg?\"      B/l feet and ankles   3. SEVERITY: \"How bad is the swelling?\" (e.g., localized; mild, moderate, severe)      Mild   4. REDNESS: \"Does the swelling look red or infected?\"      denies  5. PAIN: \"Is the swelling painful to touch?\" If Yes, ask: \"How painful is it?\"   (Scale 1-10; mild, moderate or severe)      denies  6. FEVER: \"Do you have a fever?\" If Yes, ask: \"What is it, how was it measured, and when did it start?\"       Denies   7. CAUSE: \"What do you think is causing the leg swelling?\"      Unsure   8. MEDICAL HISTORY: \"Do you have a history of blood clots (e.g., DVT), cancer, heart failure, kidney disease, or liver failure?\"      Denies   9. RECURRENT SYMPTOM: \"Have you had leg swelling before?\" If Yes, ask: \"When was the last time?\" \"What happened that time?\"      Yes on going   10. OTHER SYMPTOMS: \"Do you have any other symptoms?\" (e.g., chest pain, difficulty breathing)        denies    Protocols used: Leg Swelling and Edema-Adult-[place]    "

## 2025-01-09 ENCOUNTER — RA CDI HCC (OUTPATIENT)
Dept: OTHER | Facility: HOSPITAL | Age: 75
End: 2025-01-09

## 2025-01-09 NOTE — PROGRESS NOTES
Pulmonary Rehabilitation   Assessment and Individualized Treatment Plan  30 DAY      Today's date: 2025  # of Exercise Sessions Completed: 2  Patient name: Corinne A Longo     : 1950       MRN: [id number]  Referring Physician: Cece Ro MD  Pulmonologist: Rosy Rodriges DO / ROGELIO Crawley  Provider: Oliva  Clinician: Adriana Junior, MPT, CCRP    Dx:  COPD  Date of onset: Hospitalized 10/28/2024 - 2024      Treatment is tailored to this patient's individual needs.  The ITP was reviewed with the patient and all questions were answered to their satisfaction.  Additional ITP documentation can be found electronically including daily and monthly exercise summaries, daily session notes with  summaries, education notes, daily medication reconciliation, and daily physician supervision.      COMMENTS:  Corinne performed one exercise session during this reporting period of pulmonary rehab for the diagnosis of COPD. She has been having issues with edema in BLEs. She is working with her PCP and Cardiology on medication changes. Therefore, there is no progress to report at this time. Corinne will continue with the current POC and goals as previously outlined in the initial evaluation.     She was hospitalized 10/28 - 2024 for sepsis due to pneumonia and COPD. She states she is beginning to feel better post hospitalization.     She completed an initial 6MWT on 2L/min walking 675 ft (1.98 METs) maintaining an SpO2 of 94-97% while rating her MERCHANT a 4/10.     Her program will be progressed as tolerated and she will receive educational handouts.     She will call the AR department when she gets home and can speak with her boyfriend regarding scheduling as he drives her to all of her appointments.       ASSESSMENT      Medical History:   Past Medical History:   Diagnosis Date    Allergic     Allergic rhinitis     2015  RESOLVED    Asthma     Atrial fibrillation (HCC)     Bronchitis      Bunion     COPD (chronic obstructive pulmonary disease) ([facility])     home O2 at night at home 2L    Coronary artery disease     AFIB    Depression     Emphysema lung ([facility])     Fibroadenoma of breast     Gastroesophageal reflux disease without esophagitis 12/18/2023    Hyperlipidemia     Hypertension     Lung disease 2008    COPD    Memory difficulties 07/23/2019    Post-menopausal     Post-menopausal     Sepsis due to pneumonia ([facility]) 10/28/2024    Spondylisthesis     Stage 3 chronic kidney disease, unspecified whether stage 3a or 3b CKD ([facility]) 02/07/2022    Vitamin D deficiency        Family History:  Family History   Problem Relation Age of Onset    Hypertension Mother     Alcohol abuse Mother     Hypertension Father     Diabetes Father     Prostate cancer Father     Cancer Father         Prostate    Hearing loss Father     Vitamin D deficiency Sister     Diverticulitis Sister     Depression Sister     No Known Problems Sister     Heart failure Brother     Lung cancer Brother     Cancer Brother         Stage four lung cancer    Celiac disease Daughter     Alcohol abuse Daughter     Substance Abuse Daughter     Eczema Daughter     Fibromyalgia Daughter     Substance Abuse Daughter     Alcohol abuse Daughter     ADD / ADHD Daughter     Bipolar disorder Daughter     Anxiety disorder Daughter     Ulcerative colitis Daughter     No Known Problems Maternal Aunt     Heart disease Brother     Substance Abuse Brother     Drug abuse Brother     Heart failure Brother     Breast cancer Neg Hx        Allergies:   Bupropion, Ace inhibitors, Citalopram, Mixed ragweed, Pollen extract, and Adhesive [medical tape]    Current Medications:   Current Outpatient Medications   Medication Sig Dispense Refill    apixaban (ELIQUIS) 5 mg Take 1 tablet (5 mg total) by mouth 2 (two) times a day 180 tablet 1    ascorbic acid (VITAMIN C) 500 mg tablet Take 500 mg by mouth daily      atorvastatin (LIPITOR) 40 mg tablet Take 1 tablet (40 mg total)  by mouth daily 90 tablet 1    calcium citrate-vitamin D 315 mg-5 mcg tablet Take 2 tablets by mouth 2 (two) times a day      cetirizine (ZyrTEC) 10 mg tablet Take 10 mg by mouth daily      Cholecalciferol (VITAMIN D3) 5000 units CAPS Take 1 capsule by mouth daily      clotrimazole-betamethasone (LOTRISONE) 1-0.05 % cream Apply topically 2 (two) times a day 45 g 1    diltiazem (CARDIZEM CD) 360 MG 24 hr capsule Take 1 capsule (360 mg total) by mouth daily 90 capsule 1    docusate sodium (COLACE) 100 mg capsule take 1 capsule by mouth twice a day 60 capsule 0    ferrous sulfate 325 (65 FE) MG EC tablet Take 325 mg by mouth 3 (three) times a day with meals      guaiFENesin (MUCINEX) 600 mg 12 hr tablet Take 1 tablet (600 mg total) by mouth every 12 (twelve) hours 180 tablet 3    ipratropium-albuterol (DUO-NEB) 0.5-2.5 mg/3 mL nebulizer solution Take 3 mL by nebulization every 6 (six) hours as needed for wheezing or shortness of breath 1080 mL 0    L-Lysine 500 MG CAPS Take 1 capsule by mouth daily      levalbuterol (Xopenex HFA) 45 mcg/act inhaler Inhale 1-2 puffs every 4 (four) hours as needed for wheezing 45 g 0    LORazepam (Ativan) 0.5 mg tablet Take 1 tablet (0.5 mg total) by mouth daily at bedtime 90 tablet 0    methocarbamol (ROBAXIN) 500 mg tablet Take 1 tablet (500 mg total) by mouth 3 (three) times a day as needed for muscle spasms 90 tablet 0    metoprolol tartrate (LOPRESSOR) 50 mg tablet Take 1 tablet (50 mg total) by mouth every 12 (twelve) hours 90 tablet 1    mometasone (NASONEX) 50 mcg/act nasal spray 2 sprays into each nostril daily 51 g 0    multivitamin-minerals (CENTRUM ADULTS) tablet Take 1 tablet by mouth daily      nystatin (MYCOSTATIN) 500,000 units/5 mL suspension Apply 5 mL (500,000 Units total) to the mouth or throat 4 (four) times a day 473 mL 0    Omega-3 Fatty Acids (fish oil) 1,000 mg Take 1,000 mg by mouth 2 (two) times a day. (Patient not taking: Reported on 12/23/2024)      oxygen gas  Inhale 2 L/min continuous. via nasal cannula      Probiotic Product (PROBIOTIC-10 PO) Take 1 capsule by mouth daily      triamcinolone (KENALOG) 0.1 % cream Apply topically 2 (two) times a day 15 g 0    triamterene-hydrochlorothiazide (DYAZIDE) 37.5-25 mg per capsule Take 1 capsule by mouth every morning 30 capsule 5    umeclidinium-vilanterol (Anoro Ellipta) 62.5-25 mcg/actuation inhaler Inhale 1 puff daily 180 each 1     No current facility-administered medications for this visit.       Physical Limitations: Decreased strength, foot pain due to osteopenia     Fall Risk: Low   Comments: Ambulates with a steady gait with no assist device    Cultural needs: none    PFT:  Yes   FEV1/FVC ratio of 47%   FEV1 of 52% predicted  Moderate to severe obstruction    Medication compliance: Yes  Comments: Pt reports to be compliant with medications      Pulmonary Disease Risk Factors:  second hand smoke  smoking    Sleep Disorders:   obstructive sleep apnea:  CPCP/BiPAP:  no      EXERCISE ASSESSMENT:      Initial Fitness Assessment:   6MWT:  675 ft = 1.98 METs  Rest: HR 73, /56, SpO2 98% on 2L/min, 0/10 SOB  Exercise: HR 92, /58, SpO2 94-97% on 2L/min, 4/10 MERCHANT  Recovery: HR 70, /54, SpO2 98% on 2L/min, 0/10 SOB      Current Functional Status:  Occupation: retired  Recreation/Physical activity: Sedentary  ADL’s:Capable of performing light ADLs only  [place]: Capable of performing light ADLs only  Exercise:  None  Home exercise equipment: None  Other Comments: None    SMART Exercise Goals:   reduced score on CAT, improved 6MWT distance, reduced dyspnea during exercise, improved exercise tolerance based on peak METs tolerated in pulmonary rehab exercise session, and SpO2 >88% during exercise    Patient Specific EXERCISE GOALS:     reduced number of COPD exacerbations, attend pulmonary rehab regularly, decrease sitting time at home, reduced pulmonary related hospital readmissions , and increased  "energy    PSYCHOSOCIAL ASSESSMENT:    Date of last assessment: 12/12/2024  Depression screening:  PHQ-9 = 3    Interpretation:  1-4 = Minimal Depression  Anxiety screening:  LOUIS-7 = 5    Interpretation: 5-9 = Mild anxiety    Pt self-report of depression and anxiety   Patient has a history of depression and anxiety. Patient is prescribed Ativan.    Self-reported stress level:  Patient states typically she is a low stress level (3/10) daily but certain things (going in public, going somewhere new, rushing around) will cause her stress level to be high (7/10)  Stress Management: practice Relaxation Techniques and keep a positive mindset    Quality of Life Screen:  (Higher score indicates disease impact on QOL)  [facility] COOP score: 28/40      CAT: 26/40      Shortness of breath questionnaire: 67/120    Social Support:   significant other, children, and friends  Community/Social Activities: Spend time with family and friends    SMART Goals:    Reduce perceived stress to 1-3/10, improved [facility] QOL < 27, improved sleeping habits, feel less tired with more energy, and Improved appetite control    Patient Specific PSYCHOCOSOCIAL GOALS:    maintain compliance with medical therapy and spend time with family     Psychosocial Assessment as it relates to rehabilitation: Patient denies issues with his/her family or home life that may affect their rehabilitation efforts.       NUTRITION ASSESSMENT:    Initial Weight:  147  Current Weight:     Height:   Ht Readings from Last 1 Encounters:   12/23/24 5' 8\" (1.727 m)       Rate Your Plate Score: 71/81    Self-reported Current Dietary Habits:  Patient states since her boyfriend had CABG a few years ago they have been eating healthier. She states that she hasn't had much of an appetite since she was hospitalized and that food is finally starting to taste better.     ETOH:   Social History     Substance and Sexual Activity   Alcohol Use Yes    Comment: I might have a glass of wine " every two or three months       SMART Goals:   Improved Rate Your Plate score  >58    Patient Specific NUTRITION GOALS:    increase water intake to reduce/thin mucus production decrease caloric intake weight gain increased muscle mass      OTHER CORE COMPONENT ASSESSMENT:    Hospitalizations in the past year: Hospitalized 10/28/2024 - 2024 for sepsis due to pneumonia    Oxygen needs:   Rest:  supplemental O2 via nasal cannula @ 2L/min   Exercise/physical activity:  supplemental O2 via nasal cannula @ 2L/min   Sleep:   supplemental O2 via nasal cannula @ 2L/min     Does Pt monitor home SpO2? yes   Average SpO2 at rest:  92-94%   Average SpO2 with ADLs/physical activity:  Does not check      Use of Rescue Inhaler: Patient states she will only use her rescue inhaler if absolutely necessary because it makes her feel jittery.    Use of Maintenance Inhaler: Yes; 1x/day (morning)    Use of Nebulizer Treatments:  Patient states she has a nebulizer that she uses PRN. She states she mainly only used it during seasonal allergies but has been using it a bit more frequently since her hospitalization due to ongoing cough and increased shortness of breath.     Patient practices breathing techniques at home:  Yes and Reviewed with patient on:  12/3/28229    CAD Risk Factors:  Cholesterol: No  HTN: No  DM: No  Obesity: No   Smoking: Patient states she quit smoking on 2023 but believes she relapsed right before her hospitalization. She does not remember smoking any cigarettes but states her daughter found a pack. She has remained smoke free since she was hospitalized and avoids secondhand smoke.    SMART Core Component Goals:   consistent, controlled resting BP < 130/80, medication compliance, and demonstrate pursed lipped breathing    Patient Specific CORE COMPONENT GOALS:    medication compliance, compliance with supplemental oxygen, and monitor home pulse oximetry      Blood Pressure:    Restin/56  Exercise:  138/58  Recovery: 120/54      INDIVIDUALIZED TREATMENT PLAN    The patient is agreeable to attend 24 pulmonary rehab exercise sessions.      EXERCISE GOALS AND PLAN    PHYSCIAN PRESCRIBED EXERCISE    Current Aerobic Exercise Prescription       Frequency: 2-3 days/week   Supplement with home exercise 2+ days/wk as tolerated        Minutes: 25-35         METS: 2-3              SpO2: >88%              RPD: 4-6                      HR: RHR +30-40bpm   RPE: 4-5         Modalities: Treadmill, UBE, NuStep, and Recumbent bike      Aerobic Exercise Prescription Plan for Progression:    Frequency: 2-3 days/week    Supplement with home exercise 2+ days/wk as tolerated       Minutes: 35-45        METS: 3-4              SpO2: >88%              RPD: 4-6                      HR:RHR +30-40bpm   RPE: 4-5        Modalities: Treadmill, UBE, NuStep, and Recumbent bike        Supplemental Oxygen Needs with Exercise:  supplemental O2 2L/min via nasal canula.    Strength trainin-3 days / week  12-15 repetitions  1-2 sets per modality    Modalities: Leg Press, Chest Press, and Pull Downs    Education: pursed lipped breathing, diaphragmatic breathing, RPE scale, RPD scale, O2 saturation monitoring, appropriate O2 response to exercise, and education class: Exercise For The Pulmonary Patient    Progress toward Exercise Goals:    Reviewed Pt goals and determined plan of care, Will continue to educate and progress as tolerated.    Exercise Plan:  Titrate supplemental oxygen as needed to maintain SpO2>88% with exercise, learn to conserve energy with ADLs , practice diaphragmatic breathing, reduce time sitting at home, utilize PLB with physical activity, and begin a home exercise program     Readiness to change: Preparation:  (Getting ready to change)       NUTRITION GOALS AND PLAN    Progress toward Nutritional goals:    Reviewed Pt goals and determined plan of care, Will continue to educate and progress as tolerated.    Nutrition Plan:  group class: Reading Food Labels and group Class: Heart Healthy Eating    SMART goals are based Rate Your Plate Dietary Self-Assessment. These are the areas in which the patient could score higher on the assessment.  Goals include recommendations for a heart healthy diet based on American Heart Association.    Nutrition Education: heart healthy eating principles  maintaining hydration  group class: Heart Healthy Eating  group class:  Label Reading  group class: healthy eating for managing pulmonary illness    Readiness to change: Preparation:  (Getting ready to change)       PSYCHOSOCIAL GOALS AND PLAN    Information to begin using [employer] was provided as well as contact information for counseling through [facility].    Progress toward Psychosocial goals:    Reviewed Pt goals and determined plan of care, Will continue to educate and progress as tolerated.    Psychosocial: Class: Stress and Your Health, Class:  Stress and Pulmonary Disease, Enroll in [employer], Practice relaxation techniques, Keep a positive mindset, and Enjoy family    Psychosocial Education: stress management techniques, benefits of enrolling in [employer], depression and pulmonary disease, tools to manage fear/anxiety related to becoming SOB, class:  Stress and Your Health , and class:  Stress and Pulmonary Disease    Readiness to change: Preparation:  (Getting ready to change)       OTHER CORE COMPONENTS GOALS AND PLAN    Tobacco Use Intervention:     Patient states she quit smoking on 7/4/2023 (she averaged 0.25-0.3 ppd for 53.5 years) but believes she relapsed right before her hospitalization. She does not remember smoking any cigarettes but states her daughter found a pack. She has remained smoke free since she was hospitalized and avoids secondhand smoke.    Oxygen Goal: Maintain SpO2>88% during exercise or as advised by pulmonolgist    Progress toward Core Component goals:    Reviewed Pt goals and determined plan of care,  Will continue to educate and progress as tolerated.    Core Component Plan: medication compliance, complete abstention from smoking, avoid places with second hand smoke, group class: Pulmonary Anatomy and Physiology, and group class:  Pulmonary Medications    Core Component Education:  pathophysiology of pulmonary disease, preventing infections, relapse education, control coughing, harmonica therapy, education: Pulmonary Anatomy and Physiology, education:  Pulmonary Medications, education:  Clearing Secretions, and education: Oxygen    Readiness to change: Preparation:  (Getting ready to change)

## 2025-01-10 ENCOUNTER — CLINICAL SUPPORT (OUTPATIENT)
Dept: PULMONOLOGY | Facility: HOSPITAL | Age: 75
End: 2025-01-10
Attending: INTERNAL MEDICINE
Payer: COMMERCIAL

## 2025-01-10 DIAGNOSIS — J44.9 CHRONIC OBSTRUCTIVE PULMONARY DISEASE, UNSPECIFIED COPD TYPE (HCC): ICD-10-CM

## 2025-01-10 PROCEDURE — 94626 PHY/QHP OP PULM RHB W/MNTR: CPT

## 2025-01-13 ENCOUNTER — CLINICAL SUPPORT (OUTPATIENT)
Dept: PULMONOLOGY | Facility: HOSPITAL | Age: 75
End: 2025-01-13
Attending: INTERNAL MEDICINE
Payer: COMMERCIAL

## 2025-01-13 DIAGNOSIS — J44.9 CHRONIC OBSTRUCTIVE PULMONARY DISEASE, UNSPECIFIED COPD TYPE (HCC): ICD-10-CM

## 2025-01-13 PROCEDURE — 94626 PHY/QHP OP PULM RHB W/MNTR: CPT

## 2025-01-13 RX ORDER — UMECLIDINIUM BROMIDE AND VILANTEROL TRIFENATATE 62.5; 25 UG/1; UG/1
1 POWDER RESPIRATORY (INHALATION) DAILY
Qty: 180 EACH | Refills: 1 | Status: SHIPPED | OUTPATIENT
Start: 2025-01-13

## 2025-01-14 ENCOUNTER — APPOINTMENT (OUTPATIENT)
Dept: LAB | Facility: CLINIC | Age: 75
End: 2025-01-14
Payer: COMMERCIAL

## 2025-01-14 ENCOUNTER — OFFICE VISIT (OUTPATIENT)
Dept: FAMILY MEDICINE CLINIC | Facility: CLINIC | Age: 75
End: 2025-01-14
Payer: COMMERCIAL

## 2025-01-14 VITALS
SYSTOLIC BLOOD PRESSURE: 112 MMHG | BODY MASS INDEX: 23.49 KG/M2 | RESPIRATION RATE: 15 BRPM | HEART RATE: 88 BPM | TEMPERATURE: 97.6 F | DIASTOLIC BLOOD PRESSURE: 68 MMHG | HEIGHT: 68 IN | OXYGEN SATURATION: 96 % | WEIGHT: 155 LBS

## 2025-01-14 DIAGNOSIS — J44.9 COPD, SEVERE (HCC): ICD-10-CM

## 2025-01-14 DIAGNOSIS — E55.9 VITAMIN D DEFICIENCY: ICD-10-CM

## 2025-01-14 DIAGNOSIS — Z13.1 SCREENING FOR DIABETES MELLITUS: ICD-10-CM

## 2025-01-14 DIAGNOSIS — I48.0 PAROXYSMAL ATRIAL FIBRILLATION (HCC): ICD-10-CM

## 2025-01-14 DIAGNOSIS — Z76.89 ENCOUNTER TO ESTABLISH CARE WITH NEW DOCTOR: ICD-10-CM

## 2025-01-14 DIAGNOSIS — R60.0 BILATERAL LEG EDEMA: ICD-10-CM

## 2025-01-14 DIAGNOSIS — Z13.0 SCREENING FOR DEFICIENCY ANEMIA: ICD-10-CM

## 2025-01-14 DIAGNOSIS — R60.0 BILATERAL LEG EDEMA: Primary | ICD-10-CM

## 2025-01-14 DIAGNOSIS — J18.9 PNEUMONIA OF BOTH LOWER LOBES DUE TO INFECTIOUS ORGANISM: ICD-10-CM

## 2025-01-14 LAB
25(OH)D3 SERPL-MCNC: 81.8 NG/ML (ref 30–100)
ALBUMIN SERPL BCG-MCNC: 4.5 G/DL (ref 3.5–5)
ALP SERPL-CCNC: 65 U/L (ref 34–104)
ALT SERPL W P-5'-P-CCNC: 14 U/L (ref 7–52)
ANION GAP SERPL CALCULATED.3IONS-SCNC: 5 MMOL/L (ref 4–13)
AST SERPL W P-5'-P-CCNC: 21 U/L (ref 13–39)
BASOPHILS # BLD AUTO: 0.05 THOUSANDS/ΜL (ref 0–0.1)
BASOPHILS NFR BLD AUTO: 1 % (ref 0–1)
BILIRUB SERPL-MCNC: 0.66 MG/DL (ref 0.2–1)
BUN SERPL-MCNC: 28 MG/DL (ref 5–25)
CALCIUM SERPL-MCNC: 9.9 MG/DL (ref 8.4–10.2)
CHLORIDE SERPL-SCNC: 102 MMOL/L (ref 96–108)
CO2 SERPL-SCNC: 34 MMOL/L (ref 21–32)
CREAT SERPL-MCNC: 1.41 MG/DL (ref 0.6–1.3)
EOSINOPHIL # BLD AUTO: 0.19 THOUSAND/ΜL (ref 0–0.61)
EOSINOPHIL NFR BLD AUTO: 3 % (ref 0–6)
ERYTHROCYTE [DISTWIDTH] IN BLOOD BY AUTOMATED COUNT: 13.8 % (ref 11.6–15.1)
FERRITIN SERPL-MCNC: 194 NG/ML (ref 11–307)
GFR SERPL CREATININE-BSD FRML MDRD: 37 ML/MIN/1.73SQ M
GLUCOSE P FAST SERPL-MCNC: 96 MG/DL (ref 65–99)
HCT VFR BLD AUTO: 33.5 % (ref 34.8–46.1)
HGB BLD-MCNC: 11.3 G/DL (ref 11.5–15.4)
IMM GRANULOCYTES # BLD AUTO: 0.02 THOUSAND/UL (ref 0–0.2)
IMM GRANULOCYTES NFR BLD AUTO: 0 % (ref 0–2)
IRON SATN MFR SERPL: 30 % (ref 15–50)
IRON SERPL-MCNC: 95 UG/DL (ref 50–212)
LYMPHOCYTES # BLD AUTO: 1.65 THOUSANDS/ΜL (ref 0.6–4.47)
LYMPHOCYTES NFR BLD AUTO: 30 % (ref 14–44)
MCH RBC QN AUTO: 33.1 PG (ref 26.8–34.3)
MCHC RBC AUTO-ENTMCNC: 33.7 G/DL (ref 31.4–37.4)
MCV RBC AUTO: 98 FL (ref 82–98)
MONOCYTES # BLD AUTO: 0.6 THOUSAND/ΜL (ref 0.17–1.22)
MONOCYTES NFR BLD AUTO: 11 % (ref 4–12)
NEUTROPHILS # BLD AUTO: 3.01 THOUSANDS/ΜL (ref 1.85–7.62)
NEUTS SEG NFR BLD AUTO: 55 % (ref 43–75)
NRBC BLD AUTO-RTO: 0 /100 WBCS
PLATELET # BLD AUTO: 220 THOUSANDS/UL (ref 149–390)
PMV BLD AUTO: 11 FL (ref 8.9–12.7)
POTASSIUM SERPL-SCNC: 3.8 MMOL/L (ref 3.5–5.3)
PROT SERPL-MCNC: 7.4 G/DL (ref 6.4–8.4)
RBC # BLD AUTO: 3.41 MILLION/UL (ref 3.81–5.12)
SODIUM SERPL-SCNC: 141 MMOL/L (ref 135–147)
TIBC SERPL-MCNC: 315 UG/DL (ref 250–450)
TRANSFERRIN SERPL-MCNC: 225 MG/DL (ref 203–362)
UIBC SERPL-MCNC: 220 UG/DL (ref 155–355)
WBC # BLD AUTO: 5.52 THOUSAND/UL (ref 4.31–10.16)

## 2025-01-14 PROCEDURE — 82306 VITAMIN D 25 HYDROXY: CPT

## 2025-01-14 PROCEDURE — 99214 OFFICE O/P EST MOD 30 MIN: CPT

## 2025-01-14 PROCEDURE — 85025 COMPLETE CBC W/AUTO DIFF WBC: CPT

## 2025-01-14 PROCEDURE — 82728 ASSAY OF FERRITIN: CPT

## 2025-01-14 PROCEDURE — 36415 COLL VENOUS BLD VENIPUNCTURE: CPT

## 2025-01-14 PROCEDURE — 83550 IRON BINDING TEST: CPT

## 2025-01-14 PROCEDURE — 83540 ASSAY OF IRON: CPT

## 2025-01-14 PROCEDURE — 80053 COMPREHEN METABOLIC PANEL: CPT

## 2025-01-14 RX ORDER — HYDROCHLOROTHIAZIDE 25 MG/1
25 TABLET ORAL DAILY
Qty: 30 TABLET | Refills: 0 | Status: SHIPPED | OUTPATIENT
Start: 2025-01-14

## 2025-01-14 NOTE — ASSESSMENT & PLAN NOTE
-On metoprolol, diltiazem and eliquis due to high [id number] score after hospitalization  -Advised patient that the leg swelling is most likely coming from the diltiazem as that is one common side effect of the medication.   -Follows with cardiology who she will talk to about possibly stopping the medication

## 2025-01-14 NOTE — PROGRESS NOTES
Name: Corinne A Longo      : 1950      MRN: [id number]  Encounter Provider: Nicolle Aldana PA-C  Encounter Date: 2025   Encounter department: [facility]  :  Assessment & Plan  Encounter to establish care with new doctor         Bilateral leg edema  -Was prescribed Dyazide, but did not like the side effect profile of the triamterene component so she has not been taking it  -States she has noticed the swelling since starting on the Diltiazem.  -States it has gotten so bad to the point where she has to wear her boyfriends shoes because her shoes were not fitting  -Will try her on HCTZ for a short amount of time due to her kidney function  Orders:  •  hydroCHLOROthiazide 25 mg tablet; Take 1 tablet (25 mg total) by mouth daily    COPD, severe (HCC)  -2L Nasal canula   -Remains on oxygen  -Doing pulmonary rehab   -Follows with pulmonology        Paroxysmal atrial fibrillation (HCC)  -On metoprolol, diltiazem and eliquis due to high [id number] score after hospitalization  -Advised patient that the leg swelling is most likely coming from the diltiazem as that is one common side effect of the medication.   -Follows with cardiology who she will talk to about possibly stopping the medication        Screening for deficiency anemia    Orders:  •  Iron Panel (Includes Ferritin, Iron Sat%, Iron, and TIBC); Future    Screening for diabetes mellitus    Orders:  •  Comprehensive metabolic panel; Future    Vitamin D deficiency  -Was taking 5000iu daily.  -last labs show levels at 118.9  -She has stopped the medication for 2 weeks so we will check levels  Orders:  •  Vitamin D 25 hydroxy; Future          Depression Screening and Follow-up Plan: Patient was screened for depression during today's encounter. They screened negative with a PHQ-9 score of 0.      History of Present Illness {?Quick Links Encounters * My Last Note * Last Note in Specialty * Snapshot * Since Last Visit * History  ":[id number]}    Patient is a 74-year-old female who presents today to establish care. She has had extensive history. She was hospitalized in October 2024 with sepsis due to pneumonia and was in A-Fib with RVR. She was placed on diltiazem and on Eliquis 2/2 to a higher [id number] score. She states that she has felt off since then and has had swelling in her legs and a change in her taste. She has been following up with cardiology and is currently doing pulmonary rehab. She is on 2L nasal cannula. Today her biggest concerns are that her ankles are swollen and she would like to address some of her bloodwork that was done in December.           Review of Systems   Constitutional:  Negative for chills, fatigue and fever.   HENT:  Negative for congestion, ear pain and sore throat.    Eyes:  Negative for pain.   Respiratory:  Negative for cough and shortness of breath.    Cardiovascular:  Positive for leg swelling. Negative for chest pain and palpitations.   Gastrointestinal:  Negative for abdominal pain, constipation, diarrhea, nausea and vomiting.   Genitourinary:  Negative for dysuria and hematuria.   Musculoskeletal:  Negative for arthralgias and back pain.   Skin:  Negative for color change and rash.   Neurological:  Negative for syncope, numbness and headaches.   All other systems reviewed and are negative.      Objective {?Quick Links Trend Vitals * Enter New Vitals * Results Review * Timeline (Adult) * Labs * Imaging * Cardiology * Procedures * Lung Cancer Screening * Surgical eConsent :73111}  /68 (BP Location: Left arm, Patient Position: Sitting, Cuff Size: Standard)   Pulse 88   Temp 97.6 °F (36.4 °C) (Tympanic)   Resp 15   Ht 5' 8\" (1.727 m)   Wt 70.3 kg (155 lb)   SpO2 96%   BMI 23.57 kg/m²      Physical Exam  Vitals and nursing note reviewed.   Constitutional:       General: She is not in acute distress.     Appearance: She is well-developed.   HENT:      Head: Normocephalic and atraumatic.      Right " Ear: Tympanic membrane normal.      Left Ear: Tympanic membrane normal.      Nose: Nose normal.      Mouth/Throat:      Mouth: Mucous membranes are moist.   Eyes:      Conjunctiva/sclera: Conjunctivae normal.   Cardiovascular:      Rate and Rhythm: Normal rate and regular rhythm.      Heart sounds: No murmur heard.  Pulmonary:      Effort: Pulmonary effort is normal. No respiratory distress.      Breath sounds: Normal breath sounds. No wheezing or rhonchi.      Comments: On 2L nasal cannula   Abdominal:      Palpations: Abdomen is soft.      Tenderness: There is no abdominal tenderness.   Musculoskeletal:         General: No swelling.      Cervical back: Neck supple.      Right lower le+ Pitting Edema present.      Left lower le+ Pitting Edema present.   Skin:     General: Skin is warm and dry.      Capillary Refill: Capillary refill takes less than 2 seconds.   Neurological:      Mental Status: She is alert and oriented to person, place, and time.   Psychiatric:         Mood and Affect: Mood normal.         Behavior: Behavior normal.         Thought Content: Thought content normal.         Judgment: Judgment normal.       Administrative Statements {?Quick Links Full Problem List * Level of Service * [facility]:86467}  I have spent a total time of 45 minutes in caring for this patient on the day of the visit/encounter including Diagnostic results, Prognosis, Risks and benefits of tx options, Instructions for management, Patient and family education, Importance of tx compliance, Risk factor reductions, Impressions, Counseling / Coordination of care, Documenting in the medical record, Reviewing / ordering tests, medicine, procedures  , and Obtaining or reviewing history  . Topics discussed with the patient / family include symptom assessment and management and medication review.

## 2025-01-14 NOTE — ASSESSMENT & PLAN NOTE
-Was taking 5000iu daily.  -last labs show levels at 118.9  -She has stopped the medication for 2 weeks so we will check levels  Orders:  •  Vitamin D 25 hydroxy; Future

## 2025-01-15 ENCOUNTER — RESULTS FOLLOW-UP (OUTPATIENT)
Dept: FAMILY MEDICINE CLINIC | Facility: CLINIC | Age: 75
End: 2025-01-15

## 2025-01-15 ENCOUNTER — OFFICE VISIT (OUTPATIENT)
Dept: PULMONOLOGY | Facility: CLINIC | Age: 75
End: 2025-01-15
Payer: COMMERCIAL

## 2025-01-15 VITALS
DIASTOLIC BLOOD PRESSURE: 60 MMHG | BODY MASS INDEX: 23.64 KG/M2 | HEIGHT: 68 IN | WEIGHT: 156 LBS | HEART RATE: 57 BPM | TEMPERATURE: 98.7 F | OXYGEN SATURATION: 99 % | RESPIRATION RATE: 18 BRPM | SYSTOLIC BLOOD PRESSURE: 108 MMHG

## 2025-01-15 DIAGNOSIS — F17.211 CIGARETTE NICOTINE DEPENDENCE IN REMISSION: ICD-10-CM

## 2025-01-15 DIAGNOSIS — J18.9 PNEUMONIA OF BOTH LOWER LOBES DUE TO INFECTIOUS ORGANISM: ICD-10-CM

## 2025-01-15 DIAGNOSIS — J96.11 CHRONIC RESPIRATORY FAILURE WITH HYPOXIA (HCC): ICD-10-CM

## 2025-01-15 DIAGNOSIS — J44.9 COPD, SEVERE (HCC): Primary | ICD-10-CM

## 2025-01-15 PROCEDURE — 99214 OFFICE O/P EST MOD 30 MIN: CPT

## 2025-01-15 NOTE — PROGRESS NOTES
Name: Corinne A Longo      : 1950      MRN: [id number]  Encounter Provider: ROGELIO Mohr  Encounter Date: 1/15/2025   Encounter department: [facility]  :  Assessment & Plan  COPD, severe (HCC)  -FEV1 52% on PFTs in .  Also with severe emphysema on CT imaging  -She was hospitalized in October for multilobar pneumonia and exacerbation.  Symptoms back to her baseline  -Tolerating pulmonary rehab.  Rarely requires use of rescue inhaler    Plan:  -Continue Anoro 1 puff daily and Xopenex HFA/DuoNebs every 6 hours as needed  -Continue pulmonary rehab  -UTD vaccinations  -Follow-up in 4 months or sooner if needed       Chronic respiratory failure with hypoxia (HCC)  -On baseline 2 L supplemental oxygen.  She will continue this to maintain SpO2 above 88%       Pneumonia of both lower lobes due to infectious organism  -Reviewed recent CT chest from 2024.  Near complete resolution of previous bilateral pneumonia, but with mild residual consolidations in the lower lobes and tree-in-bud nodular opacities in the lingula   -Recommend repeat 3-month follow-up CT chest to ensure complete resolution.  Due in March, orders placed    Orders:    CT chest wo contrast; Future    Cigarette nicotine dependence in remission  -Approximately 50-pack-year history in remission x 2 years  -Will resume yearly CT lung cancer screening once follow-up imaging complete for recent pneumonia           History of Present Illness   Corinne A Longo is a 74 y.o. female with a past medical history of severe COPD, chronic hypoxic respiratory failure on 2 L baseline, and former 50-pack-year smoker in remission since 2023.  She is here today for a follow-up visit.  Last seen in the office in November following hospitalization for multilobar pneumonia, sepsis, and COPD exacerbation.  Maintained on Anoro, Xopenex/DuoNebs as needed, and 2 L supplemental oxygen.  She had a follow-up CT chest which  showed near complete resolution of prior pneumonia with mild residual consolidations in the lower lobes and TIB nodular opacities in the lingula.     Patient returns today, she continues to have improvement of symptoms. Her cough has gotten better.  Her shortness of breath is stable.  She has not needed to use her rescue inhaler in at least 1 month. She started pulmonary rehab last month, thinks this is helping.  She denies any chest pain or lower extremity swelling.        Review of Systems   Constitutional:  Positive for appetite change. Negative for activity change, chills, diaphoresis, fever and unexpected weight change.   HENT:  Negative for congestion, postnasal drip, rhinorrhea, sore throat, trouble swallowing and voice change.    Respiratory:  Positive for cough and shortness of breath. Negative for chest tightness and wheezing.    Cardiovascular:  Negative for chest pain, palpitations and leg swelling.   Allergic/Immunologic: Negative.      Current Outpatient Medications on File Prior to Visit   Medication Sig Dispense Refill    apixaban (ELIQUIS) 5 mg Take 1 tablet (5 mg total) by mouth 2 (two) times a day 180 tablet 1    ascorbic acid (VITAMIN C) 500 mg tablet Take 500 mg by mouth daily      atorvastatin (LIPITOR) 40 mg tablet Take 1 tablet (40 mg total) by mouth daily 90 tablet 1    calcium citrate-vitamin D 315 mg-5 mcg tablet Take 2 tablets by mouth 2 (two) times a day      cetirizine (ZyrTEC) 10 mg tablet Take 10 mg by mouth daily      Cholecalciferol (VITAMIN D3) 5000 units CAPS Take 1 capsule by mouth daily (Patient taking differently: Take 1 capsule by mouth daily Taking 2000)      clotrimazole-betamethasone (LOTRISONE) 1-0.05 % cream Apply topically 2 (two) times a day 45 g 1    diltiazem (CARDIZEM CD) 360 MG 24 hr capsule Take 1 capsule (360 mg total) by mouth daily 90 capsule 1    docusate sodium (COLACE) 100 mg capsule take 1 capsule by mouth twice a day 60 capsule 0    guaiFENesin (MUCINEX)  600 mg 12 hr tablet Take 1 tablet (600 mg total) by mouth every 12 (twelve) hours 180 tablet 3    hydroCHLOROthiazide 25 mg tablet Take 1 tablet (25 mg total) by mouth daily 30 tablet 0    ipratropium-albuterol (DUO-NEB) 0.5-2.5 mg/3 mL nebulizer solution Take 3 mL by nebulization every 6 (six) hours as needed for wheezing or shortness of breath 1080 mL 0    L-Lysine 500 MG CAPS Take 1 capsule by mouth daily      levalbuterol (Xopenex HFA) 45 mcg/act inhaler Inhale 1-2 puffs every 4 (four) hours as needed for wheezing 45 g 0    methocarbamol (ROBAXIN) 500 mg tablet Take 1 tablet (500 mg total) by mouth 3 (three) times a day as needed for muscle spasms 90 tablet 0    metoprolol tartrate (LOPRESSOR) 50 mg tablet Take 1 tablet (50 mg total) by mouth every 12 (twelve) hours 90 tablet 1    mometasone (NASONEX) 50 mcg/act nasal spray 2 sprays into each nostril daily 51 g 0    multivitamin-minerals (CENTRUM ADULTS) tablet Take 1 tablet by mouth daily      nystatin (MYCOSTATIN) 500,000 units/5 mL suspension Apply 5 mL (500,000 Units total) to the mouth or throat 4 (four) times a day 473 mL 0    oxygen gas Inhale 2 L/min continuous. via nasal cannula      Probiotic Product (PROBIOTIC-10 PO) Take 1 capsule by mouth daily      triamcinolone (KENALOG) 0.1 % cream Apply topically 2 (two) times a day 15 g 0    umeclidinium-vilanterol (Anoro Ellipta) 62.5-25 mcg/actuation inhaler Inhale 1 puff daily 180 each 1    ferrous sulfate 325 (65 FE) MG EC tablet Take 325 mg by mouth 3 (three) times a day with meals (Patient not taking: Reported on 1/15/2025)      LORazepam (Ativan) 0.5 mg tablet Take 1 tablet (0.5 mg total) by mouth daily at bedtime (Patient not taking: Reported on 1/15/2025) 90 tablet 0    Omega-3 Fatty Acids (fish oil) 1,000 mg Take 1,000 mg by mouth 2 (two) times a day. (Patient not taking: Reported on 12/23/2024)      [DISCONTINUED] triamterene-hydrochlorothiazide (DYAZIDE) 37.5-25 mg per capsule Take 1 capsule by  mouth every morning (Patient not taking: Reported on 1/14/2025) 30 capsule 5     No current facility-administered medications on file prior to visit.         Historical Information   Tobacco History: 50 PYH quit 2 years ago  Occupational History: Retired    Objective   There were no vitals taken for this visit.     Physical Exam  Vitals and nursing note reviewed.   Constitutional:       General: She is not in acute distress.     Appearance: Normal appearance. She is well-developed.   Cardiovascular:      Rate and Rhythm: Normal rate and regular rhythm.      Heart sounds: Normal heart sounds, S1 normal and S2 normal. No murmur heard.  Pulmonary:      Effort: Pulmonary effort is normal.      Breath sounds: Examination of the left-lower field reveals rales. Rales present. No decreased breath sounds, wheezing or rhonchi.   Musculoskeletal:         General: No swelling.      Right lower leg: No edema.      Left lower leg: No edema.   Neurological:      Mental Status: She is alert.   Psychiatric:         Mood and Affect: Mood and affect normal.         Behavior: Behavior normal. Behavior is cooperative.           Lab Results: I have reviewed pertinent labs.    Radiology Results Review: I have reviewed radiology reports from 12/17/2024 including: CT chest.  Other Study Results: Other Study Results Review : No additional pertinent studies reviewed.  PFT Results Reviewed: reviewed

## 2025-01-15 NOTE — ASSESSMENT & PLAN NOTE
-Approximately 50-pack-year history in remission x 2 years  -Will resume yearly CT lung cancer screening once follow-up imaging complete for recent pneumonia

## 2025-01-15 NOTE — TELEPHONE ENCOUNTER
Tried calling 5 times each time the call just drops I tried a different [facility] in [place] (the call went through no problem) but they couldn't pull up her profile.

## 2025-01-15 NOTE — ASSESSMENT & PLAN NOTE
-FEV1 52% on PFTs in 2023.  Also with severe emphysema on CT imaging  -She was hospitalized in October for multilobar pneumonia and exacerbation.  Symptoms back to her baseline  -Tolerating pulmonary rehab.  Rarely requires use of rescue inhaler    Plan:  -Continue Anoro 1 puff daily and Xopenex HFA/DuoNebs every 6 hours as needed  -Continue pulmonary rehab  -UTD vaccinations  -Follow-up in 4 months or sooner if needed

## 2025-01-17 ENCOUNTER — CLINICAL SUPPORT (OUTPATIENT)
Dept: PULMONOLOGY | Facility: HOSPITAL | Age: 75
End: 2025-01-17
Attending: INTERNAL MEDICINE
Payer: COMMERCIAL

## 2025-01-17 ENCOUNTER — RESULTS FOLLOW-UP (OUTPATIENT)
Dept: INTERNAL MEDICINE CLINIC | Facility: CLINIC | Age: 75
End: 2025-01-17

## 2025-01-17 DIAGNOSIS — J44.9 CHRONIC OBSTRUCTIVE PULMONARY DISEASE, UNSPECIFIED COPD TYPE (HCC): ICD-10-CM

## 2025-01-17 PROCEDURE — 94626 PHY/QHP OP PULM RHB W/MNTR: CPT

## 2025-01-17 NOTE — RESULT ENCOUNTER NOTE
Manuel Shi  I reviewed your blood counts and all are improving nicely and trending in the right direction. Have a nice weekend  -Rayna

## 2025-01-20 ENCOUNTER — APPOINTMENT (OUTPATIENT)
Dept: PULMONOLOGY | Facility: HOSPITAL | Age: 75
End: 2025-01-20
Attending: INTERNAL MEDICINE
Payer: COMMERCIAL

## 2025-01-21 ENCOUNTER — OFFICE VISIT (OUTPATIENT)
Dept: FAMILY MEDICINE CLINIC | Facility: CLINIC | Age: 75
End: 2025-01-21
Payer: COMMERCIAL

## 2025-01-21 VITALS
BODY MASS INDEX: 23.72 KG/M2 | OXYGEN SATURATION: 98 % | SYSTOLIC BLOOD PRESSURE: 128 MMHG | TEMPERATURE: 96.6 F | HEIGHT: 68 IN | HEART RATE: 59 BPM | DIASTOLIC BLOOD PRESSURE: 64 MMHG | RESPIRATION RATE: 20 BRPM

## 2025-01-21 DIAGNOSIS — E55.9 VITAMIN D DEFICIENCY: ICD-10-CM

## 2025-01-21 DIAGNOSIS — Z00.00 MEDICARE ANNUAL WELLNESS VISIT, SUBSEQUENT: ICD-10-CM

## 2025-01-21 DIAGNOSIS — J18.9 PNEUMONIA OF BOTH LOWER LOBES DUE TO INFECTIOUS ORGANISM: ICD-10-CM

## 2025-01-21 DIAGNOSIS — R60.0 BILATERAL LEG EDEMA: ICD-10-CM

## 2025-01-21 DIAGNOSIS — N18.32 CHRONIC KIDNEY DISEASE, STAGE 3B (HCC): ICD-10-CM

## 2025-01-21 DIAGNOSIS — J44.9 COPD, SEVERE (HCC): Primary | ICD-10-CM

## 2025-01-21 DIAGNOSIS — I48.0 PAROXYSMAL ATRIAL FIBRILLATION (HCC): ICD-10-CM

## 2025-01-21 DIAGNOSIS — Z12.11 ENCOUNTER FOR SCREENING FOR MALIGNANT NEOPLASM OF COLON: ICD-10-CM

## 2025-01-21 PROCEDURE — 99214 OFFICE O/P EST MOD 30 MIN: CPT

## 2025-01-21 PROCEDURE — G0439 PPPS, SUBSEQ VISIT: HCPCS

## 2025-01-21 NOTE — PATIENT INSTRUCTIONS
Medicare Preventive Visit Patient Instructions  Thank you for completing your Welcome to Medicare Visit or Medicare Annual Wellness Visit today. Your next wellness visit will be due in one year (1/22/2026).  The screening/preventive services that you may require over the next 5-10 years are detailed below. Some tests may not apply to you based off risk factors and/or age. Screening tests ordered at today's visit but not completed yet may show as past due. Also, please note that scanned in results may not display below.  Preventive Screenings:  Service Recommendations Previous Testing/Comments   Colorectal Cancer Screening  * Colonoscopy    * Fecal Occult Blood Test (FOBT)/Fecal Immunochemical Test (FIT)  * Fecal DNA/Cologuard Test  * Flexible Sigmoidoscopy Age: 45-75 years old   Colonoscopy: every 10 years (may be performed more frequently if at higher risk)  OR  FOBT/FIT: every 1 year  OR  Cologuard: every 3 years  OR  Sigmoidoscopy: every 5 years  Screening may be recommended earlier than age 45 if at higher risk for colorectal cancer. Also, an individualized decision between you and your healthcare provider will decide whether screening between the ages of 76-85 would be appropriate. Colonoscopy: Not on file  FOBT/FIT: Not on file  Cologuard: Not on file  Sigmoidoscopy: Not on file          Breast Cancer Screening Age: 40+ years old  Frequency: every 1-2 years  Not required if history of left and right mastectomy Mammogram: 06/03/2024    Screening Current   Cervical Cancer Screening Between the ages of 21-29, pap smear recommended once every 3 years.   Between the ages of 30-65, can perform pap smear with HPV co-testing every 5 years.   Recommendations may differ for women with a history of total hysterectomy, cervical cancer, or abnormal pap smears in past. Pap Smear: 11/09/2018    Screening Not Indicated   Hepatitis C Screening Once for adults born between 1945 and 1965  More frequently in patients at high risk  for Hepatitis C Hep C Antibody: 12/03/2024    Screening Current   Diabetes Screening 1-2 times per year if you're at risk for diabetes or have pre-diabetes Fasting glucose: 96 mg/dL (1/14/2025)  A1C: 5.4 % (11/9/2021)  Screening Current   Cholesterol Screening Once every 5 years if you don't have a lipid disorder. May order more often based on risk factors. Lipid panel: 12/03/2024    Screening Not Indicated  History Lipid Disorder     Other Preventive Screenings Covered by Medicare:  Abdominal Aortic Aneurysm (AAA) Screening: covered once if your at risk. You're considered to be at risk if you have a family history of AAA.  Lung Cancer Screening: covers low dose CT scan once per year if you meet all of the following conditions: (1) Age 55-77; (2) No signs or symptoms of lung cancer; (3) Current smoker or have quit smoking within the last 15 years; (4) You have a tobacco smoking history of at least 20 pack years (packs per day multiplied by number of years you smoked); (5) You get a written order from a healthcare provider.  Glaucoma Screening: covered annually if you're considered high risk: (1) You have diabetes OR (2) Family history of glaucoma OR (3)  aged 50 and older OR (4)  American aged 65 and older  Osteoporosis Screening: covered every 2 years if you meet one of the following conditions: (1) You're estrogen deficient and at risk for osteoporosis based off medical history and other findings; (2) Have a vertebral abnormality; (3) On glucocorticoid therapy for more than 3 months; (4) Have primary hyperparathyroidism; (5) On osteoporosis medications and need to assess response to drug therapy.   Last bone density test (DXA Scan): 06/02/2023.  HIV Screening: covered annually if you're between the age of 15-65. Also covered annually if you are younger than 15 and older than 65 with risk factors for HIV infection. For pregnant patients, it is covered up to 3 times per  pregnancy.    Immunizations:  Immunization Recommendations   Influenza Vaccine Annual influenza vaccination during flu season is recommended for all persons aged >= 6 months who do not have contraindications   Pneumococcal Vaccine   * Pneumococcal conjugate vaccine = PCV13 (Prevnar 13), PCV15 (Vaxneuvance), PCV20 (Prevnar 20)  * Pneumococcal polysaccharide vaccine = PPSV23 (Pneumovax) Adults 19-63 yo with certain risk factors or if 65+ yo  If never received any pneumonia vaccine: recommend Prevnar 20 (PCV20)  Give PCV20 if previously received 1 dose of PCV13 or PPSV23   Hepatitis B Vaccine 3 dose series if at intermediate or high risk (ex: diabetes, end stage renal disease, liver disease)   Respiratory syncytial virus (RSV) Vaccine - COVERED BY MEDICARE PART D  * RSVPreF3 (Arexvy) CDC recommends that adults 60 years of age and older may receive a single dose of RSV vaccine using shared clinical decision-making (SCDM)   Tetanus (Td) Vaccine - COST NOT COVERED BY MEDICARE PART B Following completion of primary series, a booster dose should be given every 10 years to maintain immunity against tetanus. Td may also be given as tetanus wound prophylaxis.   Tdap Vaccine - COST NOT COVERED BY MEDICARE PART B Recommended at least once for all adults. For pregnant patients, recommended with each pregnancy.   Shingles Vaccine (Shingrix) - COST NOT COVERED BY MEDICARE PART B  2 shot series recommended in those 19 years and older who have or will have weakened immune systems or those 50 years and older     Health Maintenance Due:      Topic Date Due   • Colorectal Cancer Screening  Never done   • DXA SCAN  06/02/2025   • Breast Cancer Screening: Mammogram  06/03/2025   • Hepatitis C Screening  Completed   • Lung Cancer Screening  Discontinued     Immunizations Due:      Topic Date Due   • Hepatitis A Vaccine (1 of 2 - Risk 2-dose series) Never done     Advance Directives   What are advance directives?  Advance directives are  legal documents that state your wishes and plans for medical care. These plans are made ahead of time in case you lose your ability to make decisions for yourself. Advance directives can apply to any medical decision, such as the treatments you want, and if you want to donate organs.   What are the types of advance directives?  There are many types of advance directives, and each state has rules about how to use them. You may choose a combination of any of the following:  Living will:  This is a written record of the treatment you want. You can also choose which treatments you do not want, which to limit, and which to stop at a certain time. This includes surgery, medicine, IV fluid, and tube feedings.   Durable power of  for healthcare (DPAHC):  This is a written record that states who you want to make healthcare choices for you when you are unable to make them for yourself. This person, called a proxy, is usually a family member or a friend. You may choose more than 1 proxy.  Do not resuscitate (DNR) order:  A DNR order is used in case your heart stops beating or you stop breathing. It is a request not to have certain forms of treatment, such as CPR. A DNR order may be included in other types of advance directives.  Medical directive:  This covers the care that you want if you are in a coma, near death, or unable to make decisions for yourself. You can list the treatments you want for each condition. Treatment may include pain medicine, surgery, blood transfusions, dialysis, IV or tube feedings, and a ventilator (breathing machine).  Values history:  This document has questions about your views, beliefs, and how you feel and think about life. This information can help others choose the care that you would choose.  Why are advance directives important?  An advance directive helps you control your care. Although spoken wishes may be used, it is better to have your wishes written down. Spoken wishes can be  misunderstood, or not followed. Treatments may be given even if you do not want them. An advance directive may make it easier for your family to make difficult choices about your care.   Urinary Incontinence   Urinary incontinence (UI)  is when you lose control of your bladder. UI develops because your bladder cannot store or empty urine properly. The 3 most common types of UI are stress incontinence, urge incontinence, or both.  Medicines:   May be given to help strengthen your bladder control. Report any side effects of medication to your healthcare provider.  Do pelvic muscle exercises often:  Your pelvic muscles help you stop urinating. Squeeze these muscles tight for 5 seconds, then relax for 5 seconds. Gradually work up to squeezing for 10 seconds. Do 3 sets of 15 repetitions a day, or as directed. This will help strengthen your pelvic muscles and improve bladder control.  Train your bladder:  Go to the bathroom at set times, such as every 2 hours, even if you do not feel the urge to go. You can also try to hold your urine when you feel the urge to go. For example, hold your urine for 5 minutes when you feel the urge to go. As that becomes easier, hold your urine for 10 minutes.   Self-care:   Keep a UI record.  Write down how often you leak urine and how much you leak. Make a note of what you were doing when you leaked urine.  Drink liquids as directed. You may need to limit the amount of liquid you drink to help control your urine leakage. Do not drink any liquid right before you go to bed. Limit or do not have drinks that contain caffeine or alcohol.   Prevent constipation.  Eat a variety of high-fiber foods. Good examples are high-fiber cereals, beans, vegetables, and whole-grain breads. Walking is the best way to trigger your intestines to have a bowel movement.  Exercise regularly and maintain a healthy weight.  Weight loss and exercise will decrease pressure on your bladder and help you control your  leakage.   Use a catheter as directed  to help empty your bladder. A catheter is a tiny, plastic tube that is put into your bladder to drain your urine.   Go to behavior therapy as directed.  Behavior therapy may be used to help you learn to control your urge to urinate.    Cigarette Smoking and Your Health   Risks to your health if you smoke:  Nicotine and other chemicals found in tobacco damage every cell in your body. Even if you are a light smoker, you have an increased risk for cancer, heart disease, and lung disease. If you are pregnant or have diabetes, smoking increases your risk for complications.   Benefits to your health if you stop smoking:   You decrease respiratory symptoms such as coughing, wheezing, and shortness of breath.   You reduce your risk for cancers of the lung, mouth, throat, kidney, bladder, pancreas, stomach, and cervix. If you already have cancer, you increase the benefits of chemotherapy. You also reduce your risk for cancer returning or a second cancer from developing.   You reduce your risk for heart disease, blood clots, heart attack, and stroke.   You reduce your risk for lung infections, and diseases such as pneumonia, asthma, chronic bronchitis, and emphysema.  Your circulation improves. More oxygen can be delivered to your body. If you have diabetes, you lower your risk for complications, such as kidney, artery, and eye diseases. You also lower your risk for nerve damage. Nerve damage can lead to amputations, poor vision, and blindness.  You improve your body's ability to heal and to fight infections.  For more information and support to stop smoking:   Smokefree.gov  Phone: 7- 562 - 586-7669  Web Address: www.[employer]     © Copyright [employer] 2018 Information is for End User's use only and may not be sold, redistributed or otherwise used for commercial purposes. All illustrations and images included in CareNotes® are the copyrighted property of [employer]., Inc. or [employer]  [facility]

## 2025-01-21 NOTE — PROGRESS NOTES
"Name: Corinne A Longo      : 1950      MRN: [id number]  Encounter Provider: Nicolle Aldana PA-C  Encounter Date: 2025   Encounter department: [facility]    Assessment & Plan  Medicare annual wellness visit, subsequent         Chronic kidney disease, stage 3b (HCC)  Lab Results   Component Value Date    EGFR 37 2025    EGFR 38 2024    EGFR 53 2024    CREATININE 1.41 (H) 2025    CREATININE 1.38 (H) 2024    CREATININE 1.05 2024     -Decrease in kidney function after hospital visit with pneumonia in October   -Will repeat her CMP in 3 months to assess kidney function and if still in stage 3B will send her to nephrology   Orders:  •  Comprehensive metabolic panel; Future    COPD, severe (HCC)  -Severe emphysema on CT imaging   -Currently in pulmonary rehab  -On 2L oxygen at baseline   -Continuing her inhalers and Duo Nebs        Pneumonia of both lower lobes due to infectious organism  -Had chest CT on  where there was \"Near complete resolution of previous bilateral pneumonia, but with mild residual consolidations in the lower lobes and tree-in-bud nodular opacities in the lingula\"  -Pulmonology recommending repeat Chest CT in 3 months        Encounter for screening for malignant neoplasm of colon  -Patient denies colon cancer screening at this time        Vitamin D deficiency  -Will have patient take 2000 IU daily now that her levels have evened out   -Will check vitamin D levels in 3 months   Orders:  •  Vitamin D 25 hydroxy; Future    Paroxysmal atrial fibrillation (HCC)  -Will be seeing cardiology in March  -Will be asking about her Cardizem and if she will need to continue it due to her lower leg edema         Bilateral leg edema  -Improving since being on the HCTZ  -Will keep her on a short amount of time due to kidney function decrease          Depression Screening and Follow-up Plan: Patient was screened for depression during " today's encounter. They screened negative with a PHQ-9 score of 2.      Preventive health issues were discussed with patient, and age appropriate screening tests were ordered as noted in patient's After Visit Summary. Personalized health advice and appropriate referrals for health education or preventive services given if needed, as noted in patient's After Visit Summary.    History of Present Illness   {?Quick Links Encounters * My Last Note * Last Note in Specialty * Snapshot * Since Last Visit * History :82904}  Patient is a 74-year-old female who presents today for AVW. She was seen last week for concern of lower leg swelling. She was then put on HCTZ due to kidney function and states that her labs have improved and they are not as swollen since starting the medication. She states she believes she is on finally recovering well from the pneumonia. She denies any other specific concerns that this time. She denies chest pain, increasing SOB, abdominal pain, changes in bowel movements.        Patient Care Team:  Nicolle Aldana PA-C as PCP - General (Physician Assistant)  Guido Duran DO as PCP - PCP-[facility] ([facility])  Guido Duarn DO as PCP - PCP-[facility] ([facility])  MD Gordy Wharton MD Meghan Astorino, PA-C Kirth Steele, DO Jenifer Lee Beers, PA-C as Physician Assistant (Vascular Surgery)    Review of Systems   Constitutional:  Negative for chills, fatigue and fever.   HENT:  Negative for congestion, ear pain and sore throat.    Eyes:  Negative for pain.   Respiratory:  Positive for cough and shortness of breath.    Cardiovascular:  Negative for chest pain and palpitations.   Gastrointestinal:  Negative for abdominal pain, constipation, diarrhea, nausea and vomiting.   Genitourinary:  Negative for dysuria and hematuria.   Musculoskeletal:  Negative for arthralgias and back pain.   Skin:  Negative for color change and rash.   Neurological:  Negative for syncope, numbness and  headaches.   All other systems reviewed and are negative.    Medical History Reviewed by provider this encounter:  Tobacco  Allergies  Meds  Problems  Med Hx  Surg Hx  Fam Hx       Annual Wellness Visit Questionnaire   Corinne is here for her Subsequent Wellness visit.     Health Risk Assessment:   Patient rates overall health as good. Patient feels that their physical health rating is slightly worse. Patient is very satisfied with their life. Eyesight was rated as same. Hearing was rated as same. Patient feels that their emotional and mental health rating is same. Patients states they are never, rarely angry. Patient states they are sometimes unusually tired/fatigued. Pain experienced in the last 7 days has been some. Patient's pain rating has been 3/10. Patient states that she has experienced no weight loss or gain in last 6 months.     Depression Screening:   PHQ-9 Score: 2      Fall Risk Screening:   In the past year, patient has experienced: no history of falling in past year      Urinary Incontinence Screening:   Patient has leaked urine accidently in the last six months.     Home Safety:  Patient does not have trouble with stairs inside or outside of their home. Patient has working smoke alarms and has no working carbon monoxide detector. Home safety hazards include: household clutter.     Nutrition:   Current diet is Low Cholesterol and Low Saturated Fat.     Medications:   Patient is currently taking over-the-counter supplements. OTC medications include: see medication list. Patient is able to manage medications.     Activities of Daily Living (ADLs)/Instrumental Activities of Daily Living (IADLs):   Walk and transfer into and out of bed and chair?: Yes  Dress and groom yourself?: Yes    Bathe or shower yourself?: Yes    Feed yourself? Yes  Do your laundry/housekeeping?: Yes  Manage your money, pay your bills and track your expenses?: Yes  Make your own meals?: Yes    Do your own shopping?:  Yes    Durable Medical Equipment Suppliers  [employer]    Previous Hospitalizations:   Any hospitalizations or ED visits within the last 12 months?: Yes    How many hospitalizations have you had in the last year?: 1-2    Advance Care Planning:   Living will: No    Durable POA for healthcare: No    Advanced directive: No      Cognitive Screening:   Provider or family/friend/caregiver concerned regarding cognition?: No    PREVENTIVE SCREENINGS      Cardiovascular Screening:    General: Screening Not Indicated and History Lipid Disorder      Diabetes Screening:     General: Screening Current      Breast Cancer Screening:     General: Screening Current      Cervical Cancer Screening:    General: Screening Not Indicated      Osteoporosis Screening:    General: Screening Current      Lung Cancer Screening:     General: Screening Not Indicated      Hepatitis C Screening:    General: Screening Current    Screening, Brief Intervention, and Referral to Treatment (SBIRT)    Screening  Typical number of drinks in a day: 0  Typical number of drinks in a week: 0  Interpretation: Low risk drinking behavior.    AUDIT-C Screenin) How often did you have a drink containing alcohol in the past year? never  2) How many drinks did you have on a typical day when you were drinking in the past year? 0  3) How often did you have 6 or more drinks on one occasion in the past year? never    AUDIT-C Score: 0  Interpretation: Score 0-2 (female): Negative screen for alcohol misuse    Single Item Drug Screening:  How often have you used an illegal drug (including marijuana) or a prescription medication for non-medical reasons in the past year? never    Single Item Drug Screen Score: 0  Interpretation: Negative screen for possible drug use disorder    Social Drivers of Health     Financial Resource Strain: Low Risk  (2023)    Overall Financial Resource Strain (CARDIA)    • Difficulty of Paying Living Expenses: Not very hard   Food  "Insecurity: No Food Insecurity (1/21/2025)    Hunger Vital Sign    • Worried About Running Out of Food in the Last Year: Never true    • Ran Out of Food in the Last Year: Never true   Transportation Needs: No Transportation Needs (1/21/2025)    PRAPARE - Transportation    • Lack of Transportation (Medical): No    • Lack of Transportation (Non-Medical): No   Housing Stability: Low Risk  (1/21/2025)    Housing Stability Vital Sign    • Unable to Pay for Housing in the Last Year: No    • Number of Times Moved in the Last Year: 0    • Homeless in the Last Year: No   Utilities: Not At Risk (1/21/2025)    [facility] Utilities    • Threatened with loss of utilities: No     No results found.    Objective {?Quick Links Trend Vitals * Enter New Vitals * Results Review * Timeline (Adult) * Labs * Imaging * Cardiology * Procedures * Lung Cancer Screening * Surgical eConsent :76291}  /64 (Patient Position: Sitting, Cuff Size: Standard)   Pulse 59   Temp (!) 96.6 °F (35.9 °C) (Tympanic)   Resp 20   Ht 5' 8\" (1.727 m)   SpO2 98%   BMI 23.72 kg/m²     Physical Exam  Vitals and nursing note reviewed.   Constitutional:       General: She is not in acute distress.     Appearance: Normal appearance. She is well-developed.   HENT:      Head: Normocephalic and atraumatic.      Right Ear: Tympanic membrane normal.      Left Ear: Tympanic membrane normal.      Nose: Nose normal.      Mouth/Throat:      Mouth: Mucous membranes are moist.   Eyes:      Conjunctiva/sclera: Conjunctivae normal.   Cardiovascular:      Rate and Rhythm: Normal rate and regular rhythm.      Heart sounds: Normal heart sounds. No murmur heard.  Pulmonary:      Effort: Pulmonary effort is normal. No respiratory distress.      Breath sounds: Examination of the left-lower field reveals rales. Rales present. No wheezing or rhonchi.      Comments: On 2L nasal cannula   Abdominal:      Palpations: Abdomen is soft.      Tenderness: There is no abdominal tenderness. "   Musculoskeletal:         General: No swelling.      Cervical back: Normal range of motion and neck supple.      Right lower leg: No edema.      Left lower leg: No edema.   Skin:     General: Skin is warm and dry.      Capillary Refill: Capillary refill takes less than 2 seconds.      Findings: No erythema or lesion.   Neurological:      Mental Status: She is alert and oriented to person, place, and time.   Psychiatric:         Mood and Affect: Mood normal.         Behavior: Behavior normal.         Thought Content: Thought content normal.         Judgment: Judgment normal.       Administrative Statements {?Quick Links Full Problem List * Level of Service * [facility]}  I have spent a total time of 45 minutes in caring for this patient on the day of the visit/encounter including Diagnostic results, Prognosis, Risks and benefits of tx options, Instructions for management, Patient and family education, Importance of tx compliance, Risk factor reductions, Impressions, Counseling / Coordination of care, Documenting in the medical record, Reviewing / ordering tests, medicine, procedures  , and Obtaining or reviewing history  . Topics discussed with the patient / family include symptom assessment and management and medication review.

## 2025-01-21 NOTE — ASSESSMENT & PLAN NOTE
-Severe emphysema on CT imaging   -Currently in pulmonary rehab  -On 2L oxygen at baseline   -Continuing her inhalers and Duo Nebs

## 2025-01-21 NOTE — ASSESSMENT & PLAN NOTE
-Will be seeing cardiology in March  -Will be asking about her Cardizem and if she will need to continue it due to her lower leg edema

## 2025-01-21 NOTE — ASSESSMENT & PLAN NOTE
-Will have patient take 2000 IU daily now that her levels have evened out   -Will check vitamin D levels in 3 months   Orders:  •  Vitamin D 25 hydroxy; Future

## 2025-01-22 ENCOUNTER — TELEPHONE (OUTPATIENT)
Age: 75
End: 2025-01-22

## 2025-01-22 DIAGNOSIS — I48.91 ATRIAL FIBRILLATION WITH RVR (HCC): Primary | ICD-10-CM

## 2025-01-22 RX ORDER — METOPROLOL TARTRATE 50 MG
50 TABLET ORAL EVERY 12 HOURS SCHEDULED
Qty: 120 TABLET | Refills: 1 | Status: SHIPPED | OUTPATIENT
Start: 2025-01-22

## 2025-01-22 NOTE — TELEPHONE ENCOUNTER
Pharmacy is requesting a new electronic order for 120 tablets of metoprolol instead of 90 so patient can take twice a day as ordered.

## 2025-01-23 DIAGNOSIS — E78.5 HYPERLIPIDEMIA, UNSPECIFIED HYPERLIPIDEMIA TYPE: ICD-10-CM

## 2025-01-23 DIAGNOSIS — J44.9 COPD, SEVERE (HCC): Primary | ICD-10-CM

## 2025-01-23 DIAGNOSIS — N18.32 CHRONIC KIDNEY DISEASE, STAGE 3B (HCC): ICD-10-CM

## 2025-01-24 ENCOUNTER — CLINICAL SUPPORT (OUTPATIENT)
Dept: PULMONOLOGY | Facility: HOSPITAL | Age: 75
End: 2025-01-24
Attending: INTERNAL MEDICINE
Payer: COMMERCIAL

## 2025-01-24 DIAGNOSIS — J44.9 CHRONIC OBSTRUCTIVE PULMONARY DISEASE, UNSPECIFIED COPD TYPE (HCC): ICD-10-CM

## 2025-01-24 PROCEDURE — 94625 PHY/QHP OP PULM RHB W/O MNTR: CPT

## 2025-01-27 ENCOUNTER — CLINICAL SUPPORT (OUTPATIENT)
Dept: PULMONOLOGY | Facility: HOSPITAL | Age: 75
End: 2025-01-27
Attending: INTERNAL MEDICINE
Payer: COMMERCIAL

## 2025-01-27 DIAGNOSIS — J44.9 CHRONIC OBSTRUCTIVE PULMONARY DISEASE, UNSPECIFIED COPD TYPE (HCC): ICD-10-CM

## 2025-01-27 PROCEDURE — 94625 PHY/QHP OP PULM RHB W/O MNTR: CPT

## 2025-01-28 ENCOUNTER — TELEPHONE (OUTPATIENT)
Age: 75
End: 2025-01-28

## 2025-01-28 DIAGNOSIS — I48.91 ATRIAL FIBRILLATION WITH RVR (HCC): ICD-10-CM

## 2025-01-28 RX ORDER — METOPROLOL TARTRATE 50 MG
50 TABLET ORAL EVERY 12 HOURS SCHEDULED
Qty: 180 TABLET | Refills: 0 | Status: SHIPPED | OUTPATIENT
Start: 2025-01-28 | End: 2025-04-28

## 2025-01-28 NOTE — TELEPHONE ENCOUNTER
Patient called in requesting a referral to nephrology be placed. She called to scheduled but was told she would need a referral.     Patient also was told to give the provider an update on her water weight. She states she notices her weight has been fluctuating between 2-3 lb weight gains. Please advise.

## 2025-01-28 NOTE — TELEPHONE ENCOUNTER
Sarah, from [facility], called in following up if we have received their request of new script for the following medication:    metoprolol tartrate (LOPRESSOR) 50 mg tablet   Quantity: 90 day supply    States the script they received is not what the pt is supposed to have.     Please advise & contact pharmacy with update. Thank you!    [facility] - [facility] - [address] [place] [address] Rd [phone]

## 2025-01-29 DIAGNOSIS — N18.32 CHRONIC KIDNEY DISEASE, STAGE 3B (HCC): Primary | ICD-10-CM

## 2025-01-31 ENCOUNTER — CLINICAL SUPPORT (OUTPATIENT)
Dept: PULMONOLOGY | Facility: HOSPITAL | Age: 75
End: 2025-01-31
Attending: INTERNAL MEDICINE
Payer: COMMERCIAL

## 2025-01-31 DIAGNOSIS — J44.9 CHRONIC OBSTRUCTIVE PULMONARY DISEASE, UNSPECIFIED COPD TYPE (HCC): ICD-10-CM

## 2025-01-31 PROCEDURE — 94626 PHY/QHP OP PULM RHB W/MNTR: CPT

## 2025-02-03 ENCOUNTER — TELEPHONE (OUTPATIENT)
Age: 75
End: 2025-02-03

## 2025-02-03 DIAGNOSIS — R60.0 BILATERAL LEG EDEMA: ICD-10-CM

## 2025-02-03 RX ORDER — HYDROCHLOROTHIAZIDE 25 MG/1
25 TABLET ORAL DAILY
Qty: 90 TABLET | Refills: 1 | Status: SHIPPED | OUTPATIENT
Start: 2025-02-03

## 2025-02-03 NOTE — TELEPHONE ENCOUNTER
Medication: hydroCHLOROthiazide 25 mg tablet     Dose/Frequency: Take 1 tablet (25 mg total) by mouth daily, Starting     Quantity: 90    Pharmacy: [facility] - [place] Cook - [address]      Office:   [x] PCP/Provider -   [] Speciality/Provider -     Does the patient have enough for 3 days?   [x] Yes   [] No - Send as HP to POD      Patient is requesting 90 days supply.

## 2025-02-04 ENCOUNTER — CLINICAL SUPPORT (OUTPATIENT)
Dept: NUTRITION | Facility: HOSPITAL | Age: 75
End: 2025-02-04
Payer: COMMERCIAL

## 2025-02-04 VITALS — BODY MASS INDEX: 23.5 KG/M2 | WEIGHT: 154.54 LBS

## 2025-02-04 DIAGNOSIS — J44.9 COPD, SEVERE (HCC): ICD-10-CM

## 2025-02-04 DIAGNOSIS — E78.5 HYPERLIPIDEMIA, UNSPECIFIED HYPERLIPIDEMIA TYPE: ICD-10-CM

## 2025-02-04 DIAGNOSIS — N18.32 CHRONIC KIDNEY DISEASE, STAGE 3B (HCC): ICD-10-CM

## 2025-02-04 PROCEDURE — 97802 MEDICAL NUTRITION INDIV IN: CPT

## 2025-02-04 NOTE — PROGRESS NOTES
" Nutrition Assessment Form    Patient Name: Corinne A Longo    YOB: 1950    Sex: Female     Assessment Date: 2/4/2025  Start Time: 9:30 Stop Time: 10:30 Total Minutes: 60     Data:  Present at session: Self and significant other   Parent/Patient Concerns/reason for visit: \"I am hoping you can help me with what I can and cannot eat with the kidney.\"   Medical Dx/Reason for Referral: N18.32 (ICD-10-CM) - Chronic kidney disease, stage 3b (Formerly [facility])   Past Medical History:   Diagnosis Date    Allergic     Allergic rhinitis     [id number]  RESOLVED    Asthma     Atrial fibrillation ([facility]ly [facility])     Bronchitis     Bunion     COPD (chronic obstructive pulmonary disease) (Formerly [facility])     home O2 at night at home 2L    Coronary artery disease     AFIB    Depression     Emphysema lung ([facility])     Fibroadenoma of breast     Gastroesophageal reflux disease without esophagitis 12/18/2023    Hyperlipidemia     Hypertension     Lung disease 2008    COPD    Memory difficulties 07/23/2019    Post-menopausal     Post-menopausal     Sepsis due to pneumonia ([facility]) 10/28/2024    Spondylisthesis     Stage 3 chronic kidney disease, unspecified whether stage 3a or 3b CKD ([facility]) 02/07/2022    Vitamin D deficiency        Current Outpatient Medications   Medication Sig Dispense Refill    apixaban (ELIQUIS) 5 mg Take 1 tablet (5 mg total) by mouth 2 (two) times a day 180 tablet 1    ascorbic acid (VITAMIN C) 500 mg tablet Take 500 mg by mouth daily      atorvastatin (LIPITOR) 40 mg tablet Take 1 tablet (40 mg total) by mouth daily 90 tablet 1    calcium citrate-vitamin D 315 mg-5 mcg tablet Take 2 tablets by mouth 2 (two) times a day      cetirizine (ZyrTEC) 10 mg tablet Take 10 mg by mouth daily      cholecalciferol (VITAMIN D3) 1,000 units tablet Take 2 Units by mouth daily      clotrimazole-betamethasone (LOTRISONE) 1-0.05 % cream Apply topically 2 (two) times a day 45 g 1    diltiazem (CARDIZEM CD) 360 MG 24 hr capsule Take 1 capsule (360 mg total) " by mouth daily 90 capsule 1    docusate sodium (COLACE) 100 mg capsule take 1 capsule by mouth twice a day 60 capsule 0    ferrous sulfate 325 (65 FE) MG EC tablet Take 325 mg by mouth 3 (three) times a day with meals (Patient not taking: Reported on 1/15/2025)      guaiFENesin (MUCINEX) 600 mg 12 hr tablet Take 1 tablet (600 mg total) by mouth every 12 (twelve) hours 180 tablet 3    hydroCHLOROthiazide 25 mg tablet Take 1 tablet (25 mg total) by mouth daily 90 tablet 1    ipratropium-albuterol (DUO-NEB) 0.5-2.5 mg/3 mL nebulizer solution Take 3 mL by nebulization every 6 (six) hours as needed for wheezing or shortness of breath 1080 mL 0    L-Lysine 500 MG CAPS Take 1 capsule by mouth daily      levalbuterol (Xopenex HFA) 45 mcg/act inhaler Inhale 1-2 puffs every 4 (four) hours as needed for wheezing 45 g 0    LORazepam (Ativan) 0.5 mg tablet Take 1 tablet (0.5 mg total) by mouth daily at bedtime (Patient not taking: Reported on 1/15/2025) 90 tablet 0    methocarbamol (ROBAXIN) 500 mg tablet Take 1 tablet (500 mg total) by mouth 3 (three) times a day as needed for muscle spasms 90 tablet 0    metoprolol tartrate (LOPRESSOR) 50 mg tablet Take 1 tablet (50 mg total) by mouth every 12 (twelve) hours 180 tablet 0    mometasone (NASONEX) 50 mcg/act nasal spray 2 sprays into each nostril daily 51 g 0    multivitamin-minerals (CENTRUM ADULTS) tablet Take 1 tablet by mouth daily      nystatin (MYCOSTATIN) 500,000 units/5 mL suspension Apply 5 mL (500,000 Units total) to the mouth or throat 4 (four) times a day 473 mL 0    Omega-3 Fatty Acids (fish oil) 1,000 mg Take 1,000 mg by mouth 2 (two) times a day. (Patient not taking: Reported on 12/23/2024)      oxygen gas Inhale 2 L/min continuous. via nasal cannula      Probiotic Product (PROBIOTIC-10 PO) Take 1 capsule by mouth daily      triamcinolone (KENALOG) 0.1 % cream Apply topically 2 (two) times a day 15 g 0    umeclidinium-vilanterol (Anoro Ellipta) 62.5-25  "mcg/actuation inhaler Inhale 1 puff daily 180 each 1     No current facility-administered medications for this visit.        Additional Meds/Supplements: None   Special Learning Needs/barriers to learning/any new barriers None   Height: Ht Readings from Last 5 Encounters:   25 5' 8\" (1.727 m)   01/15/25 5' 8\" (1.727 m)   25 5' 8\" (1.727 m)   24 5' 8\" (1.727 m)   24 5' 8\" (1.727 m)      Weight: Wt Readings from Last 10 Encounters:   25 70.1 kg (154 lb 8.7 oz)   01/15/25 70.8 kg (156 lb)   25 70.3 kg (155 lb)   24 70.6 kg (155 lb 9.6 oz)   24 68.4 kg (150 lb 12.8 oz)   24 66.7 kg (147 lb)   24 70.8 kg (156 lb)   24 70.8 kg (156 lb)   24 71.9 kg (158 lb 9.6 oz)   24 75.6 kg (166 lb 10.7 oz)     Estimated body mass index is 23.5 kg/m² as calculated from the following:    Height as of 25: 5' 8\" (1.727 m).    Weight as of this encounter: 70.1 kg (154 lb 8.7 oz).   Recent Weight Change: []Yes     [x]No  Amount: Some fluctuations with fluid      Energy Needs: 1750 - 2100 kcal (25-30 kcal/kg)    42 - 56 g protein/day (0.6-0.8 g/kg)   Allergies   Allergen Reactions    Bupropion Itching    Ace Inhibitors Cough     Action Taken: stopped med and changed to ARB; Category: Adverse Reaction;     Citalopram Diarrhea and Nausea Only    Mixed Ragweed Cough    Pollen Extract Cough    Adhesive [Medical Tape] Rash     Patch     or intolerances NKFA  Some sensitivities - cilantro, kat in excess,    Social History     Substance and Sexual Activity   Alcohol Use Yes    Comment: I might have a glass of wine every two or three months    None   Social History     Tobacco Use   Smoking Status Every Day    Current packs/day: 0.00    Average packs/day: 0.3 packs/day for 53.5 years (13.4 ttl pk-yrs)    Types: Cigarettes    Start date: 1970    Last attempt to quit: 2023    Years since quittin.5    Passive exposure: Past   Smokeless Tobacco Never    None "   Who shops? patient and Aaron make a grocery list together,  groceries   Who cooks/cooking methods/Eating out/take out habits   patient  Cooking methods: bake, airfry    Take out: twice a month  Dining out: hardly ever   Exercise: Pulmotherapy twice weekly - cardio and weight training  She reports being more active now post October/November 2024 hospital admission.    Other: ie: Sleep habits/ stress level/ work habits household-lives with ?/ food security Uses Ativan for sleep optimization as needed - does not take as often now  Gets about 8 hours of sleep nightly. On bad nights she gets 4 hours of asleep and then will nap throughout the day - happens less often now.   Retired.   Some stress about health and hospital admission where she does not remember what happened to her.   Lives with Aaron.    Prior Nutritional Counseling? []Yes     [x]No  When:      Why:         Diet Hx:  Breakfast: 7-8 AM  Oatmeal  Sliced walnuts  Apples, cinnamon, almond milk  Coffee (decaf) with vanilla creamer and cinnamon    Buckwheat pancakes with blueberries, turkey sausages with coffee    Smoothie - almond milk, spinach, blueberries   Lunch: 12 PM  Tuna (low sodium in water) salad with lite moe and celery on lettuce with cucumber and tomato     1/2 tuna salad sandwich on whole grain bread and rommel    Chicken cucumbers, celery carrots, rommel with oil and vinegar    Peanut butter and jelly   Dinner: 6 PM  Pulled pork over shanelle hair pasta with oil, butter, zucchini and onion and mushrooms    Baked chicken breast, carrots    Slice of pizza   Snacks: AM -   PM -   HS - apples and celery with peanut butter   Other Notes/ Initial Assessment: Corinne has done her own research on what she can and cannot eat with stage 3b CKD and other conditions. She and Aaron have chosen lean meat options for years after he began with heart issues 3 years ago.  She reports intentional weight loss in the past 3 years.  She enjoys turkey chili  and taco salads often. Reports change in taste since October/November hospital admission. Uses avocado oil for all cooking.  Corinne indicates a challenge she anticipates she will face is limiting protein at each meal and replacing it.  Encouraged her to add nonstarchy vegetables in place of larger protein portion.  Nutrition education materials discussed as below.  She has plans to meet with nephrologist and PCP prior to next visit.  Follow-up made.      Updated assessment (Follow up note only):         Nutrition Diagnosis:   Excessive protein intake  related to Renal dysfunction as evidenced by  Altered laboratory values (i.e. ?BUN, ?GFR)       Any change or new dx since previous visit:     Nutrition Diagnosis:         Medical Nutrition Therapy Intervention:  []Individualized Meal Plan [x]Understanding Lab Values   discussed most recent labs   []Basic Pathophysiology of Disease []Food/Medication Interactions   []Food Diary [x]Exercise  150 mins of moderate activity weekly, discussed importance of variety of activity, including wt bearing activities 2-3x/wk x 10-15mins. Discussed importance of activity throughout the lifecycle and its impact on overall health/stress management, etc.   [x]Lifestyle/Behavior Modification Techniques  Discussed the importance of eating 3 meals daily and not skipping, and how metabolism is affected.  Also discussed adding in small snacks or 5-6 small meals daily if desired vs 3 larger ones, and appropriate options and portions.  Appropriate timing of meals, including eating within 1 hr of waking and eating meals slowly 20mins/meals, minimizing mindless/boredom or habitual eating, etc was also mentioned.  Fluid intake discussed and appropriate amounts and choices, 16 oz with meals and additional 32oz during the day.  S/S dehydration also covered. []Medication, Mechanism of Action   [x]Label Reading: Serving size, protein []Self Blood Glucose Monitoring   []Weight/BMI Goals:  "gain/lose/maintain [x]Other -provided and discussed \"stage III-V CKD Nutrition Therapy,\" \" phosphorus content of foods,\" \" potassium content of foods \" handouts.  Advised patient her last phosphorus and potassium labs were WNL.          Comprehension: []Excellent  [x]Very Good  []Good  []Fair   []Poor    Receptivity: []Excellent  [x]Very Good  []Good  []Fair   []Poor    Expected Compliance: []Excellent  [x]Very Good  []Good  []Fair   []Poor        Goals (initial)/ Progress made on previous goals/new goals:  Corinne will consume no more than 56 g protein daily for at least 4 days weekly.    2. Corinne will be able to identify serving size of protein appropriate for CKD by next visit.    3.        No follow-ups on file.  Labs:  CMP  Lab Results   Component Value Date     06/05/2015    K 3.8 01/14/2025     01/14/2025    CO2 34 (H) 01/14/2025    ANIONGAP 2 (L) 06/05/2015    BUN 28 (H) 01/14/2025    CREATININE 1.41 (H) 01/14/2025    GLUCOSE 101 06/05/2015    GLUF 96 01/14/2025    CALCIUM 9.9 01/14/2025    CORRECTEDCA 9.5 11/11/2024    AST 21 01/14/2025    ALT 14 01/14/2025    ALKPHOS 65 01/14/2025    PROT 7.6 06/04/2015    BILITOT 0.5 06/04/2015    EGFR 37 01/14/2025       BMP  Lab Results   Component Value Date    GLUCOSE 101 06/05/2015    CALCIUM 9.9 01/14/2025     06/05/2015    K 3.8 01/14/2025    CO2 34 (H) 01/14/2025     01/14/2025    BUN 28 (H) 01/14/2025    CREATININE 1.41 (H) 01/14/2025       Lipids  No results found for: \"CHOL\"  Lab Results   Component Value Date    HDL 50 12/03/2024    HDL 47 (L) 04/07/2023    HDL 44 11/09/2021     Lab Results   Component Value Date    LDLCALC 65 12/03/2024    LDLCALC 63 04/07/2023    LDLCALC 66 11/09/2021     Lab Results   Component Value Date    TRIG 107 12/03/2024    TRIG 99 04/07/2023    TRIG 114 11/09/2021     No results found for: \"CHOLHDL\"    Hemoglobin A1C  Lab Results   Component Value Date    HGBA1C 5.4 11/09/2021       Fasting Glucose  Lab " "Results   Component Value Date    GLUF 96 01/14/2025       Insulin     Thyroid  No results found for: \"TSH\", \"T4ADJFN\", \"I7MQKNC\", \"THYROIDAB\"    Hepatic Function Panel  Lab Results   Component Value Date    ALT 14 01/14/2025    AST 21 01/14/2025    ALKPHOS 65 01/14/2025    BILITOT 0.5 06/04/2015       Celiac Disease Antibody Panel  Endomysial IgA   Date Value Ref Range Status   07/20/2018 Negative Negative Final     Gliadin IgA   Date Value Ref Range Status   07/20/2018 4 0 - 19 units Final     Comment:                        Negative                   0 - 19                     Weak Positive             20 - 30                     Moderate to Strong Positive   >30     Gliadin IgG   Date Value Ref Range Status   07/20/2018 2 0 - 19 units Final     Comment:                        Negative                   0 - 19                     Weak Positive             20 - 30                     Moderate to Strong Positive   >30     IgA   Date Value Ref Range Status   07/20/2018 194 87 - 352 mg/dL Final     TISSUE TRANSGLUTAMINASE IGA   Date Value Ref Range Status   07/20/2018 <2 0 - 3 U/mL Final     Comment:                                   Negative        0 -  3                                Weak Positive   4 - 10                                Positive           >10   Tissue Transglutaminase (tTG) has been identified   as the endomysial antigen.  Studies have demonstr-   ated that endomysial IgA antibodies have over 99%   specificity for gluten sensitive enteropathy.      Iron  Lab Results   Component Value Date    IRON 95 01/14/2025    TIBC 315 01/14/2025    FERRITIN 194 01/14/2025            Monique Lee RD  [facility]IN[facility]UTRITION [facility]  [address] [place] [address] [address] [place] [place]ERS [phone]    "

## 2025-02-05 ENCOUNTER — CLINICAL SUPPORT (OUTPATIENT)
Dept: PULMONOLOGY | Facility: HOSPITAL | Age: 75
End: 2025-02-05
Attending: INTERNAL MEDICINE
Payer: COMMERCIAL

## 2025-02-05 DIAGNOSIS — J44.9 CHRONIC OBSTRUCTIVE PULMONARY DISEASE, UNSPECIFIED COPD TYPE (HCC): Primary | ICD-10-CM

## 2025-02-05 PROCEDURE — 94625 PHY/QHP OP PULM RHB W/O MNTR: CPT

## 2025-02-05 NOTE — PROGRESS NOTES
Pulmonary Rehabilitation   Assessment and Individualized Treatment Plan  60 DAY      Today's date: 2025  # of Exercise Sessions Completed: 9  Patient name: Corinne A Longo     : 1950       MRN: [id number]  Referring Physician: Cece Ro MD  Pulmonologist: Rosy Rdoriges DO / ROGELIO Crawley  Provider: Oliva  Clinician: Adriana Junior, MPT, CCRP    Dx:  COPD  Date of onset: Hospitalized 10/28/2024 - 2024      Treatment is tailored to this patient's individual needs.  The ITP was reviewed with the patient and all questions were answered to their satisfaction.  Additional ITP documentation can be found electronically including daily and monthly exercise summaries, daily session notes with  summaries, education notes, daily medication reconciliation, and daily physician supervision.      COMMENTS:  Corinne has completed 9 exercise sessions of pulmonary rehab for the diagnosis of COPD. She has been having issues with edema in BLEs. She is working with her PCP and Cardiology on medication changes. She performs her sessions on 2 liters supplemental 02.She is able to maintain her 02 sat at 95% or greater. She rates her program a 4 on a 1-10 RPE Scale. She rates her MERCHANT as 0-3/10. She has correct HR and BP responses to the activity. She is able to perform 40-45 minutes of aerobic activity and recently began a strengthening component.      She was hospitalized 10/28 - 2024 for sepsis due to pneumonia and COPD. She states she is beginning to feel better post hospitalization.     Her program will be progressed as tolerated and continue to receive education specific to her disease process.      ASSESSMENT      Medical History:   Past Medical History:   Diagnosis Date    Allergic     Allergic rhinitis     2015  RESOLVED    Asthma     Atrial fibrillation (HCC)     Bronchitis     Bunion     COPD (chronic obstructive pulmonary disease) (HCC)     home O2 at night at home 2L     Coronary artery disease     AFIB    Depression     Emphysema lung (HCC)     Fibroadenoma of breast     Gastroesophageal reflux disease without esophagitis 12/18/2023    Hyperlipidemia     Hypertension     Lung disease 2008    COPD    Memory difficulties 07/23/2019    Post-menopausal     Post-menopausal     Sepsis due to pneumonia ([facility]) 10/28/2024    Spondylisthesis     Stage 3 chronic kidney disease, unspecified whether stage 3a or 3b CKD ([facility]) 02/07/2022    Vitamin D deficiency        Family History:  Family History   Problem Relation Age of Onset    Hypertension Mother     Alcohol abuse Mother     Hypertension Father     Diabetes Father     Prostate cancer Father     Cancer Father         Prostate    Hearing loss Father     Vitamin D deficiency Sister     Diverticulitis Sister     Depression Sister     No Known Problems Sister     Heart failure Brother     Lung cancer Brother     Cancer Brother         Stage four lung cancer    Celiac disease Daughter     Alcohol abuse Daughter     Substance Abuse Daughter     Eczema Daughter     Fibromyalgia Daughter     Substance Abuse Daughter     Alcohol abuse Daughter     ADD / ADHD Daughter     Bipolar disorder Daughter     Anxiety disorder Daughter     Ulcerative colitis Daughter     No Known Problems Maternal Aunt     Heart disease Brother     Substance Abuse Brother     Drug abuse Brother     Heart failure Brother     Breast cancer Neg Hx        Allergies:   Bupropion, Ace inhibitors, Citalopram, Mixed ragweed, Pollen extract, and Adhesive [medical tape]    Current Medications:   Current Outpatient Medications   Medication Sig Dispense Refill    apixaban (ELIQUIS) 5 mg Take 1 tablet (5 mg total) by mouth 2 (two) times a day 180 tablet 1    ascorbic acid (VITAMIN C) 500 mg tablet Take 500 mg by mouth daily      atorvastatin (LIPITOR) 40 mg tablet Take 1 tablet (40 mg total) by mouth daily 90 tablet 1    calcium citrate-vitamin D 315 mg-5 mcg tablet Take 2 tablets by mouth  2 (two) times a day      cetirizine (ZyrTEC) 10 mg tablet Take 10 mg by mouth daily      cholecalciferol (VITAMIN D3) 1,000 units tablet Take 2 Units by mouth daily      clotrimazole-betamethasone (LOTRISONE) 1-0.05 % cream Apply topically 2 (two) times a day 45 g 1    diltiazem (CARDIZEM CD) 360 MG 24 hr capsule Take 1 capsule (360 mg total) by mouth daily 90 capsule 1    docusate sodium (COLACE) 100 mg capsule take 1 capsule by mouth twice a day 60 capsule 0    ferrous sulfate 325 (65 FE) MG EC tablet Take 325 mg by mouth 3 (three) times a day with meals (Patient not taking: Reported on 1/15/2025)      guaiFENesin (MUCINEX) 600 mg 12 hr tablet Take 1 tablet (600 mg total) by mouth every 12 (twelve) hours 180 tablet 3    hydroCHLOROthiazide 25 mg tablet Take 1 tablet (25 mg total) by mouth daily 90 tablet 1    ipratropium-albuterol (DUO-NEB) 0.5-2.5 mg/3 mL nebulizer solution Take 3 mL by nebulization every 6 (six) hours as needed for wheezing or shortness of breath 1080 mL 0    L-Lysine 500 MG CAPS Take 1 capsule by mouth daily      levalbuterol (Xopenex HFA) 45 mcg/act inhaler Inhale 1-2 puffs every 4 (four) hours as needed for wheezing 45 g 0    LORazepam (Ativan) 0.5 mg tablet Take 1 tablet (0.5 mg total) by mouth daily at bedtime (Patient not taking: Reported on 1/15/2025) 90 tablet 0    methocarbamol (ROBAXIN) 500 mg tablet Take 1 tablet (500 mg total) by mouth 3 (three) times a day as needed for muscle spasms 90 tablet 0    metoprolol tartrate (LOPRESSOR) 50 mg tablet Take 1 tablet (50 mg total) by mouth every 12 (twelve) hours 180 tablet 0    mometasone (NASONEX) 50 mcg/act nasal spray 2 sprays into each nostril daily 51 g 0    multivitamin-minerals (CENTRUM ADULTS) tablet Take 1 tablet by mouth daily      nystatin (MYCOSTATIN) 500,000 units/5 mL suspension Apply 5 mL (500,000 Units total) to the mouth or throat 4 (four) times a day 473 mL 0    Omega-3 Fatty Acids (fish oil) 1,000 mg Take 1,000 mg by mouth  2 (two) times a day. (Patient not taking: Reported on 12/23/2024)      oxygen gas Inhale 2 L/min continuous. via nasal cannula      Probiotic Product (PROBIOTIC-10 PO) Take 1 capsule by mouth daily      triamcinolone (KENALOG) 0.1 % cream Apply topically 2 (two) times a day 15 g 0    umeclidinium-vilanterol (Anoro Ellipta) 62.5-25 mcg/actuation inhaler Inhale 1 puff daily 180 each 1     No current facility-administered medications for this visit.       Physical Limitations: Decreased strength, foot pain due to osteopenia and 02 dependence.     Fall Risk: Low   Comments: Ambulates with a steady gait with no assist device    Cultural needs: none    PFT:  Yes   FEV1/FVC ratio of 47%   FEV1 of 52% predicted  Moderate to severe obstruction    Medication compliance: Yes  Comments: Pt reports to be compliant with medications      Pulmonary Disease Risk Factors:  second hand smoke  smoking    Sleep Disorders:   obstructive sleep apnea:  CPCP/BiPAP:  no      EXERCISE ASSESSMENT:      Initial Fitness Assessment:   6MWT:  675 ft = 1.98 METs  Rest: HR 73, /56, SpO2 98% on 2L/min, 0/10 SOB  Exercise: HR 92, /58, SpO2 94-97% on 2L/min, 4/10 MERCHANT  Recovery: HR 70, /54, SpO2 98% on 2L/min, 0/10 SOB      Current Functional Status:  Occupation: retired  Recreation/Physical activity: Sedentary  ADL’s:Capable of performing light ADLs only  [place]: Capable of performing light ADLs only  Exercise:  None  Home exercise equipment: None  Other Comments: None    SMART Exercise Goals:   reduced score on CAT, improved 6MWT distance, reduced dyspnea during exercise, improved exercise tolerance based on peak METs tolerated in pulmonary rehab exercise session, and SpO2 >88% during exercise    Patient Specific EXERCISE GOALS:     reduced number of COPD exacerbations, attend pulmonary rehab regularly, decrease sitting time at home, reduced pulmonary related hospital readmissions , reduced reliance on supplemental 02 and  "increased energy    PSYCHOSOCIAL ASSESSMENT:    Date of last assessment: 12/12/2024  Depression screening:  PHQ-9 = 3    Interpretation:  1-4 = Minimal Depression  Anxiety screening:  LOUIS-7 = 5    Interpretation: 5-9 = Mild anxiety    Pt self-report of depression and anxiety   Patient has a history of depression and anxiety. Patient is prescribed Ativan.    Self-reported stress level:  Patient states typically she is a low stress level (3/10) daily but certain things (going in public, going somewhere new, rushing around) will cause her stress level to be high (7/10)  Stress Management: practice Relaxation Techniques and keep a positive mindset    Quality of Life Screen:  (Higher score indicates disease impact on QOL)  [facility] COOP score: 28/40      CAT: 26/40      Shortness of breath questionnaire: 67/120    Social Support:   significant other, children, and friends  Community/Social Activities: Spend time with family and friends    SMART Goals:    Reduce perceived stress to 1-3/10, improved [facility] QOL < 27, Physical Fitness in [facility] Score < 3, Daily Activity in [facility] Score < 3, improved sleeping habits, feel less tired with more energy, and Improved appetite control    Patient Specific PSYCHOCOSOCIAL GOALS:    maintain compliance with medical therapy, address emotional health concerns with PCP, spend time with family, and continue w/Ativan prescription     Psychosocial Assessment as it relates to rehabilitation: Patient denies issues with her family or home life that may affect their rehabilitation efforts. He states all her needs are being met. [facility] staff will continue to offer support as needed.       NUTRITION ASSESSMENT:    Initial Weight:  147  Current Weight: 154    Unplanned weight loss has ceased    Height:   Ht Readings from Last 1 Encounters:   01/21/25 5' 8\" (1.727 m)       Rate Your Plate Score: 71/81    Self-reported Current Dietary Habits:  Patient states since her boyfriend had CABG a few " years ago they have been eating healthier. She states that she hasn't had much of an appetite since she was hospitalized and that food is finally starting to taste better.     ETOH:   Social History     Substance and Sexual Activity   Alcohol Use Yes    Comment: I might have a glass of wine every two or three months       SMART Goals:   Improved Rate Your Plate score  >58    Patient Specific NUTRITION GOALS:    increase water intake to reduce/thin mucus production decrease caloric intake weight gain increased muscle mass      OTHER CORE COMPONENT ASSESSMENT:    Hospitalizations in the past year: Hospitalized 10/28/2024 - 11/6/2024 for sepsis due to pneumonia    Oxygen needs:   Rest:  supplemental O2 via nasal cannula @ 2L/min   Exercise/physical activity:  supplemental O2 via nasal cannula @ 2L/min   Sleep:   supplemental O2 via nasal cannula @ 2L/min     Does Pt monitor home SpO2? yes   Average SpO2 at rest:  92-94%   Average SpO2 with ADLs/physical activity:  Does not check      Use of Rescue Inhaler: Patient states she will only use her rescue inhaler if absolutely necessary because it makes her feel jittery.    Use of Maintenance Inhaler: Yes; 1x/day (morning)    Use of Nebulizer Treatments:  Patient states she has a nebulizer that she uses PRN. She states she mainly only used it during seasonal allergies but has been using it a bit more frequently since her hospitalization due to ongoing cough and increased shortness of breath.     Patient practices breathing techniques at home:  Yes and Reviewed with patient on:  12/3/85178    CAD Risk Factors:  Cholesterol: No  HTN: No  DM: No  Obesity: No   Smoking: Patient states she quit smoking on 7/4/2023 but believes she relapsed right before her hospitalization. She does not remember smoking any cigarettes but states her daughter found a pack. She has remained smoke free since she was hospitalized and avoids secondhand smoke.    SMART Core Component Goals:    consistent, controlled resting BP < 130/80, medication compliance, and demonstrate pursed lipped breathing    Patient Specific CORE COMPONENT GOALS:    medication compliance, compliance with supplemental oxygen, and monitor home pulse oximetry      Blood Pressure:    Restin/62  Exercise: 144/68  Recovery: 120/64      INDIVIDUALIZED TREATMENT PLAN    The patient is agreeable to attend 24 pulmonary rehab exercise sessions.      EXERCISE GOALS AND PLAN    PHYSCIAN PRESCRIBED EXERCISE    Current Aerobic Exercise Prescription       Frequency: 2-3 days/week   Supplement with home exercise 2+ days/wk as tolerated        Minutes: 40-45         METS: 2-3              SpO2: >90% on 2 liters              RPD: 0-3/10                      HR: RHR +30-40bpm   RPE: 4-5/10         Modalities: Treadmill, UBE, NuStep, and Recumbent bike      Aerobic Exercise Prescription Plan for Progression:    Frequency: 2-3 days/week    Supplement with home exercise 2+ days/wk as tolerated       Minutes: 45-50       METS: 3-4              SpO2: >90% on RA              RPD: 0-2/10                      HR:RHR +30-40bpm   RPE: 4-5/10        Modalities: Treadmill, UBE, NuStep, and Recumbent bike        Supplemental Oxygen Needs with Exercise:  supplemental O2 2L/min via nasal canula.    Strength trainin-3 days / week  12-15 repetitions  1-2 sets per modality    Modalities: Chest Press and 3# UE free weights    Education: pursed lipped breathing, diaphragmatic breathing, RPE scale, RPD scale, O2 saturation monitoring, appropriate O2 response to exercise, and education class: Exercise For The Pulmonary Patient    Progress toward Exercise Goals:    Will continue to educate and progress as tolerated., Goals met: compliant attending NC, increased activity level at home and reduced MERCHANT ratings. .    Exercise Plan:  Titrate supplemental oxygen as needed to maintain SpO2>88% with exercise, learn to conserve energy with ADLs , practice  diaphragmatic breathing, reduce time sitting at home, utilize PLB with physical activity, and begin a home exercise program     Readiness to change: Action:  (Changing behavior)      NUTRITION GOALS AND PLAN    Progress toward Nutritional goals:    Will continue to educate and progress as tolerated., Goals met: unplanned weight loss has ceased. Patient is aware to hydrate throughout the day. .    Nutrition Plan: group class: Reading Food Labels and group Class: Heart Healthy Eating    SMART goals are based Rate Your Plate Dietary Self-Assessment. These are the areas in which the patient could score higher on the assessment.  Goals include recommendations for a heart healthy diet based on American Heart Association.    Nutrition Education: heart healthy eating principles  maintaining hydration  group class: Heart Healthy Eating  group class:  Label Reading  group class: healthy eating for managing pulmonary illness    Readiness to change: Preparation:  (Getting ready to change)       PSYCHOSOCIAL GOALS AND PLAN    Information to begin using [employer] was provided as well as contact information for counseling through  [facility].    Progress toward Psychosocial goals:    Will continue to educate and progress as tolerated., Goals met: maintains a low stress level and is compliant taking her Ativan prescription. .    Psychosocial: Class: Stress and Your Health, Class:  Stress and Pulmonary Disease, Enroll in [employer], Practice relaxation techniques, Keep a positive mindset, and Enjoy family    Psychosocial Education: stress management techniques, benefits of enrolling in [employer], depression and pulmonary disease, tools to manage fear/anxiety related to becoming SOB, class:  Stress and Your Health , and class:  Stress and Pulmonary Disease    Readiness to change: Preparation:  (Getting ready to change)       OTHER CORE COMPONENTS GOALS AND PLAN    Tobacco Use Intervention:     Patient states she quit  smoking on 7/4/2023 (she averaged 0.25-0.3 ppd for 53.5 years) but believes she relapsed right before her hospitalization. She does not remember smoking any cigarettes but states her daughter found a pack. She has remained smoke free since she was hospitalized and avoids secondhand smoke.    Oxygen Goal: Maintain SpO2>88% during exercise or as advised by pulmonolgist    Progress toward Core Component goals:    Pt is progressing and showing improvement  toward the following goals:  consistent resting BP < 130/80.  , Will continue to educate and progress as tolerated.    Core Component Plan: medication compliance, complete abstention from smoking, avoid places with second hand smoke, group class: Pulmonary Anatomy and Physiology, and group class:  Pulmonary Medications    Core Component Education:  pathophysiology of pulmonary disease, preventing infections, relapse education, control coughing, harmonica therapy, education: Pulmonary Anatomy and Physiology, education:  Pulmonary Medications, education:  Clearing Secretions, and education: Oxygen    Readiness to change: Preparation:  (Getting ready to change)

## 2025-02-06 ENCOUNTER — CONSULT (OUTPATIENT)
Dept: NEPHROLOGY | Facility: CLINIC | Age: 75
End: 2025-02-06
Payer: COMMERCIAL

## 2025-02-06 VITALS
HEART RATE: 67 BPM | DIASTOLIC BLOOD PRESSURE: 68 MMHG | TEMPERATURE: 97.1 F | HEIGHT: 68 IN | OXYGEN SATURATION: 98 % | BODY MASS INDEX: 23.73 KG/M2 | WEIGHT: 156.6 LBS | SYSTOLIC BLOOD PRESSURE: 114 MMHG

## 2025-02-06 DIAGNOSIS — I48.0 PAROXYSMAL ATRIAL FIBRILLATION (HCC): ICD-10-CM

## 2025-02-06 DIAGNOSIS — R60.0 LOCALIZED EDEMA: ICD-10-CM

## 2025-02-06 DIAGNOSIS — N18.32 CHRONIC KIDNEY DISEASE, STAGE 3B (HCC): ICD-10-CM

## 2025-02-06 DIAGNOSIS — I12.9 HYPERTENSIVE RENAL DISEASE: Primary | ICD-10-CM

## 2025-02-06 PROCEDURE — 99204 OFFICE O/P NEW MOD 45 MIN: CPT | Performed by: INTERNAL MEDICINE

## 2025-02-06 NOTE — PATIENT INSTRUCTIONS
Discussed you have kidney disease stage 3b, most recent function is 37%. Etiology of kidney disease is likely age related kidney loss, high blood pressure, tobacco use, effect of diuretic, vascular disease. Your kidney function has worsened since prior to your hospitalization due to diuretic effects.   Recommend to check blood work asap to follow kidney function on diuretic. Check kidney ultrasound for updated baseline.   Keep fluid intake 50-60 ounces per day.   Advise to take biotin supplement for hair and nails  No restriction for phosphorus or potassium yet. Maintain moderate protein intake 50-60 gram per day. Advise to maintain a low sodium diet, 2-3 gram per day.   Avoid motrin, advil,aleve,ibuprofen.   Please talk to your family doctor and cardiologist about Jardiance or Farxiga, these offer diuretic benefit and kidney protection, this would be in place of a diuretic.

## 2025-02-06 NOTE — PROGRESS NOTES
[facility]  Consultation - Nephrology    Corinne A Longo 74 y.o. female MRN: [id number]      A/P:      1. Hypertensive renal disease  -     Albumin / creatinine urine ratio; Future  -     Protein / creatinine ratio, urine; Future  2. Chronic kidney disease, stage 3b (HCC)  -     Ambulatory Referral to Nephrology  -      kidney and bladder; Future; Expected date: 02/13/2025  -     Phosphorus; Future  -     Magnesium; Future  -     Comprehensive metabolic panel; Future  -     Albumin / creatinine urine ratio; Future  -     Protein / creatinine ratio, urine; Future  -     Protein electrophoresis, serum; Future  -     Protein electrophoresis, urine; Future  -     Kappa/Lambda Light Chains Free With Ratio, Serum; Future  3. Paroxysmal atrial fibrillation (HCC)  -     Albumin / creatinine urine ratio; Future  -     Protein / creatinine ratio, urine; Future  4. Localized edema  -     Albumin / creatinine urine ratio; Future  -     Protein / creatinine ratio, urine; Future       I have reviewed pertinent labs and imaging with patient.    CKD:  -Baseline Cr:  -Etiology:        HTN renal disease  BP well controlled, goal<130 /80 on current regimen--HCTZ 25mg/day, metoprolol tartrate 50mg q12 and diltizem 360mg/day.  Patient prefers to discuss diuretic titration with cardiology prior to changes.    2. CKD3b  Cr trend in 1.3-1.4 range since hospitalization. Cr 1.4 on 1/14/24, likely slightly prerenal from furosemide at that time.Cr 0.9-1.1 mg/dL prior to October hospitalization.   Etiology CKD 2/2 age related eGFR loss, vascular disease, HTN nephrosclerosis, tobacco use and prerenal effect of diuretic.  Check updated renal US.  Volume status currently compensated despite mild leg edema.  Reviewed CKD stages, Cr and eGFR trends and eGFR meaning in simple terms.  Urged her to maintain 2 gram Na diet but hold on any further K/Phos restrictions, as not currently necessary.  Advise 50 gram  protein intake, would not be aggressive with restrictions.    Discussed potential addition of RAASI vs SGLTI2 as opposed to HCTZ for CKD management.  Explained risks/benefit of both therapies. HCTZ not achieving patient's goals of euvolemia at present.  Continue to avoid NSAID use.  FU in 3 months with labs prior.   No changes as noted above prior to evaluation by cardiology colleagues.  Check urine protein quantification and paraproteinemia workup for completeness       3. Paroxysmal atrial fibrillation  Continue management per cardiology-diltiazem and metoprolol.   HR well controlled at present.    4. Localized edema, currently mild on exam  Potentially complicated by diltiazem use.  Advised to repeat chemistry to evaluate trends after HCTZ addition, previously on furosemide therapy.   Declined addition of Diazide.   Patient prefers to discuss diuretic management with cardiology colleagues prior to titration.      The patient and any family members present were counseled today on risks benefits and alternatives of any medications, and were agreeable to the treatment plan.  They were counseled on diagnostic testing if necessary, and projected outcomes as are available and medically indicated.  All questions were asked and answered during the encounter. If more questions are to arise the patient will call the office. Alarm and return precautions discussed and accepted.       Thank you for allowing us to participate in the care of your patient.        History of Present Illness   Physician Requesting Consult: No att. providers found    HPI: Corinne A Longo is a 74 y.o. year old female who presents for CKD evalaution in the [place] office at discretion of primary doctor. Cr trend 1.3-1.4 since October. Although, she had a dip of Cr 1.05 on 11/11/24. Most recent Cr 1.41 with eGFR  37 ml/min. Prior to her hospitalization Cr trend in 0.9-1.1 range.  She was seen by nephrology 20 years ago in [facility] due to  kidney function, she was on a diuretic around that time. Sounds like she had imaging of her kidney as well, unclear results.  Reports longstanding HTN> 20 years and takes HCTZ 25mg/day ( new since January). In October, hospitalized with pneumonia and multiple medication adjustments with addition of lasix. After hospitalization, recommended to switch from lasix to diazide, but did not like side effect profile on information provided to her.    Follows with cardiology for atrial fibrillation and is on diltiazem and metoprolol for rate control.   She was told by cardiology that diltiazem causing leg swelling. Since addition of HCTZ, swelling improved slightly but not how she responded to lasix.    She is on pulmonary PT for COPD, she is on 2 L chronically.     Reports a couple years ago, she had some sort of kidney cyts as well, but unclear on details.     She does not see urology at this time, but does not feel like she empties her bladder. She has stress incontinence.     Denies nephrolithiasis. Denies hematuria, proteinuria, denies foamy urine. Reports maternal aunt had kidney diseaes but unclear on the details.      Used to take ibuprofen when she was younger. Takes tylenol if needed for pain.    Denies hx cancer. Three daughter have autoimmune disease, but patient denies hx autoimmune disease.   Patient has joint pain, she has osteopenia. Denies following with rheumatology.     Hx tobacco use , but has quit.    Denies alcohol use /recreational drug use.     Reports ongoing mild cognitive impairment.     She had 3 children, uncomplicated deliveries.     Constitutional ROS- Denies fatigue, fever, chills, night sweats, weight changes.  HEENT ROS- Denies headaches or history of trauma, blurred vision..  Endocrine ROS- No history diabetes mellitus or thyroid disease.  Cardiovascular ROS- Denies chest pain, she can tell when she is in afib   Pulmonary ROS-  chronic cough   GI ROS- intermittent bout of constipation  /diarrhea   Hematological ROS- Denies history of easy bruising, blood clots, bleeding or blood transfusions.  Genitourinary ROS-incontinence   Lymphatic ROS- Denies lymphadenopathy.  Musculoskeletal ROS-  Dermatological ROS- follows with dermatology. Spider veins.   Psychiatric ROS- Denies hallucinations, disorientation.  Neurological ROS- No stroke or TIA symptoms.     Historical Information   Past Medical History:   Diagnosis Date    Allergic 1970    Allergic rhinitis     2015  RESOLVED    Asthma     Atrial fibrillation ([facility])     Bronchitis     Bunion     COPD (chronic obstructive pulmonary disease) ([facility]) 10/2015    home O2 at night at home 2L    Coronary artery disease     AFIB    Depression     Emphysema lung ([facility])     Fibroadenoma of breast     Gastroesophageal reflux disease without esophagitis 2023    Hyperlipidemia     Hypertension     Lung disease     COPD    Memory difficulties 2019    Post-menopausal     Post-menopausal     Sepsis due to pneumonia ([facility]) 10/28/2024    Spondylisthesis     Stage 3 chronic kidney disease, unspecified whether stage 3a or 3b CKD ([facility]) 2022    Vitamin D deficiency      Past Surgical History:   Procedure Laterality Date    ADENOIDECTOMY      BREAST CYST EXCISION Bilateral     benign-, ,     BUNIONECTOMY Bilateral     CATARACT EXTRACTION, BILATERAL      EYE SURGERY      Cataracts removed    MOLE REMOVAL      lip    TONSILLECTOMY      TUBAL LIGATION       Social History   Social History     Substance and Sexual Activity   Alcohol Use Not Currently    Comment: I might have a glass of wine every two or three months     Social History     Substance and Sexual Activity   Drug Use No     Social History     Tobacco Use   Smoking Status Former    Current packs/day: 0.00    Average packs/day: 0.3 packs/day for 53.5 years (13.4 ttl pk-yrs)    Types: Cigarettes    Start date: 1970    Quit date: 2023    Years since quittin.6    Passive  exposure: Past   Smokeless Tobacco Never     Family History   Problem Relation Age of Onset    Hypertension Mother     Alcohol abuse Mother     Hypertension Father     Diabetes Father     Prostate cancer Father     Cancer Father         Prostate    Hearing loss Father     Vitamin D deficiency Sister     Diverticulitis Sister     Depression Sister     No Known Problems Sister     Heart failure Brother     Lung cancer Brother     Cancer Brother         Stage four lung cancer    Celiac disease Daughter     Alcohol abuse Daughter     Substance Abuse Daughter     Eczema Daughter     Fibromyalgia Daughter     Substance Abuse Daughter     Alcohol abuse Daughter     ADD / ADHD Daughter     Bipolar disorder Daughter     Anxiety disorder Daughter     Ulcerative colitis Daughter     No Known Problems Maternal Aunt     Heart disease Brother     Substance Abuse Brother     Drug abuse Brother     Heart failure Brother     Breast cancer Neg Hx        Meds/Allergies   all current active meds have been reviewed, current meds:     Current Outpatient Medications:     apixaban (ELIQUIS) 5 mg, Take 1 tablet (5 mg total) by mouth 2 (two) times a day, Disp: 180 tablet, Rfl: 1    ascorbic acid (VITAMIN C) 500 mg tablet, Take 500 mg by mouth daily, Disp: , Rfl:     atorvastatin (LIPITOR) 40 mg tablet, Take 1 tablet (40 mg total) by mouth daily, Disp: 90 tablet, Rfl: 1    calcium citrate-vitamin D 315 mg-5 mcg tablet, Take 2 tablets by mouth 2 (two) times a day, Disp: , Rfl:     cetirizine (ZyrTEC) 10 mg tablet, Take 10 mg by mouth daily, Disp: , Rfl:     cholecalciferol (VITAMIN D3) 1,000 units tablet, Take 2 Units by mouth daily, Disp: , Rfl:     clotrimazole-betamethasone (LOTRISONE) 1-0.05 % cream, Apply topically 2 (two) times a day, Disp: 45 g, Rfl: 1    diltiazem (CARDIZEM CD) 360 MG 24 hr capsule, Take 1 capsule (360 mg total) by mouth daily, Disp: 90 capsule, Rfl: 1    docusate sodium (COLACE) 100 mg capsule, take 1 capsule by mouth  twice a day, Disp: 60 capsule, Rfl: 0    guaiFENesin (MUCINEX) 600 mg 12 hr tablet, Take 1 tablet (600 mg total) by mouth every 12 (twelve) hours, Disp: 180 tablet, Rfl: 3    hydroCHLOROthiazide 25 mg tablet, Take 1 tablet (25 mg total) by mouth daily, Disp: 90 tablet, Rfl: 1    ipratropium-albuterol (DUO-NEB) 0.5-2.5 mg/3 mL nebulizer solution, Take 3 mL by nebulization every 6 (six) hours as needed for wheezing or shortness of breath, Disp: 1080 mL, Rfl: 0    L-Lysine 500 MG CAPS, Take 1 capsule by mouth daily, Disp: , Rfl:     levalbuterol (Xopenex HFA) 45 mcg/act inhaler, Inhale 1-2 puffs every 4 (four) hours as needed for wheezing, Disp: 45 g, Rfl: 0    methocarbamol (ROBAXIN) 500 mg tablet, Take 1 tablet (500 mg total) by mouth 3 (three) times a day as needed for muscle spasms, Disp: 90 tablet, Rfl: 0    metoprolol tartrate (LOPRESSOR) 50 mg tablet, Take 1 tablet (50 mg total) by mouth every 12 (twelve) hours, Disp: 180 tablet, Rfl: 0    mometasone (NASONEX) 50 mcg/act nasal spray, 2 sprays into each nostril daily, Disp: 51 g, Rfl: 0    multivitamin-minerals (CENTRUM ADULTS) tablet, Take 1 tablet by mouth daily, Disp: , Rfl:     nystatin (MYCOSTATIN) 500,000 units/5 mL suspension, Apply 5 mL (500,000 Units total) to the mouth or throat 4 (four) times a day, Disp: 473 mL, Rfl: 0    oxygen gas, Inhale 2 L/min continuous. via nasal cannula, Disp: , Rfl:     Probiotic Product (PROBIOTIC-10 PO), Take 1 capsule by mouth daily, Disp: , Rfl:     triamcinolone (KENALOG) 0.1 % cream, Apply topically 2 (two) times a day, Disp: 15 g, Rfl: 0    umeclidinium-vilanterol (Anoro Ellipta) 62.5-25 mcg/actuation inhaler, Inhale 1 puff daily, Disp: 180 each, Rfl: 1    ferrous sulfate 325 (65 FE) MG EC tablet, Take 325 mg by mouth 3 (three) times a day with meals (Patient not taking: Reported on 1/15/2025), Disp: , Rfl:     LORazepam (Ativan) 0.5 mg tablet, Take 1 tablet (0.5 mg total) by mouth daily at bedtime (Patient not taking:  Reported on 1/15/2025), Disp: 90 tablet, Rfl: 0    Omega-3 Fatty Acids (fish oil) 1,000 mg, Take 1,000 mg by mouth 2 (two) times a day. (Patient not taking: Reported on 12/23/2024), Disp: , Rfl:      Allergies   Allergen Reactions    Bupropion Itching    Ace Inhibitors Cough     Action Taken: stopped med and changed to ARB; Category: Adverse Reaction;     Citalopram Diarrhea and Nausea Only    Mixed Ragweed Cough    Pollen Extract Cough    Adhesive [Medical Tape] Rash     Patch        Objective     Vitals:    02/06/25 1419   BP: 114/68   Pulse: 67   Temp: (!) 97.1 °F (36.2 °C)   SpO2: 98%       General Appearance:    No acute distress. Cooperative. Appears stated age.   Head:    Normocephalic. Atraumatic.    Eyes:    Lids, conjunctiva normal. No scleral icterus.   Ears:    Normal external ears.   Nose:   Nares normal. No drainage.   Mouth:   Lips, tongue normal. Mucosa normal. Phonation normal.   Neck:   Supple. Symmetrical.   Back:     Symmetric. No CVA tenderness.   Lungs:     Normal respiratory effort. Clear to auscultation bilaterally.   Chest wall:    No tenderness or deformity.   Heart:    Regular rate and rhythm. Normal S1 and S2. No murmur. No JVD. No edema.   Abdomen:     Soft. Non-tender. Bowel sounds active.   Genitourinary:     Extremities:   Extremities normal. Atraumatic. No cyanosis.   Skin:   Warm and dry. No pallor, jaundice, rash, ecchymoses.   Neurologic:   Alert and oriented to person, place, time. No focal deficit.         Current Weight: Weight - Scale: 71 kg (156 lb 9.6 oz)    First Weight:    Wt Readings from Last 3 Encounters:   02/06/25 71 kg (156 lb 9.6 oz)   02/04/25 70.1 kg (154 lb 8.7 oz)   01/15/25 70.8 kg (156 lb)        Lab Results:  I have personally reviewed pertinent labs.    1/14/25 Na 141, K 3.8, Cl 102, TCO2 34, BUN 28, Cr 1.41 with eGFR 37m/min.      Radiology review:  2/15/22 Renal US 8 mm simple left renal cyst        Jennifer Valente,       This consultation note was  produced in part using a dictation device which may document imprecise wording from author's original intent.

## 2025-02-07 ENCOUNTER — CLINICAL SUPPORT (OUTPATIENT)
Dept: PULMONOLOGY | Facility: HOSPITAL | Age: 75
End: 2025-02-07
Attending: INTERNAL MEDICINE
Payer: COMMERCIAL

## 2025-02-07 DIAGNOSIS — J44.9 CHRONIC OBSTRUCTIVE PULMONARY DISEASE, UNSPECIFIED COPD TYPE (HCC): ICD-10-CM

## 2025-02-07 PROCEDURE — 94626 PHY/QHP OP PULM RHB W/MNTR: CPT

## 2025-02-10 ENCOUNTER — CLINICAL SUPPORT (OUTPATIENT)
Dept: PULMONOLOGY | Facility: HOSPITAL | Age: 75
End: 2025-02-10
Attending: INTERNAL MEDICINE
Payer: COMMERCIAL

## 2025-02-10 DIAGNOSIS — J44.9 CHRONIC OBSTRUCTIVE PULMONARY DISEASE, UNSPECIFIED COPD TYPE (HCC): ICD-10-CM

## 2025-02-10 PROCEDURE — 94626 PHY/QHP OP PULM RHB W/MNTR: CPT

## 2025-02-12 ENCOUNTER — APPOINTMENT (OUTPATIENT)
Dept: PULMONOLOGY | Facility: HOSPITAL | Age: 75
End: 2025-02-12
Attending: INTERNAL MEDICINE
Payer: COMMERCIAL

## 2025-02-14 ENCOUNTER — VBI (OUTPATIENT)
Dept: ADMINISTRATIVE | Facility: OTHER | Age: 75
End: 2025-02-14

## 2025-02-14 ENCOUNTER — APPOINTMENT (OUTPATIENT)
Dept: PULMONOLOGY | Facility: HOSPITAL | Age: 75
End: 2025-02-14
Attending: INTERNAL MEDICINE
Payer: COMMERCIAL

## 2025-02-14 NOTE — TELEPHONE ENCOUNTER
02/14/25 8:46 AM     Chart reviewed for Mammogram was/were not submitted to the patient's insurance.     Lara Gilbert MA   [facility]

## 2025-02-17 ENCOUNTER — APPOINTMENT (OUTPATIENT)
Dept: PULMONOLOGY | Facility: HOSPITAL | Age: 75
End: 2025-02-17
Attending: INTERNAL MEDICINE
Payer: COMMERCIAL

## 2025-02-19 ENCOUNTER — APPOINTMENT (OUTPATIENT)
Dept: PULMONOLOGY | Facility: HOSPITAL | Age: 75
End: 2025-02-19
Attending: INTERNAL MEDICINE
Payer: COMMERCIAL

## 2025-02-20 DIAGNOSIS — J30.9 ALLERGIC RHINITIS, UNSPECIFIED SEASONALITY, UNSPECIFIED TRIGGER: ICD-10-CM

## 2025-02-20 DIAGNOSIS — J44.9 COPD, SEVERE (HCC): ICD-10-CM

## 2025-02-20 RX ORDER — LEVALBUTEROL TARTRATE 45 UG/1
1-2 AEROSOL, METERED ORAL EVERY 4 HOURS PRN
Qty: 45 G | Refills: 2 | Status: SHIPPED | OUTPATIENT
Start: 2025-02-20

## 2025-02-20 RX ORDER — MOMETASONE FUROATE MONOHYDRATE 50 UG/1
2 SPRAY, METERED NASAL DAILY
Qty: 51 G | Refills: 2 | Status: SHIPPED | OUTPATIENT
Start: 2025-02-20

## 2025-02-20 NOTE — TELEPHONE ENCOUNTER
Reason for call:   [x] Refill   [] Prior Auth  [] Other:     Office:   [] PCP/Provider -   [x] Specialty/Provider - Jena Gonzalez,     Medication: levalbuterol (Xopenex HFA) 45 mcg/act inhaler   Dose/Frequency: Inhale 1-2 puffs every 4 (four) hours as needed for wheezing   Quantity: 45g    mometasone (NASONEX) 50 mcg/act nasal spray    2 sprays into each nostril daily,   Qty 51g    [facility]: [facility]    Does the patient have enough for 3 days? unknown  [] Yes   [] No - Send as HP to POD

## 2025-02-21 ENCOUNTER — NURSE TRIAGE (OUTPATIENT)
Age: 75
End: 2025-02-21

## 2025-02-21 ENCOUNTER — CLINICAL SUPPORT (OUTPATIENT)
Dept: CARDIOLOGY CLINIC | Facility: CLINIC | Age: 75
End: 2025-02-21
Payer: COMMERCIAL

## 2025-02-21 ENCOUNTER — APPOINTMENT (OUTPATIENT)
Dept: PULMONOLOGY | Facility: HOSPITAL | Age: 75
End: 2025-02-21
Attending: INTERNAL MEDICINE
Payer: COMMERCIAL

## 2025-02-21 DIAGNOSIS — I48.0 PAROXYSMAL ATRIAL FIBRILLATION (HCC): Primary | ICD-10-CM

## 2025-02-21 PROCEDURE — 93000 ELECTROCARDIOGRAM COMPLETE: CPT

## 2025-02-21 NOTE — TELEPHONE ENCOUNTER
"Reason for Conversation: Pt called in stating that she did not well \"right\" this morning and her blood pressure was slight elevated at 149/75 p67. She states she feels she might be in afib. She reports feeling skipped beats. She rechecked her bp and it os now 123/70 p63. Pt states she does have state 3 kidney disease. She is on 2L of continuous oxygen.    Pt concerned about going to pulmonary rehab.     Can patient come in for a nurse visit today to check to see if she is in afib?    Please advise    VS/Weight: (Note: Please include date/time vitals/weight were measured)  pulse ranging 52-70   149/75 P67- 830am   123/70 [id number]    Pain: No    Risk Factors: Arrhythmia    Recent relevant testing and date of testing: Other EKG  11/1/24    Medication: Eliquis 5mg BID   Atorvastatin 40mg daily   Cardizem 360mg daily   HCTZ 25mg daily   Metoprolol 50mg BID    Upcoming Office Visit: Yes    Last Office Visit: 12/23/24     Answer Assessment - Initial Assessment Questions  1. DESCRIPTION: \"Please describe your heart rate or heartbeat that you are having\" (e.g., fast/slow, regular/irregular, skipped or extra beats, \"palpitations\")      Skipped beats   2. ONSET: \"When did it start?\" (e.g., minutes, hours, days)       This morning   3. DURATION: \"How long does it last\" (e.g., seconds, minutes, hours)      Hours   4. PATTERN \"Does it come and go, or has it been constant since it started?\"  \"Does it get worse with exertion?\"   \"Are you feeling it now?\"      Comes and goes   6. HEART RATE: \"Can you tell me your heart rate?\" \"How many beats in 15 seconds?\"  Note: Not all patients can do this.        Pulse ranges 52-70  7. RECURRENT SYMPTOM: \"Have you ever had this before?\" If Yes, ask: \"When was the last time?\" and \"What happened that time?\"       Yes on going   8. CAUSE: \"What do you think is causing the palpitations?\"      Unsure   9. CARDIAC HISTORY: \"Do you have any history of heart disease?\" (e.g., heart attack, angina, " "bypass surgery, angioplasty, arrhythmia)       afib  10. OTHER SYMPTOMS: \"Do you have any other symptoms?\" (e.g., dizziness, chest pain, sweating, difficulty breathing)        Denies    Protocols used: Heart Rate and Heartbeat Questions-Adult-[place]    "

## 2025-02-24 ENCOUNTER — CLINICAL SUPPORT (OUTPATIENT)
Dept: PULMONOLOGY | Facility: HOSPITAL | Age: 75
End: 2025-02-24
Attending: INTERNAL MEDICINE
Payer: COMMERCIAL

## 2025-02-24 DIAGNOSIS — J44.9 CHRONIC OBSTRUCTIVE PULMONARY DISEASE, UNSPECIFIED COPD TYPE (HCC): ICD-10-CM

## 2025-02-24 PROCEDURE — 94626 PHY/QHP OP PULM RHB W/MNTR: CPT

## 2025-02-25 ENCOUNTER — HOSPITAL ENCOUNTER (OUTPATIENT)
Dept: ULTRASOUND IMAGING | Facility: HOSPITAL | Age: 75
Discharge: HOME | End: 2025-02-25
Attending: INTERNAL MEDICINE
Payer: COMMERCIAL

## 2025-02-25 DIAGNOSIS — N18.32 CHRONIC KIDNEY DISEASE, STAGE 3B (HCC): ICD-10-CM

## 2025-02-25 PROCEDURE — 76775 US EXAM ABDO BACK WALL LIM: CPT

## 2025-02-28 ENCOUNTER — RESULTS FOLLOW-UP (OUTPATIENT)
Dept: NEPHROLOGY | Facility: CLINIC | Age: 75
End: 2025-02-28

## 2025-02-28 ENCOUNTER — APPOINTMENT (OUTPATIENT)
Dept: PULMONOLOGY | Facility: HOSPITAL | Age: 75
End: 2025-02-28
Attending: INTERNAL MEDICINE
Payer: COMMERCIAL

## 2025-03-03 ENCOUNTER — CLINICAL SUPPORT (OUTPATIENT)
Dept: PULMONOLOGY | Facility: HOSPITAL | Age: 75
End: 2025-03-03
Attending: INTERNAL MEDICINE
Payer: COMMERCIAL

## 2025-03-03 DIAGNOSIS — J44.9 CHRONIC OBSTRUCTIVE PULMONARY DISEASE, UNSPECIFIED COPD TYPE (HCC): Primary | ICD-10-CM

## 2025-03-03 PROCEDURE — 94626 PHY/QHP OP PULM RHB W/MNTR: CPT

## 2025-03-05 NOTE — PROGRESS NOTES
Pulmonary Rehabilitation   Assessment and Individualized Treatment Plan  90 DAY      Today's date: 2025  # of Exercise Sessions Completed: 13  Patient name: Corinne A Longo     : 1950       MRN: [id number]  Referring Physician: Cece Ro MD  Pulmonologist: Rosy Rodriges DO / ROGELIO Crawley  Provider: Oliva  Clinician: Adriana Junior, MPT, CCRP    Dx:  COPD  Date of onset: Hospitalized 10/28/2024 - 2024      Treatment is tailored to this patient's individual needs.  The ITP was reviewed with the patient and all questions were answered to their satisfaction.  Additional ITP documentation can be found electronically including daily and monthly exercise summaries, daily session notes with  summaries, education notes, daily medication reconciliation, and daily physician supervision.      SUMMARY 2025    Resting BP  118/58 - 122/62,  HR 54 - 73  Exercise /62- 148/70.  HR 71 - 92  Exercise session details:  50-55 minutes,  2.3-2.8 METs  LO2:   Rest:  2 L/min, Exercise:  2 L/min  SpO2: Rest:  96-97 %,  Exercise:  94-96 %  Dyspnea:   Rest:  0/10,  Exercise: 0-2/10  Home exercise/ADLs: Patient is independent w/personal ADLS. She is able to perform light household chores w/out issue. She is able to community ambulate w/0-minimal MERCHANT.   Patient's subjective report of progress: Patient is pleased w/her progress thus far in Pulm Rehab. She no longer notes extreme fatigue or weakness. Strength and standing balance/tolerance are much better as compared to her initial visit.   .     ASSESSMENT      Medical History:   Past Medical History:   Diagnosis Date    Allergic 1970    Allergic rhinitis     2015  RESOLVED    Asthma     Atrial fibrillation (HCC)     Bronchitis     Bunion     COPD (chronic obstructive pulmonary disease) (HCC) 10/2015    home O2 at night at home 2L    Coronary artery disease     AFIB    Depression     Emphysema lung (HCC)     Fibroadenoma of  breast     Gastroesophageal reflux disease without esophagitis 12/18/2023    Hyperlipidemia     Hypertension     Lung disease 2008    COPD    Memory difficulties 07/23/2019    Post-menopausal     Post-menopausal     Sepsis due to pneumonia ([facility]) 10/28/2024    Spondylisthesis     Stage 3 chronic kidney disease, unspecified whether stage 3a or 3b CKD ([facility]) 02/07/2022    Vitamin D deficiency        Family History:  Family History   Problem Relation Age of Onset    Hypertension Mother     Alcohol abuse Mother     Hypertension Father     Diabetes Father     Prostate cancer Father     Cancer Father         Prostate    Hearing loss Father     Vitamin D deficiency Sister     Diverticulitis Sister     Depression Sister     No Known Problems Sister     Heart failure Brother     Lung cancer Brother     Cancer Brother         Stage four lung cancer    Celiac disease Daughter     Alcohol abuse Daughter     Substance Abuse Daughter     Eczema Daughter     Fibromyalgia Daughter     Substance Abuse Daughter     Alcohol abuse Daughter     ADD / ADHD Daughter     Bipolar disorder Daughter     Anxiety disorder Daughter     Ulcerative colitis Daughter     No Known Problems Maternal Aunt     Heart disease Brother     Substance Abuse Brother     Drug abuse Brother     Heart failure Brother     Breast cancer Neg Hx        Allergies:   Bupropion, Ace inhibitors, Citalopram, Mixed ragweed, Pollen extract, and Adhesive [medical tape]    Current Medications:   Current Outpatient Medications   Medication Sig Dispense Refill    apixaban (ELIQUIS) 5 mg Take 1 tablet (5 mg total) by mouth 2 (two) times a day 180 tablet 1    ascorbic acid (VITAMIN C) 500 mg tablet Take 500 mg by mouth daily      atorvastatin (LIPITOR) 40 mg tablet Take 1 tablet (40 mg total) by mouth daily 90 tablet 1    calcium citrate-vitamin D 315 mg-5 mcg tablet Take 2 tablets by mouth 2 (two) times a day      cetirizine (ZyrTEC) 10 mg tablet Take 10 mg by mouth daily       cholecalciferol (VITAMIN D3) 1,000 units tablet Take 2 Units by mouth daily      clotrimazole-betamethasone (LOTRISONE) 1-0.05 % cream Apply topically 2 (two) times a day 45 g 1    diltiazem (CARDIZEM CD) 360 MG 24 hr capsule Take 1 capsule (360 mg total) by mouth daily 90 capsule 1    docusate sodium (COLACE) 100 mg capsule take 1 capsule by mouth twice a day 60 capsule 0    ferrous sulfate 325 (65 FE) MG EC tablet Take 325 mg by mouth 3 (three) times a day with meals (Patient not taking: Reported on 1/15/2025)      guaiFENesin (MUCINEX) 600 mg 12 hr tablet Take 1 tablet (600 mg total) by mouth every 12 (twelve) hours 180 tablet 3    hydroCHLOROthiazide 25 mg tablet Take 1 tablet (25 mg total) by mouth daily 90 tablet 1    ipratropium-albuterol (DUO-NEB) 0.5-2.5 mg/3 mL nebulizer solution Take 3 mL by nebulization every 6 (six) hours as needed for wheezing or shortness of breath 1080 mL 0    L-Lysine 500 MG CAPS Take 1 capsule by mouth daily      levalbuterol (Xopenex HFA) 45 mcg/act inhaler Inhale 1-2 puffs every 4 (four) hours as needed for wheezing 45 g 2    LORazepam (Ativan) 0.5 mg tablet Take 1 tablet (0.5 mg total) by mouth daily at bedtime (Patient not taking: Reported on 1/15/2025) 90 tablet 0    methocarbamol (ROBAXIN) 500 mg tablet Take 1 tablet (500 mg total) by mouth 3 (three) times a day as needed for muscle spasms 90 tablet 0    metoprolol tartrate (LOPRESSOR) 50 mg tablet Take 1 tablet (50 mg total) by mouth every 12 (twelve) hours 180 tablet 0    mometasone (NASONEX) 50 mcg/act nasal spray 2 sprays into each nostril daily 51 g 2    multivitamin-minerals (CENTRUM ADULTS) tablet Take 1 tablet by mouth daily      nystatin (MYCOSTATIN) 500,000 units/5 mL suspension Apply 5 mL (500,000 Units total) to the mouth or throat 4 (four) times a day 473 mL 0    Omega-3 Fatty Acids (fish oil) 1,000 mg Take 1,000 mg by mouth 2 (two) times a day. (Patient not taking: Reported on 12/23/2024)      oxygen gas Inhale  2 L/min continuous. via nasal cannula      Probiotic Product (PROBIOTIC-10 PO) Take 1 capsule by mouth daily      triamcinolone (KENALOG) 0.1 % cream Apply topically 2 (two) times a day 15 g 0    umeclidinium-vilanterol (Anoro Ellipta) 62.5-25 mcg/actuation inhaler Inhale 1 puff daily 180 each 1     No current facility-administered medications for this visit.       Physical Limitations: Decreased strength, foot pain due to osteopenia and 02 dependence.     Fall Risk: Low   Comments: Ambulates with a steady gait with no assist device    Cultural needs: none    PFT:  Yes   FEV1/FVC ratio of 47%   FEV1 of 52% predicted  Moderate to severe obstruction    Medication compliance: Yes  Comments: Pt reports to be compliant with medications      Pulmonary Disease Risk Factors:  second hand smoke  smoking    Sleep Disorders:   obstructive sleep apnea:  CPCP/BiPAP:  no      EXERCISE ASSESSMENT:      Initial Fitness Assessment:   6MWT:  675 ft = 1.98 METs  Rest: HR 73, /56, SpO2 98% on 2L/min, 0/10 SOB  Exercise: HR 92, /58, SpO2 94-97% on 2L/min, 4/10 MERCHANT  Recovery: HR 70, /54, SpO2 98% on 2L/min, 0/10 SOB      Current Functional Status:  Occupation: retired  Recreation/Physical activity: Sedentary  ADL’s:Capable of performing light ADLs only  [place]: Capable of performing light ADLs only  Exercise:  None  Home exercise equipment: None  Other Comments: None    SMART Exercise Goals:   reduced score on CAT, improved 6MWT distance, reduced dyspnea during exercise, improved exercise tolerance based on peak METs tolerated in pulmonary rehab exercise session, and SpO2 >88% during exercise    Patient Specific EXERCISE GOALS:     reduced number of COPD exacerbations, attend pulmonary rehab regularly, decrease sitting time at home, reduced pulmonary related hospital readmissions , reduced reliance on supplemental 02 and increased energy    PSYCHOSOCIAL ASSESSMENT:    Date of last assessment:  "12/12/2024  Depression screening:  PHQ-9 = 3    Interpretation:  1-4 = Minimal Depression  Anxiety screening:  LOUIS-7 = 5    Interpretation: 5-9 = Mild anxiety    Pt self-report of depression and anxiety   Patient has a history of depression and anxiety. Patient is prescribed Ativan.    Self-reported stress level:  Patient states typically she is a low stress level (3/10) daily but certain things (going in public, going somewhere new, rushing around) will cause her stress level to be high (7/10)  Stress Management: practice Relaxation Techniques and keep a positive mindset    Quality of Life Screen:  (Higher score indicates disease impact on QOL)  [facility] COOP score: 28/40      CAT: 26/40      Shortness of breath questionnaire: 67/120    Social Support:   significant other, children, and friends  Community/Social Activities: Spend time with family and friends    SMART Goals:    Reduce perceived stress to 1-3/10, improved [facility] QOL < 27, Physical Fitness in [facility] Score < 3, Daily Activity in [facility] Score < 3, improved sleeping habits, feel less tired with more energy, and Improved appetite control    Patient Specific PSYCHOCOSOCIAL GOALS:    maintain compliance with medical therapy, address emotional health concerns with PCP, spend time with family, and continue w/Ativan prescription     Psychosocial Assessment as it relates to rehabilitation: Patient denies issues with her family or home life that may affect their rehabilitation efforts. He states all her needs are being met. [facility] staff will continue to offer support as needed.       NUTRITION ASSESSMENT:    Initial Weight:  147  Current Weight: 156    Unplanned weight loss has ceased. She is maintaining a healthy weight.     Height:   Ht Readings from Last 1 Encounters:   02/06/25 5' 8\" (1.727 m)       Rate Your Plate Score: 71/81    Self-reported Current Dietary Habits:  Patient states since her boyfriend had CABG a few years ago they have been eating " healthier. She states that she hasn't had much of an appetite since she was hospitalized and that food is finally starting to taste better.     ETOH:   Social History     Substance and Sexual Activity   Alcohol Use Not Currently    Comment: I might have a glass of wine every two or three months       SMART Goals:   Improved Rate Your Plate score  >58    Patient Specific NUTRITION GOALS:    increase water intake to reduce/thin mucus production decrease caloric intake weight gain increased muscle mass      OTHER CORE COMPONENT ASSESSMENT:    Hospitalizations in the past year: Hospitalized 10/28/2024 - 11/6/2024 for sepsis due to pneumonia    Oxygen needs:   Rest:  supplemental O2 via nasal cannula @ 2L/min   Exercise/physical activity:  supplemental O2 via nasal cannula @ 2L/min   Sleep:   supplemental O2 via nasal cannula @ 2L/min     Does Pt monitor home SpO2? yes   Average SpO2 at rest:  92-94%   Average SpO2 with ADLs/physical activity:  Does not check      Use of Rescue Inhaler: Patient states she will only use her rescue inhaler if absolutely necessary because it makes her feel jittery.    Use of Maintenance Inhaler: Yes; 1x/day (morning)    Use of Nebulizer Treatments:  Patient states she has a nebulizer that she uses PRN. She states she mainly only used it during seasonal allergies but has been using it a bit more frequently since her hospitalization due to ongoing cough and increased shortness of breath.     Patient practices breathing techniques at home:  Yes and Reviewed with patient on:  12/3/66293    CAD Risk Factors:  Cholesterol: No  HTN: No  DM: No  Obesity: No   Smoking: Patient states she quit smoking on 7/4/2023 but believes she relapsed right before her hospitalization. She does not remember smoking any cigarettes but states her daughter found a pack. She has remained smoke free since she was hospitalized and avoids secondhand smoke.    SMART Core Component Goals:   consistent, controlled resting  BP < 130/80, medication compliance, and demonstrate pursed lipped breathing    Patient Specific CORE COMPONENT GOALS:    medication compliance, compliance with supplemental oxygen, and monitor home pulse oximetry      INDIVIDUALIZED TREATMENT PLAN    The patient is agreeable to attend 24 pulmonary rehab exercise sessions.      EXERCISE GOALS AND PLAN    PHYSCIAN PRESCRIBED EXERCISE    Current Aerobic Exercise Prescription       Frequency: 2-3 days/week   Supplement with home exercise 2+ days/wk as tolerated        Minutes: 50-60         METS: 2.3-2.8              SpO2: >90% on 2 liters              RPD: 0-2/10                      HR: RHR +30-40bpm   RPE: 4-5/10         Modalities: Treadmill, UBE, NuStep, and Recumbent bike      Aerobic Exercise Prescription Plan for Progression:    Frequency: 2-3 days/week    Supplement with home exercise 2+ days/wk as tolerated       Minutes: 50-60       METS: 3-4              SpO2: >90% on RA              RPD: 0-2/10                      HR:RHR +30-40bpm   RPE: 4-5/10        Modalities: Treadmill, UBE, NuStep, and Recumbent bike        Supplemental Oxygen Needs with Exercise:  supplemental O2 2L/min via nasal canula.    Strength trainin-3 days / week  12-15 repetitions  1-2 sets per modality    Modalities: 10# Chest Press and 3# UE free weights    Education: pursed lipped breathing, diaphragmatic breathing, RPE scale, RPD scale, O2 saturation monitoring, appropriate O2 response to exercise, and education class: Exercise For The Pulmonary Patient    Progress toward Exercise Goals:    Will continue to educate and progress as tolerated., Goals met: compliant attending NH, increased activity level at home and reduced MERCHANT ratings. .    Exercise Plan:  Titrate supplemental oxygen as needed to maintain SpO2>88% with exercise, learn to conserve energy with ADLs , practice diaphragmatic breathing, reduce time sitting at home, utilize PLB with physical activity, and begin a home  "exercise program     Readiness to change: Action:  (Changing behavior)      NUTRITION GOALS AND PLAN    Progress toward Nutritional goals:    Will continue to educate and progress as tolerated., Goals met: unplanned weight loss has ceased. Patient is aware to hydrate throughout the day. She follows a Heart Healthy Diet..    Nutrition Plan: group class: Reading Food Labels and group Class: Heart Healthy Eating    SMART goals are based Rate Your Plate Dietary Self-Assessment. These are the areas in which the patient could score higher on the assessment.  Goals include recommendations for a heart healthy diet based on American Heart Association.    Nutrition Education: heart healthy eating principles  maintaining hydration  group class: Heart Healthy Eating  group class:  Label Reading  group class: healthy eating for managing pulmonary illness  \"Plant Based Eating\"  \"Focus on Fiber\"    Readiness to change: Action:  (Changing behavior)      PSYCHOSOCIAL GOALS AND PLAN    Information to begin using [employer] was provided as well as contact information for counseling through  [facility].    Progress toward Psychosocial goals:    Will continue to educate and progress as tolerated., Goals met: maintains a low stress level and is compliant taking her Ativan prescription. She notes her needs are being met at this time. [facility] staff will continue to offer support as needed. .    Psychosocial: Class: Stress and Your Health, Class:  Stress and Pulmonary Disease, Enroll in [employer], Practice relaxation techniques, Keep a positive mindset, and Enjoy family    Psychosocial Education: stress management techniques, benefits of enrolling in [employer], depression and pulmonary disease, tools to manage fear/anxiety related to becoming SOB, class:  Stress and Your Health , and class:  Stress and Pulmonary Disease    Readiness to change: Action:  (Changing behavior)      OTHER CORE COMPONENTS GOALS AND PLAN    Tobacco Use " Intervention:     Patient states she quit smoking on 7/4/2023 (she averaged 0.25-0.3 ppd for 53.5 years) but believes she relapsed right before her hospitalization. She does not remember smoking any cigarettes but states her daughter found a pack. She continues to remain smoke free since she was hospitalized and avoids secondhand smoke.    Oxygen Goal: Maintain SpO2>88% during exercise or as advised by pulmonolgist    Progress toward Core Component goals:    Pt is progressing and showing improvement  toward the following goals:  remaining smoke free andconsistent resting BP < 130/80.  , Will continue to educate and progress as tolerated.    Core Component Plan: medication compliance, complete abstention from smoking, avoid places with second hand smoke, group class: Pulmonary Anatomy and Physiology, and group class:  Pulmonary Medications    Core Component Education:  pathophysiology of pulmonary disease, preventing infections, relapse education, control coughing, harmonica therapy, education: Pulmonary Anatomy and Physiology, education:  Pulmonary Medications, education:  Clearing Secretions, and education: Oxygen    Readiness to change: Preparation:  (Getting ready to change)

## 2025-03-07 ENCOUNTER — CLINICAL SUPPORT (OUTPATIENT)
Dept: PULMONOLOGY | Facility: HOSPITAL | Age: 75
End: 2025-03-07
Attending: INTERNAL MEDICINE
Payer: COMMERCIAL

## 2025-03-07 DIAGNOSIS — J44.9 CHRONIC OBSTRUCTIVE PULMONARY DISEASE, UNSPECIFIED COPD TYPE (HCC): ICD-10-CM

## 2025-03-07 PROCEDURE — 94626 PHY/QHP OP PULM RHB W/MNTR: CPT

## 2025-03-10 ENCOUNTER — CLINICAL SUPPORT (OUTPATIENT)
Dept: PULMONOLOGY | Facility: HOSPITAL | Age: 75
End: 2025-03-10
Attending: INTERNAL MEDICINE
Payer: COMMERCIAL

## 2025-03-10 DIAGNOSIS — J44.9 CHRONIC OBSTRUCTIVE PULMONARY DISEASE, UNSPECIFIED COPD TYPE (HCC): ICD-10-CM

## 2025-03-10 PROCEDURE — 94626 PHY/QHP OP PULM RHB W/MNTR: CPT

## 2025-03-14 ENCOUNTER — CLINICAL SUPPORT (OUTPATIENT)
Dept: PULMONOLOGY | Facility: HOSPITAL | Age: 75
End: 2025-03-14
Attending: INTERNAL MEDICINE
Payer: COMMERCIAL

## 2025-03-14 DIAGNOSIS — J44.9 CHRONIC OBSTRUCTIVE PULMONARY DISEASE, UNSPECIFIED COPD TYPE (HCC): ICD-10-CM

## 2025-03-14 PROCEDURE — 94626 PHY/QHP OP PULM RHB W/MNTR: CPT

## 2025-03-15 RX ORDER — HYDROCHLOROTHIAZIDE 25 MG/1
25 TABLET ORAL DAILY
Qty: 30 TABLET | Refills: 0 | OUTPATIENT
Start: 2025-03-15

## 2025-03-17 ENCOUNTER — APPOINTMENT (OUTPATIENT)
Dept: PULMONOLOGY | Facility: HOSPITAL | Age: 75
End: 2025-03-17
Attending: INTERNAL MEDICINE
Payer: COMMERCIAL

## 2025-03-18 ENCOUNTER — HOSPITAL ENCOUNTER (OUTPATIENT)
Dept: CT IMAGING | Facility: HOSPITAL | Age: 75
Discharge: HOME | End: 2025-03-18
Payer: COMMERCIAL

## 2025-03-18 DIAGNOSIS — J18.9 PNEUMONIA OF BOTH LOWER LOBES DUE TO INFECTIOUS ORGANISM: ICD-10-CM

## 2025-03-18 PROCEDURE — 71250 CT THORAX DX C-: CPT

## 2025-03-19 ENCOUNTER — APPOINTMENT (OUTPATIENT)
Dept: RADIOLOGY | Facility: CLINIC | Age: 75
End: 2025-03-19
Payer: COMMERCIAL

## 2025-03-19 ENCOUNTER — APPOINTMENT (OUTPATIENT)
Dept: PULMONOLOGY | Facility: HOSPITAL | Age: 75
End: 2025-03-19
Attending: INTERNAL MEDICINE
Payer: COMMERCIAL

## 2025-03-19 ENCOUNTER — OFFICE VISIT (OUTPATIENT)
Dept: URGENT CARE | Facility: CLINIC | Age: 75
End: 2025-03-19
Payer: COMMERCIAL

## 2025-03-19 VITALS
TEMPERATURE: 97.9 F | HEIGHT: 68 IN | HEART RATE: 68 BPM | OXYGEN SATURATION: 97 % | WEIGHT: 151 LBS | SYSTOLIC BLOOD PRESSURE: 108 MMHG | RESPIRATION RATE: 18 BRPM | BODY MASS INDEX: 22.88 KG/M2 | DIASTOLIC BLOOD PRESSURE: 60 MMHG

## 2025-03-19 DIAGNOSIS — S90.112A CONTUSION OF LEFT GREAT TOE WITHOUT DAMAGE TO NAIL, INITIAL ENCOUNTER: Primary | ICD-10-CM

## 2025-03-19 DIAGNOSIS — S99.922A INJURY OF LEFT GREAT TOE, INITIAL ENCOUNTER: ICD-10-CM

## 2025-03-19 PROCEDURE — 99214 OFFICE O/P EST MOD 30 MIN: CPT

## 2025-03-19 PROCEDURE — 73660 X-RAY EXAM OF TOE(S): CPT

## 2025-03-19 PROCEDURE — S9088 SERVICES PROVIDED IN URGENT: HCPCS

## 2025-03-19 NOTE — PATIENT INSTRUCTIONS
May take OTC Tylenol as needed for pain.     RICE is an acronym for a first aid treatment method that involves rest, ice, compression, and elevation.  It is commonly used in the treatment of sprains, strains, contusions, dislocations, or uncomplicated fractures.  It can help to relieve pain and swelling, promote flexibility, and speed up healing.     Rest - avoid using the injured area and stop the activity that caused the injury  Ice - apply an ice pack or cold gel pack wrapped in a towel to the injured area for 20 minutes every 4 hours   Compression - wrap the injured area with an elastic bandage (ACE) to reduce swelling and provide support  Elevation - keep the injured area raised above the level of the heart to drain fluid and reduce swelling     We have performed some tests on you during your visit with us. Our office will contact you with these results only if changes need to be made to the care plan previously discussed with you at your visit. You can review your results on [facility] Mychart. Please don't hesitate to call if you have any questions regarding your results and/or treatment.      If your symptoms do not improve with our current treatment plan or worsen, please schedule an appointment with your PCP. If you develop any high or persistent fevers, chest pain, palpitations, shortness of breath, difficulty swallowing, decreased urine output, abdominal pain, dizziness or any other concerning symptoms, please proceed to the ED.

## 2025-03-20 ENCOUNTER — OFFICE VISIT (OUTPATIENT)
Dept: CARDIOLOGY CLINIC | Facility: CLINIC | Age: 75
End: 2025-03-20
Payer: COMMERCIAL

## 2025-03-20 VITALS
OXYGEN SATURATION: 100 % | DIASTOLIC BLOOD PRESSURE: 66 MMHG | WEIGHT: 155 LBS | BODY MASS INDEX: 23.49 KG/M2 | SYSTOLIC BLOOD PRESSURE: 110 MMHG | HEIGHT: 68 IN | HEART RATE: 57 BPM

## 2025-03-20 DIAGNOSIS — I48.0 PAROXYSMAL ATRIAL FIBRILLATION (HCC): Primary | ICD-10-CM

## 2025-03-20 DIAGNOSIS — I10 ESSENTIAL (PRIMARY) HYPERTENSION: ICD-10-CM

## 2025-03-20 DIAGNOSIS — J44.9 COPD, SEVERE (HCC): ICD-10-CM

## 2025-03-20 PROCEDURE — 99214 OFFICE O/P EST MOD 30 MIN: CPT | Performed by: INTERNAL MEDICINE

## 2025-03-20 NOTE — ASSESSMENT & PLAN NOTE
Afib Likely to recur.  CHADS2-VASc score is elevated.    Continue Eliquis 5 mg twice daily.  She believes diltiazem is causing leg swelling and some odd taste.  We agreed to hold it for now.     n/a

## 2025-03-20 NOTE — PATIENT INSTRUCTIONS
For now    Put the diltiazem in a different drawer with your other as needed drugs    If off diltiazem you do not notice any leg swelling and your blood pressure remains well-controlled you can trial stopping the hydrochlorothiazide.    Goal is top number blood pressure most of the time below 140.

## 2025-03-20 NOTE — PROGRESS NOTES
" Patient ID: Corinne A Longo is a 74 y.o. female.        Plan:      Assessment & Plan  Paroxysmal atrial fibrillation (HCC)   Afib Likely to recur.  CHADS2-VASc score is elevated.    Continue Eliquis 5 mg twice daily.  She believes diltiazem is causing leg swelling and some odd taste.  We agreed to hold it for now.    COPD, severe (HCC)  She remains oxygen dependent.  Essential (primary) hypertension  Slightly too well-controlled.  Patient would like to see how she does off diltiazem if blood pressure remains well-controlled and edema is less problematic she can consider holding hydrochlorothiazide.      Follow up Plan/Other summary comments:  Return in about 1 year (around 3/20/2026).    HPI: Patient is seen in follow-up today regarding the above.  Since last visit she has done okay.  She had lots of questions about recent suggestions for managing her blood pressure and mild edema.  Atrial fibrillation by symptoms does not seem to have been a problem recently.      To reiterate, she has paroxysmal atrial fibrillation.  Because of significant epistaxis on Eliquis, she had reverted to aspirin but now back on this again.        Most recent or relevant cardiac/vascular testing:    TTE 10/30/2024: Normal LVfxn.      Past Surgical History:   Procedure Laterality Date    ADENOIDECTOMY      BREAST CYST EXCISION Bilateral     benign-1987, 2002, 2007    BUNIONECTOMY Bilateral     CATARACT EXTRACTION, BILATERAL      EYE SURGERY      Cataracts removed    MOLE REMOVAL      lip    TONSILLECTOMY      TUBAL LIGATION         Lipid Profile: Reviewed      Review of Systems   10  point ROS  was otherwise non pertinent or negative except as per HPI or as below.   Gait:  Normal.        Objective:     /66   Pulse 57   Ht 5' 8\" (1.727 m)   Wt 70.3 kg (155 lb)   SpO2 100%   BMI 23.57 kg/m²     PHYSICAL EXAM:    General:  Normal appearance in no distress.  Eyes:  Anicteric.  Oral mucosa:  Moist.  Neck:  No JVD. Carotid upstrokes " are brisk without bruits.  No masses.  Chest: Decreased breath sounds throughout.    Cardiac:  No palpable PMI.  Normal S1 and S2.  No murmur gallop or rub.  Abdomen:  Soft and nontender. No palpable organomegaly or aortic enlargement.  Extremities:  No peripheral edema.  Musculoskeletal:  Symmetric.   Vascular:  Femoral pulses are brisk without bruits.  Popliteal pulses are intact bilaterally.   Pedal pulses are intact.  Neuro:  Grossly symmetric.  Psych:  Alert and oriented x3.      Meds reviewed.    Past Medical History:   Diagnosis Date    Allergic 1970    Allergic rhinitis     2015  RESOLVED    Asthma     Atrial fibrillation ([facility])     Bronchitis     Bunion     COPD (chronic obstructive pulmonary disease) ([facility]) 10/2015    home O2 at night at home 2L    Coronary artery disease     AFIB    Depression     Emphysema lung ([facility])     Fibroadenoma of breast     Gastroesophageal reflux disease without esophagitis 2023    Hyperlipidemia     Hypertension     Lung disease     COPD    Memory difficulties 2019    Post-menopausal     Post-menopausal     Sepsis due to pneumonia ([facility]) 10/28/2024    Spondylisthesis     Stage 3 chronic kidney disease, unspecified whether stage 3a or 3b CKD ([facility]) 2022    Vitamin D deficiency            Social History     Tobacco Use   Smoking Status Former    Current packs/day: 0.00    Average packs/day: 0.3 packs/day for 53.5 years (13.4 ttl pk-yrs)    Types: Cigarettes    Start date: 1970    Quit date: 2023    Years since quittin.7    Passive exposure: Past   Smokeless Tobacco Never

## 2025-03-20 NOTE — ASSESSMENT & PLAN NOTE
Slightly too well-controlled.  Patient would like to see how she does off diltiazem if blood pressure remains well-controlled and edema is less problematic she can consider holding hydrochlorothiazide.

## 2025-03-21 ENCOUNTER — APPOINTMENT (OUTPATIENT)
Dept: PULMONOLOGY | Facility: HOSPITAL | Age: 75
End: 2025-03-21
Attending: INTERNAL MEDICINE
Payer: COMMERCIAL

## 2025-03-24 ENCOUNTER — CLINICAL SUPPORT (OUTPATIENT)
Dept: PULMONOLOGY | Facility: HOSPITAL | Age: 75
End: 2025-03-24
Attending: INTERNAL MEDICINE
Payer: COMMERCIAL

## 2025-03-24 DIAGNOSIS — J44.9 CHRONIC OBSTRUCTIVE PULMONARY DISEASE, UNSPECIFIED COPD TYPE (HCC): ICD-10-CM

## 2025-03-24 PROCEDURE — 94626 PHY/QHP OP PULM RHB W/MNTR: CPT

## 2025-03-25 ENCOUNTER — RESULTS FOLLOW-UP (OUTPATIENT)
Dept: PULMONOLOGY | Facility: CLINIC | Age: 75
End: 2025-03-25

## 2025-03-25 ENCOUNTER — VBI (OUTPATIENT)
Dept: ADMINISTRATIVE | Facility: OTHER | Age: 75
End: 2025-03-25

## 2025-03-25 ENCOUNTER — TELEPHONE (OUTPATIENT)
Age: 75
End: 2025-03-25

## 2025-03-25 DIAGNOSIS — R93.89 ABNORMAL CT OF THE CHEST: Primary | ICD-10-CM

## 2025-03-25 NOTE — TELEPHONE ENCOUNTER
Patient calling with concerns on her latest lung scan.  Patient is asking for a call back to discuss the results.

## 2025-03-25 NOTE — TELEPHONE ENCOUNTER
03/25/25 10:28 AM     Chart reviewed for [facility]: Colonoscopy was/were not submitted to the patient's insurance.     Lara Gilbert MA   [facility]

## 2025-03-28 ENCOUNTER — CLINICAL SUPPORT (OUTPATIENT)
Dept: PULMONOLOGY | Facility: HOSPITAL | Age: 75
End: 2025-03-28
Attending: INTERNAL MEDICINE
Payer: COMMERCIAL

## 2025-03-28 DIAGNOSIS — J44.9 CHRONIC OBSTRUCTIVE PULMONARY DISEASE, UNSPECIFIED COPD TYPE (HCC): ICD-10-CM

## 2025-03-28 PROCEDURE — 94626 PHY/QHP OP PULM RHB W/MNTR: CPT

## 2025-04-01 NOTE — PROGRESS NOTES
Pulmonary Rehabilitation   Assessment and Individualized Treatment Plan  120 DAY      Today's date: 2025  # of Exercise Sessions Completed: 18  Patient name: Corinne A Longo     : 1950       MRN: [id number]  Referring Physician: Cece Ro MD  Pulmonologist: Rosy Rodriges DO / ROGELIO Crawley  Provider: Oliva  Clinician: Adriana Junior, MPT, CCRP    Dx:  COPD  Date of onset: Hospitalized 10/28/2024 - 2024      Treatment is tailored to this patient's individual needs.  The ITP was reviewed with the patient and all questions were answered to their satisfaction.  Additional ITP documentation can be found electronically including daily and monthly exercise summaries, daily session notes with  summaries, education notes, daily medication reconciliation, and daily physician supervision.      SUMMARY 2025    Resting BP  120/ - 128/64,  HR 71 - 80  Exercise /62- 152/72.  HR 91 - 102  Exercise session details:  50-55 minutes,  2.3-2.8 METs  LO2:   Rest:  2 L/min, Exercise:  2 L/min  SpO2: Rest:  96-97 %,  Exercise:  94-96 %  Dyspnea:   Rest:  0/10,  Exercise: 0-2/10   Home exercise/ADLs: Patient is independent w/personal ADLS. She is able to perform light to moderate household chores w/out issue. She is able to community ambulate w/0-minimal MERCHANT. She has reduced sitting time at home. She is walking more often around her home on a daily basis.   Patient's subjective report of progress: Patient is pleased w/her progress thus far in Pulm Rehab. MERCHANT rating have reduced to 0-2/10. She no longer notes extreme fatigue or weakness. Strength and standing balance/tolerance continue to improve.Patient is more confident in her abilities.   .   ASSESSMENT      Medical History:   Past Medical History:   Diagnosis Date    Allergic 1970    Allergic rhinitis     2015  RESOLVED    Asthma     Atrial fibrillation (HCC)     Bronchitis     Bunion     COPD (chronic obstructive  pulmonary disease) ([facility]) 10/2015    home O2 at night at home 2L    Coronary artery disease     AFIB    Depression     Emphysema lung ([facility])     Fibroadenoma of breast     Gastroesophageal reflux disease without esophagitis 12/18/2023    Hyperlipidemia     Hypertension     Lung disease 2008    COPD    Memory difficulties 07/23/2019    Post-menopausal     Post-menopausal     Sepsis due to pneumonia ([facility]) 10/28/2024    Spondylisthesis     Stage 3 chronic kidney disease, unspecified whether stage 3a or 3b CKD ([facility]) 02/07/2022    Vitamin D deficiency        Family History:  Family History   Problem Relation Age of Onset    Hypertension Mother     Alcohol abuse Mother     Hypertension Father     Diabetes Father     Prostate cancer Father     Cancer Father         Prostate    Hearing loss Father     Vitamin D deficiency Sister     Diverticulitis Sister     Depression Sister     No Known Problems Sister     Heart failure Brother     Lung cancer Brother     Cancer Brother         Stage four lung cancer    Celiac disease Daughter     Alcohol abuse Daughter     Substance Abuse Daughter     Eczema Daughter     Fibromyalgia Daughter     Substance Abuse Daughter     Alcohol abuse Daughter     ADD / ADHD Daughter     Bipolar disorder Daughter     Anxiety disorder Daughter     Ulcerative colitis Daughter     No Known Problems Maternal Aunt     Heart disease Brother     Substance Abuse Brother     Drug abuse Brother     Heart failure Brother     Breast cancer Neg Hx        Allergies:   Bupropion, Ace inhibitors, Citalopram, Mixed ragweed, Pollen extract, and Adhesive [medical tape]    Current Medications:   Current Outpatient Medications   Medication Sig Dispense Refill    apixaban (ELIQUIS) 5 mg Take 1 tablet (5 mg total) by mouth 2 (two) times a day 180 tablet 1    ascorbic acid (VITAMIN C) 500 mg tablet Take 500 mg by mouth daily      atorvastatin (LIPITOR) 40 mg tablet Take 1 tablet (40 mg total) by mouth daily 90 tablet 1     calcium citrate-vitamin D 315 mg-5 mcg tablet Take 2 tablets by mouth 2 (two) times a day      cetirizine (ZyrTEC) 10 mg tablet Take 10 mg by mouth daily      cholecalciferol (VITAMIN D3) 1,000 units tablet Take 2 Units by mouth daily      clotrimazole-betamethasone (LOTRISONE) 1-0.05 % cream Apply topically 2 (two) times a day (Patient taking differently: Apply topically 2 (two) times a day AS NEEDED) 45 g 1    docusate sodium (COLACE) 100 mg capsule take 1 capsule by mouth twice a day 60 capsule 0    ferrous sulfate 325 (65 FE) MG EC tablet Take 325 mg by mouth 3 (three) times a day with meals (Patient not taking: Reported on 1/15/2025)      guaiFENesin (MUCINEX) 600 mg 12 hr tablet Take 1 tablet (600 mg total) by mouth every 12 (twelve) hours 180 tablet 3    hydroCHLOROthiazide 25 mg tablet Take 1 tablet (25 mg total) by mouth daily 90 tablet 1    ipratropium-albuterol (DUO-NEB) 0.5-2.5 mg/3 mL nebulizer solution Take 3 mL by nebulization every 6 (six) hours as needed for wheezing or shortness of breath 1080 mL 0    L-Lysine 500 MG CAPS Take 1 capsule by mouth daily      levalbuterol (Xopenex HFA) 45 mcg/act inhaler Inhale 1-2 puffs every 4 (four) hours as needed for wheezing 45 g 2    LORazepam (Ativan) 0.5 mg tablet Take 1 tablet (0.5 mg total) by mouth daily at bedtime (Patient not taking: Reported on 1/15/2025) 90 tablet 0    methocarbamol (ROBAXIN) 500 mg tablet Take 1 tablet (500 mg total) by mouth 3 (three) times a day as needed for muscle spasms 90 tablet 0    metoprolol tartrate (LOPRESSOR) 50 mg tablet Take 1 tablet (50 mg total) by mouth every 12 (twelve) hours 180 tablet 0    mometasone (NASONEX) 50 mcg/act nasal spray 2 sprays into each nostril daily 51 g 2    multivitamin-minerals (CENTRUM ADULTS) tablet Take 1 tablet by mouth daily      nystatin (MYCOSTATIN) 500,000 units/5 mL suspension Apply 5 mL (500,000 Units total) to the mouth or throat 4 (four) times a day (Patient not taking: Reported on  3/19/2025) 473 mL 0    Omega-3 Fatty Acids (fish oil) 1,000 mg Take 1,000 mg by mouth 2 (two) times a day. (Patient not taking: Reported on 3/19/2025)      oxygen gas Inhale 2 L/min continuous. via nasal cannula      Probiotic Product (PROBIOTIC-10 PO) Take 1 capsule by mouth daily      triamcinolone (KENALOG) 0.1 % cream Apply topically 2 (two) times a day (Patient not taking: Reported on 3/19/2025) 15 g 0    umeclidinium-vilanterol (Anoro Ellipta) 62.5-25 mcg/actuation inhaler Inhale 1 puff daily 180 each 1     No current facility-administered medications for this visit.       Physical Limitations: Foot pain due to osteopenia and 02 dependence.     Fall Risk: Low   Comments: Ambulates with a steady gait with no assist device    Cultural needs: none    PFT:  Yes   FEV1/FVC ratio of 47%   FEV1 of 52% predicted  Moderate to severe obstruction    Medication compliance: Yes  Comments: Pt reports to be compliant with medications      Pulmonary Disease Risk Factors:  second hand smoke  smoking    Sleep Disorders:   obstructive sleep apnea:  CPCP/BiPAP:  no      EXERCISE ASSESSMENT:      Initial Fitness Assessment:   6MWT:  675 ft = 1.98 METs  Rest: HR 73, /56, SpO2 98% on 2L/min, 0/10 SOB  Exercise: HR 92, /58, SpO2 94-97% on 2L/min, 4/10 MERCHANT  Recovery: HR 70, /54, SpO2 98% on 2L/min, 0/10 SOB      Current Functional Status:  Occupation: retired  Recreation/Physical activity: Patient is no longer sedentary at home.   ADL’s:Capable of performing light to moderate ADLs   [place]: Able to ambulate longer distances w/little to no MERCHANT  Exercise: Daily walking at home  Home exercise equipment: None  Other Comments: NA    SMART Exercise Goals:   reduced score on CAT, improved 6MWT distance, reduced dyspnea during exercise, improved exercise tolerance based on peak METs tolerated in pulmonary rehab exercise session, and SpO2 >88% during exercise    Patient Specific EXERCISE GOALS:     reduced number of  COPD exacerbations, attend pulmonary rehab regularly, decrease sitting time at home, reduced pulmonary related hospital readmissions , reduced reliance on supplemental 02 and increased energy    PSYCHOSOCIAL ASSESSMENT:    Date of last assessment: 12/12/2024  Depression screening:  PHQ-9 = 3    Interpretation:  1-4 = Minimal Depression  Anxiety screening:  LOUIS-7 = 5    Interpretation: 5-9 = Mild anxiety    Pt self-report of depression and anxiety   Patient has a history of depression and anxiety. Patient is prescribed Ativan w/good results.     Self-reported stress level:  Patient states typically she is a low stress level (3/10) daily but certain things (going in public, going somewhere new, rushing around) will cause her stress level to be high (7/10)  Stress Management: practice Relaxation Techniques and keep a positive mindset    Quality of Life Screen:  (Higher score indicates disease impact on QOL)  [facility] COOP score: 28/40      CAT: 26/40      Shortness of breath questionnaire: 67/120    Social Support:   significant other, children, and friends  Community/Social Activities: Spend time with family and friends    SMART Goals:    Reduce perceived stress to 1-3/10, improved [facility] QOL < 27, Physical Fitness in [facility] Score < 3, Daily Activity in [facility] Score < 3, improved sleeping habits, feel less tired with more energy, and Improved appetite control    Patient Specific PSYCHOCOSOCIAL GOALS:    maintain compliance with medical therapy, address emotional health concerns with PCP, spend time with family, and continue w/Ativan prescription     Psychosocial Assessment as it relates to rehabilitation: Patient denies issues with her family or home life that may affect their rehabilitation efforts. He states all her needs are being met. [place] staff will continue to offer support as needed.       NUTRITION ASSESSMENT:    Initial Weight:  147  Current Weight: 155    Unplanned weight loss has ceased. She is  "maintaining a healthy weight and BMI.     Height:   Ht Readings from Last 1 Encounters:   03/20/25 5' 8\" (1.727 m)       Rate Your Plate Score: 71/81    Self-reported Current Dietary Habits:  Patient states since her boyfriend had CABG a few years ago they have been eating healthier. She states her appetite has improved and she is trying to follow a \"Heart Healthy Diet\". She makes sure to hydrate regularly throughout the day.     ETOH:   Social History     Substance and Sexual Activity   Alcohol Use Not Currently    Comment: I might have a glass of wine every two or three months       SMART Goals:   Improved Rate Your Plate score  >58    Patient Specific NUTRITION GOALS:    increase water intake to reduce/thin mucus production decrease caloric intake weight gain increased muscle mass      OTHER CORE COMPONENT ASSESSMENT:    Hospitalizations in the past year: Hospitalized 10/28/2024 - 11/6/2024 for sepsis due to pneumonia.     Oxygen needs:   Rest:  supplemental O2 via nasal cannula @ 2L/min   Exercise/physical activity:  supplemental O2 via nasal cannula @ 2L/min   Sleep:   supplemental O2 via nasal cannula @ 2L/min     Does Pt monitor home SpO2? yes   Average SpO2 at rest:  92-94%   Average SpO2 with ADLs/physical activity:  Does not check      Use of Rescue Inhaler: Patient states she will only use her rescue inhaler if absolutely necessary because it makes her feel jittery.    Use of Maintenance Inhaler: Yes; 1x/day (morning)    Use of Nebulizer Treatments:  Patient states she has a nebulizer that she uses PRN. She states she mainly only used it during seasonal allergies but has been using it a bit more frequently since her hospitalization due to ongoing cough and increased shortness of breath.     Patient practices breathing techniques at home:  Yes and Reviewed with patient on:  12/3/49250    CAD Risk Factors:  Cholesterol: No  HTN: No  DM: No  Obesity: No   Smoking: Patient states she quit smoking on 7/4/2023 " but believes she relapsed right before her hospitalization. She does not remember smoking any cigarettes but states her daughter found a pack. She has remained smoke free since she was hospitalized and avoids secondhand smoke.    SMART Core Component Goals:   consistent, controlled resting BP < 130/80, medication compliance, and demonstrate pursed lipped breathing    Patient Specific CORE COMPONENT GOALS:    medication compliance, compliance with supplemental oxygen, and monitor home pulse oximetry      INDIVIDUALIZED TREATMENT PLAN    The patient is agreeable to attend 24 pulmonary rehab exercise sessions.      EXERCISE GOALS AND PLAN    PHYSCIAN PRESCRIBED EXERCISE    Current Aerobic Exercise Prescription       Frequency: 2-3 days/week   Supplement with home exercise 2+ days/wk as tolerated        Minutes: 50-60         METS: 2.3-2.8              SpO2: >90% on 2 liters              RPD: 0-2/10                      HR: RHR +30-40bpm   RPE: 4-5/10         Modalities: Treadmill, UBE, NuStep, and Recumbent bike      Aerobic Exercise Prescription Plan for Progression:    Frequency: 2-3 days/week    Supplement with home exercise 2+ days/wk as tolerated       Minutes: 50-60       METS: 3-4              SpO2: >90% on RA              RPD: 0-2/10                      HR:RHR +30-40bpm   RPE: 4-5/10        Modalities: Treadmill, UBE, NuStep, and Recumbent bike        Supplemental Oxygen Needs with Exercise:  supplemental O2 2L/min via nasal canula.    Strength trainin-3 days / week  12-15 repetitions  1-2 sets per modality    Modalities: 10# Chest Press and 3# UE free weights    Education: pursed lipped breathing, diaphragmatic breathing, RPE scale, RPD scale, O2 saturation monitoring, appropriate O2 response to exercise, and education class: Exercise For The Pulmonary Patient    Progress toward Exercise Goals:    Will continue to educate and progress as tolerated., Goals met: compliant attending [place], increased activity  "level at home, no exacerbations or hospital readmission,  and reduced MERCHANT ratings. .    Exercise Plan:  Titrate supplemental oxygen as needed to maintain SpO2>88% with exercise, learn to conserve energy with ADLs , practice diaphragmatic breathing, reduce time sitting at home, utilize PLB with physical activity, and begin a home exercise program     Readiness to change: Action:  (Changing behavior)      NUTRITION GOALS AND PLAN    Progress toward Nutritional goals:    Will continue to educate and progress as tolerated., Goals met: unplanned weight loss has ceased. Patient is aware to hydrate throughout the day. She follows a Heart Healthy Diet..    Nutrition Plan: group class: Reading Food Labels, group Class: Heart Healthy Eating, increase intake of whole grains, increase daily intake of fruits and vegetables, choose lean red meat, never/rarely add salt to food when cooking or at the table, choose low sodium canned, frozen, packaged foods or rarely eat these foods, and rarely/never eat salty snacks    SMART goals are based Rate Your Plate Dietary Self-Assessment. These are the areas in which the patient could score higher on the assessment.  Goals include recommendations for a heart healthy diet based on American Heart Association.    Nutrition Education: heart healthy eating principles  maintaining hydration  group class: Heart Healthy Eating  group class:  Label Reading  group class: healthy eating for managing pulmonary illness  \"Plant Based Eating\"  \"Focus on Fiber\"    Readiness to change: Action:  (Changing behavior)      PSYCHOSOCIAL GOALS AND PLAN    Information to begin using [employer] was provided as well as contact information for counseling through  [facility].    Progress toward Psychosocial goals:    Will continue to educate and progress as tolerated., Goals met: maintains a low stress level and is compliant taking her Ativan prescription. She notes her needs are being met at this time. [facility] " staff will continue to offer support as needed. .Patient is more confident in her abilities since beginning Pulm Rehab.     Psychosocial: Class: Stress and Your Health, Class:  Stress and Pulmonary Disease, Enroll in [employer], Practice relaxation techniques, Keep a positive mindset, and Enjoy family    Psychosocial Education: stress management techniques, benefits of enrolling in [employer], depression and pulmonary disease, tools to manage fear/anxiety related to becoming SOB, class:  Stress and Your Health , and class:  Stress and Pulmonary Disease    Readiness to change: Action:  (Changing behavior)      OTHER CORE COMPONENTS GOALS AND PLAN    Tobacco Use Intervention:     Patient states she quit smoking on 7/4/2023 (she averaged 0.25-0.3 ppd for 53.5 years) but believes she relapsed right before her hospitalization. She does not remember smoking any cigarettes but states her daughter found a pack. She continues to remain smoke free since she was hospitalized and avoids secondhand smoke.    Oxygen Goal: Maintain SpO2>88% during exercise or as advised by pulmonolgist    Progress toward Core Component goals:    Pt is progressing and showing improvement  toward the following goals:  remaining smoke free and consistent resting BP < 130/80.  , Will continue to educate and progress as tolerated.    Core Component Plan: medication compliance, complete abstention from smoking, avoid places with second hand smoke, group class: Pulmonary Anatomy and Physiology, and group class:  Pulmonary Medications    Core Component Education:  pathophysiology of pulmonary disease, preventing infections, relapse education, control coughing, harmonica therapy, education: Pulmonary Anatomy and Physiology, education:  Pulmonary Medications, education:  Clearing Secretions, and education: Oxygen    Readiness to change: Action:  (Changing behavior)

## 2025-04-02 ENCOUNTER — CLINICAL SUPPORT (OUTPATIENT)
Dept: PULMONOLOGY | Facility: HOSPITAL | Age: 75
End: 2025-04-02
Attending: INTERNAL MEDICINE
Payer: COMMERCIAL

## 2025-04-02 DIAGNOSIS — J44.9 CHRONIC OBSTRUCTIVE PULMONARY DISEASE, UNSPECIFIED COPD TYPE (HCC): ICD-10-CM

## 2025-04-02 PROCEDURE — 94626 PHY/QHP OP PULM RHB W/MNTR: CPT

## 2025-04-04 ENCOUNTER — CLINICAL SUPPORT (OUTPATIENT)
Dept: PULMONOLOGY | Facility: HOSPITAL | Age: 75
End: 2025-04-04
Attending: INTERNAL MEDICINE
Payer: COMMERCIAL

## 2025-04-04 DIAGNOSIS — J44.9 CHRONIC OBSTRUCTIVE PULMONARY DISEASE, UNSPECIFIED COPD TYPE (HCC): ICD-10-CM

## 2025-04-04 PROCEDURE — 94625 PHY/QHP OP PULM RHB W/O MNTR: CPT

## 2025-04-07 ENCOUNTER — CLINICAL SUPPORT (OUTPATIENT)
Dept: PULMONOLOGY | Facility: HOSPITAL | Age: 75
End: 2025-04-07
Attending: INTERNAL MEDICINE
Payer: COMMERCIAL

## 2025-04-07 ENCOUNTER — TELEPHONE (OUTPATIENT)
Age: 75
End: 2025-04-07

## 2025-04-07 DIAGNOSIS — J44.9 CHRONIC OBSTRUCTIVE PULMONARY DISEASE, UNSPECIFIED COPD TYPE (HCC): ICD-10-CM

## 2025-04-07 PROCEDURE — 94626 PHY/QHP OP PULM RHB W/MNTR: CPT

## 2025-04-07 NOTE — TELEPHONE ENCOUNTER
Pt asking if she needs to see SOFIA Gonzalez sooner than her May appt to continue pulmonary rehab.    Pt request a call back on her cell phone / [phone]

## 2025-04-07 NOTE — TELEPHONE ENCOUNTER
I called and spoke with patient, she is aware of No she doesn't. I don't see anywhere in the Pulmonary Rehab session notes suggesting this and she has appointments scheduled out through April. I will see her as scheduled on 5/20. Thanks.

## 2025-04-11 ENCOUNTER — CLINICAL SUPPORT (OUTPATIENT)
Dept: PULMONOLOGY | Facility: HOSPITAL | Age: 75
End: 2025-04-11
Attending: INTERNAL MEDICINE
Payer: COMMERCIAL

## 2025-04-11 DIAGNOSIS — J44.9 CHRONIC OBSTRUCTIVE PULMONARY DISEASE, UNSPECIFIED COPD TYPE (HCC): ICD-10-CM

## 2025-04-11 PROCEDURE — 94626 PHY/QHP OP PULM RHB W/MNTR: CPT

## 2025-04-14 ENCOUNTER — CLINICAL SUPPORT (OUTPATIENT)
Dept: PULMONOLOGY | Facility: HOSPITAL | Age: 75
End: 2025-04-14
Attending: INTERNAL MEDICINE
Payer: COMMERCIAL

## 2025-04-14 DIAGNOSIS — J44.9 CHRONIC OBSTRUCTIVE PULMONARY DISEASE, UNSPECIFIED COPD TYPE (HCC): ICD-10-CM

## 2025-04-14 PROCEDURE — 94626 PHY/QHP OP PULM RHB W/MNTR: CPT

## 2025-04-15 ENCOUNTER — CLINICAL SUPPORT (OUTPATIENT)
Dept: NUTRITION | Facility: HOSPITAL | Age: 75
End: 2025-04-15
Payer: COMMERCIAL

## 2025-04-15 VITALS — BODY MASS INDEX: 23.57 KG/M2 | WEIGHT: 154.98 LBS

## 2025-04-15 DIAGNOSIS — N18.32 CHRONIC KIDNEY DISEASE, STAGE 3B (HCC): Primary | ICD-10-CM

## 2025-04-15 PROCEDURE — 97803 MED NUTRITION INDIV SUBSEQ: CPT

## 2025-04-15 NOTE — PROGRESS NOTES
" Nutrition Assessment Form    Patient Name: Corinne A Longo    YOB: 1950    Sex: Female     Assessment Date: 4/15/2025  Start Time: 10:30 AM Stop Time: 11:00 AM Total Minutes: 30     Data:  Present at session: Self and significant other   Parent/Patient Concerns/reason for visit: \"I am good.\"   Medical Dx/Reason for Referral: N18.32 (ICD-10-CM) - Chronic kidney disease, stage 3b ([facility])   Past Medical History:   Diagnosis Date    Allergic 01/01/1970    Allergic rhinitis     [id number]  RESOLVED    Asthma     Atrial fibrillation ([facility])     Bronchitis     Bunion     COPD (chronic obstructive pulmonary disease) ([facility]) 10/2015    home O2 at night at home 2L    Coronary artery disease     AFIB    Depression     Emphysema lung ([facility])     Fibroadenoma of breast     Gastroesophageal reflux disease without esophagitis 12/18/2023    Hyperlipidemia     Hypertension     Lung disease 2008    COPD    Memory difficulties 07/23/2019    Post-menopausal     Post-menopausal     Sepsis due to pneumonia ([facility]) 10/28/2024    Spondylisthesis     Stage 3 chronic kidney disease, unspecified whether stage 3a or 3b CKD ([facility]) 02/07/2022    Vitamin D deficiency        Current Outpatient Medications   Medication Sig Dispense Refill    apixaban (ELIQUIS) 5 mg Take 1 tablet (5 mg total) by mouth 2 (two) times a day 180 tablet 1    ascorbic acid (VITAMIN C) 500 mg tablet Take 500 mg by mouth daily      atorvastatin (LIPITOR) 40 mg tablet Take 1 tablet (40 mg total) by mouth daily 90 tablet 1    calcium citrate-vitamin D 315 mg-5 mcg tablet Take 2 tablets by mouth 2 (two) times a day      cetirizine (ZyrTEC) 10 mg tablet Take 10 mg by mouth daily      cholecalciferol (VITAMIN D3) 1,000 units tablet Take 2 Units by mouth daily      clotrimazole-betamethasone (LOTRISONE) 1-0.05 % cream Apply topically 2 (two) times a day (Patient taking differently: Apply topically 2 (two) times a day AS NEEDED) 45 g 1    docusate sodium (COLACE) 100 mg " capsule take 1 capsule by mouth twice a day 60 capsule 0    ferrous sulfate 325 (65 FE) MG EC tablet Take 325 mg by mouth 3 (three) times a day with meals (Patient not taking: Reported on 1/15/2025)      guaiFENesin (MUCINEX) 600 mg 12 hr tablet Take 1 tablet (600 mg total) by mouth every 12 (twelve) hours 180 tablet 3    hydroCHLOROthiazide 25 mg tablet Take 1 tablet (25 mg total) by mouth daily 90 tablet 1    ipratropium-albuterol (DUO-NEB) 0.5-2.5 mg/3 mL nebulizer solution Take 3 mL by nebulization every 6 (six) hours as needed for wheezing or shortness of breath 1080 mL 0    L-Lysine 500 MG CAPS Take 1 capsule by mouth daily      levalbuterol (Xopenex HFA) 45 mcg/act inhaler Inhale 1-2 puffs every 4 (four) hours as needed for wheezing 45 g 2    LORazepam (Ativan) 0.5 mg tablet Take 1 tablet (0.5 mg total) by mouth daily at bedtime (Patient not taking: Reported on 1/15/2025) 90 tablet 0    methocarbamol (ROBAXIN) 500 mg tablet Take 1 tablet (500 mg total) by mouth 3 (three) times a day as needed for muscle spasms 90 tablet 0    metoprolol tartrate (LOPRESSOR) 50 mg tablet Take 1 tablet (50 mg total) by mouth every 12 (twelve) hours 180 tablet 0    mometasone (NASONEX) 50 mcg/act nasal spray 2 sprays into each nostril daily 51 g 2    multivitamin-minerals (CENTRUM ADULTS) tablet Take 1 tablet by mouth daily      nystatin (MYCOSTATIN) 500,000 units/5 mL suspension Apply 5 mL (500,000 Units total) to the mouth or throat 4 (four) times a day (Patient not taking: Reported on 3/19/2025) 473 mL 0    Omega-3 Fatty Acids (fish oil) 1,000 mg Take 1,000 mg by mouth 2 (two) times a day. (Patient not taking: Reported on 3/19/2025)      oxygen gas Inhale 2 L/min continuous. via nasal cannula      Probiotic Product (PROBIOTIC-10 PO) Take 1 capsule by mouth daily      triamcinolone (KENALOG) 0.1 % cream Apply topically 2 (two) times a day (Patient not taking: Reported on 3/19/2025) 15 g 0    umeclidinium-vilanterol (Anoro  "Ellipta) 62.5-25 mcg/actuation inhaler Inhale 1 puff daily 180 each 1     No current facility-administered medications for this visit.     Had been cleared to discontinue generic cardizem and hydrochlorothiazide   Additional Meds/Supplements:    Special Learning Needs/barriers to learning/any new barriers None   Height: Ht Readings from Last 5 Encounters:   03/20/25 5' 8\" (1.727 m)   03/19/25 5' 8\" (1.727 m)   02/06/25 5' 8\" (1.727 m)   01/21/25 5' 8\" (1.727 m)   01/15/25 5' 8\" (1.727 m)      Weight: Wt Readings from Last 10 Encounters:   04/15/25 70.3 kg (154 lb 15.7 oz)   03/20/25 70.3 kg (155 lb)   03/19/25 68.5 kg (151 lb)   02/06/25 71 kg (156 lb 9.6 oz)   02/04/25 70.1 kg (154 lb 8.7 oz)   01/15/25 70.8 kg (156 lb)   01/14/25 70.3 kg (155 lb)   12/23/24 70.6 kg (155 lb 9.6 oz)   12/03/24 68.4 kg (150 lb 12.8 oz)   11/18/24 66.7 kg (147 lb)     Estimated body mass index is 23.57 kg/m² as calculated from the following:    Height as of 3/20/25: 5' 8\" (1.727 m).    Weight as of this encounter: 70.3 kg (154 lb 15.7 oz).   Recent Weight Change: []Yes     [x]No  Amount: Some fluctuations with fluid      Energy Needs: 1750 - 2100 kcal (25-30 kcal/kg)    42 - 56 g protein/day (0.6-0.8 g/kg)   Allergies   Allergen Reactions    Bupropion Itching    Ace Inhibitors Cough     Action Taken: stopped med and changed to ARB; Category: Adverse Reaction;     Citalopram Diarrhea and Nausea Only    Mixed Ragweed Cough    Pollen Extract Cough    Adhesive [Medical Tape] Rash     Patch     or intolerances NKFA  Some sensitivities - cilantro, kat in excess,    Social History     Substance and Sexual Activity   Alcohol Use Not Currently    Comment: I might have a glass of wine every two or three months    None   Social History     Tobacco Use   Smoking Status Former    Current packs/day: 0.00    Average packs/day: 0.3 packs/day for 53.5 years (13.4 ttl pk-yrs)    Types: Cigarettes    Start date: 1/1/1970    Quit date: 7/4/2023    Years " since quittin.7    Passive exposure: Past   Smokeless Tobacco Never    None   Who shops? patient and Aaron make a grocery list together,  groceries   Who cooks/cooking methods/Eating out/take out habits   patient  Cooking methods: bake, airfry    Take out: twice a month  Dining out: hardly ever   Exercise: Pulmotherapy twice weekly - cardio and weight training  She reports being more active now post 2024 hospital admission.    Other: ie: Sleep habits/ stress level/ work habits household-lives with ?/ food security Uses Ativan for sleep optimization as needed - does not take as often now  Gets about 8 hours of sleep nightly. On bad nights she gets 4 hours of asleep and then will nap throughout the day - happens less often now.   Retired.   Some stress about health and hospital admission where she does not remember what happened to her.   Lives with Aaron.    Prior Nutritional Counseling? []Yes     [x]No  When:      Why:         Diet Hx:  Breakfast: 7-8 AM  Oatmeal  Sliced walnuts  Apples, cinnamon, almond milk  Coffee (decaf) with vanilla creamer and cinnamon    Buckwheat pancakes with blueberries, turkey sausages with coffee    Smoothie - almond milk, spinach, blueberries   Lunch: 12 PM  Tuna (low sodium in water) salad with lite moe and celery on lettuce with cucumber and tomato     1/2 tuna salad sandwich on whole grain bread and rommel    Chicken cucumbers, celery carrots, rommel with oil and vinegar    Peanut butter and jelly   Dinner: 6 PM  Pulled pork over shanelle hair pasta with oil, butter, zucchini and onion and mushrooms    Baked chicken breast, carrots    Slice of pizza   Snacks: AM -   PM -   HS - apples and celery with peanut butter   Other Notes/ Initial Assessment: Corinne has done her own research on what she can and cannot eat with stage 3b CKD and other conditions. She and Aaron have chosen lean meat options for years after he began with heart issues 3 years ago.   "She reports intentional weight loss in the past 3 years.  She enjoys turkey chili and taco salads often. Reports change in taste since October/November hospital admission. Uses avocado oil for all cooking.  Corinne indicates a challenge she anticipates she will face is limiting protein at each meal and replacing it.  Encouraged her to add nonstarchy vegetables in place of larger protein portion.  Nutrition education materials discussed as below.  She has plans to meet with nephrologist and PCP prior to next visit.  Follow-up made.    Updated assessment (Follow up note only): 4/15/25  Today Corinne reports her taste is back. \"It tastes normal now.\" She reports varying recommendations from Nephrology, Cardiology, Family Medicine, and RD. She is listening to everyone and operating accordingly. She reports she knows she needs to decrease intake of protein. Advised Corinne on limited but not no protein. She is meal prepping for herself and Aaron. She is going to get a visual for fruit and vegetable serving sizes and micronutrient composition detailed. Advised Corinne she does not need to limit phosphorus or potassium at this time, she verbalizes understanding. She recently picked up Mrs No seasonings. Consuming low sodium cheese. Making her own salad dressings. Picked up all natural peanut butter \"with nothing but peanut butter in it.\" Does not consume lunch meat often - will choose low sodium versions. She has been consuming frozen and canned vegetables/fruit with price hikes in the grocery store. She looks for low sodium options and only chooses those. Continuing pulmonary rehab, 2 days weekly - she has about 7-8 weeks left. She very much enjoys this. She will call if she wishes to make a follow up appointment.      Nutrition Diagnosis:   Excessive protein intake  related to Renal dysfunction as evidenced by  Altered laboratory values (i.e. ?BUN, ?GFR)       Any change or new dx since previous visit:     Nutrition " Diagnosis:         Medical Nutrition Therapy Intervention:  []Individualized Meal Plan [x]Understanding Lab Values   discussed most recent labs   []Basic Pathophysiology of Disease []Food/Medication Interactions   []Food Diary [x]Exercise  150 mins of moderate activity weekly, discussed importance of variety of activity, including wt bearing activities 2-3x/wk x 10-15mins. Discussed importance of activity throughout the lifecycle and its impact on overall health/stress management, etc.   [x]Lifestyle/Behavior Modification Techniques  Discussed the importance of eating 3 meals daily and not skipping, and how metabolism is affected.  Also discussed adding in small snacks or 5-6 small meals daily if desired vs 3 larger ones, and appropriate options and portions.  Appropriate timing of meals, including eating within 1 hr of waking and eating meals slowly 20mins/meals, minimizing mindless/boredom or habitual eating, etc was also mentioned.  Fluid intake discussed and appropriate amounts and choices, 16 oz with meals and additional 32oz during the day.  S/S dehydration also covered. []Medication, Mechanism of Action   [x]Label Reading: Serving size, protein []Self Blood Glucose Monitoring   []Weight/BMI Goals: gain/lose/maintain []Other -          Comprehension: []Excellent  [x]Very Good  []Good  []Fair   []Poor    Receptivity: []Excellent  [x]Very Good  []Good  []Fair   []Poor    Expected Compliance: []Excellent  [x]Very Good  []Good  []Fair   []Poor        Goals (initial)/ Progress made on previous goals/new goals:  Corinne will consume no more than 56 g protein daily for at least 4 days weekly. (Met, continue)   2. Corinne will be able to identify serving size of protein appropriate for CKD by next visit. (Not met, continue, actively working on)   3. Corinne will continue making low sodium choices. (New)        No follow-ups on file.  Labs:  CMP  Lab Results   Component Value Date     06/05/2015    K 3.8  "01/14/2025     01/14/2025    CO2 34 (H) 01/14/2025    ANIONGAP 2 (L) 06/05/2015    BUN 28 (H) 01/14/2025    CREATININE 1.41 (H) 01/14/2025    GLUCOSE 101 06/05/2015    GLUF 96 01/14/2025    CALCIUM 9.9 01/14/2025    CORRECTEDCA 9.5 11/11/2024    AST 21 01/14/2025    ALT 14 01/14/2025    ALKPHOS 65 01/14/2025    PROT 7.6 06/04/2015    BILITOT 0.5 06/04/2015    EGFR 37 01/14/2025       BMP  Lab Results   Component Value Date    GLUCOSE 101 06/05/2015    CALCIUM 9.9 01/14/2025     06/05/2015    K 3.8 01/14/2025    CO2 34 (H) 01/14/2025     01/14/2025    BUN 28 (H) 01/14/2025    CREATININE 1.41 (H) 01/14/2025       Lipids  No results found for: \"CHOL\"  Lab Results   Component Value Date    HDL 50 12/03/2024    HDL 47 (L) 04/07/2023    HDL 44 11/09/2021     Lab Results   Component Value Date    LDLCALC 65 12/03/2024    LDLCALC 63 04/07/2023    LDLCALC 66 11/09/2021     Lab Results   Component Value Date    TRIG 107 12/03/2024    TRIG 99 04/07/2023    TRIG 114 11/09/2021     No results found for: \"CHOLHDL\"    Hemoglobin A1C  Lab Results   Component Value Date    HGBA1C 5.4 11/09/2021       Fasting Glucose  Lab Results   Component Value Date    GLUF 96 01/14/2025       Insulin     Thyroid  No results found for: \"TSH\", \"N3MWUDW\", \"O5PBJPA\", \"THYROIDAB\"    Hepatic Function Panel  Lab Results   Component Value Date    ALT 14 01/14/2025    AST 21 01/14/2025    ALKPHOS 65 01/14/2025    BILITOT 0.5 06/04/2015       Celiac Disease Antibody Panel  Endomysial IgA   Date Value Ref Range Status   07/20/2018 Negative Negative Final     Gliadin IgA   Date Value Ref Range Status   07/20/2018 4 0 - 19 units Final     Comment:                        Negative                   0 - 19                     Weak Positive             20 - 30                     Moderate to Strong Positive   >30     Gliadin IgG   Date Value Ref Range Status   07/20/2018 2 0 - 19 units Final     Comment:                        Negative           "         0 - 19                     Weak Positive             20 - 30                     Moderate to Strong Positive   >30     IgA   Date Value Ref Range Status   07/20/2018 194 87 - 352 mg/dL Final     TISSUE TRANSGLUTAMINASE IGA   Date Value Ref Range Status   07/20/2018 <2 0 - 3 U/mL Final     Comment:                                   Negative        0 -  3                                Weak Positive   4 - 10                                Positive           >10   Tissue Transglutaminase (tTG) has been identified   as the endomysial antigen.  Studies have demonstr-   ated that endomysial IgA antibodies have over 99%   specificity for gluten sensitive enteropathy.      Iron  Lab Results   Component Value Date    IRON 95 01/14/2025    TIBC 315 01/14/2025    FERRITIN 194 01/14/2025            Monique Lee RD  [facility] CLINICAL NUTRITION [facility]  [address] [place] [id number]

## 2025-04-18 ENCOUNTER — CLINICAL SUPPORT (OUTPATIENT)
Dept: PULMONOLOGY | Facility: HOSPITAL | Age: 75
End: 2025-04-18
Attending: INTERNAL MEDICINE
Payer: COMMERCIAL

## 2025-04-18 DIAGNOSIS — J44.9 CHRONIC OBSTRUCTIVE PULMONARY DISEASE, UNSPECIFIED COPD TYPE (HCC): ICD-10-CM

## 2025-04-18 PROCEDURE — 94626 PHY/QHP OP PULM RHB W/MNTR: CPT

## 2025-04-21 ENCOUNTER — APPOINTMENT (OUTPATIENT)
Dept: PULMONOLOGY | Facility: HOSPITAL | Age: 75
End: 2025-04-21
Attending: INTERNAL MEDICINE
Payer: COMMERCIAL

## 2025-04-21 DIAGNOSIS — I48.91 ATRIAL FIBRILLATION WITH RVR (HCC): ICD-10-CM

## 2025-04-21 RX ORDER — METOPROLOL TARTRATE 50 MG
50 TABLET ORAL 2 TIMES DAILY
Qty: 180 TABLET | Refills: 0 | Status: SHIPPED | OUTPATIENT
Start: 2025-04-21

## 2025-04-21 RX ORDER — METOPROLOL TARTRATE 25 MG/1
TABLET, FILM COATED ORAL
Qty: 180 TABLET | Refills: 3 | OUTPATIENT
Start: 2025-04-21

## 2025-04-22 ENCOUNTER — TELEPHONE (OUTPATIENT)
Age: 75
End: 2025-04-22

## 2025-04-22 NOTE — TELEPHONE ENCOUNTER
Patient needs orders for both dexa scan & mammo. She would like a call back about the location for the dexa scan.    Her # [phone]

## 2025-04-23 DIAGNOSIS — Z13.820 ENCOUNTER FOR SCREENING FOR OSTEOPOROSIS: ICD-10-CM

## 2025-04-23 DIAGNOSIS — Z12.31 ENCOUNTER FOR SCREENING MAMMOGRAM FOR BREAST CANCER: Primary | ICD-10-CM

## 2025-04-23 DIAGNOSIS — Z78.0 POSTMENOPAUSAL: ICD-10-CM

## 2025-04-25 ENCOUNTER — CLINICAL SUPPORT (OUTPATIENT)
Dept: PULMONOLOGY | Facility: HOSPITAL | Age: 75
End: 2025-04-25
Attending: INTERNAL MEDICINE
Payer: COMMERCIAL

## 2025-04-25 DIAGNOSIS — J44.9 CHRONIC OBSTRUCTIVE PULMONARY DISEASE, UNSPECIFIED COPD TYPE (HCC): ICD-10-CM

## 2025-04-25 PROCEDURE — 94625 PHY/QHP OP PULM RHB W/O MNTR: CPT

## 2025-04-28 ENCOUNTER — CLINICAL SUPPORT (OUTPATIENT)
Dept: PULMONOLOGY | Facility: HOSPITAL | Age: 75
End: 2025-04-28
Attending: INTERNAL MEDICINE
Payer: COMMERCIAL

## 2025-04-28 DIAGNOSIS — J44.9 CHRONIC OBSTRUCTIVE PULMONARY DISEASE, UNSPECIFIED COPD TYPE (HCC): Primary | ICD-10-CM

## 2025-04-28 PROCEDURE — 94626 PHY/QHP OP PULM RHB W/MNTR: CPT

## 2025-04-29 NOTE — PROGRESS NOTES
Pulmonary Rehabilitation   Assessment and Individualized Treatment Plan  150 DAY      Today's date: 2025  # of Exercise Sessions Completed:   Patient name: Corinne A Longo     : 1950       MRN: [id number]  Referring Physician: Cece Ro MD  Pulmonologist: Rosy Rodriges DO / ROGELIO Crawley  Provider: Oliva  Clinician: Adriana Junior, MPT, CCRP    Dx:  COPD  Date of onset: Hospitalized 10/28/2024 - 2024      Treatment is tailored to this patient's individual needs.  The ITP was reviewed with the patient and all questions were answered to their satisfaction.  Additional ITP documentation can be found electronically including daily and monthly exercise summaries, daily session notes with  summaries, education notes, daily medication reconciliation, and daily physician supervision.      SUMMARY 2025    Resting BP  120/58 - 128/64,  HR 61 - 78  Exercise /62- 144/70.  HR 78 - 102  Exercise session details:  50-55 minutes,  2.3-2.8 METs  LO2:   Rest:  2 L/min, Exercise:  2 L/min  SpO2: Rest:  96-98 %,  Exercise:  90-96 %  Dyspnea:   Rest:  0/10,  Exercise: 0-3/10   Home exercise/ADLs: Patient is independent w/personal ADLS. She is able to perform light to moderate household chores w/out issue. She is able to community ambulate w/0-minimal MERCHANT. She has reduced sitting time at home. She is walking more often around her home on a daily basis. She is still having difficulty lifting/carrying especially up/down the stairs.   Patient's subjective report of progress: Patient is pleased w/her progress thus far in Pulm Rehab. MERCHANT rating have reduced to 0-3/10. She no longer notes extreme fatigue or weakness. She no longer performs interval training. Strength and standing balance/tolerance continue to improve.Patient is more confident in her abilities. She p[lans to continue until program completion of 36 sessions.   .   ASSESSMENT      Medical History:   Past Medical  History:   Diagnosis Date    Allergic 01/01/1970    Allergic rhinitis     [id number]  RESOLVED    Asthma     Atrial fibrillation ([facility])     Bronchitis     Bunion     COPD (chronic obstructive pulmonary disease) ([facility]) 10/2015    home O2 at night at home 2L    Coronary artery disease     AFIB    Depression     Emphysema lung ([facility])     Fibroadenoma of breast     Gastroesophageal reflux disease without esophagitis 12/18/2023    Hyperlipidemia     Hypertension     Lung disease 2008    COPD    Memory difficulties 07/23/2019    Post-menopausal     Post-menopausal     Sepsis due to pneumonia ([facility]) 10/28/2024    Spondylisthesis     Stage 3 chronic kidney disease, unspecified whether stage 3a or 3b CKD ([facility]) 02/07/2022    Vitamin D deficiency        Family History:  Family History   Problem Relation Age of Onset    Hypertension Mother     Alcohol abuse Mother     Hypertension Father     Diabetes Father     Prostate cancer Father     Cancer Father         Prostate    Hearing loss Father     Vitamin D deficiency Sister     Diverticulitis Sister     Depression Sister     No Known Problems Sister     Heart failure Brother     Lung cancer Brother     Cancer Brother         Stage four lung cancer    Celiac disease Daughter     Alcohol abuse Daughter     Substance Abuse Daughter     Eczema Daughter     Fibromyalgia Daughter     Substance Abuse Daughter     Alcohol abuse Daughter     ADD / ADHD Daughter     Bipolar disorder Daughter     Anxiety disorder Daughter     Ulcerative colitis Daughter     No Known Problems Maternal Aunt     Heart disease Brother     Substance Abuse Brother     Drug abuse Brother     Heart failure Brother     Breast cancer Neg Hx        Allergies:   Bupropion, Ace inhibitors, Citalopram, Mixed ragweed, Pollen extract, and Adhesive [medical tape]    Current Medications:   Current Outpatient Medications   Medication Sig Dispense Refill    apixaban (ELIQUIS) 5 mg Take 1 tablet (5 mg total) by mouth 2 (two) times a  day 180 tablet 1    ascorbic acid (VITAMIN C) 500 mg tablet Take 500 mg by mouth daily      atorvastatin (LIPITOR) 40 mg tablet Take 1 tablet (40 mg total) by mouth daily 90 tablet 1    calcium citrate-vitamin D 315 mg-5 mcg tablet Take 2 tablets by mouth 2 (two) times a day      cetirizine (ZyrTEC) 10 mg tablet Take 10 mg by mouth daily      cholecalciferol (VITAMIN D3) 1,000 units tablet Take 2 Units by mouth daily      clotrimazole-betamethasone (LOTRISONE) 1-0.05 % cream Apply topically 2 (two) times a day (Patient taking differently: Apply topically 2 (two) times a day AS NEEDED) 45 g 1    docusate sodium (COLACE) 100 mg capsule take 1 capsule by mouth twice a day 60 capsule 0    ferrous sulfate 325 (65 FE) MG EC tablet Take 325 mg by mouth 3 (three) times a day with meals (Patient not taking: Reported on 1/15/2025)      guaiFENesin (MUCINEX) 600 mg 12 hr tablet Take 1 tablet (600 mg total) by mouth every 12 (twelve) hours 180 tablet 3    hydroCHLOROthiazide 25 mg tablet Take 1 tablet (25 mg total) by mouth daily 90 tablet 1    ipratropium-albuterol (DUO-NEB) 0.5-2.5 mg/3 mL nebulizer solution Take 3 mL by nebulization every 6 (six) hours as needed for wheezing or shortness of breath 1080 mL 0    L-Lysine 500 MG CAPS Take 1 capsule by mouth daily      levalbuterol (Xopenex HFA) 45 mcg/act inhaler Inhale 1-2 puffs every 4 (four) hours as needed for wheezing 45 g 2    LORazepam (Ativan) 0.5 mg tablet Take 1 tablet (0.5 mg total) by mouth daily at bedtime (Patient not taking: Reported on 1/15/2025) 90 tablet 0    methocarbamol (ROBAXIN) 500 mg tablet Take 1 tablet (500 mg total) by mouth 3 (three) times a day as needed for muscle spasms 90 tablet 0    metoprolol tartrate (LOPRESSOR) 50 mg tablet Take 1 tablet by mouth every 12 hours 180 tablet 0    mometasone (NASONEX) 50 mcg/act nasal spray 2 sprays into each nostril daily 51 g 2    multivitamin-minerals (CENTRUM ADULTS) tablet Take 1 tablet by mouth daily       nystatin (MYCOSTATIN) 500,000 units/5 mL suspension Apply 5 mL (500,000 Units total) to the mouth or throat 4 (four) times a day (Patient not taking: Reported on 3/19/2025) 473 mL 0    Omega-3 Fatty Acids (fish oil) 1,000 mg Take 1,000 mg by mouth 2 (two) times a day. (Patient not taking: Reported on 3/19/2025)      oxygen gas Inhale 2 L/min continuous. via nasal cannula      Probiotic Product (PROBIOTIC-10 PO) Take 1 capsule by mouth daily      triamcinolone (KENALOG) 0.1 % cream Apply topically 2 (two) times a day (Patient not taking: Reported on 3/19/2025) 15 g 0    umeclidinium-vilanterol (Anoro Ellipta) 62.5-25 mcg/actuation inhaler Inhale 1 puff daily 180 each 1     No current facility-administered medications for this visit.       Physical Limitations: Foot pain due to osteopenia and 02 dependence.     Fall Risk: Low   Comments: Ambulates with a steady gait with no assist device    Cultural needs: none    PFT:  Yes   FEV1/FVC ratio of 47%   FEV1 of 52% predicted  Moderate to severe obstruction    Medication compliance: Yes  Comments: Pt reports to be compliant with medications. Denies any issues w/current prescriptions.       Pulmonary Disease Risk Factors:  second hand smoke  smoking    Sleep Disorders:   obstructive sleep apnea:  CPCP/BiPAP:  no      EXERCISE ASSESSMENT:      Initial Fitness Assessment:   6MWT:  675 ft = 1.98 METs  Rest: HR 73, /56, SpO2 98% on 2L/min, 0/10 SOB  Exercise: HR 92, /58, SpO2 94-97% on 2L/min, 4/10 MERCHANT  Recovery: HR 70, /54, SpO2 98% on 2L/min, 0/10 SOB      Current Functional Status:  Occupation: retired  Recreation/Physical activity: Patient is no longer sedentary at home.   ADL’s:Capable of performing light to moderate ADLs   [place]: Able to ambulate longer distances w/little to no MERCHANT  Exercise: Daily walking at home  Home exercise equipment: None  Other Comments: Difficulty lifting and carrying items especially up/down the stairs    SMART Exercise  Goals:   reduced score on CAT, improved 6MWT distance, reduced dyspnea during exercise, improved exercise tolerance based on peak METs tolerated in pulmonary rehab exercise session, and SpO2 >88% during exercise    Patient Specific EXERCISE GOALS:     reduced number of COPD exacerbations, attend pulmonary rehab regularly, decrease sitting time at home, reduced pulmonary related hospital readmissions , reduced reliance on supplemental 02 and increased energy    PSYCHOSOCIAL ASSESSMENT:    Date of last assessment: 12/12/2024  Depression screening:  PHQ-9 = 3    Interpretation:  1-4 = Minimal Depression  Anxiety screening:  LOUIS-7 = 5    Interpretation: 5-9 = Mild anxiety    Pt self-report of depression and anxiety   Patient has a history of depression and anxiety. Patient is prescribed Ativan w/good results.     Self-reported stress level:  Patient states typically she is a low stress level (3/10) daily but certain things (going in public, going somewhere new, rushing around) will cause her stress level to be high (7/10)  Stress Management: practice Relaxation Techniques and keep a positive mindset    Quality of Life Screen:  (Higher score indicates disease impact on QOL)  [facility] COOP score: 28/40      CAT: 26/40      Shortness of breath questionnaire: 67/120    Social Support:   significant other, children, and friends  Community/Social Activities: Spend time with family and friends    SMART Goals:    Reduce perceived stress to 1-3/10, improved [facility] QOL < 27, Physical Fitness in [facility] Score < 3, Daily Activity in [facility] Score < 3, improved sleeping habits, feel less tired with more energy, and Improved appetite control    Patient Specific PSYCHOCOSOCIAL GOALS:    maintain compliance with medical therapy, address emotional health concerns with PCP, spend time with family, and continue w/Ativan prescription     Psychosocial Assessment as it relates to rehabilitation: Patient denies issues with her family  "or home life that may affect their rehabilitation efforts. He states all her needs are being met. [facility] will continue to offer support as needed.       NUTRITION ASSESSMENT:    Initial Weight:  147  Current Weight: 154; BMI 23.57    Unplanned weight loss has ceased. She is maintaining a healthy weight and BMI.     Height:   Ht Readings from Last 1 Encounters:   03/20/25 5' 8\" (1.727 m)       Rate Your Plate Score: 71/81    Self-reported Current Dietary Habits:  Patient states since her boyfriend had CABG a few years ago they have been eating healthier. She states her appetite has improved and she is trying to follow a \"Heart Healthy Diet\". She makes sure to hydrate regularly throughout the day.     ETOH:   Social History     Substance and Sexual Activity   Alcohol Use Not Currently    Comment: I might have a glass of wine every two or three months       SMART Goals:   Improved Rate Your Plate score  >58    Patient Specific NUTRITION GOALS:    increase water intake to reduce/thin mucus production decrease caloric intake weight gain increased muscle mass      OTHER CORE COMPONENT ASSESSMENT:    Hospitalizations in the past year: Hospitalized 10/28/2024 - 11/6/2024 for sepsis due to pneumonia.     Oxygen needs:   Rest:  supplemental O2 via nasal cannula @ 2L/min   Exercise/physical activity:  supplemental O2 via nasal cannula @ 2L/min   Sleep:   supplemental O2 via nasal cannula @ 2L/min     Does Pt monitor home SpO2? yes   Average SpO2 at rest:  92-94%   Average SpO2 with ADLs/physical activity:  Does not check      Use of Rescue Inhaler: Patient states she will only use her rescue inhaler if absolutely necessary because it makes her feel jittery.    Use of Maintenance Inhaler: Yes; 1x/day (morning)    Use of Nebulizer Treatments:  Patient states she has a nebulizer that she uses PRN. She states she mainly only used it during seasonal allergies but has been using it a bit more frequently since her hospitalization " due to ongoing cough and increased shortness of breath.     Patient practices breathing techniques at home:  Yes and Reviewed with patient on:  12/3/54438    CAD Risk Factors:  Cholesterol: No  HTN: No  DM: No  Obesity: No   Smoking: Patient states she quit smoking on 2023 but believes she relapsed right before her hospitalization. She does not remember smoking any cigarettes but states her daughter found a pack. She has remained smoke free since she was hospitalized and avoids secondhand smoke.    SMART Core Component Goals:   consistent, controlled resting BP < 130/80, medication compliance, and demonstrate pursed lipped breathing    Patient Specific CORE COMPONENT GOALS:    medication compliance, compliance with supplemental oxygen, and monitor home pulse oximetry      INDIVIDUALIZED TREATMENT PLAN    The patient is agreeable to attend 36 pulmonary rehab exercise sessions.      EXERCISE GOALS AND PLAN    PHYSCIAN PRESCRIBED EXERCISE    Current Aerobic Exercise Prescription       Frequency: 2-3 days/week   Supplement with home exercise 2+ days/wk as tolerated        Minutes: 50-60         METS: 2.83              SpO2: >90% on 2 liters              RPD: 0-3/10                      HR: RHR +30-40bpm   RPE: 4-5/10         Modalities: Treadmill, UBE, NuStep, and Recumbent bike   She is able to attain 1.83 miles on the SRC in a 15 minute period     Aerobic Exercise Prescription Plan for Progression:    Frequency: 2-3 days/week    Supplement with home exercise 2+ days/wk as tolerated       Minutes: 50-60       METS: 3-4              SpO2: >90% on RA              RPD: 0-2/10                      HR:RHR +30-40bpm   RPE: 4-5/10        Modalities: Treadmill, UBE, NuStep, and Recumbent bike        Supplemental Oxygen Needs with Exercise:  supplemental O2 2L/min via nasal canula.    Strength trainin-3 days / week  12-15 repetitions  1-2 sets per modality    Modalities: 15# Chest Press and 3# UE free  "weights    Education: pursed lipped breathing, diaphragmatic breathing, RPE scale, RPD scale, O2 saturation monitoring, appropriate O2 response to exercise, and education class: Exercise For The Pulmonary Patient    Progress toward Exercise Goals:    Will continue to educate and progress as tolerated., Goals met: compliant attending [place], increased activity level at home, reduced sitting time, no exacerbations or hospital readmission,  and reduced MERCHANT ratings. .    Exercise Plan:  Titrate supplemental oxygen as needed to maintain SpO2>88% with exercise, learn to conserve energy with ADLs , practice diaphragmatic breathing, reduce time sitting at home, utilize PLB with physical activity, and begin a home exercise program     Readiness to change: Action:  (Changing behavior)      NUTRITION GOALS AND PLAN    Progress toward Nutritional goals:    Will continue to educate and progress as tolerated., Goals met: unplanned weight loss has ceased. Patient is aware to hydrate throughout the day. She limits daily sodium intake. She follows a Heart Healthy Diet..    Nutrition Plan: group class: Reading Food Labels, group Class: Heart Healthy Eating, increase intake of whole grains, increase daily intake of fruits and vegetables, choose lean red meat, never/rarely add salt to food when cooking or at the table, choose low sodium canned, frozen, packaged foods or rarely eat these foods, and rarely/never eat salty snacks    SMART goals are based Rate Your Plate Dietary Self-Assessment. These are the areas in which the patient could score higher on the assessment.  Goals include recommendations for a heart healthy diet based on American Heart Association.    Nutrition Education: heart healthy eating principles  maintaining hydration  group class: Heart Healthy Eating  group class:  Label Reading  group class: healthy eating for managing pulmonary illness  \"Plant Based Eating\"  \"Focus on Fiber\"    Readiness to change: Action:  " (Changing behavior)      PSYCHOSOCIAL GOALS AND PLAN    Information to begin using [facility] was provided as well as contact information for counseling through  [facility].    Progress toward Psychosocial goals:    Will continue to educate and progress as tolerated., Goals met: maintains a low stress level and is compliant taking her Ativan prescription. She notes her needs are being met at this time. [facility] staff will continue to offer support as needed. .Patient is more confident in her abilities since beginning Pulm Rehab.     Psychosocial: Class: Stress and Your Health, Class:  Stress and Pulmonary Disease, Enroll in [facility], Practice relaxation techniques, Keep a positive mindset, and Enjoy family    Psychosocial Education: stress management techniques, benefits of enrolling in [facility], depression and pulmonary disease, tools to manage fear/anxiety related to becoming SOB, class:  Stress and Your Health , and class:  Stress and Pulmonary Disease    Readiness to change: Action:  (Changing behavior)      OTHER CORE COMPONENTS GOALS AND PLAN    Tobacco Use Intervention:     Patient states she quit smoking on 7/4/2023 (she averaged 0.25-0.3 ppd for 53.5 years) but believes she relapsed right before her hospitalization. She does not remember smoking any cigarettes but states her daughter found a pack. She continues to remain smoke free since she was hospitalized and avoids secondhand smoke.    Oxygen Goal: Maintain SpO2>88% during exercise or as advised by pulmonolgist    Progress toward Core Component goals:    Pt is progressing and showing improvement  toward the following goals:  remaining smoke free, medication compliance and consistent resting BP < 130/80.  , Will continue to educate and progress as tolerated.    Core Component Plan: medication compliance, complete abstention from smoking, avoid places with second hand smoke, group class: Pulmonary Anatomy and Physiology, and group class:   Pulmonary Medications    Core Component Education:  pathophysiology of pulmonary disease, preventing infections, relapse education, control coughing, harmonica therapy, education: Pulmonary Anatomy and Physiology, education:  Pulmonary Medications, education:  Clearing Secretions, and education: Oxygen    Readiness to change: Action:  (Changing behavior)

## 2025-05-02 ENCOUNTER — TELEPHONE (OUTPATIENT)
Age: 75
End: 2025-05-02

## 2025-05-02 ENCOUNTER — CLINICAL SUPPORT (OUTPATIENT)
Dept: PULMONOLOGY | Facility: HOSPITAL | Age: 75
End: 2025-05-02
Attending: INTERNAL MEDICINE
Payer: COMMERCIAL

## 2025-05-02 DIAGNOSIS — J44.9 CHRONIC OBSTRUCTIVE PULMONARY DISEASE, UNSPECIFIED COPD TYPE (HCC): ICD-10-CM

## 2025-05-02 PROCEDURE — 94625 PHY/QHP OP PULM RHB W/O MNTR: CPT

## 2025-05-02 NOTE — TELEPHONE ENCOUNTER
Yamini forbes [facility] calls to get Provider information. No further questions or concerns

## 2025-05-05 ENCOUNTER — APPOINTMENT (OUTPATIENT)
Dept: PULMONOLOGY | Facility: HOSPITAL | Age: 75
End: 2025-05-05
Attending: INTERNAL MEDICINE
Payer: COMMERCIAL

## 2025-05-07 ENCOUNTER — CLINICAL SUPPORT (OUTPATIENT)
Dept: PULMONOLOGY | Facility: HOSPITAL | Age: 75
End: 2025-05-07
Attending: INTERNAL MEDICINE
Payer: COMMERCIAL

## 2025-05-07 DIAGNOSIS — J44.9 CHRONIC OBSTRUCTIVE PULMONARY DISEASE, UNSPECIFIED COPD TYPE (HCC): ICD-10-CM

## 2025-05-07 PROCEDURE — 94626 PHY/QHP OP PULM RHB W/MNTR: CPT

## 2025-05-09 ENCOUNTER — CLINICAL SUPPORT (OUTPATIENT)
Dept: PULMONOLOGY | Facility: HOSPITAL | Age: 75
End: 2025-05-09
Attending: INTERNAL MEDICINE
Payer: COMMERCIAL

## 2025-05-09 DIAGNOSIS — J44.9 CHRONIC OBSTRUCTIVE PULMONARY DISEASE, UNSPECIFIED COPD TYPE (HCC): ICD-10-CM

## 2025-05-09 DIAGNOSIS — I48.91 ATRIAL FIBRILLATION WITH RVR (HCC): ICD-10-CM

## 2025-05-09 PROCEDURE — 94626 PHY/QHP OP PULM RHB W/MNTR: CPT

## 2025-05-09 RX ORDER — APIXABAN 5 MG/1
5 TABLET, FILM COATED ORAL 2 TIMES DAILY
Qty: 180 TABLET | Refills: 0 | Status: SHIPPED | OUTPATIENT
Start: 2025-05-09

## 2025-05-12 ENCOUNTER — APPOINTMENT (OUTPATIENT)
Dept: PULMONOLOGY | Facility: HOSPITAL | Age: 75
End: 2025-05-12
Attending: INTERNAL MEDICINE
Payer: COMMERCIAL

## 2025-05-14 ENCOUNTER — RA CDI HCC (OUTPATIENT)
Dept: OTHER | Facility: HOSPITAL | Age: 75
End: 2025-05-14

## 2025-05-14 ENCOUNTER — CLINICAL SUPPORT (OUTPATIENT)
Dept: PULMONOLOGY | Facility: HOSPITAL | Age: 75
End: 2025-05-14
Attending: INTERNAL MEDICINE
Payer: COMMERCIAL

## 2025-05-14 ENCOUNTER — TELEPHONE (OUTPATIENT)
Age: 75
End: 2025-05-14

## 2025-05-14 DIAGNOSIS — J44.9 CHRONIC OBSTRUCTIVE PULMONARY DISEASE, UNSPECIFIED COPD TYPE (HCC): ICD-10-CM

## 2025-05-14 PROBLEM — I12.9 HYPERTENSIVE CKD (CHRONIC KIDNEY DISEASE): Status: ACTIVE | Noted: 2025-05-14

## 2025-05-14 PROCEDURE — 94625 PHY/QHP OP PULM RHB W/O MNTR: CPT

## 2025-05-14 NOTE — TELEPHONE ENCOUNTER
I called and left a VM for Maddy regarding completing blood/urine labs prior to upcoming appt on 05/23/2025

## 2025-05-15 NOTE — PROGRESS NOTES
HCC coding opportunities    I12.9 for [facility]     Chart Reviewed number of suggestions sent to Provider: 1     Patients Insurance     Medicare Insurance: [employer] Advantage

## 2025-05-16 ENCOUNTER — CLINICAL SUPPORT (OUTPATIENT)
Dept: PULMONOLOGY | Facility: HOSPITAL | Age: 75
End: 2025-05-16
Attending: INTERNAL MEDICINE
Payer: COMMERCIAL

## 2025-05-16 DIAGNOSIS — J44.9 CHRONIC OBSTRUCTIVE PULMONARY DISEASE, UNSPECIFIED COPD TYPE (HCC): Primary | ICD-10-CM

## 2025-05-16 PROCEDURE — 94625 PHY/QHP OP PULM RHB W/O MNTR: CPT

## 2025-05-20 ENCOUNTER — APPOINTMENT (OUTPATIENT)
Dept: LAB | Facility: CLINIC | Age: 75
End: 2025-05-20
Payer: COMMERCIAL

## 2025-05-20 DIAGNOSIS — I48.0 PAROXYSMAL ATRIAL FIBRILLATION (HCC): ICD-10-CM

## 2025-05-20 DIAGNOSIS — I12.9 HYPERTENSIVE RENAL DISEASE: ICD-10-CM

## 2025-05-20 DIAGNOSIS — E55.9 VITAMIN D DEFICIENCY: ICD-10-CM

## 2025-05-20 DIAGNOSIS — N18.32 CHRONIC KIDNEY DISEASE, STAGE 3B (HCC): ICD-10-CM

## 2025-05-20 DIAGNOSIS — R60.0 LOCALIZED EDEMA: ICD-10-CM

## 2025-05-20 LAB
25(OH)D3 SERPL-MCNC: 85.5 NG/ML (ref 30–100)
ALBUMIN SERPL BCG-MCNC: 4.5 G/DL (ref 3.5–5)
ALP SERPL-CCNC: 72 U/L (ref 34–104)
ALT SERPL W P-5'-P-CCNC: 14 U/L (ref 7–52)
ANION GAP SERPL CALCULATED.3IONS-SCNC: 10 MMOL/L (ref 4–13)
AST SERPL W P-5'-P-CCNC: 21 U/L (ref 13–39)
BILIRUB SERPL-MCNC: 0.71 MG/DL (ref 0.2–1)
BUN SERPL-MCNC: 25 MG/DL (ref 5–25)
CALCIUM SERPL-MCNC: 9.8 MG/DL (ref 8.4–10.2)
CHLORIDE SERPL-SCNC: 105 MMOL/L (ref 96–108)
CO2 SERPL-SCNC: 28 MMOL/L (ref 21–32)
CREAT SERPL-MCNC: 1.38 MG/DL (ref 0.6–1.3)
GFR SERPL CREATININE-BSD FRML MDRD: 38 ML/MIN/1.73SQ M
GLUCOSE P FAST SERPL-MCNC: 93 MG/DL (ref 65–99)
MAGNESIUM SERPL-MCNC: 2 MG/DL (ref 1.9–2.7)
PHOSPHATE SERPL-MCNC: 4.2 MG/DL (ref 2.3–4.1)
POTASSIUM SERPL-SCNC: 3.7 MMOL/L (ref 3.5–5.3)
PROT SERPL-MCNC: 7 G/DL (ref 6.4–8.4)
SODIUM SERPL-SCNC: 143 MMOL/L (ref 135–147)

## 2025-05-20 PROCEDURE — 83521 IG LIGHT CHAINS FREE EACH: CPT

## 2025-05-20 PROCEDURE — 84100 ASSAY OF PHOSPHORUS: CPT

## 2025-05-20 PROCEDURE — 84165 PROTEIN E-PHORESIS SERUM: CPT

## 2025-05-20 PROCEDURE — 82306 VITAMIN D 25 HYDROXY: CPT

## 2025-05-20 PROCEDURE — 83735 ASSAY OF MAGNESIUM: CPT

## 2025-05-20 PROCEDURE — 80053 COMPREHEN METABOLIC PANEL: CPT

## 2025-05-20 PROCEDURE — 36415 COLL VENOUS BLD VENIPUNCTURE: CPT

## 2025-05-21 ENCOUNTER — OFFICE VISIT (OUTPATIENT)
Dept: FAMILY MEDICINE CLINIC | Facility: CLINIC | Age: 75
End: 2025-05-21
Payer: COMMERCIAL

## 2025-05-21 ENCOUNTER — CLINICAL SUPPORT (OUTPATIENT)
Dept: PULMONOLOGY | Facility: HOSPITAL | Age: 75
End: 2025-05-21
Attending: INTERNAL MEDICINE
Payer: COMMERCIAL

## 2025-05-21 ENCOUNTER — OFFICE VISIT (OUTPATIENT)
Dept: PULMONOLOGY | Facility: CLINIC | Age: 75
End: 2025-05-21
Payer: COMMERCIAL

## 2025-05-21 ENCOUNTER — APPOINTMENT (OUTPATIENT)
Dept: LAB | Facility: CLINIC | Age: 75
End: 2025-05-21
Attending: INTERNAL MEDICINE
Payer: COMMERCIAL

## 2025-05-21 VITALS
BODY MASS INDEX: 22.55 KG/M2 | WEIGHT: 148.8 LBS | RESPIRATION RATE: 18 BRPM | HEIGHT: 68 IN | DIASTOLIC BLOOD PRESSURE: 58 MMHG | SYSTOLIC BLOOD PRESSURE: 130 MMHG | HEART RATE: 77 BPM | TEMPERATURE: 97.3 F | OXYGEN SATURATION: 99 %

## 2025-05-21 VITALS
WEIGHT: 152 LBS | RESPIRATION RATE: 18 BRPM | BODY MASS INDEX: 23.04 KG/M2 | DIASTOLIC BLOOD PRESSURE: 62 MMHG | TEMPERATURE: 96.8 F | OXYGEN SATURATION: 99 % | HEART RATE: 57 BPM | SYSTOLIC BLOOD PRESSURE: 130 MMHG | HEIGHT: 68 IN

## 2025-05-21 DIAGNOSIS — J44.9 COPD, SEVERE (HCC): Primary | ICD-10-CM

## 2025-05-21 DIAGNOSIS — E55.9 VITAMIN D DEFICIENCY: ICD-10-CM

## 2025-05-21 DIAGNOSIS — J96.11 CHRONIC RESPIRATORY FAILURE WITH HYPOXIA (HCC): ICD-10-CM

## 2025-05-21 DIAGNOSIS — D17.22 LIPOMA OF BOTH UPPER EXTREMITIES: Primary | ICD-10-CM

## 2025-05-21 DIAGNOSIS — N18.32 CHRONIC KIDNEY DISEASE, STAGE 3B (HCC): ICD-10-CM

## 2025-05-21 DIAGNOSIS — J44.1 COPD WITH ACUTE EXACERBATION (HCC): ICD-10-CM

## 2025-05-21 DIAGNOSIS — I48.0 PAROXYSMAL ATRIAL FIBRILLATION (HCC): ICD-10-CM

## 2025-05-21 DIAGNOSIS — J44.9 CHRONIC OBSTRUCTIVE PULMONARY DISEASE, UNSPECIFIED COPD TYPE (HCC): ICD-10-CM

## 2025-05-21 DIAGNOSIS — F17.211 CIGARETTE NICOTINE DEPENDENCE IN REMISSION: ICD-10-CM

## 2025-05-21 DIAGNOSIS — Z12.11 ENCOUNTER FOR SCREENING FOR MALIGNANT NEOPLASM OF COLON: ICD-10-CM

## 2025-05-21 DIAGNOSIS — D17.21 LIPOMA OF BOTH UPPER EXTREMITIES: Primary | ICD-10-CM

## 2025-05-21 DIAGNOSIS — I10 ESSENTIAL (PRIMARY) HYPERTENSION: ICD-10-CM

## 2025-05-21 DIAGNOSIS — R93.89 ABNORMAL CT OF THE CHEST: ICD-10-CM

## 2025-05-21 LAB
ALBUMIN SERPL ELPH-MCNC: 4.19 G/DL (ref 3.2–5.1)
ALBUMIN SERPL ELPH-MCNC: 62.6 % (ref 48–70)
ALPHA1 GLOB SERPL ELPH-MCNC: 0.31 G/DL (ref 0.15–0.47)
ALPHA1 GLOB SERPL ELPH-MCNC: 4.7 % (ref 1.8–7)
ALPHA2 GLOB SERPL ELPH-MCNC: 0.75 G/DL (ref 0.42–1.04)
ALPHA2 GLOB SERPL ELPH-MCNC: 11.2 % (ref 5.9–14.9)
BETA GLOB ABNORMAL SERPL ELPH-MCNC: 0.37 G/DL (ref 0.31–0.57)
BETA1 GLOB SERPL ELPH-MCNC: 5.5 % (ref 4.7–7.7)
BETA2 GLOB SERPL ELPH-MCNC: 4.7 % (ref 3.1–7.9)
BETA2+GAMMA GLOB SERPL ELPH-MCNC: 0.31 G/DL (ref 0.2–0.58)
CREAT UR-MCNC: 111.4 MG/DL
CREAT UR-MCNC: 111.4 MG/DL
GAMMA GLOB ABNORMAL SERPL ELPH-MCNC: 0.76 G/DL (ref 0.4–1.66)
GAMMA GLOB SERPL ELPH-MCNC: 11.3 % (ref 6.9–22.3)
IGG/ALB SER: 1.67 {RATIO} (ref 1.1–1.8)
KAPPA LC FREE SER-MCNC: 24.3 MG/L (ref 3.3–19.4)
KAPPA LC FREE/LAMBDA FREE SER: 1.09 {RATIO} (ref 0.26–1.65)
LAMBDA LC FREE SERPL-MCNC: 22.3 MG/L (ref 5.7–26.3)
MICROALBUMIN UR-MCNC: 35.1 MG/L
MICROALBUMIN/CREAT 24H UR: 32 MG/G CREATININE (ref 0–30)
PROT SERPL-MCNC: 6.7 G/DL (ref 6.4–8.4)
PROT UR-MCNC: 8.2 MG/DL
PROT/CREAT UR: 0.1 MG/G{CREAT}

## 2025-05-21 PROCEDURE — 82570 ASSAY OF URINE CREATININE: CPT

## 2025-05-21 PROCEDURE — 94626 PHY/QHP OP PULM RHB W/MNTR: CPT

## 2025-05-21 PROCEDURE — 84166 PROTEIN E-PHORESIS/URINE/CSF: CPT

## 2025-05-21 PROCEDURE — 99213 OFFICE O/P EST LOW 20 MIN: CPT

## 2025-05-21 PROCEDURE — 84165 PROTEIN E-PHORESIS SERUM: CPT | Performed by: PATHOLOGY

## 2025-05-21 PROCEDURE — 84156 ASSAY OF PROTEIN URINE: CPT

## 2025-05-21 PROCEDURE — 82043 UR ALBUMIN QUANTITATIVE: CPT

## 2025-05-21 PROCEDURE — 99214 OFFICE O/P EST MOD 30 MIN: CPT

## 2025-05-21 NOTE — ASSESSMENT & PLAN NOTE
Patient still remains oxygen dependent and is on 2 L of oxygen at baseline  Currently still doing pulmonary rehab and expected to be completed with that early June  We will continue her inhalers and Mariella  Patient will be seeing pulmonology later today

## 2025-05-21 NOTE — ASSESSMENT & PLAN NOTE
- Approximately 50-pack-year history quit 2 years ago  -Resume yearly CT lung cancer screening once follow-up imaging complete for review of TIB nodularities

## 2025-05-21 NOTE — ASSESSMENT & PLAN NOTE
Patient will continue Eliquis 5 mg twice a day  Cardiology has held her Cardizem due to lower leg swelling and an odd taste in her mouth  Patient states ever since she discontinued the medication that she has felt much better and her symptoms have went away  Cardiology believes A-fib is likely to recur

## 2025-05-21 NOTE — PROGRESS NOTES
Follow-up  Visit - Pulmonary Medicine   Name: Corinne A Longo      : 1950      MRN: [id number]  Encounter Provider: ROGELIO Mohr  Encounter Date: 2025   Encounter department: [facility]  :  Assessment & Plan  COPD, severe (HCC)  - PFTs in  with severe obstructive airflow defect with FEV1 52%.  Also with severe emphysema on CT imaging.  No significant peripheral eosinophils  -Enrolled in pulmonary rehab  - Symptoms currently stable.  No recent exacerbations, rarely requires use of rescue inhaler.  Lungs are clear on exam today    Plan:  -Continue Anoro 1 puff daily, Xopenex HFA/DuoNebs every 6 hours as needed  - Continue pulmonary rehab  - Follow-up in 4 months or sooner if needed           Chronic respiratory failure with hypoxia (HCC)  - SpO2 adequate on baseline 2 L supplemental oxygen.  She will continue this to maintain SpO2 above 88%       Abnormal CT of the chest  - Recent CT chest from 3/18/2025 showed resolution of previous pneumonia, but with new tree-in-bud nodularities in RUL.  Likely secondary to inflammation  -Will repeat CT chest in September for 6-month follow-up.  If resolved, resume yearly CT lung cancer screening       Cigarette nicotine dependence in remission  - Approximately 50-pack-year history quit 2 years ago  -Resume yearly CT lung cancer screening once follow-up imaging complete for review of TIB nodularities         No follow-ups on file.    History of Present Illness   Corinne A Longo is a 74 y.o. female  with a past medical history of severe COPD, chronic hypoxic respiratory failure on 2 L baseline, and former 50-pack-year smoker in remission since 2023.  She is here today for a routine follow-up visit.  Last seen in the office 4 months ago.  Maintained on Anoro daily, Xopenex/DuoNebs as needed, and 2 L supplemental oxygen.  She is enrolled in pulmonary rehab.  She had a follow-up CT chest for previous pneumonia which  "showed resolution of previous infiltrates, but with new tree-in-bud nodularities in the RUL.  She is planned to have repeat CT chest for 6-month follow-up in September.    Patient reports her breathing is stable.  No exacerbations or respiratory infections since her last office visit.  Rarely requires use of rescue inhaler/nebs.  She is loving pulmonary rehab, feels she has greatly benefited from it.  Denies any significant shortness of breath, no cough or wheezing.  Right now struggling with spring allergies and postnasal drainage.  Using Nasonex nasal spray, Zyrtec daily, and will start saline nasal spray.  Denies any chest pain or lower extremity swelling.      Review of Systems    Aside from what is mentioned in the HPI, ROS is otherwise negative         Medical History Reviewed by provider this encounter:     .    Objective   /58 (BP Location: Right arm, Patient Position: Sitting, Cuff Size: Standard)   Pulse 77   Temp (!) 97.3 °F (36.3 °C) (Temporal)   Resp 18   Ht 5' 8\" (1.727 m)   Wt 67.5 kg (148 lb 12.8 oz)   SpO2 99%   BMI 22.62 kg/m²     Physical Exam  Vitals and nursing note reviewed.   Constitutional:       General: She is not in acute distress.     Appearance: Normal appearance. She is well-developed.     Cardiovascular:      Rate and Rhythm: Normal rate. Rhythm irregular.      Heart sounds: Normal heart sounds, S1 normal and S2 normal. No murmur heard.  Pulmonary:      Effort: Pulmonary effort is normal.      Breath sounds: Normal breath sounds. No decreased breath sounds, wheezing, rhonchi or rales.     Musculoskeletal:         General: No swelling.      Right lower leg: No edema.      Left lower leg: No edema.     Neurological:      Mental Status: She is alert.     Psychiatric:         Mood and Affect: Mood and affect normal.         Behavior: Behavior normal. Behavior is cooperative.           Diagnostic Data:  Labs: I personally reviewed the most recent laboratory data pertinent to " today's visit.      Radiology results:  Radiology Results Review: I have reviewed radiology reports from 3/18/2025 including: CT chest.      PFT/spirometry results: Reviewed study from 5/17/2023      Oximetry testing:      Other studies:      ROGELIO Mohr

## 2025-05-21 NOTE — ASSESSMENT & PLAN NOTE
- PFTs in 2023 with severe obstructive airflow defect with FEV1 52%.  Also with severe emphysema on CT imaging.  No significant peripheral eosinophils  -Enrolled in pulmonary rehab  - Symptoms currently stable.  No recent exacerbations, rarely requires use of rescue inhaler.  Lungs are clear on exam today    Plan:  -Continue Anoro 1 puff daily, Xopenex HFA/DuoNebs every 6 hours as needed  - Continue pulmonary rehab  - Follow-up in 4 months or sooner if needed

## 2025-05-21 NOTE — PROGRESS NOTES
Name: Corinne A Longo      : 1950      MRN: [id number]  Encounter Provider: Nicolle Aldana PA-C  Encounter Date: 2025   Encounter department: [facility]  :  Assessment & Plan  Lipoma of both upper extremities  Palpation of both antecubital fossa's reveal multiple small fatty lumps.  Lumps most likely consistent with lipomas  Will order ultrasound extremity soft tissue to evaluate.  Orders:    US extremity soft tissue; Future    Essential (primary) hypertension  Well-controlled off diltiazem and hydrochlorothiazide  Blood pressure today 130/62       COPD with acute exacerbation (HCC)  Patient still remains oxygen dependent and is on 2 L of oxygen at baseline  Currently still doing pulmonary rehab and expected to be completed with that early   We will continue her inhalers and DuoNebs  Patient will be seeing pulmonology later today       Paroxysmal atrial fibrillation (HCC)  Patient will continue Eliquis 5 mg twice a day  Cardiology has held her Cardizem due to lower leg swelling and an odd taste in her mouth  Patient states ever since she discontinued the medication that she has felt much better and her symptoms have went away  Cardiology believes A-fib is likely to recur       Vitamin D deficiency  Vitamin D is stable at 8 5.5       Chronic kidney disease, stage 3b (HCC)  Lab Results   Component Value Date    EGFR 38 2025    EGFR 37 2025    EGFR 38 2024    CREATININE 1.38 (H) 2025    CREATININE 1.41 (H) 2025    CREATININE 1.38 (H) 2024     GFR stable at 38  Patient seeing nephrology in 2 days       Encounter for screening for malignant neoplasm of colon  Patient denies at this time              History of Present Illness   Patient is a 74-year-old female who presents today for follow-up.  Patient states she has been feeling very good.  Patient recently saw her cardiologist in regards to her lower leg swelling and possibly getting off  "her Cardizem.  Her cardiologist did discontinue her Cardizem and hydrochlorothiazide and ever since she has been off of them she states she has been feeling great.  Her lower leg swelling has went away and her taste is came back.  Patient states her breathing is about the same.  She is still on nasal cannula.  Patient does endorse that she did eat something bad from Mother's Day weekend and she did have severe diarrhea which is now resolved.    Patient is also concerned about some palpable lumps on bilateral arms.  She states they do grow and shrink.  They are not painful      Review of Systems   Constitutional:  Negative for chills, fatigue and fever.   HENT:  Negative for congestion, ear pain and sore throat.    Eyes:  Negative for pain.   Respiratory:  Positive for shortness of breath. Negative for cough.    Cardiovascular:  Negative for chest pain and palpitations.   Gastrointestinal:  Negative for abdominal pain, constipation, diarrhea, nausea and vomiting.   Genitourinary:  Negative for dysuria and hematuria.   Musculoskeletal:  Negative for arthralgias and back pain.   Skin:  Negative for color change and rash.        Lumps under skin on both arms   Neurological:  Negative for syncope, numbness and headaches.   All other systems reviewed and are negative.      Objective   /62 (Patient Position: Sitting, Cuff Size: Standard)   Pulse 57   Temp (!) 96.8 °F (36 °C) (Tympanic)   Resp 18   Ht 5' 8\" (1.727 m)   Wt 68.9 kg (152 lb)   SpO2 99%   BMI 23.11 kg/m²      Physical Exam  Vitals and nursing note reviewed.   Constitutional:       General: She is not in acute distress.     Appearance: Normal appearance. She is well-developed.   HENT:      Head: Normocephalic and atraumatic.      Right Ear: Tympanic membrane normal.      Left Ear: Tympanic membrane normal.      Nose: Nose normal.      Mouth/Throat:      Mouth: Mucous membranes are moist.     Eyes:      Conjunctiva/sclera: Conjunctivae normal. "       Cardiovascular:      Rate and Rhythm: Normal rate and regular rhythm.      Heart sounds: Normal heart sounds. No murmur heard.  Pulmonary:      Effort: Pulmonary effort is normal. No respiratory distress.      Breath sounds: Normal breath sounds. No wheezing or rhonchi.   Abdominal:      Palpations: Abdomen is soft.      Tenderness: There is no abdominal tenderness.     Musculoskeletal:         General: No swelling.      Cervical back: Neck supple.     Skin:     General: Skin is warm and dry.      Capillary Refill: Capillary refill takes less than 2 seconds.      Comments: Palpable lumps noted under the skin in bilateral antecubital fossa's     Neurological:      Mental Status: She is alert and oriented to person, place, and time.     Psychiatric:         Mood and Affect: Mood normal.         Behavior: Behavior normal.         Thought Content: Thought content normal.         Judgment: Judgment normal.       Administrative Statements   I have spent a total time of 20 minutes in caring for this patient on the day of the visit/encounter including Diagnostic results, Prognosis, Risks and benefits of tx options, Instructions for management, Patient and family education, Importance of tx compliance, Risk factor reductions, Impressions, Counseling / Coordination of care, Documenting in the medical record, Reviewing/placing orders in the medical record (including tests, medications, and/or procedures), and Obtaining or reviewing history  .

## 2025-05-21 NOTE — ASSESSMENT & PLAN NOTE
- SpO2 adequate on baseline 2 L supplemental oxygen.  She will continue this to maintain SpO2 above 88%

## 2025-05-23 ENCOUNTER — APPOINTMENT (OUTPATIENT)
Dept: PULMONOLOGY | Facility: HOSPITAL | Age: 75
End: 2025-05-23
Attending: INTERNAL MEDICINE
Payer: COMMERCIAL

## 2025-05-23 ENCOUNTER — OFFICE VISIT (OUTPATIENT)
Dept: NEPHROLOGY | Facility: CLINIC | Age: 75
End: 2025-05-23
Payer: COMMERCIAL

## 2025-05-23 ENCOUNTER — TELEPHONE (OUTPATIENT)
Age: 75
End: 2025-05-23

## 2025-05-23 VITALS
HEART RATE: 68 BPM | OXYGEN SATURATION: 99 % | TEMPERATURE: 96.8 F | WEIGHT: 145 LBS | DIASTOLIC BLOOD PRESSURE: 68 MMHG | BODY MASS INDEX: 21.98 KG/M2 | SYSTOLIC BLOOD PRESSURE: 120 MMHG | HEIGHT: 68 IN

## 2025-05-23 DIAGNOSIS — N18.32 STAGE 3B CHRONIC KIDNEY DISEASE (HCC): ICD-10-CM

## 2025-05-23 DIAGNOSIS — J44.9 CHRONIC OBSTRUCTIVE PULMONARY DISEASE, UNSPECIFIED COPD TYPE (HCC): ICD-10-CM

## 2025-05-23 DIAGNOSIS — I48.0 PAROXYSMAL ATRIAL FIBRILLATION (HCC): ICD-10-CM

## 2025-05-23 DIAGNOSIS — I10 ESSENTIAL (PRIMARY) HYPERTENSION: Primary | ICD-10-CM

## 2025-05-23 DIAGNOSIS — R68.2 DRY MOUTH: ICD-10-CM

## 2025-05-23 LAB
ALBUMIN UR ELPH-MCNC: 100 %
ALPHA1 GLOB MFR UR ELPH: 0 %
ALPHA2 GLOB MFR UR ELPH: 0 %
B-GLOBULIN MFR UR ELPH: 0 %
GAMMA GLOB MFR UR ELPH: 0 %
PROT UR-MCNC: 7.9 MG/DL

## 2025-05-23 PROCEDURE — 94625 PHY/QHP OP PULM RHB W/O MNTR: CPT

## 2025-05-23 PROCEDURE — 99214 OFFICE O/P EST MOD 30 MIN: CPT | Performed by: INTERNAL MEDICINE

## 2025-05-23 PROCEDURE — 84166 PROTEIN E-PHORESIS/URINE/CSF: CPT | Performed by: PATHOLOGY

## 2025-05-23 NOTE — PATIENT INSTRUCTIONS
You have stable stage 3 kidney disease , most recent function is 37%.   Advise mediterranean diet.   No specific potassium or protein restrictions.   We elected to hold off on medication addition right now.

## 2025-05-23 NOTE — PROGRESS NOTES
Assessment & Plan:    1. Essential (primary) hypertension  2. Stage 3b chronic kidney disease (HCC)  -     EDE Screen w/Reflex Cascade; Future; Expected date: Collect anytime  -     Sjogren's Antibodies; Future; Expected date: Collect anytime  -     Uric acid; Future; Expected date: 09/16/2025  -     Urinalysis with microscopic; Future; Expected date: 09/16/2025  -     Albumin / creatinine urine ratio; Future; Expected date: 09/16/2025  -     Comprehensive metabolic panel; Future; Expected date: 09/16/2025  -     CBC and differential; Future; Expected date: 09/16/2025  -     PTH, intact; Future; Expected date: 09/16/2025  3. Dry mouth  -     EDE Screen w/Reflex Cascade; Future; Expected date: Collect anytime  -     Sjogren's Antibodies; Future; Expected date: Collect anytime  4. Paroxysmal atrial fibrillation (HCC)           Essential HTN  BP well controlled at this time, volume status euvolemic.   Off cardizem and HCTZ at discretion of cardiology.     2. CKD3b  Etiology 2/2 age related eGFR loss and HTN nephrosclerosis.   5/21/25  Cr 1.38 with eGFR 38 ml/min. Baseline 1.3-1.4mg/dl.   Metabolic stable.  Spep/upep WNL.  UACR 32 mg/g cr. UPCR 0.1 gram.  Mild anemia-hgb 10-11 range at baseline.  Reviewed CKD stages, Cr and eGFR trends.    No changes made to current regimen.  Advise mediterranean diet, do not need specific restrictions otherwise at this time.  Consider Jardiance to help with CKD progression.     3. Dry mouth  Check EDE and sjogrens testing.     4. Paroxsymal atrial fibrillation   Continue management per cardiology colleagues, cardizem held. Continue eliquis and metoprolol tartrate.      Fu in 6 months with labs prior.    The benefits, risks and alternatives to the treatment plan were discussed at this visit. Patient was advised of common adverse effects of any medical therapies prescribed. All questions were answered and discussed with the patient and any accompanying family members or caretakers.   "    Subjective:      Patient ID: Corinne A Longo is a 74 y.o. female presents for follow up in the [place] office for CKD management.Patient seen in consult on 2/6/25.    HPI    In the interim since last visit, 3/20/25 stopped diltiazem due to swelling and taste. HCTZ stopped as well per cardiologist.   BP welk controlled 125-130/60-65.       Denies change in resp symptoms. + diarrhea, Monday , due to hot dog, 24 hr gi bug.    5/20/25 Cr 1.38 with eGFR 38 ml/min.    Daughter has sjogens, lyme disease, anticardoioplin syndrome.   2nd daughter plaque psoriasis.   Another daughter's medical hx unclear.    Patient concerned she may have Sjogrens due to dry eye and dry mouth. She is planning to see a rheumatologist.    The following portions of the patient's history were reviewed and updated as appropriate: allergies, current medications, past family history, past medical history, past social history, past surgical history, and problem list.    Review of Systems   Respiratory: Negative.     Cardiovascular: Negative.    Gastrointestinal:  Positive for diarrhea.   Genitourinary: Negative.    Neurological: Negative.    All other systems reviewed and are negative.        Objective:      /68 (BP Location: Left arm, Patient Position: Sitting, Cuff Size: Standard)   Pulse 68   Temp (!) 96.8 °F (36 °C)   Ht 5' 8\" (1.727 m)   Wt 65.8 kg (145 lb)   SpO2 99% Comment: on 2 liters O2  BMI 22.05 kg/m²          Physical Exam  Vitals reviewed.   Constitutional:       General: She is not in acute distress.     Appearance: She is not ill-appearing.   HENT:      Head: Normocephalic and atraumatic.      Nose: Nose normal.      Mouth/Throat:      Mouth: Mucous membranes are moist.      Pharynx: Oropharynx is clear.     Cardiovascular:      Rate and Rhythm: Normal rate and regular rhythm.      Heart sounds:      No friction rub.   Pulmonary:      Breath sounds: Normal breath sounds. No wheezing or rales.   Abdominal:      " General: There is no distension.      Tenderness: There is no abdominal tenderness.     Musculoskeletal:      Right lower leg: No edema.      Left lower leg: No edema.     Skin:     General: Skin is warm and dry.      Coloration: Skin is not jaundiced.      Findings: No bruising.     Neurological:      General: No focal deficit present.      Mental Status: She is alert and oriented to person, place, and time.     Psychiatric:         Mood and Affect: Mood normal.         Behavior: Behavior normal.             Lab Results   Component Value Date     06/05/2015    SODIUM 143 05/20/2025    K 3.7 05/20/2025     05/20/2025    CO2 28 05/20/2025    ANIONGAP 2 (L) 06/05/2015    AGAP 10 05/20/2025    BUN 25 05/20/2025    CREATININE 1.38 (H) 05/20/2025    GLUC 113 12/03/2024    GLUF 93 05/20/2025    CALCIUM 9.8 05/20/2025    AST 21 05/20/2025    ALT 14 05/20/2025    ALKPHOS 72 05/20/2025    PROT 7.6 06/04/2015    TP 7.0 05/20/2025    TP 6.7 05/20/2025    BILITOT 0.5 06/04/2015    TBILI 0.71 05/20/2025    EGFR 38 05/20/2025      Lab Results   Component Value Date    CREATININE 1.38 (H) 05/20/2025    CREATININE 1.41 (H) 01/14/2025    CREATININE 1.38 (H) 12/03/2024    CREATININE 1.05 11/11/2024    CREATININE 1.52 (H) 11/06/2024    CREATININE 1.40 (H) 11/05/2024    CREATININE 1.33 (H) 11/04/2024    CREATININE 1.39 (H) 11/03/2024    CREATININE 1.38 (H) 11/02/2024    CREATININE 1.40 (H) 11/01/2024    CREATININE 1.27 10/31/2024    CREATININE 1.26 10/30/2024    CREATININE 0.87 10/29/2024    CREATININE 1.03 10/28/2024    CREATININE 1.02 04/07/2023      Lab Results   Component Value Date    COLORU Yellow 10/29/2024    CLARITYU Clear 10/29/2024    SPECGRAV 1.015 10/29/2024    PHUR 6.0 10/29/2024    LEUKOCYTESUR Negative 10/29/2024    NITRITE Negative 10/29/2024    PROTEIN UA Negative 10/29/2024    GLUCOSEU Negative 10/29/2024    KETONESU Negative 10/29/2024    UROBILINOGEN 0.2 10/29/2024    BILIRUBINUR Negative 10/29/2024     "BLOODU Negative 10/29/2024      No results found for: \"LABPROT\"  No results found for: \"MICROALBUR\", \"ECVM67EUX\"  Lab Results   Component Value Date    WBC 5.52 01/14/2025    HGB 11.3 (L) 01/14/2025    HCT 33.5 (L) 01/14/2025    MCV 98 01/14/2025     01/14/2025      Lab Results   Component Value Date    HGB 11.3 (L) 01/14/2025    HGB 10.2 (L) 12/03/2024    HGB 11.0 (L) 11/11/2024    HGB 9.4 (L) 11/06/2024    HGB 10.0 (L) 11/05/2024      Lab Results   Component Value Date    IRON 95 01/14/2025    TIBC 315 01/14/2025    FERRITIN 194 01/14/2025      No results found for: \"PTHCALCIUM\", \"[id number]\", \"PHOSPHORUS\"   Lab Results   Component Value Date    CHOLESTEROL 136 12/03/2024    HDL 50 12/03/2024    LDLCALC 65 12/03/2024    TRIG 107 12/03/2024      No results found for: \"URICACID\"   Lab Results   Component Value Date    HGBA1C 5.4 11/09/2021      No results found for: \"TSHANTIBODY\", \"H8LEUWR\", \"FREET4\"   No results found for: \"EDE\", \"DSDNAAB\", \"RFIGM\"   Lab Results   Component Value Date    PROT 7.6 06/04/2015        Portions of the record may have been created with voice recognition software. Occasional wrong word or \"sound a like\" substitutions may have occurred due to the inherent limitations of voice recognition software. Read the chart carefully and recognize, using context, where substitutions have occurred. If you have any questions, please contact the dictating provider.  "

## 2025-05-23 NOTE — TELEPHONE ENCOUNTER
Patient called to reschedule her appointment on 10/8.    She explained that the appointment was scheduled today after her visit for 10/8 but she was not sure what the time was but she knows they scheduled her for sure on 10/8.    She would like to see if she can be put in on 9/26 at 1pm because on 10/8 it looks like there is only New Patient slots available.       Please call patient back asap.

## 2025-05-28 ENCOUNTER — CLINICAL SUPPORT (OUTPATIENT)
Dept: PULMONOLOGY | Facility: HOSPITAL | Age: 75
End: 2025-05-28
Attending: INTERNAL MEDICINE
Payer: COMMERCIAL

## 2025-05-28 DIAGNOSIS — J44.9 CHRONIC OBSTRUCTIVE PULMONARY DISEASE, UNSPECIFIED COPD TYPE (HCC): ICD-10-CM

## 2025-05-28 PROCEDURE — 94625 PHY/QHP OP PULM RHB W/O MNTR: CPT

## 2025-05-29 ENCOUNTER — HOSPITAL ENCOUNTER (OUTPATIENT)
Dept: ULTRASOUND IMAGING | Facility: HOSPITAL | Age: 75
Discharge: HOME | End: 2025-05-29
Payer: COMMERCIAL

## 2025-05-29 DIAGNOSIS — D17.21 LIPOMA OF BOTH UPPER EXTREMITIES: ICD-10-CM

## 2025-05-29 DIAGNOSIS — D17.22 LIPOMA OF BOTH UPPER EXTREMITIES: ICD-10-CM

## 2025-05-29 PROCEDURE — 76882 US LMTD JT/FCL EVL NVASC XTR: CPT

## 2025-05-30 ENCOUNTER — CLINICAL SUPPORT (OUTPATIENT)
Dept: PULMONOLOGY | Facility: HOSPITAL | Age: 75
End: 2025-05-30
Attending: INTERNAL MEDICINE
Payer: COMMERCIAL

## 2025-05-30 DIAGNOSIS — J44.9 CHRONIC OBSTRUCTIVE PULMONARY DISEASE, UNSPECIFIED COPD TYPE (HCC): ICD-10-CM

## 2025-05-30 PROCEDURE — 94625 PHY/QHP OP PULM RHB W/O MNTR: CPT

## 2025-06-02 ENCOUNTER — CLINICAL SUPPORT (OUTPATIENT)
Dept: PULMONOLOGY | Facility: HOSPITAL | Age: 75
End: 2025-06-02
Attending: INTERNAL MEDICINE
Payer: COMMERCIAL

## 2025-06-02 DIAGNOSIS — J44.9 CHRONIC OBSTRUCTIVE PULMONARY DISEASE, UNSPECIFIED COPD TYPE (HCC): Primary | ICD-10-CM

## 2025-06-02 PROCEDURE — 94626 PHY/QHP OP PULM RHB W/MNTR: CPT

## 2025-06-02 NOTE — PROGRESS NOTES
Pulmonary Rehabilitation   Assessment and Individualized Treatment Plan  DISCHARGE        Today's date: 2025  # of Exercise Sessions Completed:   Patient name: Corinne A Longo     : 1950       MRN: [id number]  Referring Physician: Cece Ro MD  Pulmonologist: Rosy Rodriges DO / ROGELIO Crawley  Provider: Oliva  Clinician: Erika Sacks, MS    Dx:  COPD  Date of onset: Hospitalized 10/28/2024 - 2024      Treatment is tailored to this patient's individual needs.  The ITP was reviewed with the patient and all questions were answered to their satisfaction.  Additional ITP documentation can be found electronically including daily and monthly exercise summaries, daily session notes with  summaries, education notes, daily medication reconciliation, and daily physician supervision.        DISCHARGE SUMMARY  2025    Completed  exercise sessions  6MWT Pre: Distance:  675 ft / 1.98 METs, Post:  Distance: 1,056 ft / 2.53 METs  Max METs tolerated in pulmonary rehab:  Pre: 1.9,  Post: 2.9  SOBQ:  Pre: 67,  Post: 14  CAT:  Pre: 26,  Post: 14  [facility] QOL:  Pre: 28, Post: 18  PHQ-9:  Pre: 3, Post: 1  Weight:  Pre: 150,  Post: 145 lb  Exercise session details:  45-60 minutes,  2.9 METs  Resting BP  110/62 - 138/66,  HR 53 - 74  Exercise /64 - 158/72,  HR 71 - 140  LO2: Rest: 2L, Exercise: 2L  SpO2:  Rest: 96-98%, Exercise: 92-95%  MERCHANT: Rest:  0,  Exercise: 0-3  Discharge Plans: Corinne is already signed up to attend the [facility] in [place].   Patient's subjective report of progress: Corinne is very pleased with how she progressed through pulmonary rehab and is looking forward to continuing exercise.   Clinical Comments:  Corinne was very compliant attending her scheduled pulmonary rehab sessions and gave excellent effort each session.          ASSESSMENT      Medical History:   Past Medical History:   Diagnosis Date    Allergic 1970    Allergic  rhinitis     [id number]  RESOLVED    Asthma     Atrial fibrillation ([facility])     Bronchitis     Bunion     COPD (chronic obstructive pulmonary disease) ([facility]) 10/2015    home O2 at night at home 2L    Coronary artery disease     AFIB    Depression     Emphysema lung ([facility])     Fibroadenoma of breast     Gastroesophageal reflux disease without esophagitis 12/18/2023    Hyperlipidemia     Hypertension     Lung disease 2008    COPD    Memory difficulties 07/23/2019    Post-menopausal     Post-menopausal     Sepsis due to pneumonia ([facility]) 10/28/2024    Spondylisthesis     Stage 3 chronic kidney disease, unspecified whether stage 3a or 3b CKD ([facility]) 02/07/2022    Vitamin D deficiency        Family History:  Family History   Problem Relation Name Age of Onset    Hypertension Mother Ria Walsh     Alcohol abuse Mother Ria Walsh     Hypertension Father Tian     Diabetes Father Tian     Prostate cancer Father Tian     Cancer Father Tian         Prostate    Hearing loss Father Tian     Vitamin D deficiency Sister [place]     Diverticulitis Sister [place]     Depression Sister Elizabeth     No Known Problems Sister      Heart failure Brother Jayce     Lung cancer Brother [place]     Cancer Brother Jayce         Stage four lung cancer    Celiac disease Daughter Michelle     Alcohol abuse Daughter Michelle     Substance Abuse Daughter Michelle     Eczema Daughter Diana     Fibromyalgia Daughter Diana     Substance Abuse Daughter michelle     Alcohol abuse Daughter michelle     ADD / ADHD Daughter michelle     Bipolar disorder Daughter michelle     Anxiety disorder Daughter michelle     Ulcerative colitis Daughter michelle     No Known Problems Maternal Aunt      Heart disease Brother Cristopher Walsh     Substance Abuse Brother Cristopher Walsh     Drug abuse Brother Cristopher Walsh     Heart failure Brother Cristopher Walsh     Breast cancer Neg Hx         Allergies:   Bupropion, Ace inhibitors, Citalopram, Diltiazem, Mixed ragweed, Pollen extract, and Adhesive  [medical tape]    Current Medications:   Current Outpatient Medications   Medication Sig Dispense Refill    apixaban (Eliquis) 5 mg Take 1 tablet by mouth 2 times a day 180 tablet 0    ascorbic acid (VITAMIN C) 500 mg tablet Take 500 mg by mouth in the morning.      atorvastatin (LIPITOR) 40 mg tablet Take 1 tablet (40 mg total) by mouth daily 90 tablet 1    calcium citrate-vitamin D 315 mg-5 mcg tablet Take 2 tablets by mouth in the morning and 2 tablets in the evening.      cetirizine (ZyrTEC) 10 mg tablet Take 10 mg by mouth in the morning.      cholecalciferol (VITAMIN D3) 1,000 units tablet Take 2 Units by mouth daily      clotrimazole-betamethasone (LOTRISONE) 1-0.05 % cream Apply topically 2 (two) times a day 45 g 1    guaiFENesin (MUCINEX) 600 mg 12 hr tablet Take 1 tablet (600 mg total) by mouth every 12 (twelve) hours 180 tablet 3    ipratropium-albuterol (DUO-NEB) 0.5-2.5 mg/3 mL nebulizer solution Take 3 mL by nebulization every 6 (six) hours as needed for wheezing or shortness of breath 1080 mL 0    L-Lysine 500 MG CAPS Take 1 capsule by mouth in the morning.      levalbuterol (Xopenex HFA) 45 mcg/act inhaler Inhale 1-2 puffs every 4 (four) hours as needed for wheezing 45 g 2    methocarbamol (ROBAXIN) 500 mg tablet Take 1 tablet (500 mg total) by mouth 3 (three) times a day as needed for muscle spasms 90 tablet 0    metoprolol tartrate (LOPRESSOR) 50 mg tablet Take 1 tablet by mouth every 12 hours 180 tablet 0    mometasone (NASONEX) 50 mcg/act nasal spray 2 sprays into each nostril daily 51 g 2    multivitamin-minerals (CENTRUM ADULTS) tablet Take 1 tablet by mouth in the morning.      oxygen gas Inhale 2 L/min continuous via nasal cannula      Probiotic Product (PROBIOTIC-10 PO) Take 1 capsule by mouth in the morning.      triamcinolone (KENALOG) 0.1 % cream Apply topically 2 (two) times a day (Patient not taking: Reported on 3/19/2025) 15 g 0    umeclidinium-vilanterol (Anoro Ellipta) 62.5-25  mcg/actuation inhaler Inhale 1 puff daily 180 each 1     No current facility-administered medications for this visit.       Physical Limitations: Foot pain due to osteopenia and 02 dependence.     Fall Risk: Low   Comments: Ambulates with a steady gait with no assist device    Cultural needs: none    PFT:  Yes   FEV1/FVC ratio of 47%   FEV1 of 52% predicted  Moderate to severe obstruction    Medication compliance: Yes  Comments: Pt reports to be compliant with medications. Denies any issues w/current prescriptions.       Pulmonary Disease Risk Factors:  second hand smoke  smoking    Sleep Disorders:   obstructive sleep apnea:  CPCP/BiPAP:  no      EXERCISE ASSESSMENT:      Initial Fitness Assessment:   6MWT:  675 ft = 1.98 METs  Rest: HR 73, /56, SpO2 98% on 2L/min, 0/10 SOB  Exercise: HR 92, /58, SpO2 94-97% on 2L/min, 4/10 MERCHANT  Recovery: HR 70, /54, SpO2 98% on 2L/min, 0/10 SOB    Final Fitness Assessment:   6MWT:  1056 ft = 2.53 METs  Rest: HR 74, /68, SpO2 98% on 2L/min, 0/10 SOB  Exercise: HR 97, /76, SpO2 95-97% on 2L/min, 0/10 MERCHANT  Recovery: HR 70, /58, SpO2 97% on 2L/min, 0/10 SOB   Did not require any rest periods!      Current Functional Status:  Occupation: retired  Recreation/Physical activity: Patient is no longer sedentary at home.   ADL’s:Capable of performing light to moderate ADLs   [place]: Able to ambulate longer distances w/little to no MERCHANT  Exercise: Daily walking at home  Home exercise equipment: None  Other Comments: Difficulty lifting and carrying items especially up/down the stairs    SMART Exercise Goals:   reduced score on CAT, improved 6MWT distance, reduced dyspnea during exercise, improved exercise tolerance based on peak METs tolerated in pulmonary rehab exercise session, and SpO2 >88% during exercise    Patient Specific EXERCISE GOALS:     reduced number of COPD exacerbations, attend pulmonary rehab regularly, decrease sitting time at home,  reduced pulmonary related hospital readmissions , reduced reliance on supplemental 02 and increased energy    PSYCHOSOCIAL ASSESSMENT:    Date of initial assessment: 12/12/2024  Depression screening:  PHQ-9 = 3    Interpretation:  1-4 = Minimal Depression  Anxiety screening:  LOUIS-7 = 5    Interpretation: 5-9 = Mild anxiety    Date of final assessment: 6/2/2025  Depression screening:  PHQ-9 = 1    Interpretation:  1-4 = Minimal Depression  Anxiety screening:  LOUIS-7 = 2    Interpretation: 0-4 = Not anxious    Pt self-report of depression and anxiety   Patient has a history of depression and anxiety. Patient is prescribed Ativan w/good results.     Self-reported stress level:  Patient states typically she is a low stress level (3/10) daily but certain things (going in public, going somewhere new, rushing around) will cause her stress level to be high (7/10)  Stress Management: practice Relaxation Techniques and keep a positive mindset    Quality of Life Screen:  (Higher score indicates disease impact on QOL)  [facility] COOP score: 28/40 at initial evaluation, 18/40 at discharge    CAT: 26/40 at initial evaluation, 14/40 at discharge    Shortness of breath questionnaire: 67/120 at initial evaluation, 14/120 at discharge     Social Support:   significant other, children, and friends  Community/Social Activities: Spend time with family and friends    SMART Goals:    Reduce perceived stress to 1-3/10, improved [facility] QOL < 27, Physical Fitness in [facility] Score < 3, Daily Activity in [facility] Score < 3, improved sleeping habits, feel less tired with more energy, and Improved appetite control    Patient Specific PSYCHOCOSOCIAL GOALS:    maintain compliance with medical therapy, address emotional health concerns with PCP, spend time with family, and continue w/Ativan prescription     Psychosocial Assessment as it relates to rehabilitation: Patient denies issues with her family or home life that may affect their  "rehabilitation efforts. He states all her needs are being met. [facility] staff will continue to offer support as needed.       NUTRITION ASSESSMENT:    Initial Weight:  147  Current Weight: 145    Height:   Ht Readings from Last 1 Encounters:   05/23/25 5' 8\" (1.727 m)       Rate Your Plate Score: 71/81 at initial evaluation, 79/81 at discharge    Self-reported Current Dietary Habits:  Patient states since her boyfriend had CABG a few years ago they have been eating healthier. She states her appetite has improved and she is trying to follow a \"Heart Healthy Diet\". She makes sure to hydrate regularly throughout the day.     ETOH:   Social History     Substance and Sexual Activity   Alcohol Use Not Currently    Comment: I might have a glass of wine every two or three months       SMART Goals:   Improved Rate Your Plate score  >58    Patient Specific NUTRITION GOALS:    increase water intake to reduce/thin mucus production decrease caloric intake weight gain increased muscle mass      OTHER CORE COMPONENT ASSESSMENT:    Hospitalizations in the past year: Hospitalized 10/28/2024 - 11/6/2024 for sepsis due to pneumonia.     Oxygen needs:   Rest:  supplemental O2 via nasal cannula @ 2L/min   Exercise/physical activity:  supplemental O2 via nasal cannula @ 2L/min   Sleep:   supplemental O2 via nasal cannula @ 2L/min     Does Pt monitor home SpO2? yes   Average SpO2 at rest:  92-94%   Average SpO2 with ADLs/physical activity:  Does not check      Use of Rescue Inhaler: Patient states she will only use her rescue inhaler if absolutely necessary because it makes her feel jittery.    Use of Maintenance Inhaler: Yes; 1x/day (morning)    Use of Nebulizer Treatments:  Patient states she has a nebulizer that she uses PRN. She states she mainly only used it during seasonal allergies but has been using it a bit more frequently since her hospitalization due to ongoing cough and increased shortness of breath.     Patient practices breathing " techniques at home:  Yes and Reviewed with patient on:  12/3/02236    CAD Risk Factors:  Cholesterol: No  HTN: No  DM: No  Obesity: No   Smoking: Patient states she quit smoking on 2023 but believes she relapsed right before her hospitalization. She does not remember smoking any cigarettes but states her daughter found a pack. She has remained smoke free since she was hospitalized and avoids secondhand smoke.    SMART Core Component Goals:   consistent, controlled resting BP < 130/80, medication compliance, and demonstrate pursed lipped breathing    Patient Specific CORE COMPONENT GOALS:    medication compliance, compliance with supplemental oxygen, and monitor home pulse oximetry      INDIVIDUALIZED TREATMENT PLAN      EXERCISE GOALS AND PLAN    PHYSCIAN PRESCRIBED EXERCISE    Current Aerobic Exercise Prescription       Frequency: 2 days/week   Supplement with home exercise 2+ days/wk as tolerated        Minutes: 50-60         METS: 2.83              SpO2: >90% on 2 liters              RPD: 0-3/10                      HR: RHR +30-40bpm   RPE: 4-5/10         Modalities: Treadmill, UBE, NuStep, and Recumbent bike        Supplemental Oxygen Needs with Exercise:  supplemental O2 2L/min via nasal canula.    Strength trainin-3 days / week  12-15 repetitions  1-2 sets per modality    Modalities: 15# Chest Press and 3# UE free weights    Education: pursed lipped breathing, diaphragmatic breathing, RPE scale, RPD scale, O2 saturation monitoring, appropriate O2 response to exercise, and education class: Exercise For The Pulmonary Patient    Progress toward Exercise Goals:    Goals met: Completed 36 sessions, improved 6MWT (675 ft to 1056 ft)., Patient will be encouraged to focus on lifestyle modifications following discharge.    Exercise Plan:  Titrate supplemental oxygen as needed to maintain SpO2>88% with exercise, learn to conserve energy with ADLs , practice diaphragmatic breathing, reduce time sitting at home,  "utilize PLB with physical activity, and begin a home exercise program     Readiness to change: Maintenance: (Maintaining the behavior change)      NUTRITION GOALS AND PLAN    Progress toward Nutritional goals:    Goals met: 5# weight loss, improved RYP score., Patient will be encouraged to focus on lifestyle modifications following discharge.    Nutrition Plan: group class: Reading Food Labels, group Class: Heart Healthy Eating, increase intake of whole grains, increase daily intake of fruits and vegetables, choose lean red meat, never/rarely add salt to food when cooking or at the table, choose low sodium canned, frozen, packaged foods or rarely eat these foods, and rarely/never eat salty snacks    SMART goals are based Rate Your Plate Dietary Self-Assessment. These are the areas in which the patient could score higher on the assessment.  Goals include recommendations for a heart healthy diet based on American Heart Association.    Nutrition Education: heart healthy eating principles  maintaining hydration  group class: Heart Healthy Eating  group class:  Label Reading  group class: healthy eating for managing pulmonary illness  \"Plant Based Eating\"  \"Focus on Fiber\"    Readiness to change: Maintenance: (Maintaining the behavior change)      PSYCHOSOCIAL GOALS AND PLAN    Information to begin using [employer] was provided as well as contact information for counseling through  [facility].    Progress toward Psychosocial goals:    Goals met: improved PHQ9 score, improved [place] score., Patient will be encouraged to focus on lifestyle modifications following discharge.    Psychosocial: Class: Stress and Your Health, Class:  Stress and Pulmonary Disease, Enroll in [employer], Practice relaxation techniques, Keep a positive mindset, and Enjoy family    Psychosocial Education: stress management techniques, benefits of enrolling in [employer], depression and pulmonary disease, tools to manage " fear/anxiety related to becoming SOB, class:  Stress and Your Health , and class:  Stress and Pulmonary Disease    Readiness to change: Maintenance: (Maintaining the behavior change)      OTHER CORE COMPONENTS GOALS AND PLAN    Tobacco Use Intervention:     Patient states she quit smoking on 7/4/2023 (she averaged 0.25-0.3 ppd for 53.5 years) but believes she relapsed right before her hospitalization. She does not remember smoking any cigarettes but states her daughter found a pack. She continues to remain smoke free since she was hospitalized and avoids secondhand smoke.    Oxygen Goal: Maintain SpO2>88% during exercise or as advised by pulmonolgist    Progress toward Core Component goals:    Goals met: Remains smoke free, maintain SpO2 88% or greater, improved vitals., Patient will be encouraged to focus on lifestyle modifications following discharge.    Core Component Plan: medication compliance, complete abstention from smoking, avoid places with second hand smoke, group class: Pulmonary Anatomy and Physiology, and group class:  Pulmonary Medications    Core Component Education:  pathophysiology of pulmonary disease, preventing infections, relapse education, control coughing, harmonica therapy, education: Pulmonary Anatomy and Physiology, education:  Pulmonary Medications, education:  Clearing Secretions, and education: Oxygen    Readiness to change: Maintenance: (Maintaining the behavior change)

## 2025-06-05 ENCOUNTER — RESULTS FOLLOW-UP (OUTPATIENT)
Dept: FAMILY MEDICINE CLINIC | Facility: CLINIC | Age: 75
End: 2025-06-05

## 2025-06-11 ENCOUNTER — TELEPHONE (OUTPATIENT)
Dept: PULMONOLOGY | Facility: CLINIC | Age: 75
End: 2025-06-11

## 2025-06-26 ENCOUNTER — TELEPHONE (OUTPATIENT)
Age: 75
End: 2025-06-26

## 2025-06-26 DIAGNOSIS — J44.1 COPD WITH ACUTE EXACERBATION (HCC): Primary | ICD-10-CM

## 2025-06-26 NOTE — TELEPHONE ENCOUNTER
Pt called and is wondering if we know what Blood type she is and if we can call her back. Pt is wondering if we dont have access to her blood type, what testing would she have to do to find out.     Thanks.

## 2025-06-26 NOTE — TELEPHONE ENCOUNTER
Left vm for patient we don't have that information but she could have a blood test done at the lab. She should call her insurance and see if they cover it first I am not sure how much it cost. She could also donate blood and they will be able to tell her.

## 2025-06-26 NOTE — TELEPHONE ENCOUNTER
Patient spoke to her insurance and she was told the testing will not be covered due to it not being medically neccessary. Patient is requesting the order be placed anyway and she will call around to see how much it will cost her. Please advise.

## 2025-07-07 DIAGNOSIS — J44.9 CHRONIC OBSTRUCTIVE PULMONARY DISEASE, UNSPECIFIED COPD TYPE (HCC): ICD-10-CM

## 2025-07-07 RX ORDER — UMECLIDINIUM BROMIDE AND VILANTEROL TRIFENATATE 62.5; 25 UG/1; UG/1
1 POWDER RESPIRATORY (INHALATION) DAILY
Qty: 180 EACH | Refills: 1 | Status: SHIPPED | OUTPATIENT
Start: 2025-07-07

## 2025-07-08 ENCOUNTER — HOSPITAL ENCOUNTER (OUTPATIENT)
Dept: MAMMOGRAPHY | Facility: HOSPITAL | Age: 75
Discharge: HOME | End: 2025-07-08
Payer: COMMERCIAL

## 2025-07-08 ENCOUNTER — HOSPITAL ENCOUNTER (OUTPATIENT)
Dept: BONE DENSITY | Facility: HOSPITAL | Age: 75
Discharge: HOME | End: 2025-07-08
Payer: COMMERCIAL

## 2025-07-08 VITALS — HEIGHT: 68 IN | WEIGHT: 139 LBS | BODY MASS INDEX: 21.07 KG/M2

## 2025-07-08 DIAGNOSIS — Z13.820 ENCOUNTER FOR SCREENING FOR OSTEOPOROSIS: ICD-10-CM

## 2025-07-08 DIAGNOSIS — Z78.0 POSTMENOPAUSAL: ICD-10-CM

## 2025-07-08 DIAGNOSIS — Z12.31 ENCOUNTER FOR SCREENING MAMMOGRAM FOR BREAST CANCER: ICD-10-CM

## 2025-07-08 PROCEDURE — 77080 DXA BONE DENSITY AXIAL: CPT

## 2025-07-08 PROCEDURE — 77063 BREAST TOMOSYNTHESIS BI: CPT

## 2025-07-08 PROCEDURE — 77067 SCR MAMMO BI INCL CAD: CPT

## 2025-07-09 ENCOUNTER — TELEPHONE (OUTPATIENT)
Age: 75
End: 2025-07-09

## 2025-07-09 DIAGNOSIS — J44.9 COPD, SEVERE (HCC): Primary | ICD-10-CM

## 2025-07-09 NOTE — TELEPHONE ENCOUNTER
Patient called in and would like to know if  would like for her to have her CT chest or CT lung screening . Since her last Ct lung screening was done back on 11/20/2023 and she was not able to get it done in 11/2024 since she was in the hospital pneumonia not sure if that's why she was not sent to have he year lung screening . Please advise

## 2025-07-10 NOTE — TELEPHONE ENCOUNTER
I called and spoke to corinne, she will call and get the CAT scan scheduled and she already has a fu appt 9-8-25

## 2025-07-14 DIAGNOSIS — E78.5 HYPERLIPIDEMIA, UNSPECIFIED HYPERLIPIDEMIA TYPE: ICD-10-CM

## 2025-07-15 RX ORDER — ATORVASTATIN CALCIUM 40 MG/1
40 TABLET, FILM COATED ORAL DAILY
Qty: 90 TABLET | Refills: 1 | Status: SHIPPED | OUTPATIENT
Start: 2025-07-15

## 2025-07-20 DIAGNOSIS — I48.91 ATRIAL FIBRILLATION WITH RVR (HCC): ICD-10-CM

## 2025-07-21 RX ORDER — METOPROLOL TARTRATE 50 MG
50 TABLET ORAL 2 TIMES DAILY
Qty: 180 TABLET | Refills: 1 | Status: SHIPPED | OUTPATIENT
Start: 2025-07-21

## 2025-07-23 ENCOUNTER — HOSPITAL ENCOUNTER (OUTPATIENT)
Dept: CT IMAGING | Facility: HOSPITAL | Age: 75
Discharge: HOME | End: 2025-07-23
Attending: INTERNAL MEDICINE
Payer: COMMERCIAL

## 2025-07-23 DIAGNOSIS — J44.9 COPD, SEVERE (HCC): ICD-10-CM

## 2025-07-23 PROCEDURE — 71250 CT THORAX DX C-: CPT

## 2025-07-30 ENCOUNTER — TELEPHONE (OUTPATIENT)
Age: 75
End: 2025-07-30

## 2025-08-12 ENCOUNTER — APPOINTMENT (OUTPATIENT)
Dept: LAB | Facility: CLINIC | Age: 75
End: 2025-08-12
Payer: COMMERCIAL

## 2025-08-12 ENCOUNTER — OFFICE VISIT (OUTPATIENT)
Dept: OBGYN CLINIC | Facility: CLINIC | Age: 75
End: 2025-08-12
Payer: COMMERCIAL

## 2025-08-14 ENCOUNTER — TELEPHONE (OUTPATIENT)
Age: 75
End: 2025-08-14